# Patient Record
Sex: FEMALE | Race: ASIAN | NOT HISPANIC OR LATINO | Employment: OTHER | URBAN - METROPOLITAN AREA
[De-identification: names, ages, dates, MRNs, and addresses within clinical notes are randomized per-mention and may not be internally consistent; named-entity substitution may affect disease eponyms.]

---

## 2017-01-05 ENCOUNTER — OFFICE VISIT (OUTPATIENT)
Dept: OPHTHALMOLOGY | Facility: CLINIC | Age: 77
End: 2017-01-05
Payer: COMMERCIAL

## 2017-01-05 DIAGNOSIS — H02.834 DERMATOCHALASIS OF BOTH UPPER EYELIDS: ICD-10-CM

## 2017-01-05 DIAGNOSIS — Z96.1 PSEUDOPHAKIA OF BOTH EYES: Primary | ICD-10-CM

## 2017-01-05 DIAGNOSIS — H02.831 DERMATOCHALASIS OF BOTH UPPER EYELIDS: ICD-10-CM

## 2017-01-05 PROCEDURE — 99024 POSTOP FOLLOW-UP VISIT: CPT | Performed by: STUDENT IN AN ORGANIZED HEALTH CARE EDUCATION/TRAINING PROGRAM

## 2017-01-05 ASSESSMENT — TONOMETRY
OS_IOP_MMHG: 18
IOP_METHOD: APPLANATION
OD_IOP_MMHG: 20

## 2017-01-05 ASSESSMENT — VISUAL ACUITY
OD_SC: 20/25
OS_SC: 20/25
METHOD: SNELLEN - LINEAR

## 2017-01-05 ASSESSMENT — EXTERNAL EXAM - LEFT EYE: OS_EXAM: NORMAL

## 2017-01-05 ASSESSMENT — EXTERNAL EXAM - RIGHT EYE: OD_EXAM: NORMAL

## 2017-01-05 ASSESSMENT — CUP TO DISC RATIO
OD_RATIO: 0.1 UD
OS_RATIO: 0.1 UD

## 2017-01-05 NOTE — MR AVS SNAPSHOT
"              After Visit Summary   1/5/2017    Lakhwinder Negron    MRN: [id number]           Patient Information     Date Of Birth          1940        Visit Information        Provider Department      1/5/2017 8:00 AM Julio Doll MD; [employer] [facility]        Today's Diagnoses     Pseudophakia of both eyes    -  1     Dermatochalasis of both upper eyelids           Care Instructions    Continue Ketotifen (Zaditor)  twice a day both eyes.  Use artificial tears up to 4 times per day (Refresh Plus, Systane Balance, or generic artificial tears are ok. Avoid \"get the red out\" drops).   Could use a warm compress as needed     Julio Doll MD  [phone]          Follow-ups after your visit        Follow-up notes from your care team     Return in about 10 months (around 11/5/2017) for Complete Exam.      Your next 10 appointments already scheduled     Jan 12, 2017 10:00 AM   [facility]   [facility]    [address] [facility] [facility] [address] [phone]           Bring a current list of meds and any records pertaining to this visit.  For Physicals, please bring immunization records and any forms needing to be filled out.  Please arrive 10 minutes early to complete paperwork.            Feb 16, 2017 10:30 AM   LAB with BK LAB   [facility]    [address] [facility] [facility] [address] [phone]           Patient must bring picture ID.  Patient should be prepared to give a urine specimen  Please do not eat 10-12 hours before your appointment if you are coming in fasting for labs on lipids, cholesterol, or glucose (sugar).  Pregnant women should follow their Care Team instructions. Water with medications is okay. Do not drink coffee or other fluids.   If you have concerns about taking  your medications, please " ask at office or if scheduling via Accellion, send a message by clicking on Secure Messaging, Message Your Care Team.            Mar 02, 2017  9:00 AM   LAB with BK LAB   [facility]    [address] [facility] [id number] [phone]           Patient must bring picture ID.  Patient should be prepared to give a urine specimen  Please do not eat 10-12 hours before your appointment if you are coming in fasting for labs on lipids, cholesterol, or glucose (sugar).  Pregnant women should follow their Care Team instructions. Water with medications is okay. Do not drink coffee or other fluids.   If you have concerns about taking  your medications, please ask at office or if scheduling via Accellion, send a message by clicking on Secure Messaging, Message Your Care Team.            Mar 09, 2017  9:00 AM   Office Visit with Jonna Cason MD   [facility]    [address] [facility] [id number] [phone]           Bring a current list of meds and any records pertaining to this visit.  For Physicals, please bring immunization records and any forms needing to be filled out.  Please arrive 10 minutes early to complete paperwork.            Aug 16, 2017 10:30 AM   LAB with LAB FIRST FLOOR Counts include 234 beds at the [facility] ([facility])    [address] [place] [id number] [phone]           Patient must bring picture ID.  Patient should be prepared to give a urine specimen  Please do not eat 10-12 hours before your appointment if you are coming in fasting for labs on lipids, cholesterol, or glucose (sugar).  Pregnant women should follow their Care Team instructions. Water with medications is okay. Do not drink coffee or other fluids.   If you have concerns about taking  your medications, please ask at office or if scheduling via Accellion, send a  "message by clicking on Secure Messaging, Message Your Care Team.            Aug 16, 2017 11:00 AM   [facility]    [address] [place] [address] [phone]              Who to contact     If you have questions or need follow up information about today's clinic visit or your schedule please contact [facility] directly at [phone].  Normal or non-critical lab and imaging results will be communicated to you by MyChart, letter or phone within 4 business days after the clinic has received the results. If you do not hear from us within 7 days, please contact the clinic through SIGFOXhart or phone. If you have a critical or abnormal lab result, we will notify you by phone as soon as possible.  Submit refill requests through Spock or call your pharmacy and they will forward the refill request to us. Please allow 3 business days for your refill to be completed.          Additional Information About Your Visit        SIGFOXharEngagementHealth Information     Spock lets you send messages to your doctor, view your test results, renew your prescriptions, schedule appointments and more. To sign up, go to [email or web address] . Click on \"Log in\" on the left side of the screen, which will take you to the Welcome page. Then click on \"Sign up Now\" on the right side of the page.     You will be asked to enter the access code listed below, as well as some personal information. Please follow the directions to create your username and password.     Your access code is: [email or web address]  Expires: 2017  8:48 AM     Your access code will  in 90 days. If you need help or a new code, please call your [facility] or [phone].        Care EveryWhere ID     This is your Care EveryWhere ID. This could be used by other organizations to access your [facility] medical records  [id number]         Blood Pressure from " Last 3 Encounters:   12/09/16 138/68   10/03/16 152/79   09/16/16 146/78    Weight from Last 3 Encounters:   12/09/16 55.339 kg (122 lb)   10/03/16 57.153 kg (126 lb)   09/09/16 57.153 kg (126 lb)              Today, you had the following     No orders found for display         Today's Medication Changes          These changes are accurate as of: 1/5/17  8:53 AM.  If you have any questions, ask your nurse or doctor.               Stop taking these medicines if you haven't already. Please contact your care team if you have questions.     ketotifen 0.025 % Soln ophthalmic solution   Commonly known as:  ZADITOR                    Primary Care Provider Office Phone # Fax #    Jonna Kaleigh Cason [phone]       [facility] [address] [facility] [address]        Thank you!     Thank you for choosing [facility]  for your care. Our goal is always to provide you with excellent care. Hearing back from our patients is one way we can continue to improve our services. Please take a few minutes to complete the written survey that you may receive in the mail after your visit with us. Thank you!             Your Updated Medication List - Protect others around you: Learn how to safely use, store and throw away your medicines at www.disposemymeds.org.          This list is accurate as of: 1/5/17  8:53 AM.  Always use your most recent med list.                   Brand Name Dispense Instructions for use    amLODIPine 10 MG tablet    NORVASC    90 tablet    Take 1 tablet (10 mg) by mouth daily       aspirin 81 MG tablet     100 tablet    Take 1 tablet (81 mg) by mouth daily *       calcium-vitamin D 600-400 MG-UNIT per tablet    CALTRATE    60 tablet    TAKE 1 TABLET BY MOUTH TWICE DAILY FOR BONE HEALTH//IB ZAUG NOJ 1 LUB, IB HNUB NOJ OB ZATIFFANIE PAB LASHA POB TXHA       * CANE, ANY MATERIAL     1 each    1 each.       FLUVIRIN Susp   Generic drug:  Influenza Vac Typ A&B Surf Ant           hydrALAZINE 10 MG tablet    APRESOLINE    1 tablet    Take 1 tablet (10 mg) by mouth daily       imipramine 10 MG tablet    TOFRANIL    180 tablet    TAKE 2 TABLETS BY MOUTH AT BEDTIME // IB HNUB NOJ 2 LUB THAUM MUS PW PAN LASHA NYUAB SIAB       insulin aspart prot & aspart injection    NovoLOG MIX 70/30 FLEXPEN    1 mL    Inject 10 Units Subcutaneous 2 times daily (with meals)       * insulin pen needle 31G X 6 MM    ULTICARE MINI    100 each    Use 2 daily or as directed.       * UNIFINE PENTIPS 31G X 5 MM   Generic drug:  insulin pen needle     100 each    USE AS DIRECTED TWICE DAILY OR AS DIRECTED// SIV RAWS LI QHIA, 1 HNUB 2 ZAUG       olopatadine 0.1 % ophthalmic solution    PATANOL    5 mL    Place 1 drop into both eyes 2 times daily as needed for allergies (itchy eye.)       ONE TOUCH ULTRA test strip   Generic drug:  blood glucose monitoring     200 each    TEST BLOOD SUGAR 2-3 TIMES A DAY//IB HNUB SIV 2-3 ZAUG LI QHIA       ONETOUCH DELICA LANCETS 33G Misc     100 each    USE AS DIRECTED TWO TIMES DAILY // IB HNUB SIV 2 ZAUG LI QHIA       * order for DME     1 each    Equipment being ordered: blood pressure meter with supplies and adult cuff       * order for DME     200 each    Lancets bid for type 2 diabetes on insulin.       * order for DME     1 Device    Equipment being ordered: Shoe lift for leg length difference.       * order for DME     200 each    Test strips for pt's glucometer, brand as covered by insurance Test bid. One touch Delica lancets. Type 2 diabetes on insulin.  Directions: use as directed Quantity: 200       prednisoLONE acetate 1 % ophthalmic susp    PRED FORTE    5 mL    Place 1 drop into both eyes 4 times daily       rosuvastatin 20 MG tablet    CRESTOR    90 tablet    Take 1 tablet (20 mg) by mouth daily       * Notice:  This list has 7 medication(s) that are the same as other medications prescribed for you. Read the directions carefully, and ask your doctor or other care  provider to review them with you.

## 2017-01-05 NOTE — PATIENT INSTRUCTIONS
"Continue Ketotifen (Zaditor)  twice a day both eyes.  Use artificial tears up to 4 times per day (Refresh Plus, Systane Balance, or generic artificial tears are ok. Avoid \"get the red out\" drops).   Could use a warm compress as needed     Julio Doll MD  [phone]    "

## 2017-01-06 ENCOUNTER — TELEPHONE (OUTPATIENT)
Dept: FAMILY MEDICINE | Facility: CLINIC | Age: 77
End: 2017-01-06

## 2017-01-06 NOTE — TELEPHONE ENCOUNTER
Home Care nurse notified.  Patient has hospital follow-up  Schedule next Tuesday.  Patient is currently taking 30 units of Insulin.  Metformin was stopped while in the hospital.   Patient wants to discontinue home care.  In the past, patient has DC home care and has ended up in the hospital.  Home Care nurse was able to convince the patient to see a nurse monthly.  Granddaughter is being trained on how to set-up the patient's medication.    Next 5 appointments (look out 90 days)     Jan 12, 2017 10:00 AM   [facility]    [address] [facility] [address]   [phone]            Mar 09, 2017  9:00 AM   Office Visit with [facility] MD   [facility]    [address] [facility] [address]   [phone]                Gricelda Olguin RN

## 2017-01-06 NOTE — TELEPHONE ENCOUNTER
Blood sugar noted and either in range, high or low, will leave insulin dose the same. Too much variation to be able to see a trend.  Jonna Cason MD

## 2017-01-06 NOTE — TELEPHONE ENCOUNTER
Reason for Call:  Lilliana from [facility]   Detailed comments: Pt released from the hospital 2 weeks ago.  All fasting blood sugar levels 155, 188, 97, 179, 134, 159, 104, 172    Phone Number Patient can be reached at: Lilliana phone#[phone]    Best Time: Anytime  Can we leave a detailed message on this number? YES    Call taken on 1/6/2017 at 2:13 PM by Mikayla Benito

## 2017-01-12 ENCOUNTER — OFFICE VISIT (OUTPATIENT)
Dept: FAMILY MEDICINE | Facility: CLINIC | Age: 77
End: 2017-01-12
Payer: COMMERCIAL

## 2017-01-12 VITALS
BODY MASS INDEX: 27.8 KG/M2 | DIASTOLIC BLOOD PRESSURE: 70 MMHG | SYSTOLIC BLOOD PRESSURE: 132 MMHG | HEIGHT: 56 IN | OXYGEN SATURATION: 97 % | TEMPERATURE: 98.9 F | WEIGHT: 123.6 LBS | HEART RATE: 98 BPM

## 2017-01-12 DIAGNOSIS — E78.5 HYPERLIPIDEMIA LDL GOAL <100: ICD-10-CM

## 2017-01-12 DIAGNOSIS — G81.91 RIGHT HEMIPARESIS (H): ICD-10-CM

## 2017-01-12 DIAGNOSIS — I10 HYPERTENSION, GOAL BELOW 140/90: ICD-10-CM

## 2017-01-12 DIAGNOSIS — I63.549 CEREBROVASCULAR ACCIDENT (CVA) DUE TO OCCLUSION OF CEREBELLAR ARTERY, UNSPECIFIED BLOOD VESSEL LATERALITY (H): ICD-10-CM

## 2017-01-12 DIAGNOSIS — E11.22 TYPE 2 DIABETES MELLITUS WITH STAGE 3 CHRONIC KIDNEY DISEASE, UNSPECIFIED LONG TERM INSULIN USE STATUS: Primary | ICD-10-CM

## 2017-01-12 DIAGNOSIS — N18.30 CKD (CHRONIC KIDNEY DISEASE) STAGE 3, GFR 30-59 ML/MIN (H): ICD-10-CM

## 2017-01-12 DIAGNOSIS — N18.3 TYPE 2 DIABETES MELLITUS WITH STAGE 3 CHRONIC KIDNEY DISEASE, UNSPECIFIED LONG TERM INSULIN USE STATUS: Primary | ICD-10-CM

## 2017-01-12 PROCEDURE — 99214 OFFICE O/P EST MOD 30 MIN: CPT | Performed by: FAMILY MEDICINE

## 2017-01-12 ASSESSMENT — PAIN SCALES - GENERAL: PAINLEVEL: NO PAIN (0)
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
## 2017-01-12 NOTE — NURSING NOTE
"Chief Complaint   Patient presents with     Hospital F/U       Initial /70 mmHg  Pulse 98  Temp(Src) 98.9  F (37.2  C) (Oral)  Ht 4' 8.25\" (1.429 m)  Wt 123 lb 9.6 oz (56.065 kg)  BMI 27.46 kg/m2  SpO2 97%  Breastfeeding? No Estimated body mass index is 27.46 kg/(m^2) as calculated from the following:    Height as of this encounter: 4' 8.25\" (1.429 m).    Weight as of this encounter: 123 lb 9.6 oz (56.065 kg).  BP completed using cuff size: simeon Robles CMA    "

## 2017-01-12 NOTE — MR AVS SNAPSHOT
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   Urgent care M-F 11am-9pm  Sat/Sun 9am-5pm.   Pharmacy   Mon-Th:  8:00am-8pm   Fri:  8:00am-6:00pm  Sat/Sun  8:00am-5:00 pm             Follow-ups after your visit        Follow-up notes from your care team     Return in about 2 months (around 3/12/2017) for Lab Work, medication follow up, Physical Exam, BP Recheck.      Your next 10 appointments already scheduled     Jan 20, 2017 12:30 PM   Return Visit with Sidra Carcamo OD   [facility] ([facility])    [address] [address] [phone]            Feb 16, 2017 10:30 AM   LAB with BK LAB   [facility]    [address] [address] [phone]           Patient must bring picture ID.  Patient should be prepared to give a urine specimen  Please do not eat 10-12 hours before your appointment if you are coming in fasting  for labs on lipids, cholesterol, or glucose (sugar).  Pregnant women should follow their Care Team instructions. Water with medications is okay. Do not drink coffee or other fluids.   If you have concerns about taking  your medications, please ask at office or if scheduling via "[employer] send a message by clicking on Secure Messaging, Message Your Care Team.            Mar 02, 2017  9:00 AM   LAB with BK LAB   [facility]    [address] [facility] [id number] [phone]           Patient must bring picture ID.  Patient should be prepared to give a urine specimen  Please do not eat 10-12 hours before your appointment if you are coming in fasting for labs on lipids, cholesterol, or glucose (sugar).  Pregnant women should follow their Care Team instructions. Water with medications is okay. Do not drink coffee or other fluids.   If you have concerns about taking  your medications, please ask at office or if scheduling via "[employer] send a message by clicking on Secure Messaging, Message Your Care Team.            Mar 09, 2017  9:00 AM   Office Visit with Jonna Cason MD   [facility] ([facility]    [address] [facility] [id number] [phone]           Bring a current list of meds and any records pertaining to this visit.  For Physicals, please bring immunization records and any forms needing to be filled out.  Please arrive 10 minutes early to complete paperwork.            Aug 16, 2017 10:30 AM   LAB with LAB FIRST FLOOR On license of [facility] ([facility])    [address] [place] [id number] [phone]           Patient must bring picture ID.  Patient should be prepared to give a urine specimen  Please do not eat 10-12 hours before your appointment if you are coming in fasting for labs on lipids, cholesterol, or glucose (sugar).   "Pregnant women should follow their Care Team instructions. Water with medications is okay. Do not drink coffee or other fluids.   If you have concerns about taking  your medications, please ask at office or if scheduling via [employer], send a message by clicking on Secure Messaging, Message Your Care Team.            Aug 16, 2017 11:00 AM   Return Visit with Ramon Rodriguez MD   [facility]    [address] [place] [address] [phone]              Who to contact     If you have questions or need follow up information about today's clinic visit or your schedule please contact [facility] directly at [phone].  Normal or non-critical lab and imaging results will be communicated to you by [employer], letter or phone within 4 business days after the clinic has received the results. If you do not hear from us within 7 days, please contact the clinic through [employer] or phone. If you have a critical or abnormal lab result, we will notify you by phone as soon as possible.  Submit refill requests through [employer] or call your pharmacy and they will forward the refill request to us. Please allow 3 business days for your refill to be completed.          Additional Information About Your Visit        [employer] Information     [employer] lets you send messages to your doctor, view your test results, renew your prescriptions, schedule appointments and more. To sign up, go to [email or web address] . Click on \"Log in\" on the left side of the screen, which will take you to the Welcome page. Then click on \"Sign up Now\" on the right side of the page.     You will be asked to enter the access code listed below, as well as some personal information. Please follow the directions to create your username and password.     Your access code is: [email or web address]  Expires: 2017  8:48 AM     Your access code will  in 90 days. If you need help or a new " "code, please call your [facility] or [phone].        Care EveryWhere ID     This is your Care EveryWhere ID. This could be used by other organizations to access your [facility] medical records  [id number]        Your Vitals Were     Pulse Temperature Height BMI (Body Mass Index) Pulse Oximetry Breastfeeding?    98 98.9  F (37.2  C) (Oral) 4' 8.25\" (1.429 m) 27.46 kg/m2 97% No       Blood Pressure from Last 3 Encounters:   01/12/17 132/70   12/09/16 138/68   10/03/16 152/79    Weight from Last 3 Encounters:   01/12/17 123 lb 9.6 oz (56.065 kg)   12/09/16 122 lb (55.339 kg)   10/03/16 126 lb (57.153 kg)              Today, you had the following     No orders found for display       Primary Care Provider Office Phone # Fax #    Jonna Kaleigh Cason [phone]       [facility] [address] [facility] [address]        Thank you!     Thank you for choosing [facility]  for your care. Our goal is always to provide you with excellent care. Hearing back from our patients is one way we can continue to improve our services. Please take a few minutes to complete the written survey that you may receive in the mail after your visit with us. Thank you!             Your Updated Medication List - Protect others around you: Learn how to safely use, store and throw away your medicines at www.disposemymeds.org.          This list is accurate as of: 1/12/17 10:54 AM.  Always use your most recent med list.                   Brand Name Dispense Instructions for use    acetaminophen-codeine 300-30 MG per tablet    TYLENOL #3     as needed       amLODIPine 10 MG tablet    NORVASC    90 tablet    Take 1 tablet (10 mg) by mouth daily       aspirin 81 MG tablet     100 tablet    Take 1 tablet (81 mg) by mouth daily *       calcium-vitamin D 600-400 MG-UNIT per tablet    CALTRATE    60 tablet    TAKE 1 TABLET BY MOUTH TWICE DAILY FOR BONE HEALTH//IB ZAUG NOJ 1 LUB, IB HNUB NOJ " OB ZAUG PAB LASHA POB TXHA       * CANE, ANY MATERIAL     1 each    1 each.       hydrALAZINE 10 MG tablet    APRESOLINE    1 tablet    Take 1 tablet (10 mg) by mouth daily       imipramine 10 MG tablet    TOFRANIL    180 tablet    TAKE 2 TABLETS BY MOUTH AT BEDTIME // IB HNUB NOJ 2 LUB THAUM MUS PW PAN LASHA NYUAB SIAB       insulin aspart prot & aspart injection    NovoLOG MIX 70/30 FLEXPEN    1 mL    Inject 10 Units Subcutaneous 2 times daily (with meals)       * insulin pen needle 31G X 6 MM    ULTICARE MINI    100 each    Use 2 daily or as directed.       * UNIFINE PENTIPS 31G X 5 MM   Generic drug:  insulin pen needle     100 each    USE AS DIRECTED TWICE DAILY OR AS DIRECTED// SIV RAWS CELESTINE RUANO, 1 HNUB 2 ZAUG       olopatadine 0.1 % ophthalmic solution    PATANOL    5 mL    Place 1 drop into both eyes 2 times daily as needed for allergies (itchy eye.)       ONE TOUCH ULTRA test strip   Generic drug:  blood glucose monitoring     200 each    TEST BLOOD SUGAR 2-3 TIMES A DAY//IB HNUB SIV 2-3 ZAUG LI QHIA       ONETOUCH DELICA LANCETS 33G Misc     100 each    USE AS DIRECTED TWO TIMES DAILY // IB HNUB SIV 2 ZAUG LI QHIA       * order for DME     1 each    Equipment being ordered: blood pressure meter with supplies and adult cuff       * order for DME     200 each    Lancets bid for type 2 diabetes on insulin.       * order for DME     1 Device    Equipment being ordered: Shoe lift for leg length difference.       * order for DME     200 each    Test strips for pt's glucometer, brand as covered by insurance Test bid. One touch Delica lancets. Type 2 diabetes on insulin.  Directions: use as directed Quantity: 200       prednisoLONE acetate 1 % ophthalmic susp    PRED FORTE    5 mL    Place 1 drop into both eyes 4 times daily       rosuvastatin 20 MG tablet    CRESTOR    90 tablet    Take 1 tablet (20 mg) by mouth daily       * Notice:  This list has 7 medication(s) that are the same as other medications prescribed for  you. Read the directions carefully, and ask your doctor or other care provider to review them with you.

## 2017-01-12 NOTE — PATIENT INSTRUCTIONS
How to contact your care team: [phone] Pharmacy [phone]   FAIZA GILBERT MD KATYA GEORGIEV, PA-C CHRIS JONES, PA-C NAM HO, MD JONATHAN BATES, MD ARVIN VOCAL, MD    Clinic hours M-Th 7am-7pm Fri 7am-5pm.   Urgent care M-F 11am-9pm  Sat/Sun 9am-5pm.   Pharmacy   Mon-Th:  8:00am-8pm   Fri:  8:00am-6:00pm  Sat/Sun  8:00am-5:00 pm       Diabetes: Understanding Carbohydrates, Fats, and Protein  Food is a source of fuel and nourishment for your body. It s also a source of pleasure. Having diabetes doesn t mean you have to eat special foods or give up desserts. Instead, your dietitian can show you how to plan meals to suit your body. To start, learn how different foods affect blood sugar.    Carbohydrates  Carbohydrates are the main source of fuel for the body. Carbohydrates raise blood sugar. Many people think carbohydrates are only found in pasta or bread. But carbohydrates are actually in many kinds of foods:    Sugars occur naturally in foods such as fruit, milk, honey, and molasses. Sugars can also be added to many foods, from cereals and yogurt to candy and desserts. Sugars raise blood sugar.    Starches are found in bread, cereals, pasta, and dried beans. They re also found in corn, peas, potatoes, yam, acorn squash, and butternut squash. Starches also raise blood sugar.     Fiber is found in foods such as vegetables, fruits, and whole grains. Unlike other carbs, fiber isn t digested or absorbed. So it doesn t raise blood sugar. In fact, fiber can help keep blood sugar from rising too fast. It also helps keep blood cholesterol at a healthy level.     Did You Know?  Even though carbohydrates raise blood sugar, it s best to have some in every meal. They are an important part of a healthy diet.   Fat  Fat is an energy source that can be stored until needed. Fat does not raise blood sugar. However, it can raise blood cholesterol, increasing the risk of heart disease. Fat is also  high in calories, which can cause weight gain. Not all types of fat are the same.  More Healthy:    Monounsaturated fats are mostly found in vegetable oils, such as olive, canola, and peanut oils. They are also found in avocados and some nuts. Monounsaturated fats are healthy for your heart. That s because they lower LDL (unhealthy) cholesterol.    Polyunsaturated fats are mostly found in vegetable oils, such as corn, safflower, and soybean oils. They are also found in some seeds, nuts, and fish. Polyunsaturated fats lower LDL (unhealthy) cholesterol. So, choosing them instead of saturated fats is healthy for your heart. Certain unsaturated fats can help lower triglycerides.   Less Healthy:    Saturated fats are found in animal products, such as meat, poultry, whole milk, lard, and butter. Saturated fats raise LDL cholesterol and are not healthy for your heart.    Hydrogenated oils and trans fats are formed when vegetable oils are processed into solid fats. They are found in many processed foods. Hydrogenated oils and trans fats raise LDL cholesterol and lower HDL (healthy) cholesterol. They are not healthy for your heart.  Protein  Protein helps the body build and repair muscle and other tissue. Protein has little or no effect on blood sugar. However, many foods that contain protein also contain saturated fat. By choosing low-fat protein sources, you can get the benefits of protein without the extra fat:    Plant protein is found in dry beans and peas, nuts, and soy products, such as tofu and soymilk. These sources tend to be cholesterol-free and low in saturated fat.    Animal protein is found in fish, poultry, meat, cheese, milk, and eggs. These contain cholesterol and can be high in saturated fat. Aim for lean, lower-fat choices.    4476-4293 [employer]. [address] [place] [address], [place] [address]. All rights reserved. This information is not intended as a substitute for professional medical care.  Always follow your healthcare professional's instructions.        Symptoms of a Stroke  During a stroke, blood stops flowing to part of the brain. This can damage areas in the brain that control the rest of the body. Get help right away if any of these symptoms come on suddenly, even if the symptoms don t last.  Know the symptoms of a stroke     A sudden feeling of weakness on one side of your body may be a sign that you are having a stroke.     Weakness. You may feel a sudden weakness, tingling, or a loss of feeling on 1 side of your face or body including your arm or leg.     Vision problems. You may have sudden double vision or trouble seeing in 1 or both eyes.    Speech problems. You may have sudden trouble talking, slurred speech, or problems understanding others.    Headache. You may have a sudden, severe headache.    Movement problems. You may have sudden trouble walking, dizziness, a feeling of spinning, a loss of balance, a feeling of falling, or blackouts.    Seizure. You may also have a seizure with a large or hemorrhagic stroke.   Remember: If you have any of these symptoms, call 911 and your doctor as soon as possible.  F.A.S.T. is an easy way to remember the signs of a stroke. When you see these signs, you will know that you need to call 911 fast.   F.A.S.T. stands for:    F is for face drooping - One side of the face is drooping or num. When the person smiles, the smile is uneven.    A is for arm weakness - One arm is weak or numb. When the person lifts both arms at the same time, one arm may drift downward.    S is for speech difficulty - You may notice slurred speech or difficulty speaking. The person can't repeat a simple sentence correctly when asked.    T is for time to dial 911 - If someone shows any of these symptoms, even if they go away, call 911 immediately. Make note of the time the symptoms first appeared.     0720-3049 [employer]. [address] [place] [address], [place] [address].  All rights reserved. This information is not intended as a substitute for professional medical care. Always follow your healthcare professional's instructions.

## 2017-01-20 ENCOUNTER — TELEPHONE (OUTPATIENT)
Dept: FAMILY MEDICINE | Facility: CLINIC | Age: 77
End: 2017-01-20

## 2017-01-20 ENCOUNTER — OFFICE VISIT (OUTPATIENT)
Dept: OPTOMETRY | Facility: CLINIC | Age: 77
End: 2017-01-20
Payer: COMMERCIAL

## 2017-01-20 DIAGNOSIS — G89.4 CHRONIC PAIN SYNDROME: ICD-10-CM

## 2017-01-20 DIAGNOSIS — E11.22 TYPE 2 DIABETES MELLITUS WITH STAGE 3 CHRONIC KIDNEY DISEASE, UNSPECIFIED LONG TERM INSULIN USE STATUS: Primary | ICD-10-CM

## 2017-01-20 DIAGNOSIS — M81.0 SENILE OSTEOPOROSIS: ICD-10-CM

## 2017-01-20 DIAGNOSIS — H10.13 ALLERGIC CONJUNCTIVITIS OF BOTH EYES: Primary | ICD-10-CM

## 2017-01-20 DIAGNOSIS — N18.3 TYPE 2 DIABETES MELLITUS WITH STAGE 3 CHRONIC KIDNEY DISEASE, UNSPECIFIED LONG TERM INSULIN USE STATUS: Primary | ICD-10-CM

## 2017-01-20 PROCEDURE — 99213 OFFICE O/P EST LOW 20 MIN: CPT | Performed by: OPTOMETRIST

## 2017-01-20 RX ORDER — OLOPATADINE HYDROCHLORIDE 1 MG/ML
1 SOLUTION/ DROPS OPHTHALMIC 2 TIMES DAILY PRN
Qty: 5 ML | Refills: 12 | Status: SHIPPED | OUTPATIENT
Start: 2017-01-20 | End: 2020-01-20

## 2017-01-20 ASSESSMENT — VISUAL ACUITY
OD_SC: 20/25
CORRECTION_TYPE: GLASSES
OD_SC+: -1
OS_SC+: -1
OS_SC: 20/25
METHOD: SNELLEN - LINEAR

## 2017-01-20 ASSESSMENT — SLIT LAMP EXAM - LIDS
COMMENTS: NORMAL
COMMENTS: NORMAL

## 2017-01-20 ASSESSMENT — EXTERNAL EXAM - LEFT EYE: OS_EXAM: NORMAL

## 2017-01-20 ASSESSMENT — EXTERNAL EXAM - RIGHT EYE: OD_EXAM: NORMAL

## 2017-01-20 NOTE — PROGRESS NOTES
Chief Complaint   Patient presents with     Eye Problem Both Eyes           HPI    Affected eye(s):  Both   Symptoms:     Tearing   Itching            Comments:  Eyes are really watery.  Not all the time but sometimes she will wipe her eye till red.  Both eyes itch as well - Inner corner of eye.  Noticed a couple of weeks after cataract surgery.  Surgery done in Sept and Oct of 2016.  Pt uses Ketotifen fumerate - 2-3 times a day             Marie Jones  Optometric Tech      See Review Of Systems     HPI and ROS performed by Optometrist  scribed by [unfilled]    Medical, surgical and family histories reviewed and updated 1/20/2017.         OBJECTIVE: See Ophthalmology exam    ASSESSMENT:    ICD-10-CM    1. Allergic conjunctivitis of both eyes H10.13 olopatadine (PATANOL) 0.1 % ophthalmic solution      PLAN:    Patient Instructions   Stop the ketotifen drops. Use preservative free Systane drops 2 to 3 times a day for 2 weeks. If eyes are still itchy after 2 weeks fill the Patanol drop prescription and use one drop in both eyes twice a day.

## 2017-01-20 NOTE — PATIENT INSTRUCTIONS
Stop the ketotifen drops. Use preservative free Systane drops 2 to 3 times a day for 2 weeks. If eyes are still itchy after 2 weeks fill the Patanol drop prescription and use one drop in both eyes twice a day.

## 2017-02-09 DIAGNOSIS — E11.22 TYPE 2 DIABETES MELLITUS WITH DIABETIC CHRONIC KIDNEY DISEASE, UNSPECIFIED CKD STAGE, UNSPECIFIED LONG TERM INSULIN USE STATUS: ICD-10-CM

## 2017-02-09 DIAGNOSIS — I63.39 CEREBROVASCULAR ACCIDENT (CVA) DUE TO THROMBOSIS OF OTHER CEREBRAL ARTERY (H): ICD-10-CM

## 2017-02-09 DIAGNOSIS — I10 ESSENTIAL HYPERTENSION: Primary | ICD-10-CM

## 2017-02-09 NOTE — TELEPHONE ENCOUNTER
PERRYTOUCH DELICA LANCETS 33G MISC      Last Written Prescription Date: 12/09/16  Last Fill Quantity: 100,  # refills: 0   Last Office Visit with [facility], P or  Health prescribing provider: 1/12/17                                           Next 5 appointments (look out 90 days)     Mar 09, 2017  9:00 AM   Office Visit with Jonna Cason MD   [facility] ([facility])    [address] [facility] [address]   [phone]                    aspirin 81 MG tablet      Last Written Prescription Date: 01/28/16  Last Fill Quantity: 100, # refills: 4  Last Office Visit with [facility], [facility] or  Health prescribing provider: 1/12/17     Next 5 appointments (look out 90 days)     Mar 09, 2017  9:00 AM   Office Visit with Jonna Cason MD   [facility] ([facility])    [address] [facility] [address]   [phone]                   BP Readings from Last 3 Encounters:   01/12/17 132/70   12/09/16 138/68   10/03/16 152/79     AST       40   9/22/2014  ALT       37   9/22/2014  CREATININE   Date Value Ref Range Status   12/02/2016 1.66* 0.52 - 1.04 mg/dL Final           Alyx Canales  [facility]

## 2017-02-10 ENCOUNTER — DOCUMENTATION ONLY (OUTPATIENT)
Dept: FAMILY MEDICINE | Facility: CLINIC | Age: 77
End: 2017-02-10

## 2017-02-10 DIAGNOSIS — N18.3 TYPE 2 DIABETES MELLITUS WITH STAGE 3 CHRONIC KIDNEY DISEASE, UNSPECIFIED LONG TERM INSULIN USE STATUS: Primary | ICD-10-CM

## 2017-02-10 DIAGNOSIS — E11.22 TYPE 2 DIABETES MELLITUS WITH STAGE 3 CHRONIC KIDNEY DISEASE, UNSPECIFIED LONG TERM INSULIN USE STATUS: Primary | ICD-10-CM

## 2017-02-10 DIAGNOSIS — N18.30 CKD (CHRONIC KIDNEY DISEASE) STAGE 3, GFR 30-59 ML/MIN (H): ICD-10-CM

## 2017-02-13 RX ORDER — ASPIRIN 81 MG
TABLET, DELAYED RELEASE (ENTERIC COATED) ORAL
Qty: 100 TABLET | Refills: 2 | Status: SHIPPED | OUTPATIENT
Start: 2017-02-13 | End: 2017-07-31

## 2017-02-13 RX ORDER — LANCETS 33 GAUGE
EACH MISCELLANEOUS
Qty: 100 EACH | Refills: 1 | Status: SHIPPED | OUTPATIENT
Start: 2017-02-13 | End: 2017-06-05

## 2017-02-13 NOTE — TELEPHONE ENCOUNTER
Prescription(s) approved per [facility] Refill Protocol. (No labs needed for Aspirin)    Lito Sprague RN

## 2017-02-16 DIAGNOSIS — N18.30 CKD (CHRONIC KIDNEY DISEASE) STAGE 3, GFR 30-59 ML/MIN (H): ICD-10-CM

## 2017-02-16 DIAGNOSIS — E11.22 TYPE 2 DIABETES MELLITUS WITH STAGE 3 CHRONIC KIDNEY DISEASE, UNSPECIFIED LONG TERM INSULIN USE STATUS: ICD-10-CM

## 2017-02-16 DIAGNOSIS — N18.3 TYPE 2 DIABETES MELLITUS WITH STAGE 3 CHRONIC KIDNEY DISEASE, UNSPECIFIED LONG TERM INSULIN USE STATUS: ICD-10-CM

## 2017-02-16 LAB
ALBUMIN SERPL-MCNC: 3.8 G/DL (ref 3.4–5)
ANION GAP SERPL CALCULATED.3IONS-SCNC: 5 MMOL/L (ref 3–14)
BUN SERPL-MCNC: 22 MG/DL (ref 7–30)
CALCIUM SERPL-MCNC: 8.8 MG/DL (ref 8.5–10.1)
CHLORIDE SERPL-SCNC: 108 MMOL/L (ref 94–109)
CO2 SERPL-SCNC: 28 MMOL/L (ref 20–32)
CREAT SERPL-MCNC: 1.66 MG/DL (ref 0.52–1.04)
ERYTHROCYTE [DISTWIDTH] IN BLOOD BY AUTOMATED COUNT: 12.6 % (ref 10–15)
GFR SERPL CREATININE-BSD FRML MDRD: 30 ML/MIN/1.7M2
GLUCOSE SERPL-MCNC: 110 MG/DL (ref 70–99)
HBA1C MFR BLD: 6 % (ref 4.3–6)
HCT VFR BLD AUTO: 38 % (ref 35–47)
HGB BLD-MCNC: 12.2 G/DL (ref 11.7–15.7)
MCH RBC QN AUTO: 28.6 PG (ref 26.5–33)
MCHC RBC AUTO-ENTMCNC: 32.1 G/DL (ref 31.5–36.5)
MCV RBC AUTO: 89 FL (ref 78–100)
PHOSPHATE SERPL-MCNC: 3.7 MG/DL (ref 2.5–4.5)
PLATELET # BLD AUTO: 171 10E9/L (ref 150–450)
POTASSIUM SERPL-SCNC: 4.3 MMOL/L (ref 3.4–5.3)
RBC # BLD AUTO: 4.27 10E12/L (ref 3.8–5.2)
SODIUM SERPL-SCNC: 141 MMOL/L (ref 133–144)
WBC # BLD AUTO: 6.6 10E9/L (ref 4–11)

## 2017-02-16 PROCEDURE — 80069 RENAL FUNCTION PANEL: CPT | Performed by: FAMILY MEDICINE

## 2017-02-16 PROCEDURE — 85027 COMPLETE CBC AUTOMATED: CPT | Performed by: FAMILY MEDICINE

## 2017-02-16 PROCEDURE — 36415 COLL VENOUS BLD VENIPUNCTURE: CPT | Performed by: FAMILY MEDICINE

## 2017-02-16 PROCEDURE — 83036 HEMOGLOBIN GLYCOSYLATED A1C: CPT | Performed by: FAMILY MEDICINE

## 2017-02-16 NOTE — LETTER
[facility]  [address] [facility] [address]  [phone]             February 17, 2017    Lakhwinder Negron  [address] [facility] [address]          Dear Lakhwinder Negron,    Your test results are attached. I am happy to let you know that they are stable and your medications can stay the same.    The diabetes test looks very good. The kidney test is low but has not changed. We can recheck labs in 3 months. Enclosed are the results.  Results for orders placed or performed in visit on 02/16/17   Renal panel   Result Value Ref Range    Sodium 141 133 - 144 mmol/L    Potassium 4.3 3.4 - 5.3 mmol/L    Chloride 108 94 - 109 mmol/L    Carbon Dioxide 28 20 - 32 mmol/L    Anion Gap 5 3 - 14 mmol/L    Glucose 110 (H) 70 - 99 mg/dL    Urea Nitrogen 22 7 - 30 mg/dL    Creatinine 1.66 (H) 0.52 - 1.04 mg/dL    GFR Estimate 30 (L) >60 mL/min/1.7m2    GFR Estimate If Black 36 (L) >60 mL/min/1.7m2    Calcium 8.8 8.5 - 10.1 mg/dL    Phosphorus 3.7 2.5 - 4.5 mg/dL    Albumin 3.8 3.4 - 5.0 g/dL   Hemoglobin A1c   Result Value Ref Range    Hemoglobin A1C 6.0 4.3 - 6.0 %   CBC with platelets   Result Value Ref Range    WBC 6.6 4.0 - 11.0 10e9/L    RBC Count 4.27 3.8 - 5.2 10e12/L    Hemoglobin 12.2 11.7 - 15.7 g/dL    Hematocrit 38.0 35.0 - 47.0 %    MCV 89 78 - 100 fl    MCH 28.6 26.5 - 33.0 pg    MCHC 32.1 31.5 - 36.5 g/dL    RDW 12.6 10.0 - 15.0 %    Platelet Count 171 150 - 450 10e9/L       Please call me if you have any questions about these test results or about your care.    Sincerely,    Jonna Cason MD/ekaterina

## 2017-02-17 ENCOUNTER — DOCUMENTATION ONLY (OUTPATIENT)
Dept: CARE COORDINATION | Facility: CLINIC | Age: 77
End: 2017-02-17

## 2017-02-17 NOTE — PROGRESS NOTES
Dear Lakhwinder Negron,    Your test results are attached. I am happy to let you know that they are stable and your medications can stay the same.    The diabetes test looks very good. The kidney test is low but has not changed. We can recheck labs in 3 months.     Please call me if you have any questions about these test results or about your care.    Sincerely,    Jonna Cason MD

## 2017-02-18 NOTE — PROGRESS NOTES
[facility] now requests orders and shares plan of care/discharge summaries for some patients through [employer].  Please REPLY TO THIS MESSAGE in order to give authorization for orders when needed.  This is considered a verbal order, you will still receive a faxed copy of orders for signature.  Thank you for your assistance in improving collaboration for our patients.    ORDER  1 Month 2, 2 PRN    MD SUMMARY/PLAN OF CARE  SITUATION Patient is a 76 year old female living with family in [facility] level home in [place]. Patient has been receiving home care services for SN every other week for medication managment as well as diabetic education, pain managment, and over all health concerns. Patient over the past 60 days has remained compliant with medication.  Medications have been set up by SN and granddaughter has been instructed and is currently doing.  No changes to plan of care. Due to cognitive level patient needs ongoing eduation. Patient has been compliant with BS monitoring 2 times daily. BS at visit 125, 30 day average 174.  Patient has reported continued compliance with ADA diet and meals are provided by caregivers. Patient ambulates with cane and no falls have been reported. Patient needs assistance and or supervision when ambulating outside of the home. Patient continues to have neuropathic pain to both hands, rating remains at 1 to 5/10. Manages with medication PRN tylenol 3. VS /82, P 67, T 96.8 oral, RR 18, lung CTA, denies SOB. SPO2 98 RA.  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 Day program during the week.   ESTIMATED LENTH OF HOME CARE: ongoing  community wellness nursing anticipated.

## 2017-02-26 ENCOUNTER — DOCUMENTATION ONLY (OUTPATIENT)
Dept: FAMILY MEDICINE | Facility: CLINIC | Age: 77
End: 2017-02-26

## 2017-02-27 NOTE — PROGRESS NOTES
What is a PVL? Patient had labs done on 2-. She does not need any other lab work done.  Jonna Cason MD     Please notify patient.  Jonna Cason MD

## 2017-03-09 ENCOUNTER — OFFICE VISIT (OUTPATIENT)
Dept: FAMILY MEDICINE | Facility: CLINIC | Age: 77
End: 2017-03-09
Payer: COMMERCIAL

## 2017-03-09 VITALS
DIASTOLIC BLOOD PRESSURE: 66 MMHG | OXYGEN SATURATION: 96 % | HEIGHT: 56 IN | TEMPERATURE: 97.8 F | HEART RATE: 84 BPM | BODY MASS INDEX: 28.12 KG/M2 | SYSTOLIC BLOOD PRESSURE: 105 MMHG | WEIGHT: 125 LBS

## 2017-03-09 DIAGNOSIS — G89.4 CHRONIC PAIN SYNDROME: ICD-10-CM

## 2017-03-09 DIAGNOSIS — I10 HYPERTENSION, GOAL BELOW 140/90: ICD-10-CM

## 2017-03-09 DIAGNOSIS — N18.30 TYPE 2 DIABETES MELLITUS WITH STAGE 3 CHRONIC KIDNEY DISEASE, WITHOUT LONG-TERM CURRENT USE OF INSULIN (H): Primary | ICD-10-CM

## 2017-03-09 DIAGNOSIS — E11.22 TYPE 2 DIABETES MELLITUS WITH STAGE 3 CHRONIC KIDNEY DISEASE, WITHOUT LONG-TERM CURRENT USE OF INSULIN (H): Primary | ICD-10-CM

## 2017-03-09 DIAGNOSIS — N18.30 CKD (CHRONIC KIDNEY DISEASE) STAGE 3, GFR 30-59 ML/MIN (H): ICD-10-CM

## 2017-03-09 DIAGNOSIS — G81.91 RIGHT HEMIPARESIS (H): ICD-10-CM

## 2017-03-09 DIAGNOSIS — I63.549 CEREBROVASCULAR ACCIDENT (CVA) DUE TO OCCLUSION OF CEREBELLAR ARTERY, UNSPECIFIED BLOOD VESSEL LATERALITY (H): ICD-10-CM

## 2017-03-09 PROCEDURE — 99214 OFFICE O/P EST MOD 30 MIN: CPT | Performed by: FAMILY MEDICINE
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
## 2017-03-09 NOTE — Clinical Note
Lakhwinder is doing very well. She will return for labs in June. Are there any labs we should add for her chronic kidney disease? Her blood pressure was pretty low today. Jonna Cason MD

## 2017-03-09 NOTE — NURSING NOTE
"Chief Complaint   Patient presents with     Diabetes     Fasting     Chronic Disease Management       Initial /66 (BP Location: Left arm, Patient Position: Chair, Cuff Size: Adult Regular)  Pulse 84  Temp 97.8  F (36.6  C) (Oral)  Ht 4' 8.25\" (1.429 m)  Wt 125 lb (56.7 kg)  SpO2 96%  BMI 27.78 kg/m2 Estimated body mass index is 27.78 kg/(m^2) as calculated from the following:    Height as of this encounter: 4' 8.25\" (1.429 m).    Weight as of this encounter: 125 lb (56.7 kg).  Medication Reconciliation: complete     Ronni Robles CMA    "

## 2017-03-09 NOTE — PATIENT INSTRUCTIONS
How to contact your care team: [phone] Pharmacy [phone]   FAIZA GILBERT MD KATYA GEORGIEV, PA-C CHRIS JONES, PA-C NAM HO, MD JONATHAN BATES, MD ARVIN VOCAL, MD    Clinic hours M-Th 7am-7pm Fri 7am-5pm.   Urgent care M-F 11am-9pm  Sat/Sun 9am-5pm.   Pharmacy   Mon-Th:  8:00am-8pm   Fri:  8:00am-6:00pm  Sat/Sun  8:00am-5:00 pm       Kab Mob Ntshav Qab Zib (Diabetes) Ruddy 2 Yog Dab Tsi ?  Ntshav qab zib ruddy 2 yog ib tug mob ncua ntev (tag sim neej). Yog muaj ntshav qab zib, cov piam thaj hauv koj cov ntshav yuav siab dhau. Cov ntshav qab zib yuav ua viet koj lub cev hloov khoom noj tsis tau mus ua lub zog. Yog li ntawd koj thiaj mloog zoo li nkees thiab qaug zog, tshwj xeeb yog don qab uas noj tag. Lawrence tswj koj cov ntshav qab zib txhais tau hais tias lawrence ua kom muaj qhov hloov uas kalie zaum yuav nyuaj heev thaum xub thawj. Koj cov neeg saib xyuas lawrence noj qab haus huv yuav pab koj.  Tshuaj Xyuas Koj Cov Piam Thaj Hauv Ntshav    Zoo tshaj mas koj yuav tau tshuaj xyuas koj cov piam thaj hauv ntshav txhua txhua hnub. Qhov no yuav qhia viet koj paub hais tias yan koj cov piam thaj hauv ntshav puas tseem nyob viet hauv koj lub ruddy phiaj:    Koj cov neeg saib xyuas lawrence noj qab haus huv yuav qhia koj hais tias koj yuav tau ntsuam xyuas heev npaum licas thiab thaum twg.    Thaum koj cov piam thaj hauv ntshav nyob viet hauv koj lub ruddy phiaj lawm, koj li phiaj xwm khoom noj, phiaj xwm dejnum, thiab lawrence siv tshuaj carlie mob yuav mus tau zoo txhawm kom tswj tau koj tsis muaj mob.    Yog tias koj cov piam thaj hauv ntshav siab dhau lossis qis dhau, koj cov neeg saib xyuas lawrence noj qab haus huv yuav hloov koj kalie phiaj xwm khoom noj, phiaj xwm dejnum, lossis hloov carlie koj qhov lawrence siv tshuaj carlie mob.  Ua Raws Koj Qhov Phiaj Xwm Khoom Noj (Follow Your Meal Plan)  Lawrence ua raws koj qhov phiaj xwm khoom noj yuav pab tswj tau koj cov piam thaj hauv koj cov ntshav. Nws kuj tseem pab koj tswj tau koj qhov  hnyav thiab. Qhov hnyav tshaj qhov tsim nyog yuav ua viet koj lub cev tsis tuaj yeem siv nws cov insulin los mus hloov khoom noj mus ua lub zog tau:    Koj cov neeg saib xyuas lawrence noj qab haus huv yuav pab koj tsim ib lub phiaj xwm khoom noj uas siv tau zoo viet koj.    Koj tsis tas yuav caiv tag nrho cov khoom noj uas koj nyiam. Tab sis kalie zaum koj yuav tau noj tsawg gabriella qub ntawm qee yam khoom noj. Lawrence noj khoom noj haum nrog zaub, txiv hmab txiv ntoo, nqaij ntshiv, thiab cov khoom ua ntsiav tag nrho yuav pab tswj koj cov piam thaj hauv ntshav.    Koj yuav tau noj cov khoom noj kom tab don haum xwb hais txog qhov ntau tsawg. Noj koj cov khoom noj thiab khoom noj ua si nyob viet tib lub sij hawm txhua hnub. Txhob noj mov nrug pluas.  Ua Kom Lub Cev Ua Haujlwm Tas Li (Be Physically Active)  Lawrence ua kom ua haujlwm tas li yuav pab ua kom koj cov piam thaj hauv ntshav nqis. Nws ua li no los ntawm lawrence pab koj lub cev siv cov insulin hloov khoom noj mus ua lub zog. Dejnum los kuj tseem pab koj tswj tuav tau koj qhov hnyav thiab:    Koj cov neeg saib xyuas lawrence noj qab haus huv yuav ua haujlwm nrog koj los mus tsim ib lub phiaj xwm dejnum uas haum viet koj.    Koj lub phiaj xwm dejnum yuav ua raws koj li hnub nyoog, lawrence noj qab haus huv txhua yam, thiab ruddy dejnum uas koj nyiam ua. Yog tias muaj coob leej, lawrence taug lawrence ua si don qab uas noj tag yuav yog ib qho lawrence pib uas zoo heev.  Saib Xyuas Koj Tus Kheej (Take Care of Yourself)  Thaum koj muaj ntshav qab zib, kalie zaum koj yuav muaj lwm yam teeb meem romeo lawrence noj qab haus huv ntau gabriella tuaj. Cov no xam muaj teeb meem ko taw, qhov muag, plawv, thiab raum:    Koj cov neeg saib xyuas lawrence noj qab haus huv yuav qhia viet koj txog lawrence yuav saib xyuas koj tus kheej licas kom pab tiv thaiv tau cov teeb meem no.    Thiab koj tseem yuav tau muaj lawrence tshuaj xyuas tas li, xam muaj lawrence ntsuam xyuas qhov muag thiab ko taw, thiab lawrence ntsuam xyuas ntshav. Yam tsawg 2 zaug nyob viet ib lub  xyoos, hais kom koj tus neeg saib xyuas lawrence noj qab haus huv muab ib qho lawrence ntsuam xyuas A1C (A1C test) viet koj. Qhov lawrence ntsuam xyuas ntshav no yuav pab qhia hais tias koj twb tswj tau koj cov piam thaj hauv ntshav tau zoo npaum licas suav txij 2 mus viet 3 lub hlis dhau los lawm.    Yog tias koj haus luam yeeb, meliton mere tseg! Lawrence haus luam yeeb yuav ua viet koj cov ntshav qab zib thiab cov mob uas koj tuaj yeem muaj tau los ntawm lawrence haus luam yeeb mob loj gabriella tuaj. Hais kom koj tus neeg saib xyuas lawrence noj qab haus huv muab cov hawa lawrence los mus txiav luam yeeb.    8517-0596 The "[employer]. [address] [place] [id number]. All rights reserved. This information is not intended as a substitute for professional medical care. Always follow your healthcare professional's instructions.

## 2017-03-09 NOTE — MR AVS SNAPSHOT
After Visit Summary   3/9/2017    Lakhwinder Negron    MRN: [id number]           Patient Information     Date Of Birth          1940        Visit Information        Provider Department      3/9/2017 8:45 AM Jnona Cason MD; [employer] [facility]        Today's Diagnoses     Type 2 diabetes mellitus with stage 3 chronic kidney disease, without long-term current use of insulin (H)    -  1    CKD (chronic kidney disease) stage 3, GFR 30-59 ml/min        Cerebrovascular accident (CVA) due to occlusion of cerebellar artery, unspecified blood vessel laterality (H)        Right hemiparesis (H)        Hypertension, goal below 140/90        Chronic pain syndrome          Care Instructions    How to contact your care team: [phone] Pharmacy [phone]   FAIZA GILBERT MD KATYA GEORGIEV, PA-C CHRIS JONES, PA-C NAM HO, MD JONATHAN BATES, MD ARVIN VOCAL, MD    Clinic hours M-Th 7am-7pm Fri 7am-5pm.   Urgent care M-F 11am-9pm  Sat/Sun 9am-5pm.   Pharmacy   Mon-Th:  8:00am-8pm   Fri:  8:00am-6:00pm  Sat/Sun  8:00am-5:00 pm       Kab Mob Ntshav Qab Zib (Diabetes) Ruddy 2 Yog Dab Tsi ?  Ntshav qab zib ruddy 2 yog ib tug mob ncua ntev (tag sim neej). Yog muaj ntshav qab zib, cov piam thaj hauv koj cov ntshav yuav siab dhau. Cov ntshav qab zib yuav ua viet koj lub cev hloov khoom noj tsis tau mus ua lub zog. Yog li ntawd koj thiaj mloog zoo li nkees thiab qaug zog, tshwj xeeb yog don qab uas noj tag. Lawrence tswj koj cov ntshav qab zib txhais tau hais tias lawrence ua kom muaj qhov hloov uas kalie zaum yuav nyuaj heev thaum xub thawj. Koj cov neeg saib xyuas lawrence noj qab haus huv yuav pab koj.  Tshuaj Xyuas Koj Cov Piam Thaj Hauv Ntshav    Zoo tshaj mas koj yuav tau tshuaj xyuas koj cov piam thaj hauv ntshav txhua txhua hnub. Qhov no yuav qhia viet koj paub hais tias yan koj cov piam thaj hauv ntshav puas tseem nyob viet hauv koj lub ruddy phiaj:    Koj cov  neeg saib xyuas lawrence noj qab haus huv yuav qhia koj hais tias koj yuav tau ntsuam xyuas heev npaum licas thiab thaum twg.    Thaum koj cov piam thaj hauv ntshav nyob viet hauv koj lub ruddy phiaj lawm, koj li phiaj xwm khoom noj, phiaj xwm dejnum, thiab lawrence siv tshuaj carlie mob yuav mus tau zoo txhawm kom tswj tau koj tsis muaj mob.    Yog tias koj cov piam thaj hauv ntshav siab dhau lossis qis dhau, koj cov neeg saib xyuas lawrence noj qab haus huv yuav hloov koj kalie phiaj xwm khoom noj, phiaj xwm dejnum, lossis hloov carlie koj qhov lawrence siv tshuaj carlie mob.  Ua Raws Koj Qhov Phiaj Xwm Khoom Noj (Follow Your Meal Plan)  Lawrence ua raws koj qhov phiaj xwm khoom noj yuav pab tswj tau koj cov piam thaj hauv koj cov ntshav. Nws kuj tseem pab koj tswj tau koj qhov hnyav thiab. Qhov hnyav tshaj qhov tsim nyog yuav ua viet koj lub cev tsis tuaj yeem siv nws cov insulin los mus hloov khoom noj mus ua lub zog tau:    Koj cov neeg saib xyuas lawrence noj qab haus huv yuav pab koj tsim ib lub phiaj xwm khoom noj uas siv tau zoo viet koj.    Koj tsis tas yuav caiv tag nrho cov khoom noj uas koj nyiam. Tab sis kalie zaum koj yuav tau noj tsawg gabriella qub ntawm qee yam khoom noj. Lawrence noj khoom noj haum nrog zaub, txiv hmab txiv ntoo, nqaij ntshiv, thiab cov khoom ua ntsiav tag nrho yuav pab tswj koj cov piam thaj hauv ntshav.    Koj yuav tau noj cov khoom noj kom tab don haum xwb hais txog qhov ntau tsawg. Noj koj cov khoom noj thiab khoom noj ua si nyob viet tib lub sij hawm txhua hnub. Txhob noj mov nrug pluas.  Ua Kom Lub Cev Ua Haujlwm Tas Li (Be Physically Active)  Lawrence ua kom ua haujlwm tas li yuav pab ua kom koj cov piam thaj hauv ntshav nqis. Nws ua li no los ntawm lawrence pab koj lub cev siv cov insulin hloov khoom noj mus ua lub zog. Dejnum los kuj tseem pab koj tswj tuav tau koj qhov hnyav thiab:    Koj cov neeg saib xyuas lawrence noj qab haus huv yuav ua haujlwm nrog koj los mus tsim ib lub phiaj xwm dejnum uas haum viet koj.    Koj lub phiaj xwm dejnum yuav  ua raws koj li hnub nyoog, lawrence noj qab haus huv txhua yam, thiab ruddy dejnum uas koj nyiam ua. Yog tias muaj coob leej, lawrence taug lawrence ua si don qab uas noj tag yuav yog ib qho lawrence pib uas zoo heev.  Saib Xyuas Koj Tus Kheej (Take Care of Yourself)  Thaum koj muaj ntshav qab zib, kalie zaum koj yuav muaj lwm yam teeb meem romeo lawrence noj qab haus huv ntau gabriella tuaj. Cov no xam muaj teeb meem ko taw, qhov muag, plawv, thiab raum:    Koj cov neeg saib xyuas lawrence noj qab haus huv yuav qhia viet koj txog lawrence yuav saib xyuas koj tus kheej licas kom pab tiv thaiv tau cov teeb meem no.    Thiab koj tseem yuav tau muaj lawrence tshuaj xyuas tas li, xam muaj lawrence ntsuam xyuas qhov muag thiab ko taw, thiab lawrence ntsuam xyuas ntshav. Yam tsawg 2 zaug nyob viet ib lub xyoos, hais kom koj tus neeg saib xyuas lawrence noj qab haus huv muab ib qho lawrence ntsuam xyuas A1C (A1C test) viet koj. Qhov lawrence ntsuam xyuas ntshav no yuav pab qhia hais tias koj twb tswj tau koj cov piam thaj hauv ntshav tau zoo npaum licas suav txij 2 mus viet 3 lub hlis dhau los lawm.    Yog tias koj haus luam yeeb, meliton  tseg! Lawrence haus luam yeeb yuav ua viet koj cov ntshav qab zib thiab cov mob uas koj tuaj yeem muaj tau los ntawm lawrence haus luam yeeb mob loj gabriella tuaj. Hais kom koj tus neeg saib xyuas lawrence noj qab haus huv muab cov hawa lawrence los mus txiav miracle quan.    [id number] [employer]. [address] [place] [address]. All rights reserved. This information is not intended as a substitute for professional medical care. Always follow your healthcare professional's instructions.              Follow-ups after your visit        Follow-up notes from your care team     Return in about 3 months (around 6/9/2017) for Lab Work, Physical Exam, medication follow up, BP Recheck.      Your next 10 appointments already scheduled     Aug 16, 2017 10:30 AM CDT   LAB with LAB FIRST FLOOR [facility] ([facility])    [address] [address] [place] [address]  "[facility] [address]   [phone]           Patient must bring picture ID.  Patient should be prepared to give a urine specimen  Please do not eat 10-12 hours before your appointment if you are coming in fasting for labs on lipids, cholesterol, or glucose (sugar).  Pregnant women should follow their Care Team instructions. Water with medications is okay. Do not drink coffee or other fluids.   If you have concerns about taking  your medications, please ask at office or if scheduling via [employer], send a message by clicking on Secure Messaging, Message Your Care Team.            Aug 16, 2017 11:00 AM CDT   Return Visit with Ramon Rodriguez MD   [facility] ([facility])    [id number] [place] [id number] [phone]              Who to contact     If you have questions or need follow up information about today's clinic visit or your schedule please contact [facility] directly at [phone].  Normal or non-critical lab and imaging results will be communicated to you by [employer], letter or phone within 4 business days after the clinic has received the results. If you do not hear from us within 7 days, please contact the clinic through [employer] or phone. If you have a critical or abnormal lab result, we will notify you by phone as soon as possible.  Submit refill requests through [employer] or call your pharmacy and they will forward the refill request to us. Please allow 3 business days for your refill to be completed.          Additional Information About Your Visit        [employer] Information     [employer] lets you send messages to your doctor, view your test results, renew your prescriptions, schedule appointments and more. To sign up, go to [email or web address] . Click on \"Log in\" on the left side of the screen, which will take you to the Welcome page. Then click on \"Sign up Now\" on the right side of the page.     You will be asked " "to enter the access code listed below, as well as some personal information. Please follow the directions to create your username and password.     Your access code is: [email or web address]  Expires: 2017  9:42 AM     Your access code will  in 90 days. If you need help or a new code, please call your [facility] or [phone].        Care EveryWhere ID     This is your Care EveryWhere ID. This could be used by other organizations to access your [facility] medical records  [id number]        Your Vitals Were     Pulse Temperature Height Pulse Oximetry BMI (Body Mass Index)       84 97.8  F (36.6  C) (Oral) 4' 8.25\" (1.429 m) 96% 27.78 kg/m2        Blood Pressure from Last 3 Encounters:   17 105/66   17 132/70   16 138/68    Weight from Last 3 Encounters:   17 125 lb (56.7 kg)   17 123 lb 9.6 oz (56.1 kg)   16 122 lb (55.3 kg)              Today, you had the following     No orders found for display       Primary Care Provider Office Phone # Fax #    Jonna Kaleigh Cason [phone]       [facility] [address]  [facility] [address]        Thank you!     Thank you for choosing [facility]  for your care. Our goal is always to provide you with excellent care. Hearing back from our patients is one way we can continue to improve our services. Please take a few minutes to complete the written survey that you may receive in the mail after your visit with us. Thank you!             Your Updated Medication List - Protect others around you: Learn how to safely use, store and throw away your medicines at www.disposemymeds.org.          This list is accurate as of: 3/9/17  9:42 AM.  Always use your most recent med list.                   Brand Name Dispense Instructions for use    acetaminophen-codeine 300-30 MG per tablet    TYLENOL #3    60 tablet    TAKE 1-2 TABLETS ONCE DAILY AS NEEDED FOR PAIN// IB HNUB NOJ 1-2 LUB YOG MOB    "    amLODIPine 10 MG tablet    NORVASC    90 tablet    Take 1 tablet (10 mg) by mouth daily       ASPIRIN LOW DOSE 81 MG EC tablet   Generic drug:  aspirin     100 tablet    TAKE 1 TABLET BY MOUTH DAILY TO PREVENT STROKE // IB HNUB NOJ 1 LUB PAB KOM NTSHAV KHIAV ZOO       calcium-vitamin D 600-400 MG-UNIT per tablet    CALTRATE    60 tablet    TAKE 1 TABLET BY MOUTH TWICE DAILY FOR BONE HEALTH // IB ZAUG NOJ 1 LUB, IB HNUB NOJ OB ZAUG PAB LASHA POB TXHA       * CANE, ANY MATERIAL     1 each    1 each.       hydrALAZINE 10 MG tablet    APRESOLINE    1 tablet    Take 1 tablet (10 mg) by mouth daily       imipramine 10 MG tablet    TOFRANIL    180 tablet    TAKE 2 TABLETS BY MOUTH AT BEDTIME // IB HNUB NOJ 2 LUB THAUM MUS PW PAN LASHA NYUAB SIAB       insulin aspart prot & aspart injection    NovoLOG MIX 70/30 FLEXPEN    1 mL    Inject 10 Units Subcutaneous 2 times daily (with meals)       * insulin pen needle 31G X 6 MM    ULTICARE MINI    100 each    Use 2 daily or as directed.       * UNIFINE PENTIPS 31G X 5 MM   Generic drug:  insulin pen needle     100 each    USE AS DIRECTED TWICE DAILY OR AS DIRECTED// SIV DAVONS CELESTINE RUANO, 1 HNUB 2 ZAUG       * olopatadine 0.1 % ophthalmic solution    PATANOL    5 mL    Place 1 drop into both eyes 2 times daily as needed for allergies (itchy eye.)       * olopatadine 0.1 % ophthalmic solution    PATANOL    5 mL    Place 1 drop into both eyes 2 times daily as needed for allergies       ONE TOUCH ULTRA test strip   Generic drug:  blood glucose monitoring     200 each    USE TO TEST BLOOD SUGAR 2-3 TIMES A DAY // IB HNUB SIV 2-3 ZATIFFANIE SPRAGUE QHIA       ONETOUCH DELICA LANCETS 33G Misc     100 each    USE AS DIRECTED TWO TIMES DAILY // IB HNUB SIV 2 ZAUG LI QHIA       * order for DME     1 each    Equipment being ordered: blood pressure meter with supplies and adult cuff       * order for DME     200 each    Lancets bid for type 2 diabetes on insulin.       * order for DME     1 Device     Equipment being ordered: Shoe lift for leg length difference.       * order for DME     200 each    Test strips for pt's glucometer, brand as covered by insurance Test bid. One touch Delica lancets. Type 2 diabetes on insulin.  Directions: use as directed Quantity: 200       prednisoLONE acetate 1 % ophthalmic susp    PRED FORTE    5 mL    Place 1 drop into both eyes 4 times daily       rosuvastatin 20 MG tablet    CRESTOR    90 tablet    Take 1 tablet (20 mg) by mouth daily       * Notice:  This list has 9 medication(s) that are the same as other medications prescribed for you. Read the directions carefully, and ask your doctor or other care provider to review them with you.

## 2017-03-10 ASSESSMENT — PATIENT HEALTH QUESTIONNAIRE - PHQ9: SUM OF ALL RESPONSES TO PHQ QUESTIONS 1-9: 1

## 2017-03-23 DIAGNOSIS — E11.65 HYPERGLYCEMIA DUE TO TYPE 2 DIABETES MELLITUS (H): ICD-10-CM

## 2017-03-23 NOTE — TELEPHONE ENCOUNTER
UNIFINE PENTIPS 31G X 5 MM         Last Written Prescription Date: 3/22/16  Last Fill Quantity: 100, # refills: 5  Last Office Visit with G, [facility] or [facility] prescribing provider:  3/9/17     Next 5 appointments (look out 90 days)     Jun 01, 2017  9:00 AM CDT   [facility]    [address] [place] [id number] [phone]                   BP Readings from Last 3 Encounters:   03/09/17 105/66   01/12/17 132/70   12/09/16 138/68     Lab Results   Component Value Date    MICROL 2810 12/02/2016     No results found for: MICROALBUMIN  Creatinine   Date Value Ref Range Status   02/16/2017 1.66 (H) 0.52 - 1.04 mg/dL Final   ]  GFR Estimate   Date Value Ref Range Status   02/16/2017 30 (L) >60 mL/min/1.7m2 Final     Comment:     Non  GFR Calc   12/02/2016 30 (L) >60 mL/min/1.7m2 Final     Comment:     Non  GFR Calc   08/16/2016 34 (L) >60 mL/min/1.7m2 Final     Comment:     Non  GFR Calc     GFR Estimate If Black   Date Value Ref Range Status   02/16/2017 36 (L) >60 mL/min/1.7m2 Final     Comment:      GFR Calc   12/02/2016 36 (L) >60 mL/min/1.7m2 Final     Comment:      GFR Calc   08/16/2016 41 (L) >60 mL/min/1.7m2 Final     Comment:      GFR Calc     Lab Results   Component Value Date    CHOL 177 12/02/2016     Lab Results   Component Value Date    HDL 40 12/02/2016     Lab Results   Component Value Date    LDL 85 12/02/2016     Lab Results   Component Value Date    TRIG 262 12/02/2016     Lab Results   Component Value Date    CHOLHDLRATIO 5.2 08/06/2015     Lab Results   Component Value Date    AST 40 09/22/2014     Lab Results   Component Value Date    ALT 37 09/22/2014     Lab Results   Component Value Date    A1C 6.0 02/16/2017    A1C 6.1 12/02/2016    A1C 6.1 06/03/2016    A1C 6.3 01/28/2016    A1C 6.3  10/29/2015     Potassium   Date Value Ref Range Status   02/16/2017 4.3 3.4 - 5.3 mmol/L Final

## 2017-03-27 RX ORDER — PEN NEEDLE, DIABETIC 31 GX3/16"
NEEDLE, DISPOSABLE MISCELLANEOUS
Qty: 100 EACH | Refills: 2 | Status: SHIPPED | OUTPATIENT
Start: 2017-03-27 | End: 2019-01-18

## 2017-03-27 NOTE — TELEPHONE ENCOUNTER
Prescription approved per [facility] Refill Protocol.  Gricelda Olguin RN

## 2017-04-21 DIAGNOSIS — G89.4 CHRONIC PAIN SYNDROME: ICD-10-CM

## 2017-04-21 DIAGNOSIS — E11.42 DIABETIC POLYNEUROPATHY ASSOCIATED WITH TYPE 2 DIABETES MELLITUS (H): ICD-10-CM

## 2017-04-21 NOTE — TELEPHONE ENCOUNTER
imipramine (TOFRANIL) 10 MG tablet     Last Written Prescription Date: 11/1/16  Last Fill Quantity: 180, # refills: 1  Last Office Visit with G, P or [facility] prescribing provider: 3/9/17   Next 5 appointments (look out 90 days)     Apr 27, 2017  8:40 AM CDT   [facility]    [address] [facility] [address]   [phone]            Jun 01, 2017  9:00 AM CDT   [facility] Visit [facility]    [address] [facility] [address]   [phone]                   BP Readings from Last 3 Encounters:   03/09/17 105/66   01/12/17 132/70   12/09/16 138/68     Last PHQ-9 score on record=   PHQ-9 SCORE 3/9/2017   Total Score 1

## 2017-04-26 RX ORDER — IMIPRAMINE HYDROCHLORIDE 10 MG/1
TABLET, FILM COATED ORAL
Qty: 180 TABLET | Refills: 1 | Status: SHIPPED | OUTPATIENT
Start: 2017-04-26 | End: 2017-06-07

## 2017-04-26 NOTE — TELEPHONE ENCOUNTER
Routing refill request to provider for review/approval because:  Can not fill for the associated diagnosis.    Mena Franklin RN, [facility]

## 2017-04-27 ENCOUNTER — OFFICE VISIT (OUTPATIENT)
Dept: FAMILY MEDICINE | Facility: CLINIC | Age: 77
End: 2017-04-27
Payer: COMMERCIAL

## 2017-04-27 VITALS
OXYGEN SATURATION: 97 % | BODY MASS INDEX: 28.34 KG/M2 | HEIGHT: 56 IN | SYSTOLIC BLOOD PRESSURE: 158 MMHG | DIASTOLIC BLOOD PRESSURE: 70 MMHG | HEART RATE: 86 BPM | RESPIRATION RATE: 19 BRPM | TEMPERATURE: 97.7 F | WEIGHT: 126 LBS

## 2017-04-27 DIAGNOSIS — G81.91 RIGHT HEMIPARESIS (H): ICD-10-CM

## 2017-04-27 DIAGNOSIS — R60.9 DEPENDENT EDEMA: Primary | ICD-10-CM

## 2017-04-27 DIAGNOSIS — E78.5 HYPERLIPIDEMIA LDL GOAL <100: ICD-10-CM

## 2017-04-27 DIAGNOSIS — E11.22 TYPE 2 DIABETES MELLITUS WITH STAGE 3 CHRONIC KIDNEY DISEASE, WITHOUT LONG-TERM CURRENT USE OF INSULIN (H): ICD-10-CM

## 2017-04-27 DIAGNOSIS — N18.30 TYPE 2 DIABETES MELLITUS WITH STAGE 3 CHRONIC KIDNEY DISEASE, WITHOUT LONG-TERM CURRENT USE OF INSULIN (H): ICD-10-CM

## 2017-04-27 DIAGNOSIS — E66.3 OVERWEIGHT (BMI 25.0-29.9): ICD-10-CM

## 2017-04-27 DIAGNOSIS — I63.549 CEREBROVASCULAR ACCIDENT (CVA) DUE TO OCCLUSION OF CEREBELLAR ARTERY, UNSPECIFIED BLOOD VESSEL LATERALITY (H): ICD-10-CM

## 2017-04-27 DIAGNOSIS — I10 HYPERTENSION, GOAL BELOW 140/90: ICD-10-CM

## 2017-04-27 PROCEDURE — 99214 OFFICE O/P EST MOD 30 MIN: CPT | Performed by: FAMILY MEDICINE

## 2017-04-27 RX ORDER — FUROSEMIDE 20 MG
20 TABLET ORAL DAILY PRN
Qty: 10 TABLET | Refills: 1 | Status: SHIPPED | OUTPATIENT
Start: 2017-04-27 | End: 2017-09-15

## 2017-04-27 ASSESSMENT — PAIN SCALES - GENERAL: PAINLEVEL: NO PAIN (0)

## 2017-04-27 NOTE — MR AVS SNAPSHOT
After Visit Summary   4/27/2017    Lakhwinder Negron    MRN: [id number]           Patient Information     Date Of Birth          1940        Visit Information        Provider Department      4/27/2017 8:30 AM Jonna Cason MD; [employer] [facility]        Today's Diagnoses     Dependent edema    -  1    Type 2 diabetes mellitus with stage 3 chronic kidney disease, without long-term current use of insulin (H)        Hypertension, goal below 140/90        Cerebrovascular accident (CVA) due to occlusion of cerebellar artery, unspecified blood vessel laterality (H)        Right hemiparesis (H)        Overweight (BMI 25.0-29.9)        Hyperlipidemia LDL goal <100          Care Instructions    How to contact your care team: [phone] Pharmacy [phone]     MD ISMAEL ALFARO PA-C CHRIS JONES, PA-C NAM HO, MD JONATHAN BATES, MD ARVIN VOCAL, MD    Clinic hours M-Th 7am-7pm Fri 7am-5pm.   Urgent care M-F 11am-9pm  Sat/Sun 9am-5pm.   Pharmacy   Mon-Th:  8:00am-8pm   Fri:  8:00am-6:00pm  Sat/Sun  8:00am-5:00 pm       Leg Swelling in Both Legs    Swelling of the feet, ankles, and legs is called edema. It is caused by excess fluid that has collected in the tissues. Extra fluid in the body settles in the lowest part because of gravity. This is why the legs and feet are most affected.  Some of the causes for edema include:    Disease of the heart like congestive heart failure    Standing or sitting for long periods of time. Sitting with your legs in the down position may do this.    Infection of the feet or legs    Blood pooling in the veins of your legs (venous insufficiency)    Dilated veins in your lower leg (varicose veins)    Garters or other clothing that is tight on your legs. This will cause blood to pool in your legs because the clothing limits blood flow.    Some medicines. These include hormones like birth control pills. They also  include some blood pressure medicines like calcium channel blockers and steroids. Other medicines include some antidepressants like MAO inhibitors and tricyclics.    Menstrual periods that cause you to retain fluids    Many types of renal disease    Liver failure or cirrhosis    Pregnancy. Some swelling is normal, but a sudden increase in leg swelling or weight gain can be a sign of a dangerous complication of pregnancy..    Poor nutrition  Medical treatment will depend on what is causing the swelling in your legs. Your health care provider may prescribe water pills (diuretics) to get rid of the extra fluid.  Home care  Follow these guidelines when caring for yourself at home:    Don't wear clothing like garters that is tight on your legs.    Keep your legs up while lying or sitting.    If infection, injury, or recent surgery is causing the swelling, stay off your legs as much as possible until symptoms get better.    If your health care provider says that your leg swelling is caused by venous insufficiency or varicose veins, don't sit or  one place for long periods of time. Take breaks and walk about every few hours. Brisk walking is a good exercise. It helps circulate the blood that has collected in your leg. Talk with your provider about using support stockings to stop daytime leg swelling.    If your provider says that heart disease is causing your leg swelling, follow a low-salt diet to stop extra fluid from staying in your body.  Follow-up care  Follow up with your health care provider, or as advised.  When to seek medical advice  Call your health care provider right away if any of these occur:    New shortness of breath or chest pain    Shortness of breath or chest pain that gets worse    Swelling in both legs or ankles that gets worse    Swelling of the abdomen    Redness, warmth, or swelling in one leg    Fever of 100.4 F (38 C) or higher, or as directed by your health care provider    Yellow color  to your skin or eyes    Rapid, unexplained weight gain       4508-4562 [employer]. [address] [place] [address], [place] [address]. All rights reserved. This information is not intended as a substitute for professional medical care. Always follow your healthcare professional's instructions.              Follow-ups after your visit        Your next 10 appointments already scheduled     May 25, 2017  8:45 AM CDT   LAB with B[facility] LAB   [facility]    [address] [facility] [id number] [phone]           Patient must bring picture ID.  Patient should be prepared to give a urine specimen  Please do not eat 10-12 hours before your appointment if you are coming in fasting for labs on lipids, cholesterol, or glucose (sugar).  Pregnant women should follow their Care Team instructions. Water with medications is okay. Do not drink coffee or other fluids.   If you have concerns about taking  your medications, please ask at office or if scheduling via [employer], send a message by clicking on Secure Messaging, Message Your Care Team.            Jun 01, 2017  9:00 AM CDT   Office Visit with Jonna Cason MD   [facility]    [address] [facility] [id number] [phone]           Bring a current list of meds and any records pertaining to this visit.  For Physicals, please bring immunization records and any forms needing to be filled out.  Please arrive 10 minutes early to complete paperwork.            Aug 16, 2017 10:30 AM CDT   LAB with LAB FIRST FLOOR [facility] ([facility])    [address] [place] [id number] [phone]           Patient must bring picture ID.  Patient should be prepared to give a urine specimen  Please do not eat 10-12 hours before your appointment if you are coming in fasting for labs on  "lipids, cholesterol, or glucose (sugar).  Pregnant women should follow their Care Team instructions. Water with medications is okay. Do not drink coffee or other fluids.   If you have concerns about taking  your medications, please ask at office or if scheduling via [employer], send a message by clicking on Secure Messaging, Message Your Care Team.            Aug 16, 2017 11:00 AM CDT   Return Visit with Ramon Rodriguez MD   [facility]    [address] [place] [id number] [phone]              Who to contact     If you have questions or need follow up information about today's clinic visit or your schedule please contact [facility] directly at [phone].  Normal or non-critical lab and imaging results will be communicated to you by [employer], letter or phone within 4 business days after the clinic has received the results. If you do not hear from us within 7 days, please contact the clinic through [email or web address] or phone. If you have a critical or abnormal lab result, we will notify you by phone as soon as possible.  Submit refill requests through [employer] or call your pharmacy and they will forward the refill request to us. Please allow 3 business days for your refill to be completed.          Additional Information About Your Visit        [employer] Information     [employer] lets you send messages to your doctor, view your test results, renew your prescriptions, schedule appointments and more. To sign up, go to [email or web address] . Click on \"Log in\" on the left side of the screen, which will take you to the Welcome page. Then click on \"Sign up Now\" on the right side of the page.     You will be asked to enter the access code listed below, as well as some personal information. Please follow the directions to create your username and password.     Your access code is: [email or web address]  Expires: 6/7/2017 10:42 AM     Your access code will " " in 90 days. If you need help or a new code, please call your [facility] or [phone].        Care EveryWhere ID     This is your Care EveryWhere ID. This could be used by other organizations to access your [facility] medical records  [id number]        Your Vitals Were     Pulse Temperature Respirations Height Pulse Oximetry Breastfeeding?    86 97.7  F (36.5  C) (Oral) 19 4' 8.25\" (1.429 m) 97% No    BMI (Body Mass Index)                   28 kg/m2            Blood Pressure from Last 3 Encounters:   17 158/70   17 105/66   17 132/70    Weight from Last 3 Encounters:   17 126 lb (57.2 kg)   17 125 lb (56.7 kg)   17 123 lb 9.6 oz (56.1 kg)              Today, you had the following     No orders found for display         Today's Medication Changes          These changes are accurate as of: 17  9:16 AM.  If you have any questions, ask your nurse or doctor.               Start taking these medicines.        Dose/Directions    furosemide 20 MG tablet   Commonly known as:  LASIX   Used for:  Dependent edema   Started by:  Jonna Cason MD        Dose:  20 mg   Take 1 tablet (20 mg) by mouth daily as needed For swelling in legs   Quantity:  10 tablet   Refills:  1            Where to get your medicines      These medications were sent to [facility], [place] - [address] [facility]  [address] [place] [address]     Phone:  [phone]     furosemide 20 MG tablet                Primary Care Provider Office Phone # Fax #    Jonna Cason [phone]       [facility] [address] [place]  [facility] [place] [address]        Thank you!     Thank you for choosing [facility]  for your care. Our goal is always to provide you with excellent care. Hearing back from our patients is one way we can continue to improve our services. Please take a few minutes to complete the written survey that you may " receive in the mail after your visit with us. Thank you!             Your Updated Medication List - Protect others around you: Learn how to safely use, store and throw away your medicines at www.disposemymeds.org.          This list is accurate as of: 4/27/17  9:16 AM.  Always use your most recent med list.                   Brand Name Dispense Instructions for use    ACE/ARB NOT PRESCRIBED (INTENTIONAL)      Please choose reason not prescribed, below       acetaminophen-codeine 300-30 MG per tablet    TYLENOL #3    60 tablet    TAKE 1-2 TABLETS ONCE DAILY AS NEEDED FOR PAIN// IB HNUB NOJ 1-2 LUB YOG MOB       amLODIPine 10 MG tablet    NORVASC    90 tablet    Take 1 tablet (10 mg) by mouth daily       ASPIRIN LOW DOSE 81 MG EC tablet   Generic drug:  aspirin     100 tablet    TAKE 1 TABLET BY MOUTH DAILY TO PREVENT STROKE // IB HNUB NOJ 1 LUB PAB KOM NTSHAV KHIAV ZOO       calcium-vitamin D 600-400 MG-UNIT per tablet    CALTRATE    60 tablet    TAKE 1 TABLET BY MOUTH TWICE DAILY FOR BONE HEALTH // IB ZAUG NOJ 1 LUB, IB HNUB NOJ OB ZAUG PAB LASHA POB TXHA       * CANE, ANY MATERIAL     1 each    1 each.       furosemide 20 MG tablet    LASIX    10 tablet    Take 1 tablet (20 mg) by mouth daily as needed For swelling in legs       hydrALAZINE 10 MG tablet    APRESOLINE    1 tablet    Take 1 tablet (10 mg) by mouth daily       imipramine 10 MG tablet    TOFRANIL    180 tablet    TAKE 2 TABLETS BY MOUTH AT BEDTIME // IB HNUB NOJ 2 LUB THAUM MUS PW PAN LASHA NYUAB SIAB       insulin aspart prot & aspart injection    NovoLOG MIX 70/30 FLEXPEN    1 mL    Inject 10 Units Subcutaneous 2 times daily (with meals)       * insulin pen needle 31G X 6 MM    ULTICARE MINI    100 each    Use 2 daily or as directed.       * UNIFINE PENTIPS 31G X 5 MM   Generic drug:  insulin pen needle     100 each    USE AS DIRECTED TWO TIMES DAILY // IB HNUB SIV 2 GERALD JAMESA       * olopatadine 0.1 % ophthalmic solution    PATANOL    5 mL    Place 1  drop into both eyes 2 times daily as needed for allergies (itchy eye.)       * olopatadine 0.1 % ophthalmic solution    PATANOL    5 mL    Place 1 drop into both eyes 2 times daily as needed for allergies       ONE TOUCH ULTRA test strip   Generic drug:  blood glucose monitoring     200 each    USE TO TEST BLOOD SUGAR 2-3 TIMES A DAY // IB HNUB SIV 2-3 ZAUG LI QHIA       ONETOUCH DELICA LANCETS 33G Misc     100 each    USE AS DIRECTED TWO TIMES DAILY // IB HNUB SIV 2 ZAUG LI QHIA       * order for DME     1 each    Equipment being ordered: blood pressure meter with supplies and adult cuff       * order for DME     200 each    Lancets bid for type 2 diabetes on insulin.       * order for DME     1 Device    Equipment being ordered: Shoe lift for leg length difference.       * order for DME     200 each    Test strips for pt's glucometer, brand as covered by insurance Test bid. One touch Delica lancets. Type 2 diabetes on insulin.  Directions: use as directed Quantity: 200       prednisoLONE acetate 1 % ophthalmic susp    PRED FORTE    5 mL    Place 1 drop into both eyes 4 times daily       rosuvastatin 20 MG tablet    CRESTOR    90 tablet    Take 1 tablet (20 mg) by mouth daily       * Notice:  This list has 9 medication(s) that are the same as other medications prescribed for you. Read the directions carefully, and ask your doctor or other care provider to review them with you.
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
## 2017-04-27 NOTE — NURSING NOTE
"Chief Complaint   Patient presents with     Edema       Initial /70  Pulse 86  Temp 97.7  F (36.5  C) (Oral)  Resp 19  Ht 4' 8.25\" (1.429 m)  Wt 126 lb (57.2 kg)  SpO2 97%  Breastfeeding? No  BMI 28 kg/m2 Estimated body mass index is 28 kg/(m^2) as calculated from the following:    Height as of this encounter: 4' 8.25\" (1.429 m).    Weight as of this encounter: 126 lb (57.2 kg).  Medication Reconciliation: complete     Yolande Campos MA       "

## 2017-04-27 NOTE — PATIENT INSTRUCTIONS
How to contact your care team: [phone] Pharmacy [phone]     MD ISMAEL ALFARO PA-C CHRIS JONES, PA-C NAM HO, MD JONATHAN BATES, MD ARVIN VOCAL, MD    Clinic hours M-Th 7am-7pm Fri 7am-5pm.   Urgent care M-F 11am-9pm  Sat/Sun 9am-5pm.   Pharmacy   Mon-Th:  8:00am-8pm   Fri:  8:00am-6:00pm  Sat/Sun  8:00am-5:00 pm       Leg Swelling in Both Legs    Swelling of the feet, ankles, and legs is called edema. It is caused by excess fluid that has collected in the tissues. Extra fluid in the body settles in the lowest part because of gravity. This is why the legs and feet are most affected.  Some of the causes for edema include:    Disease of the heart like congestive heart failure    Standing or sitting for long periods of time. Sitting with your legs in the down position may do this.    Infection of the feet or legs    Blood pooling in the veins of your legs (venous insufficiency)    Dilated veins in your lower leg (varicose veins)    Garters or other clothing that is tight on your legs. This will cause blood to pool in your legs because the clothing limits blood flow.    Some medicines. These include hormones like birth control pills. They also include some blood pressure medicines like calcium channel blockers and steroids. Other medicines include some antidepressants like MAO inhibitors and tricyclics.    Menstrual periods that cause you to retain fluids    Many types of renal disease    Liver failure or cirrhosis    Pregnancy. Some swelling is normal, but a sudden increase in leg swelling or weight gain can be a sign of a dangerous complication of pregnancy..    Poor nutrition  Medical treatment will depend on what is causing the swelling in your legs. Your health care provider may prescribe water pills (diuretics) to get rid of the extra fluid.  Home care  Follow these guidelines when caring for yourself at home:    Don't wear clothing like garters that is tight on your  legs.    Keep your legs up while lying or sitting.    If infection, injury, or recent surgery is causing the swelling, stay off your legs as much as possible until symptoms get better.    If your health care provider says that your leg swelling is caused by venous insufficiency or varicose veins, don't sit or  one place for long periods of time. Take breaks and walk about every few hours. Brisk walking is a good exercise. It helps circulate the blood that has collected in your leg. Talk with your provider about using support stockings to stop daytime leg swelling.    If your provider says that heart disease is causing your leg swelling, follow a low-salt diet to stop extra fluid from staying in your body.  Follow-up care  Follow up with your health care provider, or as advised.  When to seek medical advice  Call your health care provider right away if any of these occur:    New shortness of breath or chest pain    Shortness of breath or chest pain that gets worse    Swelling in both legs or ankles that gets worse    Swelling of the abdomen    Redness, warmth, or swelling in one leg    Fever of 100.4 F (38 C) or higher, or as directed by your health care provider    Yellow color to your skin or eyes    Rapid, unexplained weight gain       [id number] [employer]. [address] [place] [address], [place] [address]. All rights reserved. This information is not intended as a substitute for professional medical care. Always follow your healthcare professional's instructions.

## 2017-05-25 DIAGNOSIS — E11.22 TYPE 2 DIABETES MELLITUS WITH STAGE 3 CHRONIC KIDNEY DISEASE, WITHOUT LONG-TERM CURRENT USE OF INSULIN (H): ICD-10-CM

## 2017-05-25 DIAGNOSIS — N18.30 CKD (CHRONIC KIDNEY DISEASE) STAGE 3, GFR 30-59 ML/MIN (H): ICD-10-CM

## 2017-05-25 DIAGNOSIS — N18.30 TYPE 2 DIABETES MELLITUS WITH STAGE 3 CHRONIC KIDNEY DISEASE, WITHOUT LONG-TERM CURRENT USE OF INSULIN (H): ICD-10-CM

## 2017-05-25 LAB
ALBUMIN SERPL-MCNC: 3.4 G/DL (ref 3.4–5)
ANION GAP SERPL CALCULATED.3IONS-SCNC: 6 MMOL/L (ref 3–14)
BUN SERPL-MCNC: 24 MG/DL (ref 7–30)
CALCIUM SERPL-MCNC: 8.6 MG/DL (ref 8.5–10.1)
CHLORIDE SERPL-SCNC: 108 MMOL/L (ref 94–109)
CO2 SERPL-SCNC: 27 MMOL/L (ref 20–32)
CREAT SERPL-MCNC: 2 MG/DL (ref 0.52–1.04)
ERYTHROCYTE [DISTWIDTH] IN BLOOD BY AUTOMATED COUNT: 12.6 % (ref 10–15)
GFR SERPL CREATININE-BSD FRML MDRD: 24 ML/MIN/1.7M2
GLUCOSE SERPL-MCNC: 79 MG/DL (ref 70–99)
HBA1C MFR BLD: 6.3 % (ref 4.3–6)
HCT VFR BLD AUTO: 40.2 % (ref 35–47)
HGB BLD-MCNC: 13.1 G/DL (ref 11.7–15.7)
MCH RBC QN AUTO: 28.6 PG (ref 26.5–33)
MCHC RBC AUTO-ENTMCNC: 32.6 G/DL (ref 31.5–36.5)
MCV RBC AUTO: 88 FL (ref 78–100)
PHOSPHATE SERPL-MCNC: 3.5 MG/DL (ref 2.5–4.5)
PLATELET # BLD AUTO: 188 10E9/L (ref 150–450)
POTASSIUM SERPL-SCNC: 4 MMOL/L (ref 3.4–5.3)
RBC # BLD AUTO: 4.58 10E12/L (ref 3.8–5.2)
SODIUM SERPL-SCNC: 141 MMOL/L (ref 133–144)
WBC # BLD AUTO: 7.3 10E9/L (ref 4–11)

## 2017-05-25 PROCEDURE — 85027 COMPLETE CBC AUTOMATED: CPT | Performed by: FAMILY MEDICINE

## 2017-05-25 PROCEDURE — 83036 HEMOGLOBIN GLYCOSYLATED A1C: CPT | Performed by: FAMILY MEDICINE

## 2017-05-25 PROCEDURE — 36415 COLL VENOUS BLD VENIPUNCTURE: CPT | Performed by: FAMILY MEDICINE

## 2017-05-25 PROCEDURE — 80069 RENAL FUNCTION PANEL: CPT | Performed by: FAMILY MEDICINE

## 2017-05-26 ENCOUNTER — TELEPHONE (OUTPATIENT)
Dept: NEPHROLOGY | Facility: CLINIC | Age: 77
End: 2017-05-26

## 2017-05-26 ENCOUNTER — TELEPHONE (OUTPATIENT)
Dept: NURSING | Facility: CLINIC | Age: 77
End: 2017-05-26

## 2017-05-26 DIAGNOSIS — N18.30 CKD (CHRONIC KIDNEY DISEASE) STAGE 3, GFR 30-59 ML/MIN (H): Primary | ICD-10-CM

## 2017-05-26 NOTE — TELEPHONE ENCOUNTER
Please call patient with results (If unable to reach patient, this may be sent as a letter instead)   Talk with family member who speaks English:     Dear Christyfrankie Negron,     The kidney test is a little worse, so I will send the results to Dr. Rodriguez to review. You are scheduled to see her in August. I will see if she wants me to make any changes in your medication or do follow up testing before you see her. You are scheduled to see Dr. Rodriguez August 16 to check your kidneys.     The diabetes test looks very good.       Jonna Cason MD              This writer attempted to contact Lakhwinder's son Ramin on 05/26/17    Reason for call results and left message to return call.    When patient calls back, please contact clinic RN team. If no one available, document that pt called and route to care team. routine priority.        Harriett Garcia RN

## 2017-05-26 NOTE — TELEPHONE ENCOUNTER
Left message on patient's son's telephone number to offer sooner appointment due to worsening creatinine according to Dr. Cason recent lab work.     Offered held appointment times on Tuesday, 5/30, 11 AM or 2 PM. Call center- okay to schedule with labs 30 minute prior if patient son returns call.    Dyasi Frias, RN, BSN  Nephrology Care Coordinator  [facility]

## 2017-05-30 NOTE — TELEPHONE ENCOUNTER
This writer attempted to contact Christye on 05/30/17 via     Reason for call Results and left message to return call.    When patient calls back, please contact clinic RN team. If no one available, document that pt called and route to care team. routine priority.        Gricelda Olguin, MARJORIE

## 2017-05-31 ENCOUNTER — CARE COORDINATION (OUTPATIENT)
Dept: GERIATRIC MEDICINE | Facility: CLINIC | Age: 77
End: 2017-05-31

## 2017-05-31 NOTE — PROGRESS NOTES
[facility] Six-Month Telephone Assessment    6 month telephone assessment completed on 5/31/2017.    ER visits: Yes, X1 -  [facility], 12/15/2016 DX: DM II with Hypoglycemia without coma  Hospitalizations: Yes, X1 -  [facility], 12/15/2016 DX: DM II with Hypoglycemia without coma  TCU stays: No  Significant health status changes: None reported.  Falls/Injuries: No  ADL/IADL changes: No  Changes in services: No    Caregiver Assessment follow up:  NA    Goals: See POC in chart for goal progress documentation.      Will see client in 6 months for an annual health risk assessment.   Encouraged client to call CM with any questions or concerns in the meantime.       Jacqui Robles RN, PHN  [facility]   Phone: [phone]  Fax [phone]  [email or web address]

## 2017-06-01 ENCOUNTER — OFFICE VISIT (OUTPATIENT)
Dept: FAMILY MEDICINE | Facility: CLINIC | Age: 77
End: 2017-06-01
Payer: COMMERCIAL

## 2017-06-01 VITALS
HEIGHT: 56 IN | BODY MASS INDEX: 28.34 KG/M2 | OXYGEN SATURATION: 96 % | TEMPERATURE: 98 F | HEART RATE: 93 BPM | DIASTOLIC BLOOD PRESSURE: 71 MMHG | WEIGHT: 126 LBS | SYSTOLIC BLOOD PRESSURE: 151 MMHG

## 2017-06-01 DIAGNOSIS — G81.91 RIGHT HEMIPARESIS (H): ICD-10-CM

## 2017-06-01 DIAGNOSIS — N18.30 CKD (CHRONIC KIDNEY DISEASE) STAGE 3, GFR 30-59 ML/MIN (H): ICD-10-CM

## 2017-06-01 DIAGNOSIS — E78.5 HYPERLIPIDEMIA LDL GOAL <100: ICD-10-CM

## 2017-06-01 DIAGNOSIS — G89.4 CHRONIC PAIN SYNDROME: ICD-10-CM

## 2017-06-01 DIAGNOSIS — I63.549 CEREBROVASCULAR ACCIDENT (CVA) DUE TO OCCLUSION OF CEREBELLAR ARTERY, UNSPECIFIED BLOOD VESSEL LATERALITY (H): ICD-10-CM

## 2017-06-01 DIAGNOSIS — N18.30 TYPE 2 DIABETES MELLITUS WITH STAGE 3 CHRONIC KIDNEY DISEASE, WITHOUT LONG-TERM CURRENT USE OF INSULIN (H): Primary | ICD-10-CM

## 2017-06-01 DIAGNOSIS — I10 HYPERTENSION, GOAL BELOW 140/90: ICD-10-CM

## 2017-06-01 DIAGNOSIS — E11.22 TYPE 2 DIABETES MELLITUS WITH STAGE 3 CHRONIC KIDNEY DISEASE, WITHOUT LONG-TERM CURRENT USE OF INSULIN (H): Primary | ICD-10-CM

## 2017-06-01 LAB
ALBUMIN UR-MCNC: >=300 MG/DL
APPEARANCE UR: CLEAR
BACTERIA #/AREA URNS HPF: ABNORMAL /HPF
BILIRUB UR QL STRIP: NEGATIVE
COLOR UR AUTO: YELLOW
CREAT UR-MCNC: 51 MG/DL
GLUCOSE UR STRIP-MCNC: 100 MG/DL
HGB UR QL STRIP: ABNORMAL
KETONES UR STRIP-MCNC: NEGATIVE MG/DL
LEUKOCYTE ESTERASE UR QL STRIP: NEGATIVE
MUCOUS THREADS #/AREA URNS LPF: PRESENT /LPF
NITRATE UR QL: NEGATIVE
PH UR STRIP: 5.5 PH (ref 5–7)
PROT UR-MCNC: 5.08 G/L
PROT/CREAT 24H UR: 9.9 G/G CR (ref 0–0.2)
RBC #/AREA URNS AUTO: ABNORMAL /HPF (ref 0–2)
SP GR UR STRIP: 1.02 (ref 1–1.03)
URN SPEC COLLECT METH UR: ABNORMAL
UROBILINOGEN UR STRIP-ACNC: 0.2 EU/DL (ref 0.2–1)
WBC #/AREA URNS AUTO: ABNORMAL /HPF (ref 0–2)

## 2017-06-01 PROCEDURE — 84156 ASSAY OF PROTEIN URINE: CPT | Performed by: FAMILY MEDICINE

## 2017-06-01 PROCEDURE — 81001 URINALYSIS AUTO W/SCOPE: CPT | Performed by: FAMILY MEDICINE

## 2017-06-01 PROCEDURE — 99214 OFFICE O/P EST MOD 30 MIN: CPT | Performed by: FAMILY MEDICINE

## 2017-06-01 ASSESSMENT — PAIN SCALES - GENERAL: PAINLEVEL: NO PAIN (0)

## 2017-06-01 NOTE — LETTER
June 2, 2017      Christyfrankie BREEN Jamil  [address] [id number]          Dear Lakhwinder,     Your test results are attached.     The urine shows some blood and protein. I will send these results to Dr. Rodriguez.     Please call me if you have any questions about these test results or about your care.     Sincerely,       Jonna Cason MD/julianna    Results for orders placed or performed in visit on 06/01/17   UA reflex to Microscopic and Culture   Result Value Ref Range    Color Urine Yellow     Appearance Urine Clear     Glucose Urine 100 (A) NEG mg/dL    Bilirubin Urine Negative NEG    Ketones Urine Negative NEG mg/dL    Specific Gravity Urine 1.020 1.003 - 1.035    Blood Urine Moderate (A) NEG    pH Urine 5.5 5.0 - 7.0 pH    Protein Albumin Urine >=300 (A) NEG mg/dL    Urobilinogen Urine 0.2 0.2 - 1.0 EU/dL    Nitrite Urine Negative NEG    Leukocyte Esterase Urine Negative NEG    Source Midstream Urine    Protein  random urine   Result Value Ref Range    Protein Random Urine 5.08 g/L    Protein Total Urine g/gr Creatinine 9.90 (H) 0 - 0.2 g/g Cr   Creatinine urine calculation only   Result Value Ref Range    Creatinine Urine 51 mg/dL   Urine Microscopic   Result Value Ref Range    WBC Urine O - 2 0 - 2 /HPF    RBC Urine 2-5 (A) 0 - 2 /HPF    Bacteria Urine Few (A) NEG /HPF    Mucous Urine Present (A) NEG /LPF

## 2017-06-01 NOTE — NURSING NOTE
"Chief Complaint   Patient presents with     Hypertension     Diabetes       Initial /71 (BP Location: Left arm, Patient Position: Chair, Cuff Size: Adult Regular)  Pulse 93  Temp 98  F (36.7  C) (Oral)  Ht 4' 8.25\" (1.429 m)  Wt 126 lb (57.2 kg)  SpO2 96%  Breastfeeding? No  BMI 28 kg/m2 Estimated body mass index is 28 kg/(m^2) as calculated from the following:    Height as of this encounter: 4' 8.25\" (1.429 m).    Weight as of this encounter: 126 lb (57.2 kg).  Medication Reconciliation: complete     Ronni Robles CMA    "

## 2017-06-01 NOTE — PATIENT INSTRUCTIONS
How to contact your care team: [phone] Pharmacy [phone]   FAIZA GILBERT MD KATYA GEORGIEV, PA-C CHRIS JONES, PA-C NAM HO, MD JONATHAN BATES, MD ARVIN VOCAL, MD    Clinic hours M-Th 7am-7pm Fri 7am-5pm.   Urgent care M-F 11am-9pm  Sat/Sun 9am-5pm.   Pharmacy   Mon-Th:  8:00am-8pm   Fri:  8:00am-6:00pm  Sat/Sun  8:00am-5:00 pm       Kab Mob Ntshav Qab Zib (Diabetes) Ruddy 2 Yog Dab Tsi ?  Ntshav qab zib ruddy 2 yog ib tug mob ncua ntev (tag sim neej). Yog muaj ntshav qab zib, cov piam thaj hauv koj cov ntshav yuav siab dhau. Cov ntshav qab zib yuav ua viet koj lub cev hloov khoom noj tsis tau mus ua lub zog. Yog li ntawd koj thiaj mloog zoo li nkees thiab qaug zog, tshwj xeeb yog don qab uas noj tag. Lawrence tswj koj cov ntshav qab zib txhais tau hais tias lawrence ua kom muaj qhov hloov uas kalie zaum yuav nyuaj heev thaum xub thawj. Koj cov neeg saib xyuas lawrence noj qab haus huv yuav pab koj.  Tshuaj Xyuas Koj Cov Piam Thaj Hauv Ntshav    Zoo tshaj mas koj yuav tau tshuaj xyuas koj cov piam thaj hauv ntshav txhua txhua hnub. Qhov no yuav qhia viet koj paub hais tias yan koj cov piam thaj hauv ntshav puas tseem nyob viet hauv koj lub ruddy phiaj:    Koj cov neeg saib xyuas lawrence noj qab haus huv yuav qhia koj hais tias koj yuav tau ntsuam xyuas heev npaum licas thiab thaum twg.    Thaum koj cov piam thaj hauv ntshav nyob viet hauv koj lub ruddy phiaj lawm, koj li phiaj xwm khoom noj, phiaj xwm dejnum, thiab lawrence siv tshuaj carlie mob yuav mus tau zoo txhawm kom tswj tau koj tsis muaj mob.    Yog tias koj cov piam thaj hauv ntshav siab dhau lossis qis dhau, koj cov neeg saib xyuas lawrence noj qab haus huv yuav hloov koj kalie phiaj xwm khoom noj, phiaj xwm dejnum, lossis hloov carlie koj qhov lawrence siv tshuaj carlie mob.  Ua Raws Koj Qhov Phiaj Xwm Khoom Noj (Follow Your Meal Plan)  Lawrence ua raws koj qhov phiaj xwm khoom noj yuav pab tswj tau koj cov piam thaj hauv koj cov ntshav. Nws kuj tseem pab koj tswj tau koj qhov  hnyav thiab. Qhov hnyav tshaj qhov tsim nyog yuav ua viet koj lub cev tsis tuaj yeem siv nws cov insulin los mus hloov khoom noj mus ua lub zog tau:    Koj cov neeg saib xyuas lawrence noj qab haus huv yuav pab koj tsim ib lub phiaj xwm khoom noj uas siv tau zoo viet koj.    Koj tsis tas yuav caiv tag nrho cov khoom noj uas koj nyiam. Tab sis kalie zaum koj yuav tau noj tsawg gabriella qub ntawm qee yam khoom noj. Lawrence noj khoom noj haum nrog zaub, txiv hmab txiv ntoo, nqaij ntshiv, thiab cov khoom ua ntsiav tag nrho yuav pab tswj koj cov piam thaj hauv ntshav.    Koj yuav tau noj cov khoom noj kom tab don haum xwb hais txog qhov ntau tsawg. Noj koj cov khoom noj thiab khoom noj ua si nyob viet tib lub sij hawm txhua hnub. Txhob noj mov nrug pluas.  Ua Kom Lub Cev Ua Haujlwm Tas Li (Be Physically Active)  Lawrence ua kom ua haujlwm tas li yuav pab ua kom koj cov piam thaj hauv ntshav nqis. Nws ua li no los ntawm lawrence pab koj lub cev siv cov insulin hloov khoom noj mus ua lub zog. Dejnum los kuj tseem pab koj tswj tuav tau koj qhov hnyav thiab:    Koj cov neeg saib xyuas lawrence noj qab haus huv yuav ua haujlwm nrog koj los mus tsim ib lub phiaj xwm dejnum uas haum viet koj.    Koj lub phiaj xwm dejnum yuav ua raws koj li hnub nyoog, lawrence noj qab haus huv txhua yam, thiab ruddy dejnum uas koj nyiam ua. Yog tias muaj coob leej, lawrence taug lawrence ua si don qab uas noj tag yuav yog ib qho lawrence pib uas zoo heev.  Saib Xyuas Koj Tus Kheej (Take Care of Yourself)  Thaum koj muaj ntshav qab zib, kalie zaum koj yuav muaj lwm yam teeb meem romeo lawrence noj qab haus huv ntau gabriella tuaj. Cov no xam muaj teeb meem ko taw, qhov muag, plawv, thiab raum:    Koj cov neeg saib xyuas lawrence noj qab haus huv yuav qhia viet koj txog lawrence yuav saib xyuas koj tus kheej licas kom pab tiv thaiv tau cov teeb meem no.    Thiab koj tseem yuav tau muaj lawrence tshuaj xyuas tas li, xam muaj lawrence ntsuam xyuas qhov muag thiab ko taw, thiab lawrence ntsuam xyuas ntshav. Yam tsawg 2 zaug nyob viet ib lub  xyoos, hais kom koj tus neeg saib xyuas lawrence noj qab haus huv muab ib qho lawrence ntsuam xyuas A1C (A1C test) viet koj. Qhov lawrence ntsuam xyuas ntshav no yuav pab qhia hais tias koj twb tswj tau koj cov piam thaj hauv ntshav tau zoo npaum licas suav txij 2 mus viet 3 lub hlis dhau los lawm.    Yog tias koj haus luam yeeb, meliton mere tseg! Lawrence haus luam yeeb yuav ua ivet koj cov ntshav qab zib thiab cov mob uas koj tuaj yeem muaj tau los ntawm lawrence haus luam yeeb mob loj gabriella tuaj. Hais kom koj tus neeg saib xyuas lawrence noj qab haus huv muab cov hawa lawrence los mus txiav luam yeeb.    [id number] [employer] [address] [place] [address]28. All rights reserved. This information is not intended as a substitute for professional medical care. Always follow your healthcare professional's instructions.

## 2017-06-01 NOTE — MR AVS SNAPSHOT
After Visit Summary   6/1/2017    Lakhwinder Negron    MRN: [id number]           Patient Information     Date Of Birth          1940        Visit Information        Provider Department      6/1/2017 8:45 AM Jonna Cason MD; [employer] [facility]        Today's Diagnoses     Type 2 diabetes mellitus with stage 3 chronic kidney disease, without long-term current use of insulin (H)    -  1    CKD (chronic kidney disease) stage 3, GFR 30-59 ml/min        Hypertension, goal below 140/90        Cerebrovascular accident (CVA) due to occlusion of cerebellar artery, unspecified blood vessel laterality (H)        Hyperlipidemia LDL goal <100        Right hemiparesis (H)        Chronic pain syndrome          Care Instructions    How to contact your care team: [phone] Pharmacy [phone]   FAIZA GILBERT MD KATYA GEORGIEV, PA-C CHRIS JONES, PA-C NAM HO, MD JONATHAN BATES, MD ARVIN VOCAL, MD    Clinic hours M-Th 7am-7pm Fri 7am-5pm.   Urgent care M-F 11am-9pm  Sat/Sun 9am-5pm.   Pharmacy   Mon-Th:  8:00am-8pm   Fri:  8:00am-6:00pm  Sat/Sun  8:00am-5:00 pm       Kab Mob Ntshav Qab Zib (Diabetes) Ruddy 2 Yog Dab Tsi ?  Ntshav qab zib ruddy 2 yog ib tug mob ncua ntev (tag sim neej). Yog muaj ntshav qab zib, cov piam thaj hauv koj cov ntshav yuav siab dhau. Cov ntshav qab zib yuav ua viet koj lub cev hloov khoom noj tsis tau mus ua lub zog. Yog li ntawd koj thiaj mloog zoo li nkees thiab qaug zog, tshwj xeeb yog don qab uas noj tag. Lawrence tswj koj cov ntshav qab zib txhais tau hais tias lawrence ua kom muaj qhov hloov uas kalie zaum yuav nyuaj heev thaum xub thawj. Koj cov neeg saib xyuas lawrence noj qab haus huv yuav pab koj.  Tshuaj Xyuas Koj Cov Piam Thaj Hauv Ntshav    Zoo tshaj mas koj yuav tau tshuaj xyuas koj cov piam thaj hauv ntshav txhua txhua hnub. Qhov no yuav qhia viet koj paub hais tias yan koj cov piam thaj hauv ntshav puas tseem nyob viet  hauv koj lub ruddy phiaj:    Koj cov neeg saib xyuas lawrence noj qab haus huv yuav qhia koj hais tias koj yuav tau ntsuam xyuas heev npaum licas thiab thaum twg.    Thaum koj cov piam thaj hauv ntshav nyob viet hauv koj lub ruddy phiaj lawm, koj li phiaj xwm khoom noj, phiaj xwm dejnum, thiab lawrence siv tshuaj carlie mob yuav mus tau zoo txhawm kom tswj tau koj tsis muaj mob.    Yog tias koj cov piam thaj hauv ntshav siab dhau lossis qis dhau, koj cov neeg saib xyuas lawrence noj qab haus huv yuav hloov koj kalie phiaj xwm khoom noj, phiaj xwm dejnum, lossis hloov carlie koj qhov lawrence siv tshuaj carlie mob.  Ua Raws Koj Qhov Phiaj Xwm Khoom Noj (Follow Your Meal Plan)  Lawrence ua raws koj qhov phiaj xwm khoom noj yuav pab tswj tau koj cov piam thaj hauv koj cov ntshav. Nws kuj tseem pab koj tswj tau koj qhov hnyav thiab. Qhov hnyav tshaj qhov tsim nyog yuav ua viet koj lub cev tsis tuaj yeem siv nws cov insulin los mus hloov khoom noj mus ua lub zog tau:    Koj cov neeg saib xyuas lawrence noj qab haus huv yuav pab koj tsim ib lub phiaj xwm khoom noj uas siv tau zoo viet koj.    Koj tsis tas yuav caiv tag nrho cov khoom noj uas koj nyiam. Tab sis kalie zaum koj yuav tau noj tsawg gabriella qub ntawm qee yam khoom noj. Lawrence noj khoom noj haum nrog zaub, txiv hmab txiv ntoo, nqaij ntshiv, thiab cov khoom ua ntsiav tag nrho yuav pab tswj koj cov piam thaj hauv ntshav.    Koj yuav tau noj cov khoom noj kom tab don haum xwb hais txog qhov ntau tsawg. Noj koj cov khoom noj thiab khoom noj ua si nyob viet tib lub sij hawm txhua hnub. Txhob noj mov nrug pluas.  Ua Kom Lub Cev Ua Haujlwm Tas Li (Be Physically Active)  Lawrence ua kom ua haujlwm tas li yuav pab ua kom koj cov piam thaj hauv ntshav nqis. Nws ua li no los ntawm lawrence pab koj lub cev siv cov insulin hloov khoom noj mus ua lub zog. Dejnum los kuj tseem pab koj tswj tuav tau koj qhov hnyav thiab:    Koj cov neeg saib xyuas lawrence noj qab haus huv yuav ua haujlwm nrog koj los mus tsim ib lub phiaj xwm dejnum uas haum viet  koj.    Koj lub phiaj xwm dejnum yuav ua raws koj li hnub nyoog, lawrence noj qab haus huv txhua yam, thiab ruddy dejnum uas koj nyiam ua. Yog tias muaj coob leej, lawrence taug lawrence ua si don qab uas noj tag yuav yog ib qho lawrence pib uas zoo heev.  Saib Xyuas Koj Tus Kheej (Take Care of Yourself)  Thaum koj muaj ntshav qab zib, kalie zaum koj yuav muaj lwm yam teeb meem romeo lawrence noj qab haus huv ntau gabriella tuaj. Cov no xam muaj teeb meem ko taw, qhov muag, plawv, thiab raum:    Koj cov neeg saib xyuas lawrence noj qab haus huv yuav qhia viet koj txog lawrence yuav saib xyuas koj tus kheej licas kom pab tiv thaiv tau cov teeb meem no.    Thiab koj tseem yuav tau muaj lawrence tshuaj xyuas tas li, xam muaj lawrence ntsuam xyuas qhov muag thiab ko taw, thiab lawrence ntsuam xyuas ntshav. Yam tsawg 2 zaug nyob viet ib lub xyoos, hais kom koj tus neeg saib xyuas lawrence noj qab haus huv muab ib qho lawrence ntsuam xyuas A1C (A1C test) viet koj. Qhov lawrence ntsuam xyuas ntshav no yuav pab qhia hais tias koj twb tswj tau koj cov piam thaj hauv ntshav tau zoo npaum licas suav txij 2 mus viet 3 lub hlis dhau los lawm.    Yog tias koj haus luam yeeb, meliton mere tseg! Lawrence haus luam yeeb yuav ua viet koj cov ntshav qab zib thiab cov mob uas koj tuaj yeem muaj tau los ntawm lawrence haus luam yeeb mob loj gabriella tuaj. Hais kom koj tus neeg saib xyuas lawrence noj qab miguelito huv muab cov hawa lawrence los mus txiav miracle quan.    5028-1229 [employer]. [address] [place] [address] 7[address]76. All rights reserved. This information is not intended as a substitute for professional medical care. Always follow your healthcare professional's instructions.                Follow-ups after your visit        Follow-up notes from your care team     Return in about 3 months (around 9/1/2017) for Lab Work, Physical Exam, medication follow up.      Your next 10 appointments already scheduled     Aug 16, 2017 10:30 AM CDT   LAB with LAB FIRST FLOOR [facility] ([facility]  [place] [facility])    [address] [place] [address] [id number] [phone]           Patient must bring picture ID.  Patient should be prepared to give a urine specimen  Please do not eat 10-12 hours before your appointment if you are coming in fasting for labs on lipids, cholesterol, or glucose (sugar).  Pregnant women should follow their Care Team instructions. Water with medications is okay. Do not drink coffee or other fluids.   If you have concerns about taking  your medications, please ask at office or if scheduling via [facility] ????, send a message by clicking on Secure Messaging, Message Your Care Team.            Aug 16, 2017 11:00 AM CDT   [facility] with Ramon Rodriguez MD   [facility]    [address] [place] [address] [id number] [phone]              Future tests that were ordered for you today     Open Future Orders        Priority Expected Expires Ordered    Hemoglobin A1c Routine 8/16/2017 6/1/2018 6/1/2017            Who to contact     If you have questions or need follow up information about today's clinic visit or your schedule please contact [facility] directly at [phone].  Normal or non-critical lab and imaging results will be communicated to you by MyChart, letter or phone within 4 business days after the clinic has received the results. If you do not hear from us within 7 days, please contact the clinic through [facility] or phone. If you have a critical or abnormal lab result, we will notify you by phone as soon as possible.  Submit refill requests through [facility] ???? or call your pharmacy and they will forward the refill request to us. Please allow 3 business days for your refill to be completed.          Additional Information About Your Visit        W[facility] [facility]AK[facility] ???? Information     [employer] ???? lets you send messages to your doctor, view your test results, renew your prescriptions, schedule appointments and more. To sign up,  "go to [email or web address] . Click on \"Log in\" on the left side of the screen, which will take you to the Welcome page. Then click on \"Sign up Now\" on the right side of the page.     You will be asked to enter the access code listed below, as well as some personal information. Please follow the directions to create your username and password.     Your access code is: [email or web address]  Expires: 2017 10:42 AM     Your access code will  in 90 days. If you need help or a new code, please call your [place] clinic or [phone].        Care EveryWhere ID     This is your [facility] EveryWhere ID. This could be used by other organizations to access your [place] medical records  [id number]        Your Vitals Were     Pulse Temperature Height Pulse Oximetry Breastfeeding? BMI (Body Mass Index)    93 98  F (36.7  C) (Oral) 4' 8.25\" (1.429 m) 96% No 28 kg/m2       Blood Pressure from Last 3 Encounters:   17 151/71   17 158/70   17 105/66    Weight from Last 3 Encounters:   17 126 lb (57.2 kg)   17 126 lb (57.2 kg)   17 125 lb (56.7 kg)              We Performed the Following     Creatinine urine calculation only     Protein  random urine     UA reflex to Microscopic and Culture          Where to get your medicines      Some of these will need a paper prescription and others can be bought over the counter.  Ask your nurse if you have questions.     You don't need a prescription for these medications     ACE/ARB NOT PRESCRIBED (INTENTIONAL)          Primary Care Provider Office Phone # Fax #    Jonna Cason [phone]       [facility] [address]  [facility] [address]        Thank you!     Thank you for choosing [facility]  for your care. Our goal is always to provide you with excellent care. Hearing back from our patients is one way we can continue to improve our services. Please take a few minutes to complete the " written survey that you may receive in the mail after your visit with us. Thank you!             Your Updated Medication List - Protect others around you: Learn how to safely use, store and throw away your medicines at www.disposemymeds.org.          This list is accurate as of: 6/1/17  9:35 AM.  Always use your most recent med list.                   Brand Name Dispense Instructions for use    ACE/ARB NOT PRESCRIBED (INTENTIONAL)      Please choose reason not prescribed, below       acetaminophen-codeine 300-30 MG per tablet    TYLENOL #3    60 tablet    TAKE 1-2 TABLETS ONCE DAILY AS NEEDED FOR PAIN// IB HNUB NOJ 1-2 LUB YOG MOB       amLODIPine 10 MG tablet    NORVASC    90 tablet    Take 1 tablet (10 mg) by mouth daily       ASPIRIN LOW DOSE 81 MG EC tablet   Generic drug:  aspirin     100 tablet    TAKE 1 TABLET BY MOUTH DAILY TO PREVENT STROKE // IB HNUB NOJ 1 LUB PAB KOM NTSHAV KHIAV ZOO       calcium-vitamin D 600-400 MG-UNIT per tablet    CALTRATE    60 tablet    TAKE 1 TABLET BY MOUTH TWICE DAILY FOR BONE HEALTH // IB ZAUG NOJ 1 LUB, IB HNUB NOJ OB ZAUG PAB LASHA POB TXHA       * CANE, ANY MATERIAL     1 each    1 each.       furosemide 20 MG tablet    LASIX    10 tablet    Take 1 tablet (20 mg) by mouth daily as needed For swelling in legs       hydrALAZINE 10 MG tablet    APRESOLINE    90 tablet    Take 1 tablet (10 mg) by mouth 3 times daily       imipramine 10 MG tablet    TOFRANIL    180 tablet    TAKE 2 TABLETS BY MOUTH AT BEDTIME // IB HNUB NOJ 2 LUB THAUM MUS PW PAN LASHA NYUAB SIAB       insulin aspart prot & aspart injection    NovoLOG MIX 70/30 FLEXPEN    1 mL    Inject 10 Units Subcutaneous 2 times daily (with meals)       * insulin pen needle 31G X 6 MM    ULTICARE MINI    100 each    Use 2 daily or as directed.       * UNIFINE PENTIPS 31G X 5 MM   Generic drug:  insulin pen needle     100 each    USE AS DIRECTED TWO TIMES DAILY // IB HNUB SIV 2 ZAUG CELESTINE JAMESA       * olopatadine 0.1 % ophthalmic  solution    PATANOL    5 mL    Place 1 drop into both eyes 2 times daily as needed for allergies (itchy eye.)       * olopatadine 0.1 % ophthalmic solution    PATANOL    5 mL    Place 1 drop into both eyes 2 times daily as needed for allergies       ONE TOUCH ULTRA test strip   Generic drug:  blood glucose monitoring     200 each    USE TO TEST BLOOD SUGAR 2-3 TIMES A DAY // IB HNUB SIV 2-3 ZAUG LI QHIA       ONETOUCH DELICA LANCETS 33G Misc     100 each    USE AS DIRECTED TWO TIMES DAILY // IB HNUB SIV 2 ZAUG LI QHIA       * order for DME     1 each    Equipment being ordered: blood pressure meter with supplies and adult cuff       * order for DME     200 each    Lancets bid for type 2 diabetes on insulin.       * order for DME     1 Device    Equipment being ordered: Shoe lift for leg length difference.       * order for DME     200 each    Test strips for pt's glucometer, brand as covered by insurance Test bid. One touch Delica lancets. Type 2 diabetes on insulin.  Directions: use as directed Quantity: 200       prednisoLONE acetate 1 % ophthalmic susp    PRED FORTE    5 mL    Place 1 drop into both eyes 4 times daily       rosuvastatin 20 MG tablet    CRESTOR    90 tablet    Take 1 tablet (20 mg) by mouth daily       * Notice:  This list has 9 medication(s) that are the same as other medications prescribed for you. Read the directions carefully, and ask your doctor or other care provider to review them with you.
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
## 2017-06-02 NOTE — PROGRESS NOTES
Dear Lakhwinder Negron,    Your test results are attached.     The urine shows some blood and protein. I will send these results to Dr. Rodriguez.     Please call me if you have any questions about these test results or about your care.    Sincerely,    Jonna Cason MD

## 2017-06-05 DIAGNOSIS — I10 ESSENTIAL HYPERTENSION: ICD-10-CM

## 2017-06-05 DIAGNOSIS — E11.22 TYPE 2 DIABETES MELLITUS WITH DIABETIC CHRONIC KIDNEY DISEASE, UNSPECIFIED CKD STAGE, UNSPECIFIED LONG TERM INSULIN USE STATUS: ICD-10-CM

## 2017-06-05 NOTE — TELEPHONE ENCOUNTER
Spoke to Ramin. Corrected him about appointment time. Advised that 10 AM on Wednesday was appointment time. He stated that this will work. Scheduled lab prior along with placed lab orders.    Daysi Frias, RN, BSN  Nephrology Care Coordinator  [facility]

## 2017-06-05 NOTE — TELEPHONE ENCOUNTER
Received voicemail from male on [facility] voicemail stating appt on Wed at 7am will work (?). He would like a call back to confirm [phone].    Peyton LONG RN, BSN  Pulmonary Care Coordinator

## 2017-06-05 NOTE — TELEPHONE ENCOUNTER
ONETOUCH DELICA LANCETS 33G MISC      Last Written Prescription Date: 02/13/17  Last Fill Quantity: 100,  # refills: 1   Last Office Visit with [facility], [facility] or [facility] prescribing provider: 06/01/17                                           Next 5 appointments (look out 90 days)     Aug 16, 2017 11:00 AM CDT   Return Visit with Ramon Rodriguez MD   [facility] ([facility]    [address] [place] [id number]   [phone]                      Alyx Canales  [facility]

## 2017-06-05 NOTE — TELEPHONE ENCOUNTER
Spoke to patient's son, Ramin. He will call back to see if Wednesday 6/8/17 at 10 AM would work out for his family to bring her in to her appointment.    Daysi Frias, RN, BSN  Nephrology Care Coordinator  [facility]

## 2017-06-06 RX ORDER — LANCETS 33 GAUGE
EACH MISCELLANEOUS
Qty: 100 EACH | Refills: 1 | Status: SHIPPED | OUTPATIENT
Start: 2017-06-06 | End: 2017-09-14

## 2017-06-06 NOTE — TELEPHONE ENCOUNTER
Prescription approved per [facility] Refill Protocol.  Gricelda Olguin RN

## 2017-06-07 ENCOUNTER — TELEPHONE (OUTPATIENT)
Dept: FAMILY MEDICINE | Facility: CLINIC | Age: 77
End: 2017-06-07

## 2017-06-07 ENCOUNTER — OFFICE VISIT (OUTPATIENT)
Dept: NEPHROLOGY | Facility: CLINIC | Age: 77
End: 2017-06-07
Payer: COMMERCIAL

## 2017-06-07 VITALS
BODY MASS INDEX: 28.76 KG/M2 | SYSTOLIC BLOOD PRESSURE: 140 MMHG | HEIGHT: 56 IN | DIASTOLIC BLOOD PRESSURE: 65 MMHG | HEART RATE: 83 BPM | WEIGHT: 127.87 LBS

## 2017-06-07 DIAGNOSIS — N18.30 CKD (CHRONIC KIDNEY DISEASE) STAGE 3, GFR 30-59 ML/MIN (H): Primary | ICD-10-CM

## 2017-06-07 DIAGNOSIS — N18.30 CKD (CHRONIC KIDNEY DISEASE) STAGE 3, GFR 30-59 ML/MIN (H): ICD-10-CM

## 2017-06-07 DIAGNOSIS — E11.22 TYPE 2 DIABETES MELLITUS WITH STAGE 3 CHRONIC KIDNEY DISEASE, WITHOUT LONG-TERM CURRENT USE OF INSULIN (H): ICD-10-CM

## 2017-06-07 DIAGNOSIS — N25.81 SECONDARY RENAL HYPERPARATHYROIDISM (H): ICD-10-CM

## 2017-06-07 DIAGNOSIS — N18.30 TYPE 2 DIABETES MELLITUS WITH STAGE 3 CHRONIC KIDNEY DISEASE, WITHOUT LONG-TERM CURRENT USE OF INSULIN (H): ICD-10-CM

## 2017-06-07 DIAGNOSIS — G89.4 CHRONIC PAIN SYNDROME: ICD-10-CM

## 2017-06-07 DIAGNOSIS — R31.29 MICROSCOPIC HEMATURIA: ICD-10-CM

## 2017-06-07 DIAGNOSIS — E11.42 DIABETIC POLYNEUROPATHY ASSOCIATED WITH TYPE 2 DIABETES MELLITUS (H): ICD-10-CM

## 2017-06-07 DIAGNOSIS — I10 HYPERTENSION, GOAL BELOW 140/90: ICD-10-CM

## 2017-06-07 LAB
ALBUMIN SERPL-MCNC: 3.2 G/DL (ref 3.4–5)
ALBUMIN UR-MCNC: >600 MG/DL
ANION GAP SERPL CALCULATED.3IONS-SCNC: 7 MMOL/L (ref 3–14)
APPEARANCE UR: CLEAR
BACTERIA #/AREA URNS HPF: ABNORMAL /HPF
BILIRUB UR QL STRIP: NEGATIVE
BUN SERPL-MCNC: 26 MG/DL (ref 7–30)
C3 SERPL-MCNC: 117 MG/DL (ref 76–169)
C4 SERPL-MCNC: 27 MG/DL (ref 15–50)
CALCIUM SERPL-MCNC: 8.6 MG/DL (ref 8.5–10.1)
CHLORIDE SERPL-SCNC: 112 MMOL/L (ref 94–109)
CO2 SERPL-SCNC: 25 MMOL/L (ref 20–32)
COLOR UR AUTO: YELLOW
CREAT SERPL-MCNC: 2.24 MG/DL (ref 0.52–1.04)
CREAT UR-MCNC: 117 MG/DL
GFR SERPL CREATININE-BSD FRML MDRD: 21 ML/MIN/1.7M2
GLUCOSE SERPL-MCNC: 81 MG/DL (ref 70–99)
GLUCOSE UR STRIP-MCNC: 150 MG/DL
GRAN CASTS #/AREA URNS LPF: ABNORMAL /LPF
HGB UR QL STRIP: ABNORMAL
HYALINE CASTS #/AREA URNS LPF: ABNORMAL /LPF (ref 0–2)
KAPPA LC UR-MCNC: 2.72 MG/DL (ref 0.33–1.94)
KAPPA LC/LAMBDA SER: 0.52 {RATIO} (ref 0.26–1.65)
KETONES UR STRIP-MCNC: NEGATIVE MG/DL
LAMBDA LC SERPL-MCNC: 5.19 MG/DL (ref 0.57–2.63)
LEUKOCYTE ESTERASE UR QL STRIP: NEGATIVE
MICROALBUMIN UR-MCNC: 7350 MG/L
MICROALBUMIN/CREAT UR: 6282.05 MG/G CR (ref 0–25)
NITRATE UR QL: NEGATIVE
NON-SQ EPI CELLS #/AREA URNS LPF: ABNORMAL /LPF
PH UR STRIP: 6 PH (ref 5–7)
PHOSPHATE SERPL-MCNC: 3.1 MG/DL (ref 2.5–4.5)
POTASSIUM SERPL-SCNC: 4.4 MMOL/L (ref 3.4–5.3)
PROT UR-MCNC: 9 G/L
PROT/CREAT 24H UR: 7.44 G/G CR (ref 0–0.2)
RBC #/AREA URNS AUTO: ABNORMAL /HPF (ref 0–2)
SODIUM SERPL-SCNC: 144 MMOL/L (ref 133–144)
SP GR UR STRIP: 1.01 (ref 1–1.03)
URN SPEC COLLECT METH UR: ABNORMAL
UROBILINOGEN UR STRIP-MCNC: NORMAL MG/DL (ref 0–2)
WBC #/AREA URNS AUTO: ABNORMAL /HPF (ref 0–2)

## 2017-06-07 PROCEDURE — 83516 IMMUNOASSAY NONANTIBODY: CPT | Performed by: INTERNAL MEDICINE

## 2017-06-07 PROCEDURE — 99214 OFFICE O/P EST MOD 30 MIN: CPT | Performed by: INTERNAL MEDICINE

## 2017-06-07 PROCEDURE — 82306 VITAMIN D 25 HYDROXY: CPT | Performed by: INTERNAL MEDICINE

## 2017-06-07 PROCEDURE — 83883 ASSAY NEPHELOMETRY NOT SPEC: CPT | Performed by: INTERNAL MEDICINE

## 2017-06-07 PROCEDURE — 82043 UR ALBUMIN QUANTITATIVE: CPT | Performed by: INTERNAL MEDICINE

## 2017-06-07 PROCEDURE — 84165 PROTEIN E-PHORESIS SERUM: CPT | Performed by: INTERNAL MEDICINE

## 2017-06-07 PROCEDURE — 00000402 ZZHCL STATISTIC TOTAL PROTEIN: Performed by: INTERNAL MEDICINE

## 2017-06-07 PROCEDURE — 86160 COMPLEMENT ANTIGEN: CPT | Performed by: INTERNAL MEDICINE

## 2017-06-07 PROCEDURE — 80069 RENAL FUNCTION PANEL: CPT | Performed by: INTERNAL MEDICINE

## 2017-06-07 PROCEDURE — 83876 ASSAY MYELOPEROXIDASE: CPT | Performed by: INTERNAL MEDICINE

## 2017-06-07 PROCEDURE — 84156 ASSAY OF PROTEIN URINE: CPT | Performed by: INTERNAL MEDICINE

## 2017-06-07 PROCEDURE — 36415 COLL VENOUS BLD VENIPUNCTURE: CPT | Performed by: INTERNAL MEDICINE

## 2017-06-07 PROCEDURE — 81001 URINALYSIS AUTO W/SCOPE: CPT | Performed by: INTERNAL MEDICINE

## 2017-06-07 RX ORDER — IMIPRAMINE HYDROCHLORIDE 10 MG/1
10 TABLET, FILM COATED ORAL AT BEDTIME
Qty: 30 TABLET | Refills: 0 | Status: SHIPPED | OUTPATIENT
Start: 2017-06-07 | End: 2017-07-10 | Stop reason: ALTCHOICE

## 2017-06-07 RX ORDER — HYDRALAZINE HYDROCHLORIDE 10 MG/1
20 TABLET, FILM COATED ORAL 2 TIMES DAILY
Qty: 180 TABLET | Refills: 3 | Status: SHIPPED | OUTPATIENT
Start: 2017-06-07 | End: 2017-07-25
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
## 2017-06-07 NOTE — PATIENT INSTRUCTIONS
1. Please have your family member contact our clinic to reconcile your medication list: [phone]  2. INcrease hydralazine to 20 mg twice a day  3. We will be in touch regarding your imipramine medication after I hear back from Dr. Cason  4. We will be in touch with plan going forward when your labs return.

## 2017-06-07 NOTE — MR AVS SNAPSHOT
After Visit Summary   6/7/2017    Lakhwinder Negron    MRN: [id number]           Patient Information     Date Of Birth          1940        Visit Information        Provider Department      6/7/2017 10:00 AM Ramon Rodriguez MD; [employer] [employer] [facility]        Today's Diagnoses     Hypertension, goal below 140/90          Care Instructions    1. Please have your family member contact our clinic to reconcile your medication list: [phone]  2. INcrease hydralazine to 20 mg twice a day  3. We will be in touch regarding your imipramine medication after I hear back from Dr. Cason  4. We will be in touch with plan going forward when your labs return.           Follow-ups after your visit        Your next 10 appointments already scheduled     Jul 25, 2017 11:00 AM CDT   Return Visit with Ramon Rodriguez MD   [facility])    [id number] [place] [id number] [phone]            Aug 16, 2017 10:30 AM CDT   LAB with LAB FIRST FLOOR Formerly named [facility])    [address] [place] [id number] [phone]           Patient must bring picture ID.  Patient should be prepared to give a urine specimen  Please do not eat 10-12 hours before your appointment if you are coming in fasting for labs on lipids, cholesterol, or glucose (sugar).  Pregnant women should follow their Care Team instructions. Water with medications is okay. Do not drink coffee or other fluids.   If you have concerns about taking  your medications, please ask at office or if scheduling via [employer], send a message by clicking on Secure Messaging, Message Your Care Team.            Aug 16, 2017 11:00 AM CDT   Return Visit with Ramon Rodriguez MD   [facility])    [address] [place] [id number] [phone]             Oct 05, 2017  9:00 AM CDT   LAB with BK LAB   [facility]    [address] [facility] [id number] [phone]           Patient must bring picture ID.  Patient should be prepared to give a urine specimen  Please do not eat 10-12 hours before your appointment if you are coming in fasting for labs on lipids, cholesterol, or glucose (sugar).  Pregnant women should follow their Care Team instructions. Water with medications is okay. Do not drink coffee or other fluids.   If you have concerns about taking  your medications, please ask at office or if scheduling via [employer], send a message by clicking on Secure Messaging, Message Your Care Team.            Oct 12, 2017  9:00 AM CDT   Office Visit with Jonna Cason MD   [facility] ([facility])    [address] [facility] [id number] [phone]           Bring a current list of meds and any records pertaining to this visit.  For Physicals, please bring immunization records and any forms needing to be filled out.  Please arrive 10 minutes early to complete paperwork.              Who to contact     If you have questions or need follow up information about today's clinic visit or your schedule please contact [facility] directly at [phone].  Normal or non-critical lab and imaging results will be communicated to you by MyChart, letter or phone within 4 business days after the clinic has received the results. If you do not hear from us within 7 days, please contact the clinic through MyChart or phone. If you have a critical or abnormal lab result, we will notify you by phone as soon as possible.  Submit refill requests through [employer] or call your pharmacy and they will forward the refill request to us. Please allow 3 business days for your refill to be completed.          Additional Information About Your Visit       "  MyChart Information     [employer] is an electronic gateway that provides easy, online access to your medical records. With [employer], you can request a clinic appointment, read your test results, renew a prescription or communicate with your care team.     To sign up for [employer] visit the website at [email or web address]   You will be asked to enter the access code listed below, as well as some personal information. Please follow the directions to create your username and password.     Your access code is: [email or web address]  Expires: 2017 10:52 AM     Your access code will  in 90 days. If you need help or a new code, please contact your [facility] or call [phone] for assistance.        Care EveryWhere ID     This is your Care EveryWhere ID. This could be used by other organizations to access your [place] medical records  [id number]        Your Vitals Were     Pulse Height BMI (Body Mass Index)             83 1.429 m (4' 8.25\") 28.41 kg/m2          Blood Pressure from Last 3 Encounters:   17 140/65   17 151/71   17 158/70    Weight from Last 3 Encounters:   17 58 kg (127 lb 13.9 oz)   17 57.2 kg (126 lb)   17 57.2 kg (126 lb)              Today, you had the following     No orders found for display         Today's Medication Changes          These changes are accurate as of: 17  3:07 PM.  If you have any questions, ask your nurse or doctor.               These medicines have changed or have updated prescriptions.        Dose/Directions    hydrALAZINE 10 MG tablet   Commonly known as:  APRESOLINE   This may have changed:    - how much to take  - when to take this   Used for:  Hypertension, goal below 140/90   Changed by:  Ramon Rodriguez MD        Dose:  20 mg   Take 2 tablets (20 mg) by mouth 2 times daily   Quantity:  180 tablet   Refills:  3            Where to get your medicines      These medications were sent to [facility], [place] - [address]  [address], [facility] [place] [address]     Phone:  [phone]     hydrALAZINE 10 MG tablet                Primary Care Provider Office Phone # Fax #    Jonna Kaleigh Cason [phone]       [facility] [address]  [facility] [address]        Thank you!     Thank you for choosing [facility]  for your care. Our goal is always to provide you with excellent care. Hearing back from our patients is one way we can continue to improve our services. Please take a few minutes to complete the written survey that you may receive in the mail after your visit with us. Thank you!             Your Updated Medication List - Protect others around you: Learn how to safely use, store and throw away your medicines at www.disposemymeds.org.          This list is accurate as of: 6/7/17  3:07 PM.  Always use your most recent med list.                   Brand Name Dispense Instructions for use    ACE/ARB NOT PRESCRIBED (INTENTIONAL)      Please choose reason not prescribed, below       acetaminophen-codeine 300-30 MG per tablet    TYLENOL #3    60 tablet    TAKE 1-2 TABLETS ONCE DAILY AS NEEDED FOR PAIN// IB HNUB NOJ 1-2 LUB YOG MOB       amLODIPine 10 MG tablet    NORVASC    90 tablet    Take 1 tablet (10 mg) by mouth daily       ASPIRIN LOW DOSE 81 MG EC tablet   Generic drug:  aspirin     100 tablet    TAKE 1 TABLET BY MOUTH DAILY TO PREVENT STROKE // IB HNUB NOJ 1 LUB PAB KOM NTSHAV KHIAV ZOO       calcium-vitamin D 600-400 MG-UNIT per tablet    CALTRATE    60 tablet    TAKE 1 TABLET BY MOUTH TWICE DAILY FOR BONE HEALTH // IB ZAUG NOJ 1 LUB, IB HNUB NOJ OB ZAUG PAB LASHA POB TXHA       * CANE, ANY MATERIAL     1 each    1 each.       furosemide 20 MG tablet    LASIX    10 tablet    Take 1 tablet (20 mg) by mouth daily as needed For swelling in legs       hydrALAZINE 10 MG tablet    APRESOLINE    180 tablet    Take 2 tablets  (20 mg) by mouth 2 times daily       imipramine 10 MG tablet    TOFRANIL    180 tablet    TAKE 2 TABLETS BY MOUTH AT BEDTIME // IB HNUB NOJ 2 LUB THAUM MUS PW PAN LASHA NYUAB SIAB       insulin aspart prot & aspart injection    NovoLOG MIX 70/30 FLEXPEN    1 mL    Inject 10 Units Subcutaneous 2 times daily (with meals)       * insulin pen needle 31G X 6 MM    ULTICARE MINI    100 each    Use 2 daily or as directed.       * UNIFINE PENTIPS 31G X 5 MM   Generic drug:  insulin pen needle     100 each    USE AS DIRECTED TWO TIMES DAILY // IB HNUB SIV 2 ZAUG LI QHIA       * olopatadine 0.1 % ophthalmic solution    PATANOL    5 mL    Place 1 drop into both eyes 2 times daily as needed for allergies (itchy eye.)       * olopatadine 0.1 % ophthalmic solution    PATANOL    5 mL    Place 1 drop into both eyes 2 times daily as needed for allergies       ONE TOUCH ULTRA test strip   Generic drug:  blood glucose monitoring     200 each    USE TO TEST BLOOD SUGAR 2-3 TIMES A DAY // IB HNUB SIV 2-3 ZAUG LI QHIA       ONETOUCH DELICA LANCETS 33G Misc     100 each    USE AS DIRECTED TWO TIMES DAILY // IB HNUB SIV 2 ZAUG LI QHIA       * order for DME     1 each    Equipment being ordered: blood pressure meter with supplies and adult cuff       * order for DME     200 each    Lancets bid for type 2 diabetes on insulin.       * order for DME     1 Device    Equipment being ordered: Shoe lift for leg length difference.       * order for DME     200 each    Test strips for pt's glucometer, brand as covered by insurance Test bid. One touch Delica lancets. Type 2 diabetes on insulin.  Directions: use as directed Quantity: 200       prednisoLONE acetate 1 % ophthalmic susp    PRED FORTE    5 mL    Place 1 drop into both eyes 4 times daily       rosuvastatin 20 MG tablet    CRESTOR    90 tablet    Take 1 tablet (20 mg) by mouth daily       * Notice:  This list has 9 medication(s) that are the same as other medications prescribed for you. Read  the directions carefully, and ask your doctor or other care provider to review them with you.

## 2017-06-07 NOTE — NURSING NOTE
"Lakhwinder Negron's goals for this visit include: Follow up CKD  She requests these members of her care team be copied on today's visit information: YES    PCP: Jonna Cason    Referring Provider:  No referring provider defined for this encounter.    Chief Complaint   Patient presents with     Chronic Disease Management       Initial Ht 1.429 m (4' 8.25\")  Wt 58 kg (127 lb 13.9 oz)  BMI 28.41 kg/m2 Estimated body mass index is 28.41 kg/(m^2) as calculated from the following:    Height as of this encounter: 1.429 m (4' 8.25\").    Weight as of this encounter: 58 kg (127 lb 13.9 oz).  Medication Reconciliation: complete    Do you need any medication refills at today's visit? NO    "

## 2017-06-07 NOTE — TELEPHONE ENCOUNTER
----- Message from Ramon Rodriguez MD sent at 6/7/2017 10:39 AM CDT -----  NOting acute rise in  Creatinine on this patient. I may end up doing a kidney biopsy. There are reports that imipramine can cause nephrotoxicity - can we get away from this? Appreciate your help if able.   Ramon

## 2017-06-07 NOTE — TELEPHONE ENCOUNTER
We need to cut back on the imipramine to once at night for 2-3 weeks then stop medication due to kidney problems. Please follow up with me on alternative medication. Monday is a good day when the pharmacist is here for medication review.  Jonna Cason MD

## 2017-06-08 LAB
ALBUMIN SERPL ELPH-MCNC: 3.4 G/DL (ref 3.7–5.1)
ALPHA1 GLOB SERPL ELPH-MCNC: 0.3 G/DL (ref 0.2–0.4)
ALPHA2 GLOB SERPL ELPH-MCNC: 1.1 G/DL (ref 0.5–0.9)
B-GLOBULIN SERPL ELPH-MCNC: 0.8 G/DL (ref 0.6–1)
DEPRECATED CALCIDIOL+CALCIFEROL SERPL-MC: NORMAL UG/L (ref 20–75)
GAMMA GLOB SERPL ELPH-MCNC: 0.7 G/DL (ref 0.7–1.6)
GBM IGG SER IA-ACNC: NORMAL AI (ref 0–0.9)
M PROTEIN SERPL ELPH-MCNC: 0.1 G/DL
MYELOPEROXIDASE AB SER-ACNC: NORMAL AI (ref 0–0.9)
PROT PATTERN SERPL ELPH-IMP: ABNORMAL
PROTEINASE3 IGG SER-ACNC: NORMAL AI (ref 0–0.9)
VITAMIN D2 SERPL-MCNC: <5 UG/L
VITAMIN D3 SERPL-MCNC: 17 UG/L

## 2017-06-08 NOTE — TELEPHONE ENCOUNTER
This writer attempted to contact Lakhwinder on 06/08/17 via [employer]  ID# [id number].      Reason for call relay provider message and left message to return call.      When patient calls back, please contact clinic RN team. If no one available, document that pt called and route to care team. routine priority.          Joelle Hassan RN

## 2017-06-13 ENCOUNTER — TELEPHONE (OUTPATIENT)
Dept: NEPHROLOGY | Facility: CLINIC | Age: 77
End: 2017-06-13

## 2017-06-13 NOTE — TELEPHONE ENCOUNTER
2nd attempt. This writer attempted to contact Xee on 06/13/17      Reason for call relay provider message and left message to return call.      When patient calls back, please contact clinic RN team. If no one available, document that pt called and route to care team. route - high priority.          Joelle Hassan RN

## 2017-06-13 NOTE — TELEPHONE ENCOUNTER
Called and spoke to the patients son and explained that the medication has been changed from hydrALAZINE (APRESOLINE) 10 MG tablet to 2 tablets (20 mg) by mouth 2 times daily.  Son understood and had no other questions.    [facility]

## 2017-06-20 DIAGNOSIS — N18.30 CKD (CHRONIC KIDNEY DISEASE) STAGE 3, GFR 30-59 ML/MIN (H): Primary | ICD-10-CM

## 2017-06-20 DIAGNOSIS — Z53.9 DIAGNOSIS NOT YET DEFINED: Primary | ICD-10-CM

## 2017-06-20 PROCEDURE — 99207 ZZC NO BILLABLE SERVICE THIS VISIT: CPT | Performed by: FAMILY MEDICINE

## 2017-07-02 DIAGNOSIS — N18.30 CKD (CHRONIC KIDNEY DISEASE) STAGE 3, GFR 30-59 ML/MIN (H): Primary | ICD-10-CM
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
## 2017-07-03 ENCOUNTER — TELEPHONE (OUTPATIENT)
Dept: NEPHROLOGY | Facility: CLINIC | Age: 77
End: 2017-07-03

## 2017-07-03 ENCOUNTER — TELEPHONE (OUTPATIENT)
Dept: FAMILY MEDICINE | Facility: CLINIC | Age: 77
End: 2017-07-03

## 2017-07-03 NOTE — LETTER
July 3, 2017      Lakhwinder Negron  [address] [address]              Dear Lakhwinder,      Attached are your labs from your visit last month. You have a lot of protein and glucose present in the urine which is likely all related to diabetes' effect on the kidney. Additional results:   - vitamin D level is just below goal   - NO evidence of vasculitis which is reassuring.   -Complements are normal which is reassuring.   - Unfortunately, your kidney function is again at a worsened level.     At the time of your visit I recommended that you call to do a medication reconciliation following our visit. I don't see that this has occurred. My team will reach out to you to assure we have a good plan going forward. Please call with any questions or concerns.             Sincerely,      Ramon Rodriguez DO    Division of Renal Diseases and Hypertension  [facility]    Component      Latest Ref Rng & Units 6/7/2017   Sodium      133 - 144 mmol/L 144   Potassium      3.4 - 5.3 mmol/L 4.4   Chloride      94 - 109 mmol/L 112 (H)   Carbon Dioxide      20 - 32 mmol/L 25   Anion Gap      3 - 14 mmol/L 7   Glucose      70 - 99 mg/dL 81   Urea Nitrogen      7 - 30 mg/dL 26   Creatinine      0.52 - 1.04 mg/dL 2.24 (H)   GFR Estimate      >60 mL/min/1.7m2 21 (L)   GFR Estimate If Black      >60 mL/min/1.7m2 26 (L)   Calcium      8.5 - 10.1 mg/dL 8.6   Phosphorus      2.5 - 4.5 mg/dL 3.1   Albumin      3.4 - 5.0 g/dL 3.2 (L)   Color Urine       Yellow   Appearance Urine       Clear   Glucose Urine      NEG mg/dL 150 (A)   Bilirubin Urine      NEG Negative   Ketones Urine      NEG mg/dL Negative   Specific Gravity Urine      1.003 - 1.035 1.013   Blood Urine      NEG Small (A)   pH Urine      5.0 - 7.0 pH 6.0   Protein Albumin Urine      NEG mg/dL >600 (A)   Urobilinogen mg/dL      0.0 - 2.0 mg/dL Normal   Nitrite Urine      NEG Negative   Leukocyte Esterase Urine      NEG Negative   Source        Midstream Urine   Albumin Fraction      3.7 - 5.1 g/dL 3.4 (L)   Alpha 1 Fraction      0.2 - 0.4 g/dL 0.3   Alpha 2 Fraction      0.5 - 0.9 g/dL 1.1 (H)   Beta Fraction      0.6 - 1.0 g/dL 0.8   Gamma Fraction      0.7 - 1.6 g/dL 0.7   Monoclonal Peak      0.0 g/dL 0.1 (H)   ELP Interpretation:       Possible small monoclonal protein ( 0.1g/dL) seen in the gamma fraction, not . . .   WBC Urine      0 - 2 /HPF O - 2   RBC Urine      0 - 2 /HPF O - 2   Bacteria Urine      NEG /HPF Few (A)   Squamous Epithelial /LPF Urine      FEW /LPF Few   Hyaline Casts      0 - 2 /LPF O - 2   Granular Casts      NEG /LPF 0-2 (A)   Creatinine Urine      mg/dL 117   Albumin Urine mg/L      mg/L 7350   Albumin Urine mg/g Cr      0 - 25 mg/g Cr 6282.05 (H)   [place] Free Lt Chain      0.33 - 1.94 mg/dL 2.72 (H)   Lambda Free Lt Chain      0.57 - 2.63 mg/dL 5.19 (H)   Kappa Lambda Ratio      0.26 - 1.65 0.52   25 OH Vit D2      ug/L <5   25 OH Vit D3      ug/L 17   25 OH Vit D total      20 - 75 ug/L <22 . . .   Myeloperoxidase Antibody IgG      0.0 - 0.9 AI <0.2 . . .   Proteinase 3 Antibody IgG      0.0 - 0.9 AI <0.2 . . .   Protein Random Urine      g/L 9.00   Protein Total Urine g/gr Creatinine      0 - 0.2 g/g Cr 7.44 (H)   Complement C3      76 - 169 mg/dL 117   Complement C4      15 - 50 mg/dL 27   GBM Antibody IgG      0.0 - 0.9 AI <0.2 . . .

## 2017-07-03 NOTE — TELEPHONE ENCOUNTER
Panel Management Review      BP Readings from Last 1 Encounters:   06/07/17 140/65        Fail List measure: Blood Pressure checlk      Patient is due/failing the following:   BP CHECK    Action needed:   None, seen at [place], by LEANN Rodriguez MD     Type of outreach:    none    Questions for provider review:    None                                                                                                                                    Lise Greenfield MA

## 2017-07-03 NOTE — TELEPHONE ENCOUNTER
Left a message for Ramin to go over Xee's medications and BP checks. Labs are entered and needed this week. Please go over results with this patient letter has been mailed      Attached are your labs from your visit last month. You have a lot of protein and glucose present in the urine which is likely all related to diabetes' effect on the kidney. Additional results:   - vitamin D level is just below goal   - NO evidence of vasculitis which is reassuring.   -Complements are normal which is reassuring.   - Unfortunately, your kidney function is again at a worsened level.     At the time of your visit I recommended that you call to do a medication reconciliation following our visit. I don't see that this has occurred. My team will reach out to you to assure we have a good plan going forward. Please call with any questions or concerns.     Sincerely,     Ramon Rodriguez DO     Team: I appreciate your help with this patient this week if you can. Recommend the following:   - Please call patient/family to reconcile meds to assure our med list is correct.   - Please get an update on BP readings   - Please ask patient to get addt labs done this week. I will place these lab orders now.   - Please move up follow-up with me to next available.    Zach Fried Medical Assistant

## 2017-07-05 ENCOUNTER — TELEPHONE (OUTPATIENT)
Dept: NEPHROLOGY | Facility: CLINIC | Age: 77
End: 2017-07-05

## 2017-07-05 ENCOUNTER — TELEPHONE (OUTPATIENT)
Dept: FAMILY MEDICINE | Facility: CLINIC | Age: 77
End: 2017-07-05

## 2017-07-05 DIAGNOSIS — E11.42 DIABETIC POLYNEUROPATHY ASSOCIATED WITH TYPE 2 DIABETES MELLITUS (H): ICD-10-CM

## 2017-07-05 DIAGNOSIS — E11.22 TYPE 2 DIABETES MELLITUS WITH STAGE 3 CHRONIC KIDNEY DISEASE, UNSPECIFIED LONG TERM INSULIN USE STATUS: ICD-10-CM

## 2017-07-05 DIAGNOSIS — N18.3 TYPE 2 DIABETES MELLITUS WITH STAGE 3 CHRONIC KIDNEY DISEASE, UNSPECIFIED LONG TERM INSULIN USE STATUS: ICD-10-CM

## 2017-07-05 DIAGNOSIS — G89.4 CHRONIC PAIN SYNDROME: ICD-10-CM

## 2017-07-05 NOTE — TELEPHONE ENCOUNTER
Called Ramin to discuss patient ( Xee) results. Second attempt, letter has been mailed to the patient. Need to go over results and schedule a lab appointment.    Zach Meier

## 2017-07-05 NOTE — TELEPHONE ENCOUNTER
imipramine (TOFRANIL) 10 MG tablet     Last Written Prescription Date: 06/07/17  Last Fill Quantity: 30, # refills: 0  Last Office Visit with [facility], [facility] or [facility] prescribing provider: 06/01/17     Next 5 appointments (look out 90 days)     Jul 25, 2017 11:00 AM CDT   Return Visit with Ramon Rodriguez MD   [facility])    [address] [facility] [address] [phone]            Aug 16, 2017 11:00 AM CDT   Return Visit with Ramon Rodriguez MD   [facility])    [address] [facility] [id number] [phone]                   BP Readings from Last 3 Encounters:   06/07/17 140/65   06/01/17 151/71   04/27/17 158/70     Last PHQ-9 score on record=   PHQ-9 SCORE 3/9/2017   Total Score 1       ONE TOUCH ULTRA test strip      Last Written Prescription Date: 01/20/17  Last Fill Quantity: 200,  # refills: 1   Last Office Visit with [facility], [facility] or [facility] prescribing provider: 06/01/17                                           Next 5 appointments (look out 90 days)     Jul 25, 2017 11:00 AM CDT   Return Visit with Ramon Rodriguez MD   [facility])    [address] [facility] [address] [phone]            Aug 16, 2017 11:00 AM CDT   Return Visit with Ramon Rodirguez MD   [facility]    [address] [facility] [address] [phone]                      Alyx Canales  [facility]

## 2017-07-10 RX ORDER — IMIPRAMINE HYDROCHLORIDE 10 MG/1
TABLET, FILM COATED ORAL
Qty: 30 TABLET | Refills: 0 | OUTPATIENT
Start: 2017-07-10

## 2017-07-10 NOTE — TELEPHONE ENCOUNTER
Routing refill request to provider for review/approval because:  Patient needs to be seen because:  Please see telephone encounter 6/7/17.  Future appointment found with PCP: 10/17/17  Routing to provider to review/advise if sooner follow up is required.   Joelle Hassan RN

## 2017-07-10 NOTE — TELEPHONE ENCOUNTER
Dr. Rodriguez would like me to stop the imipramine due to the kidney problems. I will refill the other prescription.   Jonna Cason MD

## 2017-07-25 ENCOUNTER — OFFICE VISIT (OUTPATIENT)
Dept: NEPHROLOGY | Facility: CLINIC | Age: 77
End: 2017-07-25
Payer: COMMERCIAL

## 2017-07-25 VITALS
SYSTOLIC BLOOD PRESSURE: 147 MMHG | BODY MASS INDEX: 26.82 KG/M2 | TEMPERATURE: 97.9 F | DIASTOLIC BLOOD PRESSURE: 73 MMHG | WEIGHT: 120.7 LBS

## 2017-07-25 DIAGNOSIS — E11.22 TYPE 2 DIABETES MELLITUS WITH STAGE 3 CHRONIC KIDNEY DISEASE, WITHOUT LONG-TERM CURRENT USE OF INSULIN (H): Primary | ICD-10-CM

## 2017-07-25 DIAGNOSIS — N18.30 TYPE 2 DIABETES MELLITUS WITH STAGE 3 CHRONIC KIDNEY DISEASE, WITHOUT LONG-TERM CURRENT USE OF INSULIN (H): Primary | ICD-10-CM

## 2017-07-25 DIAGNOSIS — I10 HYPERTENSION, GOAL BELOW 140/90: ICD-10-CM

## 2017-07-25 DIAGNOSIS — N18.30 CKD (CHRONIC KIDNEY DISEASE) STAGE 3, GFR 30-59 ML/MIN (H): ICD-10-CM

## 2017-07-25 LAB
ALBUMIN SERPL-MCNC: 3.2 G/DL (ref 3.4–5)
ANION GAP SERPL CALCULATED.3IONS-SCNC: 5 MMOL/L (ref 3–14)
BUN SERPL-MCNC: 24 MG/DL (ref 7–30)
CALCIUM SERPL-MCNC: 8.8 MG/DL (ref 8.5–10.1)
CHLORIDE SERPL-SCNC: 109 MMOL/L (ref 94–109)
CO2 SERPL-SCNC: 28 MMOL/L (ref 20–32)
CREAT SERPL-MCNC: 2.54 MG/DL (ref 0.52–1.04)
CREAT UR-MCNC: 76 MG/DL
GFR SERPL CREATININE-BSD FRML MDRD: 18 ML/MIN/1.7M2
GLUCOSE SERPL-MCNC: 58 MG/DL (ref 70–99)
HGB BLD-MCNC: 10.9 G/DL (ref 11.7–15.7)
PHOSPHATE SERPL-MCNC: 4.5 MG/DL (ref 2.5–4.5)
POTASSIUM SERPL-SCNC: 3.6 MMOL/L (ref 3.4–5.3)
PROT UR-MCNC: 3.82 G/L
PROT/CREAT 24H UR: 5.05 G/G CR (ref 0–0.2)
SODIUM SERPL-SCNC: 142 MMOL/L (ref 133–144)

## 2017-07-25 PROCEDURE — 82784 ASSAY IGA/IGD/IGG/IGM EACH: CPT | Performed by: INTERNAL MEDICINE

## 2017-07-25 PROCEDURE — 85018 HEMOGLOBIN: CPT | Performed by: INTERNAL MEDICINE

## 2017-07-25 PROCEDURE — 99214 OFFICE O/P EST MOD 30 MIN: CPT | Performed by: INTERNAL MEDICINE

## 2017-07-25 PROCEDURE — 36415 COLL VENOUS BLD VENIPUNCTURE: CPT | Performed by: INTERNAL MEDICINE

## 2017-07-25 PROCEDURE — 80069 RENAL FUNCTION PANEL: CPT | Performed by: INTERNAL MEDICINE

## 2017-07-25 PROCEDURE — 83970 ASSAY OF PARATHORMONE: CPT | Performed by: INTERNAL MEDICINE

## 2017-07-25 PROCEDURE — 84156 ASSAY OF PROTEIN URINE: CPT | Performed by: INTERNAL MEDICINE

## 2017-07-25 PROCEDURE — 86334 IMMUNOFIX E-PHORESIS SERUM: CPT | Performed by: INTERNAL MEDICINE

## 2017-07-25 PROCEDURE — 86335 IMMUNFIX E-PHORSIS/URINE/CSF: CPT | Performed by: INTERNAL MEDICINE

## 2017-07-25 RX ORDER — HYDRALAZINE HYDROCHLORIDE 50 MG/1
50 TABLET, FILM COATED ORAL 2 TIMES DAILY
Qty: 180 TABLET | Refills: 3 | Status: SHIPPED | OUTPATIENT
Start: 2017-07-25 | End: 2017-07-31

## 2017-07-25 NOTE — PATIENT INSTRUCTIONS
1. Please take the med list home and compare to what she is getting at home. If anything is different please call Daysi or Linda at [phone]    2. Specifically, how often has she been using lasix - it is ordered at 20 mg to be used as needed.     3. Lab and urine tests today. If function is worse, we may consider doing a kidney biopsy to better rule out any other cause of worsening kidney function. We will be in touch regarding today's result in the near future.     4. Increase hydralazine to 50 mg twice a day.     5. Recommend nurse visit in 2 weeks to assure blood pressure is better controlled    6. Follow-up with Jennifer in 1 month

## 2017-07-25 NOTE — NURSING NOTE
"Lakhwinder Negron's goals for this visit include: CC  She requests these members of her care team be copied on today's visit information: No    PCP: Jonna Cason    Referring Provider:  No referring provider defined for this encounter.    Chief Complaint   Patient presents with     RECHECK       Initial There were no vitals taken for this visit. Estimated body mass index is 28.41 kg/(m^2) as calculated from the following:    Height as of 6/7/17: 1.429 m (4' 8.25\").    Weight as of 6/7/17: 58 kg (127 lb 13.9 oz).  Medication Reconciliation: complete  "

## 2017-07-25 NOTE — MR AVS SNAPSHOT
After Visit Summary   7/25/2017    Lakhwinder Negron    MRN: [id number]           Patient Information     Date Of Birth          1940        Visit Information        Provider Department      7/25/2017 10:45 AM Ramon Rodriguez MD; [employer] [facility]        Today's Diagnoses     Hypertension, goal below 140/90          Care Instructions    1. Please take the med list home and compare to what she is getting at home. If anything is different please call Daysi or Linda at [phone]    2. Specifically, how often has she been using lasix - it is ordered at 20 mg to be used as needed.     3. Lab and urine tests today. If function is worse, we may consider doing a kidney biopsy to better rule out any other cause of worsening kidney function. We will be in touch regarding today's result in the near future.     4. Increase hydralazine to 50 mg twice a day.     5. Recommend nurse visit in 2 weeks to assure blood pressure is better controlled    6. Follow-up with Jennifer in 1 month          Follow-ups after your visit        Your next 10 appointments already scheduled     Aug 10, 2017  8:00 AM CDT   Nurse Only with Nurse Only Med Spec   [facility]    [address] [place] [id number] [phone]            Aug 16, 2017 10:30 AM CDT   LAB with LAB FIRST FLOOR [facility])    [address] [place] [id number] [phone]           Patient must bring picture ID.  Patient should be prepared to give a urine specimen  Please do not eat 10-12 hours before your appointment if you are coming in fasting for labs on lipids, cholesterol, or glucose (sugar).  Pregnant women should follow their Care Team instructions. Water with medications is okay. Do not drink coffee or other fluids.   If you have concerns about taking  your medications, please ask  at office or if scheduling via [employer], send a message by clicking on Secure Messaging, Message Your Care Team.            Aug 16, 2017 11:00 AM CDT   Return Visit with Ramon Rodriguez MD   [facility] ([facility]    [address] [place] [id number]   [phone]            Oct 05, 2017  9:00 AM CDT   LAB with BK LAB   [facility]    [address] [facility] [id number] [phone]           Patient must bring picture ID.  Patient should be prepared to give a urine specimen  Please do not eat 10-12 hours before your appointment if you are coming in fasting for labs on lipids, cholesterol, or glucose (sugar).  Pregnant women should follow their Care Team instructions. Water with medications is okay. Do not drink coffee or other fluids.   If you have concerns about taking  your medications, please ask at office or if scheduling via [employer], send a message by clicking on Secure Messaging, Message Your Care Team.            Oct 17, 2017 11:00 AM CDT   Office Visit with Jonna Cason MD   [facility]    [address] [facility] [address] [phone]           Bring a current list of meds and any records pertaining to this visit.  For Physicals, please bring immunization records and any forms needing to be filled out.  Please arrive 10 minutes early to complete paperwork.              Who to contact     If you have questions or need follow up information about today's clinic visit or your schedule please contact [facility] directly at [phone].  Normal or non-critical lab and imaging results will be communicated to you by MyChart, letter or phone within 4 business days after the clinic has received the results. If you do not hear from us within 7 days, please contact the clinic through [employer] or  phone. If you have a critical or abnormal lab result, we will notify you by phone as soon as possible.  Submit refill requests through [employer] or call your pharmacy and they will forward the refill request to us. Please allow 3 business days for your refill to be completed.          Additional Information About Your Visit        [employer] Information     [employer] is an electronic gateway that provides easy, online access to your medical records. With [employer], you can request a clinic appointment, read your test results, renew a prescription or communicate with your care team.     To sign up for [employer] visit the website at [email or web address]   You will be asked to enter the access code listed below, as well as some personal information. Please follow the directions to create your username and password.     Your access code is: [email or web address]  Expires: 2017 10:52 AM     Your access code will  in 90 days. If you need help or a new code, please contact your [facility] or call [phone] for assistance.        Care EveryWhere ID     This is your Care EveryWhere ID. This could be used by other organizations to access your [place] medical records  [id number]        Your Vitals Were     Temperature BMI (Body Mass Index)                97.9  F (36.6  C) (Oral) 26.82 kg/m2           Blood Pressure from Last 3 Encounters:   17 147/73   17 140/65   17 151/71    Weight from Last 3 Encounters:   17 54.7 kg (120 lb 11.2 oz)   17 58 kg (127 lb 13.9 oz)   17 57.2 kg (126 lb)              Today, you had the following     No orders found for display         Today's Medication Changes          These changes are accurate as of: 17 11:41 AM.  If you have any questions, ask your nurse or doctor.               These medicines have changed or have updated prescriptions.        Dose/Directions    hydrALAZINE 50 MG tablet   Commonly known as:   APRESOLINE   This may have changed:    - medication strength  - how much to take   Used for:  Hypertension, goal below 140/90   Changed by:  Ramon Rodriguez MD        Dose:  50 mg   Take 1 tablet (50 mg) by mouth 2 times daily   Quantity:  180 tablet   Refills:  3            Where to get your medicines      These medications were sent to [facility] [place] - [address]  [address], [facility] [place] [address]     Phone:  [phone]     hydrALAZINE 50 MG tablet                Primary Care Provider Office Phone # Fax #    Jonna Kaleigh Cason [phone]       [facility] [address]  [facility] [place] [address]        Equal Access to Services     KATIUSKA ORELLANA : Hadii timbo read hadasho Soomaali, waaxda luqadaha, qaybta kaalmada adeegyada, waxay idiin haysanya batista . So [facility] [phone].    ATENCIÓN: Si habla español, tiene a mckeon disposición servicios gratuitos de asistencia lingüística. [facility] [phone].    We comply with applicable federal civil rights laws and [place] laws. We do not discriminate on the basis of race, color, national origin, age, disability sex, sexual orientation or gender identity.            Thank you!     Thank you for choosing [facility]  for your care. Our goal is always to provide you with excellent care. Hearing back from our patients is one way we can continue to improve our services. Please take a few minutes to complete the written survey that you may receive in the mail after your visit with us. Thank you!             Your Updated Medication List - Protect others around you: Learn how to safely use, store and throw away your medicines at www.disposemymeds.org.          This list is accurate as of: 7/25/17 11:41 AM.  Always use your most recent med list.                   Brand Name Dispense Instructions for use Diagnosis    ACE/ARB NOT PRESCRIBED (INTENTIONAL)      Please choose reason  not prescribed, below    Type 2 diabetes mellitus with stage 3 chronic kidney disease, without long-term current use of insulin (H)       acetaminophen-codeine 300-30 MG per tablet    TYLENOL #3    60 tablet    TAKE 1-2 TABLETS ONCE DAILY AS NEEDED FOR PAIN// IB HNUB NOJ 1-2 LUB YOG MOB    Chronic pain syndrome       amLODIPine 10 MG tablet    NORVASC    90 tablet    Take 1 tablet (10 mg) by mouth daily    Type 2 diabetes mellitus with other diabetic kidney complication (H), Proteinuria       ASPIRIN LOW DOSE 81 MG EC tablet   Generic drug:  aspirin     100 tablet    TAKE 1 TABLET BY MOUTH DAILY TO PREVENT STROKE // IB HNUB NOJ 1 LUB PAB KOM NTSV KHIAV ZOO    Type 2 diabetes mellitus with diabetic chronic kidney disease, unspecified CKD stage, unspecified long term insulin use status (H), 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    Right hemiparesis (H)       furosemide 20 MG tablet    LASIX    10 tablet    Take 1 tablet (20 mg) by mouth daily as needed For swelling in legs    Dependent edema       hydrALAZINE 50 MG tablet    APRESOLINE    180 tablet    Take 1 tablet (50 mg) by mouth 2 times daily    Hypertension, goal below 140/90       insulin aspart prot & aspart injection    NovoLOG MIX 70/30 FLEXPEN    1 mL    Inject 10 Units Subcutaneous 2 times daily (with meals)    Type 2 diabetes mellitus without complication, unspecified long term insulin use status (H)       * insulin pen needle 31G X 6 MM    ULTICARE MINI    100 each    Use 2 daily or as directed.    Type 2 diabetes, HbA1C goal < 8% (H)       * UNIFINE PENTIPS 31G X 5 MM   Generic drug:  insulin pen needle     100 each    USE AS DIRECTED TWO TIMES DAILY // IB  HNUB SIV 2 ZAUG LI QHIA    Hyperglycemia due to type 2 diabetes mellitus (H)       * olopatadine 0.1 % ophthalmic solution    PATANOL    5 mL    Place 1 drop into both eyes 2 times daily as needed for allergies (itchy eye.)    Allergic conjunctivitis       * olopatadine 0.1 % ophthalmic solution    PATANOL    5 mL    Place 1 drop into both eyes 2 times daily as needed for allergies    Allergic conjunctivitis of both eyes       ONE TOUCH ULTRA test strip   Generic drug:  blood glucose monitoring     200 strip    USE TO TEST BLOOD SUGAR 2-3 TIMES A DAY // IB HNUB SIV 2-3 ZAUG LI QHIA    Type 2 diabetes mellitus with stage 3 chronic kidney disease, unspecified long term insulin use status (H)       ONETOUCH DELICA LANCETS 33G Misc     100 each    USE AS DIRECTED TWO TIMES DAILY // IB HNUB SIV 2 ZAUG LI QHIA    Essential hypertension, Type 2 diabetes mellitus with diabetic chronic kidney disease, unspecified CKD stage, unspecified long term insulin use status (H)       * order for DME     1 each    Equipment being ordered: blood pressure meter with supplies and adult cuff    Hypertension goal BP (blood pressure) < 140/90       * order for DME     200 each    Lancets bid for type 2 diabetes on insulin.    Type 2 diabetes mellitus with proteinuria or albuminuria       * order for DME     1 Device    Equipment being ordered: Shoe lift for leg length difference.    Leg length difference, acquired, CVA (cerebral vascular accident) (H)       * order for DME     200 each    Test strips for pt's glucometer, brand as covered by insurance Test bid. One touch Delica lancets. Type 2 diabetes on insulin.  Directions: use as directed Quantity: 200    Type 2 diabetes, HbA1C goal < 8% (H)       prednisoLONE acetate 1 % ophthalmic susp    PRED FORTE    5 mL    Place 1 drop into both eyes 4 times daily        rosuvastatin 20 MG tablet    CRESTOR    90 tablet    Take 1 tablet (20 mg) by mouth daily    Hyperlipidemia LDL goal <100       *  Notice:  This list has 9 medication(s) that are the same as other medications prescribed for you. Read the directions carefully, and ask your doctor or other care provider to review them with you.

## 2017-07-26 LAB
IGA SERPL-MCNC: 296 MG/DL (ref 70–380)
IGG SERPL-MCNC: 587 MG/DL (ref 695–1620)
IGM SERPL-MCNC: 30 MG/DL (ref 60–265)
PROT ELPH PNL UR ELPH: NORMAL
PROT PATTERN SERPL IFE-IMP: ABNORMAL
PTH-INTACT SERPL-MCNC: 126 PG/ML (ref 12–72)

## 2017-07-31 ENCOUNTER — OFFICE VISIT (OUTPATIENT)
Dept: FAMILY MEDICINE | Facility: CLINIC | Age: 77
End: 2017-07-31
Payer: COMMERCIAL

## 2017-07-31 VITALS
WEIGHT: 123 LBS | HEART RATE: 93 BPM | TEMPERATURE: 98.3 F | DIASTOLIC BLOOD PRESSURE: 73 MMHG | OXYGEN SATURATION: 96 % | SYSTOLIC BLOOD PRESSURE: 146 MMHG | BODY MASS INDEX: 27.67 KG/M2 | HEIGHT: 56 IN

## 2017-07-31 DIAGNOSIS — I10 HYPERTENSION GOAL BP (BLOOD PRESSURE) < 140/90: ICD-10-CM

## 2017-07-31 DIAGNOSIS — E78.5 HYPERLIPIDEMIA LDL GOAL <100: ICD-10-CM

## 2017-07-31 DIAGNOSIS — I10 HYPERTENSION, GOAL BELOW 140/90: ICD-10-CM

## 2017-07-31 DIAGNOSIS — E11.22 TYPE 2 DIABETES MELLITUS WITH DIABETIC CHRONIC KIDNEY DISEASE, UNSPECIFIED CKD STAGE, UNSPECIFIED LONG TERM INSULIN USE STATUS: ICD-10-CM

## 2017-07-31 DIAGNOSIS — K27.4 GASTROINTESTINAL HEMORRHAGE ASSOCIATED WITH PEPTIC ULCER: Primary | ICD-10-CM

## 2017-07-31 DIAGNOSIS — I63.39 CEREBROVASCULAR ACCIDENT (CVA) DUE TO THROMBOSIS OF OTHER CEREBRAL ARTERY (H): ICD-10-CM

## 2017-07-31 DIAGNOSIS — N18.30 CKD (CHRONIC KIDNEY DISEASE) STAGE 3, GFR 30-59 ML/MIN (H): ICD-10-CM

## 2017-07-31 PROBLEM — D62 ACUTE BLOOD LOSS ANEMIA: Status: ACTIVE | Noted: 2017-07-19

## 2017-07-31 PROBLEM — K92.2 ACUTE UPPER GI BLEED: Status: ACTIVE | Noted: 2017-07-19

## 2017-07-31 PROCEDURE — 99214 OFFICE O/P EST MOD 30 MIN: CPT | Performed by: FAMILY MEDICINE

## 2017-07-31 RX ORDER — HYDRALAZINE HYDROCHLORIDE 25 MG/1
25 TABLET, FILM COATED ORAL 3 TIMES DAILY
Qty: 90 TABLET | Refills: 3 | Status: SHIPPED | OUTPATIENT
Start: 2017-07-31 | End: 2017-09-15

## 2017-07-31 NOTE — MR AVS SNAPSHOT
After Visit Summary   7/31/2017    Lakhwinder Negron    MRN: [id number]           Patient Information     Date Of Birth          1940        Visit Information        Provider Department      7/31/2017 4:15 PM Jonna Cason MD; [employer] [facility]        Today's Diagnoses     Gastrointestinal hemorrhage associated with peptic ulcer    -  1    Type 2 diabetes mellitus with diabetic chronic kidney disease, unspecified CKD stage, unspecified long term insulin use status (H)        Cerebrovascular accident (CVA) due to thrombosis of other cerebral artery (H)        Hypertension goal BP (blood pressure) < 140/90        CKD (chronic kidney disease) stage 3, GFR 30-59 ml/min        Hyperlipidemia LDL goal <100        Hypertension, goal below 140/90          Care Instructions    How to contact your care team: [phone] Pharmacy [phone]   MD ISMAEL ALFARO PA-C CHRIS JONES, PA-C NAM HO, MD JONATHAN BATES, MD ARVIN VOCAL, MD    Clinic hours M-Th 7am-7pm Fri 7am-5pm.   Urgent care M-F 11am-9pm  Sat/Sun 9am-5pm.   Pharmacy   Mon-Th:  8:00am-8pm   Fri:  8:00am-6:00pm  Sat/Sun  8:00am-5:00 pm       Understanding Gastric Ulcers    A gastric ulcer is an open sore in the stomach lining. It is sometimes called a peptic ulcer. This is a more general term for ulcers that may be in the stomach or the upper part of the small intestine. Ulcers can cause pain. But they may also have no symptoms for a long time.  What causes gastric ulcers?  Gastric ulcers have a few common causes. To find the cause of your ulcer, your healthcare provider will give you an exam and take your health history. He or she may also order some tests. The main causes of gastric ulcers include:    Infection with the H. pylori (Helicobacter pylori) bacteria. This damages the stomach lining. Digestive juices can then harm the digestive tract.    Long-term use of some  over-the-counter pain medicines. This reduces the body s ability to protect the stomach from damage.  Other causes of gastric ulcers include:    Heavy alcohol use    Having a family history of ulcers    In rare cases, a tumor in the digestive tract may cause an ulcer  Symptoms of a gastric ulcer  Ulcer symptoms may appear and then go away for a time. Symptoms of a gastric ulcer may include:    Stomach pain, often a dull or burning feeling toward the top of your belly    Feeling full or bloated    Heartburn or acid reflux    Upset stomach (nausea) or vomiting    Vomiting blood    Lack of appetite    Weight loss    Black stool    Red blood in the stool  Treatment for a gastric ulcer  Treatment for gastric ulcers may depend on what is causing them. Treatment may include:    Not using over-the-counter medicines. You will likely need to stop taking these medicines. But in some cases these medicines can t be safely stopped. Check with your healthcare provider to see what is best for you.     Taking medicines to ease symptoms. These medicines may help to reduce the amount of acid your stomach makes. They also may help coat your stomach lining.    Taking antibiotics. If your ulcer was caused by H. pylori, your provider will likely prescribe antibiotics to get rid of the infection.    Having an endoscopy. This is often done to check the stomach and diagnose the ulcer. In some cases it can also treat the ulcer. It involves passing a flexible tube through your mouth into your stomach and small intestine.    Using a tube (catheter) to stop the bleeding. A thin tube is passed into one of your blood vessels. Special tools are used to help stop the bleeding.    Having surgery. You often may need this if the ulcer has caused severe symptoms.  Making some lifestyle changes can reduce ulcer symptoms. It may also prevent more damage to your digestive tract. These changes include:    Not taking over-the-counter pain medicines. Talk  with your provider about using another type of pain reliever.    Not taking aspirin unless your provider has advised it    Limiting the amount of alcohol you drink    Quitting smoking  Possible complications of a gastric ulcer  Gastric ulcers can have serious complications. These can include:    Bleeding into the stomach    A hole (perforation) in the stomach    A blockage that interferes with food moving from your stomach to the small intestine  An ongoing infection with H. pylori may be a risk factor for stomach cancer. This is one reason it is important to get rid of this bacteria.  When to call your healthcare provider  Call your healthcare provider right away if you have any of these:    Vomiting blood, or vomit that looks like coffee grounds    Bloody, black, or tarry-looking stools    Fever of 100.4 F (38 C) or higher, or as directed    Pain that gets worse    Symptoms that don t get better with treatment, or symptoms that get worse    New symptoms   Date Last Reviewed: 5/1/2016 2000-2017 [employer]. [address] [place] [address]. All rights reserved. This information is not intended as a substitute for professional medical care. Always follow your healthcare professional's instructions.                Follow-ups after your visit        Follow-up notes from your care team     Return in about 3 months (around 10/31/2017) for medication follow up, Lab Work.      Your next 10 appointments already scheduled     Aug 10, 2017  8:00 AM CDT   Nurse Only with Nurse Only Med Spec   [facility])    [id number] [place] [address] [id number] [phone]            Aug 16, 2017 10:30 AM CDT   LAB with LAB FIRST FLOOR [facility])    [address] [place] [address] [id number] [phone]           Patient must bring picture ID. Patient should be prepared to give a urine specimen   Please do not eat 10-12 hours before your appointment if you are coming in fasting for labs on lipids, cholesterol, or glucose (sugar). Pregnant women should follow their Care Team instructions. Water with medications is okay. Do not drink coffee or other fluids. If you have concerns about taking  your medications, please ask at office or if scheduling via [employer], send a message by clicking on Secure Messaging, Message Your Care Team.            Aug 16, 2017 11:00 AM CDT   [facility]    [address] [place] [id number]   [phone]            Oct 05, 2017  9:00 AM CDT   LAB with BK LAB   [facility]    [address] [facility] [id number] [phone]           Patient must bring picture ID. Patient should be prepared to give a urine specimen  Please do not eat 10-12 hours before your appointment if you are coming in fasting for labs on lipids, cholesterol, or glucose (sugar). Pregnant women should follow their Care Team instructions. Water with medications is okay. Do not drink coffee or other fluids. If you have concerns about taking  your medications, please ask at office or if scheduling via [employer], send a message by clicking on Secure Messaging, Message Your Care Team.            Oct 17, 2017 11:00 AM CDT   Office Visit with Jonna Cason MD   [facility]    [address] [facility] [phone]           Bring a current list of meds and any records pertaining to this visit. For Physicals, please bring immunization records and any forms needing to be filled out. Please arrive 10 minutes early to complete paperwork.              Who to contact     If you have questions or need follow up information about today's clinic visit or your schedule  "please contact [facility] directly at [phone].  Normal or non-critical lab and imaging results will be communicated to you by MyChart, letter or phone within 4 business days after the clinic has received the results. If you do not hear from us within 7 days, please contact the clinic through MyChart or phone. If you have a critical or abnormal lab result, we will notify you by phone as soon as possible.  Submit refill requests through [employer] or call your pharmacy and they will forward the refill request to us. Please allow 3 business days for your refill to be completed.          Additional Information About Your Visit        [employer] Information     [employer] lets you send messages to your doctor, view your test results, renew your prescriptions, schedule appointments and more. To sign up, go to [email or web address] . Click on \"Log in\" on the left side of the screen, which will take you to the Welcome page. Then click on \"Sign up Now\" on the right side of the page.     You will be asked to enter the access code listed below, as well as some personal information. Please follow the directions to create your username and password.     Your access code is: [email or web address]  Expires: 2017 10:52 AM     Your access code will  in 90 days. If you need help or a new code, please call your [facility] clinic or [phone].        Care EveryWhere ID     This is your Care EveryWhere ID. This could be used by other organizations to access your [facility] medical records  [id number]        Your Vitals Were     Pulse Temperature Height Pulse Oximetry Breastfeeding? BMI (Body Mass Index)    93 98.3  F (36.8  C) (Oral) 4' 8.25\" (1.429 m) 96% No 27.33 kg/m2       Blood Pressure from Last 3 Encounters:   17 146/73   17 147/73   17 140/65    Weight from Last 3 Encounters:   17 123 lb (55.8 kg)   17 120 lb 11.2 oz (54.7 kg)   17 127 lb 13.9 oz (58 kg)              Today, " you had the following     No orders found for display         Today's Medication Changes          These changes are accurate as of: 7/31/17  5:06 PM.  If you have any questions, ask your nurse or doctor.               Start taking these medicines.        Dose/Directions    ASPIRIN NOT PRESCRIBED   Commonly known as:  INTENTIONAL   Used for:  Cerebrovascular accident (CVA) due to thrombosis of other cerebral artery (H)   Started by:  Jonna Cason MD        Please choose reason not prescribed, below   Quantity:  1 each   Refills:  0         These medicines have changed or have updated prescriptions.        Dose/Directions    aspirin 81 MG EC tablet   Commonly known as:  ASPIRIN LOW DOSE   This may have changed:  See the new instructions.   Used for:  Type 2 diabetes mellitus with diabetic chronic kidney disease, unspecified CKD stage, unspecified long term insulin use status (H), Cerebrovascular accident (CVA) due to thrombosis of other cerebral artery (H)   Changed by:  Jonna Cason MD        Dose:  81 mg   Take 1 tablet (81 mg) by mouth once for 1 dose Stop for now due to ulcer in stomach   Quantity:  1 tablet   Refills:  0       hydrALAZINE 25 MG tablet   Commonly known as:  APRESOLINE   This may have changed:    - medication strength  - how much to take  - when to take this   Used for:  Hypertension goal BP (blood pressure) < 140/90   Changed by:  Jonna Cason MD        Dose:  25 mg   Take 1 tablet (25 mg) by mouth 3 times daily   Quantity:  90 tablet   Refills:  3            Where to get your medicines      These medications were sent to [facility], [place] - [address] [facility]  [address], [facility] [place] [address]     Phone:  [phone]     aspirin 81 MG EC tablet    hydrALAZINE 25 MG tablet         Some of these will need a paper prescription and others can be bought over the counter.  Ask your nurse if you have questions.     You don't need a prescription for  these medications     ASPIRIN NOT PRESCRIBED                Primary Care Provider Office Phone # Fax #    Jonna Kaleigh Cason [phone]       [facility] [address]  [facility] [address]        Equal Access to Services     KATIUSKA ORELLANA : Hadii aad ku hadasho Soomaali, waaxda luqadaha, qaybta kaalmada adeegyada, waxay idiin hayaan adeeg kharash lagurdeep ortiz. So [facility] [phone].    ATENCIÓN: Si habla español, tiene a mckeon disposición servicios gratuitos de asistencia lingüística. Llame al [phone].    We comply with applicable federal civil rights laws and [place] laws. We do not discriminate on the basis of race, color, national origin, age, disability sex, sexual orientation or gender identity.            Thank you!     Thank you for choosing [facility]  for your care. Our goal is always to provide you with excellent care. Hearing back from our patients is one way we can continue to improve our services. Please take a few minutes to complete the written survey that you may receive in the mail after your visit with us. Thank you!             Your Updated Medication List - Protect others around you: Learn how to safely use, store and throw away your medicines at www.disposemymeds.org.          This list is accurate as of: 7/31/17  5:06 PM.  Always use your most recent med list.                   Brand Name Dispense Instructions for use Diagnosis    ACE/ARB NOT PRESCRIBED (INTENTIONAL)      Please choose reason not prescribed, below    Type 2 diabetes mellitus with stage 3 chronic kidney disease, without long-term current use of insulin (H)       acetaminophen-codeine 300-30 MG per tablet    TYLENOL #3    60 tablet    TAKE 1-2 TABLETS ONCE DAILY AS NEEDED FOR PAIN// IB HNUB NOJ 1-2 LUB YOG MOB    Chronic pain syndrome       amLODIPine 10 MG tablet    NORVASC    90 tablet    Take 1 tablet (10 mg) by mouth daily    Type 2 diabetes mellitus with other diabetic  kidney complication (H), Proteinuria       aspirin 81 MG EC tablet    ASPIRIN LOW DOSE    1 tablet    Take 1 tablet (81 mg) by mouth once for 1 dose Stop for now due to ulcer in stomach    Type 2 diabetes mellitus with diabetic chronic kidney disease, unspecified CKD stage, unspecified long term insulin use status (H), Cerebrovascular accident (CVA) due to thrombosis of other cerebral artery (H)       ASPIRIN NOT PRESCRIBED    INTENTIONAL    1 each    Please choose reason not prescribed, below    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    Right hemiparesis (H)       furosemide 20 MG tablet    LASIX    10 tablet    Take 1 tablet (20 mg) by mouth daily as needed For swelling in legs    Dependent edema       hydrALAZINE 25 MG tablet    APRESOLINE    90 tablet    Take 1 tablet (25 mg) by mouth 3 times daily    Hypertension goal BP (blood pressure) < 140/90       insulin aspart prot & aspart injection    NovoLOG MIX 70/30 FLEXPEN    1 mL    Inject 10 Units Subcutaneous 2 times daily (with meals)    Type 2 diabetes mellitus without complication, unspecified long term insulin use status (H)       * insulin pen needle 31G X 6 MM    ULTICARE MINI    100 each    Use 2 daily or as directed.    Type 2 diabetes, HbA1C goal < 8% (H)       * UNIFINE PENTIPS 31G X 5 MM   Generic drug:  insulin pen needle     100 each    USE AS DIRECTED TWO TIMES DAILY // IB HNUB SIV 2 ZAUG LI QHIA    Hyperglycemia due to type 2 diabetes mellitus (H)       * olopatadine 0.1 % ophthalmic solution    PATANOL    5 mL    Place 1 drop into both eyes 2 times daily as needed for allergies (itchy eye.)    Allergic conjunctivitis       * olopatadine 0.1 %  ophthalmic solution    PATANOL    5 mL    Place 1 drop into both eyes 2 times daily as needed for allergies    Allergic conjunctivitis of both eyes       omeprazole 20 MG CR capsule    priLOSEC     TAKE 1 CAPSULE BY MOUTH TWICE DAILY FOR STOMACH//IB ZAUG NOJ 1 LUB, IB HNUB NOJ 2 ZAUG PAB LASHA MOB PLAB (NCAUJ PLAB)        ONE TOUCH ULTRA test strip   Generic drug:  blood glucose monitoring     200 strip    USE TO TEST BLOOD SUGAR 2-3 TIMES A DAY // IB HNUB SIV 2-3 ZAUG LI QHIA    Type 2 diabetes mellitus with stage 3 chronic kidney disease, unspecified long term insulin use status (H)       ONETOUCH DELICA LANCETS 33G Misc     100 each    USE AS DIRECTED TWO TIMES DAILY // IB HNUB SIV 2 ZAUG LI QHIA    Essential hypertension, Type 2 diabetes mellitus with diabetic chronic kidney disease, unspecified CKD stage, unspecified long term insulin use status (H)       * order for DME     1 each    Equipment being ordered: blood pressure meter with supplies and adult cuff    Hypertension goal BP (blood pressure) < 140/90       * order for DME     200 each    Lancets bid for type 2 diabetes on insulin.    Type 2 diabetes mellitus with proteinuria or albuminuria       * order for DME     1 Device    Equipment being ordered: Shoe lift for leg length difference.    Leg length difference, acquired, CVA (cerebral vascular accident) (H)       * order for DME     200 each    Test strips for pt's glucometer, brand as covered by insurance Test bid. One touch Delica lancets. Type 2 diabetes on insulin.  Directions: use as directed Quantity: 200    Type 2 diabetes, HbA1C goal < 8% (H)       rosuvastatin 20 MG tablet    CRESTOR    90 tablet    Take 1 tablet (20 mg) by mouth daily    Hyperlipidemia LDL goal <100       * Notice:  This list has 9 medication(s) that are the same as other medications prescribed for you. Read the directions carefully, and ask your doctor or other care provider to review them with you.

## 2017-07-31 NOTE — NURSING NOTE
"Chief Complaint   Patient presents with     Hospital F/U     [facility]     Chronic Disease Management     kidney check       Initial /73 (BP Location: Left arm, Patient Position: Chair, Cuff Size: Adult Regular)  Pulse 93  Temp 98.3  F (36.8  C) (Oral)  Ht 4' 8.25\" (1.429 m)  Wt 123 lb (55.8 kg)  SpO2 96%  Breastfeeding? No  BMI 27.33 kg/m2 Estimated body mass index is 27.33 kg/(m^2) as calculated from the following:    Height as of this encounter: 4' 8.25\" (1.429 m).    Weight as of this encounter: 123 lb (55.8 kg).  Medication Reconciliation: complete     Ronni Robles CMA    "
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
## 2017-07-31 NOTE — PATIENT INSTRUCTIONS
How to contact your care team: [phone] Pharmacy [phone]   MD ISMAEL ALFARO PA-C CHRIS JONES, PA-C NAM HO, MD JONATHAN BATES, MD ARVIN VOCAL, MD    Clinic hours M-Th 7am-7pm Fri 7am-5pm.   Urgent care M-F 11am-9pm  Sat/Sun 9am-5pm.   Pharmacy   Mon-Th:  8:00am-8pm   Fri:  8:00am-6:00pm  Sat/Sun  8:00am-5:00 pm       Understanding Gastric Ulcers    A gastric ulcer is an open sore in the stomach lining. It is sometimes called a peptic ulcer. This is a more general term for ulcers that may be in the stomach or the upper part of the small intestine. Ulcers can cause pain. But they may also have no symptoms for a long time.  What causes gastric ulcers?  Gastric ulcers have a few common causes. To find the cause of your ulcer, your healthcare provider will give you an exam and take your health history. He or she may also order some tests. The main causes of gastric ulcers include:    Infection with the H. pylori (Helicobacter pylori) bacteria. This damages the stomach lining. Digestive juices can then harm the digestive tract.    Long-term use of some over-the-counter pain medicines. This reduces the body s ability to protect the stomach from damage.  Other causes of gastric ulcers include:    Heavy alcohol use    Having a family history of ulcers    In rare cases, a tumor in the digestive tract may cause an ulcer  Symptoms of a gastric ulcer  Ulcer symptoms may appear and then go away for a time. Symptoms of a gastric ulcer may include:    Stomach pain, often a dull or burning feeling toward the top of your belly    Feeling full or bloated    Heartburn or acid reflux    Upset stomach (nausea) or vomiting    Vomiting blood    Lack of appetite    Weight loss    Black stool    Red blood in the stool  Treatment for a gastric ulcer  Treatment for gastric ulcers may depend on what is causing them. Treatment may include:    Not using over-the-counter medicines. You will likely need to stop  taking these medicines. But in some cases these medicines can t be safely stopped. Check with your healthcare provider to see what is best for you.     Taking medicines to ease symptoms. These medicines may help to reduce the amount of acid your stomach makes. They also may help coat your stomach lining.    Taking antibiotics. If your ulcer was caused by H. pylori, your provider will likely prescribe antibiotics to get rid of the infection.    Having an endoscopy. This is often done to check the stomach and diagnose the ulcer. In some cases it can also treat the ulcer. It involves passing a flexible tube through your mouth into your stomach and small intestine.    Using a tube (catheter) to stop the bleeding. A thin tube is passed into one of your blood vessels. Special tools are used to help stop the bleeding.    Having surgery. You often may need this if the ulcer has caused severe symptoms.  Making some lifestyle changes can reduce ulcer symptoms. It may also prevent more damage to your digestive tract. These changes include:    Not taking over-the-counter pain medicines. Talk with your provider about using another type of pain reliever.    Not taking aspirin unless your provider has advised it    Limiting the amount of alcohol you drink    Quitting smoking  Possible complications of a gastric ulcer  Gastric ulcers can have serious complications. These can include:    Bleeding into the stomach    A hole (perforation) in the stomach    A blockage that interferes with food moving from your stomach to the small intestine  An ongoing infection with H. pylori may be a risk factor for stomach cancer. This is one reason it is important to get rid of this bacteria.  When to call your healthcare provider  Call your healthcare provider right away if you have any of these:    Vomiting blood, or vomit that looks like coffee grounds    Bloody, black, or tarry-looking stools    Fever of 100.4 F (38 C) or higher, or as  directed    Pain that gets worse    Symptoms that don t get better with treatment, or symptoms that get worse    New symptoms   Date Last Reviewed: 5/1/2016 2000-2017 [employer]. [address] [place] [address], [place] [address]. All rights reserved. This information is not intended as a substitute for professional medical care. Always follow your healthcare professional's instructions.

## 2017-08-01 ASSESSMENT — PATIENT HEALTH QUESTIONNAIRE - PHQ9: SUM OF ALL RESPONSES TO PHQ QUESTIONS 1-9: 4

## 2017-08-03 DIAGNOSIS — M81.0 SENILE OSTEOPOROSIS: ICD-10-CM

## 2017-08-03 NOTE — TELEPHONE ENCOUNTER
calcium-vitamin D (CALTRATE) 600-400 MG-UNIT per tablet    Last Written Prescription Date: 01/20/17  Last Fill Quantity: 60, # refills: 6  Last Office Visit with [facility], [facility] or [facility] prescribing provider: 7/31/17    Next 5 appointments (look out 90 days)     Aug 10, 2017  8:00 AM CDT   Nurse Only with Nurse Only Med Spec   [facility]    [address] [place] [id number]   [phone]            Aug 16, 2017 11:00 AM CDT   Return Visit with Ramon Rodriguez MD   [facility] ([facility]    [address] [place] [id number]   [phone]            Oct 17, 2017 11:00 AM CDT   Office Visit with Jonna Cason MD   [facility]    [address] [facility] [address]   [phone]                   DEXA Scan:  Last order of DX HIP/PELVIS/SPINE was found on 4/17/2014 from [facility] Appointment on 4/17/2014     No order of DX HIP/PELVIS/SPINE W LAT FRACTION ANALYSIS is found.       Creatinine   Date Value Ref Range Status   07/25/2017 2.54 (H) 0.52 - 1.04 mg/dL Final         Alyx Canales  [facility]

## 2017-08-06 NOTE — TELEPHONE ENCOUNTER
Prescription(s) approved per [facility].    Lito Sprague RN

## 2017-08-08 DIAGNOSIS — R80.9 PROTEINURIA: ICD-10-CM

## 2017-08-08 DIAGNOSIS — N18.30 CKD (CHRONIC KIDNEY DISEASE) STAGE 3, GFR 30-59 ML/MIN (H): Primary | ICD-10-CM

## 2017-08-08 DIAGNOSIS — E11.29 TYPE 2 DIABETES MELLITUS WITH OTHER DIABETIC KIDNEY COMPLICATION (H): ICD-10-CM

## 2017-08-08 RX ORDER — AMLODIPINE BESYLATE 10 MG/1
10 TABLET ORAL DAILY
Qty: 90 TABLET | Refills: 3 | Status: SHIPPED | OUTPATIENT
Start: 2017-08-08 | End: 2018-08-13

## 2017-08-08 NOTE — TELEPHONE ENCOUNTER
Writer received a refill request from: [facility] in [place].     Medication: amLODIPine (NORVASC) 10 MG tablet  Sig: Take 1 tablet (10 mg) by mouth daily  Date last written: 08/16/16  Dispensed amount: 90  Refills: 3  Date last dispensed: 07/04/17      Pt's last office visit: 06/07/17  Next scheduled office visit: 08/16/17

## 2017-08-16 ENCOUNTER — TELEPHONE (OUTPATIENT)
Dept: NEPHROLOGY | Facility: CLINIC | Age: 77
End: 2017-08-16

## 2017-08-16 ENCOUNTER — OFFICE VISIT (OUTPATIENT)
Dept: NEPHROLOGY | Facility: CLINIC | Age: 77
End: 2017-08-16
Payer: COMMERCIAL

## 2017-08-16 VITALS
DIASTOLIC BLOOD PRESSURE: 68 MMHG | TEMPERATURE: 98.7 F | HEART RATE: 80 BPM | SYSTOLIC BLOOD PRESSURE: 147 MMHG | BODY MASS INDEX: 27.82 KG/M2 | WEIGHT: 125.2 LBS

## 2017-08-16 DIAGNOSIS — N18.30 TYPE 2 DIABETES MELLITUS WITH STAGE 3 CHRONIC KIDNEY DISEASE, WITHOUT LONG-TERM CURRENT USE OF INSULIN (H): ICD-10-CM

## 2017-08-16 DIAGNOSIS — E11.22 TYPE 2 DIABETES MELLITUS WITH STAGE 3 CHRONIC KIDNEY DISEASE, WITHOUT LONG-TERM CURRENT USE OF INSULIN (H): ICD-10-CM

## 2017-08-16 DIAGNOSIS — N18.30 CKD (CHRONIC KIDNEY DISEASE) STAGE 3, GFR 30-59 ML/MIN (H): Primary | ICD-10-CM

## 2017-08-16 DIAGNOSIS — N25.81 SECONDARY HYPERPARATHYROIDISM (H): ICD-10-CM

## 2017-08-16 DIAGNOSIS — N18.30 CKD (CHRONIC KIDNEY DISEASE) STAGE 3, GFR 30-59 ML/MIN (H): ICD-10-CM

## 2017-08-16 DIAGNOSIS — I10 HYPERTENSION, GOAL BELOW 140/90: ICD-10-CM

## 2017-08-16 DIAGNOSIS — D64.9 ANEMIA, UNSPECIFIED TYPE: ICD-10-CM

## 2017-08-16 LAB
ALBUMIN SERPL-MCNC: 3.3 G/DL (ref 3.4–5)
ANION GAP SERPL CALCULATED.3IONS-SCNC: 9 MMOL/L (ref 3–14)
BUN SERPL-MCNC: 17 MG/DL (ref 7–30)
CALCIUM SERPL-MCNC: 8.5 MG/DL (ref 8.5–10.1)
CHLORIDE SERPL-SCNC: 111 MMOL/L (ref 94–109)
CO2 SERPL-SCNC: 23 MMOL/L (ref 20–32)
CREAT SERPL-MCNC: 2.22 MG/DL (ref 0.52–1.04)
CREAT UR-MCNC: 150 MG/DL
GFR SERPL CREATININE-BSD FRML MDRD: 21 ML/MIN/1.7M2
GLUCOSE SERPL-MCNC: 104 MG/DL (ref 70–99)
HGB BLD-MCNC: 11.1 G/DL (ref 11.7–15.7)
PHOSPHATE SERPL-MCNC: 3.4 MG/DL (ref 2.5–4.5)
POTASSIUM SERPL-SCNC: 4.1 MMOL/L (ref 3.4–5.3)
PROT UR-MCNC: 9.31 G/L
PROT/CREAT 24H UR: 6.2 G/G CR (ref 0–0.2)
PTH-INTACT SERPL-MCNC: 123 PG/ML (ref 12–72)
SODIUM SERPL-SCNC: 143 MMOL/L (ref 133–144)

## 2017-08-16 PROCEDURE — 83540 ASSAY OF IRON: CPT | Performed by: INTERNAL MEDICINE

## 2017-08-16 PROCEDURE — 83550 IRON BINDING TEST: CPT | Performed by: INTERNAL MEDICINE

## 2017-08-16 PROCEDURE — 80069 RENAL FUNCTION PANEL: CPT | Performed by: INTERNAL MEDICINE

## 2017-08-16 PROCEDURE — 81001 URINALYSIS AUTO W/SCOPE: CPT | Performed by: INTERNAL MEDICINE

## 2017-08-16 PROCEDURE — 85018 HEMOGLOBIN: CPT | Performed by: INTERNAL MEDICINE

## 2017-08-16 PROCEDURE — 84443 ASSAY THYROID STIM HORMONE: CPT | Performed by: INTERNAL MEDICINE

## 2017-08-16 PROCEDURE — 82306 VITAMIN D 25 HYDROXY: CPT | Performed by: INTERNAL MEDICINE

## 2017-08-16 PROCEDURE — 99214 OFFICE O/P EST MOD 30 MIN: CPT | Performed by: INTERNAL MEDICINE

## 2017-08-16 PROCEDURE — 82728 ASSAY OF FERRITIN: CPT | Performed by: INTERNAL MEDICINE

## 2017-08-16 PROCEDURE — 36415 COLL VENOUS BLD VENIPUNCTURE: CPT | Performed by: INTERNAL MEDICINE

## 2017-08-16 PROCEDURE — 84156 ASSAY OF PROTEIN URINE: CPT | Performed by: INTERNAL MEDICINE

## 2017-08-16 PROCEDURE — 83970 ASSAY OF PARATHORMONE: CPT | Performed by: INTERNAL MEDICINE

## 2017-08-16 NOTE — Clinical Note
Hi Team: 2 additional thoughts after my visit with this patient. She has a home health nurse. Can we reach out to her to update her on the advancement of her kidney disease as would like her help in monitoring her BP at home. Would like an idea of what the readings have been like during those visits. Additionally, please clarify that she is NOT on an ACE-I or ARB at this time.  Last - I would like to repeat a renal ultrasound to assure no obstruction given her more acute decline than expected. No absolute urgency but in the somewhat near future.  Ramon Partida

## 2017-08-16 NOTE — LETTER
August 17, 2017       TO: Lakhwinder Negron  [address] [facility] [address]       Dear Ms. Negron,    We are writing to inform you of your test results.    Your most recent lab results are attached.     - Thyroid testing is normal   - there continues to be a large amount of protein present in the urine     We will continue to follow you closely. Please call with any questions or concerns.     Sincerely,     Ramon Rodriguez, DO Ines Daley, CMA                                          Ref Range & Units 1d ago  (8/16/17) 2mo ago  (6/7/17) 2mo ago  (6/7/17)        Color Urine  Yellow Yellow       Appearance Urine  Clear Clear       Glucose Urine NEG^Negative mg/dL 70 (A) 150 (A)R       Bilirubin Urine NEG^Negative Negative NegativeR       Ketones Urine NEG^Negative mg/dL Negative NegativeR       Specific Gravity Urine 1.003 - 1.035 1.016 1.013       Blood Urine NEG^Negative Trace (A) Small (A)R       pH Urine 5.0 - 7.0 pH 6.5 6.0       Protein Albumin Urine NEG^Negative mg/dL >600 (A) >600 (A)R       Urobilinogen mg/dL 0.0 - 2.0 mg/dL Normal Normal       Nitrite Urine NEG^Negative Negative NegativeR       Leukocyte Esterase Urine NEG^Negative Negative NegativeR       Source  Midstream Urine Midstream Urine       WBC Urine OTO2^O - 2 /HPF 2-5 (A)  O - 2R      RBC Urine OTO2^O - 2 /HPF O - 2  O - 2R      Bacteria Urine NEG^Negative /HPF Few (A)  Few (A)R      Squamous Epithelial /LPF Urine FEW^Few /LPF Few  FewR      Hyaline Casts OTO2^O - 2 /LPF O - 2  O - 2R     Resulting Agency  [facility]          Specimen Collected: 08/16/17 10:28 AM Last Resulted: 08/17/17  2:58 PM         Ref Range & Units 1d ago   1yr ago   3yr ago          TSH 0.40 - 4.00 mU/L 2.04 1.44 0.62R     Resulting Agency  [facility]          Specimen Collected: 08/16/17 10:28 AM Last Resulted: 08/17/17 10:16 AM         Ref Range & Units 1d ago        Iron 35 - 180 ug/dL 60     Iron Binding Cap 240 - 430 ug/dL 262     Iron Saturation Index 15  - 46 % 23     Resulting Agency  [facility]          Specimen Collected: 08/16/17 10:28 AM Last Resulted: 08/17/17 10:15 AM            Ref Range & Units 1d ago       Ferritin 8 - 252 ng/mL 59     Resulting Agency  [facility]          Specimen Collected: 08/16/17 10:28 AM Last Resulted: 08/17/17 10:15 AM

## 2017-08-16 NOTE — PATIENT INSTRUCTIONS
1. Via your home health nurse, blood pressure check, lab (renal panel, hgb) in 2 weeks  2. Follow-up in 6-8 weeks.  3. Daysi, [phone]

## 2017-08-16 NOTE — TELEPHONE ENCOUNTER
Left message for patient's home care nurse, Lilliana regarding plan after today's office visit with Dr. Rodriguez.  Orders placed for renal panel, hgb to be drawn in 2 weeks. Dr. Rodriguez would also like a bp check at that time. Asked Lilliana to contact the nephrology clinic with updates after visit in 2 weeks. Provided my direct contact information for telephone call back.    Daysi Frias RN, BSN  Nephrology Care Coordinator  [facility]
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
## 2017-08-16 NOTE — MR AVS SNAPSHOT
After Visit Summary   8/16/2017    Lakhwinder Negron    MRN: [id number]           Patient Information     Date Of Birth          1940        Visit Information        Provider Department      8/16/2017 11:00 AM Ramon Rodriguez MD; [employer] [facility]        Today's Diagnoses     CKD (chronic kidney disease) stage 3, GFR 30-59 ml/min    -  1      Care Instructions    1. Via your home health nurse, blood pressure check, lab (renal panel, hgb) in 2 weeks  2. Follow-up in 6-8 weeks.  3. [facility] [phone]          Follow-ups after your visit        Your next 10 appointments already scheduled     Oct 05, 2017  9:00 AM CDT   LAB with BK LAB   [facility] ([facility])    [address] [facility] [address] [phone]           Patient must bring picture ID. Patient should be prepared to give a urine specimen  Please do not eat 10-12 hours before your appointment if you are coming in fasting for labs on lipids, cholesterol, or glucose (sugar). Pregnant women should follow their Care Team instructions. Water with medications is okay. Do not drink coffee or other fluids. If you have concerns about taking  your medications, please ask at office or if scheduling via [employer], send a message by clicking on Secure Messaging, Message Your Care Team.            Oct 09, 2017  8:30 AM CDT   LAB with LAB FIRST FLOOR [facility] ([facility])    [id number] [place] [address] [phone]           Patient must bring picture ID. Patient should be prepared to give a urine specimen  Please do not eat 10-12 hours before your appointment if you are coming in fasting for labs on lipids, cholesterol, or glucose (sugar). Pregnant women should follow their Care Team instructions. Water with medications is okay. Do not drink coffee or other fluids. If you have concerns about  taking  your medications, please ask at office or if scheduling via [employer], send a message by clicking on Secure Messaging, Message Your Care Team.            Oct 09, 2017  9:00 AM CDT   Return Visit with Ramon Rodriguez MD   [facility] ([facility])    [address] [address] [address]   [phone]            Oct 17, 2017 11:00 AM CDT   Office Visit with Jonna Cason MD   [facility] ([facility])    [address] [facility] [id number] [phone]           Bring a current list of meds and any records pertaining to this visit. For Physicals, please bring immunization records and any forms needing to be filled out. Please arrive 10 minutes early to complete paperwork.              Future tests that were ordered for you today     Open Future Orders        Priority Expected Expires Ordered    Renal panel Routine 8/30/2017 10/31/2017 8/16/2017    Hemoglobin Routine 8/30/2017 10/31/2017 8/16/2017            Who to contact     If you have questions or need follow up information about today's clinic visit or your schedule please contact [facility] directly at [phone].  Normal or non-critical lab and imaging results will be communicated to you by [employer] Â®hart, letter or phone within 4 business days after the clinic has received the results. If you do not hear from us within 7 days, please contact the clinic through [employer] Â®hart or phone. If you have a critical or abnormal lab result, we will notify you by phone as soon as possible.  Submit refill requests through [employer] or call your pharmacy and they will forward the refill request to us. Please allow 3 business days for your refill to be completed.          Additional Information About Your Visit        [employer] Information     [employer] is an electronic gateway that provides easy, online access to your medical records. With [employer], you can  request a clinic appointment, read your test results, renew a prescription or communicate with your care team.     To sign up for [employer] visit the website at [email or web address]   You will be asked to enter the access code listed below, as well as some personal information. Please follow the directions to create your username and password.     Your access code is: [email or web address]  Expires: 2017 10:52 AM     Your access code will  in 90 days. If you need help or a new code, please contact your [facility] or call [phone] for assistance.        Care EveryWhere ID     This is your Care EveryWhere ID. This could be used by other organizations to access your [place] medical records  [id number]        Your Vitals Were     Pulse Temperature BMI (Body Mass Index)             80 98.7  F (37.1  C) (Oral) 27.82 kg/m2          Blood Pressure from Last 3 Encounters:   17 147/68   17 146/73   17 147/73    Weight from Last 3 Encounters:   17 125 lb 3.2 oz (56.8 kg)   17 123 lb (55.8 kg)   17 120 lb 11.2 oz (54.7 kg)               Primary Care Provider Office Phone # Fax #    Jonna Kaleigh Cason [phone]       [address]  [facility] [address]        Equal Access to Services     [facility] AH: Hadii timbo ku hadasho Soomaali, waaxda luqadaha, qaybta kaalmada adeegyada, greta ortiz. So [facility] [phone].    ATENCIÓN: Si habla español, tiene a mckeon disposición servicios gratuitos de asistencia lingüística. Elif al [phone].    We comply with applicable federal civil rights laws and [place] laws. We do not discriminate on the basis of race, color, national origin, age, disability sex, sexual orientation or gender identity.            Thank you!     Thank you for choosing [facility]  for your care. Our goal is always to provide you with excellent care. Hearing back from  our patients is one way we can continue to improve our services. Please take a few minutes to complete the written survey that you may receive in the mail after your visit with us. Thank you!             Your Updated Medication List - Protect others around you: Learn how to safely use, store and throw away your medicines at www.disposemymeds.org.          This list is accurate as of: 8/16/17 11:44 AM.  Always use your most recent med list.                   Brand Name Dispense Instructions for use Diagnosis    ACE/ARB NOT PRESCRIBED (INTENTIONAL)      Please choose reason not prescribed, below    Type 2 diabetes mellitus with stage 3 chronic kidney disease, without long-term current use of insulin (H)       acetaminophen-codeine 300-30 MG per tablet    TYLENOL #3    60 tablet    TAKE 1-2 TABLETS ONCE DAILY AS NEEDED FOR PAIN// IB HNUB NOJ 1-2 LUB YOG MOB    Chronic pain syndrome       amLODIPine 10 MG tablet    NORVASC    90 tablet    Take 1 tablet (10 mg) by mouth daily    Type 2 diabetes mellitus with other diabetic kidney complication (H), Proteinuria       ASPIRIN NOT PRESCRIBED    INTENTIONAL    1 each    Please choose reason not prescribed, below    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    Right hemiparesis (H)       furosemide 20 MG tablet    LASIX    10 tablet    Take 1 tablet (20 mg) by mouth daily as needed For swelling in legs    Dependent edema       hydrALAZINE 25 MG tablet    APRESOLINE    90 tablet    Take 1 tablet (25 mg) by mouth 3 times daily    Hypertension goal BP (blood pressure) < 140/90       insulin aspart prot & aspart injection    NovoLOG MIX 70/30 FLEXPEN    1 mL    Inject 10 Units Subcutaneous  2 times daily (with meals)    Type 2 diabetes mellitus without complication, unspecified long term insulin use status (H)       * insulin pen needle 31G X 6 MM    ULTICARE MINI    100 each    Use 2 daily or as directed.    Type 2 diabetes, HbA1C goal < 8% (H)       * UNIFINE PENTIPS 31G X 5 MM   Generic drug:  insulin pen needle     100 each    USE AS DIRECTED TWO TIMES DAILY // IB HNUB SIV 2 ZAUG LI QHIA    Hyperglycemia due to type 2 diabetes mellitus (H)       * olopatadine 0.1 % ophthalmic solution    PATANOL    5 mL    Place 1 drop into both eyes 2 times daily as needed for allergies (itchy eye.)    Allergic conjunctivitis       * olopatadine 0.1 % ophthalmic solution    PATANOL    5 mL    Place 1 drop into both eyes 2 times daily as needed for allergies    Allergic conjunctivitis of both eyes       omeprazole 20 MG CR capsule    priLOSEC     TAKE 1 CAPSULE BY MOUTH TWICE DAILY FOR STOMACH//IB ZAUG NOJ 1 LUB, IB HNUB NOJ 2 ZAUG PAB LASHA MOB PLAB (NCAUJ PLAB)        ONE TOUCH ULTRA test strip   Generic drug:  blood glucose monitoring     200 strip    USE TO TEST BLOOD SUGAR 2-3 TIMES A DAY // IB HNUB SIV 2-3 ZAUG LI QHIA    Type 2 diabetes mellitus with stage 3 chronic kidney disease, unspecified long term insulin use status (H)       ONETOUCH DELICA LANCETS 33G Misc     100 each    USE AS DIRECTED TWO TIMES DAILY // IB HNUB SIV 2 ZAUG LI QHIA    Essential hypertension, Type 2 diabetes mellitus with diabetic chronic kidney disease, unspecified CKD stage, unspecified long term insulin use status (H)       * order for DME     1 each    Equipment being ordered: blood pressure meter with supplies and adult cuff    Hypertension goal BP (blood pressure) < 140/90       * order for DME     200 each    Lancets bid for type 2 diabetes on insulin.    Type 2 diabetes mellitus with proteinuria or albuminuria       * order for DME     1 Device    Equipment being ordered: Shoe lift for leg length difference.    Leg length  difference, acquired, CVA (cerebral vascular accident) (H)       * order for DME     200 each    Test strips for pt's glucometer, brand as covered by insurance Test bid. One touch Delica lancets. Type 2 diabetes on insulin.  Directions: use as directed Quantity: 200    Type 2 diabetes, HbA1C goal < 8% (H)       rosuvastatin 20 MG tablet    CRESTOR    90 tablet    Take 1 tablet (20 mg) by mouth daily    Hyperlipidemia LDL goal <100       * Notice:  This list has 9 medication(s) that are the same as other medications prescribed for you. Read the directions carefully, and ask your doctor or other care provider to review them with you.

## 2017-08-16 NOTE — NURSING NOTE
"Lakhwinder Negron's goals for this visit include: CC  She requests these members of her care team be copied on today's visit information: No    PCP: Jonna Cason    Referring Provider:  No referring provider defined for this encounter.    Chief Complaint   Patient presents with     RECHECK       Initial Wt 56.8 kg (125 lb 3.2 oz)  BMI 27.82 kg/m2 Estimated body mass index is 27.82 kg/(m^2) as calculated from the following:    Height as of 7/31/17: 1.429 m (4' 8.25\").    Weight as of this encounter: 56.8 kg (125 lb 3.2 oz).  Medication Reconciliation: complete  "
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
## 2017-08-17 ENCOUNTER — CARE COORDINATION (OUTPATIENT)
Dept: GERIATRIC MEDICINE | Facility: CLINIC | Age: 77
End: 2017-08-17

## 2017-08-17 ENCOUNTER — TELEPHONE (OUTPATIENT)
Dept: FAMILY MEDICINE | Facility: CLINIC | Age: 77
End: 2017-08-17

## 2017-08-17 LAB
ALBUMIN UR-MCNC: >600 MG/DL
APPEARANCE UR: CLEAR
BACTERIA #/AREA URNS HPF: ABNORMAL /HPF
BILIRUB UR QL STRIP: NEGATIVE
COLOR UR AUTO: YELLOW
FERRITIN SERPL-MCNC: 59 NG/ML (ref 8–252)
GLUCOSE UR STRIP-MCNC: 70 MG/DL
HGB UR QL STRIP: ABNORMAL
HYALINE CASTS #/AREA URNS LPF: ABNORMAL /LPF
IRON SATN MFR SERPL: 23 % (ref 15–46)
IRON SERPL-MCNC: 60 UG/DL (ref 35–180)
KETONES UR STRIP-MCNC: NEGATIVE MG/DL
LEUKOCYTE ESTERASE UR QL STRIP: NEGATIVE
NITRATE UR QL: NEGATIVE
NON-SQ EPI CELLS #/AREA URNS LPF: ABNORMAL /LPF
PH UR STRIP: 6.5 PH (ref 5–7)
RBC #/AREA URNS AUTO: ABNORMAL /HPF
SOURCE: ABNORMAL
SP GR UR STRIP: 1.02 (ref 1–1.03)
TIBC SERPL-MCNC: 262 UG/DL (ref 240–430)
TSH SERPL DL<=0.005 MIU/L-ACNC: 2.04 MU/L (ref 0.4–4)
UROBILINOGEN UR STRIP-MCNC: NORMAL MG/DL (ref 0–2)
WBC #/AREA URNS AUTO: ABNORMAL /HPF

## 2017-08-17 NOTE — TELEPHONE ENCOUNTER
Reason for Call: Request for an order or referral:    Order or referral being requested: Order    Date needed: as soon as possible    Has the patient been seen by the PCP for this problem? Not Applicable    Additional comments: [facility] needs order for ok to resume homecare for 8/18/17 due to post hospital stay.Nurse would like call back today. Thanks.     Phone number Nurse can be reached at:  [phone]    Best Time:  Anytime     Can we leave a detailed message on this number?  YES    Call taken on 8/17/2017 at 10:45 AM by Rachel Barber

## 2017-08-17 NOTE — TELEPHONE ENCOUNTER
Last OV 7/31/17 Yoav    Called and spoke with MARJORIE Farris.   Verbal orders given to approve the requested services below per the 'Home Care, Assisted Living, or Nursing Home Evaluations and Treatments [facility] Policy'.      Lito Sprague RN

## 2017-08-17 NOTE — PROGRESS NOTES
[facility] Six-Month Telephone Assessment    6 month telephone assessment completed on 8/17/2017.    ER visits: No.  Hospitalizations: Yes -  [facility]    Date of Admission: 7/19/17  Date of Discharge: 7/23/17  Reason(s) for Admission: Bleeding ulcer, vomit with blood   TCU stays: No  Significant health status changes: None reported.   Falls/Injuries: No  ADL/IADL changes: No  Changes in services: No    Caregiver Assessment follow up:  NA    Goals: See POC in chart for goal progress documentation.      Will see client in 6 months for an annual health risk assessment.   Encouraged client to call CM with any questions or concerns in the meantime.     Jacqui Robles RN, PHN  [facility]   Phone: [phone]  Fax [phone]  [email or web address]

## 2017-08-22 LAB
DEPRECATED CALCIDIOL+CALCIFEROL SERPL-MC: <28 UG/L (ref 20–75)
VITAMIN D2 SERPL-MCNC: <5 UG/L
VITAMIN D3 SERPL-MCNC: 23 UG/L

## 2017-08-22 NOTE — TELEPHONE ENCOUNTER
Received message from Dr. Rodriguez.  Hi Team: 2 additional thoughts after my visit with this patient. She has a home health nurse. Can we reach out to her to update her on the advancement of her kidney disease as would like her help in monitoring her BP at home. Would like an idea of what the readings have been like during those visits. Additionally, please clarify that she is NOT on an ACE-I or ARB at this time.   Last - I would like to repeat a renal ultrasound to assure no obstruction given her more acute decline than expected. No absolute urgency but in the somewhat near future.   Ramon Partida       Left message for Lilliana AMADOR at [facility] [phone]. Will await call back.    Daysi Frias, RN, BSN  Nephrology Care Coordinator  [facility]

## 2017-08-23 NOTE — TELEPHONE ENCOUNTER
Spoke to Lilliana. MARJORIE Tijerina has seen this patient once. The visit with Lakhwinder was frustrating for Lilliana because the patient did not have all of her medications at home for Lilliana to review with Lakhwinder. Per Lilliana, there was not a medication box to be found and the medication bottles were reported to be at the other home where Lakhwinder occassionally stays. Lilliana is concerned about medication compliance. Patient's granddaughter is reported to assist with medication set up.    Her last BP was 130/70s.    Lilliana will be visiting the patient again on Friday. Lilliana did confirm that Lakhwinder is not on an ACE/ARB at this time.

## 2017-08-29 NOTE — TELEPHONE ENCOUNTER
Left message with the assistance of  services to call our clinic back to discuss need for repeat ultrasound given recent decline in kidney function.    Also left message for Ramin that ultrasound is needed and to call back to schedule. Order is in patient's chart.    Daysi Frias RN, BSN  Nephrology Care Coordinator  [facility]

## 2017-09-01 ENCOUNTER — TELEPHONE (OUTPATIENT)
Dept: NEPHROLOGY | Facility: CLINIC | Age: 77
End: 2017-09-01

## 2017-09-01 DIAGNOSIS — I10 HYPERTENSION GOAL BP (BLOOD PRESSURE) < 140/90: ICD-10-CM

## 2017-09-01 LAB
ALBUMIN SERPL-MCNC: 2.9 G/DL (ref 3.4–5)
ANION GAP SERPL CALCULATED.3IONS-SCNC: 15 MMOL/L (ref 3–14)
BUN SERPL-MCNC: 21 MG/DL (ref 7–30)
CALCIUM SERPL-MCNC: 8.7 MG/DL (ref 8.5–10.1)
CHLORIDE SERPL-SCNC: 115 MMOL/L (ref 94–109)
CO2 SERPL-SCNC: 14 MMOL/L (ref 20–32)
CREAT SERPL-MCNC: 2.13 MG/DL (ref 0.52–1.04)
GFR SERPL CREATININE-BSD FRML MDRD: 22 ML/MIN/1.7M2
GLUCOSE SERPL-MCNC: 35 MG/DL (ref 70–99)
HGB BLD-MCNC: 11.4 G/DL (ref 11.7–15.7)
PHOSPHATE SERPL-MCNC: 3.1 MG/DL (ref 2.5–4.5)
POTASSIUM SERPL-SCNC: 4.2 MMOL/L (ref 3.4–5.3)
SODIUM SERPL-SCNC: 144 MMOL/L (ref 133–144)

## 2017-09-01 PROCEDURE — 80069 RENAL FUNCTION PANEL: CPT | Performed by: INTERNAL MEDICINE

## 2017-09-01 PROCEDURE — 85018 HEMOGLOBIN: CPT | Performed by: INTERNAL MEDICINE

## 2017-09-06 DIAGNOSIS — E78.5 HYPERLIPIDEMIA LDL GOAL <100: ICD-10-CM

## 2017-09-06 NOTE — TELEPHONE ENCOUNTER
rosuvastatin (CRESTOR) 20 MG tablet     Last Written Prescription Date: 07/28/16  Last Fill Quantity: 90, # refills: 3  Last Office Visit with [facility], [facility] or [facility] prescribing provider: 07/31/17  Next 5 appointments (look out 90 days)     Oct 09, 2017  9:00 AM CDT   Return Visit with Ramon Rodriguez MD   [facility]    [address] [place] [id number]   [phone]            Oct 17, 2017 11:00 AM CDT   Office Visit with Jonna Cason MD   [facility]    [address] [facility] [id number] [phone]                   Lab Results   Component Value Date    CHOL 177 12/02/2016     Lab Results   Component Value Date    HDL 40 12/02/2016     Lab Results   Component Value Date    LDL 85 12/02/2016     Lab Results   Component Value Date    TRIG 262 12/02/2016     Lab Results   Component Value Date    CHOLHDLRATIO 5.2 08/06/2015         Alyx Canales  [facility]

## 2017-09-11 RX ORDER — ROSUVASTATIN CALCIUM 20 MG/1
TABLET, COATED ORAL
Qty: 90 TABLET | Refills: 3 | Status: SHIPPED | OUTPATIENT
Start: 2017-09-11 | End: 2018-05-18

## 2017-09-14 DIAGNOSIS — E11.22 TYPE 2 DIABETES MELLITUS WITH DIABETIC CHRONIC KIDNEY DISEASE, UNSPECIFIED CKD STAGE, UNSPECIFIED LONG TERM INSULIN USE STATUS: ICD-10-CM

## 2017-09-14 DIAGNOSIS — I10 ESSENTIAL HYPERTENSION: ICD-10-CM

## 2017-09-15 RX ORDER — HYDRALAZINE HYDROCHLORIDE 25 MG/1
50 TABLET, FILM COATED ORAL 2 TIMES DAILY
Qty: 180 TABLET | Refills: 3 | Status: SHIPPED | OUTPATIENT
Start: 2017-09-15 | End: 2017-11-13

## 2017-09-15 RX ORDER — SODIUM BICARBONATE 650 MG/1
650 TABLET ORAL 2 TIMES DAILY
Qty: 180 TABLET | Refills: 3 | Status: SHIPPED | OUTPATIENT
Start: 2017-09-15 | End: 2018-01-11

## 2017-09-15 NOTE — TELEPHONE ENCOUNTER
ONETOUCH DELICA LANCETS 33G MISC      Last Written Prescription Date: 6/6/17  Last Fill Quantity: 100,  # refills: 1   Last Office Visit with [facility], [facility] or [facility] prescribing provider: 7/31/17                                         Next 5 appointments (look out 90 days)     Oct 09, 2017  9:00 AM CDT   Return Visit with Ramon Rodriguez MD   [facility]    [address] [place] [id number]   [phone]            Oct 17, 2017 11:00 AM CDT   Office Visit with Jonna Cason MD   [facility]    [address] [facility] [id number] [phone]                    Allie Schneider  [facility]

## 2017-09-19 RX ORDER — LANCETS 33 GAUGE
EACH MISCELLANEOUS
Qty: 100 EACH | Refills: 1 | Status: SHIPPED | OUTPATIENT
Start: 2017-09-19 | End: 2018-04-20

## 2017-09-22 LAB
ALBUMIN SERPL-MCNC: 3.7 G/DL (ref 3.4–5)
ANION GAP SERPL CALCULATED.3IONS-SCNC: 12 MMOL/L (ref 3–14)
BUN SERPL-MCNC: 30 MG/DL (ref 7–30)
CALCIUM SERPL-MCNC: 8.4 MG/DL (ref 8.5–10.1)
CHLORIDE SERPL-SCNC: 109 MMOL/L (ref 94–109)
CO2 SERPL-SCNC: 21 MMOL/L (ref 20–32)
CREAT SERPL-MCNC: 2.3 MG/DL (ref 0.52–1.04)
GFR SERPL CREATININE-BSD FRML MDRD: 20 ML/MIN/1.7M2
GLUCOSE SERPL-MCNC: 44 MG/DL (ref 70–99)
HGB BLD-MCNC: 11.6 G/DL (ref 11.7–15.7)
PHOSPHATE SERPL-MCNC: 3 MG/DL (ref 2.5–4.5)
POTASSIUM SERPL-SCNC: 4.1 MMOL/L (ref 3.4–5.3)
SODIUM SERPL-SCNC: 142 MMOL/L (ref 133–144)

## 2017-09-22 PROCEDURE — 85018 HEMOGLOBIN: CPT | Performed by: INTERNAL MEDICINE

## 2017-09-22 PROCEDURE — 80069 RENAL FUNCTION PANEL: CPT | Performed by: INTERNAL MEDICINE

## 2017-09-25 DIAGNOSIS — N18.30 CKD (CHRONIC KIDNEY DISEASE) STAGE 3, GFR 30-59 ML/MIN (H): Primary | ICD-10-CM

## 2017-09-26 ENCOUNTER — TELEPHONE (OUTPATIENT)
Dept: NEPHROLOGY | Facility: CLINIC | Age: 77
End: 2017-09-26

## 2017-09-26 DIAGNOSIS — N18.30 CKD (CHRONIC KIDNEY DISEASE) STAGE 3, GFR 30-59 ML/MIN (H): Primary | ICD-10-CM

## 2017-09-26 NOTE — TELEPHONE ENCOUNTER
Received telephone call from patient's nurse, Lilliana with updated blood pressure readings.   /68 HR 94         167/75 HR 94          169/68 HR 78    Most recent change was Hydralazine 50 mg BID. Lilliana reports assessing 3+ edema to her LE. Med rec completed. Lasix had been ordered  20 mg prn, though Lilliana does not feel that she has been taking it at all.     Reviewed with Dr. Rodriguez. She would advise starting Lasix 20 mg daily at this time and move follow up forward to next week. Lilliana communicated this to the  and patient. Patient scheduled.    Daysi Frias, RN, BSN  Nephrology Care Coordinator  [facility]

## 2017-09-27 ENCOUNTER — TELEPHONE (OUTPATIENT)
Dept: FAMILY MEDICINE | Facility: CLINIC | Age: 77
End: 2017-09-27

## 2017-09-27 NOTE — TELEPHONE ENCOUNTER
The following contact information found for home care:  MARJORIE Tijerina [facility] care () [phone]    Called and left detailed message for Lilliana advised of provider notes below.  Asked for call back to make sure she got this message and will update patient.    If home care calls back:   Patient contacted by clinic RN team. Please document if home care received the above message. If they have further questions or concerns, please inform that someone from the team will contact them, document that pt called and route to care team.         Lito Sprague RN

## 2017-09-27 NOTE — TELEPHONE ENCOUNTER
Blood sugars are running too low and need to stop insulin for now. Will restart if needed. With kidney failure, she may not need to take shots.   Lab Results   Component Value Date    A1C 6.3 05/25/2017    A1C 6.0 02/16/2017    A1C 6.1 12/02/2016    A1C 6.1 06/03/2016    A1C 6.3 01/28/2016      We should follow up in clinic to discuss safer medication for the diabetes.     Jonna Cason MD

## 2017-09-29 NOTE — TELEPHONE ENCOUNTER
Detailed message left on voice mail per provider documentation. [facility] clinic number provided for any additional questions/concerns.   Chart review shows upcoming appointment on 10/17/17 with PCP.   Joelle Hassan RN

## 2017-10-03 ENCOUNTER — OFFICE VISIT (OUTPATIENT)
Dept: NEPHROLOGY | Facility: CLINIC | Age: 77
End: 2017-10-03
Payer: COMMERCIAL

## 2017-10-03 ENCOUNTER — TELEPHONE (OUTPATIENT)
Dept: NEPHROLOGY | Facility: CLINIC | Age: 77
End: 2017-10-03

## 2017-10-03 VITALS
HEART RATE: 83 BPM | WEIGHT: 124.5 LBS | DIASTOLIC BLOOD PRESSURE: 73 MMHG | SYSTOLIC BLOOD PRESSURE: 154 MMHG | OXYGEN SATURATION: 98 % | BODY MASS INDEX: 27.66 KG/M2

## 2017-10-03 DIAGNOSIS — N25.81 SECONDARY HYPERPARATHYROIDISM (H): ICD-10-CM

## 2017-10-03 DIAGNOSIS — N18.30 TYPE 2 DIABETES MELLITUS WITH STAGE 3 CHRONIC KIDNEY DISEASE, WITHOUT LONG-TERM CURRENT USE OF INSULIN (H): ICD-10-CM

## 2017-10-03 DIAGNOSIS — E11.22 TYPE 2 DIABETES MELLITUS WITH STAGE 3 CHRONIC KIDNEY DISEASE, WITHOUT LONG-TERM CURRENT USE OF INSULIN (H): ICD-10-CM

## 2017-10-03 DIAGNOSIS — N18.30 CKD (CHRONIC KIDNEY DISEASE) STAGE 3, GFR 30-59 ML/MIN (H): ICD-10-CM

## 2017-10-03 DIAGNOSIS — I10 HYPERTENSION, GOAL BELOW 140/90: Primary | ICD-10-CM

## 2017-10-03 DIAGNOSIS — D64.9 ANEMIA, UNSPECIFIED TYPE: ICD-10-CM

## 2017-10-03 DIAGNOSIS — K27.4 GASTROINTESTINAL HEMORRHAGE ASSOCIATED WITH PEPTIC ULCER: ICD-10-CM

## 2017-10-03 DIAGNOSIS — E11.22 TYPE 2 DIABETES MELLITUS WITH DIABETIC CHRONIC KIDNEY DISEASE, UNSPECIFIED CKD STAGE, UNSPECIFIED LONG TERM INSULIN USE STATUS: ICD-10-CM

## 2017-10-03 LAB
ALBUMIN SERPL-MCNC: 3.7 G/DL (ref 3.4–5)
ANION GAP SERPL CALCULATED.3IONS-SCNC: 7 MMOL/L (ref 3–14)
BUN SERPL-MCNC: 25 MG/DL (ref 7–30)
CALCIUM SERPL-MCNC: 8.9 MG/DL (ref 8.5–10.1)
CHLORIDE SERPL-SCNC: 110 MMOL/L (ref 94–109)
CO2 SERPL-SCNC: 27 MMOL/L (ref 20–32)
CREAT SERPL-MCNC: 2.46 MG/DL (ref 0.52–1.04)
CREAT UR-MCNC: 60 MG/DL
ERYTHROCYTE [DISTWIDTH] IN BLOOD BY AUTOMATED COUNT: 12.9 % (ref 10–15)
GFR SERPL CREATININE-BSD FRML MDRD: 19 ML/MIN/1.7M2
GLUCOSE BLDC GLUCOMTR-MCNC: 99 MG/DL (ref 70–99)
GLUCOSE SERPL-MCNC: 39 MG/DL (ref 70–99)
GLUCOSE SERPL-MCNC: 56 MG/DL (ref 70–99)
HBA1C MFR BLD: 5.3 % (ref 4.3–6)
HCT VFR BLD AUTO: 36.3 % (ref 35–47)
HGB BLD-MCNC: 11.9 G/DL (ref 11.7–15.7)
MCH RBC QN AUTO: 28.7 PG (ref 26.5–33)
MCHC RBC AUTO-ENTMCNC: 32.8 G/DL (ref 31.5–36.5)
MCV RBC AUTO: 88 FL (ref 78–100)
PHOSPHATE SERPL-MCNC: 2.7 MG/DL (ref 2.5–4.5)
PLATELET # BLD AUTO: 159 10E9/L (ref 150–450)
POTASSIUM SERPL-SCNC: 3.5 MMOL/L (ref 3.4–5.3)
PROT UR-MCNC: 5.3 G/L
PROT/CREAT 24H UR: 8.8 G/G CR (ref 0–0.2)
PTH-INTACT SERPL-MCNC: 167 PG/ML (ref 12–72)
RBC # BLD AUTO: 4.14 10E12/L (ref 3.8–5.2)
SODIUM SERPL-SCNC: 144 MMOL/L (ref 133–144)
WBC # BLD AUTO: 5.9 10E9/L (ref 4–11)

## 2017-10-03 PROCEDURE — 82962 GLUCOSE BLOOD TEST: CPT | Performed by: INTERNAL MEDICINE

## 2017-10-03 PROCEDURE — 83036 HEMOGLOBIN GLYCOSYLATED A1C: CPT | Performed by: FAMILY MEDICINE

## 2017-10-03 PROCEDURE — 83970 ASSAY OF PARATHORMONE: CPT | Performed by: FAMILY MEDICINE

## 2017-10-03 PROCEDURE — 99215 OFFICE O/P EST HI 40 MIN: CPT | Performed by: INTERNAL MEDICINE

## 2017-10-03 PROCEDURE — 85027 COMPLETE CBC AUTOMATED: CPT | Performed by: FAMILY MEDICINE

## 2017-10-03 PROCEDURE — 80069 RENAL FUNCTION PANEL: CPT | Performed by: FAMILY MEDICINE

## 2017-10-03 PROCEDURE — 82947 ASSAY GLUCOSE BLOOD QUANT: CPT | Performed by: INTERNAL MEDICINE

## 2017-10-03 PROCEDURE — 36415 COLL VENOUS BLD VENIPUNCTURE: CPT | Performed by: FAMILY MEDICINE

## 2017-10-03 PROCEDURE — 84156 ASSAY OF PROTEIN URINE: CPT | Performed by: INTERNAL MEDICINE

## 2017-10-03 RX ORDER — CARVEDILOL 6.25 MG/1
6.25 TABLET ORAL 2 TIMES DAILY WITH MEALS
Qty: 180 TABLET | Refills: 1 | Status: SHIPPED | OUTPATIENT
Start: 2017-10-03 | End: 2018-01-11

## 2017-10-03 NOTE — NURSING NOTE
"Lakhwinder Negron's goals for this visit include: CC  She requests these members of her care team be copied on today's visit information: No    PCP: Jonna Cason    Referring Provider:  No referring provider defined for this encounter.    Chief Complaint   Patient presents with     RECHECK       Initial Wt 56.5 kg (124 lb 8 oz)  BMI 27.66 kg/m2 Estimated body mass index is 27.66 kg/(m^2) as calculated from the following:    Height as of 7/31/17: 1.429 m (4' 8.25\").    Weight as of this encounter: 56.5 kg (124 lb 8 oz).  Medication Reconciliation: complete  "

## 2017-10-03 NOTE — MR AVS SNAPSHOT
After Visit Summary   10/3/2017    Lakhwinder Negron    MRN: [id number]           Patient Information     Date Of Birth          1940        Visit Information        Provider Department      10/3/2017 10:00 AM Ramon Rodriguez MD; LANGUAGE [facility]        Today's Diagnoses     Hypertension, goal below 140/90    -  1      Care Instructions    1. Start carvedilol 6.25 mg twice a day- Blood pressure medication  2. We will touch base with your home health nurse now to discuss this change and also in a week to follow-up on your response.   3. Stop the insulin  4. Hold the sodium bicarbonate  5. Please attempt to change the lasix to 20 mg every other day. -water pill. Let us know if she is having any increase in swelling or shortness of breath [phone]  6. Labs in 2 weeks  7. Follow-up November 20th at 430 PM  8. Follow-up with your appt that is scheduled with Dr. Cason on 10/17 at 11 AM              Follow-ups after your visit        Your next 10 appointments already scheduled     Oct 17, 2017 11:00 AM CDT   Office Visit with Jonna Cason MD   [facility] ([facility])    [address] [address] [phone]           Bring a current list of meds and any records pertaining to this visit. For Physicals, please bring immunization records and any forms needing to be filled out. Please arrive 10 minutes early to complete paperwork.            Nov 20, 2017  4:00 PM CST   LAB with LAB FIRST FLOOR [facility] ([facility])    [id number] [place] [address] [phone]           Patient must bring picture ID. Patient should be prepared to give a urine specimen  Please do not eat 10-12 hours before your appointment if you are coming in fasting for labs on lipids, cholesterol, or glucose (sugar). Pregnant women should follow their Care Team  instructions. Water with medications is okay. Do not drink coffee or other fluids. If you have concerns about taking  your medications, please ask at office or if scheduling via Melon, send a message by clicking on Secure Messaging, Message Your Care Team.            2017  4:30 PM CST   [facility] Visit with Ramon Rodriguez MD   [facility]    [address] [place] [address] [phone]              Who to contact     If you have questions or need follow up information about today's clinic visit or your schedule please contact [facility] directly at [phone].  Normal or non-critical lab and imaging results will be communicated to you by GIVTEDhart, letter or phone within 4 business days after the clinic has received the results. If you do not hear from us within 7 days, please contact the clinic through GIVTEDhart or phone. If you have a critical or abnormal lab result, we will notify you by phone as soon as possible.  Submit refill requests through Melon or call your pharmacy and they will forward the refill request to us. Please allow 3 business days for your refill to be completed.          Additional Information About Your Visit        Melon Information     Melon is an electronic gateway that provides easy, online access to your medical records. With Melon, you can request a clinic appointment, read your test results, renew a prescription or communicate with your care team.     To sign up for Melon visit the website at [email or web address]   You will be asked to enter the access code listed below, as well as some personal information. Please follow the directions to create your username and password.     Your access code is: [email or web address]  Expires: 2018 11:14 AM     Your access code will  in 90 days. If you need help or a new code, please contact your [facility]  or call [phone] for assistance.        Care EveryWhere ID     This is your Care EveryWhere ID. This could be used by other organizations to access your [facility] [place] medical records  [id number]        Your Vitals Were     Pulse Pulse Oximetry BMI (Body Mass Index)             83 98% 27.66 kg/m2          Blood Pressure from Last 3 Encounters:   10/03/17 154/73   08/16/17 147/68   07/31/17 146/73    Weight from Last 3 Encounters:   10/03/17 124 lb 8 oz (56.5 kg)   08/16/17 125 lb 3.2 oz (56.8 kg)   07/31/17 123 lb (55.8 kg)              We Performed the Following     Glucose          Today's Medication Changes          These changes are accurate as of: 10/3/17 11:16 AM.  If you have any questions, ask your nurse or doctor.               Start taking these medicines.        Dose/Directions    carvedilol 6.25 MG tablet   Commonly known as:  COREG   Used for:  Hypertension, goal below 140/90   Started by:  Ramon Rodriguez MD        Dose:  6.25 mg   Take 1 tablet (6.25 mg) by mouth 2 times daily (with meals)   Quantity:  180 tablet   Refills:  1            Where to get your medicines      These medications were sent to [facility], [place] - [address]  [address] [facility] [address]     Phone:  [phone]     carvedilol 6.25 MG tablet                Primary Care Provider Office Phone # Fax #    Jonna Kaleigh Cason [phone]       [address]  [facility] [address]        Equal Access to Services     [facility]: Hadii aad ku hadasho Soomaali, waaxda luqadaha, qaybta kaalmada asa, greta ortiz. So [facility] [phone].    ATENCIÓN: Si habla español, tiene a mckeon disposición servicios gratuitos de asistencia lingüística. Llame al [phone].    We comply with applicable federal civil rights laws and [place] laws. We do not discriminate on the basis of race, color, national origin, age, disability, sex, sexual  orientation, or gender identity.            Thank you!     Thank you for choosing [facility]  for your care. Our goal is always to provide you with excellent care. Hearing back from our patients is one way we can continue to improve our services. Please take a few minutes to complete the written survey that you may receive in the mail after your visit with us. Thank you!             Your Updated Medication List - Protect others around you: Learn how to safely use, store and throw away your medicines at www.disposemymeds.org.          This list is accurate as of: 10/3/17 11:16 AM.  Always use your most recent med list.                   Brand Name Dispense Instructions for use Diagnosis    ACE/ARB NOT PRESCRIBED (INTENTIONAL)      Please choose reason not prescribed, below    Type 2 diabetes mellitus with stage 3 chronic kidney disease, without long-term current use of insulin (H)       acetaminophen-codeine 300-30 MG per tablet    TYLENOL #3    60 tablet    TAKE 1-2 TABLETS ONCE DAILY AS NEEDED FOR PAIN// IB HNUB NOJ 1-2 LUB YOG MOB    Chronic pain syndrome       amLODIPine 10 MG tablet    NORVASC    90 tablet    Take 1 tablet (10 mg) by mouth daily    Type 2 diabetes mellitus with other diabetic kidney complication, Proteinuria       ASPIRIN NOT PRESCRIBED    INTENTIONAL    1 each    Please choose reason not prescribed, below    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    Right hemiparesis (H)       carvedilol 6.25 MG tablet    COREG    180 tablet    Take 1 tablet (6.25 mg) by mouth 2 times daily (with meals)    Hypertension, goal below 140/90       furosemide 20 MG tablet    LASIX    30 tablet    TAKE 1  TABLET BY MOUTH DAILY AS NEEDED FOR SWELLING IN LEGS // IB HNUB NOJ 1 LUB PAB LASHA PHOB VOG    Dependent edema       hydrALAZINE 25 MG tablet    APRESOLINE    180 tablet    Take 2 tablets (50 mg) by mouth 2 times daily    Hypertension goal BP (blood pressure) < 140/90       insulin aspart prot & aspart injection    NovoLOG MIX 70/30 FLEXPEN    1 mL    Inject 10 Units Subcutaneous 2 times daily (with meals)    Type 2 diabetes mellitus without complication, unspecified long term insulin use status (H)       * insulin pen needle 31G X 6 MM    ULTICARE MINI    100 each    Use 2 daily or as directed.    Type 2 diabetes, HbA1C goal < 8% (H)       * UNIFINE PENTIPS 31G X 5 MM   Generic drug:  insulin pen needle     100 each    USE AS DIRECTED TWO TIMES DAILY // IB HNUB SIV 2 ZAUG LI QHIA    Hyperglycemia due to type 2 diabetes mellitus (H)       * olopatadine 0.1 % ophthalmic solution    PATANOL    5 mL    Place 1 drop into both eyes 2 times daily as needed for allergies (itchy eye.)    Allergic conjunctivitis       * olopatadine 0.1 % ophthalmic solution    PATANOL    5 mL    Place 1 drop into both eyes 2 times daily as needed for allergies    Allergic conjunctivitis of both eyes       omeprazole 20 MG CR capsule    priLOSEC     TAKE 1 CAPSULE BY MOUTH TWICE DAILY FOR STOMACH//IB ZAUG NOJ 1 LUB, IB HNUB NOJ 2 ZAUG EKATERINA WRIGHTU MOB PLAB (NCAUJ PLAB)        ONE TOUCH ULTRA test strip   Generic drug:  blood glucose monitoring     200 strip    USE TO TEST BLOOD SUGAR 2-3 TIMES A DAY // IB HNUB SIV 2-3 ZAUG LI QHIA    Type 2 diabetes mellitus with stage 3 chronic kidney disease, unspecified long term insulin use status (H)       ONETOUCH DELICA LANCETS 33G Misc     100 each    USE AS DIRECTED TWO TIMES DAILY // IB HNUB SIV 2 ZAUG LI QHIA    Essential hypertension, Type 2 diabetes mellitus with diabetic chronic kidney disease, unspecified CKD stage, unspecified long term insulin use status (H)       * order for DME     1 each     Equipment being ordered: blood pressure meter with supplies and adult cuff    Hypertension goal BP (blood pressure) < 140/90       * order for DME     200 each    Lancets bid for type 2 diabetes on insulin.    Type 2 diabetes mellitus with proteinuria or albuminuria       * order for DME     1 Device    Equipment being ordered: Shoe lift for leg length difference.    Leg length difference, acquired, CVA (cerebral vascular accident) (H)       * order for DME     200 each    Test strips for pt's glucometer, brand as covered by insurance Test bid. One touch Delica lancets. Type 2 diabetes on insulin.  Directions: use as directed Quantity: 200    Type 2 diabetes, HbA1C goal < 8% (H)       rosuvastatin 20 MG tablet    CRESTOR    90 tablet    TAKE 1 TABLET BY MOUTH DAILY FOR CHOLESTEROL // IB HNUB NOJ 1 LUB LASHA NTSHAV MUAJ ROJ    Hyperlipidemia LDL goal <100       sodium bicarbonate 650 MG tablet     180 tablet    Take 1 tablet (650 mg) by mouth 2 times daily    Hypertension goal BP (blood pressure) < 140/90       * Notice:  This list has 9 medication(s) that are the same as other medications prescribed for you. Read the directions carefully, and ask your doctor or other care provider to review them with you.

## 2017-10-03 NOTE — TELEPHONE ENCOUNTER
Called home care manager, Lilliana to discuss office visit today and medication recommendations from Dr. Rodriguez.     - Stop sodium bicarbonate  - Decrease Lasix to 20 mg every other day  - Start Carvedilol 6.25 mg BID  - Stop insulin    Lilliana stated that she had a prior conversation with both of Lakhwinder's son's regarding stopping the insulin. Patient arrived with low glucose again today and reports that she is still taking insulin. Home care visits are every other week, typically on Fridays to assist with medication set up. Patient attends and adult day program [facility].    Spoke to patient's son, Ramin. Asked him if he had any concerns regarding his mom's rentention and understanding of medical information. From a memory standpoint, he feels that she is the same. He has had to remind her to stop the insulin on a couple of times now. He feels that she has forgotten that. At office visit today, Lakhwinder mentioned not knowing who Dr. Cason was, after informing her that this is the MD that she has seen a number of times, she seemed to continue not to recall. He son feels that she is seeing a lot of different doctors and she does not remember names well.     Expressed concern with Ramin regarding his mother's continued decline in kidney function, medication noncompliance/confusion, lack of family presence at office visits.  Asked if there is a way that family could become more present in her cares. Suggested that someone familiarize themselves with her medications and frequency, someone who can help monitor if she is taking them correctly. He feels that his oldest daughter, Samira will be able to help with this. Suggested more frequent home care visits, to which Ramin stated she has not been open to in the past. He will discuss this with his older brother. Ramin stated that he will attend the next office visit with Lakhwinder that is scheduled for November 20. He will also  the Carvedilol. Advised that Lilliana was going to work with the   to see if she could come earlier than Friday to help with medication set up. He verbalized understanding.    Daysi Frias, RN, BSN  Nephrology Care Coordinator  [facility]

## 2017-10-03 NOTE — LETTER
[facility]  [address] [address]  Phone [phone]                 Christyfrankie Negron  [address] [facility] [address]        October 6, 2017        Dear Lakhwinder,    Your test results are attached. I am happy to let you know that they are stable and your medications can stay the same.    Your diabetes test looks great. The test for anemia is back to normal.       Results for orders placed or performed in visit on 10/03/17   CBC with platelets   Result Value Ref Range    WBC 5.9 4.0 - 11.0 10e9/L    RBC Count 4.14 3.8 - 5.2 10e12/L    Hemoglobin 11.9 11.7 - 15.7 g/dL    Hematocrit 36.3 35.0 - 47.0 %    MCV 88 78 - 100 fl    MCH 28.7 26.5 - 33.0 pg    MCHC 32.8 31.5 - 36.5 g/dL    RDW 12.9 10.0 - 15.0 %    Platelet Count 159 150 - 450 10e9/L   Hemoglobin A1c   Result Value Ref Range    Hemoglobin A1C 5.3 4.3 - 6.0 %   Parathyroid Hormone Intact   Result Value Ref Range    Parathyroid Hormone Intact 167 (H) 12 - 72 pg/mL   Protein  random urine with Creat Ratio   Result Value Ref Range    Protein Random Urine 5.30 g/L    Protein Total Urine g/gr Creatinine 8.80 (H) 0 - 0.2 g/g Cr   Renal panel   Result Value Ref Range    Sodium 144 133 - 144 mmol/L    Potassium 3.5 3.4 - 5.3 mmol/L    Chloride 110 (H) 94 - 109 mmol/L    Carbon Dioxide 27 20 - 32 mmol/L    Anion Gap 7 3 - 14 mmol/L    Glucose 39 (LL) 70 - 99 mg/dL    Urea Nitrogen 25 7 - 30 mg/dL    Creatinine 2.46 (H) 0.52 - 1.04 mg/dL    GFR Estimate 19 (L) >60 mL/min/1.7m2    GFR Estimate If Black 23 (L) >60 mL/min/1.7m2    Calcium 8.9 8.5 - 10.1 mg/dL    Phosphorus 2.7 2.5 - 4.5 mg/dL    Albumin 3.7 3.4 - 5.0 g/dL   Creatinine urine calculation only   Result Value Ref Range    Creatinine Urine 60 mg/dL       Please call me if you have any questions about these test results or about your care.    Sincerely,      Jonna Cason MD/julianna

## 2017-10-03 NOTE — PATIENT INSTRUCTIONS
1. Start carvedilol 6.25 mg twice a day- Blood pressure medication  2. We will touch base with your home health nurse now to discuss this change and also in a week to follow-up on your response.   3. Stop the insulin  4. Hold the sodium bicarbonate  5. Please attempt to change the lasix to 20 mg every other day. -water pill. Let us know if she is having any increase in swelling or shortness of breath [phone]  6. Labs in 2 weeks  7. Follow-up November 20th at 430 PM  8. Follow-up with your appt that is scheduled with Dr. Cason on 10/17 at 11 AM  9. Recommend renal ultrasound
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
## 2017-10-06 DIAGNOSIS — Z53.9 DIAGNOSIS NOT YET DEFINED: Primary | ICD-10-CM

## 2017-10-06 PROCEDURE — 99207 ZZC NO BILLABLE SERVICE THIS VISIT: CPT | Performed by: FAMILY MEDICINE

## 2017-10-06 NOTE — PROGRESS NOTES
Dear Lakhwinder Negron,    Your test results are attached. I am happy to let you know that they are stable and your medications can stay the same.    Your diabetes test looks great. The test for anemia is back to normal.     Please call me if you have any questions about these test results or about your care.    Sincerely,    Jonna Cason MD

## 2017-10-16 ENCOUNTER — TRANSFERRED RECORDS (OUTPATIENT)
Dept: HEALTH INFORMATION MANAGEMENT | Facility: CLINIC | Age: 77
End: 2017-10-16

## 2017-10-16 LAB — EJECTION FRACTION: 63

## 2017-10-17 ENCOUNTER — TELEPHONE (OUTPATIENT)
Dept: FAMILY MEDICINE | Facility: CLINIC | Age: 77
End: 2017-10-17

## 2017-10-17 NOTE — TELEPHONE ENCOUNTER
elvin (HOME CARE) [phone]     Requested nursing home care orders. Pt was discharged from [facility] today. Verbal order given.  Marcia Hogan RN

## 2017-10-20 ENCOUNTER — TELEPHONE (OUTPATIENT)
Dept: NEPHROLOGY | Facility: CLINIC | Age: 77
End: 2017-10-20

## 2017-10-20 ENCOUNTER — HOSPITAL (OUTPATIENT)
Dept: NEPHROLOGY | Facility: CLINIC | Age: 77
End: 2017-10-20

## 2017-10-20 ENCOUNTER — TELEPHONE (OUTPATIENT)
Dept: FAMILY MEDICINE | Facility: CLINIC | Age: 77
End: 2017-10-20

## 2017-10-20 ENCOUNTER — CARE COORDINATION (OUTPATIENT)
Dept: GERIATRIC MEDICINE | Facility: CLINIC | Age: 77
End: 2017-10-20

## 2017-10-20 DIAGNOSIS — N18.30 TYPE 2 DIABETES MELLITUS WITH STAGE 3 CHRONIC KIDNEY DISEASE, WITHOUT LONG-TERM CURRENT USE OF INSULIN (H): Primary | ICD-10-CM

## 2017-10-20 DIAGNOSIS — E11.22 TYPE 2 DIABETES MELLITUS WITH STAGE 3 CHRONIC KIDNEY DISEASE, WITHOUT LONG-TERM CURRENT USE OF INSULIN (H): Primary | ICD-10-CM

## 2017-10-20 RX ORDER — GLIPIZIDE 2.5 MG/1
2.5 TABLET, EXTENDED RELEASE ORAL DAILY
Qty: 30 TABLET | Refills: 3 | Status: SHIPPED | OUTPATIENT
Start: 2017-10-20 | End: 2017-10-27

## 2017-10-20 NOTE — TELEPHONE ENCOUNTER
Please call regarding diabetic management    Ok to leave voicemail    Thank you  ashlyn (HOME CARE) [phone]

## 2017-10-20 NOTE — TELEPHONE ENCOUNTER
It would be better if she could see me Monday or Tuesday and she can take any of my open slots. We can start glipizide 2.5 mg once a day. I sent an prescription for this to the pharmacy.  Jonna Cason MD

## 2017-10-20 NOTE — TELEPHONE ENCOUNTER
This writer attempted to contact Lilliana home care nurse on 10/20/17      Reason for call provider's message and REschedule patient sooner and left detailed message.      If patient calls back:   GERALDO RN- if call center is not able to schedule appointment on Monday or Tuesday.    Joelle Hassan, RN

## 2017-10-20 NOTE — TELEPHONE ENCOUNTER
Upon chart review patient has been cancelling and rescheduling hospital follow up appointments (10/19, 10/20).  She tentatively has an appointment set up for 10/26/17 with Cathleen Adams and another one with Yoav when she is back in office, on 17.     Home care nurse contacted. She reports 3 weeks ago patient saw PCP and was told to stop her insulin. She did not do so and ended up at [facility] under Observation for low blood sugars. They took her insulin away. Her blood sugars have been this mornin. Evening either before or after supper (cannot tell when) blood sugars have been 258, 243, 190, 107, and 371. The 30 day average blood sugar has been 179. Home care wants to know what she should be taking before she is seen in  for hospital follow up.     Routing to provider to review and advise.   Joelle Hassan RN

## 2017-10-23 NOTE — TELEPHONE ENCOUNTER
Hospitalized 10/15-10/17 for Hypoglycemia at [facility]. BP medications did not change. Insulin was discontinued (had been previously advised). She was started on Glipizide 2.5 mg daily.    Per Lilliana, BP 130s/70s. She is scheduled to follow up with Lakhwinder again on Friday. Advised that she should call with any concerns.    Daysi Frias, RN, BSN  Nephrology Care Coordinator  [facility]

## 2017-10-24 NOTE — TELEPHONE ENCOUNTER
This writer attempted to contact Ramin on 10/24/17      Reason for call check with Ramin if pt would like to be seen today with Dr. Cason otherwise ask Ramin to see if pt is planning to see Cathleen Bryan on Thursday 10/26. Pt is also scheduled for an appt with Dr. Cason on 11/2, does pt plan to keep both appts or not and left message to return call.      If patient calls back:   Patient contacted by 1st floor [facility] Team (MA/TC). Inform patient that someone from the team will contact them, document that pt called and route to care team. .        Gamal Negron

## 2017-10-24 NOTE — TELEPHONE ENCOUNTER
Please see telephone encounter dated 10/20/17. Lilliana, home care nurse, did receive the message below. She did inform patient, however patient has not scheduled an earlier appointment with PCP.     Please reach out to Lakhwinder Negron and see if she is willing to come see Dr. Cason today. Otherwise ask her if she is planning on seeing Bryan on Thursday? She has two future appointments scheduled. Lets try to figure out which one she is wanting to keep Bryan vs Yoav.     Thank you,   Joelle Hassan, RN

## 2017-10-25 ENCOUNTER — CARE COORDINATION (OUTPATIENT)
Dept: GERIATRIC MEDICINE | Facility: CLINIC | Age: 77
End: 2017-10-25

## 2017-10-25 NOTE — PROGRESS NOTES
[facility]:  [facility]  Faxed copy of PCA assessment to [facility] and mailed copy to member.  Faxed MD Communication to PCP.     Antonieta Erickson  Case Management Specialist  [facility]   [phone]

## 2017-10-25 NOTE — TELEPHONE ENCOUNTER
Ramin did not call back, the appt for tomorrow is still scheduled, Dr. Cason is not in clinic today, will wait until tomorrow if pt shows up for her appt.  Gamal Negron,  For Teams [facility]

## 2017-10-26 ENCOUNTER — OFFICE VISIT (OUTPATIENT)
Dept: FAMILY MEDICINE | Facility: CLINIC | Age: 77
End: 2017-10-26
Payer: COMMERCIAL

## 2017-10-26 VITALS
BODY MASS INDEX: 26.89 KG/M2 | HEART RATE: 72 BPM | WEIGHT: 121 LBS | TEMPERATURE: 97.2 F | OXYGEN SATURATION: 98 % | SYSTOLIC BLOOD PRESSURE: 158 MMHG | DIASTOLIC BLOOD PRESSURE: 64 MMHG

## 2017-10-26 DIAGNOSIS — I10 HYPERTENSION, GOAL BELOW 140/90: Primary | ICD-10-CM

## 2017-10-26 DIAGNOSIS — N18.30 TYPE 2 DIABETES MELLITUS WITH STAGE 3 CHRONIC KIDNEY DISEASE, WITHOUT LONG-TERM CURRENT USE OF INSULIN (H): ICD-10-CM

## 2017-10-26 DIAGNOSIS — N18.30 CKD (CHRONIC KIDNEY DISEASE) STAGE 3, GFR 30-59 ML/MIN (H): ICD-10-CM

## 2017-10-26 DIAGNOSIS — E11.22 TYPE 2 DIABETES MELLITUS WITH STAGE 3 CHRONIC KIDNEY DISEASE, WITHOUT LONG-TERM CURRENT USE OF INSULIN (H): ICD-10-CM

## 2017-10-26 DIAGNOSIS — E16.2 HYPOGLYCEMIA: ICD-10-CM

## 2017-10-26 LAB
ANION GAP SERPL CALCULATED.3IONS-SCNC: 10 MMOL/L (ref 3–14)
BUN SERPL-MCNC: 33 MG/DL (ref 7–30)
CALCIUM SERPL-MCNC: 9.7 MG/DL (ref 8.5–10.1)
CHLORIDE SERPL-SCNC: 110 MMOL/L (ref 94–109)
CO2 SERPL-SCNC: 21 MMOL/L (ref 20–32)
CREAT SERPL-MCNC: 2.53 MG/DL (ref 0.52–1.04)
GFR SERPL CREATININE-BSD FRML MDRD: 18 ML/MIN/1.7M2
GLUCOSE SERPL-MCNC: 168 MG/DL (ref 70–99)
POTASSIUM SERPL-SCNC: 4.1 MMOL/L (ref 3.4–5.3)
SODIUM SERPL-SCNC: 141 MMOL/L (ref 133–144)

## 2017-10-26 PROCEDURE — 36415 COLL VENOUS BLD VENIPUNCTURE: CPT | Performed by: NURSE PRACTITIONER

## 2017-10-26 PROCEDURE — 80048 BASIC METABOLIC PNL TOTAL CA: CPT | Performed by: NURSE PRACTITIONER

## 2017-10-26 PROCEDURE — 99214 OFFICE O/P EST MOD 30 MIN: CPT | Performed by: NURSE PRACTITIONER

## 2017-10-26 NOTE — PATIENT INSTRUCTIONS
Take Glipizide twice a day, one tablet.  This will help bring your blood sugars down a little bit.  Continue to check your blood sugars twice a day.    For your blood pressure, let's add an additional dose of hydralazine.  So you will take hydralazine three times a day ( morning, noon, and night) at least 4 hours apart.  Follow up with Dr. Cason as planned next week and Dr. Rodriguez on 11/20.      Based on your medical history and these are the current health maintenance or preventive care services that you are due for (some may have been done at this visit)  Health Maintenance Due   Topic Date Due     URINE DRUG SCREEN Q1 YR  05/06/1955     TRICIA QUESTIONNAIRE 1 YEAR  04/28/2017     INFLUENZA VACCINE (SYSTEM ASSIGNED)  09/01/2017     ADVANCE DIRECTIVE PLANNING Q5 YRS  09/25/2017     EYE EXAM Q1 YEAR  11/14/2017         At [facility], we strive to deliver an exceptional experience to you, every time we see you.    If you receive a survey in the mail, please send us back your thoughts. We really do value your feedback.    Your care team's suggested websites for health information:  Www.[employer] : Up to date and easily searchable information on multiple topics.  Www.medlineplus.gov : medication info, interactive tutorials, watch real surgeries online  Www.familydoctor.org : good info from the Academy of Family Physicians  Www.cdc.gov : public health info, travel advisories, epidemics (H1N1)  Www.aap.org : children's health info, normal development, vaccinations  Www.health.[facility].[place].us : [place] dept of health, public health issues in [place], N1N1    How to contact your care team:   Team [facility] [phone]         Pharmacy [phone]    Dr. Alexander, Tiara Hendricks PA-C, Dr. Borja, Radha Gómez APRN CNP, Tiffanie Lino PA-C, Dr. Guzman, and CHANO Garnica CNP    Team RN: [place]      Clinic hours  M-Th 7 am-7 pm   Fri 7 am-5 pm.   Urgent care M-F 11 am-9 pm,   Sat/Sun 9 am-5  pm.  Pharmacy M-Th 8 am-8 pm Fri 8 am-6 pm  Sat/Sun 9 am-5 pm.     All password changes, disabled accounts, or ID changes in [facility]MyHealth will be done by our Access Services Department.    If you need help with your account or password, call: [phone]. Clinic staff no longer has the ability to change passwords.

## 2017-10-26 NOTE — MR AVS SNAPSHOT
After Visit Summary   10/26/2017    Lakhwinder Negron    MRN: [id number]           Patient Information     Date Of Birth          1940        Visit Information        Provider Department      10/26/2017 8:45 AM Cathleen Adams APRN CNP; [employer] [facility] [address]        Today's Diagnoses     CKD (chronic kidney disease) stage 3, GFR 30-59 ml/min    -  1    Hypoglycemia          Care Instructions    Take Glipizide twice a day, one tablet.  This will help bring your blood sugars down a little bit.  Continue to check your blood sugars twice a day.    For your blood pressure, let's add an additional dose of hydralazine.  So you will take hydralazine three times a day ( morning, noon, and night) at least 4 hours apart.  Follow up with Dr. Cason as planned next week and Dr. Rodriguez on 11/20.      Based on your medical history and these are the current health maintenance or preventive care services that you are due for (some may have been done at this visit)  Health Maintenance Due   Topic Date Due     URINE DRUG SCREEN Q1 YR  05/06/1955     TRICIA QUESTIONNAIRE 1 YEAR  04/28/2017     INFLUENZA VACCINE (SYSTEM ASSIGNED)  09/01/2017     ADVANCE DIRECTIVE PLANNING Q5 YRS  09/25/2017     EYE EXAM Q1 YEAR  11/14/2017         At [facility], we strive to deliver an exceptional experience to you, every time we see you.    If you receive a survey in the mail, please send us back your thoughts. We really do value your feedback.    Your care team's suggested websites for health information:  Www.[employer].org : Up to date and easily searchable information on multiple topics.  Www.medlineplus.gov : medication info, interactive tutorials, watch real surgeries online  Www.familydoctor.org : good info from the Academy of Family Physicians  Www.cdc.gov : public health info, travel advisories, epidemics (H1N1)  Www.aap.org : children's health info, normal  development, vaccinations  Www.health.[facility].[place].us : [place] dept of health, public health issues in [place], N1N1    How to contact your care team:   Team Jessica/Peter [phone]         Pharmacy [phone]    Dr. Alexander, Tiara Hendricks PA-C, Dr. Borja, Radha óGmez APRN CNP, Tiffanie Lino PA-C, Dr. Guzman, and CHANO Garnica CNP    Team RN: Mena      Clinic hours  M-Th 7 am-7 pm   Fri 7 am-5 pm.   Urgent care M-F 11 am-9 pm,   Sat/Sun 9 am-5 pm.  Pharmacy M-Th 8 am-8 pm Fri 8 am-6 pm  Sat/Sun 9 am-5 pm.     All password changes, disabled accounts, or ID changes in [employer]MyHealth will be done by our Access Services Department.    If you need help with your account or password, call: [phone]. Clinic staff no longer has the ability to change passwords.             Follow-ups after your visit        Your next 10 appointments already scheduled     Nov 02, 2017  9:20 AM CDT   [facility]    [address] [address] [phone]           Bring a current list of meds and any records pertaining to this visit. For Physicals, please bring immunization records and any forms needing to be filled out. Please arrive 10 minutes early to complete paperwork.            Nov 20, 2017  4:00 PM CST   LAB with LAB FIRST FLOOR Critical access hospital ([facility])    [address] [place] [address] [phone]           Patient must bring picture ID. Patient should be prepared to give a urine specimen  Please do not eat 10-12 hours before your appointment if you are coming in fasting for labs on lipids, cholesterol, or glucose (sugar). Pregnant women should follow their Care Team instructions. Water with medications is okay. Do not drink coffee or other fluids. If you have concerns about taking  your medications, please ask at office or if  "scheduling via scroll kit, send a message by clicking on Secure Messaging, Message Your Care Team.            2017  4:30 PM CST   [facility]    [id number] [place] [address] [phone]              Who to contact     If you have questions or need follow up information about today's clinic visit or your schedule please contact [facility] directly at [phone].  Normal or non-critical lab and imaging results will be communicated to you by [employer], letter or phone within 4 business days after the clinic has received the results. If you do not hear from us within 7 days, please contact the clinic through [employer] or phone. If you have a critical or abnormal lab result, we will notify you by phone as soon as possible.  Submit refill requests through scroll kit or call your pharmacy and they will forward the refill request to us. Please allow 3 business days for your refill to be completed.          Additional Information About Your Visit        scroll kit Information     scroll kit lets you send messages to your doctor, view your test results, renew your prescriptions, schedule appointments and more. To sign up, go to [email or web address] . Click on \"Log in\" on the left side of the screen, which will take you to the Welcome page. Then click on \"Sign up Now\" on the right side of the page.     You will be asked to enter the access code listed below, as well as some personal information. Please follow the directions to create your username and password.     Your access code is: [email or web address]  Expires: 2018 11:14 AM     Your access code will  in 90 days. If you need help or a new code, please call your [facility] or [phone].        Care EveryWhere ID     This is your Care EveryWhere ID. This could be used by other organizations to access your [facility] " records  [id number]        Your Vitals Were     Pulse Temperature Pulse Oximetry Breastfeeding? BMI (Body Mass Index)       72 97.2  F (36.2  C) (Tympanic) 98% No 26.89 kg/m2        Blood Pressure from Last 3 Encounters:   10/26/17 158/64   10/03/17 154/73   08/16/17 147/68    Weight from Last 3 Encounters:   10/26/17 121 lb (54.9 kg)   10/03/17 124 lb 8 oz (56.5 kg)   08/16/17 125 lb 3.2 oz (56.8 kg)              We Performed the Following     Basic metabolic panel  (Ca, Cl, CO2, Creat, Gluc, K, Na, BUN)        Primary Care Provider Office Phone # Fax #    Jonna Kaleigh Cason [phone]       [address]  [facility] [address]        Equal Access to Services     [facility]: Hadii aad ku hadasho Soomaali, waaxda luqadaha, qaybta kaalmada adeegyada, waxay guanakitoin hayaan bing batista . So [facility] [phone].    ATENCIÓN: Si habla español, tiene a mckeon disposición servicios gratuitos de asistencia lingüística. Llame al [phone].    We comply with applicable federal civil rights laws and [place] laws. We do not discriminate on the basis of race, color, national origin, age, disability, sex, sexual orientation, or gender identity.            Thank you!     Thank you for choosing [facility]  for your care. Our goal is always to provide you with excellent care. Hearing back from our patients is one way we can continue to improve our services. Please take a few minutes to complete the written survey that you may receive in the mail after your visit with us. Thank you!             Your Updated Medication List - Protect others around you: Learn how to safely use, store and throw away your medicines at www.disposemymeds.org.          This list is accurate as of: 10/26/17  9:43 AM.  Always use your most recent med list.                   Brand Name Dispense Instructions for use Diagnosis    ACE/ARB/ARNI NOT PRESCRIBED (INTENTIONAL)      Please choose reason not  prescribed, below    Type 2 diabetes mellitus with stage 3 chronic kidney disease, without long-term current use of insulin (H)       acetaminophen-codeine 300-30 MG per tablet    TYLENOL #3    60 tablet    TAKE 1-2 TABLETS ONCE DAILY AS NEEDED FOR PAIN// IB HNUB NOJ 1-2 LUB YOG MOB    Chronic pain syndrome       amLODIPine 10 MG tablet    NORVASC    90 tablet    Take 1 tablet (10 mg) by mouth daily    Type 2 diabetes mellitus with other diabetic kidney complication, Proteinuria       ASPIRIN NOT PRESCRIBED    INTENTIONAL    1 each    Please choose reason not prescribed, below    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    Right hemiparesis (H)       carvedilol 6.25 MG tablet    COREG    180 tablet    Take 1 tablet (6.25 mg) by mouth 2 times daily (with meals)    Hypertension, goal below 140/90       furosemide 20 MG tablet    LASIX    30 tablet    TAKE 1 TABLET BY MOUTH DAILY AS NEEDED FOR SWELLING IN LEGS // IB HNUB NOJ 1 LUB PAB LASHA PHOB VOG    Dependent edema       glipiZIDE 2.5 MG 24 hr tablet    glipiZIDE XL    30 tablet    Take 1 tablet (2.5 mg) by mouth daily    Type 2 diabetes mellitus with stage 3 chronic kidney disease, without long-term current use of insulin (H)       hydrALAZINE 25 MG tablet    APRESOLINE    180 tablet    Take 2 tablets (50 mg) by mouth 2 times daily    Hypertension goal BP (blood pressure) < 140/90       * insulin pen needle 31G X 6 MM    ULTICARE MINI    100 each    Use 2 daily or as directed.    Type 2 diabetes, HbA1C goal < 8% (H)       * UNIFINE PENTIPS 31G X 5 MM   Generic drug:  insulin pen needle     100 each    USE AS DIRECTED TWO TIMES DAILY // IB HNUB SIV 2 NICOLASUG CELESTINE RUANO     Hyperglycemia due to type 2 diabetes mellitus (H)       * olopatadine 0.1 % ophthalmic solution    PATANOL    5 mL    Place 1 drop into both eyes 2 times daily as needed for allergies (itchy eye.)    Allergic conjunctivitis       * olopatadine 0.1 % ophthalmic solution    PATANOL    5 mL    Place 1 drop into both eyes 2 times daily as needed for allergies    Allergic conjunctivitis of both eyes       omeprazole 20 MG CR capsule    priLOSEC     TAKE 1 CAPSULE BY MOUTH TWICE DAILY FOR STOMACH//IB ZAUG NOJ 1 LUB, IB HNUB NOJ 2 ZAUG PAB LASHA MOB PLAB (NCAUJ PLAB)        ONE TOUCH ULTRA test strip   Generic drug:  blood glucose monitoring     200 strip    USE TO TEST BLOOD SUGAR 2-3 TIMES A DAY // IB HNUB SIV 2-3 ZAUG LI QHIA    Type 2 diabetes mellitus with stage 3 chronic kidney disease, unspecified long term insulin use status (H)       ONETOUCH DELICA LANCETS 33G Misc     100 each    USE AS DIRECTED TWO TIMES DAILY // IB HNUB SIV 2 ZAUG LI QHIA    Essential hypertension, Type 2 diabetes mellitus with diabetic chronic kidney disease, unspecified CKD stage, unspecified long term insulin use status (H)       * order for DME     1 each    Equipment being ordered: blood pressure meter with supplies and adult cuff    Hypertension goal BP (blood pressure) < 140/90       * order for DME     200 each    Lancets bid for type 2 diabetes on insulin.    Type 2 diabetes mellitus with proteinuria or albuminuria       * order for DME     1 Device    Equipment being ordered: Shoe lift for leg length difference.    Leg length difference, acquired, CVA (cerebral vascular accident) (H)       * order for DME     200 each    Test strips for pt's glucometer, brand as covered by insurance Test bid. One touch Delica lancets. Type 2 diabetes on insulin.  Directions: use as directed Quantity: 200    Type 2 diabetes, HbA1C goal < 8% (H)       rosuvastatin 20 MG tablet    CRESTOR    90 tablet    TAKE 1 TABLET BY MOUTH DAILY FOR CHOLESTEROL // IB  HNUB NOJ 1 LUB LASHA NTSHAV MUAJ ROJ    Hyperlipidemia LDL goal <100       sodium bicarbonate 650 MG tablet     180 tablet    Take 1 tablet (650 mg) by mouth 2 times daily    Hypertension goal BP (blood pressure) < 140/90       * Notice:  This list has 9 medication(s) that are the same as other medications prescribed for you. Read the directions carefully, and ask your doctor or other care provider to review them with you.

## 2017-10-26 NOTE — NURSING NOTE
"Chief Complaint   Patient presents with     Diabetes       Initial /68 (BP Location: Left arm, Patient Position: Sitting, Cuff Size: Adult Regular)  Pulse 72  Temp 97.2  F (36.2  C) (Tympanic)  Wt 121 lb (54.9 kg)  SpO2 98%  Breastfeeding? No  BMI 26.89 kg/m2 Estimated body mass index is 26.89 kg/(m^2) as calculated from the following:    Height as of 7/31/17: 4' 8.25\" (1.429 m).    Weight as of this encounter: 121 lb (54.9 kg).  Medication Reconciliation: complete   Mine PAYNE      "
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
## 2017-10-27 ENCOUNTER — TELEPHONE (OUTPATIENT)
Dept: FAMILY MEDICINE | Facility: CLINIC | Age: 77
End: 2017-10-27

## 2017-10-27 DIAGNOSIS — N18.30 TYPE 2 DIABETES MELLITUS WITH STAGE 3 CHRONIC KIDNEY DISEASE, WITHOUT LONG-TERM CURRENT USE OF INSULIN (H): ICD-10-CM

## 2017-10-27 DIAGNOSIS — E11.22 TYPE 2 DIABETES MELLITUS WITH STAGE 3 CHRONIC KIDNEY DISEASE, WITHOUT LONG-TERM CURRENT USE OF INSULIN (H): ICD-10-CM

## 2017-10-27 RX ORDER — GLIPIZIDE 2.5 MG/1
2.5 TABLET, EXTENDED RELEASE ORAL 2 TIMES DAILY
Qty: 60 TABLET | Refills: 3 | Status: SHIPPED | OUTPATIENT
Start: 2017-10-27 | End: 2018-02-02

## 2017-10-27 NOTE — TELEPHONE ENCOUNTER
..Reason for Call:  Other     Detailed comments: L[facility]iana called said the patient's script for GLIPIZIDE was switched to 2 times a day. The pharmacy need the new scriot change so the patient can have medication.    Phone Number Patient can be reached at: [phone]    Best Time: anytime    Can we leave a detailed message on this number? YES    Call taken on 10/27/2017 at 11:27 AM by Kian Bartlett

## 2017-10-30 ENCOUNTER — OFFICE VISIT (OUTPATIENT)
Dept: FAMILY MEDICINE | Facility: CLINIC | Age: 77
End: 2017-10-30
Payer: COMMERCIAL

## 2017-10-30 VITALS
WEIGHT: 123 LBS | HEIGHT: 56 IN | TEMPERATURE: 97.1 F | HEART RATE: 67 BPM | BODY MASS INDEX: 27.67 KG/M2 | SYSTOLIC BLOOD PRESSURE: 140 MMHG | DIASTOLIC BLOOD PRESSURE: 57 MMHG | OXYGEN SATURATION: 95 %

## 2017-10-30 DIAGNOSIS — N18.4 CKD (CHRONIC KIDNEY DISEASE) STAGE 4, GFR 15-29 ML/MIN (H): ICD-10-CM

## 2017-10-30 DIAGNOSIS — I10 HYPERTENSION, GOAL BELOW 140/90: ICD-10-CM

## 2017-10-30 DIAGNOSIS — E11.42 TYPE 2 DIABETES MELLITUS WITH DIABETIC POLYNEUROPATHY, UNSPECIFIED LONG TERM INSULIN USE STATUS: ICD-10-CM

## 2017-10-30 DIAGNOSIS — I63.549 CEREBROVASCULAR ACCIDENT (CVA) DUE TO OCCLUSION OF CEREBELLAR ARTERY, UNSPECIFIED BLOOD VESSEL LATERALITY (H): ICD-10-CM

## 2017-10-30 DIAGNOSIS — G81.91 RIGHT HEMIPARESIS (H): ICD-10-CM

## 2017-10-30 DIAGNOSIS — E11.22 TYPE 2 DIABETES MELLITUS WITH STAGE 3 CHRONIC KIDNEY DISEASE, WITHOUT LONG-TERM CURRENT USE OF INSULIN (H): Primary | ICD-10-CM

## 2017-10-30 DIAGNOSIS — N18.30 TYPE 2 DIABETES MELLITUS WITH STAGE 3 CHRONIC KIDNEY DISEASE, WITHOUT LONG-TERM CURRENT USE OF INSULIN (H): Primary | ICD-10-CM

## 2017-10-30 PROCEDURE — 99214 OFFICE O/P EST MOD 30 MIN: CPT | Performed by: FAMILY MEDICINE

## 2017-10-30 ASSESSMENT — PAIN SCALES - GENERAL: PAINLEVEL: MODERATE PAIN (4)

## 2017-10-30 NOTE — TELEPHONE ENCOUNTER
Ronni Robles contacted [facility] on 10/30/17 and left a message. If patient calls back please contact care team.
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
## 2017-10-30 NOTE — PATIENT INSTRUCTIONS
At [facility], we strive to deliver an exceptional experience to you, every time we see you.  If you receive a survey in the mail, please send us back your thoughts. We really do value your feedback.    Based on your medical history, these are the current health maintenance/preventive care services that you are due for (some may have been done at this visit.)  Health Maintenance Due   Topic Date Due     URINE DRUG SCREEN Q1 YR  05/06/1955     TRICIA QUESTIONNAIRE 1 YEAR  04/28/2017     INFLUENZA VACCINE (SYSTEM ASSIGNED)  09/01/2017     ADVANCE DIRECTIVE PLANNING Q5 YRS  09/25/2017     EYE EXAM Q1 YEAR  11/14/2017         Suggested websites for health information:  [email or web address] : Up to date and easily searchable information on multiple topics.  Www.medlineplus.gov : medication info, interactive tutorials, watch real surgeries online  Www.familydoctor.org : good info from the Academy of Family Physicians  Www.cdc.gov : public health info, travel advisories, epidemics (H1N1)  Www.aap.org : children's health info, normal development, vaccinations  Www.health.[facility].[place].us : [place] dept of health, public health issues in [place], N1N1    Your care team:                            Family Medicine Internal Medicine   MD Gera Frost MD Shantel Branch-Fleming, MD Katya Georgiev PA-C Nam Ho, MD Pediatrics   FAIZA Benavides, MD Candida Robles CNP, MD Deborah Mielke, MD Kim Thein, APRN CNP      Clinic hours: Monday - Thursday 7 am-7 pm; Fridays 7 am-5 pm.   Urgent care: Monday - Friday 11 am-9 pm; Saturday and Sunday 9 am-5 pm.  Pharmacy : Monday -Thursday 8 am-8 pm; Friday 8 am-6 pm; Saturday and Sunday 9 am-5 pm.     Clinic: [phone]   Pharmacy: [phone]

## 2017-10-30 NOTE — NURSING NOTE
"Chief Complaint   Patient presents with     Forms      Evalution to report eyesight       Initial /57 (BP Location: Right arm, Patient Position: Chair, Cuff Size: Adult Regular)  Pulse 67  Temp 97.1  F (36.2  C) (Oral)  Ht 4' 8.25\" (1.429 m)  Wt 123 lb (55.8 kg)  SpO2 95%  Breastfeeding? No  BMI 27.33 kg/m2 Estimated body mass index is 27.33 kg/(m^2) as calculated from the following:    Height as of this encounter: 4' 8.25\" (1.429 m).    Weight as of this encounter: 123 lb (55.8 kg).  Medication Reconciliation: complete     Ronni Robles CMA    "

## 2017-10-30 NOTE — MR AVS SNAPSHOT
After Visit Summary   10/30/2017    Lakhwinder Negron    MRN: [id number]           Patient Information     Date Of Birth          1940        Visit Information        Provider Department      10/30/2017 2:45 PM Jonna Cason MD; [employer] 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  If you receive a survey in the mail, please send us back your thoughts. We really do value your feedback.    Based on your medical history, these are the current health maintenance/preventive care services that you are due for (some may have been done at this visit.)  Health Maintenance Due   Topic Date Due     URINE DRUG SCREEN Q1 YR  05/06/1955     TRICIA QUESTIONNAIRE 1 YEAR  04/28/2017     INFLUENZA VACCINE (SYSTEM ASSIGNED)  09/01/2017     ADVANCE DIRECTIVE PLANNING Q5 YRS  09/25/2017     EYE EXAM Q1 YEAR  11/14/2017         Suggested websites for health information:  Www.[employer] : Up to date and easily searchable information on multiple topics.  Www.medlineplus.gov : medication info, interactive tutorials, watch real surgeries online  Www.familydoctor.org : good info from the Academy of Family Physicians  Www.cdc.gov : public health info, travel advisories, epidemics (H1N1)  Www.aap.org : children's health info, normal development, vaccinations  Www.health.state.[place].us : [place]  dept of health, public health issues in [place], N1N1    Your care team:                            Family Medicine Internal Medicine   MD Gera Frost MD Shantel Branch-Fleming, MD Katya Georgiev PA-C Nam Ho, MD Pediatrics   FAIZA Benavides, KD Gómez APRMD Candida Villeda CNP, MD Deborah Mielke, MD Kim Thein, APRMARYLU [facility]      Clinic hours: Monday - Thursday 7 am-7 pm; Fridays 7 am-5 pm.   Urgent care: Monday - Friday 11 am-9 pm; Saturday and Sunday 9 am-5 pm.  Pharmacy : Monday -Thursday 8 am-8 pm; Friday 8 am-6 pm; Saturday and Sunday 9 am-5 pm.     Clinic: [phone]   Pharmacy: [phone]            Follow-ups after your visit        Follow-up notes from your care team     Return in about 3 months (around 1/30/2018) for Physical Exam, Lab Work, medication follow up.      Your next 10 appointments already scheduled     Nov 20, 2017  4:00 PM CST   LAB with LAB FIRST FLOOR [facility] ([facility])    [address] [place] [address] [phone]           Please do not eat 10-12 hours before your appointment if you are coming in fasting for labs on lipids, cholesterol, or glucose (sugar). This does not apply to pregnant women. Water, hot tea and black coffee (with nothing added) are okay. Do not drink other fluids, diet soda or chew gum.            Nov 20, 2017  4:30 PM CST   Return Visit with Ramon Rodriguez MD   [facility] ([facility])    [address] [place] [id number] [phone]            Feb 01, 2018  9:00 AM CST   [facility]    [address] [facility] [address] [phone]           Bring a current list of meds and any records pertaining to this visit. For Physicals, please bring immunization  "records and any forms needing to be filled out. Please arrive 10 minutes early to complete paperwork.              Who to contact     If you have questions or need follow up information about today's clinic visit or your schedule please contact [facility]] [facility] directly at [phone].  Normal or non-critical lab and imaging results will be communicated to you by MyChart, letter or phone within 4 business days after the clinic has received the results. If you do not hear from us within 7 days, please contact the clinic through [employer] or phone. If you have a critical or abnormal lab result, we will notify you by phone as soon as possible.  Submit refill requests through [employer] or call your pharmacy and they will forward the refill request to us. Please allow 3 business days for your refill to be completed.          Additional Information About Your Visit        [facility] Information     [employer] lets you send messages to your doctor, view your test results, renew your prescriptions, schedule appointments and more. To sign up, go to [email or web address] . Click on \"Log in\" on the left side of the screen, which will take you to the Welcome page. Then click on \"Sign up Now\" on the right side of the page.     You will be asked to enter the access code listed below, as well as some personal information. Please follow the directions to create your username and password.     Your access code is: [email or web address]  Expires: 2018 11:14 AM     Your access code will  in 90 days. If you need help or a new code, please call your [facility] or [phone].        Care EveryWhere ID     This is your Care EveryWhere ID. This could be used by other organizations to access your [facility] medical records  [id number]        Your Vitals Were     Pulse Temperature Height Pulse Oximetry Breastfeeding? BMI (Body Mass Index)    67 97.1  F (36.2  C) (Oral) 4' 8.25\" (1.429 m) 95% No 27.33 kg/m2       Blood Pressure " from Last 3 Encounters:   10/30/17 140/57   10/26/17 158/64   10/03/17 154/73    Weight from Last 3 Encounters:   10/30/17 123 lb (55.8 kg)   10/26/17 121 lb (54.9 kg)   10/03/17 124 lb 8 oz (56.5 kg)              Today, you had the following     No orders found for display       Primary Care Provider Office Phone # Fax #    Jonna Kaleigh Cason [phone]       [address]  [facility] [address]        Equal Access to Services     [facility]: Hadii aad ku hadasho Soomaali, waaxda luqadaha, qaybta kaalmada adeegyada, greta khalil haysanya batista . So [facility] [phone].    ATENCIÓN: Si habla español, tiene a mckeon disposición servicios gratuitos de asistencia lingüística. Llame al [phone].    We comply with applicable federal civil rights laws and [place] laws. We do not discriminate on the basis of race, color, national origin, age, disability, sex, sexual orientation, or gender identity.            Thank you!     Thank you for choosing [facility]  for your care. Our goal is always to provide you with excellent care. Hearing back from our patients is one way we can continue to improve our services. Please take a few minutes to complete the written survey that you may receive in the mail after your visit with us. Thank you!             Your Updated Medication List - Protect others around you: Learn how to safely use, store and throw away your medicines at www.disposemymeds.org.          This list is accurate as of: 10/30/17  9:45 PM.  Always use your most recent med list.                   Brand Name Dispense Instructions for use Diagnosis    ACE/ARB/ARNI NOT PRESCRIBED (INTENTIONAL)      Please choose reason not prescribed, below    Type 2 diabetes mellitus with stage 3 chronic kidney disease, without long-term current use of insulin (H)       acetaminophen-codeine 300-30 MG per tablet    TYLENOL #3    60 tablet    TAKE 1-2 TABLETS ONCE DAILY AS NEEDED  FOR PAIN// IB HNUB NOJ 1-2 LUB YOG MOB    Chronic pain syndrome       amLODIPine 10 MG tablet    NORVASC    90 tablet    Take 1 tablet (10 mg) by mouth daily    Type 2 diabetes mellitus with other diabetic kidney complication, Proteinuria       ASPIRIN NOT PRESCRIBED    INTENTIONAL    1 each    Please choose reason not prescribed, below    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    Right hemiparesis (H)       carvedilol 6.25 MG tablet    COREG    180 tablet    Take 1 tablet (6.25 mg) by mouth 2 times daily (with meals)    Hypertension, goal below 140/90       furosemide 20 MG tablet    LASIX    30 tablet    TAKE 1 TABLET BY MOUTH DAILY AS NEEDED FOR SWELLING IN LEGS // IB HNUB NOJ 1 LUB PAB LASHA PHOB VOG    Dependent edema       glipiZIDE 2.5 MG 24 hr tablet    glipiZIDE XL    60 tablet    Take 1 tablet (2.5 mg) by mouth 2 times daily    Type 2 diabetes mellitus with stage 3 chronic kidney disease, without long-term current use of insulin (H)       hydrALAZINE 25 MG tablet    APRESOLINE    180 tablet    Take 2 tablets (50 mg) by mouth 2 times daily    Hypertension goal BP (blood pressure) < 140/90       * insulin pen needle 31G X 6 MM    ULTICARE MINI    100 each    Use 2 daily or as directed.    Type 2 diabetes, HbA1C goal < 8% (H)       * UNIFINE PENTIPS 31G X 5 MM   Generic drug:  insulin pen needle     100 each    USE AS DIRECTED TWO TIMES DAILY // IB HNUB SIV 2 ZAUG LI QHIA    Hyperglycemia due to type 2 diabetes mellitus (H)       * olopatadine 0.1 % ophthalmic solution    PATANOL    5 mL    Place 1 drop into both eyes 2 times daily as needed for allergies (itchy eye.)    Allergic conjunctivitis       * olopatadine  0.1 % ophthalmic solution    PATANOL    5 mL    Place 1 drop into both eyes 2 times daily as needed for allergies    Allergic conjunctivitis of both eyes       omeprazole 20 MG CR capsule    priLOSEC     TAKE 1 CAPSULE BY MOUTH TWICE DAILY FOR STOMACH//IB ZAUG NOJ 1 LUB, IB HNUB NOJ 2 ZAUG PAB LASHA MOB PLAB (NCAUJ PLAB)        ONE TOUCH ULTRA test strip   Generic drug:  blood glucose monitoring     200 strip    USE TO TEST BLOOD SUGAR 2-3 TIMES A DAY // IB HNUB SIV 2-3 ZAUG LI QHIA    Type 2 diabetes mellitus with stage 3 chronic kidney disease, unspecified long term insulin use status (H)       ONETOUCH DELICA LANCETS 33G Misc     100 each    USE AS DIRECTED TWO TIMES DAILY // IB HNUB SIV 2 ZAUG LI QHIA    Essential hypertension, Type 2 diabetes mellitus with diabetic chronic kidney disease, unspecified CKD stage, unspecified long term insulin use status (H)       * order for DME     1 each    Equipment being ordered: blood pressure meter with supplies and adult cuff    Hypertension goal BP (blood pressure) < 140/90       * order for DME     200 each    Lancets bid for type 2 diabetes on insulin.    Type 2 diabetes mellitus with proteinuria or albuminuria       * order for DME     1 Device    Equipment being ordered: Shoe lift for leg length difference.    Leg length difference, acquired, CVA (cerebral vascular accident) (H)       * order for DME     200 each    Test strips for pt's glucometer, brand as covered by insurance Test bid. One touch Delica lancets. Type 2 diabetes on insulin.  Directions: use as directed Quantity: 200    Type 2 diabetes, HbA1C goal < 8% (H)       rosuvastatin 20 MG tablet    CRESTOR    90 tablet    TAKE 1 TABLET BY MOUTH DAILY FOR CHOLESTEROL // IB HNUB NOJ 1 LUB LASHA NTSHAV MUAJ ROJ    Hyperlipidemia LDL goal <100       sodium bicarbonate 650 MG tablet     180 tablet    Take 1 tablet (650 mg) by mouth 2 times daily    Hypertension goal BP (blood pressure) < 140/90       * Notice:  This  list has 9 medication(s) that are the same as other medications prescribed for you. Read the directions carefully, and ask your doctor or other care provider to review them with you.

## 2017-10-31 ENCOUNTER — MEDICAL CORRESPONDENCE (OUTPATIENT)
Dept: HEALTH INFORMATION MANAGEMENT | Facility: CLINIC | Age: 77
End: 2017-10-31

## 2017-11-02 DIAGNOSIS — Z53.9 DIAGNOSIS NOT YET DEFINED: Primary | ICD-10-CM

## 2017-11-10 ENCOUNTER — TELEPHONE (OUTPATIENT)
Dept: FAMILY MEDICINE | Facility: CLINIC | Age: 77
End: 2017-11-10

## 2017-11-10 NOTE — TELEPHONE ENCOUNTER
..Reason for Call:  Other     Detailed comments: Lilliana called from [facility] said she need a new script for HYDRALAZINE sent to [facility] for the patient.    Phone Number Patient can be reached at: [phone]    Best Time: anytime    Can we leave a detailed message on this number? YES    Call taken on 11/10/2017 at 9:23 AM by Kian Bartlett

## 2017-11-10 NOTE — TELEPHONE ENCOUNTER
This writer attempted to contact Lilliana from [facility] on 11/10/17      Reason for call relay information about prescription and left detailed message that patient has refills available at the [facility] pharmacy.      If patient calls back:   Homecare contacted by clinic RN team. Inform patient that someone from the team will contact them, document that pt called and route to care team.      Natasha Lizama RN   [facility] Triage

## 2017-11-13 DIAGNOSIS — I10 HYPERTENSION GOAL BP (BLOOD PRESSURE) < 140/90: ICD-10-CM

## 2017-11-13 NOTE — TELEPHONE ENCOUNTER
Writer received a refill request from: [facility]  in [place].     Medication: hydrALAZINE (APRESOLINE) 25 MG tablet  Sig: Take 2 tablets (50 mg) by mouth 2 times daily  Date last written: 9/15/17  Dispensed amount: 180  Refills: 3  Date last dispensed: 10/13/17      Pt's last office visit:  10/3/17  Next scheduled office visit: 11/20/17     Per Home nurse pt. Supposed to be on 3 tabs BID

## 2017-11-15 RX ORDER — HYDRALAZINE HYDROCHLORIDE 50 MG/1
50 TABLET, FILM COATED ORAL 3 TIMES DAILY
Qty: 270 TABLET | Refills: 1 | Status: SHIPPED | OUTPATIENT
Start: 2017-11-15 | End: 2018-06-15

## 2017-11-15 NOTE — TELEPHONE ENCOUNTER
Reviewed recent chart notes that Hydralazine was increased from 50 BID to 50 TID per PCP team. Refill sent to pharmacy. Patient is scheduled for follow up next week.    Daysi Frias, RN, BSN  Nephrology Care Coordinator  [facility]

## 2017-12-04 ENCOUNTER — CARE COORDINATION (OUTPATIENT)
Dept: GERIATRIC MEDICINE | Facility: CLINIC | Age: 77
End: 2017-12-04

## 2017-12-04 NOTE — PROGRESS NOTES
Received after visit chart from care coordinator.  Completed following tasks: Updated services in access, Submitted referrals/auths for Adult Day Care and Entered MMIS  Chart was returned to CC.     Antonieta Erickson  Case Management Specialist  [facility]   [phone]

## 2017-12-04 NOTE — PROGRESS NOTES
[facility] Home Visit Assessment     Home visit for Health Risk Assessment with Lakhwinder Negron completed on October 20, 2017  Member resides in Private home stairs and lives with son, Ramin, BELEN, and grandchildren.  Present at assessment: member, this care coordinator and adult sonRamin.    Current Care Plan  Member currently receiving the following services: Adult Day Care, EW transportation, PCA, RN, Supplies (incontinence).  Medication Review  Medication reconciliation completed in Epic:Yes  Medication set-up & administration: RN sets up  every two weeks.  Self-administers medications.  Medication understanding/adherence (by member, family or CC): Member has questions about his or her medications:  No     Mental/Behavioral Health   Depression Screening: See PHQ assessment flowsheet.   Mental Health Diagnosis: No   Falls in last 12 months: No If yes, was an injury sustained? No    ADL/IADL Dependencies: Ambulation-cane, Bathing, Dressing, Grooming, Eating, Toileting, Cleaning, Cooking, Laundry, Shopping, Meal prep, Medication Management, Money Management and Transportation     [facility] Plan sponsored benefits: Shared information re: Silver Sneakers/gym memberships, ASA, Calcium +D.    PCA Assessment completed at visit: Yes   If yes, will process assessment and communicate results to member within 10 days.  Elderly Waiver Eligibility: Yes-will continue on EW    Care Plan & Recommendations: Continue current services.     See CC for detailed assessment information.    Follow-Up Plan: Member informed of future contact, plan to f/u with member with a 6 month telephone assessment.  Contact information shared with member and family, encouraged member to call with any questions or concerns at any time.  [place] care continuum providers: Please refer to Health Care Home on the [facility] Problem List to view this patient's [facility] Care Plan Summary.    Jacqui Robles RN, PHN  [facility] Case  Manager  Phone: [phone]  Fax [phone]  [email or web address]

## 2018-01-05 DIAGNOSIS — R60.9 DEPENDENT EDEMA: ICD-10-CM

## 2018-01-05 DIAGNOSIS — N18.3 TYPE 2 DIABETES MELLITUS WITH STAGE 3 CHRONIC KIDNEY DISEASE, UNSPECIFIED LONG TERM INSULIN USE STATUS: ICD-10-CM

## 2018-01-05 DIAGNOSIS — N18.30 CKD (CHRONIC KIDNEY DISEASE) STAGE 3, GFR 30-59 ML/MIN (H): Primary | ICD-10-CM

## 2018-01-05 DIAGNOSIS — M81.0 SENILE OSTEOPOROSIS: ICD-10-CM

## 2018-01-05 DIAGNOSIS — E11.22 TYPE 2 DIABETES MELLITUS WITH STAGE 3 CHRONIC KIDNEY DISEASE, UNSPECIFIED LONG TERM INSULIN USE STATUS: ICD-10-CM

## 2018-01-05 NOTE — TELEPHONE ENCOUNTER
Requested Prescriptions   Pending Prescriptions Disp Refills     furosemide (LASIX) 20 MG tablet [Pharmacy Med Name: FUROSEMIDE 20MG TABLET 20 TAB]  Last Written Prescription Date:  09/19/17  Last Fill Quantity: 30,  # refills: 3   Last Office Visit with [facility], P or [facility] prescribing provider:  10/30/17   Future Office Visit:    Next 5 appointments (look out 90 days)     Jan 09, 2018  4:30 PM CST   Return Visit with Ramon Rodriguez MD, [employer]   [email or web address] ([email or web address]    [address] [place] [address]   [phone]            Feb 01, 2018  8:45 AM CST   Office Visit with Jonna Cason MD, [employer]   [facility] ([facility])    [address] [facility] [address]   [phone]                30 tablet 3     Sig: TAKE 1 TABLET BY MOUTH DAILY AS NEEDED FOR SWELLING IN LEGS // IB HNUB NOJ 1 LUB PAB LASHA PHOB VOG    Diuretics (Including Combos) Protocol Failed    1/5/2018 10:53 AM       Failed - Blood pressure under 140/90    BP Readings from Last 3 Encounters:   10/30/17 140/57   10/26/17 158/64   10/03/17 154/73                Failed - Normal serum creatinine on file in past 12 months    Recent Labs   Lab Test  10/26/17   0947   CR  2.53*             Passed - Recent or future visit with authorizing provider's specialty    Patient had office visit in the last year or has a visit in the next 30 days with authorizing provider.  See chart review.              Passed - Patient is age 18 or older       Passed - No active pregancy on record       Passed - Normal serum potassium on file in past 12 months    Recent Labs   Lab Test  10/26/17   0947   POTASSIUM  4.1                   Passed - Normal serum sodium on file in past 12 months    Recent Labs   Lab Test  10/26/17   0947   NA  141             Passed - No positive pregnancy test in past 12 months         ONETOUCH ULTRA test strip [Pharmacy Med Name: ONE TOUCH ULTRA STRIPS STRP]  Last Written Prescription Date:  07/10/17  Last Fill Quantity: 200,  # refills: 1   Last Office Visit with [facility], [facility] or [facility] prescribing provider:  10/30/17   Future Office Visit:    Next 5 appointments (look out 90 days)     Jan 09, 2018  4:30 PM CST   Return Visit with Ramon Rodriguez MD, [employer]   [facility] ([facility]    [address] [place] [id number]   [phone]            Feb 01, 2018  8:45 AM CST   Office Visit with Jonan Cason MD, [employer]   [facility]    [address] [place] [address]   [phone]                200 strip 1     Sig: USE TO TEST BLOOD SUGAR 2-3 TIMES A DAY // IB HNUB SIV 2-3 ZAUG LI QHIA    Diabetic Supplies Protocol Passed    1/5/2018 10:53 AM       Passed - Patient is 18 years of age or older       Passed - Patient has had appt within past 6 mos    IV to PO - Antibiotics     None                    calcium-vitamin D (CALTRATE) 600-400 MG-UNIT per tablet [Pharmacy Med Name: CALCIUM 600 + VIT D 400 -400 TAB]  Last Written Prescription Date:  08/06/17  Last Fill Quantity: 60,  # refills: 2   Last Office Visit with ANDRIY, YAS or  Health prescribing provider:  10/30/17   Future Office Visit:    Next 5 appointments (look out 90 days)     Jan 09, 2018  4:30 PM CST   Return Visit with Ramon Rodriguez MD, [employer]   [facility] ([facility])    [address] [place] [address]   [phone]            Feb 01, 2018  8:45 AM CST   Office Visit with Jonna Cason MD, [employer]   [facility]    [address] [facility] [address]   [phone]                60  tablet 2     Sig: TAKE 1 TABLET BY MOUTH TWICE DAILY FOR BONE HEALTH // IB GERALD NOSHAUNA 1 LUB, IB HNUB NOJ OB GERALD PAB LASHA POB TXHA    Vitamin Supplements (Adult) Protocol Passed    1/5/2018 10:53 AM       Passed - High dose Vitamin D not ordered       Passed - Recent or future visit with authorizing provider's specialty    Patient had office visit in the last year or has a visit in the next 30 days with authorizing provider.  See chart review.

## 2018-01-09 ENCOUNTER — OFFICE VISIT (OUTPATIENT)
Dept: NEPHROLOGY | Facility: CLINIC | Age: 78
End: 2018-01-09
Payer: COMMERCIAL

## 2018-01-09 VITALS
OXYGEN SATURATION: 98 % | BODY MASS INDEX: 25.89 KG/M2 | DIASTOLIC BLOOD PRESSURE: 62 MMHG | SYSTOLIC BLOOD PRESSURE: 145 MMHG | WEIGHT: 120 LBS | HEART RATE: 65 BPM | HEIGHT: 57 IN

## 2018-01-09 DIAGNOSIS — I10 HYPERTENSION, GOAL BELOW 140/90: ICD-10-CM

## 2018-01-09 DIAGNOSIS — N18.30 CKD (CHRONIC KIDNEY DISEASE) STAGE 3, GFR 30-59 ML/MIN (H): Primary | ICD-10-CM

## 2018-01-09 DIAGNOSIS — D64.9 ANEMIA, UNSPECIFIED TYPE: ICD-10-CM

## 2018-01-09 DIAGNOSIS — N25.81 SECONDARY HYPERPARATHYROIDISM (H): ICD-10-CM

## 2018-01-09 DIAGNOSIS — N18.30 CKD (CHRONIC KIDNEY DISEASE) STAGE 3, GFR 30-59 ML/MIN (H): ICD-10-CM

## 2018-01-09 LAB
ALBUMIN SERPL-MCNC: 3.7 G/DL (ref 3.4–5)
ANION GAP SERPL CALCULATED.3IONS-SCNC: 8 MMOL/L (ref 3–14)
BUN SERPL-MCNC: 33 MG/DL (ref 7–30)
CALCIUM SERPL-MCNC: 8.6 MG/DL (ref 8.5–10.1)
CHLORIDE SERPL-SCNC: 110 MMOL/L (ref 94–109)
CO2 SERPL-SCNC: 23 MMOL/L (ref 20–32)
CREAT SERPL-MCNC: 2.68 MG/DL (ref 0.52–1.04)
GFR SERPL CREATININE-BSD FRML MDRD: 17 ML/MIN/1.7M2
GLUCOSE SERPL-MCNC: 152 MG/DL (ref 70–99)
HGB BLD-MCNC: 10.1 G/DL (ref 11.7–15.7)
PHOSPHATE SERPL-MCNC: 3.5 MG/DL (ref 2.5–4.5)
POTASSIUM SERPL-SCNC: 4.1 MMOL/L (ref 3.4–5.3)
PTH-INTACT SERPL-MCNC: 251 PG/ML (ref 12–72)
SODIUM SERPL-SCNC: 141 MMOL/L (ref 133–144)

## 2018-01-09 PROCEDURE — 36415 COLL VENOUS BLD VENIPUNCTURE: CPT | Performed by: INTERNAL MEDICINE

## 2018-01-09 PROCEDURE — 83970 ASSAY OF PARATHORMONE: CPT | Performed by: INTERNAL MEDICINE

## 2018-01-09 PROCEDURE — 99214 OFFICE O/P EST MOD 30 MIN: CPT | Performed by: INTERNAL MEDICINE

## 2018-01-09 PROCEDURE — 85018 HEMOGLOBIN: CPT | Performed by: INTERNAL MEDICINE

## 2018-01-09 PROCEDURE — 80069 RENAL FUNCTION PANEL: CPT | Performed by: INTERNAL MEDICINE

## 2018-01-09 RX ORDER — FUROSEMIDE 20 MG
TABLET ORAL
Qty: 30 TABLET | Refills: 3 | Status: SHIPPED | OUTPATIENT
Start: 2018-01-09 | End: 2018-03-30

## 2018-01-09 ASSESSMENT — PAIN SCALES - GENERAL: PAINLEVEL: NO PAIN (0)

## 2018-01-09 NOTE — NURSING NOTE
"Lakhwinder Negron's goals for this visit include:   She requests these members of her care team be copied on today's visit information:     PCP: Jonna Cason    Referring Provider:  No referring provider defined for this encounter.    Chief Complaint   Patient presents with     RECHECK     CKD       Initial /62 (BP Location: Left arm, Patient Position: Sitting, Cuff Size: Adult Regular)  Pulse 65  Ht 1.441 m (4' 8.75\")  Wt 54.4 kg (120 lb)  SpO2 98%  BMI 26.2 kg/m2 Estimated body mass index is 26.2 kg/(m^2) as calculated from the following:    Height as of this encounter: 1.441 m (4' 8.75\").    Weight as of this encounter: 54.4 kg (120 lb).  Medication Reconciliation: complete    Do you need any medication refills at today's visit? No    Cora Hayes LPN\    "
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
## 2018-01-09 NOTE — PATIENT INSTRUCTIONS
1. Increase carvedilol to 12.5 mg twice a day  2. Please make sure you are not taking any over the counter pain medications such as aleve, ibuprofen, motrin, napoxen, excedrin. Tylenol is the one that is ok to take.   3. Please assure that you are getting good hydration.   4. Decrease sodium bicarbonate to 650 just once a day  5. Move the norvasc to being taken at night.   6. To the home health nurse: please update us on blood pressure readings in the next 1-2 weeks so we can continue to make adjustments as needed. Try to limit the lasix to no more than once a day at this time as needed.   7. Recommend follow-up in 8 weeks to assure good plan going forward.

## 2018-01-09 NOTE — MR AVS SNAPSHOT
After Visit Summary   1/9/2018    Lakhwinder Negron    MRN: [id number]           Patient Information     Date Of Birth          1940        Visit Information        Provider Department      1/9/2018 4:30 PM Ramon Rodriguez MD; [employer] [facility]        Care Instructions    1. Increase carvedilol to 12.5 mg twice a day  2. Please make sure you are not taking any over the counter pain medications such as aleve, ibuprofen, motrin, napoxen, excedrin. Tylenol is the one that is ok to take.   3. Please assure that you are getting good hydration.   4. Decrease sodium bicarbonate to 650 just once a day  5. Move the norvasc to being taken at night.   6. To the home health nurse: please update us on blood pressure readings in the next 1-2 weeks so we can continue to make adjustments as needed. Try to limit the lasix to no more than once a day at this time as needed.   7. Recommend follow-up in 8 weeks to assure good plan going forward.           Follow-ups after your visit        Your next 10 appointments already scheduled     Feb 01, 2018  8:45 AM CST   [facility] MD   [facility]    [address] [address] [phone]           Bring a current list of meds and any records pertaining to this visit. For Physicals, please bring immunization records and any forms needing to be filled out. Please arrive 10 minutes early to complete paperwork.            Mar 13, 2018  4:00 PM CDT   LAB with LAB FIRST FLOOR [facility] ([facility])    [address] [place] [address] [phone]           Please do not eat 10-12 hours before your appointment if you are coming in fasting for labs on lipids, cholesterol, or glucose (sugar). This does not apply to pregnant women. Water, hot tea and black coffee (with nothing  added) are okay. Do not drink other fluids, diet soda or chew gum.            Mar 13, 2018  4:30 PM CDT   Return Visit with Ramon Rodriguez MD   [facility]    [address] [address] [place] [address] [phone]              Who to contact     If you have questions or need follow up information about today's clinic visit or your schedule please contact [facility] directly at [phone].  Normal or non-critical lab and imaging results will be communicated to you by [employer], letter or phone within 4 business days after the clinic has received the results. If you do not hear from us within 7 days, please contact the clinic through [employer] or phone. If you have a critical or abnormal lab result, we will notify you by phone as soon as possible.  Submit refill requests through [employer] or call your pharmacy and they will forward the refill request to us. Please allow 3 business days for your refill to be completed.          Additional Information About Your Visit        [employer] Information     [employer] is an electronic gateway that provides easy, online access to your medical records. With [employer], you can request a clinic appointment, read your test results, renew a prescription or communicate with your care team.     To sign up for [employer] visit the website at [email or web address]   You will be asked to enter the access code listed below, as well as some personal information. Please follow the directions to create your username and password.     Your access code is: [email or web address]  Expires: 2018  5:27 PM     Your access code will  in 90 days. If you need help or a new code, please contact your [facility] or call [phone] for assistance.        Care EveryWhere ID     This is your Care EveryWhere ID. This could be used by other organizations to access your [facility]  "records  [id number]        Your Vitals Were     Pulse Height Pulse Oximetry BMI (Body Mass Index)          65 1.441 m (4' 8.75\") 98% 26.2 kg/m2         Blood Pressure from Last 3 Encounters:   01/09/18 145/62   10/30/17 140/57   10/26/17 158/64    Weight from Last 3 Encounters:   01/09/18 54.4 kg (120 lb)   10/30/17 55.8 kg (123 lb)   10/26/17 54.9 kg (121 lb)              Today, you had the following     No orders found for display       Primary Care Provider Office Phone # Fax #    Jonna Kaleigh Cason [phone]       [address]  [facility] [address]        Equal Access to Services     KATIUSKA ORELLANA : Hadii timbo read hadasho Soomaali, waaxda luqadaha, qaybta kaalmada adeegyada, greta batista . So [facility] [phone].    ATENCIÓN: Si habla español, tiene a mckeon disposición servicios gratuitos de asistencia lingüística. Llame al [phone].    We comply with applicable federal civil rights laws and [place] laws. We do not discriminate on the basis of race, color, national origin, age, disability, sex, sexual orientation, or gender identity.            Thank you!     Thank you for choosing [facility]  for your care. Our goal is always to provide you with excellent care. Hearing back from our patients is one way we can continue to improve our services. Please take a few minutes to complete the written survey that you may receive in the mail after your visit with us. Thank you!             Your Updated Medication List - Protect others around you: Learn how to safely use, store and throw away your medicines at www.disposemymeds.org.          This list is accurate as of: 1/9/18  5:27 PM.  Always use your most recent med list.                   Brand Name Dispense Instructions for use Diagnosis    ACE/ARB/ARNI NOT PRESCRIBED (INTENTIONAL)      Please choose reason not prescribed, below    Type 2 diabetes mellitus with stage 3 chronic kidney disease, " without long-term current use of insulin (H)       acetaminophen-codeine 300-30 MG per tablet    TYLENOL #3    60 tablet    TAKE 1-2 TABLETS ONCE DAILY AS NEEDED FOR PAIN// IB HNUB NOJ 1-2 LUB YOG MOB    Chronic pain syndrome       amLODIPine 10 MG tablet    NORVASC    90 tablet    Take 1 tablet (10 mg) by mouth daily    Type 2 diabetes mellitus with other diabetic kidney complication, Proteinuria       ASPIRIN NOT PRESCRIBED    INTENTIONAL    1 each    Please choose reason not prescribed, below    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    Right hemiparesis (H)       carvedilol 6.25 MG tablet    COREG    180 tablet    Take 1 tablet (6.25 mg) by mouth 2 times daily (with meals)    Hypertension, goal below 140/90       furosemide 20 MG tablet    LASIX    30 tablet    TAKE 1 TABLET BY MOUTH DAILY AS NEEDED FOR SWELLING IN LEGS // IB HNUB NOJ 1 LUB PAB LASHA PHOB VOG    Dependent edema       glipiZIDE 2.5 MG 24 hr tablet    glipiZIDE XL    60 tablet    Take 1 tablet (2.5 mg) by mouth 2 times daily    Type 2 diabetes mellitus with stage 3 chronic kidney disease, without long-term current use of insulin (H)       hydrALAZINE 50 MG tablet    APRESOLINE    270 tablet    Take 1 tablet (50 mg) by mouth 3 times daily    Hypertension goal BP (blood pressure) < 140/90       * insulin pen needle 31G X 6 MM    ULTICARE MINI    100 each    Use 2 daily or as directed.    Type 2 diabetes, HbA1C goal < 8% (H)       * UNIFINE PENTIPS 31G X 5 MM   Generic drug:  insulin pen needle     100 each    USE AS DIRECTED TWO TIMES DAILY // IB HNUB SIV 2 ZAUG LI QHIA    Hyperglycemia due to type 2 diabetes mellitus (H)       * olopatadine 0.1 %  ophthalmic solution    PATANOL    5 mL    Place 1 drop into both eyes 2 times daily as needed for allergies (itchy eye.)    Allergic conjunctivitis       * olopatadine 0.1 % ophthalmic solution    PATANOL    5 mL    Place 1 drop into both eyes 2 times daily as needed for allergies    Allergic conjunctivitis of both eyes       omeprazole 20 MG CR capsule    priLOSEC     TAKE 1 CAPSULE BY MOUTH TWICE DAILY FOR STOMACH//IB ZAUG NOJ 1 LUB, IB HNUB NOJ 2 ZAUG PAB LASHA MOB PLAB (NCAUJ PLAB)        ONETOUCH DELICA LANCETS 33G Misc     100 each    USE AS DIRECTED TWO TIMES DAILY // IB HNUB SIV 2 ZAUG LI QHIA    Essential hypertension, Type 2 diabetes mellitus with diabetic chronic kidney disease, unspecified CKD stage, unspecified long term insulin use status (H)       ONETOUCH ULTRA test strip   Generic drug:  blood glucose monitoring     200 strip    USE TO TEST BLOOD SUGAR 2-3 TIMES A DAY // IB HNUB SIV 2-3 ZAUG LI QHIA    Type 2 diabetes mellitus with stage 3 chronic kidney disease, unspecified long term insulin use status (H)       * order for DME     1 each    Equipment being ordered: blood pressure meter with supplies and adult cuff    Hypertension goal BP (blood pressure) < 140/90       * order for DME     200 each    Lancets bid for type 2 diabetes on insulin.    Type 2 diabetes mellitus with proteinuria or albuminuria       * order for DME     1 Device    Equipment being ordered: Shoe lift for leg length difference.    Leg length difference, acquired, CVA (cerebral vascular accident) (H)       * order for DME     200 each    Test strips for pt's glucometer, brand as covered by insurance Test bid. One touch Delica lancets. Type 2 diabetes on insulin.  Directions: use as directed Quantity: 200    Type 2 diabetes, HbA1C goal < 8% (H)       rosuvastatin 20 MG tablet    CRESTOR    90 tablet    TAKE 1 TABLET BY MOUTH DAILY FOR CHOLESTEROL // IB HNUB NOJ 1 LUB LASHA NTSHAV MUAJ ROJ    Hyperlipidemia LDL goal <100        sodium bicarbonate 650 MG tablet     180 tablet    Take 1 tablet (650 mg) by mouth 2 times daily    Hypertension goal BP (blood pressure) < 140/90       * Notice:  This list has 9 medication(s) that are the same as other medications prescribed for you. Read the directions carefully, and ask your doctor or other care provider to review them with you.

## 2018-01-09 NOTE — TELEPHONE ENCOUNTER
Lasix  Routing refill request to provider for review/approval because:  Labs out of range:  Creatinine  Blood pressure elevated  BP Readings from Last 3 Encounters:   10/30/17 140/57   10/26/17 158/64   10/03/17 154/73     Test strips  Prescription approved per [facility] Refill Protocol.    Caltrate  Routing refill request to provider for review/approval because:  A break in medication    Hebert Eastman RN, BSN

## 2018-01-11 ENCOUNTER — TELEPHONE (OUTPATIENT)
Dept: NEPHROLOGY | Facility: CLINIC | Age: 78
End: 2018-01-11

## 2018-01-11 DIAGNOSIS — I10 HYPERTENSION, GOAL BELOW 140/90: ICD-10-CM

## 2018-01-11 DIAGNOSIS — R60.9 DEPENDENT EDEMA: ICD-10-CM

## 2018-01-11 RX ORDER — CARVEDILOL 12.5 MG/1
12.5 TABLET ORAL 2 TIMES DAILY WITH MEALS
Qty: 180 TABLET | Refills: 3 | Status: SHIPPED | OUTPATIENT
Start: 2018-01-11 | End: 2018-02-21

## 2018-01-11 NOTE — TELEPHONE ENCOUNTER
Received telephone call from patient's home care nurse, Lilliana for medication reconciliation. She is at the patient's home and setting up her medications.    Current Outpatient Prescriptions   Medication     carvedilol (COREG) 12.5 MG tablet     furosemide (LASIX) 20 MG tablet     ONETOUCH ULTRA test strip     calcium-vitamin D (CALTRATE) 600-400 MG-UNIT per tablet     hydrALAZINE (APRESOLINE) 50 MG tablet     glipiZIDE (GLIPIZIDE XL) 2.5 MG 24 hr tablet     [DISCONTINUED] carvedilol (COREG) 6.25 MG tablet     ONETOUCH DELICA LANCETS 33G MISC     rosuvastatin (CRESTOR) 20 MG tablet     amLODIPine (NORVASC) 10 MG tablet     omeprazole (PRILOSEC) 20 MG CR capsule     ASPIRIN NOT PRESCRIBED (INTENTIONAL)     ACE/ARB NOT PRESCRIBED, INTENTIONAL,     UNIFINE PENTIPS 31G X 5 MM     olopatadine (PATANOL) 0.1 % ophthalmic solution     acetaminophen-codeine (TYLENOL #3) 300-30 MG per tablet     order for DME     order for DME     insulin pen needle (ULTICARE MINI) 31G X 6 MM     ORDER FOR DME     olopatadine (PATANOL) 0.1 % ophthalmic solution     ORDER FOR DME     CANE, ANY MATERIAL     No current facility-administered medications for this visit.      Informed Lilliana of changes advised by Dr. Rodirguez. . Lakhwinder has been taking Lasix 20 mg EOD.Lilliana reported that Lakhwinder has not been on Sodium Bicarbonate Recent C02= 23. Advised that she should continue to hold medication. This writer will review with Dr. Rodriguez and call back with any new recommendations.    Daysi Frias, RN, BSN  Nephrology Care Coordinator  [facility]

## 2018-01-12 NOTE — TELEPHONE ENCOUNTER
Dr. Rodriguez aware. Home care RN did change Carvedilol dosing as advised at visit.   Sent message to neph pool to follow up bp readings in 2 weeks.    Daysi Frias, RN, BSN  Nephrology Care Coordinator  [facility]

## 2018-01-26 ENCOUNTER — TELEPHONE (OUTPATIENT)
Dept: NEPHROLOGY | Facility: CLINIC | Age: 78
End: 2018-01-26

## 2018-01-26 NOTE — TELEPHONE ENCOUNTER
Left message for patient's home care RNLilliana ([phone]) to return call for updated blood pressure and heart rate readings since medication adjustment approximately 2 weeks ago.    Daysi Frias, RN, BSN  Nephrology Care Coordinator  [facility]

## 2018-02-02 DIAGNOSIS — E11.22 TYPE 2 DIABETES MELLITUS WITH STAGE 3 CHRONIC KIDNEY DISEASE, WITHOUT LONG-TERM CURRENT USE OF INSULIN (H): ICD-10-CM

## 2018-02-02 DIAGNOSIS — N18.30 TYPE 2 DIABETES MELLITUS WITH STAGE 3 CHRONIC KIDNEY DISEASE, WITHOUT LONG-TERM CURRENT USE OF INSULIN (H): ICD-10-CM

## 2018-02-03 NOTE — TELEPHONE ENCOUNTER
Requested Prescriptions   Pending Prescriptions Disp Refills     glipiZIDE (GLUCOTROL XL) 2.5 MG 24 hr tablet [Pharmacy Med Name: GLIPIZIDE ER 2.5 MG TABLET 2.5 TAB] 60 tablet 3    Last Written Prescription Date:  10/27/17  Last Fill Quantity: 60,  # refills: 3   Last Office Visit with FMANDRIY, YAS or [facility] prescribing provider:  10/30/2017     Future Office Visit:    Next 5 appointments (look out 90 days)     Mar 13, 2018  4:30 PM CDT   Return Visit with Ramon Rodriguez MD   [facility]    [address] [place] [id number]   [phone]            Mar 15, 2018  9:00 AM CDT   Office Visit with Jonna Cason MD   [facility] ([facility])    [address] [facility] [address]   [phone]                  Sig: TAKE 1 TABLET 2 TIMES DAILY FOR DIABETES.// IB ZATIFFANIE NOJ 1 LUB, IB HNUB NOJ 2 ZAUG PAB LASHA NTSHAV QAB ZIB.    Sulfonylurea Agents Failed    2/2/2018  4:57 PM       Failed - Patient's BP is less than 140/90    BP Readings from Last 3 Encounters:   01/09/18 145/62   10/30/17 140/57   10/26/17 158/64                Failed - Patient has documented LDL within the past 12 mos.    Recent Labs   Lab Test  12/02/16   0849   LDL  85            Failed - Patient has a recent creatinine (normal) within the past 12 mos.    Recent Labs   Lab Test  01/09/18   1556   CR  2.68*            Passed - Patient has had a Microalbumin in the past 12 mos.    Recent Labs   Lab Test  06/07/17   1011   MICROL  7350   UMALCR  6282.05*            Passed - Patient has documented A1c within the specified period of time.    Recent Labs   Lab Test  10/03/17   0954   A1C  5.3            Passed - Patient is age 18 or older       Passed - No active pregnancy on record       Passed - Patient has not had a positive pregnancy test within the past 12 mos.       Passed - Patient has had an appointment with authorizing  "provider within the past 6 mos. or  within next 30 days    Patient had office visit in the last 6 months or has a visit in the next 30 days with authorizing provider.  See \"Patient Info\" tab in inbasket, or \"Choose Columns\" in Meds & Orders section of the refill encounter.              "

## 2018-02-06 RX ORDER — GLIPIZIDE 2.5 MG/1
TABLET, EXTENDED RELEASE ORAL
Qty: 60 TABLET | Refills: 3 | Status: SHIPPED | OUTPATIENT
Start: 2018-02-06 | End: 2018-05-07

## 2018-02-06 NOTE — TELEPHONE ENCOUNTER
"Requested Prescriptions   Pending Prescriptions Disp Refills     glipiZIDE (GLUCOTROL XL) 2.5 MG 24 hr tablet [Pharmacy Med Name: GLIPIZIDE ER 2.5 MG TABLET 2.5 TAB] 60 tablet 3     Sig: TAKE 1 TABLET 2 TIMES DAILY FOR DIABETES.// IB ZAUG NOJ 1 LUB, IB HNUB NOJ 2 ZAUG EKATERINA COLBY NTSHAV QAB ZIB.    Sulfonylurea Agents Failed    2/2/2018  7:58 PM       Failed - Patient's BP is less than 140/90    BP Readings from Last 3 Encounters:   01/09/18 145/62   10/30/17 140/57   10/26/17 158/64                Failed - Patient has documented LDL within the past 12 mos.    Recent Labs   Lab Test  12/02/16   0849   LDL  85            Failed - Patient has a recent creatinine (normal) within the past 12 mos.    Recent Labs   Lab Test  01/09/18   1556   CR  2.68*            Passed - Patient has had a Microalbumin in the past 12 mos.    Recent Labs   Lab Test  06/07/17   1011   MICROL  7350   UMALCR  6282.05*            Passed - Patient has documented A1c within the specified period of time.    Recent Labs   Lab Test  10/03/17   0954   A1C  5.3            Passed - Patient is age 18 or older       Passed - No active pregnancy on record       Passed - Patient has not had a positive pregnancy test within the past 12 mos.       Passed - Patient has had an appointment with authorizing provider within the past 6 mos. or  within next 30 days    Patient had office visit in the last 6 months or has a visit in the next 30 days with authorizing provider.  See \"Patient Info\" tab in inbasket, or \"Choose Columns\" in Meds & Orders section of the refill encounter.            Routing refill request to provider for review/approval because:  Labs out of range:  Creatinine, BP  Labs not current:  LDL    Theresa Gregorio RN, BSN          "

## 2018-02-08 NOTE — TELEPHONE ENCOUNTER
Left another message for Lilliaan for blood pressure readings to be faxed to [phone].    Daysi Frias, RN, BSN  Nephrology Care Coordinator  [facility]

## 2018-02-14 ENCOUNTER — TELEPHONE (OUTPATIENT)
Dept: NEPHROLOGY | Facility: CLINIC | Age: 78
End: 2018-02-14

## 2018-02-14 DIAGNOSIS — I10 HYPERTENSION, GOAL BELOW 140/90: ICD-10-CM

## 2018-02-14 NOTE — TELEPHONE ENCOUNTER
Home Care RN Lilliana returned call with blood pressure readings. Lilliana meets with patient twice monthly. BP are as follows:    Today 148/70 HR 66  2/9/18: 148/70 HR 83  1/26/18:138/64 HR 74  1/11: 140/68     Lilliana plans to see her again,next Friday, 2/23. Reconciled meds with Lilliana. Lakhwinder is taking Lasix EOD and has +1 edema which is reported to be stable for her. Will plan to review with Dr. Rodriguez and contact Lilliana back with recommendations.    Current Outpatient Prescriptions   Medication     glipiZIDE (GLUCOTROL XL) 2.5 MG 24 hr tablet     carvedilol (COREG) 12.5 MG tablet     furosemide (LASIX) 20 MG tablet     ONETOUCH ULTRA test strip     calcium-vitamin D (CALTRATE) 600-400 MG-UNIT per tablet     hydrALAZINE (APRESOLINE) 50 MG tablet     ONETOUCH DELICA LANCETS 33G MISC     rosuvastatin (CRESTOR) 20 MG tablet     amLODIPine (NORVASC) 10 MG tablet     omeprazole (PRILOSEC) 20 MG CR capsule     ASPIRIN NOT PRESCRIBED (INTENTIONAL)     ACE/ARB NOT PRESCRIBED, INTENTIONAL,     UNIFINE PENTIPS 31G X 5 MM     olopatadine (PATANOL) 0.1 % ophthalmic solution     acetaminophen-codeine (TYLENOL #3) 300-30 MG per tablet     order for DME     order for DME     insulin pen needle (ULTICARE MINI) 31G X 6 MM     ORDER FOR DME     olopatadine (PATANOL) 0.1 % ophthalmic solution     ORDER FOR DME     CANE, ANY MATERIAL     No current facility-administered medications for this visit.          Daysi Frias, RN, BSN  Nephrology Care Coordinator  [facility]

## 2018-02-20 DIAGNOSIS — Z53.9 DIAGNOSIS NOT YET DEFINED: Primary | ICD-10-CM

## 2018-02-21 RX ORDER — CARVEDILOL 12.5 MG/1
18.75 TABLET ORAL 2 TIMES DAILY WITH MEALS
Qty: 225 TABLET | Refills: 3 | Status: SHIPPED | OUTPATIENT
Start: 2018-02-21 | End: 2019-04-19

## 2018-02-21 NOTE — TELEPHONE ENCOUNTER
Per Dr. Rodriguez.    It doesn't look like her heart rate goes below 60 - recommend increase coreg to 18.75 mg BID (going up a half pill given her age and want to avoid bradycardia).     Again - have her update in a few weeks including heart rates. Thank you, Ramon     Left detailed message for Lilliana with this information. Script to be sent to patient's pharmacy.    Daysi Frias RN, BSN  Nephrology Care Coordinator  [facility]

## 2018-02-23 DIAGNOSIS — K21.9 GASTROESOPHAGEAL REFLUX DISEASE WITHOUT ESOPHAGITIS: Primary | ICD-10-CM

## 2018-02-23 NOTE — TELEPHONE ENCOUNTER
"Requested Prescriptions   Pending Prescriptions Disp Refills     omeprazole (PRILOSEC) 20 MG CR capsule  Last Written Prescription Date:  na  Last Fill Quantity: na,  # refills: na   Last Office Visit with FMG, P or [facility] prescribing provider:  10/30/17   Future Office Visit:    Next 5 appointments (look out 90 days)     Mar 13, 2018  4:30 PM CDT   Return Visit with Ramon Rodriguez MD   [facility]    [address] [place] [id number] [phone]            Mar 15, 2018  9:00 AM CDT   [facility] Visit with [facility]    [address] [facility] [id number] [phone]                30 capsule 2    PPI Protocol Failed    2/23/2018  2:10 PM       Failed - No diagnosis of osteoporosis on record       Failed - No positive pregnancy test in past 12 months       Passed - Not on Clopidogrel (unless Pantoprazole ordered)       Passed - Recent or future visit with authorizing provider's specialty    Patient had office visit in the last year or has a visit in the next 30 days with authorizing provider.  See \"Patient Info\" tab in inbasket, or \"Choose Columns\" in Meds & Orders section of the refill encounter.            Passed - Patient is age 18 or older       Passed - No active pregnacy on record          "

## 2018-02-27 PROBLEM — K21.9 GASTROESOPHAGEAL REFLUX DISEASE WITHOUT ESOPHAGITIS: Status: ACTIVE | Noted: 2018-02-27

## 2018-02-27 NOTE — TELEPHONE ENCOUNTER
RN unable to fill refill request per protocol.    Mena Franklin RN, [facility]

## 2018-03-08 ENCOUNTER — OFFICE VISIT (OUTPATIENT)
Dept: URGENT CARE | Facility: URGENT CARE | Age: 78
End: 2018-03-08
Payer: COMMERCIAL

## 2018-03-08 ENCOUNTER — RADIANT APPOINTMENT (OUTPATIENT)
Dept: GENERAL RADIOLOGY | Facility: CLINIC | Age: 78
End: 2018-03-08
Attending: PHYSICIAN ASSISTANT
Payer: COMMERCIAL

## 2018-03-08 VITALS
SYSTOLIC BLOOD PRESSURE: 140 MMHG | TEMPERATURE: 97.1 F | HEART RATE: 71 BPM | DIASTOLIC BLOOD PRESSURE: 59 MMHG | BODY MASS INDEX: 25.54 KG/M2 | OXYGEN SATURATION: 94 % | WEIGHT: 117 LBS

## 2018-03-08 DIAGNOSIS — I50.9 ACUTE ON CHRONIC CONGESTIVE HEART FAILURE, UNSPECIFIED CONGESTIVE HEART FAILURE TYPE: Primary | ICD-10-CM

## 2018-03-08 DIAGNOSIS — R06.02 SOB (SHORTNESS OF BREATH): ICD-10-CM

## 2018-03-08 LAB
ALBUMIN SERPL-MCNC: 3.6 G/DL (ref 3.4–5)
ALP SERPL-CCNC: 93 U/L (ref 40–150)
ALT SERPL W P-5'-P-CCNC: 45 U/L (ref 0–50)
ANION GAP SERPL CALCULATED.3IONS-SCNC: 8 MMOL/L (ref 3–14)
AST SERPL W P-5'-P-CCNC: 44 U/L (ref 0–45)
BASOPHILS # BLD AUTO: 0 10E9/L (ref 0–0.2)
BASOPHILS NFR BLD AUTO: 0.5 %
BILIRUB SERPL-MCNC: 0.2 MG/DL (ref 0.2–1.3)
BUN SERPL-MCNC: 38 MG/DL (ref 7–30)
CALCIUM SERPL-MCNC: 8.4 MG/DL (ref 8.5–10.1)
CHLORIDE SERPL-SCNC: 113 MMOL/L (ref 94–109)
CO2 SERPL-SCNC: 22 MMOL/L (ref 20–32)
CREAT SERPL-MCNC: 2.89 MG/DL (ref 0.52–1.04)
DIFFERENTIAL METHOD BLD: ABNORMAL
EOSINOPHIL # BLD AUTO: 0.1 10E9/L (ref 0–0.7)
EOSINOPHIL NFR BLD AUTO: 2.2 %
ERYTHROCYTE [DISTWIDTH] IN BLOOD BY AUTOMATED COUNT: 13.4 % (ref 10–15)
GFR SERPL CREATININE-BSD FRML MDRD: 16 ML/MIN/1.7M2
GLUCOSE SERPL-MCNC: 202 MG/DL (ref 70–99)
HCT VFR BLD AUTO: 30.8 % (ref 35–47)
HGB BLD-MCNC: 9.7 G/DL (ref 11.7–15.7)
LYMPHOCYTES # BLD AUTO: 1.6 10E9/L (ref 0.8–5.3)
LYMPHOCYTES NFR BLD AUTO: 26.1 %
MCH RBC QN AUTO: 28.9 PG (ref 26.5–33)
MCHC RBC AUTO-ENTMCNC: 31.5 G/DL (ref 31.5–36.5)
MCV RBC AUTO: 92 FL (ref 78–100)
MONOCYTES # BLD AUTO: 0.3 10E9/L (ref 0–1.3)
MONOCYTES NFR BLD AUTO: 4 %
NEUTROPHILS # BLD AUTO: 4.2 10E9/L (ref 1.6–8.3)
NEUTROPHILS NFR BLD AUTO: 67.2 %
NT-PROBNP SERPL-MCNC: 4217 PG/ML (ref 0–450)
PLATELET # BLD AUTO: 112 10E9/L (ref 150–450)
POTASSIUM SERPL-SCNC: 4.7 MMOL/L (ref 3.4–5.3)
PROT SERPL-MCNC: 6.5 G/DL (ref 6.8–8.8)
RBC # BLD AUTO: 3.36 10E12/L (ref 3.8–5.2)
SODIUM SERPL-SCNC: 143 MMOL/L (ref 133–144)
WBC # BLD AUTO: 6.3 10E9/L (ref 4–11)

## 2018-03-08 PROCEDURE — 80053 COMPREHEN METABOLIC PANEL: CPT | Performed by: PHYSICIAN ASSISTANT

## 2018-03-08 PROCEDURE — 93000 ELECTROCARDIOGRAM COMPLETE: CPT | Performed by: PHYSICIAN ASSISTANT

## 2018-03-08 PROCEDURE — 83880 ASSAY OF NATRIURETIC PEPTIDE: CPT | Performed by: PHYSICIAN ASSISTANT

## 2018-03-08 PROCEDURE — 71046 X-RAY EXAM CHEST 2 VIEWS: CPT | Mod: FY

## 2018-03-08 PROCEDURE — 99215 OFFICE O/P EST HI 40 MIN: CPT | Performed by: PHYSICIAN ASSISTANT

## 2018-03-08 PROCEDURE — 85025 COMPLETE CBC W/AUTO DIFF WBC: CPT | Performed by: PHYSICIAN ASSISTANT

## 2018-03-08 PROCEDURE — 36415 COLL VENOUS BLD VENIPUNCTURE: CPT | Performed by: PHYSICIAN ASSISTANT

## 2018-03-08 ASSESSMENT — ENCOUNTER SYMPTOMS
CHEST TIGHTNESS: 1
NEUROLOGICAL NEGATIVE: 1
COUGH: 1
SHORTNESS OF BREATH: 1
WHEEZING: 1
MUSCULOSKELETAL NEGATIVE: 1
EYES NEGATIVE: 1
PSYCHIATRIC NEGATIVE: 1
GASTROINTESTINAL NEGATIVE: 1
FEVER: 0
CHILLS: 1
HEMATOLOGIC/LYMPHATIC NEGATIVE: 1

## 2018-03-08 NOTE — MR AVS SNAPSHOT
After Visit Summary   3/8/2018    Lakhwinder Negron    MRN: [id number]           Patient Information     Date Of Birth          1940        Visit Information        Provider Department      3/8/2018 3:55 PM Radha Dowd PA-C [facility]        Today's Diagnoses     Acute on chronic congestive heart failure, unspecified congestive heart failure type (H)    -  1    SOB (shortness of breath)           Follow-ups after your visit        Your next 10 appointments already scheduled     Mar 13, 2018  4:00 PM CDT   LAB with LAB FIRST FLOOR [facility] ([facility])    [address] [place] [phone]   [phone]           Please do not eat 10-12 hours before your appointment if you are coming in fasting for labs on lipids, cholesterol, or glucose (sugar). This does not apply to pregnant women. Water, hot tea and black coffee (with nothing added) are okay. Do not drink other fluids, diet soda or chew gum.            Mar 13, 2018  4:30 PM CDT   [facility] Visit with Ramon Rodriguez MD   [facility]    [address] [place] [id number] [phone]            Mar 15, 2018  9:00 AM CDT   Office Visit with Jonna Cason MD   [facility]    [address] [facility] [id number] [phone]           Bring a current list of meds and any records pertaining to this visit. For Physicals, please bring immunization records and any forms needing to be filled out. Please arrive 10 minutes early to complete paperwork.              Who to contact     If you have questions or need follow up information about today's clinic visit or your schedule please contact [facility] directly at [phone].  Normal or non-critical lab and imaging results will be communicated to you by [facility]  "letter or phone within 4 business days after the clinic has received the results. If you do not hear from us within 7 days, please contact the clinic through Vollee or phone. If you have a critical or abnormal lab result, we will notify you by phone as soon as possible.  Submit refill requests through Vollee or call your pharmacy and they will forward the refill request to us. Please allow 3 business days for your refill to be completed.          Additional Information About Your Visit        Vollee Information     Vollee lets you send messages to your doctor, view your test results, renew your prescriptions, schedule appointments and more. To sign up, go to [email or web address] . Click on \"Log in\" on the left side of the screen, which will take you to the Welcome page. Then click on \"Sign up Now\" on the right side of the page.     You will be asked to enter the access code listed below, as well as some personal information. Please follow the directions to create your username and password.     Your access code is: [email or web address]  Expires: 2018  5:27 PM     Your access code will  in 90 days. If you need help or a new code, please call your [facility] clinic or [phone].        Care EveryWhere ID     This is your Care EveryWhere ID. This could be used by other organizations to access your [facility] medical records  [id number]        Your Vitals Were     Pulse Temperature Pulse Oximetry BMI (Body Mass Index)          71 97.1  F (36.2  C) (Oral) 94% 25.54 kg/m2         Blood Pressure from Last 3 Encounters:   18 140/59   18 145/62   10/30/17 140/57    Weight from Last 3 Encounters:   18 117 lb (53.1 kg)   18 120 lb (54.4 kg)   10/30/17 123 lb (55.8 kg)              We Performed the Following     BNP-N terminal pro     CBC with platelets differential     Comprehensive metabolic panel     EKG 12-lead complete w/read - Clinics     XR Chest 2 Views        Primary Care Provider " Office Phone # Fax #    Jonna Kaleigh Cason [phone] [address]  [facility] [address]        Equal Access to Services     KATIUSKA ORELLANA : Hadii aad ku hadluz elenao Soomaali, waaxda luqadaha, qaybta kaalmada adeegyada, greta rowell laLupissanya ortiz. So [facility] [phone].    ATENCIÓN: Si habla español, tiene a mckeon disposición servicios gratuitos de asistencia lingüística. Llame al [phone].    We comply with applicable federal civil rights laws and [place] laws. We do not discriminate on the basis of race, color, national origin, age, disability, sex, sexual orientation, or gender identity.            Thank you!     Thank you for choosing [facility]  for your care. Our goal is always to provide you with excellent care. Hearing back from our patients is one way we can continue to improve our services. Please take a few minutes to complete the written survey that you may receive in the mail after your visit with us. Thank you!             Your Updated Medication List - Protect others around you: Learn how to safely use, store and throw away your medicines at www.disposemymeds.org.          This list is accurate as of 3/8/18  6:32 PM.  Always use your most recent med list.                   Brand Name Dispense Instructions for use Diagnosis    ACE/ARB/ARNI NOT PRESCRIBED (INTENTIONAL)      Please choose reason not prescribed, below    Type 2 diabetes mellitus with stage 3 chronic kidney disease, without long-term current use of insulin (H)       acetaminophen-codeine 300-30 MG per tablet    TYLENOL #3    60 tablet    TAKE 1-2 TABLETS ONCE DAILY AS NEEDED FOR PAIN// IB HNUB NOJ 1-2 LUB YOG MOB    Chronic pain syndrome       amLODIPine 10 MG tablet    NORVASC    90 tablet    Take 1 tablet (10 mg) by mouth daily    Type 2 diabetes mellitus with other diabetic kidney complication, Proteinuria       ASPIRIN NOT PRESCRIBED    INTENTIONAL    1 each    Please  choose reason not prescribed, below    Cerebrovascular accident (CVA) due to thrombosis of other cerebral artery (H)       calcium-vitamin D 600-400 MG-UNIT per tablet    CALTRATE    60 tablet    TAKE 1 TABLET BY MOUTH TWICE DAILY FOR BONE HEALTH // IB ZAUG NOJ 1 LUB, IB HNUB NOJ OB UG EKATERINA WRIGHTU POB TXHA    Senile osteoporosis       * CANE, ANY MATERIAL     1 each    1 each.    Right hemiparesis (H)       carvedilol 12.5 MG tablet    COREG    225 tablet    Take 1.5 tablets (18.75 mg) by mouth 2 times daily (with meals)    Hypertension, goal below 140/90       furosemide 20 MG tablet    LASIX    30 tablet    TAKE 1 TABLET BY MOUTH DAILY AS NEEDED FOR SWELLING IN LEGS // IB HNUB NOJ 1 LUB PAB LASHA PHOB VOG    Dependent edema       glipiZIDE 2.5 MG 24 hr tablet    GLUCOTROL XL    60 tablet    TAKE 1 TABLET 2 TIMES DAILY FOR DIABETES.// IB ZAUG NOJ 1 LUB, IB HNUB NOJ 2 ZAUG PAB LASHA NTSHAV QAB ZIB.    Type 2 diabetes mellitus with stage 3 chronic kidney disease, without long-term current use of insulin (H)       hydrALAZINE 50 MG tablet    APRESOLINE    270 tablet    Take 1 tablet (50 mg) by mouth 3 times daily    Hypertension goal BP (blood pressure) < 140/90       * insulin pen needle 31G X 6 MM    ULTICARE MINI    100 each    Use 2 daily or as directed.    Type 2 diabetes, HbA1C goal < 8% (H)       * UNIFINE PENTIPS 31G X 5 MM   Generic drug:  insulin pen needle     100 each    USE AS DIRECTED TWO TIMES DAILY // IB HNUB SIV 2 ZAUG LI QHIA    Hyperglycemia due to type 2 diabetes mellitus (H)       * olopatadine 0.1 % ophthalmic solution    PATANOL    5 mL    Place 1 drop into both eyes 2 times daily as needed for allergies (itchy eye.)    Allergic conjunctivitis       * olopatadine 0.1 % ophthalmic solution    PATANOL    5 mL    Place 1 drop into both eyes 2 times daily as needed for allergies    Allergic conjunctivitis of both eyes       omeprazole 20 MG CR capsule    priLOSEC    90 capsule    TAKE 1 CAPSULE BY MOUTH  TWICE DAILY FOR STOMACH//IB ZAUG NOJ 1 LUB, IB HNUB NOJ 2 ZAUG PAB LASHA MOB PLAB (NCAUJ PLAB)    Gastroesophageal reflux disease without esophagitis       ONETOUCH DELICA LANCETS 33G Misc     100 each    USE AS DIRECTED TWO TIMES DAILY // IB HNUB SIV 2 ZAUG LI QHIA    Essential hypertension, Type 2 diabetes mellitus with diabetic chronic kidney disease, unspecified CKD stage, unspecified long term insulin use status (H)       ONETOUCH ULTRA test strip   Generic drug:  blood glucose monitoring     200 strip    USE TO TEST BLOOD SUGAR 2-3 TIMES A DAY // IB HNUB SIV 2-3 ZAUG LI QHIA    Type 2 diabetes mellitus with stage 3 chronic kidney disease, unspecified long term insulin use status (H)       * order for DME     1 each    Equipment being ordered: blood pressure meter with supplies and adult cuff    Hypertension goal BP (blood pressure) < 140/90       * order for DME     200 each    Lancets bid for type 2 diabetes on insulin.    Type 2 diabetes mellitus with proteinuria or albuminuria       * order for DME     1 Device    Equipment being ordered: Shoe lift for leg length difference.    Leg length difference, acquired, CVA (cerebral vascular accident) (H)       * order for DME     200 each    Test strips for pt's glucometer, brand as covered by insurance Test bid. One touch Delica lancets. Type 2 diabetes on insulin.  Directions: use as directed Quantity: 200    Type 2 diabetes, HbA1C goal < 8% (H)       rosuvastatin 20 MG tablet    CRESTOR    90 tablet    TAKE 1 TABLET BY MOUTH DAILY FOR CHOLESTEROL // IB HNUB NOJ 1 LUB LASHA NTSHAV MUAJ ROJ    Hyperlipidemia LDL goal <100       * Notice:  This list has 9 medication(s) that are the same as other medications prescribed for you. Read the directions carefully, and ask your doctor or other care provider to review them with you.

## 2018-03-08 NOTE — PROGRESS NOTES
SUBJECTIVE:   Lakhwinder Negron is a 77 year old female presenting with a chief complaint of   Chief Complaint   Patient presents with     Breathing Problem     Patient complains of SOB  and wheezing x 1 week       She is an established patient of [facility].    This started with tightness in her chest and shortness of breath  The tightness started when she was was moving around  She also has a cough - it is dry  She denies chest pain  She has no history of asthma or COPD  No fever, but chills  No cardiac history  She has leg swelling, which is worse on the left side but found bilaterally - has not improved for a week     Review of Systems   Constitutional: Positive for chills. Negative for fever.   HENT: Negative.    Eyes: Negative.    Respiratory: Positive for cough, chest tightness, shortness of breath and wheezing.    Cardiovascular: Positive for leg swelling.   Gastrointestinal: Negative.    Genitourinary: Negative.    Musculoskeletal: Negative.    Skin: Negative.    Neurological: Negative.    Hematological: Negative.    Psychiatric/Behavioral: Negative.        Past Medical History:   Diagnosis Date     Arthritis      Cataract      CVA (cerebral vascular accident) (H) 2001    Visual changes - RT Leg weakness     DM (diabetes mellitus) (H)     dx around 15 years ago.      Hypertension      neuropathy      Nonproliferative diabetic retinopathy of both eyes (H) 5/12/2011     Family History   Problem Relation Age of Onset     Unknown/Adopted Mother      Unknown/Adopted Father      Blood Disease Son      passed at age 3 - patient reports due to low blood counts     CANCER No family hx of      DIABETES No family hx of      Hypertension No family hx of      CEREBROVASCULAR DISEASE No family hx of      Thyroid Disease No family hx of      Glaucoma No family hx of      Macular Degeneration No family hx of      KIDNEY DISEASE No family hx of      Current Outpatient Prescriptions   Medication Sig Dispense Refill     omeprazole  (PRILOSEC) 20 MG CR capsule TAKE 1 CAPSULE BY MOUTH TWICE DAILY FOR STOMACH//IB ZAUG NOJ 1 LUB, IB HNUB NOJ 2 ZAUG PAB LASHA MOB PLAB (NCAUJ PLAB) 90 capsule 3     carvedilol (COREG) 12.5 MG tablet Take 1.5 tablets (18.75 mg) by mouth 2 times daily (with meals) 225 tablet 3     glipiZIDE (GLUCOTROL XL) 2.5 MG 24 hr tablet TAKE 1 TABLET 2 TIMES DAILY FOR DIABETES.// IB ZAUG NOJ 1 LUB, IB HNUB NOJ 2 ZAUG PAB LASHA NTSHAV QAB ZIB. 60 tablet 3     furosemide (LASIX) 20 MG tablet TAKE 1 TABLET BY MOUTH DAILY AS NEEDED FOR SWELLING IN LEGS // IB HNUB NOJ 1 LUB [facility] LASHA PHOB VOG 30 tablet 3     ONETOUCH ULTRA test strip USE TO TEST BLOOD SUGAR 2-3 TIMES A DAY // IB HNUB SIV 2-3 ZAUG LI QHIA 200 strip 1     calcium-vitamin D (CALTRATE) 600-400 MG-UNIT per tablet TAKE 1 TABLET BY MOUTH TWICE DAILY FOR BONE HEALTH // IB ZAUG NOJ 1 LUB, IB HNUB NOJ OB UG [facility] LASHA POB TXHA 60 tablet 2     hydrALAZINE (APRESOLINE) 50 MG tablet Take 1 tablet (50 mg) by mouth 3 times daily 270 tablet 1     ONETOUCH DELICA LANCETS 33G MISC USE AS DIRECTED TWO TIMES DAILY // IB HNUB SIV 2 ZAUG LI QHIA 100 each 1     rosuvastatin (CRESTOR) 20 MG tablet TAKE 1 TABLET BY MOUTH DAILY FOR CHOLESTEROL // [facility]NUB NOJ [facility] ROJ 90 tablet 3     amLODIPine (NORVASC) 10 MG tablet Take 1 tablet (10 mg) by mouth daily 90 tablet 3     ASPIRIN NOT PRESCRIBED (INTENTIONAL) Please choose reason not prescribed, below 1 each 0     ACE/ARB NOT PRESCRIBED, INTENTIONAL, Please choose reason not prescribed, below       UNIFINE PENTIPS 31G X 5 MM USE AS DIRECTED TWO TIMES DAILY // IB HNUB SIV 2 ZAUG LI QHIA 100 each 2     olopatadine (PATANOL) 0.1 % ophthalmic solution Place 1 drop into both eyes 2 times daily as needed for allergies 5 mL 12     acetaminophen-codeine (TYLENOL #3) 300-30 MG per tablet TAKE 1-2 TABLETS ONCE DAILY AS NEEDED FOR PAIN// IB HNUB NOJ 1-2 LUB YOG MOB 60 tablet 3     order for DME Test strips for pt's glucometer, brand as covered by  insurance Test bid. One touch Delica lancets. Type 2 diabetes on insulin.   Directions: use as directed  Quantity: 200 200 each 0     order for DME Equipment being ordered: Shoe lift for leg length difference. 1 Device 0     insulin pen needle (ULTICARE MINI) 31G X 6 MM Use 2 daily or as directed. 100 each 5     ORDER FOR DME Lancets bid for type 2 diabetes on insulin. 200 each 4     olopatadine (PATANOL) 0.1 % ophthalmic solution Place 1 drop into both eyes 2 times daily as needed for allergies (itchy eye.) 5 mL 12     ORDER FOR DME Equipment being ordered: blood pressure meter with supplies and adult cuff 1 each 0     CANE, ANY MATERIAL 1 each. 1 each 0     Social History   Substance Use Topics     Smoking status: Never Smoker     Smokeless tobacco: Never Used     Alcohol use No       OBJECTIVE  /59 (BP Location: Left arm, Patient Position: Chair, Cuff Size: Adult Regular)  Pulse 71  Temp 97.1  F (36.2  C) (Oral)  Wt 117 lb (53.1 kg)  SpO2 94%  BMI 25.54 kg/m2    Physical Exam   Constitutional: She is oriented to person, place, and time. She appears well-developed.   HENT:   Head: Normocephalic and atraumatic.   Right Ear: Tympanic membrane and ear canal normal.   Left Ear: Tympanic membrane and ear canal normal.   Nose: Nose normal.   Mouth/Throat: Oropharynx is clear and moist.   Eyes: Conjunctivae and EOM are normal.   Cardiovascular: Normal rate, regular rhythm and normal heart sounds.    Pulmonary/Chest: She has decreased breath sounds in the right middle field, the right lower field, the left middle field and the left lower field. She has wheezes. She has no rhonchi. She has no rales.   Neurological: She is alert and oriented to person, place, and time.   Skin: Skin is warm and dry.   Psychiatric: She has a normal mood and affect. Judgment normal.       Labs:  Results for orders placed or performed in visit on 03/08/18 (from the past 24 hour(s))   CBC with platelets differential   Result Value Ref  Range    WBC 6.3 4.0 - 11.0 10e9/L    RBC Count 3.36 (L) 3.8 - 5.2 10e12/L    Hemoglobin 9.7 (L) 11.7 - 15.7 g/dL    Hematocrit 30.8 (L) 35.0 - 47.0 %    MCV 92 78 - 100 fl    MCH 28.9 26.5 - 33.0 pg    MCHC 31.5 31.5 - 36.5 g/dL    RDW 13.4 10.0 - 15.0 %    Platelet Count 112 (L) 150 - 450 10e9/L    Diff Method Automated Method     % Neutrophils 67.2 %    % Lymphocytes 26.1 %    % Monocytes 4.0 %    % Eosinophils 2.2 %    % Basophils 0.5 %    Absolute Neutrophil 4.2 1.6 - 8.3 10e9/L    Absolute Lymphocytes 1.6 0.8 - 5.3 10e9/L    Absolute Monocytes 0.3 0.0 - 1.3 10e9/L    Absolute Eosinophils 0.1 0.0 - 0.7 10e9/L    Absolute Basophils 0.0 0.0 - 0.2 10e9/L   XR Chest 2 Views    Narrative    CHEST TWO VIEWS  3/8/2018 4:41 PM     HISTORY: Shortness of breath.    COMPARISON: None.      Impression    IMPRESSION: Cardiac enlargement. Interstitial prominence in the lungs  bilaterally, predominantly in the perihilar and bibasal regions,  suggests pulmonary edema in the setting of CHF. No pleural effusions  are identified. Aortic calcification.    NEELA VALENTINE MD           ASSESSMENT:      ICD-10-CM    1. Acute on chronic congestive heart failure, unspecified congestive heart failure type (H) I50.9    2. SOB (shortness of breath) R06.02 XR Chest 2 Views     EKG 12-lead complete w/read - Clinics     Comprehensive metabolic panel     CBC with platelets differential     BNP-N terminal pro        Medical Decision Making:    Presentation today is consistent with a new diagnosis of CHF and acute exacerbation  With her history of CKD and previous treatment with Lasix, I do not feel she can be appropriately managed as an outpatient.  She will go to [facility] ED.     PLAN:    Cardiac: transfer to ED    Followup:    Transfer to ED via private car    There are no Patient Instructions on file for this visit.    Radha Dowd PA-C

## 2018-03-08 NOTE — NURSING NOTE
"Chief Complaint   Patient presents with     Breathing Problem     Patient complains of SOB  and wheezing x 1 week       Initial /59 (BP Location: Left arm, Patient Position: Chair, Cuff Size: Adult Regular)  Pulse 71  Temp 97.1  F (36.2  C) (Oral)  Wt 117 lb (53.1 kg)  SpO2 94%  BMI 25.54 kg/m2 Estimated body mass index is 25.54 kg/(m^2) as calculated from the following:    Height as of 1/9/18: 4' 8.75\" (1.441 m).    Weight as of this encounter: 117 lb (53.1 kg).  Medication Reconciliation: complete       Chapis Floyd    "

## 2018-03-09 ENCOUNTER — DOCUMENTATION ONLY (OUTPATIENT)
Dept: LAB | Facility: CLINIC | Age: 78
End: 2018-03-09

## 2018-03-09 DIAGNOSIS — N18.30 CKD (CHRONIC KIDNEY DISEASE) STAGE 3, GFR 30-59 ML/MIN (H): Primary | ICD-10-CM

## 2018-03-09 NOTE — PROGRESS NOTES
Only order in the chart is for a U/A. If blood tests are needed please place orders for 03/13 appointment.    Thank you,  Katt ROLLINS

## 2018-03-12 ENCOUNTER — CARE COORDINATION (OUTPATIENT)
Dept: GERIATRIC MEDICINE | Facility: CLINIC | Age: 78
End: 2018-03-12

## 2018-03-12 ENCOUNTER — TELEPHONE (OUTPATIENT)
Dept: FAMILY MEDICINE | Facility: CLINIC | Age: 78
End: 2018-03-12

## 2018-03-12 NOTE — TELEPHONE ENCOUNTER
Reason for Call:  Other Home Care    Detailed comments: Dot with [facility] calling to notify Dr. Cason that Pt is  requesting a delay in resumption of care for home care services per Pt request until 03/14/18.     Phone Number RN  can be reached at: Other phone number:  [phone]    Best Time: anytime    Can we leave a detailed message on this number? YES    Call taken on 3/12/2018 at 10:04 AM by Regan Ledezma

## 2018-03-13 ENCOUNTER — OFFICE VISIT (OUTPATIENT)
Dept: NEPHROLOGY | Facility: CLINIC | Age: 78
End: 2018-03-13
Payer: COMMERCIAL

## 2018-03-13 VITALS
DIASTOLIC BLOOD PRESSURE: 67 MMHG | HEART RATE: 65 BPM | WEIGHT: 117 LBS | SYSTOLIC BLOOD PRESSURE: 157 MMHG | BODY MASS INDEX: 25.54 KG/M2 | OXYGEN SATURATION: 100 %

## 2018-03-13 DIAGNOSIS — N18.30 CKD (CHRONIC KIDNEY DISEASE) STAGE 3, GFR 30-59 ML/MIN (H): ICD-10-CM

## 2018-03-13 DIAGNOSIS — I10 HYPERTENSION, GOAL BELOW 140/90: Primary | ICD-10-CM

## 2018-03-13 DIAGNOSIS — N18.4 CKD (CHRONIC KIDNEY DISEASE) STAGE 4, GFR 15-29 ML/MIN (H): ICD-10-CM

## 2018-03-13 DIAGNOSIS — E11.42 TYPE 2 DIABETES MELLITUS WITH DIABETIC POLYNEUROPATHY, UNSPECIFIED LONG TERM INSULIN USE STATUS: ICD-10-CM

## 2018-03-13 DIAGNOSIS — N25.81 SECONDARY HYPERPARATHYROIDISM (H): ICD-10-CM

## 2018-03-13 DIAGNOSIS — K21.9 GASTROESOPHAGEAL REFLUX DISEASE WITHOUT ESOPHAGITIS: ICD-10-CM

## 2018-03-13 LAB
ALBUMIN SERPL-MCNC: 3.6 G/DL (ref 3.4–5)
ANION GAP SERPL CALCULATED.3IONS-SCNC: 8 MMOL/L (ref 3–14)
BUN SERPL-MCNC: 31 MG/DL (ref 7–30)
CALCIUM SERPL-MCNC: 8.3 MG/DL (ref 8.5–10.1)
CHLORIDE SERPL-SCNC: 111 MMOL/L (ref 94–109)
CO2 SERPL-SCNC: 23 MMOL/L (ref 20–32)
CREAT SERPL-MCNC: 2.96 MG/DL (ref 0.52–1.04)
CREAT UR-MCNC: 78 MG/DL
GFR SERPL CREATININE-BSD FRML MDRD: 15 ML/MIN/1.7M2
GLUCOSE SERPL-MCNC: 184 MG/DL (ref 70–99)
HGB BLD-MCNC: 10 G/DL (ref 11.7–15.7)
PHOSPHATE SERPL-MCNC: 3.8 MG/DL (ref 2.5–4.5)
POTASSIUM SERPL-SCNC: 4.5 MMOL/L (ref 3.4–5.3)
PROT UR-MCNC: 8.58 G/L
PROT/CREAT 24H UR: 10.97 G/G CR (ref 0–0.2)
PTH-INTACT SERPL-MCNC: 238 PG/ML (ref 18–80)
SODIUM SERPL-SCNC: 142 MMOL/L (ref 133–144)

## 2018-03-13 PROCEDURE — 83970 ASSAY OF PARATHORMONE: CPT | Performed by: INTERNAL MEDICINE

## 2018-03-13 PROCEDURE — 84156 ASSAY OF PROTEIN URINE: CPT | Performed by: INTERNAL MEDICINE

## 2018-03-13 PROCEDURE — 85018 HEMOGLOBIN: CPT | Performed by: INTERNAL MEDICINE

## 2018-03-13 PROCEDURE — 99214 OFFICE O/P EST MOD 30 MIN: CPT | Performed by: INTERNAL MEDICINE

## 2018-03-13 PROCEDURE — 80069 RENAL FUNCTION PANEL: CPT | Performed by: INTERNAL MEDICINE

## 2018-03-13 PROCEDURE — 36415 COLL VENOUS BLD VENIPUNCTURE: CPT | Performed by: INTERNAL MEDICINE

## 2018-03-13 ASSESSMENT — PAIN SCALES - GENERAL: PAINLEVEL: NO PAIN (0)

## 2018-03-13 NOTE — TELEPHONE ENCOUNTER
This writer attempted to contact Dot on 03/13/18      Reason for call informed Dr. Cason message below  and left detailed message.      If patient calls back:   Patient contacted by 1st floor [facility] Team (MA/TC). Inform patient that someone from the team will contact them, document that pt called and route to care team.         Malorie Almaraz MA

## 2018-03-13 NOTE — PATIENT INSTRUCTIONS
Go back to lasix 40 mg twice a day as you were instructed to do at the time of leaving the hospital. Please have the nurse that sets up her medications contact us tomorrow. [phone] ([place])  Nurse visit in 2 weeks  Plan one month follow-up.

## 2018-03-13 NOTE — NURSING NOTE
"Lakhwinder Cansecoua's goals for this visit include:   She requests these members of her care team be copied on today's visit information: no    PCP: Jonna Cason    Referring Provider:  No referring provider defined for this encounter.    Chief Complaint   Patient presents with     RECHECK     CKD       Initial /67 (BP Location: Right arm, Patient Position: Sitting, Cuff Size: Adult Regular)  Pulse 65  Wt 53.1 kg (117 lb)  SpO2 100%  BMI 25.54 kg/m2 Estimated body mass index is 25.54 kg/(m^2) as calculated from the following:    Height as of 1/9/18: 1.441 m (4' 8.75\").    Weight as of this encounter: 53.1 kg (117 lb).  Medication Reconciliation: unable or not appropriate to perform    Do you need any medication refills at today's visit? Unsure    Present with pt today is Interrupter \"Magda\" from [employer] \"[employer]\"    Cora Hayes LPN     "

## 2018-03-14 ENCOUNTER — TELEPHONE (OUTPATIENT)
Dept: NEPHROLOGY | Facility: CLINIC | Age: 78
End: 2018-03-14

## 2018-03-14 NOTE — TELEPHONE ENCOUNTER
Received telephone call from home care nurseLilliana. Current medications as follows.  Carvedilol 18.75 mg BID  Amlodipine 10 mg daily  Hydralazine 50 mg TID  Furosemide 40 mg BID  Calcium Cab with vit D BID  Omeprazole 20 mg BID  Iron 325 mg daily  Glipizide 2.5 mg BID  Crestor 20 mg daily

## 2018-03-15 ENCOUNTER — OFFICE VISIT (OUTPATIENT)
Dept: FAMILY MEDICINE | Facility: CLINIC | Age: 78
End: 2018-03-15
Payer: COMMERCIAL

## 2018-03-15 VITALS
TEMPERATURE: 98.3 F | SYSTOLIC BLOOD PRESSURE: 140 MMHG | HEART RATE: 66 BPM | HEIGHT: 57 IN | WEIGHT: 111.8 LBS | DIASTOLIC BLOOD PRESSURE: 52 MMHG | BODY MASS INDEX: 24.12 KG/M2 | OXYGEN SATURATION: 98 %

## 2018-03-15 DIAGNOSIS — N25.81 SECONDARY HYPERPARATHYROIDISM (H): ICD-10-CM

## 2018-03-15 DIAGNOSIS — N18.4 CKD (CHRONIC KIDNEY DISEASE) STAGE 4, GFR 15-29 ML/MIN (H): ICD-10-CM

## 2018-03-15 DIAGNOSIS — I63.549 CEREBROVASCULAR ACCIDENT (CVA) DUE TO OCCLUSION OF CEREBELLAR ARTERY, UNSPECIFIED BLOOD VESSEL LATERALITY (H): ICD-10-CM

## 2018-03-15 DIAGNOSIS — E78.5 HYPERLIPIDEMIA LDL GOAL <100: ICD-10-CM

## 2018-03-15 DIAGNOSIS — G81.91 RIGHT HEMIPARESIS (H): ICD-10-CM

## 2018-03-15 DIAGNOSIS — N18.30 TYPE 2 DIABETES MELLITUS WITH STAGE 3 CHRONIC KIDNEY DISEASE, WITHOUT LONG-TERM CURRENT USE OF INSULIN (H): Primary | ICD-10-CM

## 2018-03-15 DIAGNOSIS — E11.22 TYPE 2 DIABETES MELLITUS WITH STAGE 3 CHRONIC KIDNEY DISEASE, WITHOUT LONG-TERM CURRENT USE OF INSULIN (H): Primary | ICD-10-CM

## 2018-03-15 LAB
CHOLEST SERPL-MCNC: 138 MG/DL
HBA1C MFR BLD: 6.6 % (ref 4.3–6)
HDLC SERPL-MCNC: 46 MG/DL
LDLC SERPL CALC-MCNC: 34 MG/DL
NONHDLC SERPL-MCNC: 92 MG/DL
TRIGL SERPL-MCNC: 291 MG/DL

## 2018-03-15 PROCEDURE — 99214 OFFICE O/P EST MOD 30 MIN: CPT | Performed by: FAMILY MEDICINE

## 2018-03-15 PROCEDURE — 80061 LIPID PANEL: CPT | Performed by: FAMILY MEDICINE

## 2018-03-15 PROCEDURE — 83036 HEMOGLOBIN GLYCOSYLATED A1C: CPT | Performed by: FAMILY MEDICINE

## 2018-03-15 PROCEDURE — 36415 COLL VENOUS BLD VENIPUNCTURE: CPT | Performed by: FAMILY MEDICINE

## 2018-03-15 RX ORDER — FERROUS SULFATE 325(65) MG
325 TABLET ORAL
COMMUNITY
Start: 2018-03-11 | End: 2018-03-23

## 2018-03-15 ASSESSMENT — PAIN SCALES - GENERAL: PAINLEVEL: NO PAIN (0)

## 2018-03-15 NOTE — LETTER
March 19, 2018      Lakhwinder Negron  [address] [address]        Dear Lakhwinder Negron,    Your test results are attached. I am happy to let you know that they are stable and your medications can stay the same.    Please call me if you have any questions about these test results or about your care.    Sincerely,      Jonna Cason MD/ymv    Resulted Orders   HEMOGLOBIN A1C   Result Value Ref Range    Hemoglobin A1C 6.6 (H) 4.3 - 6.0 %   Lipid panel reflex to direct LDL Non-fasting   Result Value Ref Range    Cholesterol 138 <200 mg/dL    Triglycerides 291 (H) <150 mg/dL      Comment:      Borderline high:  150-199 mg/dl  High:             200-499 mg/dl  Very high:       >499 mg/dl  Fasting specimen      HDL Cholesterol 46 (L) >49 mg/dL    LDL Cholesterol Calculated 34 <100 mg/dL      Comment:      Desirable:       <100 mg/dl    Non HDL Cholesterol 92 <130 mg/dL
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
## 2018-03-15 NOTE — MR AVS SNAPSHOT
After Visit Summary   3/15/2018    Lakhwinder Negron    MRN: [id number]           Patient Information     Date Of Birth          1940        Visit Information        Provider Department      3/15/2018 8:45 AM Jonna Cason MD; [employer] [facility]        Today's Diagnoses     Type 2 diabetes mellitus with stage 3 chronic kidney disease, without long-term current use of insulin (H)    -  1    Screening for diabetic retinopathy        Hyperlipidemia LDL goal <100        Cerebrovascular accident (CVA) due to occlusion of cerebellar artery, unspecified blood vessel laterality (H)        CKD (chronic kidney disease) stage 3, GFR 30-59 ml/min        Right hemiparesis (H)        Secondary hyperparathyroidism (H)           Follow-ups after your visit        Follow-up notes from your care team     Return in about 6 months (around 9/15/2018) for Physical Exam, Lab Work, medication follow up.      Your next 10 appointments already scheduled     Mar 29, 2018 11:00 AM CDT   LAB with LAB FIRST FLOOR [facility])    [address] [place] [id number] [phone]           Please do not eat 10-12 hours before your appointment if you are coming in fasting for labs on lipids, cholesterol, or glucose (sugar). This does not apply to pregnant women. Water, hot tea and black coffee (with nothing added) are okay. Do not drink other fluids, diet soda or chew gum.            Mar 29, 2018 11:15 AM CDT   Nurse Only with Nurse Only Med Spec   [facility])    [address] [place] [id number]   [phone]            Apr 24, 2018  4:00 PM CDT   LAB with LAB FIRST FLOOR [facility])    [address] [place] [id number]   [phone]           Please do not eat 10-12 hours before your  "appointment if you are coming in fasting for labs on lipids, cholesterol, or glucose (sugar). This does not apply to pregnant women. Water, hot tea and black coffee (with nothing added) are okay. Do not drink other fluids, diet soda or chew gum.            2018  4:30 PM CDT   Return Visit with Ramon Rodriguez MD   [facility] ([facility])    [address] [place] [id number] [phone]              Who to contact     If you have questions or need follow up information about today's clinic visit or your schedule please contact [facility] directly at [phone].  Normal or non-critical lab and imaging results will be communicated to you by MyChart, letter or phone within 4 business days after the clinic has received the results. If you do not hear from us within 7 days, please contact the clinic through MyChart or phone. If you have a critical or abnormal lab result, we will notify you by phone as soon as possible.  Submit refill requests through [employer] or call your pharmacy and they will forward the refill request to us. Please allow 3 business days for your refill to be completed.          Additional Information About Your Visit        [employer] Information     [employer] lets you send messages to your doctor, view your test results, renew your prescriptions, schedule appointments and more. To sign up, go to [email or web address] . Click on \"Log in\" on the left side of the screen, which will take you to the Welcome page. Then click on \"Sign up Now\" on the right side of the page.     You will be asked to enter the access code listed below, as well as some personal information. Please follow the directions to create your username and password.     Your access code is: [email or web address]  Expires: 2018  6:27 PM     Your access code will  in 90 days. If you need help or a new code, please call your [facility] or " "[phone].        Care EveryWhere ID     This is your Care EveryWhere ID. This could be used by other organizations to access your [facility] medical records  [id number]        Your Vitals Were     Pulse Temperature Height Pulse Oximetry BMI (Body Mass Index)       66 98.3  F (36.8  C) (Oral) 4' 8.75\" (1.441 m) 98% 24.41 kg/m2        Blood Pressure from Last 3 Encounters:   03/15/18 140/52   03/13/18 157/67   03/08/18 140/59    Weight from Last 3 Encounters:   03/15/18 111 lb 12.8 oz (50.7 kg)   03/13/18 117 lb (53.1 kg)   03/08/18 117 lb (53.1 kg)              We Performed the Following     HEMOGLOBIN A1C     Lipid panel reflex to direct LDL Non-fasting          Today's Medication Changes          These changes are accurate as of 3/15/18  9:40 AM.  If you have any questions, ask your nurse or doctor.               These medicines have changed or have updated prescriptions.        Dose/Directions    furosemide 20 MG tablet   Commonly known as:  LASIX   This may have changed:  See the new instructions.   Used for:  Dependent edema        TAKE 1 TABLET BY MOUTH DAILY AS NEEDED FOR SWELLING IN LEGS // IB HNUB NOJ 1 LUB PAB LASHA PHOB VOG   Quantity:  30 tablet   Refills:  3                Primary Care Provider Office Phone # Fax #    Jonna Kaleigh Cason [phone]       [address] [facility] [address]        Equal Access to Services     KATIUSKA ORELLANA AH: Hadii timbo ku hadasho Soomaali, waaxda luqadaha, qaybta kaalmada adeegyada, greta goyal adesonia ortiz. So [facility] [phone].    ATENCIÓN: Si habla charli, tiene a mckeon disposición servicios gratuitos de asistencia lingüística. Llame al [phone].    We comply with applicable federal civil rights laws and [place] laws. We do not discriminate on the basis of race, color, national origin, age, disability, sex, sexual orientation, or gender identity.            Thank you!     Thank you for choosing [facility]  " for your care. Our goal is always to provide you with excellent care. Hearing back from our patients is one way we can continue to improve our services. Please take a few minutes to complete the written survey that you may receive in the mail after your visit with us. Thank you!             Your Updated Medication List - Protect others around you: Learn how to safely use, store and throw away your medicines at www.disposemymeds.org.          This list is accurate as of 3/15/18  9:40 AM.  Always use your most recent med list.                   Brand Name Dispense Instructions for use Diagnosis    ACE/ARB/ARNI NOT PRESCRIBED (INTENTIONAL)      Please choose reason not prescribed, below    Type 2 diabetes mellitus with stage 3 chronic kidney disease, without long-term current use of insulin (H)       acetaminophen-codeine 300-30 MG per tablet    TYLENOL #3    60 tablet    TAKE 1-2 TABLETS ONCE DAILY AS NEEDED FOR PAIN// IB HNUB NOJ 1-2 LUB YOG MOB    Chronic pain syndrome       amLODIPine 10 MG tablet    NORVASC    90 tablet    Take 1 tablet (10 mg) by mouth daily    Type 2 diabetes mellitus with other diabetic kidney complication, Proteinuria       ASPIRIN NOT PRESCRIBED    INTENTIONAL    1 each    Please choose reason not prescribed, below    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    Right hemiparesis (H)       carvedilol 12.5 MG tablet    COREG    225 tablet    Take 1.5 tablets (18.75 mg) by mouth 2 times daily (with meals)    Hypertension, goal below 140/90       ferrous sulfate 325 (65 FE) MG tablet    IRON     Take 325 mg by mouth        furosemide 20 MG tablet    LASIX    30 tablet    TAKE 1 TABLET BY MOUTH DAILY AS  NEEDED FOR SWELLING IN LEGS // IB HNUB NOJ 1 LUB EKATERINA LASHA PHOB VOG    Dependent edema       glipiZIDE 2.5 MG 24 hr tablet    GLUCOTROL XL    60 tablet    TAKE 1 TABLET 2 TIMES DAILY FOR DIABETES.// IB ZAUG NOJ 1 LUB, IB HNUB NOJ 2 GERALD TOBRA RAU NTSHAV QAB ZIB.    Type 2 diabetes mellitus with stage 3 chronic kidney disease, without long-term current use of insulin (H)       hydrALAZINE 50 MG tablet    APRESOLINE    270 tablet    Take 1 tablet (50 mg) by mouth 3 times daily    Hypertension goal BP (blood pressure) < 140/90       * insulin pen needle 31G X 6 MM    ULTICARE MINI    100 each    Use 2 daily or as directed.    Type 2 diabetes, HbA1C goal < 8% (H)       * UNIFINE PENTIPS 31G X 5 MM   Generic drug:  insulin pen needle     100 each    USE AS DIRECTED TWO TIMES DAILY // IB HNUB SIV 2 ZAUG LI QHIA    Hyperglycemia due to type 2 diabetes mellitus (H)       * olopatadine 0.1 % ophthalmic solution    PATANOL    5 mL    Place 1 drop into both eyes 2 times daily as needed for allergies (itchy eye.)    Allergic conjunctivitis       * olopatadine 0.1 % ophthalmic solution    PATANOL    5 mL    Place 1 drop into both eyes 2 times daily as needed for allergies    Allergic conjunctivitis of both eyes       omeprazole 20 MG CR capsule    priLOSEC    90 capsule    TAKE 1 CAPSULE BY MOUTH TWICE DAILY FOR STOMACH//IB ZAUG NOJ 1 LUB, IB HNUB NOJ 2 [facility] EKATERINA WRIGHTU MOB PLAB (NCAUJ PLAB)    Gastroesophageal reflux disease without esophagitis       ONETOUCH DELICA LANCETS 33G Misc     100 each    USE AS DIRECTED TWO TIMES DAILY // IB HNUB SIV 2 ZAUG LI QHIA    Essential hypertension, Type 2 diabetes mellitus with diabetic chronic kidney disease, unspecified CKD stage, unspecified long term insulin use status (H)       ONETOUCH ULTRA test strip   Generic drug:  blood glucose monitoring     200 strip    USE TO TEST BLOOD SUGAR 2-3 TIMES A DAY // IB HNUB SIV 2-3 ZAUG LI QHIA    Type 2 diabetes mellitus with stage 3 chronic kidney  disease, unspecified long term insulin use status (H)       * order for DME     1 each    Equipment being ordered: blood pressure meter with supplies and adult cuff    Hypertension goal BP (blood pressure) < 140/90       * order for DME     200 each    Lancets bid for type 2 diabetes on insulin.    Type 2 diabetes mellitus with proteinuria or albuminuria       * order for DME     1 Device    Equipment being ordered: Shoe lift for leg length difference.    Leg length difference, acquired, CVA (cerebral vascular accident) (H)       * order for DME     200 each    Test strips for pt's glucometer, brand as covered by insurance Test bid. One touch Delica lancets. Type 2 diabetes on insulin.  Directions: use as directed Quantity: 200    Type 2 diabetes, HbA1C goal < 8% (H)       rosuvastatin 20 MG tablet    CRESTOR    90 tablet    TAKE 1 TABLET BY MOUTH DAILY FOR CHOLESTEROL // IB HNUB NOJ 1 LUB LASHA NTSHAV MUAJ ROJ    Hyperlipidemia LDL goal <100       * Notice:  This list has 9 medication(s) that are the same as other medications prescribed for you. Read the directions carefully, and ask your doctor or other care provider to review them with you.

## 2018-03-15 NOTE — PATIENT INSTRUCTIONS
Understanding Primary Hyperparathyroidism    The parathyroid glands are 4 tiny glands in the neck. They make parathyroid hormone (PTH). PTH controls the amount of calcium and phosphorus in your blood. Primary hyperparathyroidism is when there is too much PTH in your blood. It occurs when one or more of the glands are too active.  The job of PTH is to tell the body how to control calcium. Too much PTH means the body increases the amount of calcium in the blood. This leads to a problem called hypercalcemia. This is when the amount of calcium in the blood is too high. Hypercalcemia can cause serious health problems.  Causes  Hyperparathyroidism can occur when a parathyroid gland becomes enlarged. It can also occur as a complication of another health conditions, such as kidney failure or rickets. In these conditions, calcium is usually not high. This is called secondary hyperparathyroidism.  Who s at risk  The risk factors for this condition include:    Being a woman (it s less common in men)    Being older (it s more likely to occur with age)    Having parents or siblings with the condition or other endocrine tumors    Having certain kidney problems    Taking certain medicines    Having had radiation treatment in the head or neck  Symptoms  Symptoms of the condition can include:    Muscle weakness    Depression    Tiredness    Confusion and memory loss    Poor memory    Nausea and vomiting    Pain in the stomach area (abdomen)    Hard stools (constipation)    Stomach ulcers    Need to urinate often    Kidney stones    Joint or bone pain    Bone disease (osteopenia or osteoporosis), an increase in bone fractures    High blood pressure    Increased thirst  Treatment  If primary hyperparathyroidism is not treated, it can get worse over time. Treatments include:    Surgery. This may be done to remove any enlarged parathyroid glands. This lets the amount of calcium in the blood go back to normal. You may need to take  vitamin D and calcium supplements before the surgery. This will reduce the risk of low calcium after the surgery.     Medicine. This lowers the amount of parathyroid hormone made by the overactive glands.   You and your healthcare provider can discuss your treatment options. Make sure to ask any questions you have.  Date Last Reviewed: 11/1/2016 2000-2017 [employer]. [address] [place] [address], [place] [address]. All rights reserved. This information is not intended as a substitute for professional medical care. Always follow your healthcare professional's instructions.        Chronic Kidney Disease (CKD)     The role of the kidneys is to remove waste products and extra water from the blood.  When the kidneys do not work as they should, waste products begin to build up in the blood. This is called chronic kidney disease (CKD). CKD means that you have kidney damage or a decrease in kidney function lasting at least 3 months. CKD allows extra water, waste, and toxins to build up in the body. This can eventually become life-threatening. You might need dialysis or a kidney transplant to stay alive. This most severe form is called end stage renal disease.  Diabetes is the leading causes of chronic renal failure. Other causes include high blood pressure, hardening of the arteries (atherosclerosis), lupus, inflammation of the blood vessels (vasculitis), and past viral or bacterial infections. Certain over-the-counter pain medicines can cause renal failure when taken often over a long period of time. These include aspirin, ibuprofen, and related anti-inflammatory medicines called NSAIDs (nonsteroidal anti-inflammatory drugs).  Home care  The following guidelines will help you care for yourself at home:    If you have diabetes, talk with your healthcare provider about keeping your blood sugar under control. Ask if you need to make and changes to your diet, lifestyle, or medicines.    If you have high blood  pressure:    Take prescribed medicine to lower your blood pressure to the recommended goal of less than 130/80.    Start a regular exercise program that you enjoy. Check with your healthcare provider to be sure your planned exercise program is right for you.    Eat less salt (sodium). Your healthcare provider can tell you how much salt per day is safe for you.    If you are overweight, talk with your healthcare provider about a weight loss plan.    If you smoke, you must quit. Smoking makes kidney disease worse. Talk with your healthcare provider about ways to help you quit.  For more information, visit the following links:    www.smokefree.gov/sites/default/files/pdf/clearing-the-air-accessible.pdf    www.smokefree.gov    www.cancer.org/healthy/stayawayfromtobacco/guidetoquittingsmoking/    Most people with CKD need to follow a special diet.  Be sure you understand yours. In general, you will need to limit protein, salt, potassium, and phosphorus. You also need to limit how much fluid you drink.     CKD is a risk factor for heart disease. Talk with your healthcare provider about any other risk factors you might have and what you can do to lessen them.    Talk with your healthcare provider about any medicines you are taking to find out if they need to be reduced or stopped.    Don't use the following over-the-counter medicines, or consult your healthcare provider before using:    Aspirin and NSAIDs such as ibuprofen or naproxen. Using acetaminophen for fever or pain is OK.    Laxatives and antacids containing magnesium or aluminum    Fleet or phospho soda enemas containing phosphorus    Certain stomach acid-blocking medicine such as cimetidine or ranitidine     Decongestants containing pseudoephedrine     Herbal supplements  Follow-up care  Follow up with your healthcare provider, or as advised. Contact one of the following for more information:    American Association of Kidney Patients 549-031-4015  www.aakp.org    National Kidney Foundation 025-230-9937 www.kidney.org    American Kidney Fund 162-662-2291 www.kidneyfund.org    National Kidney Disease Education Program 866-4KIDNEY www.nkdep.nih.gov  If an X-ray, ECG (cardiogram), or other diagnostic test was taken, you will be told of any new findings that may affect your care.  Call 911  Call 911 if you have any of the following:    Severe weakness, dizziness, fainting, drowsiness, or confusion    Chest pain or shortness of breath    Heart beating fast, slow, or irregularly  When to seek medical advice  Call your healthcare provider right away if any of these occur:    Nausea or vomiting    Fever of 100.4 F (38 C) or higher, or as directed by your healthcare provider    Unexpected weight gain or swelling in the legs, ankles, or around the eyes    Decrease or absent urine output  Date Last Reviewed: 9/1/2016 2000-2017 [employer]. [address] [place] [address], [place] [address]. All rights reserved. This information is not intended as a substitute for professional medical care. Always follow your healthcare professional's instructions.

## 2018-03-16 ASSESSMENT — PATIENT HEALTH QUESTIONNAIRE - PHQ9: SUM OF ALL RESPONSES TO PHQ QUESTIONS 1-9: 4

## 2018-03-18 NOTE — PROGRESS NOTES
Dear Lakhwinder Negron,    Your test results are attached. I am happy to let you know that they are stable and your medications can stay the same.    Please call me if you have any questions about these test results or about your care.    Sincerely,    Jonna Cason MD

## 2018-03-23 DIAGNOSIS — D50.8 IRON DEFICIENCY ANEMIA SECONDARY TO INADEQUATE DIETARY IRON INTAKE: Primary | ICD-10-CM

## 2018-03-23 DIAGNOSIS — M81.0 SENILE OSTEOPOROSIS: ICD-10-CM

## 2018-03-23 RX ORDER — FERROUS SULFATE 325(65) MG
325 TABLET ORAL EVERY OTHER DAY
Qty: 100 TABLET | Refills: 1 | Status: SHIPPED | OUTPATIENT
Start: 2018-03-23 | End: 2018-04-30

## 2018-03-23 NOTE — TELEPHONE ENCOUNTER
"Requested Prescriptions   Pending Prescriptions Disp Refills     calcium-vitamin D (CALTRATE) 600-400 MG-UNIT per tablet [Pharmacy Med Name: CALCIUM 600 + VIT D 400 -400 TAB] 60 tablet 2     Sig: TAKE 1 TABLET BY MOUTH TWICE DAILY FOR BONE HEALTH // IB ZAUG NOJ 1 LUB, IB HNUB NOJ OB NICOLASUG PAB LASHA POB TXHA    Vitamin Supplements (Adult) Protocol Passed    3/23/2018 10:53 AM       Passed - High dose Vitamin D not ordered       Passed - Recent (12 mo) or future (30 days) visit within the authorizing provider's specialty    Patient had office visit in the last 12 months or has a visit in the next 30 days with authorizing provider or within the authorizing provider's specialty.  See \"Patient Info\" tab in inbasket, or \"Choose Columns\" in Meds & Orders section of the refill encounter.            Prescription approved per [facility] [place].    Theresa Gregorio RN, BSN    "

## 2018-03-23 NOTE — TELEPHONE ENCOUNTER
..Reason for Call:  Medication or medication refill:    Do you use a [facility]?  Name of the pharmacy and phone number for the current request:  [facility], [place] - [address] [Patient Preferred]  [phone]    Name of the medication requested: ferrous sulfate (IRON) 325 (65 FE) MG tablet    Other request: Lilliana from [facility] called and wondering if Dr. Cason can prescribe the patient Ferrous-sulfate 325 mg daily since the hospital forgot to prescribe it to the patient. Please call her if any questions.      Can we leave a detailed message on this number? YES    Phone number patient can be reached at: Other phone number:  [phone]    Best Time: any    Call taken on 3/23/2018 at 11:45 AM by Swetha Johnson

## 2018-03-23 NOTE — TELEPHONE ENCOUNTER
Requested Prescriptions   Pending Prescriptions Disp Refills     ferrous sulfate (IRON) 325 (65 FE) MG tablet 100 tablet      Sig: Take 1 tablet (325 mg) by mouth    There is no refill protocol information for this order        Routing refill request to provider for review/approval because:  Drug not on the [facility] refill protocol   Medication is reported/historical    Theresa Gregorio RN, BSN

## 2018-03-27 DIAGNOSIS — G89.4 CHRONIC PAIN SYNDROME: ICD-10-CM

## 2018-03-27 NOTE — TELEPHONE ENCOUNTER
Requested Prescriptions   Pending Prescriptions Disp Refills     acetaminophen-codeine (TYLENOL #3) 300-30 MG per tablet [Pharmacy Med Name: ACETAMINOPHEN-COD #3 TABLET 300-30 TAB]  Last Written Prescription Date:  01/20/17  Last Fill Quantity: 60,  # refills: 3   Last Office Visit with FMANDRIY, YAS or [facility] prescribing provider:  03/15/18   Future Office Visit:    Next 5 appointments (look out 90 days)     Mar 29, 2018 11:00 AM CDT   Nurse Only with Nurse Only Med Spec, [employer]   [facility])    [address] [place] [id number]   [phone]            Apr 24, 2018  4:30 PM CDT   Return Visit with Ramon Rodriguez MD   [facility])    [address] [place] [id number]   [phone]                60 tablet 3     Sig: TAKE 1-2 TABLETS ONCE DAILY AS NEEDED// IB HNUB NOJ 1-2 LUB YOG XAV NOJPAB LASHA MOB IB CE    There is no refill protocol information for this order

## 2018-03-27 NOTE — TELEPHONE ENCOUNTER
Requested Prescriptions   Pending Prescriptions Disp Refills     acetaminophen-codeine (TYLENOL #3) 300-30 MG per tablet [Pharmacy Med Name: ACETAMINOPHEN-COD #3 TABLET 300-30 TAB] 60 tablet 3     Sig: TAKE 1-2 TABLETS ONCE DAILY AS NEEDED// IB HNUB NOJ 1-2 LUB YOG XAV NOJPAB LASHA MOB IB CE    There is no refill protocol information for this order        Routing refill request to provider for review/approval because:  Drug not on the [facility] [place]     Theresa Gregorio RN, BSN
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
## 2018-03-29 NOTE — TELEPHONE ENCOUNTER
Written rx faxed to the pharmacy, Pharmacy will contact pt when medication is ready for pickup.  Gamal Negorn,  [employer] [facility]

## 2018-03-30 DIAGNOSIS — R60.9 DEPENDENT EDEMA: ICD-10-CM

## 2018-03-30 RX ORDER — FUROSEMIDE 20 MG
40 TABLET ORAL DAILY
Qty: 180 TABLET | Refills: 1 | Status: SHIPPED | OUTPATIENT
Start: 2018-03-30 | End: 2018-04-09

## 2018-03-30 NOTE — TELEPHONE ENCOUNTER
Received refill for Lasix 20 mg tablet- take 40 mg daily.    Refilled medication.    Daysi Frias RN

## 2018-04-03 ENCOUNTER — PATIENT OUTREACH (OUTPATIENT)
Dept: GERIATRIC MEDICINE | Facility: CLINIC | Age: 78
End: 2018-04-03

## 2018-04-04 NOTE — PROGRESS NOTES
[facility] Care Coordination Contact    [facility] Six-Month Telephone Assessment    6 month telephone assessment completed on 4/3/2018.    ER visits: No  Hospitalizations: Yes.  Admitted on 3/8 to [facility] due to shortness of breath  TCU stays: No  Significant health status changes: None reported.  Falls/Injuries: No  ADL/IADL changes: No  Changes in services: No    Caregiver Assessment follow up:  N/A    Goals: See POC in chart for goal progress documentation.      Will see member in 6 months for an annual health risk assessment.   Encouraged member to call CC with any questions or concerns in the meantime.     Jacqui Robles RN, PHN  [facility]   Phone: [phone]  Fax [phone]  [email or web address]

## 2018-04-06 ENCOUNTER — TELEPHONE (OUTPATIENT)
Dept: NEPHROLOGY | Facility: CLINIC | Age: 78
End: 2018-04-06

## 2018-04-06 DIAGNOSIS — R60.9 DEPENDENT EDEMA: ICD-10-CM

## 2018-04-06 NOTE — TELEPHONE ENCOUNTER
4.6.18  Lilliana,nurse, states that patient should be taking Lasix b.i.d.  Rx only states daily. Please call with clarification  [phone].    Lilliana is with patient now and needs to make the change while she is there. Please call.

## 2018-04-09 RX ORDER — FUROSEMIDE 40 MG
40 TABLET ORAL 2 TIMES DAILY
Qty: 180 TABLET | Refills: 3 | Status: SHIPPED | OUTPATIENT
Start: 2018-04-09 | End: 2018-04-24

## 2018-04-09 NOTE — TELEPHONE ENCOUNTER
Lasix 40 mg BID is correct. Discussed this with Lilliana. Sending in new prescription today for this.    Daysi Frias, RN, BSN  Nephrology Care Coordinator  [facility]

## 2018-04-13 NOTE — TELEPHONE ENCOUNTER
Home care nurse called with the following information for Daysi:    B/P-130/68  P-65  Edema-2+ stable-no change  No SOB        Shirley Nolan LPN

## 2018-04-20 ENCOUNTER — DOCUMENTATION ONLY (OUTPATIENT)
Dept: LAB | Facility: CLINIC | Age: 78
End: 2018-04-20

## 2018-04-20 DIAGNOSIS — N18.4 CKD (CHRONIC KIDNEY DISEASE) STAGE 4, GFR 15-29 ML/MIN (H): Primary | ICD-10-CM

## 2018-04-20 DIAGNOSIS — N18.3 TYPE 2 DIABETES MELLITUS WITH STAGE 3 CHRONIC KIDNEY DISEASE, UNSPECIFIED LONG TERM INSULIN USE STATUS: ICD-10-CM

## 2018-04-20 DIAGNOSIS — E11.22 TYPE 2 DIABETES MELLITUS WITH DIABETIC CHRONIC KIDNEY DISEASE, UNSPECIFIED CKD STAGE, UNSPECIFIED LONG TERM INSULIN USE STATUS: ICD-10-CM

## 2018-04-20 DIAGNOSIS — I10 ESSENTIAL HYPERTENSION: ICD-10-CM

## 2018-04-20 DIAGNOSIS — E11.22 TYPE 2 DIABETES MELLITUS WITH STAGE 3 CHRONIC KIDNEY DISEASE, UNSPECIFIED LONG TERM INSULIN USE STATUS: ICD-10-CM

## 2018-04-20 RX ORDER — LANCETS 33 GAUGE
EACH MISCELLANEOUS
Qty: 100 EACH | Refills: 1 | Status: SHIPPED | OUTPATIENT
Start: 2018-04-20 | End: 2018-08-10

## 2018-04-20 NOTE — TELEPHONE ENCOUNTER
Prescription [place] per [facility] [place].    Theresa Gregorio RN, BSN

## 2018-04-20 NOTE — TELEPHONE ENCOUNTER
"Requested Prescriptions   Pending Prescriptions Disp Refills     ONETOUCH DELICA LANCETS 33G MISC [Pharmacy Med Name: ONETOUCH DELICA 33G LANCETS MISC] 100 each 1     Sig: USE AS DIRECTED TWO TIMES DAILY // IB HNUB SIV 2 GERALD SPRAGUE QHIA    Diabetic Supplies Protocol Passed    4/20/2018  1:51 PM       Passed - Patient is 18 years of age or older       Passed - Recent (6 mo) or future (30 days) visit within the authorizing provider's specialty    Patient had office visit in the last 6 months or has a visit in the next 30 days with authorizing provider.  See \"Patient Info\" tab in inbasket, or \"Choose Columns\" in Meds & Orders section of the refill encounter.            Prescription approved per [facility] [place] Refill Protocol.    Theresa Gregorio RN, BSN    "

## 2018-04-20 NOTE — TELEPHONE ENCOUNTER
"Requested Prescriptions   Pending Prescriptions Disp Refills     ONETOUCH ULTRA test strip [Pharmacy Med Name: ONE TOUCH ULTRA STRIPS STRP]  Last Written Prescription Date:  01/09/18  Last Fill Quantity: 200,  # refills: 1   Last office visit: 3/15/2018 with prescribing provider:  Yoav   Future Office Visit:   Next 5 appointments (look out 90 days)     Apr 24, 2018  4:30 PM CDT   Return Visit with Ramon Rodriguez MD, [employer]   [facility]    [address] [place] [id number]   [phone]                  200 strip 1     Sig: USE TO TEST BLOOD SUGAR 2-3 TIMES A DAY // IB HNUB SIV 2-3 GERALD SPRAGUE QHIA    Diabetic Supplies Protocol Passed    4/20/2018 10:10 AM       Passed - Patient is 18 years of age or older       Passed - Recent (6 mo) or future (30 days) visit within the authorizing provider's specialty    Patient had office visit in the last 6 months or has a visit in the next 30 days with authorizing provider.  See \"Patient Info\" tab in inbasket, or \"Choose Columns\" in Meds & Orders section of the refill encounter.              "

## 2018-04-20 NOTE — PROGRESS NOTES
Please order labs if needed in addition to urine sample for 4/24 appointment.    Thank you,  Katt ROLLINS

## 2018-04-23 PROBLEM — N39.46 MIXED STRESS AND URGE URINARY INCONTINENCE: Status: ACTIVE | Noted: 2018-04-23

## 2018-04-24 ENCOUNTER — OFFICE VISIT (OUTPATIENT)
Dept: NEPHROLOGY | Facility: CLINIC | Age: 78
End: 2018-04-24
Payer: COMMERCIAL

## 2018-04-24 VITALS
DIASTOLIC BLOOD PRESSURE: 63 MMHG | SYSTOLIC BLOOD PRESSURE: 157 MMHG | WEIGHT: 114 LBS | BODY MASS INDEX: 24.89 KG/M2 | HEART RATE: 66 BPM | OXYGEN SATURATION: 97 %

## 2018-04-24 DIAGNOSIS — N25.81 SECONDARY HYPERPARATHYROIDISM (H): ICD-10-CM

## 2018-04-24 DIAGNOSIS — N18.4 CKD (CHRONIC KIDNEY DISEASE) STAGE 4, GFR 15-29 ML/MIN (H): ICD-10-CM

## 2018-04-24 DIAGNOSIS — Z53.9 DIAGNOSIS NOT YET DEFINED: Primary | ICD-10-CM

## 2018-04-24 DIAGNOSIS — I10 HYPERTENSION, GOAL BELOW 140/90: ICD-10-CM

## 2018-04-24 DIAGNOSIS — R60.9 DEPENDENT EDEMA: ICD-10-CM

## 2018-04-24 DIAGNOSIS — N18.5 CKD (CHRONIC KIDNEY DISEASE) STAGE 5, GFR LESS THAN 15 ML/MIN (H): ICD-10-CM

## 2018-04-24 DIAGNOSIS — D64.9 ANEMIA, UNSPECIFIED TYPE: ICD-10-CM

## 2018-04-24 DIAGNOSIS — Z87.19 HISTORY OF GI BLEED: Primary | ICD-10-CM

## 2018-04-24 LAB
ALBUMIN SERPL-MCNC: 3.7 G/DL (ref 3.4–5)
ALBUMIN UR-MCNC: 100 MG/DL
ANION GAP SERPL CALCULATED.3IONS-SCNC: 9 MMOL/L (ref 3–14)
APPEARANCE UR: CLEAR
BACTERIA #/AREA URNS HPF: ABNORMAL /HPF
BILIRUB UR QL STRIP: NEGATIVE
BUN SERPL-MCNC: 49 MG/DL (ref 7–30)
CALCIUM SERPL-MCNC: 8.8 MG/DL (ref 8.5–10.1)
CHLORIDE SERPL-SCNC: 102 MMOL/L (ref 94–109)
CO2 SERPL-SCNC: 25 MMOL/L (ref 20–32)
COLOR UR AUTO: ABNORMAL
CREAT SERPL-MCNC: 3.53 MG/DL (ref 0.52–1.04)
CREAT UR-MCNC: 23 MG/DL
FERRITIN SERPL-MCNC: 68 NG/ML (ref 8–252)
GFR SERPL CREATININE-BSD FRML MDRD: 12 ML/MIN/1.7M2
GLUCOSE SERPL-MCNC: 275 MG/DL (ref 70–99)
GLUCOSE UR STRIP-MCNC: 70 MG/DL
HGB BLD-MCNC: 10.1 G/DL (ref 11.7–15.7)
HGB UR QL STRIP: NEGATIVE
IRON SATN MFR SERPL: 15 % (ref 15–46)
IRON SERPL-MCNC: 39 UG/DL (ref 35–180)
KETONES UR STRIP-MCNC: NEGATIVE MG/DL
LEUKOCYTE ESTERASE UR QL STRIP: NEGATIVE
NITRATE UR QL: NEGATIVE
NON-SQ EPI CELLS #/AREA URNS LPF: ABNORMAL /LPF
PH UR STRIP: 6.5 PH (ref 5–7)
PHOSPHATE SERPL-MCNC: 4.8 MG/DL (ref 2.5–4.5)
POTASSIUM SERPL-SCNC: 4.4 MMOL/L (ref 3.4–5.3)
PROT UR-MCNC: 1.57 G/L
PROT/CREAT 24H UR: 6.7 G/G CR (ref 0–0.2)
PTH-INTACT SERPL-MCNC: 339 PG/ML (ref 18–80)
RBC #/AREA URNS AUTO: ABNORMAL /HPF
SODIUM SERPL-SCNC: 136 MMOL/L (ref 133–144)
SOURCE: ABNORMAL
SP GR UR STRIP: 1.01 (ref 1–1.03)
TIBC SERPL-MCNC: 253 UG/DL (ref 240–430)
TRANS CELLS #/AREA URNS HPF: ABNORMAL /HPF
UROBILINOGEN UR STRIP-MCNC: NORMAL MG/DL (ref 0–2)
WBC #/AREA URNS AUTO: ABNORMAL /HPF

## 2018-04-24 PROCEDURE — 81001 URINALYSIS AUTO W/SCOPE: CPT | Performed by: INTERNAL MEDICINE

## 2018-04-24 PROCEDURE — 86335 IMMUNFIX E-PHORSIS/URINE/CSF: CPT | Performed by: INTERNAL MEDICINE

## 2018-04-24 PROCEDURE — 82306 VITAMIN D 25 HYDROXY: CPT | Performed by: INTERNAL MEDICINE

## 2018-04-24 PROCEDURE — 80069 RENAL FUNCTION PANEL: CPT | Performed by: INTERNAL MEDICINE

## 2018-04-24 PROCEDURE — 82728 ASSAY OF FERRITIN: CPT | Performed by: INTERNAL MEDICINE

## 2018-04-24 PROCEDURE — 85018 HEMOGLOBIN: CPT | Performed by: INTERNAL MEDICINE

## 2018-04-24 PROCEDURE — 83970 ASSAY OF PARATHORMONE: CPT | Performed by: INTERNAL MEDICINE

## 2018-04-24 PROCEDURE — 99214 OFFICE O/P EST MOD 30 MIN: CPT | Performed by: INTERNAL MEDICINE

## 2018-04-24 PROCEDURE — 83550 IRON BINDING TEST: CPT | Performed by: INTERNAL MEDICINE

## 2018-04-24 PROCEDURE — 84156 ASSAY OF PROTEIN URINE: CPT | Performed by: INTERNAL MEDICINE

## 2018-04-24 PROCEDURE — 36415 COLL VENOUS BLD VENIPUNCTURE: CPT | Performed by: INTERNAL MEDICINE

## 2018-04-24 PROCEDURE — 83540 ASSAY OF IRON: CPT | Performed by: INTERNAL MEDICINE

## 2018-04-24 RX ORDER — FUROSEMIDE 40 MG
TABLET ORAL
Qty: 180 TABLET | Refills: 3 | COMMUNITY
Start: 2018-04-24 | End: 2018-05-18

## 2018-04-24 NOTE — MR AVS SNAPSHOT
After Visit Summary   4/24/2018    Lakhwinder Negron    MRN: [id number]           Patient Information     Date Of Birth          1940        Visit Information        Provider Department      4/24/2018 4:30 PM Ramon Rodriguez MD; [employer] [facility]        Today's Diagnoses     History of GI bleed    -  1    Dependent edema        Anemia, unspecified type        Secondary hyperparathyroidism (H)          Care Instructions    1. Hold the sodium bicarbonate for the time being.   2. Stop the prilosec  3. Start ranitidine 150 mg twice a day  4. Lower lasix to 40 mg AM and 20 mg PM (I believe you have been taking lasix 40 mg twice a day currently - if this is not accurate, please let me know).   5. Follow-up in one month.   6. If considering dialysis, I would recommend vascular referral for access placement as well as additional education at this time. If not considering dialysis, would recommend palliative care consult to assure a good plan in place going forward toward a comfort care approach of care.     Team: orthostatic vital Signs prior to leaving. Thank you, KW          Follow-ups after your visit        Your next 10 appointments already scheduled     Jun 12, 2018  3:30 PM CDT   LAB with LAB FIRST FLOOR [facility] ([facility])    [id number] [place] [id number] [phone]           Please do not eat 10-12 hours before your appointment if you are coming in fasting for labs on lipids, cholesterol, or glucose (sugar). This does not apply to pregnant women. Water, hot tea and black coffee (with nothing added) are okay. Do not drink other fluids, diet soda or chew gum.            Jun 12, 2018  4:00 PM CDT   Return Visit with Ramon Rodriguez MD   [facility] ([facility]    [address] [place] [address]   [phone]            Sep 13, 2018  8:40  AM CDT   Office Visit with Jonna Cason MD   [facility] ([facility]    [address] [facility] [id number] [phone]           Bring a current list of meds and any records pertaining to this visit. For Physicals, please bring immunization records and any forms needing to be filled out. Please arrive 10 minutes early to complete paperwork.              Future tests that were ordered for you today     Open Future Orders        Priority Expected Expires Ordered    Protein electrophoresis Routine 4/24/2018 4/24/2019 4/24/2018    Poinciana and lambda light chain Routine 4/24/2018 4/24/2019 4/24/2018            Who to contact     If you have questions or need follow up information about today's clinic visit or your schedule please contact [facility] directly at [phone].  Normal or non-critical lab and imaging results will be communicated to you by MyChart, letter or phone within 4 business days after the clinic has received the results. If you do not hear from us within 7 days, please contact the clinic through [employer] or phone. If you have a critical or abnormal lab result, we will notify you by phone as soon as possible.  Submit refill requests through [employer] or call your pharmacy and they will forward the refill request to us. Please allow 3 business days for your refill to be completed.          Additional Information About Your Visit        RES[employer]hart Information     [employer]TV is an electronic gateway that provides easy, online access to your medical records. With SnappyTV, you can request a clinic appointment, read your test results, renew a prescription or communicate with your care team.     To sign up for [employer] visit the website at [email or web address]   You will be asked to enter the access code listed below, as well as some personal information. Please follow the directions to create your username and password.      Your access code is: [email or web address]  Expires: 2018  5:21 PM     Your access code will  in 90 days. If you need help or a new code, please contact your [facility] or call [phone] for assistance.        Care EveryWhere ID     This is your Care EveryWhere ID. This could be used by other organizations to access your [facility] medical records  [id number]        Your Vitals Were     Pulse Pulse Oximetry BMI (Body Mass Index)             68 97% 24.89 kg/m2          Blood Pressure from Last 3 Encounters:   18 154/56   03/15/18 140/52   18 157/67    Weight from Last 3 Encounters:   18 51.7 kg (114 lb)   03/15/18 50.7 kg (111 lb 12.8 oz)   18 53.1 kg (117 lb)              We Performed the Following     25 Hydroxyvitamin D2 and D3     Ferritin     Iron and iron binding capacity     Protein immunofixation urine     UA with Microscopic reflex to Culture          Today's Medication Changes          These changes are accurate as of 18  5:21 PM.  If you have any questions, ask your nurse or doctor.               Start taking these medicines.        Dose/Directions    ranitidine 150 MG tablet   Commonly known as:  ZANTAC   Used for:  History of GI bleed   Started by:  Ramon Rodriguez MD        Dose:  150 mg   Take 1 tablet (150 mg) by mouth 2 times daily   Quantity:  60 tablet   Refills:  3         These medicines have changed or have updated prescriptions.        Dose/Directions    furosemide 40 MG tablet   Commonly known as:  LASIX   This may have changed:    - how much to take  - how to take this  - when to take this  - additional instructions   Used for:  Dependent edema   Changed by:  Ramon Rodriguez MD        40 mg AM and 20 mg PM   Quantity:  180 tablet   Refills:  3         Stop taking these medicines if you haven't already. Please contact your care team if you have questions.     omeprazole 20 MG CR capsule   Commonly known as:   priLOSEC   Stopped by:  Ramon Rodriguez MD                Where to get your medicines      These medications were sent to [facility], [place] - [address] [facility]  [address] [facility], [facility] [place] [address]     Phone:  [phone]     ranitidine 150 MG tablet                Primary Care Provider Office Phone # Fax #    Jonna Kaleigh Cason [phone]       [address]  [facility] [address]        Equal Access to Services     [facility]: Hadii aad ku hadasho Soomaali, waaxda luqadaha, qaybta kaalmada adeegyada, waxay idiin hayaan adeeg kharash lagurdeep . So [facility] [phone].    ATENCIÓN: Si habla español, tiene a mckeon disposición servicios gratuitos de asistencia lingüística. [facility] [phone].    We comply with applicable federal civil rights laws and [place] laws. We do not discriminate on the basis of race, color, national origin, age, disability, sex, sexual orientation, or gender identity.            Thank you!     Thank you for choosing [facility]  for your care. Our goal is always to provide you with excellent care. Hearing back from our patients is one way we can continue to improve our services. Please take a few minutes to complete the written survey that you may receive in the mail after your visit with us. Thank you!             Your Updated Medication List - Protect others around you: Learn how to safely use, store and throw away your medicines at www.disposemymeds.org.          This list is accurate as of 4/24/18  5:21 PM.  Always use your most recent med list.                   Brand Name Dispense Instructions for use Diagnosis    ACE/ARB/ARNI NOT PRESCRIBED (INTENTIONAL)      Please choose reason not prescribed, below    Type 2 diabetes mellitus with stage 3 chronic kidney disease, without long-term current use of insulin (H)       acetaminophen-codeine 300-30 MG per tablet    TYLENOL #3    60 tablet    TAKE 1-2 TABLETS ONCE  DAILY AS NEEDED// IB HNUB NOJ 1-2 LUB YOG XAV NOJPAB LASHA MOB IB CE    Chronic pain syndrome       amLODIPine 10 MG tablet    NORVASC    90 tablet    Take 1 tablet (10 mg) by mouth daily    Type 2 diabetes mellitus with other diabetic kidney complication, Proteinuria       ASPIRIN NOT PRESCRIBED    INTENTIONAL    1 each    Please choose reason not prescribed, below    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    Right hemiparesis (H)       carvedilol 12.5 MG tablet    COREG    225 tablet    Take 1.5 tablets (18.75 mg) by mouth 2 times daily (with meals)    Hypertension, goal below 140/90       ferrous sulfate 325 (65 Fe) MG tablet    IRON    100 tablet    Take 1 tablet (325 mg) by mouth every other day    Iron deficiency anemia secondary to inadequate dietary iron intake       furosemide 40 MG tablet    LASIX    180 tablet    40 mg AM and 20 mg PM    Dependent edema       glipiZIDE 2.5 MG 24 hr tablet    GLUCOTROL XL    60 tablet    TAKE 1 TABLET 2 TIMES DAILY FOR DIABETES.// IB ZAUG NOJ 1 LUB, IB HNUB NOJ 2 [facility] PAB LASHA NTSHAV QAB ZIB.    Type 2 diabetes mellitus with stage 3 chronic kidney disease, without long-term current use of insulin (H)       hydrALAZINE 50 MG tablet    APRESOLINE    270 tablet    Take 1 tablet (50 mg) by mouth 3 times daily    Hypertension goal BP (blood pressure) < 140/90       * insulin pen needle 31G X 6 MM    ULTICARE MINI    100 each    Use 2 daily or as directed.    Type 2 diabetes, HbA1C goal < 8% (H)       * UNIFINE PENTIPS 31G X 5 MM   Generic drug:  insulin pen needle     100 each    USE AS DIRECTED TWO TIMES DAILY // IB HNUB SIV 2 ZAUG CELESTINE QHIA    Hyperglycemia due to type 2 diabetes  mellitus (H)       * olopatadine 0.1 % ophthalmic solution    PATANOL    5 mL    Place 1 drop into both eyes 2 times daily as needed for allergies (itchy eye.)    Allergic conjunctivitis       * olopatadine 0.1 % ophthalmic solution    PATANOL    5 mL    Place 1 drop into both eyes 2 times daily as needed for allergies    Allergic conjunctivitis of both eyes       ONETOUCH DELICA LANCETS 33G Misc     100 each    USE AS DIRECTED TWO TIMES DAILY // IB HNUB SIV 2 ZAUG LI QHIA    Essential hypertension, Type 2 diabetes mellitus with diabetic chronic kidney disease, unspecified CKD stage, unspecified long term insulin use status (H)       ONETOUCH ULTRA test strip   Generic drug:  blood glucose monitoring     200 strip    USE TO TEST BLOOD SUGAR 2-3 TIMES A DAY // IB HNUB SIV 2-3 ZAUG LI QHIA    Type 2 diabetes mellitus with stage 3 chronic kidney disease, unspecified long term insulin use status (H)       * order for DME     1 each    Equipment being ordered: blood pressure meter with supplies and adult cuff    Hypertension goal BP (blood pressure) < 140/90       order for DME     200 each    Lancets bid for type 2 diabetes on insulin.    Type 2 diabetes mellitus with proteinuria or albuminuria       order for DME     1 Device    Equipment being ordered: Shoe lift for leg length difference.    Leg length difference, acquired, CVA (cerebral vascular accident) (H)       order for DME     200 each    Test strips for pt's glucometer, brand as covered by insurance Test bid. One touch Delica lancets. Type 2 diabetes on insulin.  Directions: use as directed Quantity: 200    Type 2 diabetes, HbA1C goal < 8% (H)       ranitidine 150 MG tablet    ZANTAC    60 tablet    Take 1 tablet (150 mg) by mouth 2 times daily    History of GI bleed       rosuvastatin 20 MG tablet    CRESTOR    90 tablet    TAKE 1 TABLET BY MOUTH DAILY FOR CHOLESTEROL // IB HNUB NOJ 1 LUB LASHA NTSHAV MUAJ ROJ    Hyperlipidemia LDL goal <100       * Notice:   This list has 6 medication(s) that are the same as other medications prescribed for you. Read the directions carefully, and ask your doctor or other care provider to review them with you.

## 2018-04-24 NOTE — PATIENT INSTRUCTIONS
1. Hold the sodium bicarbonate for the time being.   2. Stop the prilosec  3. Start ranitidine 150 mg twice a day  4. Lower lasix to 40 mg AM and 20 mg PM (I believe you have been taking lasix 40 mg twice a day currently - if this is not accurate, please let me know).   5. Follow-up in one month.   6. If considering dialysis, I would recommend vascular referral for access placement as well as additional education at this time. If not considering dialysis, would recommend palliative care consult to assure a good plan in place going forward toward a comfort care approach of care.     Team: orthostatic vital Signs prior to leaving. Thank you, KW

## 2018-04-24 NOTE — NURSING NOTE
Orthostatics  Blood pressure and Pulse:   Supine  174/68   65  Sitting  168/67   64  Standing   157/63   66      one minute wait between readings.    Cora Hayes LPN

## 2018-04-24 NOTE — NURSING NOTE
"Lakhwinder Negron's goals for this visit include: recheck  She requests these members of her care team be copied on today's visit information:     PCP: Jonna Cason    Referring Provider:  No referring provider defined for this encounter.    Chief Complaint   Patient presents with     RECHECK       Initial /56  Pulse 68  Wt 51.7 kg (114 lb)  SpO2 97%  BMI 24.89 kg/m2 Estimated body mass index is 24.89 kg/(m^2) as calculated from the following:    Height as of 3/15/18: 1.441 m (4' 8.75\").    Weight as of this encounter: 51.7 kg (114 lb).  Medication Reconciliation: complete    "

## 2018-04-25 LAB — PROT ELPH PNL UR ELPH: NORMAL

## 2018-04-27 LAB
DEPRECATED CALCIDIOL+CALCIFEROL SERPL-MC: <31 UG/L (ref 20–75)
VITAMIN D2 SERPL-MCNC: <5 UG/L
VITAMIN D3 SERPL-MCNC: 26 UG/L

## 2018-04-30 ENCOUNTER — TELEPHONE (OUTPATIENT)
Dept: NEPHROLOGY | Facility: CLINIC | Age: 78
End: 2018-04-30

## 2018-04-30 DIAGNOSIS — D50.8 IRON DEFICIENCY ANEMIA SECONDARY TO INADEQUATE DIETARY IRON INTAKE: ICD-10-CM

## 2018-04-30 RX ORDER — FERROUS SULFATE 325(65) MG
325 TABLET ORAL DAILY
Qty: 100 TABLET | Refills: 1 | Status: SHIPPED | OUTPATIENT
Start: 2018-04-30 | End: 2018-10-26

## 2018-04-30 NOTE — LETTER
April 30, 2018      Christyfrankie BREEN Jamil  [address] [facility] [address]              Dear Lakhwinder,    The results of your recent Lab tests is listed below.    Vitamin d level is at goal.   No blood in the urine.  Iron level is below goal. I recommend increasing iron dosing to every day to see if we can help increase this iron saturation.    Please call if you have any questions or concerns.  [phone]      Sincerely,      Ramon Rodriguez, DO  KW/gw    Results for orders placed or performed in visit on 04/24/18   Protein immunofixation urine   Result Value Ref Range    Immunofix ELP Urine       No monoclonal protein seen on immunofixation.  Pathological significance requires clinical   correlation.     UA with Microscopic reflex to Culture   Result Value Ref Range    Color Urine Straw     Appearance Urine Clear     Glucose Urine 70 (A) NEG^Negative mg/dL    Bilirubin Urine Negative NEG^Negative    Ketones Urine Negative NEG^Negative mg/dL    Specific Gravity Urine 1.006 1.003 - 1.035    Blood Urine Negative NEG^Negative    pH Urine 6.5 5.0 - 7.0 pH    Protein Albumin Urine 100 (A) NEG^Negative mg/dL    Urobilinogen mg/dL Normal 0.0 - 2.0 mg/dL    Nitrite Urine Negative NEG^Negative    Leukocyte Esterase Urine Negative NEG^Negative    Source Midstream Urine     WBC Urine 0 - 5 OTO5^0 - 5 /HPF    RBC Urine O - 2 OTO2^O - 2 /HPF    Bacteria Urine Few (A) NEG^Negative /HPF    Squamous Epithelial /LPF Urine Few FEW^Few /LPF    Transitional Epi Few FEW^Few /HPF   25 Hydroxyvitamin D2 and D3   Result Value Ref Range    25 OH Vit D2 <5 ug/L    25 OH Vit D3 26 ug/L    25 OH Vit D total <31 20 - 75 ug/L   Ferritin   Result Value Ref Range    Ferritin 68 8 - 252 ng/mL   Iron and iron binding capacity   Result Value Ref Range    Iron 39 35 - 180 ug/dL    Iron Binding Cap 253 240 - 430 ug/dL    Iron Saturation Index 15 15 - 46 %

## 2018-04-30 NOTE — TELEPHONE ENCOUNTER
"Attempted to contact pt via phone using "[employer] Services [phone].  No answer. Message left to call [facility] at [phone].    Attempted to contact pt's Son \"Ramin Negron\" (Auth to Discuss on file).  No answer.  Message left to call Dr. Rodriguez's office at [facility] at [phone].  Regarding his Mom, need to give lab results and recommendations per Dr. Rodriguez.    Letter mailed to pt as well.    Notes Recorded by Ramon Rodriguez MD on 4/27/2018 at 2:30 PM  Vitamin d level is at goal.  ------    Notes Recorded by Ramon Rodriguez MD on 4/27/2018 at 7:37 AM  No blood in the urine   Iron level is below goal. I recommend increasing iron dosing to every day to see if we can help increase this iron saturation.      Cora Hayes LPN    "

## 2018-05-02 ENCOUNTER — PATIENT OUTREACH (OUTPATIENT)
Dept: GERIATRIC MEDICINE | Facility: CLINIC | Age: 78
End: 2018-05-02

## 2018-05-02 NOTE — PROGRESS NOTES
Per [employer], Incontinence Pads & Pull Ups delivered on 04/25/18. Order placed 04/13/18.    Lilliana Greer  CMS Supervisor  [facility]  [phone]

## 2018-05-07 DIAGNOSIS — N18.30 TYPE 2 DIABETES MELLITUS WITH STAGE 3 CHRONIC KIDNEY DISEASE, WITHOUT LONG-TERM CURRENT USE OF INSULIN (H): ICD-10-CM

## 2018-05-07 DIAGNOSIS — E11.22 TYPE 2 DIABETES MELLITUS WITH STAGE 3 CHRONIC KIDNEY DISEASE, WITHOUT LONG-TERM CURRENT USE OF INSULIN (H): ICD-10-CM

## 2018-05-07 NOTE — TELEPHONE ENCOUNTER
Requested Prescriptions   Pending Prescriptions Disp Refills     glipiZIDE (GLUCOTROL XL) 2.5 MG 24 hr tablet [Pharmacy Med Name: GLIPIZIDE ER 2.5 MG TAB 2.5 TAB]  Last Written Prescription Date:  02/06/18  Last Fill Quantity: 60,  # refills: 3   Last Office Visit with FMANDRIY, YAS or [facility] prescribing provider:  03/15/18   Future Office Visit:    Next 5 appointments (look out 90 days)     Jun 12, 2018  4:00 PM CDT   Return Visit with Ramon Rodriguez MD   [facility]    [address] [address] [place] [id number] [phone]                60 tablet 3     Sig: TAKE 1 TABLET BY MOUTH TWO TIMES DAILY FOR DIABETES // IB ZAUG NOJ 1 LUB, IB HNUB NOJ 2 ZAUG PAB LASHA NTSHAV QAB ZIB    Sulfonylurea Agents Failed    5/7/2018 12:22 PM       Failed - Blood pressure less than 140/90 in past 6 months    BP Readings from Last 3 Encounters:   04/24/18 157/63   03/15/18 140/52   03/13/18 157/67                Failed - Patient has a recent creatinine (normal) within the past 12 mos.    Recent Labs   Lab Test  04/24/18   1603   CR  3.53*            Passed - Patient has documented LDL within the past 12 mos.    Recent Labs   Lab Test  03/15/18   0859   LDL  34            Passed - Patient has had a Microalbumin in the past 12 mos.    Recent Labs   Lab Test  06/07/17   1011   MICROL  7350   UMALCR  6282.05*            Passed - Patient has documented A1c within the specified period of time.    Recent Labs   Lab Test  03/15/18   0859   A1C  6.6*            Passed - Patient is age 18 or older       Passed - No active pregnancy on record       Passed - Patient has not had a positive pregnancy test within the past 12 mos.       Passed - Recent (6 mo) or future (30 days) visit within the authorizing provider's specialty    Patient had office visit in the last 6 months or has a visit in the next 30 days with authorizing provider or within the authorizing provider's specialty.  See  "\"Patient Info\" tab in inbasket, or \"Choose Columns\" in Meds & Orders section of the refill encounter.              "

## 2018-05-10 ENCOUNTER — PATIENT OUTREACH (OUTPATIENT)
Dept: GERIATRIC MEDICINE | Facility: CLINIC | Age: 78
End: 2018-05-10

## 2018-05-10 ENCOUNTER — TRANSFERRED RECORDS (OUTPATIENT)
Dept: HEALTH INFORMATION MANAGEMENT | Facility: CLINIC | Age: 78
End: 2018-05-10

## 2018-05-10 ENCOUNTER — TELEPHONE (OUTPATIENT)
Dept: FAMILY MEDICINE | Facility: CLINIC | Age: 78
End: 2018-05-10

## 2018-05-10 LAB — EJECTION FRACTION: 63

## 2018-05-10 RX ORDER — GLIPIZIDE 2.5 MG/1
TABLET, EXTENDED RELEASE ORAL
Qty: 60 TABLET | Refills: 3 | Status: SHIPPED | OUTPATIENT
Start: 2018-05-10 | End: 2018-11-19

## 2018-05-10 NOTE — TELEPHONE ENCOUNTER
Reason for Call:  Form, our goal is to have forms completed with 72 hours, however, some forms may require a visit or additional information.    Type of letter, form or note:  FMLA    Who is the form from?: Patient / son    Where did the form come from: Patient or family brought in       What clinic location was the form placed at?: [place]    Where the form was placed: 's Box p    What number is listed as a contact on the form?: [phone]       Additional comments: Son Ramin will  forms when ready.    Call taken on 5/10/2018 at 4:46 PM by Ronnie Robles

## 2018-05-10 NOTE — PROGRESS NOTES
[facility] Care Coordination Contact    Kellie Cason,    I am the [facility] care coordinator for Lakhwinder Negron.  I am writing to inform you that client was admitted to [facility] on 5/8/2018 for CHF.     All of my documentation can be found in EPIC. You do not have to respond to this message. However, if you have any questions or concerns, please feel free to contact me.    Jacqui Robles RN, PHN  [facility]   Phone: [phone]  Fax [phone]  [email or web address]

## 2018-05-10 NOTE — TELEPHONE ENCOUNTER
Routing refill request to provider for review/approval because:  Labs out of range:  BP and Creatinine    Colleen Dixon RN

## 2018-05-10 NOTE — TELEPHONE ENCOUNTER
Son Ramin Negron returned call to clinic. Informed him of Dr. Rodriguez's recommendations and lab result comment. He confirmed and expressed understanding. He also asked which doctor he should give his FMLA paperwork to. Informed him to give this paperwork to the pt's PCP. He states he needs this because the pt is not doing well and was recently in the ED due to SOB. He had no questions or concerns at this time.  Ines Daley, CMA

## 2018-05-11 NOTE — TELEPHONE ENCOUNTER
Form is received from the  and forward to Dr. Cason to address.  Gamal Negron,  [employer]

## 2018-05-14 ENCOUNTER — PATIENT OUTREACH (OUTPATIENT)
Dept: GERIATRIC MEDICINE | Facility: CLINIC | Age: 78
End: 2018-05-14

## 2018-05-14 NOTE — TELEPHONE ENCOUNTER
Patient's son Ramin called regarding FMLA.  He is requesting Intermittent as needed for an indefinite amount of time.     Please call Ramin at [phone] with any questions.    Thank you,    Latoya Klein

## 2018-05-14 NOTE — TELEPHONE ENCOUNTER
Noted Bee info below, forwarding the form and this message to Dr. Cason to address.  Gamal Negron,  For [employer] [facility] [employer]

## 2018-05-14 NOTE — TELEPHONE ENCOUNTER
This writer attempted to contact Bee on 05/14/18      Reason for call Dr. Cason needs more information on how much time off Bee needs from work and left detailed message.      If patient calls back:   Relay message from Dr. Cason below, (read verbatim) and route this message back to the care team.  Gamal Negron,  For [facility] [employer] [facility]        Gamal Negron

## 2018-05-14 NOTE — PROGRESS NOTES
[facility] Care Coordination Contact    Kellie Cason,    I am the [facility] care coordinator for Lakhwinder Negron.  I am writing to inform you that client was  was discharged from [facility] to home on 5/11/2018 with resumption of home care services. She has a post hospital f/u with you on 5/18/2018.      All of my documentation can be found in EPIC. You do not have to respond to this message. However, if you have any questions or concerns, please feel free to contact me.    Jacqui Robles RN, PHN  [facility]   Phone: [phone]  Fax [phone]  [email or web address]

## 2018-05-14 NOTE — TELEPHONE ENCOUNTER
Can you get more information about how much time off patient's son needs or wants to take from work? This can be in days per month or week, if he is not certain. I can also OK a block of time if needed.  Jonna Cason MD

## 2018-05-14 NOTE — TELEPHONE ENCOUNTER
Reason for Call:  Other call back and returning call    Detailed comments: Ramin would like 2 days a week to be with his mom.     Phone Number Patient can be reached at: [phone] (I)    Best Time: Any    Can we leave a detailed message on this number? YES    Call taken on 5/14/2018 at 1:29 PM by Familia Maldonado

## 2018-05-15 PROBLEM — N18.5 CKD (CHRONIC KIDNEY DISEASE) STAGE 5, GFR LESS THAN 15 ML/MIN (H): Status: ACTIVE | Noted: 2018-03-15

## 2018-05-15 NOTE — TELEPHONE ENCOUNTER
This writer attempted to contact Ramin on 05/15/18      Reason for call FMLA and left detailed message.      If patient calls back:   Relay message FMLA form ready for  at the , bring photo ID, (read verbatim), document that pt called and close encounter        Ronni Robles

## 2018-05-15 NOTE — TELEPHONE ENCOUNTER
form will be deliver to the  this afternoon for pickup after 1pm.  Gamal Negron,  [employer]    Please call Ramin to let them know the above info.  And remind the person who is picking up to bring their photo ID.  Gamal Negron,  For [employer]     NOTE: If patient/gaurdian calls the clinic before the care teams gets a chance to call them, please let pt know the above information regarding pickup times, remind them to bring a photo ID and close the encounter.  Gamal Negron,  For [employer] [facility]    FYI:  Anything completed after 2:00p will not be delivered until the next business day after 11a.   Gamal Negron,  for Team's Comfort and Heart.
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
## 2018-05-18 ENCOUNTER — OFFICE VISIT (OUTPATIENT)
Dept: FAMILY MEDICINE | Facility: CLINIC | Age: 78
End: 2018-05-18
Payer: COMMERCIAL

## 2018-05-18 VITALS
TEMPERATURE: 98.1 F | BODY MASS INDEX: 23.3 KG/M2 | HEIGHT: 57 IN | DIASTOLIC BLOOD PRESSURE: 46 MMHG | SYSTOLIC BLOOD PRESSURE: 141 MMHG | OXYGEN SATURATION: 97 % | HEART RATE: 68 BPM | WEIGHT: 108 LBS | RESPIRATION RATE: 16 BRPM

## 2018-05-18 DIAGNOSIS — E78.5 HYPERLIPIDEMIA LDL GOAL <100: ICD-10-CM

## 2018-05-18 DIAGNOSIS — I10 HYPERTENSION, GOAL BELOW 140/90: ICD-10-CM

## 2018-05-18 DIAGNOSIS — I63.549 CEREBROVASCULAR ACCIDENT (CVA) DUE TO OCCLUSION OF CEREBELLAR ARTERY, UNSPECIFIED BLOOD VESSEL LATERALITY (H): ICD-10-CM

## 2018-05-18 DIAGNOSIS — N25.81 SECONDARY HYPERPARATHYROIDISM (H): ICD-10-CM

## 2018-05-18 DIAGNOSIS — N18.5 CKD (CHRONIC KIDNEY DISEASE) STAGE 5, GFR LESS THAN 15 ML/MIN (H): ICD-10-CM

## 2018-05-18 DIAGNOSIS — Z13.5 SCREENING FOR DIABETIC RETINOPATHY: ICD-10-CM

## 2018-05-18 DIAGNOSIS — E11.42 TYPE 2 DIABETES MELLITUS WITH DIABETIC POLYNEUROPATHY, UNSPECIFIED LONG TERM INSULIN USE STATUS: Primary | ICD-10-CM

## 2018-05-18 PROCEDURE — 99214 OFFICE O/P EST MOD 30 MIN: CPT | Performed by: FAMILY MEDICINE

## 2018-05-18 RX ORDER — FUROSEMIDE 20 MG
60 TABLET ORAL
COMMUNITY
Start: 2018-05-11 | End: 2018-05-25

## 2018-05-18 RX ORDER — ROSUVASTATIN CALCIUM 20 MG/1
10 TABLET, COATED ORAL
COMMUNITY
Start: 2018-05-11 | End: 2018-06-14

## 2018-05-18 ASSESSMENT — PAIN SCALES - GENERAL: PAINLEVEL: NO PAIN (0)
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
## 2018-05-18 NOTE — MR AVS SNAPSHOT
After Visit Summary   5/18/2018    Lakhwinder Negron    MRN: [id number]           Patient Information     Date Of Birth          1940        Visit Information        Provider Department      5/18/2018 11:25 AM Jonna Cason MD; [employer] [facility]        Today's Diagnoses     Screening for diabetic retinopathy    -  1    Type 2 diabetes mellitus with diabetic polyneuropathy, unspecified long term insulin use status (H)        CKD (chronic kidney disease) stage 5, GFR less than 15 ml/min (H)        Secondary hyperparathyroidism (H)        Hyperlipidemia LDL goal <100        Cerebrovascular accident (CVA) due to occlusion of cerebellar artery, unspecified blood vessel laterality (H)        Hypertension, goal below 140/90          Care Instructions    At [facility], we strive to deliver an exceptional experience to you, every time we see you.  If you receive a survey in the mail, please send us back your thoughts. We really do value your feedback.    Based on your medical history, these are the current health maintenance/preventive care services that you are due for (some may have been done at this visit.)  Health Maintenance Due   Topic Date Due     HF ACTION PLAN Q3 YR  1940     URINE DRUG SCREEN Q1 YR  05/06/1953     TRICIA QUESTIONNAIRE 1 YEAR  04/28/2017     ADVANCE DIRECTIVE PLANNING Q5 YRS  09/25/2017     EYE EXAM Q1 YEAR  11/14/2017     MICROALBUMIN Q1 YEAR  06/07/2018         Suggested websites for health information:  Www.Avuxi.org : Up to date and easily searchable information on multiple topics.  Www.medlineplus.gov : medication info, interactive tutorials, watch real surgeries online  Www.familydoctor.org : good info from the Academy of Family Physicians  Www.cdc.gov : public health info, travel advisories, epidemics (H1N1)  Www.aap.org : children's health info, normal development, vaccinations  Www.health.state.[place].us :  [place] dept of health, public health issues in [place], N1N1    Your care team:                            Family Medicine Internal Medicine   MD Gera Frost MD Shantel Branch-Fleming, MD Katya Georgiev PA-C Nam Ho, MD Pediatrics   FAIZA Benavides, KD Gómez APRN CNP   MD Candida Gonzalez MD Deborah Mielke, MD Kim Thein, APRN [facility]      Clinic hours: Monday - Thursday 7 am-7 pm; Fridays 7 am-5 pm.   Urgent care: Monday - Friday 11 am-9 pm; Saturday and Sunday 9 am-5 pm.  Pharmacy : Monday -Thursday 8 am-8 pm; Friday 8 am-6 pm; Saturday and Sunday 9 am-5 pm.     Clinic: [phone]   Pharmacy: [phone]    Hemodialysis    The job of the kidneys is to remove waste and extra water from the body. The kidneys also balance levels of minerals in the body, called electrolytes. Kidneys are essential for the health of the body. People with severe kidney disease are not able to perform these functions. This may be due to a temporary or a permanent kidney condition. Hemodialysis is a treatment that takes over the essential functions of the kidney until you recover from kidney disease, or get a kidney transplant. If you have end stage renal disease and are not eligible for a transplant, you will need to have hemodialysis for the rest of your life. Peritoneal dialysis is another type of dialysis but is not covered in this article.  Hemodialysis requires access to a strong blood flow. There are 3 ways to do this.     Dialysis catheter. This is a plastic tube put into a large blood vessel, usually for temporary access. If there is no other option, it may be used permanently.    AV fistula. Through a minor surgical procedure, an artery in your arm is joined directly to a vein. This creates an arteriovenous fistula or AV fistula. The pressure of the arterial blood flow slowly expands the size of the attached vein. It may take up to 4 to 6 weeks for the fistula to  enlarge enough to be ready for dialysis. The AV fistula is the access of choice. This is because there are typically fewer problems and side effects. It also lasts longer than the other options.    AV graft. This is an artificial implant that joins an artery and vein in people with small veins. It can also be used when an AV fistula did not work. It can be used for dialysis a few weeks after the surgery.  A connecting tube carries blood from the access point in the body to the dialysis machine where special filters called dialyzers filter and process the blood. The blood is then returned to the body through a separate tube and needle. This takes 3 to 4 hours and is usually done 3 times a week. Home dialysis is an option for some patients.  Hemodialysis is lifesaving for people with kidney failure, but there are possible side effects. These include infection, low blood pressure, bleeding, electrolyte imbalance, anemia, and heart disease.  Home care  The following guidelines will help you care for yourself at home:    Take any medicines as directed.    Follow the special diet for kidney failure that your healthcare provider gave you.    Don t wear any clothing or jewelry that could put pressure on the access site.    Don t lie on the access site while sleeping.    If the access is in your arm, have blood pressure readings and blood samples taken from the other arm.    Check the access site after dialysis for swelling, bleeding, or signs of infection.    If you have an AV fistula or graft, check the site regularly to be sure you can always feel the vibration (called the  thrill ) of blood flowing from artery to vein. Don t put lotions or other products on the access site.    If you have an external dialysis catheter, avoid physical activities that could pull on the catheter.  Follow-up care  Follow up with your healthcare provider, or as advised.  Call 911  Call 911 if any of these occur:    External catheter starts  bleeding or opens to air    Severe weakness, dizziness, fainting, drowsiness, or confusion    Chest pain or shortness of breath  When to seek medical advice  Call your healthcare provider right away if any of these occur:    Color of the blood in the external tubing changes from bright red to dark red    You don t feel the  thrill  (vibration) in an AV fistula or graft    Nausea or vomiting    Unexpected weight gain or swelling in the legs, ankles, or around the eyes    Decreased or absent urine output if you previously made urine    Fever of 100.4 F (38 C) or higher, or as directed by your healthcare provider  Date Last Reviewed: 10/1/2016    4316-9631 [employer]. [address] [place] [address], [place] [id number]. All rights reserved. This information is not intended as a substitute for professional medical care. Always follow your healthcare professional's instructions.        Central Line (Central Venous Access Device)     The catheter may have more than 1 channel. This means that different fluids or medicines can be given at the same time.   You need a central line as part of your treatment. It s also called a central venous access device (CVAD) or central venous catheter (CVC). A small, soft tube called a catheter is put in a vein that leads to your heart. When you no longer need the central line, it will be taken out. Your skin will then heal. This sheet describes types of central lines. It also explains how the central line is placed in your body.  What a central line does  A central line is often used instead of a standard IV (intravenous) line when you need treatment for longer than a week or so. The line can deliver medicine or nutrition right into your bloodstream. It can also be used to measure blood flow (hemodynamic monitoring), to draw blood, or for other reasons. Ask your healthcare provider why you need the central line and which type you ll get.  Types of central lines  The central line will  be placed into 1 of the veins as described below. Which vein is used depends on your needs and overall health. The catheter is threaded through the vein until the tip sits in the large vein near the heart (vena cava). Types of central lines include:    Peripherally inserted central catheter (PICC). This line is placed in a large vein in the upper arm, or near the bend of the elbow.    Subclavian line. This line is placed into the vein that runs behind the collarbone.    Internal jugular line. This line is placed into a large vein in the neck.    Femoral line. This line is placed in a large vein in the groin.  Placing the central line  The central line is placed in your body during a brief procedure. This may be done in your hospital room or an operating room. Your healthcare team can tell you what to expect. During central line placement:    You re fully covered with a large sterile sheet. Only the spot where the line will be placed is exposed. The skin is cleaned with antiseptic solution. These steps lower the risk for infection.    Medicine (local anesthetic) is injected near the vein. This numbs the skin so you don t feel pain during the procedure.    After the pain medicine takes effect, the catheter is gently passed into the vein. It s moved forward until the tip of the catheter is in the vena cava, close to the heart.    The other end of the catheter extends a few inches out from your skin. It may be loosely attached to the skin with stitches to hold it in place.    The healthcare provider flushes the catheter with saline solution to clear it. The solution may include heparin, which prevents blood clots.    An X-ray or other imaging test is done. This allows the provider to confirm the catheter s position and check for problems.  Risks and complications  As with any procedure, having a central line placed has certain risks. These include:    Infection    Bleeding problems    An irregular heartbeat    Injury to  the vein or to lymph ducts near the vein    Inflammation of the vein (phlebitis)    Air bubble in the blood (air embolism). An air embolism can travel through the blood vessels and block the flow of blood to the heart, lungs, brain, or other organs.    Blood clot (thrombus) that can block the flow of blood. A blood clot can also travel through the blood vessels and block the flow of blood to the heart, lungs (pulmonary embolism), brain, or other organs.    Collapsed lung (pneumothorax) or buildup of blood between the lungs and the chest wall (hemothorax)    Nerve injury    Accidental insertion into an artery instead of a vein    Catheter not positioned correctly  If you have any problems with your central line, talk to your healthcare provider.  Date Last Reviewed: 7/1/2016 2000-2017 [employer]. [address] [place] [address]. All rights reserved. This information is not intended as a substitute for professional medical care. Always follow your healthcare professional's instructions.                Follow-ups after your visit        Your next 10 appointments already scheduled     Jun 12, 2018  3:30 PM CDT   LAB with LAB FIRST FLOOR [facility] ([facility])    [id number] [place] [id number] [phone]           Please do not eat 10-12 hours before your appointment if you are coming in fasting for labs on lipids, cholesterol, or glucose (sugar). This does not apply to pregnant women. Water, hot tea and black coffee (with nothing added) are okay. Do not drink other fluids, diet soda or chew gum.            Jun 12, 2018  4:00 PM CDT   Return Visit with Ramon Rodriguez MD   [facility]    [id number] [place] [id number] [phone]            Sep 13, 2018  8:40 AM CDT   Office Visit with Jonna Cason MD   [facility]  "[facility]    [address] [facility] [id number] [phone]           Bring a current list of meds and any records pertaining to this visit. For Physicals, please bring immunization records and any forms needing to be filled out. Please arrive 10 minutes early to complete paperwork.              Who to contact     If you have questions or need follow up information about today's clinic visit or your schedule please contact [facility] directly at [phone].  Normal or non-critical lab and imaging results will be communicated to you by [employer] Experthart, letter or phone within 4 business days after the clinic has received the results. If you do not hear from us within 7 days, please contact the clinic through AirPRt or phone. If you have a critical or abnormal lab result, we will notify you by phone as soon as possible.  Submit refill requests through mphoria or call your pharmacy and they will forward the refill request to us. Please allow 3 business days for your refill to be completed.          Additional Information About Your Visit        D[facility] Expert[facility] [email or web address] Information     mphoria lets you send messages to your doctor, view your test results, renew your prescriptions, schedule appointments and more. To sign up, go to [email or web address] . Click on \"Log in\" on the left side of the screen, which will take you to the Welcome page. Then click on \"Sign up Now\" on the right side of the page.     You will be asked to enter the access code listed below, as well as some personal information. Please follow the directions to create your username and password.     Your access code is: [email or web address]  Expires: 2018  5:21 PM     Your access code will  in 90 days. If you need help or a new code, please call your [facility] or [phone].        Care EveryWhere ID     This is your Care EveryWhere ID. This could be used by other organizations to access your [place] medical " "records  [id number]        Your Vitals Were     Pulse Temperature Respirations Height Pulse Oximetry BMI (Body Mass Index)    68 98.1  F (36.7  C) (Oral) 16 4' 8.75\" (1.441 m) 97% 23.58 kg/m2       Blood Pressure from Last 3 Encounters:   05/18/18 141/46   04/24/18 157/63   03/15/18 140/52    Weight from Last 3 Encounters:   05/18/18 108 lb (49 kg)   04/24/18 114 lb (51.7 kg)   03/15/18 111 lb 12.8 oz (50.7 kg)              Today, you had the following     No orders found for display       Primary Care Provider Office Phone # Fax #    Jonna Kaleigh Cason [phone]       [address]  [facility] [address]        Equal Access to Services     [facility]: Hadii aad ku hadasho Soomaali, waaxda luqadaha, qaybta kaalmada adeegyada, waxay idiin hayaan bing kharaila batista . So [facility] [phone].    ATENCIÓN: Si habla español, tiene a mckeon disposición servicios gratuitos de asistencia lingüística. Llame al [phone].    We comply with applicable federal civil rights laws and [place] laws. We do not discriminate on the basis of race, color, national origin, age, disability, sex, sexual orientation, or gender identity.            Thank you!     Thank you for choosing [facility]  for your care. Our goal is always to provide you with excellent care. Hearing back from our patients is one way we can continue to improve our services. Please take a few minutes to complete the written survey that you may receive in the mail after your visit with us. Thank you!             Your Updated Medication List - Protect others around you: Learn how to safely use, store and throw away your medicines at www.disposemymeds.org.          This list is accurate as of 5/18/18 12:09 PM.  Always use your most recent med list.                   Brand Name Dispense Instructions for use Diagnosis    ACE/ARB/ARNI NOT PRESCRIBED (INTENTIONAL)      Please choose reason not prescribed, below    Type 2 " diabetes mellitus with stage 3 chronic kidney disease, without long-term current use of insulin (H)       acetaminophen-codeine 300-30 MG per tablet    TYLENOL #3    60 tablet    TAKE 1-2 TABLETS ONCE DAILY AS NEEDED// IB HNUB NOJ 1-2 LUB YOG XAV NOJPAB LASHA MOB IB CE    Chronic pain syndrome       amLODIPine 10 MG tablet    NORVASC    90 tablet    Take 1 tablet (10 mg) by mouth daily    Type 2 diabetes mellitus with other diabetic kidney complication, Proteinuria       ASPIRIN NOT PRESCRIBED    INTENTIONAL    1 each    Please choose reason not prescribed, below    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    Right hemiparesis (H)       carvedilol 12.5 MG tablet    COREG    225 tablet    Take 1.5 tablets (18.75 mg) by mouth 2 times daily (with meals)    Hypertension, goal below 140/90       ferrous sulfate 325 (65 Fe) MG tablet    IRON    100 tablet    Take 1 tablet (325 mg) by mouth daily    Iron deficiency anemia secondary to inadequate dietary iron intake       furosemide 20 MG tablet    LASIX     Take 60 mg by mouth        glipiZIDE 2.5 MG 24 hr tablet    GLUCOTROL XL    60 tablet    TAKE 1 TABLET BY MOUTH TWO TIMES DAILY FOR DIABETES // IB ZAUG NOJ 1 LUB, IB HNUB NOJ 2 ZAUG PAB LASHA NTSHAV QAB ZIB    Type 2 diabetes mellitus with stage 3 chronic kidney disease, without long-term current use of insulin (H)       hydrALAZINE 50 MG tablet    APRESOLINE    270 tablet    Take 1 tablet (50 mg) by mouth 3 times daily    Hypertension goal BP (blood pressure) < 140/90       * insulin pen needle 31G X 6 MM    ULTICARE MINI    100 each    Use 2 daily or as directed.    Type 2 diabetes, HbA1C goal < 8% (H)       * UNIFINE PENTIPS 31G  X 5 MM   Generic drug:  insulin pen needle     100 each    USE AS DIRECTED TWO TIMES DAILY // IB HNUB SIV 2 ZAUG LI QHIA    Hyperglycemia due to type 2 diabetes mellitus (H)       * olopatadine 0.1 % ophthalmic solution    PATANOL    5 mL    Place 1 drop into both eyes 2 times daily as needed for allergies (itchy eye.)    Allergic conjunctivitis       * olopatadine 0.1 % ophthalmic solution    PATANOL    5 mL    Place 1 drop into both eyes 2 times daily as needed for allergies    Allergic conjunctivitis of both eyes       ONETOUCH DELICA LANCETS 33G Misc     100 each    USE AS DIRECTED TWO TIMES DAILY // IB HNUB SIV 2 ZAUG LI QHIA    Essential hypertension, Type 2 diabetes mellitus with diabetic chronic kidney disease, unspecified CKD stage, unspecified long term insulin use status (H)       ONETOUCH ULTRA test strip   Generic drug:  blood glucose monitoring     200 strip    USE TO TEST BLOOD SUGAR 2-3 TIMES A DAY // IB HNUB SIV 2-3 ZAUG LI QHIA    Type 2 diabetes mellitus with stage 3 chronic kidney disease, unspecified long term insulin use status (H)       * order for DME     1 each    Equipment being ordered: blood pressure meter with supplies and adult cuff    Hypertension goal BP (blood pressure) < 140/90       order for DME     200 each    Lancets bid for type 2 diabetes on insulin.    Type 2 diabetes mellitus with proteinuria or albuminuria       order for DME     1 Device    Equipment being ordered: Shoe lift for leg length difference.    Leg length difference, acquired, CVA (cerebral vascular accident) (H)       order for DME     200 each    Test strips for pt's glucometer, brand as covered by insurance Test bid. One touch Delica lancets. Type 2 diabetes on insulin.  Directions: use as directed Quantity: 200    Type 2 diabetes, HbA1C goal < 8% (H)       ranitidine 150 MG tablet    ZANTAC    60 tablet    Take 1 tablet (150 mg) by mouth 2 times daily    History of GI bleed       rosuvastatin 20 MG tablet     CRESTOR     Take 10 mg by mouth        * Notice:  This list has 6 medication(s) that are the same as other medications prescribed for you. Read the directions carefully, and ask your doctor or other care provider to review them with you.

## 2018-05-18 NOTE — PATIENT INSTRUCTIONS
At [facility], we strive to deliver an exceptional experience to you, every time we see you.  If you receive a survey in the mail, please send us back your thoughts. We really do value your feedback.    Based on your medical history, these are the current health maintenance/preventive care services that you are due for (some may have been done at this visit.)  Health Maintenance Due   Topic Date Due     HF ACTION PLAN Q3 YR  1940     URINE DRUG SCREEN Q1 YR  05/06/1955     TRICIA QUESTIONNAIRE 1 YEAR  04/28/2017     ADVANCE DIRECTIVE PLANNING Q5 YRS  09/25/2017     EYE EXAM Q1 YEAR  11/14/2017     MICROALBUMIN Q1 YEAR  06/07/2018         Suggested websites for health information:  Www.[employer].org : Up to date and easily searchable information on multiple topics.  Www.medlineplus.gov : medication info, interactive tutorials, watch real surgeries online  Www.familydoctor.org : good info from the Academy of Family Physicians  Www.cdc.gov : public health info, travel advisories, epidemics (H1N1)  Www.aap.org : children's health info, normal development, vaccinations  Www.health.[facility].[place].us : [place] dept of health, public health issues in [place], N1N1    Your care team:                            Family Medicine Internal Medicine   MD Gera Frost MD Shantel Branch-Fleming, MD Katya Georgiev PA-C Nam Ho, MD Pediatrics   FAIZA Benavides, MD Candida Robles CNP, MD Deborah Mielke, MD Kim Thein, APRN CNP      Clinic hours: Monday - Thursday 7 am-7 pm; Fridays 7 am-5 pm.   Urgent care: Monday - Friday 11 am-9 pm; Saturday and Sunday 9 am-5 pm.  Pharmacy : Monday -Thursday 8 am-8 pm; Friday 8 am-6 pm; Saturday and Sunday 9 am-5 pm.     Clinic: [phone]   Pharmacy: [phone]    Hemodialysis    The job of the kidneys is to remove waste and extra water from the body. The kidneys also balance levels of minerals  in the body, called electrolytes. Kidneys are essential for the health of the body. People with severe kidney disease are not able to perform these functions. This may be due to a temporary or a permanent kidney condition. Hemodialysis is a treatment that takes over the essential functions of the kidney until you recover from kidney disease, or get a kidney transplant. If you have end stage renal disease and are not eligible for a transplant, you will need to have hemodialysis for the rest of your life. Peritoneal dialysis is another type of dialysis but is not covered in this article.  Hemodialysis requires access to a strong blood flow. There are 3 ways to do this.     Dialysis catheter. This is a plastic tube put into a large blood vessel, usually for temporary access. If there is no other option, it may be used permanently.    AV fistula. Through a minor surgical procedure, an artery in your arm is joined directly to a vein. This creates an arteriovenous fistula or AV fistula. The pressure of the arterial blood flow slowly expands the size of the attached vein. It may take up to 4 to 6 weeks for the fistula to enlarge enough to be ready for dialysis. The AV fistula is the access of choice. This is because there are typically fewer problems and side effects. It also lasts longer than the other options.    AV graft. This is an artificial implant that joins an artery and vein in people with small veins. It can also be used when an AV fistula did not work. It can be used for dialysis a few weeks after the surgery.  A connecting tube carries blood from the access point in the body to the dialysis machine where special filters called dialyzers filter and process the blood. The blood is then returned to the body through a separate tube and needle. This takes 3 to 4 hours and is usually done 3 times a week. Home dialysis is an option for some patients.  Hemodialysis is lifesaving for people with kidney failure, but  there are possible side effects. These include infection, low blood pressure, bleeding, electrolyte imbalance, anemia, and heart disease.  Home care  The following guidelines will help you care for yourself at home:    Take any medicines as directed.    Follow the special diet for kidney failure that your healthcare provider gave you.    Don t wear any clothing or jewelry that could put pressure on the access site.    Don t lie on the access site while sleeping.    If the access is in your arm, have blood pressure readings and blood samples taken from the other arm.    Check the access site after dialysis for swelling, bleeding, or signs of infection.    If you have an AV fistula or graft, check the site regularly to be sure you can always feel the vibration (called the  thrill ) of blood flowing from artery to vein. Don t put lotions or other products on the access site.    If you have an external dialysis catheter, avoid physical activities that could pull on the catheter.  Follow-up care  Follow up with your healthcare provider, or as advised.  Call 911  Call 911 if any of these occur:    External catheter starts bleeding or opens to air    Severe weakness, dizziness, fainting, drowsiness, or confusion    Chest pain or shortness of breath  When to seek medical advice  Call your healthcare provider right away if any of these occur:    Color of the blood in the external tubing changes from bright red to dark red    You don t feel the  thrill  (vibration) in an AV fistula or graft    Nausea or vomiting    Unexpected weight gain or swelling in the legs, ankles, or around the eyes    Decreased or absent urine output if you previously made urine    Fever of 100.4 F (38 C) or higher, or as directed by your healthcare provider  Date Last Reviewed: 10/1/2016    5369-3727 [employer]. [address] [place] [address], [place] [address]. All rights reserved. This information is not intended as a substitute for  professional medical care. Always follow your healthcare professional's instructions.        Central Line (Central Venous Access Device)     The catheter may have more than 1 channel. This means that different fluids or medicines can be given at the same time.   You need a central line as part of your treatment. It s also called a central venous access device (CVAD) or central venous catheter (CVC). A small, soft tube called a catheter is put in a vein that leads to your heart. When you no longer need the central line, it will be taken out. Your skin will then heal. This sheet describes types of central lines. It also explains how the central line is placed in your body.  What a central line does  A central line is often used instead of a standard IV (intravenous) line when you need treatment for longer than a week or so. The line can deliver medicine or nutrition right into your bloodstream. It can also be used to measure blood flow (hemodynamic monitoring), to draw blood, or for other reasons. Ask your healthcare provider why you need the central line and which type you ll get.  Types of central lines  The central line will be placed into 1 of the veins as described below. Which vein is used depends on your needs and overall health. The catheter is threaded through the vein until the tip sits in the large vein near the heart (vena cava). Types of central lines include:    Peripherally inserted central catheter (PICC). This line is placed in a large vein in the upper arm, or near the bend of the elbow.    Subclavian line. This line is placed into the vein that runs behind the collarbone.    Internal jugular line. This line is placed into a large vein in the neck.    Femoral line. This line is placed in a large vein in the groin.  Placing the central line  The central line is placed in your body during a brief procedure. This may be done in your hospital room or an operating room. Your healthcare team can tell you  what to expect. During central line placement:    You re fully covered with a large sterile sheet. Only the spot where the line will be placed is exposed. The skin is cleaned with antiseptic solution. These steps lower the risk for infection.    Medicine (local anesthetic) is injected near the vein. This numbs the skin so you don t feel pain during the procedure.    After the pain medicine takes effect, the catheter is gently passed into the vein. It s moved forward until the tip of the catheter is in the vena cava, close to the heart.    The other end of the catheter extends a few inches out from your skin. It may be loosely attached to the skin with stitches to hold it in place.    The healthcare provider flushes the catheter with saline solution to clear it. The solution may include heparin, which prevents blood clots.    An X-ray or other imaging test is done. This allows the provider to confirm the catheter s position and check for problems.  Risks and complications  As with any procedure, having a central line placed has certain risks. These include:    Infection    Bleeding problems    An irregular heartbeat    Injury to the vein or to lymph ducts near the vein    Inflammation of the vein (phlebitis)    Air bubble in the blood (air embolism). An air embolism can travel through the blood vessels and block the flow of blood to the heart, lungs, brain, or other organs.    Blood clot (thrombus) that can block the flow of blood. A blood clot can also travel through the blood vessels and block the flow of blood to the heart, lungs (pulmonary embolism), brain, or other organs.    Collapsed lung (pneumothorax) or buildup of blood between the lungs and the chest wall (hemothorax)    Nerve injury    Accidental insertion into an artery instead of a vein    Catheter not positioned correctly  If you have any problems with your central line, talk to your healthcare provider.  Date Last Reviewed: 7/1/2016 2000-2017 [employer]. [address] [place] [address], [place] [address]. All rights reserved. This information is not intended as a substitute for professional medical care. Always follow your healthcare professional's instructions.

## 2018-05-25 DIAGNOSIS — I50.33 ACUTE ON CHRONIC DIASTOLIC CONGESTIVE HEART FAILURE (H): Primary | ICD-10-CM

## 2018-05-25 RX ORDER — FUROSEMIDE 20 MG
60 TABLET ORAL DAILY
Qty: 90 TABLET | Refills: 1 | Status: SHIPPED | OUTPATIENT
Start: 2018-05-25 | End: 2018-06-14 | Stop reason: DRUGHIGH

## 2018-05-25 NOTE — TELEPHONE ENCOUNTER
"Requested Prescriptions   Pending Prescriptions Disp Refills     furosemide (LASIX) 20 MG tablet 30 tablet     Last Written Prescription Date:  5/11/18  Last Fill Quantity: 30,  # refills: 0   Last Office Visit with FMANDRIY, VIJAYA or [facility] prescribing provider:  5/18/2018     Future Office Visit:    Next 5 appointments (look out 90 days)     Jun 12, 2018  4:00 PM CDT   Return Visit with Ramon Rodriguez MD   [facility]    [address] [place] [id number] [phone]                  Sig: Take 3 tablets (60 mg) by mouth    Diuretics (Including Combos) Protocol Failed    5/25/2018  1:44 PM       Failed - Blood pressure under 140/90 in past 12 months    BP Readings from Last 3 Encounters:   05/18/18 141/46   04/24/18 157/63   03/15/18 140/52                Failed - Normal serum creatinine on file in past 12 months    Recent Labs   Lab Test  04/24/18   1603   CR  3.53*             Passed - Recent (12 mo) or future (30 days) visit within the authorizing provider's specialty    Patient had office visit in the last 12 months or has a visit in the next 30 days with authorizing provider or within the authorizing provider's specialty.  See \"Patient Info\" tab in inbasket, or \"Choose Columns\" in Meds & Orders section of the refill encounter.           Passed - Patient is age 18 or older       Passed - No active pregancy on record       Passed - Normal serum potassium on file in past 12 months    Recent Labs   Lab Test  04/24/18   1603   POTASSIUM  4.4                   Passed - Normal serum sodium on file in past 12 months    Recent Labs   Lab Test  04/24/18   1603   NA  136             Passed - No positive pregnancy test in past 12 months          "

## 2018-05-25 NOTE — TELEPHONE ENCOUNTER
Routing refill request to provider for review/approval because:  Labs out of range:  Creatinine  BP is out of range.  Medication is reported as historical    Theresa Gregorio RN, BSN

## 2018-05-31 ENCOUNTER — TELEPHONE (OUTPATIENT)
Dept: FAMILY MEDICINE | Facility: CLINIC | Age: 78
End: 2018-05-31

## 2018-06-07 NOTE — TELEPHONE ENCOUNTER
Latasha nurse from [facility] contacted and informed of provider response  Ronni Robles CMA

## 2018-06-11 DIAGNOSIS — N18.5 CKD (CHRONIC KIDNEY DISEASE) STAGE 5, GFR LESS THAN 15 ML/MIN (H): Primary | ICD-10-CM

## 2018-06-12 ENCOUNTER — OFFICE VISIT (OUTPATIENT)
Dept: NEPHROLOGY | Facility: CLINIC | Age: 78
End: 2018-06-12
Payer: COMMERCIAL

## 2018-06-12 VITALS
BODY MASS INDEX: 24.45 KG/M2 | SYSTOLIC BLOOD PRESSURE: 142 MMHG | DIASTOLIC BLOOD PRESSURE: 60 MMHG | WEIGHT: 112 LBS | HEART RATE: 64 BPM | OXYGEN SATURATION: 98 %

## 2018-06-12 DIAGNOSIS — N18.5 CKD (CHRONIC KIDNEY DISEASE) STAGE 5, GFR LESS THAN 15 ML/MIN (H): ICD-10-CM

## 2018-06-12 DIAGNOSIS — I10 HYPERTENSION, GOAL BELOW 140/90: ICD-10-CM

## 2018-06-12 DIAGNOSIS — N25.81 SECONDARY HYPERPARATHYROIDISM (H): ICD-10-CM

## 2018-06-12 DIAGNOSIS — Z87.19 HISTORY OF GI BLEED: ICD-10-CM

## 2018-06-12 DIAGNOSIS — E11.22 TYPE 2 DIABETES MELLITUS WITH STAGE 3 CHRONIC KIDNEY DISEASE, WITHOUT LONG-TERM CURRENT USE OF INSULIN (H): Primary | ICD-10-CM

## 2018-06-12 DIAGNOSIS — N18.30 TYPE 2 DIABETES MELLITUS WITH STAGE 3 CHRONIC KIDNEY DISEASE, WITHOUT LONG-TERM CURRENT USE OF INSULIN (H): Primary | ICD-10-CM

## 2018-06-12 DIAGNOSIS — D64.9 ANEMIA, UNSPECIFIED TYPE: ICD-10-CM

## 2018-06-12 LAB
ALBUMIN SERPL-MCNC: 3.8 G/DL (ref 3.4–5)
ANION GAP SERPL CALCULATED.3IONS-SCNC: 7 MMOL/L (ref 3–14)
BUN SERPL-MCNC: 45 MG/DL (ref 7–30)
CALCIUM SERPL-MCNC: 8.3 MG/DL (ref 8.5–10.1)
CHLORIDE SERPL-SCNC: 107 MMOL/L (ref 94–109)
CO2 SERPL-SCNC: 27 MMOL/L (ref 20–32)
CREAT SERPL-MCNC: 3.1 MG/DL (ref 0.52–1.04)
CREAT UR-MCNC: 43 MG/DL
GFR SERPL CREATININE-BSD FRML MDRD: 14 ML/MIN/1.7M2
GLUCOSE SERPL-MCNC: 212 MG/DL (ref 70–99)
HGB BLD-MCNC: 10.8 G/DL (ref 11.7–15.7)
PHOSPHATE SERPL-MCNC: 4.1 MG/DL (ref 2.5–4.5)
POTASSIUM SERPL-SCNC: 4.1 MMOL/L (ref 3.4–5.3)
PROT UR-MCNC: 2.49 G/L
PROT/CREAT 24H UR: 5.82 G/G CR (ref 0–0.2)
PTH-INTACT SERPL-MCNC: 259 PG/ML (ref 18–80)
SODIUM SERPL-SCNC: 141 MMOL/L (ref 133–144)

## 2018-06-12 PROCEDURE — 84165 PROTEIN E-PHORESIS SERUM: CPT | Performed by: INTERNAL MEDICINE

## 2018-06-12 PROCEDURE — 99214 OFFICE O/P EST MOD 30 MIN: CPT | Performed by: INTERNAL MEDICINE

## 2018-06-12 PROCEDURE — 84156 ASSAY OF PROTEIN URINE: CPT | Performed by: INTERNAL MEDICINE

## 2018-06-12 PROCEDURE — 36415 COLL VENOUS BLD VENIPUNCTURE: CPT | Performed by: INTERNAL MEDICINE

## 2018-06-12 PROCEDURE — 00000402 ZZHCL STATISTIC TOTAL PROTEIN: Performed by: INTERNAL MEDICINE

## 2018-06-12 PROCEDURE — 83970 ASSAY OF PARATHORMONE: CPT | Performed by: INTERNAL MEDICINE

## 2018-06-12 PROCEDURE — 80069 RENAL FUNCTION PANEL: CPT | Performed by: INTERNAL MEDICINE

## 2018-06-12 PROCEDURE — 85018 HEMOGLOBIN: CPT | Performed by: INTERNAL MEDICINE

## 2018-06-12 PROCEDURE — 83883 ASSAY NEPHELOMETRY NOT SPEC: CPT | Performed by: INTERNAL MEDICINE

## 2018-06-12 ASSESSMENT — PAIN SCALES - GENERAL: PAINLEVEL: NO PAIN (0)
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
## 2018-06-12 NOTE — MR AVS SNAPSHOT
After Visit Summary   6/12/2018    Lakhwinder Negron    MRN: [id number]           Patient Information     Date Of Birth          1940        Visit Information        Provider Department      6/12/2018 3:45 PM Ramon Rodriguez MD; [employer] [facility]        Care Instructions    1. Follow-up in 6 weeks but call sooner if any concerns or questions.  2. If considering dialysis, I would recommend vascular referral for access placement as well as additional education at this time. If not considering dialysis, would recommend palliative care consult to assure a good plan in place going forward toward a comfort care approach of care.  3. We will be in touch with your nurse to assure we are aware of your current medication regimen and make adjustments as needed.       Preventive Care:    Diabetic Eye Exam Screening: During our visit today, we discussed that it is recommended you receive diabetic eye exam screening. Please call or make an appointment with your primary care provider to discuss this with them. You may also call the [facility] scheduling line ([phone]) to set up an eye exam at one of the [facility] Eye [facility].            Follow-ups after your visit        Your next 10 appointments already scheduled     Talat 15, 2018  4:30 PM CDT   US RENAL COMPLETE with MGUS1, MG US TECH   [facility] ([facility])    [address] [place] [id number] [phone]           Please bring a list of your medicines (including vitamins, minerals and over-the-counter drugs). Also, tell your doctor about any allergies you may have. Wear comfortable clothes and leave your valuables at home.  You do not need to do anything special to prepare for your exam.  Please call the Imaging Department at your exam site with any questions.            Aug 07, 2018  4:00 PM CDT   LAB with LAB FIRST FLOOR [facility] (  [facility])    [address] [address] [place] [id number] [phone]           Please do not eat 10-12 hours before your appointment if you are coming in fasting for labs on lipids, cholesterol, or glucose (sugar). This does not apply to pregnant women. Water, hot tea and black coffee (with nothing added) are okay. Do not drink other fluids, diet soda or chew gum.            Aug 07, 2018  4:30 PM CDT   Return Visit with Ramon Rodriguez MD   [facility] ([facility])    [id number] [place] [id number] [phone]            Sep 13, 2018  8:25 AM CDT   Office Visit with Jonna Cason MD   [facility] ([facility])    [address] [facility] [id number] [phone]           Bring a current list of meds and any records pertaining to this visit. For Physicals, please bring immunization records and any forms needing to be filled out. Please arrive 10 minutes early to complete paperwork.              Who to contact     If you have questions or need follow up information about today's clinic visit or your schedule please contact [facility] directly at [phone].  Normal or non-critical lab and imaging results will be communicated to you by MyChart, letter or phone within 4 business days after the clinic has received the results. If you do not hear from us within 7 days, please contact the clinic through MyChart or phone. If you have a critical or abnormal lab result, we will notify you by phone as soon as possible.  Submit refill requests through [employer] or call your pharmacy and they will forward the refill request to us. Please allow 3 business days for your refill to be completed.          Additional Information About Your Visit        Care EveryWhere ID     This is your Care EveryWhere ID. This could be used by other organizations to access your [place]  medical records  [id number]        Your Vitals Were     Pulse Pulse Oximetry BMI (Body Mass Index)             64 98% 24.45 kg/m2          Blood Pressure from Last 3 Encounters:   06/12/18 142/60   05/18/18 141/46   04/24/18 157/63    Weight from Last 3 Encounters:   06/12/18 50.8 kg (112 lb)   05/18/18 49 kg (108 lb)   04/24/18 51.7 kg (114 lb)              Today, you had the following     No orders found for display       Primary Care Provider Office Phone # Fax #    Jonna Kaleigh Cason [phone]       [address] [place]  [facility] [address]        Equal Access to Services     KATIUSKA ORELLANA : Hadii timbo thompsono Sojulissa, waaxda luqadaha, qaybta kaalmada adeegyada, greta batista . So [facility] [phone].    ATENCIÓN: Si habla español, tiene a mckeon disposición servicios gratuitos de asistencia lingüística. Llame al [phone].    We comply with applicable federal civil rights laws and [place] laws. We do not discriminate on the basis of race, color, national origin, age, disability, sex, sexual orientation, or gender identity.            Thank you!     Thank you for choosing [facility]  for your care. Our goal is always to provide you with excellent care. Hearing back from our patients is one way we can continue to improve our services. Please take a few minutes to complete the written survey that you may receive in the mail after your visit with us. Thank you!             Your Updated Medication List - Protect others around you: Learn how to safely use, store and throw away your medicines at www.disposemymeds.org.          This list is accurate as of 6/12/18  4:56 PM.  Always use your most recent med list.                   Brand Name Dispense Instructions for use Diagnosis    ACE/ARB/ARNI NOT PRESCRIBED (INTENTIONAL)      Please choose reason not prescribed, below    Type 2 diabetes mellitus with stage 3 chronic kidney disease, without long-term  current use of insulin (H)       acetaminophen-codeine 300-30 MG per tablet    TYLENOL #3    60 tablet    TAKE 1-2 TABLETS ONCE DAILY AS NEEDED// IB HNUB NOJ 1-2 LUB YOG XAV NOJPAB LASHA MOB IB CE    Chronic pain syndrome       amLODIPine 10 MG tablet    NORVASC    90 tablet    Take 1 tablet (10 mg) by mouth daily    Type 2 diabetes mellitus with other diabetic kidney complication, Proteinuria       ASPIRIN NOT PRESCRIBED    INTENTIONAL    1 each    Please choose reason not prescribed, below    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    Right hemiparesis (H)       carvedilol 12.5 MG tablet    COREG    225 tablet    Take 1.5 tablets (18.75 mg) by mouth 2 times daily (with meals)    Hypertension, goal below 140/90       ferrous sulfate 325 (65 Fe) MG tablet    IRON    100 tablet    Take 1 tablet (325 mg) by mouth daily    Iron deficiency anemia secondary to inadequate dietary iron intake       furosemide 20 MG tablet    LASIX    90 tablet    Take 3 tablets (60 mg) by mouth daily    Acute on chronic diastolic congestive heart failure (H)       glipiZIDE 2.5 MG 24 hr tablet    GLUCOTROL XL    60 tablet    TAKE 1 TABLET BY MOUTH TWO TIMES DAILY FOR DIABETES // IB ZAUG NOJ 1 LUB, IB HNUB NOJ 2 ZAUG PAB LASHA NTSHAV QAB ZIB    Type 2 diabetes mellitus with stage 3 chronic kidney disease, without long-term current use of insulin (H)       hydrALAZINE 50 MG tablet    APRESOLINE    270 tablet    Take 1 tablet (50 mg) by mouth 3 times daily    Hypertension goal BP (blood pressure) < 140/90       * insulin pen needle 31G X 6 MM    ULTICARE MINI    100 each    Use 2 daily or as directed.    Type 2 diabetes, HbA1C goal < 8% (H)       *  UNIFINE PENTIPS 31G X 5 MM   Generic drug:  insulin pen needle     100 each    USE AS DIRECTED TWO TIMES DAILY // IB HNUB SIV 2 ZAUG LI QHIA    Hyperglycemia due to type 2 diabetes mellitus (H)       * olopatadine 0.1 % ophthalmic solution    PATANOL    5 mL    Place 1 drop into both eyes 2 times daily as needed for allergies (itchy eye.)    Allergic conjunctivitis       * olopatadine 0.1 % ophthalmic solution    PATANOL    5 mL    Place 1 drop into both eyes 2 times daily as needed for allergies    Allergic conjunctivitis of both eyes       ONETOUCH DELICA LANCETS 33G Misc     100 each    USE AS DIRECTED TWO TIMES DAILY // IB HNUB SIV 2 ZAUG LI QHIA    Essential hypertension, Type 2 diabetes mellitus with diabetic chronic kidney disease, unspecified CKD stage, unspecified long term insulin use status (H)       ONETOUCH ULTRA test strip   Generic drug:  blood glucose monitoring     200 strip    USE TO TEST BLOOD SUGAR 2-3 TIMES A DAY // IB HNUB SIV 2-3 ZAUG LI QHIA    Type 2 diabetes mellitus with stage 3 chronic kidney disease, unspecified long term insulin use status (H)       * order for DME     1 each    Equipment being ordered: blood pressure meter with supplies and adult cuff    Hypertension goal BP (blood pressure) < 140/90       order for DME     200 each    Lancets bid for type 2 diabetes on insulin.    Type 2 diabetes mellitus with proteinuria or albuminuria       order for DME     1 Device    Equipment being ordered: Shoe lift for leg length difference.    Leg length difference, acquired, CVA (cerebral vascular accident) (H)       order for DME     200 each    Test strips for pt's glucometer, brand as covered by insurance Test bid. One touch Delica lancets. Type 2 diabetes on insulin.  Directions: use as directed Quantity: 200    Type 2 diabetes, HbA1C goal < 8% (H)       ranitidine 150 MG tablet    ZANTAC    60 tablet    Take 1 tablet (150 mg) by mouth 2 times daily    History of GI bleed        rosuvastatin 20 MG tablet    CRESTOR     Take 10 mg by mouth        * Notice:  This list has 6 medication(s) that are the same as other medications prescribed for you. Read the directions carefully, and ask your doctor or other care provider to review them with you.

## 2018-06-12 NOTE — PATIENT INSTRUCTIONS
1. Follow-up in 6 weeks but call sooner if any concerns or questions.  2. If considering dialysis, I would recommend vascular referral for access placement as well as additional education at this time. If not considering dialysis, would recommend palliative care consult to assure a good plan in place going forward toward a comfort care approach of care.  3. We will be in touch with your nurse to assure we are aware of your current medication regimen and make adjustments as needed.       Preventive Care:    Diabetic Eye Exam Screening: During our visit today, we discussed that it is recommended you receive diabetic eye exam screening. Please call or make an appointment with your primary care provider to discuss this with them. You may also call the [facility] scheduling line ([phone]) to set up an eye exam at one of the [facility] Eye Clinics.

## 2018-06-12 NOTE — NURSING NOTE
"Lakhwinder Negron's goals for this visit include:   Chief Complaint   Patient presents with     RECHECK     CKD       She requests these members of her care team be copied on today's visit information: no    PCP: Jonna Cason    Referring Provider:  No referring provider defined for this encounter.    /60 (BP Location: Right arm, Patient Position: Sitting, Cuff Size: Adult Regular)  Pulse 64  Wt 50.8 kg (112 lb)  SpO2 98%  BMI 24.45 kg/m2    Do you need any medication refills at today's visit? Unsure    Present with pt is Interrupter \"Magda\" Kaleigh Hayes LPN     "

## 2018-06-13 LAB
ALBUMIN SERPL ELPH-MCNC: 4 G/DL (ref 3.7–5.1)
ALPHA1 GLOB SERPL ELPH-MCNC: 0.3 G/DL (ref 0.2–0.4)
ALPHA2 GLOB SERPL ELPH-MCNC: 0.9 G/DL (ref 0.5–0.9)
B-GLOBULIN SERPL ELPH-MCNC: 0.7 G/DL (ref 0.6–1)
GAMMA GLOB SERPL ELPH-MCNC: 0.8 G/DL (ref 0.7–1.6)
KAPPA LC UR-MCNC: 3.51 MG/DL (ref 0.33–1.94)
KAPPA LC/LAMBDA SER: 0.7 {RATIO} (ref 0.26–1.65)
LAMBDA LC SERPL-MCNC: 5.02 MG/DL (ref 0.57–2.63)
M PROTEIN SERPL ELPH-MCNC: 0 G/DL
PROT PATTERN SERPL ELPH-IMP: NORMAL

## 2018-06-14 ENCOUNTER — DOCUMENTATION ONLY (OUTPATIENT)
Dept: CARE COORDINATION | Facility: CLINIC | Age: 78
End: 2018-06-14

## 2018-06-14 ENCOUNTER — TELEPHONE (OUTPATIENT)
Dept: NEPHROLOGY | Facility: CLINIC | Age: 78
End: 2018-06-14

## 2018-06-14 DIAGNOSIS — I10 HYPERTENSION GOAL BP (BLOOD PRESSURE) < 140/90: ICD-10-CM

## 2018-06-14 DIAGNOSIS — E78.5 HYPERLIPIDEMIA LDL GOAL <100: Primary | ICD-10-CM

## 2018-06-14 NOTE — PROGRESS NOTES
[facility] now requests orders and shares plan of care/discharge summaries for some patients through [employer].  Please REPLY TO THIS MESSAGE OR ROUTE BACK TO THE AUTHOR in order to give authorization for orders when needed.  This is considered a verbal order, you will still receive a faxed copy of orders for signature.  Thank you for your assistance in improving collaboration for our patients.    ORDER 1 SN visit Every other week starting week of 6/17/18, and 3 PRN        78 year old female being recertified for every other week Skill nursing services through [facility]. In past 60 days Pt did have one hospitalization d/t CHF exacerbation and pneumonia.  Post d/c from hospital pt was seen weekly x4 wks. Pt started to weight self daily and shows nurse weights at each visit. Education has been provided if increase wt, SOB or edema to call nurse and have PRN visit for assessment. Pt reports understanding. D/t respiratory and CHF being stable will return to every other week visits. At time of visit VSS, LS CTA. Wt has been 109 or 110 over the past 2 wks. Pt has chronic pain in bilat hand, when nurse asked if MD should be called for possible medicate top. Medication to assist with pain management, Pt state her pain was tolerable and didnt feel it was necessary for MD to be called. Will continue to assess pain and update PRN.  SOB with moderate activity, this is per Pts baseline.  Skin assessment completed, CDI no areas of break down. Noted to have trace edema to BLE, but Pt has been up ambulating a good portion of the day. Mansoor shows no risk for break down at this time. Pt is incontinent of urine at times s/t stress and urgency incontinence. Continent of bowels, and denies any issues r/t GI. Reports appetite to be fair, eating 3 smaller meals per day, tends to eat a lot of vegetables, with at least 2 services of protein per day.  Family assist with ADL/IADLs, Pt ambulates with cane for long distances, no  falls in the past 60 days, but remains at risk.  Medication reconciliation completed.  Did call [facility] nurse with Dr. Adams regarding Lasix, per phone conversation with [facility] will fill prescription of 60 mg BID, but if MD wants a change will she call back. All other meds in home match Clinic paper work.   ANALYSIS...Pt is at risk for rehospitalization due to CHF, DM, hx of respiratory infections, and poor understanding of medications.  RECOMMENDATION Patient will continue to benefit from home care services for [facility] for education on chronic disease management/ medication management/ and coordination of cares prn.    Thank you!  Rosa Valladares RN  [email or web address]  [phone]

## 2018-06-14 NOTE — TELEPHONE ENCOUNTER
Med Rec done with MARJORIE Snyder over the telephone.   The only change is that she is taking Lasix for 60 mg twice daily versus once daily that is currently on MAR. She has been on this dosage since her hospital d/c. Lakhwinder does weigh herself daily and records these. Home weights from 2 weeks ago ranged from 109-110 lbs.    Will forward to Dr. Rodriguez. If no changes are advised, she will need a new refill sent to pharmacy.    Daysi Frias RN, BSN  Nephrology Care Coordinator  [facility]

## 2018-06-15 ENCOUNTER — RADIANT APPOINTMENT (OUTPATIENT)
Dept: ULTRASOUND IMAGING | Facility: CLINIC | Age: 78
End: 2018-06-15
Attending: INTERNAL MEDICINE
Payer: COMMERCIAL

## 2018-06-15 DIAGNOSIS — N18.30 CKD (CHRONIC KIDNEY DISEASE) STAGE 3, GFR 30-59 ML/MIN (H): ICD-10-CM

## 2018-06-15 PROCEDURE — 76770 US EXAM ABDO BACK WALL COMP: CPT | Performed by: RADIOLOGY

## 2018-06-15 RX ORDER — HYDRALAZINE HYDROCHLORIDE 50 MG/1
50 TABLET, FILM COATED ORAL 3 TIMES DAILY
Qty: 270 TABLET | Refills: 1 | Status: SHIPPED | OUTPATIENT
Start: 2018-06-15 | End: 2019-03-26

## 2018-06-15 RX ORDER — FUROSEMIDE 40 MG
60 TABLET ORAL 2 TIMES DAILY
Qty: 270 TABLET | Refills: 1 | Status: SHIPPED | OUTPATIENT
Start: 2018-06-15 | End: 2018-10-26

## 2018-06-15 RX ORDER — ROSUVASTATIN CALCIUM 20 MG/1
10 TABLET, COATED ORAL DAILY
Qty: 45 TABLET | Refills: 3 | Status: SHIPPED | OUTPATIENT
Start: 2018-06-15 | End: 2019-07-02

## 2018-06-15 NOTE — TELEPHONE ENCOUNTER
"Routing refill request to provider for review/approval because:  Medication is reported/historical on current med list. Believe dose was reduced by hospitalist when last in [facility]. Apparently now taking 20mg, 1/2 tab daily. Need PCP to write script and approve since \"reported\" medication, please.    Nancy Owusu RN  [facility] Triage          "

## 2018-06-15 NOTE — TELEPHONE ENCOUNTER
Per Dr. Rodriguez,  Did she give you any BP updates from home? No other changes needed for the time being.     Left message for Latasha at [phone] that no further recommendations at this time, though if she could send updated blood pressure readings, in the upcoming weeks, that would be helpful.    Provided fax and telephone number to reach back to.    Daysi Frias RN, BSN  Nephrology Care Coordinator  [facility]

## 2018-06-15 NOTE — TELEPHONE ENCOUNTER
"Requested Prescriptions   Pending Prescriptions Disp Refills     rosuvastatin (CRESTOR) 20 MG tablet    Last Written Prescription Date:  9/11/17  Last Fill Quantity: 90,  # refills: 3   Last Office Visit with MIGUEL, VIJAYA or [facility] prescribing provider:  5/18/18   Future Office Visit:    Next 5 appointments (look out 90 days)     Aug 07, 2018  4:30 PM CDT   Return Visit with Ramon Rodriguez MD   [facility]    [address] [place] [id number] [phone]                  30 tablet      Sig: Take 0.5 tablets (10 mg) by mouth    Statins Protocol Passed    6/14/2018  3:31 PM       Passed - LDL on file in past 12 months    Recent Labs   Lab Test  03/15/18   0859   LDL  34            Passed - No abnormal creatine kinase in past 12 months    Recent Labs   Lab Test  08/16/16   1612   CKT  200               Passed - Recent (12 mo) or future (30 days) visit within the authorizing provider's specialty    Patient had office visit in the last 12 months or has a visit in the next 30 days with authorizing provider or within the authorizing provider's specialty.  See \"Patient Info\" tab in inbasket, or \"Choose Columns\" in Meds & Orders section of the refill encounter.           Passed - Patient is age 18 or older       Passed - No active pregnancy on record       Passed - No positive pregnancy test in past 12 months              Demarco Faarax  [facility]  "

## 2018-06-18 DIAGNOSIS — I10 HYPERTENSION GOAL BP (BLOOD PRESSURE) < 140/90: ICD-10-CM

## 2018-06-18 RX ORDER — HYDRALAZINE HYDROCHLORIDE 50 MG/1
50 TABLET, FILM COATED ORAL 3 TIMES DAILY
Qty: 270 TABLET | Refills: 1 | Status: CANCELLED | OUTPATIENT
Start: 2018-06-18

## 2018-06-21 DIAGNOSIS — Z53.9 DIAGNOSIS NOT YET DEFINED: Primary | ICD-10-CM

## 2018-07-18 ENCOUNTER — TELEPHONE (OUTPATIENT)
Dept: FAMILY MEDICINE | Facility: CLINIC | Age: 78
End: 2018-07-18

## 2018-07-18 DIAGNOSIS — G89.4 CHRONIC PAIN SYNDROME: ICD-10-CM

## 2018-07-18 NOTE — TELEPHONE ENCOUNTER
Routing refill request to provider for review/approval because:  Drug not on the [facility] refill protocol     Theresa Gregoiro RN, BSN

## 2018-07-18 NOTE — TELEPHONE ENCOUNTER
Requested Prescriptions   Pending Prescriptions Disp Refills     acetaminophen-codeine (TYLENOL #3) 300-30 MG per tablet [Pharmacy Med Name: ACETAMINOPHEN-COD #3 TABLET 300-30 TAB]    Last Written Prescription Date:  3/27/18  Last Fill Quantity: 60,  # refills: 3   Last Office Visit with FMANDRIY, YAS or [facility] prescribing provider:  5/18/18   Future Office Visit:    Next 5 appointments (look out 90 days)     Aug 07, 2018  4:30 PM CDT   Return Visit with Ramon Rodriguez MD   [facility] ([facility])    [address] [place] [phone]   [phone]            Sep 13, 2018  8:25 AM CDT   Office Visit with Jonna Cason MD, [employer]   [facility] ([facility])    [address] [facility] [address]   [phone]                  60 tablet 3     Sig: TAKE 1-2 TABLETS BY MOUTH ONCE DAILY AS NEEDED // IB HNUB NOJ 1-2 LUB YOG MOB    There is no refill protocol information for this order            Demarco Faarax  [facility]

## 2018-07-19 NOTE — TELEPHONE ENCOUNTER
Written rx faxed to the pharmacy, Pharmacy will contact pt when medication is ready for pickup also to let patient know she needs to schedule an appointment for patient's next refill.  Gamal Negron,  [facility]

## 2018-07-19 NOTE — TELEPHONE ENCOUNTER
Prescription signed and put in box/printer. [facility] web site accessed and verified 4 refills in past 4 months with no other medications under patient's name. Will need office visit for next refill. Dr.Deborah Cason

## 2018-08-03 DIAGNOSIS — N18.5 CKD (CHRONIC KIDNEY DISEASE) STAGE 5, GFR LESS THAN 15 ML/MIN (H): Primary | ICD-10-CM

## 2018-08-07 ENCOUNTER — OFFICE VISIT (OUTPATIENT)
Dept: NEPHROLOGY | Facility: CLINIC | Age: 78
End: 2018-08-07
Payer: COMMERCIAL

## 2018-08-07 VITALS
SYSTOLIC BLOOD PRESSURE: 150 MMHG | DIASTOLIC BLOOD PRESSURE: 62 MMHG | HEART RATE: 60 BPM | WEIGHT: 113.8 LBS | OXYGEN SATURATION: 97 % | BODY MASS INDEX: 24.84 KG/M2

## 2018-08-07 DIAGNOSIS — N18.30 TYPE 2 DIABETES MELLITUS WITH STAGE 3 CHRONIC KIDNEY DISEASE, WITHOUT LONG-TERM CURRENT USE OF INSULIN (H): ICD-10-CM

## 2018-08-07 DIAGNOSIS — D64.9 ANEMIA, UNSPECIFIED TYPE: Primary | ICD-10-CM

## 2018-08-07 DIAGNOSIS — E11.22 TYPE 2 DIABETES MELLITUS WITH STAGE 3 CHRONIC KIDNEY DISEASE, WITHOUT LONG-TERM CURRENT USE OF INSULIN (H): ICD-10-CM

## 2018-08-07 DIAGNOSIS — N18.5 CKD (CHRONIC KIDNEY DISEASE) STAGE 5, GFR LESS THAN 15 ML/MIN (H): ICD-10-CM

## 2018-08-07 DIAGNOSIS — N25.81 SECONDARY HYPERPARATHYROIDISM (H): ICD-10-CM

## 2018-08-07 DIAGNOSIS — I10 HYPERTENSION, GOAL BELOW 140/90: ICD-10-CM

## 2018-08-07 LAB
ALBUMIN SERPL-MCNC: 3.9 G/DL (ref 3.4–5)
ANION GAP SERPL CALCULATED.3IONS-SCNC: 9 MMOL/L (ref 3–14)
BUN SERPL-MCNC: 45 MG/DL (ref 7–30)
CALCIUM SERPL-MCNC: 8.5 MG/DL (ref 8.5–10.1)
CHLORIDE SERPL-SCNC: 109 MMOL/L (ref 94–109)
CO2 SERPL-SCNC: 25 MMOL/L (ref 20–32)
CREAT SERPL-MCNC: 3.73 MG/DL (ref 0.52–1.04)
CREAT UR-MCNC: 28 MG/DL
FERRITIN SERPL-MCNC: 193 NG/ML (ref 8–252)
GFR SERPL CREATININE-BSD FRML MDRD: 12 ML/MIN/1.7M2
GLUCOSE SERPL-MCNC: 185 MG/DL (ref 70–99)
HGB BLD-MCNC: 10.7 G/DL (ref 11.7–15.7)
IRON SATN MFR SERPL: 27 % (ref 15–46)
IRON SERPL-MCNC: 66 UG/DL (ref 35–180)
PHOSPHATE SERPL-MCNC: 4.6 MG/DL (ref 2.5–4.5)
POTASSIUM SERPL-SCNC: 4.5 MMOL/L (ref 3.4–5.3)
PROT UR-MCNC: 1.6 G/L
PROT/CREAT 24H UR: 5.64 G/G CR (ref 0–0.2)
PTH-INTACT SERPL-MCNC: 335 PG/ML (ref 18–80)
SODIUM SERPL-SCNC: 143 MMOL/L (ref 133–144)
TIBC SERPL-MCNC: 243 UG/DL (ref 240–430)

## 2018-08-07 PROCEDURE — 83540 ASSAY OF IRON: CPT | Performed by: INTERNAL MEDICINE

## 2018-08-07 PROCEDURE — 36415 COLL VENOUS BLD VENIPUNCTURE: CPT | Performed by: INTERNAL MEDICINE

## 2018-08-07 PROCEDURE — 82728 ASSAY OF FERRITIN: CPT | Performed by: INTERNAL MEDICINE

## 2018-08-07 PROCEDURE — 84156 ASSAY OF PROTEIN URINE: CPT | Performed by: INTERNAL MEDICINE

## 2018-08-07 PROCEDURE — 83970 ASSAY OF PARATHORMONE: CPT | Performed by: INTERNAL MEDICINE

## 2018-08-07 PROCEDURE — 99214 OFFICE O/P EST MOD 30 MIN: CPT | Performed by: INTERNAL MEDICINE

## 2018-08-07 PROCEDURE — 85018 HEMOGLOBIN: CPT | Performed by: INTERNAL MEDICINE

## 2018-08-07 PROCEDURE — 83550 IRON BINDING TEST: CPT | Performed by: INTERNAL MEDICINE

## 2018-08-07 PROCEDURE — 80069 RENAL FUNCTION PANEL: CPT | Performed by: INTERNAL MEDICINE

## 2018-08-07 PROCEDURE — 82306 VITAMIN D 25 HYDROXY: CPT | Performed by: INTERNAL MEDICINE

## 2018-08-07 ASSESSMENT — PAIN SCALES - GENERAL: PAINLEVEL: NO PAIN (0)

## 2018-08-07 NOTE — NURSING NOTE
Lakhwinder Negron's goals for this visit include: return  She requests these members of her care team be copied on today's visit information:     PCP: Jonna Cason    Referring Provider:  No referring provider defined for this encounter.    /62  Pulse 60  Wt 51.6 kg (113 lb 12.8 oz)  SpO2 97%  BMI 24.84 kg/m2    Do you need any medication refills at today's visit? n

## 2018-08-07 NOTE — LETTER
August 13, 2018      Lakhwinder Negron  [address] [facility] [address]              Dear Lakhwinder,      - Your vitamin D level is just below goal. I recommend taking a vitamin D supplement: cholecalciferol 1000 units daily.   - Iron level is also just below goal. It is reasonable to continue your iron as you have been taking.     Attached are your labs as we discussed at your visit. GFR is currently 12.     Please call with any questions or concerns.         Sincerely,      Ramon Rodriguez DO    Division of Renal Diseases and Hypertension  [facility]  KW/gw                    Component      Latest Ref Rng & Units 8/7/2018   Sodium      133 - 144 mmol/L 143   Potassium      3.4 - 5.3 mmol/L 4.5   Chloride      94 - 109 mmol/L 109   Carbon Dioxide      20 - 32 mmol/L 25   Anion Gap      3 - 14 mmol/L 9   Glucose      70 - 99 mg/dL 185 (H)   Urea Nitrogen      7 - 30 mg/dL 45 (H)   Creatinine      0.52 - 1.04 mg/dL 3.73 (H)   GFR Estimate      >60 mL/min/1.7m2 12 (L)   GFR Estimate If Black      >60 mL/min/1.7m2 14 (L)   Calcium      8.5 - 10.1 mg/dL 8.5   Phosphorus      2.5 - 4.5 mg/dL 4.6 (H)   Albumin      3.4 - 5.0 g/dL 3.9   25 OH Vit D2      ug/L <5   25 OH Vit D3      ug/L 22   25 OH Vit D total      20 - 75 ug/L <27   Iron      35 - 180 ug/dL 66   Iron Binding Cap      240 - 430 ug/dL 243   Iron Saturation Index      15 - 46 % 27   Protein Random Urine      g/L 1.60   Protein Total Urine g/gr Creatinine      0 - 0.2 g/g Cr 5.64 (H)   Hemoglobin      11.7 - 15.7 g/dL 10.7 (L)   Parathyroid Hormone Intact      18 - 80 pg/mL 335 (H)   Creatinine Urine      mg/dL 28   Ferritin      8 - 252 ng/mL 193

## 2018-08-09 ENCOUNTER — DOCUMENTATION ONLY (OUTPATIENT)
Dept: CARE COORDINATION | Facility: CLINIC | Age: 78
End: 2018-08-09
Payer: COMMERCIAL

## 2018-08-09 LAB
DEPRECATED CALCIDIOL+CALCIFEROL SERPL-MC: <27 UG/L (ref 20–75)
VITAMIN D2 SERPL-MCNC: <5 UG/L
VITAMIN D3 SERPL-MCNC: 22 UG/L

## 2018-08-09 NOTE — PROGRESS NOTES
[facility] utilizes an encounter to take the place of a direct phone call to your office. Please take a moment to review the below request. Please reply or route message to author of this encounter.  Message will act as a verbal OK of orders requested below. Thank you.    ORDER  1 visit every other week x9 weeks, 3 prns   MD SUMMARY/PLAN OF CARE  SN to perform assessment with focus on edema, weight, dyspnea levels, cardiac assessment, oxygen safety and management, medication management and reconciliation, pain management, mental health status and need for referral or change in treatment plan, skin integrity, falls risk, and to notify physician concerns related to mental or physical health. Instruct on disease process, signs/symptoms of complications to report to agency/physician/911, emergency/safety and falls prevention plan, medication effects/SE, new/high risk/changed medications, diet, and activity. Implement interventions to monitor and mitigate pain, prevent pressure ulcers and confirm proper foot care.     3 prn visits for falls, medication changes/issues, change in mental or physical health, supervisory visits per agency protocol, recertification assessments.  Visits may be in person or via video conference based upon patient needs.  Visits to begin week of 8/14/18  DISCHARGE SUMMARY

## 2018-08-10 ENCOUNTER — TELEPHONE (OUTPATIENT)
Dept: NEPHROLOGY | Facility: CLINIC | Age: 78
End: 2018-08-10

## 2018-08-10 DIAGNOSIS — I10 ESSENTIAL HYPERTENSION: ICD-10-CM

## 2018-08-10 DIAGNOSIS — N25.81 SECONDARY HYPERPARATHYROIDISM (H): Primary | ICD-10-CM

## 2018-08-10 DIAGNOSIS — E11.22 TYPE 2 DIABETES MELLITUS WITH DIABETIC CHRONIC KIDNEY DISEASE, UNSPECIFIED CKD STAGE, UNSPECIFIED LONG TERM INSULIN USE STATUS: ICD-10-CM

## 2018-08-10 RX ORDER — LANCETS 33 GAUGE
EACH MISCELLANEOUS
Qty: 100 EACH | Refills: 1 | Status: SHIPPED | OUTPATIENT
Start: 2018-08-10 | End: 2019-06-17

## 2018-08-10 NOTE — TELEPHONE ENCOUNTER
"Requested Prescriptions   Pending Prescriptions Disp Refills     ONETOUCH DELICA LANCETS 33G MISC [Pharmacy Med Name: ONETOUCH DELICA 33G LANCETS MISC]  Last Written Prescription Date:  04/20/18  Last Fill Quantity: 100,  # refills: 1   Last Office Visit with FMANDRIY, YAS or [facility] prescribing provider:  05/18/18   Future Office Visit:    Next 5 appointments (look out 90 days)     Sep 13, 2018  8:25 AM CDT   Office Visit with Jonna Cason MD, [employer]   [facility]    [address] [facility] [address]   [phone]            Oct 09, 2018  4:30 PM CDT   Return Visit with Ramon Rodriguez MD   [facility]    [address] [place] [id number]   [phone]                100 each 1     Sig: USE AS DIRECTED TWO TIMES DAILY // IB HNUB SIV 2 GERALD SPRAGUE QHIA    Diabetic Supplies Protocol Passed    8/10/2018 11:02 AM       Passed - Patient is 18 years of age or older       Passed - Recent (6 mo) or future (30 days) visit within the authorizing provider's specialty    Patient had office visit in the last 6 months or has a visit in the next 30 days with authorizing provider.  See \"Patient Info\" tab in inbasket, or \"Choose Columns\" in Meds & Orders section of the refill encounter.              "

## 2018-08-10 NOTE — TELEPHONE ENCOUNTER
Received telephone call from Lilliana, home care RN for Lakhwinder Negron. She was touching base about medications. Reviewed last patient instructions from office visit 8/7 as well as result note from Dr. Rodriguez. Lilliana feels that edema is under good control right now.     - Your vitamin D level is just below goal. I recommend taking a vitamin D supplement: cholecalciferol 1000 units daily.   - Iron level is also just below goal. It is reasonable to continue your iron as you have been taking.     Please call with any questions or concerns.     Sincerely,     DO Daysi Hobson: as a FYI, she uses [facility] as her : [phone] - not urgent to get her on the vitamin D.     Patient does take PO iron daily. Will send prescription in to patient's pharmacy. Updated Magda via VM of new prescription.    Daysi Frias, RN, BSN  Nephrology Care Coordinator  [facility]

## 2018-08-10 NOTE — TELEPHONE ENCOUNTER
Prescription approved per [facility] Refill Protocol.  Mena Franklin RN, [facility]

## 2018-08-13 DIAGNOSIS — I10 HTN (HYPERTENSION): Primary | ICD-10-CM

## 2018-08-13 DIAGNOSIS — Z87.19 HISTORY OF GI BLEED: ICD-10-CM

## 2018-08-13 RX ORDER — AMLODIPINE BESYLATE 10 MG/1
10 TABLET ORAL DAILY
Qty: 90 TABLET | Refills: 3 | Status: SHIPPED | OUTPATIENT
Start: 2018-08-13 | End: 2019-09-17

## 2018-08-13 NOTE — TELEPHONE ENCOUNTER
Patient is followed closely in neph clinic and home care nursing team. Refilling Norvasc at this time.    Daysi Frias RN, BSN  Nephrology Care Coordinator  [facility]

## 2018-08-13 NOTE — TELEPHONE ENCOUNTER
Writer received a refill request from: [facility] in [place].  [phone]    Medication: amLODIPine (NORVASC) 10 MG tablet   Sig: Take one tablet by mouth daily (for high blood pressure)    Date last dispensed: 7/12/18    Pt's last office visit: 8/17/18  Next scheduled office visit: 10/09/18        Medication: ranitidine (ZANTAC) 150 MG tablet    Sig: Take one tablet by mouth twice daily (for stomach)    Date last dispensed: 7/12/18    Pt's last office visit: 8/07/18  Next scheduled office visit: 10/09/18      Per the RN/LPN medication refill protocol, writer is to refill this request. If able to refill, please call the pt to inform them the RX was sent to the pharmacy. If unable to refill, route this encounter to the prescribing physician for authorization or further instructions.    Cora Hayes LPN

## 2018-08-14 DIAGNOSIS — Z53.9 DIAGNOSIS NOT YET DEFINED: Primary | ICD-10-CM
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
## 2018-09-18 ENCOUNTER — PATIENT OUTREACH (OUTPATIENT)
Dept: GERIATRIC MEDICINE | Facility: CLINIC | Age: 78
End: 2018-09-18

## 2018-09-18 ASSESSMENT — ACTIVITIES OF DAILY LIVING (ADL): DEPENDENT_IADLS:: CLEANING;COOKING;LAUNDRY;SHOPPING;MEAL PREPARATION;MEDICATION MANAGEMENT;TRANSPORTATION

## 2018-09-19 ENCOUNTER — PATIENT OUTREACH (OUTPATIENT)
Dept: GERIATRIC MEDICINE | Facility: CLINIC | Age: 78
End: 2018-09-19

## 2018-09-19 NOTE — PROGRESS NOTES
[facility] Care Coordination Contact    [facility] Home Visit Assessment     Home visit for Health Risk Assessment with Lakhwinder Negron completed on September 18, 2018    Type of residence:: Private home - stairs  Current living arrangement:: I live in a private home with family     Assessment completed with:: Patient    Current Care Plan  Member currently receiving the following home care services: Skilled Nursing   Member currently receiving the following community resources: PCA, Day Care, Home Care    Medication Review  Medication reconciliation completed in Epic: Yes  Medication set-up & administration: RN sets up  every two weeks.  Self-administers medications.  Medication understanding concerns (by member, family or CC): No  Medication adherence concerns (by member, family or CC): No    Mental/Behavioral Health   Depression Screening: See PHQ assessment flowsheet.   Mental health DX:: No      No current MH services-will place referral for : N/A    Falls Assessment:   Fallen 2 or more times in the past year?: No   Any fall with injury in the past year?: No    ADL/IADL Dependencies:   Dependent ADLs:: Ambulation-cane, Bathing, Dressing, Grooming  Dependent IADLs:: Cleaning, Cooking, Laundry, Shopping, Meal Preparation, Medication Management, Transportation    [facility] Health Plan sponsored benefits: Shared information re: Silver Sneakers/gym memberships, ASA, Calcium +D.    PCA Assessment completed at visit: Yes     Elderly Waiver Eligibility: Yes-will continue on EW    Care Plan & Recommendations: Continue current services.    See CC for detailed assessment information.    Follow-Up Plan: Member informed of future contact, plan to f/u with member with a 6 month telephone assessment.  Contact information shared with member and family, encouraged member to call with any questions or concerns at any time.    Blessing care continuum providers: Please refer to Health Care Home on the [facility] Problem List to  view this patient's [facility] Care Plan Summary.    Jacqui Robles RN, PHN  [facility] Care Coordinator  Phone: [phone]  Fax [phone]   [email or web address]

## 2018-09-19 NOTE — PROGRESS NOTES
[facility] Care Coordination Contact    Dr. Cason,     I am the [facility] care coordinator for Lakhwinder Negron, and I am writing to notify you of a change in services. I completed PCA assessment for client on 9/18/2018, and client has a reduction in PCA hours to 3 hours per day from 3.5 hours per day.      This change has occurred because last year client had 5 ADL dependencies. This year client has 4 ADL dependencies.  Last year client was dependent with dressing, grooming, bathing, mobility,and toileting. This year client is assessed as being independent with eating, transfer, positioning, and mobility but still dependent with dressing, grooming, bathing, and toileting.  Client s needs can be met in the community with assistance of PCA and family.     I am required by the health plan to notify you of this change. No action is required on your part. Please do not hesitate to contact me with any questions or concerns. Thank you.     Jacqui Robles RN, PHN  [facility] Care Coordinator  Phone: [phone]  Fax [phone]

## 2018-09-20 ENCOUNTER — PATIENT OUTREACH (OUTPATIENT)
Dept: GERIATRIC MEDICINE | Facility: CLINIC | Age: 78
End: 2018-09-20

## 2018-09-20 ENCOUNTER — OFFICE VISIT (OUTPATIENT)
Dept: FAMILY MEDICINE | Facility: CLINIC | Age: 78
End: 2018-09-20
Payer: COMMERCIAL

## 2018-09-20 VITALS
HEART RATE: 56 BPM | TEMPERATURE: 98 F | BODY MASS INDEX: 24.98 KG/M2 | OXYGEN SATURATION: 97 % | DIASTOLIC BLOOD PRESSURE: 65 MMHG | RESPIRATION RATE: 16 BRPM | SYSTOLIC BLOOD PRESSURE: 128 MMHG | WEIGHT: 115.8 LBS | HEIGHT: 57 IN

## 2018-09-20 DIAGNOSIS — E11.22 TYPE 2 DIABETES MELLITUS WITH STAGE 4 CHRONIC KIDNEY DISEASE, WITHOUT LONG-TERM CURRENT USE OF INSULIN (H): Primary | ICD-10-CM

## 2018-09-20 DIAGNOSIS — N18.5 CKD (CHRONIC KIDNEY DISEASE) STAGE 5, GFR LESS THAN 15 ML/MIN (H): ICD-10-CM

## 2018-09-20 DIAGNOSIS — H90.6 MIXED CONDUCTIVE AND SENSORINEURAL HEARING LOSS OF BOTH EARS: ICD-10-CM

## 2018-09-20 DIAGNOSIS — I63.549 CEREBROVASCULAR ACCIDENT (CVA) DUE TO OCCLUSION OF CEREBELLAR ARTERY, UNSPECIFIED BLOOD VESSEL LATERALITY (H): ICD-10-CM

## 2018-09-20 DIAGNOSIS — E78.5 HYPERLIPIDEMIA LDL GOAL <100: ICD-10-CM

## 2018-09-20 DIAGNOSIS — N18.4 TYPE 2 DIABETES MELLITUS WITH STAGE 4 CHRONIC KIDNEY DISEASE, WITHOUT LONG-TERM CURRENT USE OF INSULIN (H): Primary | ICD-10-CM

## 2018-09-20 DIAGNOSIS — G81.91 RIGHT HEMIPARESIS (H): ICD-10-CM

## 2018-09-20 PROCEDURE — 99214 OFFICE O/P EST MOD 30 MIN: CPT | Performed by: FAMILY MEDICINE

## 2018-09-20 ASSESSMENT — ANXIETY QUESTIONNAIRES
3. WORRYING TOO MUCH ABOUT DIFFERENT THINGS: NOT AT ALL
6. BECOMING EASILY ANNOYED OR IRRITABLE: NOT AT ALL
1. FEELING NERVOUS, ANXIOUS, OR ON EDGE: NOT AT ALL
7. FEELING AFRAID AS IF SOMETHING AWFUL MIGHT HAPPEN: NOT AT ALL
2. NOT BEING ABLE TO STOP OR CONTROL WORRYING: NOT AT ALL
GAD7 TOTAL SCORE: 0
5. BEING SO RESTLESS THAT IT IS HARD TO SIT STILL: NOT AT ALL
IF YOU CHECKED OFF ANY PROBLEMS ON THIS QUESTIONNAIRE, HOW DIFFICULT HAVE THESE PROBLEMS MADE IT FOR YOU TO DO YOUR WORK, TAKE CARE OF THINGS AT HOME, OR GET ALONG WITH OTHER PEOPLE: NOT DIFFICULT AT ALL

## 2018-09-20 ASSESSMENT — PATIENT HEALTH QUESTIONNAIRE - PHQ9: 5. POOR APPETITE OR OVEREATING: NOT AT ALL

## 2018-09-20 ASSESSMENT — PAIN SCALES - GENERAL: PAINLEVEL: NO PAIN (0)

## 2018-09-20 NOTE — PROGRESS NOTES
[facility] Care Coordination Contact  PCA DTR per CC.  Faxed completed PCA Assessment to PCA Agency and mailed copy to member.  Faxed MD Communication to PCP.    Lilliana Greer  CMS Supervisor  [facility]  [phone]

## 2018-09-20 NOTE — PATIENT INSTRUCTIONS
At [facility], we strive to deliver an exceptional experience to you, every time we see you.  If you receive a survey in the mail, please send us back your thoughts. We really do value your feedback.    Based on your medical history, these are the current health maintenance/preventive care services that you are due for (some may have been done at this visit.)  Health Maintenance Due   Topic Date Due     HF ACTION PLAN Q3 YR  1940     URINE DRUG SCREEN Q1 YR  05/06/1955     TRICIA QUESTIONNAIRE 1 YEAR  04/28/2017     ADVANCE DIRECTIVE PLANNING Q5 YRS  09/25/2017     EYE EXAM Q1 YEAR  11/14/2017     MICROALBUMIN Q1 YEAR  06/07/2018     INFLUENZA VACCINE (1) 09/01/2018     A1C Q6 MO  09/15/2018       Suggested websites for health information:  Www.OpenHatch.Appsco : Up to date and easily searchable information on multiple topics.  Www.[employer]orific.gov : medication info, interactive tutorials, watch real surgeries online  Www.familydoctor.org : good info from the Academy of Family Physicians  Www.cdc.gov : public health info, travel advisories, epidemics (H1N1)  Www.aap.org : children's health info, normal development, vaccinations  Www.health.[facility].[place].us : [place] dept of health, public health issues in [place], N1N1    Your care team:                            Family Medicine Internal Medicine   MD Gera Frost MD Shantel Branch-Fleming, MD Katya Georgiev PA-C Megan Hill, CHANO Causey MD Pediatrics   FAIZA Benavides, MD Radha Hernandez APRN CNP   MD Candida Gonzalez MD Deborah Mielke, MD Kim Thein, APRN [facility]      Clinic hours: Monday - Thursday 7 am-7 pm; Fridays 7 am-5 pm.   Urgent care: Monday - Friday 11 am-9 pm; Saturday and Sunday 9 am-5 pm.  Pharmacy : Monday -Thursday 8 am-8 pm; Friday 8 am-6 pm; Saturday and Sunday 9 am-5 pm.     Clinic: [phone]   Pharmacy: [phone]

## 2018-09-20 NOTE — MR AVS SNAPSHOT
After Visit Summary   9/20/2018    Lakhwinder Negron    MRN: [id number]           Patient Information     Date Of Birth          1940        Visit Information        Provider Department      9/20/2018 10:30 AM Jonna Cason MD; [employer] [facility]        Today's Diagnoses     Type 2 diabetes mellitus with stage 4 chronic kidney disease, without long-term current use of insulin (H)    -  1    Cerebrovascular accident (CVA) due to occlusion of cerebellar artery, unspecified blood vessel laterality (H)        CKD (chronic kidney disease) stage 5, GFR less than 15 ml/min (H)        Hyperlipidemia LDL goal <100        Mixed conductive and sensorineural hearing loss of both ears        Right hemiparesis (H)          Care Instructions    At [facility], we strive to deliver an exceptional experience to you, every time we see you.  If you receive a survey in the mail, please send us back your thoughts. We really do value your feedback.    Based on your medical history, these are the current health maintenance/preventive care services that you are due for (some may have been done at this visit.)  Health Maintenance Due   Topic Date Due     HF ACTION PLAN Q3 YR  1940     URINE DRUG SCREEN Q1 YR  [birth year]/06/1955     TRICIA QUESTIONNAIRE 1 YEAR  04/28/2017     ADVANCE DIRECTIVE PLANNING Q5 YRS  09/25/2017     EYE EXAM Q1 YEAR  11/14/2017     MICROALBUMIN Q1 YEAR  06/07/2018     INFLUENZA VACCINE (1) 09/01/2018     A1C Q6 MO  09/15/2018       Suggested websites for health information:  Www.Ne[employer] : Up to date and easily searchable information on multiple topics.  Www.medlineplus.gov : medication info, interactive tutorials, watch real surgeries online  Www.familydoctor.org : good info from the Academy of Family Physicians  Www.cdc.gov : public health info, travel advisories, epidemics (H1N1)  Www.aap.org : children's health info, normal  development, vaccinations  Www.health.[facility].[place].us : [place] dept of health, public health issues in [place], N1N1    Your care team:                            Family Medicine Internal Medicine   MD Gera Frost MD Shantel Branch-Fleming, MD Katya Georgiev PA-C Megan Hill, APRN [facility]    Fernando Causey MD Pediatrics   Reagan Parham, FAIZA Adams, CNP MD Radha Holman APRN CNP   MD Candida Gonzalez MD Deborah Mielke, MD Kim Thein, APRN [facility]      Clinic hours: Monday - Thursday 7 am-7 pm; Fridays 7 am-5 pm.   Urgent care: Monday - Friday 11 am-9 pm; Saturday and Sunday 9 am-5 pm.  Pharmacy : Monday -Thursday 8 am-8 pm; Friday 8 am-6 pm; Saturday and Sunday 9 am-5 pm.     Clinic: [phone]   Pharmacy: [phone]              Follow-ups after your visit        Follow-up notes from your care team     Return in about 6 months (around 3/20/2019) for Lab Work, medication follow up.      Your next 10 appointments already scheduled     Sep 24, 2018  1:30 PM CDT   Office Visit with Lyla Moya [facility] ([facility])    [address] [facility] [address] [phone]           Bring a current list of meds and any records pertaining to this visit. For Physicals, please bring immunization records and any forms needing to be filled out. Please arrive 10 minutes early to complete paperwork.            Oct 09, 2018  4:00 PM CDT   LAB with LAB FIRST FLOOR [facility] ([facility])    [address] [place] [address] [phone]           Please do not eat 10-12 hours before your appointment if you are coming in fasting for labs on lipids, cholesterol, or glucose (sugar). This does not apply to pregnant women. Water, hot tea and black coffee (with nothing added) are okay. Do not drink other fluids, diet soda or chew gum.            Oct 09, 2018   4:30 PM CDT   [facility] Ramon [facility]   [facility]    [address] [place] [address]   [phone]            Oct 12, 2018  2:00 PM CDT   New Visit with Sidra Carcamo OD   [facility]    [address] [address] [id number] [phone]            Dec 20, 2018 10:00 AM CST   Office Visit with Jonna Cason MD   [facility]    [address] [id number] [phone]           Bring a current list of meds and any records pertaining to this visit. For Physicals, please bring immunization records and any forms needing to be filled out. Please arrive 10 minutes early to complete paperwork.              Future tests that were ordered for you today     Open Future Orders        Priority Expected Expires Ordered    HEMOGLOBIN A1C Routine 10/9/2018 9/20/2019 9/20/2018            Who to contact     If you have questions or need follow up information about today's clinic visit or your schedule please contact [facility] directly at [phone].  Normal or non-critical lab and imaging results will be communicated to you by MyChart, letter or phone within 4 business days after the clinic has received the results. If you do not hear from us within 7 days, please contact the clinic through MyChart or phone. If you have a critical or abnormal lab result, we will notify you by phone as soon as possible.  Submit refill requests through [employer] or call your pharmacy and they will forward the refill request to us. Please allow 3 business days for your refill to be completed.          Additional Information About Your Visit        [employer] Information     [employer] lets you send messages to your doctor, view your test results, renew your prescriptions, schedule appointments and  "more. To sign up, go to [email or web address] . Click on \"Log in\" on the left side of the screen, which will take you to the Welcome page. Then click on \"Sign up Now\" on the right side of the page.     You will be asked to enter the access code listed below, as well as some personal information. Please follow the directions to create your username and password.     Your access code is: [email or web address]  Expires: 2018  9:32 AM     Your access code will  in 90 days. If you need help or a new code, please call your [place] clinic or [phone].        Care EveryWhere ID     This is your Care EveryWhere ID. This could be used by other organizations to access your [place] medical records  [id number]        Your Vitals Were     Pulse Temperature Respirations Height Pulse Oximetry Breastfeeding?    56 98  F (36.7  C) (Oral) 16 4' 8.75\" (1.441 m) 97% No    BMI (Body Mass Index)                   25.28 kg/m2            Blood Pressure from Last 3 Encounters:   18 128/65   18 150/62   18 142/60    Weight from Last 3 Encounters:   18 115 lb 12.8 oz (52.5 kg)   18 113 lb 12.8 oz (51.6 kg)   18 112 lb (50.8 kg)               Primary Care Provider Office Phone # Fax #    Jonna Kaleigh Cason [phone]       [address]  [facility] [address]        Equal Access to Services     [facility]: Hadii timbo read hadasho Soomaali, waaxda luqadaha, qaybta kaalmada asa, greta ortiz. So [facility] [phone].    ATENCIÓN: Si habla español, tiene a mckeon disposición servicios gratuitos de asistencia lingüística. Llame al [phone].    We comply with applicable federal civil rights laws and [place] laws. We do not discriminate on the basis of race, color, national origin, age, disability, sex, sexual orientation, or gender identity.            Thank you!     Thank you for choosing [facility]  for your care. Our goal " is always to provide you with excellent care. Hearing back from our patients is one way we can continue to improve our services. Please take a few minutes to complete the written survey that you may receive in the mail after your visit with us. Thank you!             Your Updated Medication List - Protect others around you: Learn how to safely use, store and throw away your medicines at www.disposemymeds.org.          This list is accurate as of 9/20/18  8:07 PM.  Always use your most recent med list.                   Brand Name Dispense Instructions for use Diagnosis    ACE/ARB/ARNI NOT PRESCRIBED (INTENTIONAL)      Please choose reason not prescribed, below    Type 2 diabetes mellitus with stage 3 chronic kidney disease, without long-term current use of insulin (H)       acetaminophen-codeine 300-30 MG per tablet    TYLENOL #3    60 tablet    TAKE 1-2 TABLETS BY MOUTH ONCE DAILY AS NEEDED // IB HNUB NOJ 1-2 LUB YOG MOB    Chronic pain syndrome       amLODIPine 10 MG tablet    NORVASC    90 tablet    Take 1 tablet (10 mg) by mouth daily    HTN (hypertension)       ASPIRIN NOT PRESCRIBED    INTENTIONAL    1 each    Please choose reason not prescribed, below    Cerebrovascular accident (CVA) due to thrombosis of other cerebral artery (H)       calcium carbonate 600 mg-vitamin D 400 units 600-400 MG-UNIT per tablet    CALTRATE    60 tablet    TAKE 1 TABLET BY MOUTH TWICE DAILY FOR BONE HEALTH // IB ZAUG NOJ 1 LUB, IB HNUB NOJ OB GERALD PAB LASHA POB TXHA    Senile osteoporosis       carvedilol 12.5 MG tablet    COREG    225 tablet    Take 1.5 tablets (18.75 mg) by mouth 2 times daily (with meals)    Hypertension, goal below 140/90       cholecalciferol 1000 UNIT tablet    vitamin D3    100 tablet    Take 1 tablet (1,000 Units) by mouth daily    Secondary hyperparathyroidism (H)       ferrous sulfate 325 (65 Fe) MG tablet    IRON    100 tablet    Take 1 tablet (325 mg) by mouth daily    Iron deficiency anemia secondary to  inadequate dietary iron intake       furosemide 40 MG tablet    LASIX    270 tablet    Take 1.5 tablets (60 mg) by mouth 2 times daily    Hypertension goal BP (blood pressure) < 140/90       glipiZIDE 2.5 MG 24 hr tablet    GLUCOTROL XL    60 tablet    TAKE 1 TABLET BY MOUTH TWO TIMES DAILY FOR DIABETES // IB ZAUG NOJ 1 LUB, IB HNUB NOJ 2 NICOLASUG PAB LASHA NTSHAV QAB ZIB    Type 2 diabetes mellitus with stage 3 chronic kidney disease, without long-term current use of insulin (H)       hydrALAZINE 50 MG tablet    APRESOLINE    270 tablet    Take 1 tablet (50 mg) by mouth 3 times daily    Hypertension goal BP (blood pressure) < 140/90       * insulin pen needle 31G X 6 MM    ULTICARE MINI    100 each    Use 2 daily or as directed.    Type 2 diabetes, HbA1C goal < 8% (H)       * UNIFINE PENTIPS 31G X 5 MM   Generic drug:  insulin pen needle     100 each    USE AS DIRECTED TWO TIMES DAILY // IB HNUB SIV 2 ZAUG LI QHIA    Hyperglycemia due to type 2 diabetes mellitus (H)       olopatadine 0.1 % ophthalmic solution    PATANOL    5 mL    Place 1 drop into both eyes 2 times daily as needed for allergies    Allergic conjunctivitis of both eyes       omeprazole 20 MG CR capsule    priLOSEC     TAKE 1 CAPSULE BY MOUTH TWICE DAILY FOR STOMACH    IB ZAUG NOJ 1 LUB, IB HNUB NOJ 2 GERALD TOBAR RAU MOB PLAB  NCAUJ PLAB        ONETOUCH DELICA LANCETS 33G Misc     100 each    USE AS DIRECTED TWO TIMES DAILY // IB HNUB SIV 2 ZAUG LI QHIA    Essential hypertension, Type 2 diabetes mellitus with diabetic chronic kidney disease, unspecified CKD stage, unspecified long term insulin use status (H)       ONETOUCH ULTRA test strip   Generic drug:  blood glucose monitoring     200 strip    USE TO TEST BLOOD SUGAR 2-3 TIMES A DAY // IB HNUB SIV 2-3 ZAUG LI QHIA    Type 2 diabetes mellitus with stage 3 chronic kidney disease, unspecified long term insulin use status (H)       ranitidine 150 MG tablet    ZANTAC    180 tablet    Take 1 tablet (150 mg) by  mouth 2 times daily    History of GI bleed       rosuvastatin 20 MG tablet    CRESTOR    45 tablet    Take 0.5 tablets (10 mg) by mouth daily    Hyperlipidemia LDL goal <100       * Notice:  This list has 2 medication(s) that are the same as other medications prescribed for you. Read the directions carefully, and ask your doctor or other care provider to review them with you.

## 2018-09-20 NOTE — LETTER
[address], [address]  [place] [address]  Phone:  [phone]  Fax:  [phone]        September 20, 2018    LAKHWINDER LAZARO  [address] [facility] [address]    Dear Lakhwinder,    Faviola is a copy of your completed PCA Assessment and Service Plan.  This is for your records and no action is required by you.  If you have additional questions regarding your assessment please contact me at [phone]. If you feel that your needs are not being met, please contact the Clinical Supervisor at [phone].    Sincerely,      Jacqui Robles RN  Care Coordinator  [facility]  [phone]      Enclosure:  Completed PCA assessment
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
## 2018-09-21 ASSESSMENT — ANXIETY QUESTIONNAIRES: GAD7 TOTAL SCORE: 0

## 2018-09-24 ENCOUNTER — OFFICE VISIT (OUTPATIENT)
Dept: PHARMACY | Facility: CLINIC | Age: 78
End: 2018-09-24
Payer: COMMERCIAL

## 2018-09-24 VITALS — DIASTOLIC BLOOD PRESSURE: 56 MMHG | SYSTOLIC BLOOD PRESSURE: 144 MMHG | HEART RATE: 59 BPM

## 2018-09-24 DIAGNOSIS — E11.22 TYPE 2 DIABETES MELLITUS WITH STAGE 4 CHRONIC KIDNEY DISEASE, WITHOUT LONG-TERM CURRENT USE OF INSULIN (H): ICD-10-CM

## 2018-09-24 DIAGNOSIS — N18.4 TYPE 2 DIABETES MELLITUS WITH STAGE 4 CHRONIC KIDNEY DISEASE, WITHOUT LONG-TERM CURRENT USE OF INSULIN (H): ICD-10-CM

## 2018-09-24 DIAGNOSIS — D64.9 ANEMIA, UNSPECIFIED TYPE: ICD-10-CM

## 2018-09-24 DIAGNOSIS — E78.5 HYPERLIPIDEMIA LDL GOAL <100: ICD-10-CM

## 2018-09-24 DIAGNOSIS — K21.9 GASTROESOPHAGEAL REFLUX DISEASE WITHOUT ESOPHAGITIS: ICD-10-CM

## 2018-09-24 DIAGNOSIS — Z87.19 HISTORY OF GI BLEED: ICD-10-CM

## 2018-09-24 DIAGNOSIS — I10 HYPERTENSION, GOAL BELOW 140/90: Primary | ICD-10-CM

## 2018-09-24 DIAGNOSIS — G89.4 CHRONIC PAIN SYNDROME: ICD-10-CM

## 2018-09-24 DIAGNOSIS — E56.9 VITAMIN DEFICIENCY: ICD-10-CM

## 2018-09-24 LAB — HBA1C MFR BLD: 6.8 % (ref 0–5.6)

## 2018-09-24 PROCEDURE — 99607 MTMS BY PHARM ADDL 15 MIN: CPT | Performed by: PHARMACIST

## 2018-09-24 PROCEDURE — 99605 MTMS BY PHARM NP 15 MIN: CPT | Performed by: PHARMACIST

## 2018-09-24 PROCEDURE — 36415 COLL VENOUS BLD VENIPUNCTURE: CPT | Performed by: FAMILY MEDICINE

## 2018-09-24 PROCEDURE — 83036 HEMOGLOBIN GLYCOSYLATED A1C: CPT | Performed by: FAMILY MEDICINE

## 2018-09-24 NOTE — PROGRESS NOTES
Dear Lakhwinder Negron,    Your test results are attached. I am happy to let you know that they are stable and your medications can stay the same.    The diabetes test is good. We can recheck labs in 6 months.     Please call me if you have any questions about these test results or about your care.    Sincerely,    Jonna Cason MD

## 2018-09-24 NOTE — PROGRESS NOTES
SUBJECTIVE/OBJECTIVE:                           Lakhwinder Negron is a 78 year old female coming in for an initial visit for Medication Therapy Management.  She was referred to me from  [facility] . Seen with [facility] .    Chief Complaint: would like to stop taking so many pills.    Allergies/ADRs: Reviewed in Epic  Tobacco: No tobacco use  Alcohol: not currently using  Caffeine: 1 cups/day of coffee  Activity: stretching exercises  PMH: Reviewed in Epic    Medication Adherence/Access:  Patient uses pill box(es). Set up by HCRN  Patient takes medications 3 time(s) per day.   Per patient, misses medication 0 times per week.     Pain: takes apap otc for mild pain and T#3 for moderate pain, 1-2 times weekly on avg. No side effects noted.     Hypertension: Current medications include amlodipine 10mg daily, carvedilol 18.75 BID, hydralazine 50mg TID, furosemide 60mg AM and noon.  Patient has BP monitored by HCR[facility]. Home BP monitoring in range of 140's systolic over 90's diastolic - she thinks but not sure.  Patient reports no current medication side effects.    Diabetes:  Pt currently taking glipizide XL 2.5 BID. Pt is not experiencing side effects.  SMBG: one time daily.   Ranges (patient reported): 110-130  Patient is not experiencing hypoglycemia  Recent symptoms of high blood sugar? none  Eye exam: due  Foot exam: up to date  ACEi/ARB: No, on hold CESAR   Urine Albumin:   Lab Results   Component Value Date    UMALCR 6282.05 (H) 06/07/2017      Aspirin: Not taking due to hx GI bleed  Diet/Exercise: did not review today.     Hyperlipidemia: Current therapy includes rosuvastatin 10mg once daily.  Pt reports no significant myalgias or other side effects. Pt has hx CVA.     vitamin: currently taking calcium w/D BID and vit D 1000u daily. She does not ingest any dairy sources of calcium.     GERD: Current medications include: Zantac (ranitidine) 150mg BID. The patient does have a history of GI bleed. Patient feels  "that current regimen is effective.     anemia: currently taking ferrous sulfate 325mg daily without side effects. Takes in the morning with breakfast and calcium supplement.   Hemoglobin   Date Value Ref Range Status   08/07/2018 10.7 (L) 11.7 - 15.7 g/dL Final   ]  Ferritin   Date Value Ref Range Status   08/07/2018 193 8 - 252 ng/mL Final     Iron   Date Value Ref Range Status   08/07/2018 66 35 - 180 ug/dL Final     Iron Binding Cap   Date Value Ref Range Status   08/07/2018 243 240 - 430 ug/dL Final     Today's Vitals: /56  Pulse 59    Est CrCl ([place]-G[place], ABW 52.5kg) ~ 10 ml/min (may be overestimation, unable to use accurate body weight for calculating ideal body weight due to height. Additionally SCr has not been stable)  Wt Readings from Last 1 Encounters:   09/20/18 115 lb 12.8 oz (52.5 kg)     Ht Readings from Last 1 Encounters:   09/20/18 4' 8.75\" (1.441 m)     Creatinine   Date Value Ref Range Status   08/07/2018 3.73 (H) 0.52 - 1.04 mg/dL Final   ]    ASSESSMENT:                             Current medications were reviewed today.     Medication Adherence: excellent, no issues identified    Pain: Stable. Reviewed dosing of apap w/codeine Per [employer]: GFR 10 to 50 mL/min: Reduce to 75% of the usual dose.    For safety, consider decreasing apap w/codeine dose to 30-15mg (Order T#2 instead of T#3)     Hypertension: Improved. Patient is not meeting BP goal of < 140/90mmHg. Will forward chart to nephrology to update BP reading and inform of ongoing edema.    Diabetes: Stable. Due for A1c recheck.    Hyperlipidemia: Stable.     vitamin: stable    GERD: stable without PPI, however dosing recommendations for ranitidine for CKD CrCl <50 mL/minute: Oral: 150 mg every 24 hours; adjust dose cautiously   Since symptoms are well controlled, will back off on this dose     anemia: needs improvement, due to drug interaction with calcium and ranitidine, will move iron to noon dosing to improve absorption. "     PLAN:                            1. Decrease ranitidine to 150mg once a day  2. Change ferrous sulfate to taking at noon  3. Consider changing Rx for T#3 to T#2 PRN - sent to PCP for consideration  4. Sent Epic message to nephrology regarding med rec and ongoing edema.  5. A1c today    I spent 45 minutes with this patient today. All changes were made via collaborative practice agreement with Jonna Cason. A copy of the visit note was provided to the patient's primary care provider.    Will follow up as needed.    The patient was given a summary of these recommendations as an after visit summary.     Lyla Moya, PharmD, BCACP

## 2018-09-24 NOTE — PATIENT INSTRUCTIONS
Recommendations from today's MTM visit:                                                    MTM (medication therapy management) is a service provided by a clinical pharmacist designed to help you get the most of out of your medicines.   Today we reviewed what your medicines are for, how to know if they are working, that your medicines are safe and how to make your medicine regimen as easy as possible.     1. Decrease ranitidine to 150mg once a day in the morning    2. Move ferrous sulfate to taking it in the afternoon    3. Check your A1c today    4. I will let the kidney doctor know about your swelling      Next MTM visit: as needed    To schedule another MTM appointment, please call the clinic directly or you may call the MTM scheduling line at [phone] or toll-free at [phone].     My Clinical Pharmacist's contact information:                                                      It was a pleasure seeing you today!  Please feel free to contact me with any questions or concerns you have.      Lyla Moya, [place], [facility]   [phone]    You may receive a survey about the MTM services you received.  I would appreciate your feedback to help me serve you better in the future. Please fill it out and return it when you can. Your comments will be anonymous.

## 2018-09-24 NOTE — MR AVS SNAPSHOT
After Visit Summary   9/24/2018    Lakhwinder Negron    MRN: [id number]           Patient Information     Date Of Birth          1940        Visit Information        Provider Department      9/24/2018 1:30 PM Lyla Moya [facility]; [employer] [facility] MTM        Today's Diagnoses     History of GI bleed          Care Instructions    Recommendations from today's MTM visit:                                                    MTM (medication therapy management) is a service provided by a clinical pharmacist designed to help you get the most of out of your medicines.   Today we reviewed what your medicines are for, how to know if they are working, that your medicines are safe and how to make your medicine regimen as easy as possible.     1. Decrease ranitidine to 150mg once a day in the morning    2. Move ferrous sulfate to taking it in the afternoon    3. Check your A1c today    4. I will let the kidney doctor know about your swelling      Next MTM visit: as needed    To schedule another MTM appointment, please call the clinic directly or you may call the MTM scheduling line at [phone] or toll-free at [phone].     My Clinical Pharmacist's contact information:                                                      It was a pleasure seeing you today!  Please feel free to contact me with any questions or concerns you have.      Lyla Moya, PharmD, [facility]   [phone]    You may receive a survey about the MTM services you received.  I would appreciate your feedback to help me serve you better in the future. Please fill it out and return it when you can. Your comments will be anonymous.                  Follow-ups after your visit        Your next 10 appointments already scheduled     Oct 09, 2018  4:00 PM CDT   LAB with LAB FIRST FLOOR Replaced by [facility]    [address] [place] [id number]    [phone]           Please do not eat 10-12 hours before your appointment if you are coming in fasting for labs on lipids, cholesterol, or glucose (sugar). This does not apply to pregnant women. Water, hot tea and black coffee (with nothing added) are okay. Do not drink other fluids, diet soda or chew gum.            Oct 09, 2018  4:30 PM CDT   Return Visit with Ramon Rodriguez MD   [facility] ([facility])    [address] [place] [id number]   [phone]            Oct 12, 2018  2:00 PM CDT   [facility]    [address] [facility] [address]   [phone]            Dec 20, 2018 10:00 AM CST   Office Visit with Jonna Cason MD   [facility]    [address] [facility] [address]   [phone]           Bring a current list of meds and any records pertaining to this visit. For Physicals, please bring immunization records and any forms needing to be filled out. Please arrive 10 minutes early to complete paperwork.              Who to contact     If you have questions or need follow up information about today's clinic visit or your schedule please contact [facility] MTM directly at [phone].  Normal or non-critical lab and imaging results will be communicated to you by MyChart, letter or phone within 4 business days after the clinic has received the results. If you do not hear from us within 7 days, please contact the clinic through MyChart or phone. If you have a critical or abnormal lab result, we will notify you by phone as soon as possible.  Submit refill requests through [employer] or call your pharmacy and they will forward the refill request to us. Please allow 3 business days for your refill to be completed.          Additional Information About  "Your Visit        MediCardhart Information     frintit lets you send messages to your doctor, view your test results, renew your prescriptions, schedule appointments and more. To sign up, go to [email or web address] . Click on \"Log in\" on the left side of the screen, which will take you to the Welcome page. Then click on \"Sign up Now\" on the right side of the page.     You will be asked to enter the access code listed below, as well as some personal information. Please follow the directions to create your username and password.     Your access code is: [email or web address]  Expires: 2018  9:32 AM     Your access code will  in 90 days. If you need help or a new code, please call your [facility] clinic or [phone].        Care EveryWhere ID     This is your Care EveryWhere ID. This could be used by other organizations to access your [facility] medical records  [id number]        Your Vitals Were     Pulse                   59            Blood Pressure from Last 3 Encounters:   18 144/56   18 128/65   18 150/62    Weight from Last 3 Encounters:   18 115 lb 12.8 oz (52.5 kg)   18 113 lb 12.8 oz (51.6 kg)   18 112 lb (50.8 kg)              Today, you had the following     No orders found for display         Today's Medication Changes          These changes are accurate as of 18  2:20 PM.  If you have any questions, ask your nurse or doctor.               These medicines have changed or have updated prescriptions.        Dose/Directions    ranitidine 150 MG tablet   Commonly known as:  ZANTAC   This may have changed:  when to take this   Used for:  History of GI bleed        Dose:  150 mg   Take 1 tablet (150 mg) by mouth daily   Quantity:  90 tablet   Refills:  1            Where to get your medicines      These medications were sent to [facility], [place] - [address]  [address] [place] [address]     Phone:  [phone]     ranitidine " 150 MG tablet                Primary Care Provider Office Phone # Fax #    Jonna Kaleigh Cason [phone] [address]  [facility] [address]        Equal Access to Services     KATIUSKA ORELLANA : Hadii timbo ku jayo Soomaali, waaxda luqadaha, qaybta kaalmada adeegyada, waxnadine idiin haylorenn adesonia rowell lagurdeep ortiz. So [facility] [phone].    ATENCIÓN: Si habla español, tiene a mckeon disposición servicios gratuitos de asistencia lingüística. Llame al [phone].    We comply with applicable federal civil rights laws and [place] laws. We do not discriminate on the basis of race, color, national origin, age, disability, sex, sexual orientation, or gender identity.            Thank you!     Thank you for choosing [facility]  for your care. Our goal is always to provide you with excellent care. Hearing back from our patients is one way we can continue to improve our services. Please take a few minutes to complete the written survey that you may receive in the mail after your visit with us. Thank you!             Your Updated Medication List - Protect others around you: Learn how to safely use, store and throw away your medicines at www.disposemymeds.org.          This list is accurate as of 9/24/18  2:20 PM.  Always use your most recent med list.                   Brand Name Dispense Instructions for use Diagnosis    ACE/ARB/ARNI NOT PRESCRIBED (INTENTIONAL)      Please choose reason not prescribed, below    Type 2 diabetes mellitus with stage 3 chronic kidney disease, without long-term current use of insulin (H)       acetaminophen-codeine 300-30 MG per tablet    TYLENOL #3    60 tablet    TAKE 1-2 TABLETS BY MOUTH ONCE DAILY AS NEEDED // IB HNUB NOJ 1-2 LUB YOG MOB    Chronic pain syndrome       amLODIPine 10 MG tablet    NORVASC    90 tablet    Take 1 tablet (10 mg) by mouth daily    HTN (hypertension)       ASPIRIN NOT PRESCRIBED    INTENTIONAL    1 each    Please choose  reason not prescribed, below    Cerebrovascular accident (CVA) due to thrombosis of other cerebral artery (H)       calcium carbonate 600 mg-vitamin D 400 units 600-400 MG-UNIT per tablet    CALTRATE    60 tablet    TAKE 1 TABLET BY MOUTH TWICE DAILY FOR BONE HEALTH // IB ZAUG NOJ 1 LUB, IB HNUB NOJ OB [facility] POB TXHA    Senile osteoporosis       carvedilol 12.5 MG tablet    COREG    225 tablet    Take 1.5 tablets (18.75 mg) by mouth 2 times daily (with meals)    Hypertension, goal below 140/90       cholecalciferol 1000 UNIT tablet    vitamin D3    100 tablet    Take 1 tablet (1,000 Units) by mouth daily    Secondary hyperparathyroidism (H)       ferrous sulfate 325 (65 Fe) MG tablet    IRON    100 tablet    Take 1 tablet (325 mg) by mouth daily    Iron deficiency anemia secondary to inadequate dietary iron intake       furosemide 40 MG tablet    LASIX    270 tablet    Take 1.5 tablets (60 mg) by mouth 2 times daily    Hypertension goal BP (blood pressure) < 140/90       glipiZIDE 2.5 MG 24 hr tablet    GLUCOTROL XL    60 tablet    TAKE 1 TABLET BY MOUTH TWO TIMES DAILY FOR DIABETES // IB ZAUG NOJ 1 LUB, IB HNUB NOJ 2 [facility] NTSHAV QAB ZIB    Type 2 diabetes mellitus with stage 3 chronic kidney disease, without long-term current use of insulin (H)       hydrALAZINE 50 MG tablet    APRESOLINE    270 tablet    Take 1 tablet (50 mg) by mouth 3 times daily    Hypertension goal BP (blood pressure) < 140/90       * insulin pen needle 31G X 6 MM    ULTICARE MINI    100 each    Use 2 daily or as directed.    Type 2 diabetes, HbA1C goal < 8% (H)       * UNIFINE PENTIPS 31G X 5 MM   Generic drug:  insulin pen needle     100 each    USE AS DIRECTED TWO TIMES DAILY // IB HNUB SIV 2 ZAUG LI QHIA    Hyperglycemia due to type 2 diabetes mellitus (H)       olopatadine 0.1 % ophthalmic solution    PATANOL    5 mL    Place 1 drop into both eyes 2 times daily as needed for allergies    Allergic conjunctivitis of both eyes        ONETOUCH DELICA LANCETS 33G Misc     100 each    USE AS DIRECTED TWO TIMES DAILY // IB HNUB SIV 2 ZAUG LI QHIA    Essential hypertension, Type 2 diabetes mellitus with diabetic chronic kidney disease, unspecified CKD stage, unspecified long term insulin use status (H)       ONETOUCH ULTRA test strip   Generic drug:  blood glucose monitoring     200 strip    USE TO TEST BLOOD SUGAR 2-3 TIMES A DAY // IB HNUB SIV 2-3 ZAUG LI QHIA    Type 2 diabetes mellitus with stage 3 chronic kidney disease, unspecified long term insulin use status (H)       ranitidine 150 MG tablet    ZANTAC    90 tablet    Take 1 tablet (150 mg) by mouth daily    History of GI bleed       rosuvastatin 20 MG tablet    CRESTOR    45 tablet    Take 0.5 tablets (10 mg) by mouth daily    Hyperlipidemia LDL goal <100       * Notice:  This list has 2 medication(s) that are the same as other medications prescribed for you. Read the directions carefully, and ask your doctor or other care provider to review them with you.

## 2018-09-24 NOTE — LETTER
September 24, 2018      Lakhwinder Negron  [address] [address]      Dear Lakhwinder Negron,    Your test results are attached. I am happy to let you know that they are stable and your medications can stay the same.    The diabetes test is good. We can recheck labs in 6 months.     Please call me if you have any questions about these test results or about your care.    Sincerely,    Jonna Cason MD/SYLVESTER      Resulted Orders   HEMOGLOBIN A1C   Result Value Ref Range    Hemoglobin A1C 6.8 (H) 0 - 5.6 %      Comment:      Normal <5.7% Prediabetes 5.7-6.4%  Diabetes 6.5% or higher - adopted from ADA   consensus guidelines.

## 2018-09-24 NOTE — Clinical Note
NELIDA from [facility] visit - Dr Cason, next time you give Rx for apap w/codeine, would consider the lower dose - primarily renal elimination.

## 2018-09-25 ENCOUNTER — PATIENT OUTREACH (OUTPATIENT)
Dept: GERIATRIC MEDICINE | Facility: CLINIC | Age: 78
End: 2018-09-25

## 2018-09-25 NOTE — LETTER
September 25, 2018    LAKHWINDER LAZARO  [address] [address]    Dear Lakhwinder,     At [facility], we re dedicated to improving the health and well-being of our members.  Enclosed you will find the Comprehensive Care Plan that was developed with you on 09/18/2018. Please review the Care Plan carefully.    As a reminder, some of the things we discussed at your visit include:    Ways to improve or maintain your physical health such as walking 20 minutes a day.     Ways to reduce the risk of falls.     Health care needs you may have.     Don t forget to contact your care coordinator if you:    Have been hospitalized or plan to be hospitalized.     Have experienced a fall.      Have experienced a change in physical health, which may include bladder control and pain issues.      Are experiencing emotional problems.     If you do not agree with your Care Plan, have questions about it, or have experienced a change in your needs, please contact me, your care coordinator, at [phone]. If you are hearing impaired, please call the [place] Relay at 791 or [phone] (cvegwx-ca-kbrhap relay service).    Sincerely,      Jacqui Robles RN  [employer]     [facility] is a health plan that contracts with both Medicare and the [place] Medical Assistance (Medicaid) program to provide benefits of both programs to enrollees. Enrollment in [facility] depends on contract renewal.    MSC+ [id number] IA ([id number])                                                  (12/16)

## 2018-10-05 DIAGNOSIS — N18.5 CKD (CHRONIC KIDNEY DISEASE) STAGE 5, GFR LESS THAN 15 ML/MIN (H): Primary | ICD-10-CM

## 2018-10-08 ENCOUNTER — DOCUMENTATION ONLY (OUTPATIENT)
Dept: LAB | Facility: CLINIC | Age: 78
End: 2018-10-08

## 2018-10-08 NOTE — PROGRESS NOTES
Pt is requesting an A1C to be drawn with other labs. Please order lab if needed for 10/9 appointment.    Thank you,  Katt ROLLINS

## 2018-10-08 NOTE — PROGRESS NOTES
This writer attempted to contact Lakhwinder Negron on 10/08/18      Reason for call A1C and left message.      If patient calls back:   Patient contacted by 1st floor [place] Care Team (MA/TC). Inform patient that someone from the team will contact them, document that pt called and route to care team.         Urban Robles MA

## 2018-10-09 ENCOUNTER — TELEPHONE (OUTPATIENT)
Dept: NEPHROLOGY | Facility: CLINIC | Age: 78
End: 2018-10-09

## 2018-10-09 NOTE — TELEPHONE ENCOUNTER
LM for Ramin to return call to discuss need to cancel Dr. Thomas office visit today.    LM for Magda, patient's  to discuss need to cancel Dr. Rodriguez office visit today.    Contacted patient with  service assistance to review need to cancel appointment for today. Offered Sat 10/13 8:00 lab 8:30 with Dr. Rodriguez at [facility]- though patient is concerned that Son may not be able to drive her to this appointment. Patient prefers appointments at 4:30 as her son does not get home from work until 2:30. Advised that patient have her son call later today to reschedule appointment which may work for their schedule. This writer is concerned as there is not an appointment available within a reasonable time frame given patient has CKD stage 5. She cannot push this appointment into December.  - Sat 10/13 8 lab 8:30  - Monday 10/15 @ 12 labs at 11:30  - Wednesday 10/17 labs at 2:00 with office visit at 2:30      Daysi Frias, RN, BSN  Nephrology Care Coordinator  [facility]     Patient is out if medication.

## 2018-10-09 NOTE — TELEPHONE ENCOUNTER
Contacted Ramin to discuss scheduling. 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 Same with a 12:00 weekday spot given his work hours. Offered Wednesday 10/17 @ 2:30 (labs at 2). He was agreeable to this.    Daysi Frias, RN, BSN  Nephrology Care Coordinator  [facility]

## 2018-10-12 ENCOUNTER — DOCUMENTATION ONLY (OUTPATIENT)
Dept: CARE COORDINATION | Facility: CLINIC | Age: 78
End: 2018-10-12
Payer: COMMERCIAL

## 2018-10-12 NOTE — PROGRESS NOTES
[facility] utilizes an encounter to take the place of a direct phone call to your office. Please take a moment to review the below request. Please reply or route message to author of this encounter.  Message will act as a verbal OK of orders requested below. Thank you.    ORDER  Skilled nurse visits every other week x9 weeks 3prns   MD SUMMARY/PLAN OF CARE  SN to perform assessment with focus on edema, weight, dyspnea levels, cardiac assessment, oxygen safety and management, medication management and reconciliation, pain management, mental health status and need for referral or change in treatment plan, skin integrity, falls risk, and to notify physician concerns related to mental or physical health. Instruct on disease process, signs/symptoms of complications to report to agency/physician/911, emergency/safety and falls prevention plan, medication effects/SE, new/high risk/changed medications, diet, and activity. Implement interventions to monitor and mitigate pain, prevent pressure ulcers and confirm proper foot care.     3 prn visits for falls, medication changes/issues, change in mental or physical health, supervisory visits per agency protocol, recertification assessments.  Visits may be in person or via video conference based upon patient needs.

## 2018-10-16 DIAGNOSIS — Z53.9 DIAGNOSIS NOT YET DEFINED: Primary | ICD-10-CM

## 2018-10-17 ENCOUNTER — OFFICE VISIT (OUTPATIENT)
Dept: NEPHROLOGY | Facility: CLINIC | Age: 78
End: 2018-10-17
Payer: COMMERCIAL

## 2018-10-17 VITALS
OXYGEN SATURATION: 98 % | HEIGHT: 56 IN | SYSTOLIC BLOOD PRESSURE: 150 MMHG | HEART RATE: 55 BPM | RESPIRATION RATE: 18 BRPM | TEMPERATURE: 97.8 F | DIASTOLIC BLOOD PRESSURE: 62 MMHG | BODY MASS INDEX: 26.66 KG/M2 | WEIGHT: 118.5 LBS

## 2018-10-17 DIAGNOSIS — N25.81 SECONDARY HYPERPARATHYROIDISM (H): ICD-10-CM

## 2018-10-17 DIAGNOSIS — N18.5 CKD (CHRONIC KIDNEY DISEASE) STAGE 5, GFR LESS THAN 15 ML/MIN (H): ICD-10-CM

## 2018-10-17 DIAGNOSIS — I10 HYPERTENSION, GOAL BELOW 140/90: ICD-10-CM

## 2018-10-17 DIAGNOSIS — E11.22 TYPE 2 DIABETES MELLITUS WITH STAGE 4 CHRONIC KIDNEY DISEASE, WITHOUT LONG-TERM CURRENT USE OF INSULIN (H): ICD-10-CM

## 2018-10-17 DIAGNOSIS — D50.8 IRON DEFICIENCY ANEMIA SECONDARY TO INADEQUATE DIETARY IRON INTAKE: ICD-10-CM

## 2018-10-17 DIAGNOSIS — I10 HYPERTENSION GOAL BP (BLOOD PRESSURE) < 140/90: ICD-10-CM

## 2018-10-17 DIAGNOSIS — N18.4 TYPE 2 DIABETES MELLITUS WITH STAGE 4 CHRONIC KIDNEY DISEASE, WITHOUT LONG-TERM CURRENT USE OF INSULIN (H): ICD-10-CM

## 2018-10-17 DIAGNOSIS — Z23 NEED FOR PROPHYLACTIC VACCINATION AND INOCULATION AGAINST INFLUENZA: ICD-10-CM

## 2018-10-17 DIAGNOSIS — D64.9 ANEMIA, UNSPECIFIED TYPE: Primary | ICD-10-CM

## 2018-10-17 LAB
ALBUMIN SERPL-MCNC: 3.9 G/DL (ref 3.4–5)
ANION GAP SERPL CALCULATED.3IONS-SCNC: 6 MMOL/L (ref 3–14)
BUN SERPL-MCNC: 47 MG/DL (ref 7–30)
CALCIUM SERPL-MCNC: 8.4 MG/DL (ref 8.5–10.1)
CHLORIDE SERPL-SCNC: 111 MMOL/L (ref 94–109)
CO2 SERPL-SCNC: 23 MMOL/L (ref 20–32)
CREAT SERPL-MCNC: 3.7 MG/DL (ref 0.52–1.04)
CREAT UR-MCNC: 35 MG/DL
FERRITIN SERPL-MCNC: 203 NG/ML (ref 8–252)
GFR SERPL CREATININE-BSD FRML MDRD: 12 ML/MIN/1.7M2
GLUCOSE SERPL-MCNC: 171 MG/DL (ref 70–99)
HGB BLD-MCNC: 10.1 G/DL (ref 11.7–15.7)
IRON SATN MFR SERPL: 17 % (ref 15–46)
IRON SERPL-MCNC: 38 UG/DL (ref 35–180)
PHOSPHATE SERPL-MCNC: 4.8 MG/DL (ref 2.5–4.5)
POTASSIUM SERPL-SCNC: 5.2 MMOL/L (ref 3.4–5.3)
PROT UR-MCNC: 1.9 G/L
PROT/CREAT 24H UR: 5.48 G/G CR (ref 0–0.2)
PTH-INTACT SERPL-MCNC: 366 PG/ML (ref 18–80)
SODIUM SERPL-SCNC: 140 MMOL/L (ref 133–144)
TIBC SERPL-MCNC: 229 UG/DL (ref 240–430)

## 2018-10-17 PROCEDURE — 36415 COLL VENOUS BLD VENIPUNCTURE: CPT | Performed by: INTERNAL MEDICINE

## 2018-10-17 PROCEDURE — 90471 IMMUNIZATION ADMIN: CPT | Performed by: INTERNAL MEDICINE

## 2018-10-17 PROCEDURE — 84156 ASSAY OF PROTEIN URINE: CPT | Performed by: INTERNAL MEDICINE

## 2018-10-17 PROCEDURE — 83550 IRON BINDING TEST: CPT | Performed by: INTERNAL MEDICINE

## 2018-10-17 PROCEDURE — 99214 OFFICE O/P EST MOD 30 MIN: CPT | Mod: 25 | Performed by: INTERNAL MEDICINE

## 2018-10-17 PROCEDURE — 80069 RENAL FUNCTION PANEL: CPT | Performed by: INTERNAL MEDICINE

## 2018-10-17 PROCEDURE — 90662 IIV NO PRSV INCREASED AG IM: CPT | Performed by: INTERNAL MEDICINE

## 2018-10-17 PROCEDURE — 82728 ASSAY OF FERRITIN: CPT | Performed by: INTERNAL MEDICINE

## 2018-10-17 PROCEDURE — 85018 HEMOGLOBIN: CPT | Performed by: INTERNAL MEDICINE

## 2018-10-17 PROCEDURE — 83540 ASSAY OF IRON: CPT | Performed by: INTERNAL MEDICINE

## 2018-10-17 PROCEDURE — 83970 ASSAY OF PARATHORMONE: CPT | Performed by: INTERNAL MEDICINE

## 2018-10-17 ASSESSMENT — PAIN SCALES - GENERAL: PAINLEVEL: NO PAIN (0)

## 2018-10-17 NOTE — PATIENT INSTRUCTIONS
1. We will be in touch with your home health nurse regarding making potential adjustments to the following:  - increasing the lasix from 60 mg twice a day to 80 mg in the AM and 60 mg in the early afternoon.  - potential change to the iron (iron studies pending and we will update whether to change)    2. We will get recent blood pressure readings from your home health nurse as well.     3. Follow-up in 8 weeks or sooner as needed.

## 2018-10-17 NOTE — PROGRESS NOTES

## 2018-10-17 NOTE — MR AVS SNAPSHOT
After Visit Summary   10/17/2018    Lakhwinder Negron    MRN: [id number]           Patient Information     Date Of Birth          1940        Visit Information        Provider Department      10/17/2018 2:30 PM Ramon Rodriguez MD; [employer] [facility]        Today's Diagnoses     Anemia, unspecified type    -  1      Care Instructions    1. We will be in touch with your home health nurse regarding making potential adjustments to the following:  - increasing the lasix from 60 mg twice a day to 80 mg in the AM and 60 mg in the early afternoon.  - potential change to the iron (iron studies pending and we will update whether to change)    2. We will get recent blood pressure readings from your home health nurse as well.     3. Follow-up in 8 weeks or sooner as needed.           Follow-ups after your visit        Your next 10 appointments already scheduled     Nov 23, 2018  4:00 PM CST   New Visit with Sidra Carcamo OD   [facility] ([facility]    [address] [place] [id number]   [phone]            Dec 11, 2018  4:00 PM CST   Return Visit with Ramon Rodriguez MD   [facility] ([facility]    [address] [place] [id number]   [phone]            Dec 20, 2018 10:00 AM CST   Office Visit with Jonna Cason MD   [facility] ([facility]    [address] [place] [id number]   [phone]           Bring a current list of meds and any records pertaining to this visit. For Physicals, please bring immunization records and any forms needing to be filled out. Please arrive 10 minutes early to complete paperwork.              Who to contact     If you have questions or need follow up information about today's clinic visit or your schedule please contact [facility]  "directly at [phone].  Normal or non-critical lab and imaging results will be communicated to you by MyChart, letter or phone within 4 business days after the clinic has received the results. If you do not hear from us within 7 days, please contact the clinic through MyChart or phone. If you have a critical or abnormal lab result, we will notify you by phone as soon as possible.  Submit refill requests through [employer] or call your pharmacy and they will forward the refill request to us. Please allow 3 business days for your refill to be completed.          Additional Information About Your Visit        Care EveryWhere ID     This is your Care EveryWhere ID. This could be used by other organizations to access your [facility] medical records  [id number]        Your Vitals Were     Pulse Temperature Respirations Height Pulse Oximetry BMI (Body Mass Index)    55 97.8  F (36.6  C) 18 1.422 m (4' 8\") 98% 26.57 kg/m2       Blood Pressure from Last 3 Encounters:   10/17/18 150/62   09/24/18 144/56   09/20/18 128/65    Weight from Last 3 Encounters:   10/17/18 53.8 kg (118 lb 8 oz)   09/20/18 52.5 kg (115 lb 12.8 oz)   08/07/18 51.6 kg (113 lb 12.8 oz)              We Performed the Following     Ferritin     Iron and iron binding capacity        Primary Care Provider Office Phone # Fax #    Jonna Kaleigh Cason [phone]       [id number]  [facility] [address]        Equal Access to Services     [facility]: Hadii aad ku hadasho Soomaali, waaxda luqadaha, qaybta kaalmada adeegyada, waxay remi batista . So [facility] [phone].    ATENCIÓN: Si habla español, tiene a mckeon disposición servicios gratuitos de asistencia lingüística. Llame al [phone].    We comply with applicable federal civil rights laws and [place] laws. We do not discriminate on the basis of race, color, national origin, age, disability, sex, sexual orientation, or gender identity.            Thank you!     " Thank you for choosing [facility]  for your care. Our goal is always to provide you with excellent care. Hearing back from our patients is one way we can continue to improve our services. Please take a few minutes to complete the written survey that you may receive in the mail after your visit with us. Thank you!             Your Updated Medication List - Protect others around you: Learn how to safely use, store and throw away your medicines at www.disposemymeds.org.          This list is accurate as of 10/17/18  3:28 PM.  Always use your most recent med list.                   Brand Name Dispense Instructions for use Diagnosis    ACE/ARB/ARNI NOT PRESCRIBED (INTENTIONAL)      Please choose reason not prescribed, below    Type 2 diabetes mellitus with stage 3 chronic kidney disease, without long-term current use of insulin (H)       acetaminophen-codeine 300-30 MG per tablet    TYLENOL #3    60 tablet    TAKE 1-2 TABLETS BY MOUTH ONCE DAILY AS NEEDED // IB HNUB NOJ 1-2 LUB YOG MOB    Chronic pain syndrome       amLODIPine 10 MG tablet    NORVASC    90 tablet    Take 1 tablet (10 mg) by mouth daily    HTN (hypertension)       ASPIRIN NOT PRESCRIBED    INTENTIONAL    1 each    Please choose reason not prescribed, below    Cerebrovascular accident (CVA) due to thrombosis of other cerebral artery (H)       calcium carbonate 600 mg-vitamin D 400 units 600-400 MG-UNIT per tablet    CALTRATE    60 tablet    TAKE 1 TABLET BY MOUTH TWICE DAILY FOR BONE HEALTH // IB ZAUG NOJ 1 LUB, IB HNUB NOJ OB ZAUG PAB LASHA POB TXHA    Senile osteoporosis       carvedilol 12.5 MG tablet    COREG    225 tablet    Take 1.5 tablets (18.75 mg) by mouth 2 times daily (with meals)    Hypertension, goal below 140/90       cholecalciferol 1000 UNIT tablet    vitamin D3    100 tablet    Take 1 tablet (1,000 Units) by mouth daily    Secondary hyperparathyroidism (H)       ferrous sulfate 325 (65 Fe) MG tablet    IRON    100 tablet     Take 1 tablet (325 mg) by mouth daily    Iron deficiency anemia secondary to inadequate dietary iron intake       furosemide 40 MG tablet    LASIX    270 tablet    Take 1.5 tablets (60 mg) by mouth 2 times daily    Hypertension goal BP (blood pressure) < 140/90       glipiZIDE 2.5 MG 24 hr tablet    GLUCOTROL XL    60 tablet    TAKE 1 TABLET BY MOUTH TWO TIMES DAILY FOR DIABETES // IB ZAUG NOJ 1 LUB, IB HNUB NOJ 2 ZAUG PAB LASHA NTSHAV QAB ZIB    Type 2 diabetes mellitus with stage 3 chronic kidney disease, without long-term current use of insulin (H)       hydrALAZINE 50 MG tablet    APRESOLINE    270 tablet    Take 1 tablet (50 mg) by mouth 3 times daily    Hypertension goal BP (blood pressure) < 140/90       * insulin pen needle 31G X 6 MM    ULTICARE MINI    100 each    Use 2 daily or as directed.    Type 2 diabetes, HbA1C goal < 8% (H)       * UNIFINE PENTIPS 31G X 5 MM   Generic drug:  insulin pen needle     100 each    USE AS DIRECTED TWO TIMES DAILY // IB HNUB SIV 2 ZAUG LI QHIA    Hyperglycemia due to type 2 diabetes mellitus (H)       olopatadine 0.1 % ophthalmic solution    PATANOL    5 mL    Place 1 drop into both eyes 2 times daily as needed for allergies    Allergic conjunctivitis of both eyes       ONETOUCH DELICA LANCETS 33G Misc     100 each    USE AS DIRECTED TWO TIMES DAILY // IB HNUB SIV 2 ZAUG LI QHIA    Essential hypertension, Type 2 diabetes mellitus with diabetic chronic kidney disease, unspecified CKD stage, unspecified long term insulin use status       ONETOUCH ULTRA test strip   Generic drug:  blood glucose monitoring     200 strip    USE TO TEST BLOOD SUGAR 2-3 TIMES A DAY // IB HNUB SIV 2-3 ZAUG LI QHIA    Type 2 diabetes mellitus with stage 3 chronic kidney disease, unspecified long term insulin use status       ranitidine 150 MG tablet    ZANTAC    90 tablet    Take 1 tablet (150 mg) by mouth daily    History of GI bleed       rosuvastatin 20 MG tablet    CRESTOR    45 tablet    Take  0.5 tablets (10 mg) by mouth daily    Hyperlipidemia LDL goal <100       * Notice:  This list has 2 medication(s) that are the same as other medications prescribed for you. Read the directions carefully, and ask your doctor or other care provider to review them with you.

## 2018-10-17 NOTE — NURSING NOTE
"Lakhwinder Negron's goals for this visit include: Return  She requests these members of her care team be copied on today's visit information: PCP    PCP: Jonna Cason    Referring Provider:  No referring provider defined for this encounter.    /62  Pulse 55  Temp 97.8  F (36.6  C)  Resp 18  Ht 1.422 m (4' 8\")  Wt 53.8 kg (118 lb 8 oz)  SpO2 98%  BMI 26.57 kg/m2    Do you need any medication refills at today's visit? N    "

## 2018-10-19 ENCOUNTER — TELEPHONE (OUTPATIENT)
Dept: NEPHROLOGY | Facility: CLINIC | Age: 78
End: 2018-10-19

## 2018-10-19 NOTE — TELEPHONE ENCOUNTER
Dr. Rodriguez recommends increasing iron to twice daily. Will contact patient to discuss.     Daysi Frias RN, BSN  Nephrology Care Coordinator  [facility]

## 2018-10-23 NOTE — TELEPHONE ENCOUNTER
Please document what dose of Iron pt should be taking twice daily.     She is currently on ferrous sulfate (IRON) Take 1 tablet (325 mg) by mouth daily     Cora Hayes LPN

## 2018-10-24 NOTE — TELEPHONE ENCOUNTER
Left message for MARJORIE Tijerina Care Coordinator to discuss medication changes.     Dr. Rodriguez would like to increase iron to 325 mg twice daily. In addition to this change, Dr. Rodriguez recommends - increasing the lasix from 60 mg twice a day to 80 mg in the AM and 60 mg in the early afternoon (per office visit patient instructions).    Daysi Frias RN, BSN  Nephrology Care Coordinator  [facility]

## 2018-10-24 NOTE — TELEPHONE ENCOUNTER
Received Voicemail from MARJORIE Tijerina [facility] regarding patients medications changes. She will be seeing the patient on Friday 10/26/18, and will make the medication changes per Dr. Rodriguez's recommendations 80 mg in the AM and 60 mg in the early afternoon, and increase iron to 325mg twice daily.  If there are any questions or concerns please call her back at [phone].     Kaley Esteban CMA (AAMA) on 10/24/2018 at 3:57 PM

## 2018-10-26 RX ORDER — FERROUS SULFATE 325(65) MG
325 TABLET ORAL 2 TIMES DAILY
Qty: 200 TABLET | Refills: 1 | Status: SHIPPED | OUTPATIENT
Start: 2018-10-26 | End: 2019-03-18

## 2018-10-26 RX ORDER — FUROSEMIDE 20 MG
TABLET ORAL
Qty: 630 TABLET | Refills: 1 | Status: SHIPPED | OUTPATIENT
Start: 2018-10-26 | End: 2019-06-25

## 2018-11-05 ENCOUNTER — PATIENT OUTREACH (OUTPATIENT)
Dept: GERIATRIC MEDICINE | Facility: CLINIC | Age: 78
End: 2018-11-05

## 2018-11-06 NOTE — PROGRESS NOTES
[facility] Care Coordination Contact    CC received notification of Hospital admission.  Hospital admission occurred on 11/4/2018 at [facility] with Dx of CHF.  CC contacted Hospital /discharge planner (SHEBA Lai: [phone]) and reviewed community POC. CC requested to be notified of concerns, care conference dates and discharge planning.   CC reached out to karla Negron regarding transition and offered support as needed.  Reviewed and update care plan as needed.  Notified community service providers and placed services Adult [facility] on hold as needed.  Transition log initiated.   PCP notified of hospitalization via EMR.    Jacqui Robles RN, PHN  [facility] Care Coordinator  Phone: [phone]  Fax [phone]   [email or web address]
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
## 2018-11-08 ENCOUNTER — RADIANT APPOINTMENT (OUTPATIENT)
Dept: GENERAL RADIOLOGY | Facility: CLINIC | Age: 78
End: 2018-11-08
Attending: FAMILY MEDICINE
Payer: COMMERCIAL

## 2018-11-08 ENCOUNTER — OFFICE VISIT (OUTPATIENT)
Dept: FAMILY MEDICINE | Facility: CLINIC | Age: 78
End: 2018-11-08
Payer: COMMERCIAL

## 2018-11-08 VITALS
OXYGEN SATURATION: 98 % | TEMPERATURE: 97.8 F | RESPIRATION RATE: 14 BRPM | HEART RATE: 62 BPM | HEIGHT: 56 IN | BODY MASS INDEX: 24.75 KG/M2 | DIASTOLIC BLOOD PRESSURE: 55 MMHG | SYSTOLIC BLOOD PRESSURE: 134 MMHG | WEIGHT: 110 LBS

## 2018-11-08 DIAGNOSIS — R04.2 HEMOPTYSIS: Primary | ICD-10-CM

## 2018-11-08 DIAGNOSIS — E78.5 HYPERLIPIDEMIA LDL GOAL <100: ICD-10-CM

## 2018-11-08 DIAGNOSIS — R04.2 HEMOPTYSIS: ICD-10-CM

## 2018-11-08 DIAGNOSIS — I10 HYPERTENSION, GOAL BELOW 140/90: ICD-10-CM

## 2018-11-08 DIAGNOSIS — N18.5 CKD (CHRONIC KIDNEY DISEASE) STAGE 5, GFR LESS THAN 15 ML/MIN (H): ICD-10-CM

## 2018-11-08 DIAGNOSIS — I63.549 CEREBROVASCULAR ACCIDENT (CVA) DUE TO OCCLUSION OF CEREBELLAR ARTERY, UNSPECIFIED BLOOD VESSEL LATERALITY (H): ICD-10-CM

## 2018-11-08 DIAGNOSIS — N18.4 TYPE 2 DIABETES MELLITUS WITH STAGE 4 CHRONIC KIDNEY DISEASE, WITHOUT LONG-TERM CURRENT USE OF INSULIN (H): ICD-10-CM

## 2018-11-08 DIAGNOSIS — G81.91 RIGHT HEMIPARESIS (H): ICD-10-CM

## 2018-11-08 DIAGNOSIS — E11.22 TYPE 2 DIABETES MELLITUS WITH STAGE 4 CHRONIC KIDNEY DISEASE, WITHOUT LONG-TERM CURRENT USE OF INSULIN (H): ICD-10-CM

## 2018-11-08 DIAGNOSIS — D62 ACUTE BLOOD LOSS ANEMIA: ICD-10-CM

## 2018-11-08 PROCEDURE — 71046 X-RAY EXAM CHEST 2 VIEWS: CPT | Mod: FY

## 2018-11-08 PROCEDURE — 99214 OFFICE O/P EST MOD 30 MIN: CPT | Performed by: FAMILY MEDICINE

## 2018-11-08 ASSESSMENT — PAIN SCALES - GENERAL: PAINLEVEL: NO PAIN (0)
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
## 2018-11-08 NOTE — LETTER
2018      Lakhwinder Negron  [address] [facility] [address]              Dear Lakhwinder Negron      Your test results are attached. I am happy to let you know that they are stable and your medications can stay the same.    There is no sign of pneumonia. There is some scar tissue at the bottom of the lungs. None of this shows any cause for blood when coughing. Please follow up with ENT to see if they see any cause for the bleeding.     Enclosed is a copy of the results.  It was a pleasure to see you at your last appointment.    If you have any questions or concerns, please call myself or my nurse at [phone].      Sincerely,      Jonna Cason MD, MPH /D. LALITA Almaraz  [employer]      Results for orders placed or performed in visit on 18   XR Chest 2 Views    Narrative    XR CHEST 2 VW 2018 1:51 PM    HISTORY: Hemoptysis.    COMPARISON: 3/8/2018    FINDINGS: Chronic mild basilar fibrosis. No new airspace  consolidation, pleural effusion or pneumothorax. Stable heart size.      Impression    IMPRESSION: No acute cardiopulmonary abnormality.    MARIN LOPEZ MD                 RE: Lakhwinder Negron   MRN: [id number]  : 1940  ENC DATE: 2018

## 2018-11-08 NOTE — PATIENT INSTRUCTIONS
At [facility], we strive to deliver an exceptional experience to you, every time we see you.  If you receive a survey in the mail, please send us back your thoughts. We really do value your feedback.    Based on your medical history, these are the current health maintenance/preventive care services that you are due for (some may have been done at this visit.)  Health Maintenance Due   Topic Date Due     HF ACTION PLAN Q3 YR  1940     URINE DRUG SCREEN Q1 YR  05/06/1955     ADVANCE DIRECTIVE PLANNING Q5 YRS  09/25/2017     EYE EXAM Q1 YEAR  11/14/2017     MICROALBUMIN Q1 YEAR  06/07/2018         Suggested websites for health information:  Www.etrigg.org : Up to date and easily searchable information on multiple topics.  Www.ClearFit.gov : medication info, interactive tutorials, watch real surgeries online  Www.familydoctor.org : good info from the Academy of Family Physicians  Www.cdc.gov : public health info, travel advisories, epidemics (H1N1)  Www.aap.org : children's health info, normal development, vaccinations  Www.health.[facility].[place].us : [place] dept of health, public health issues in [place], N1N1    Your care team:                            Family Medicine Internal Medicine   MD Gera Frost MD Shantel Branch-Fleming, MD Katya Georgiev PA-C Nam Ho, MD Pediatrics   FAIZA Benavides, KD Gómez APRN MD Candida Bland MD Deborah Mielke, MD Kim Thein, APRN [facility]      Clinic hours: Monday - Thursday 7 am-7 pm; Fridays 7 am-5 pm.   Urgent care: Monday - Friday 11 am-9 pm; Saturday and Sunday 9 am-5 pm.  Pharmacy : Monday -Thursday 8 am-8 pm; Friday 8 am-6 pm; Saturday and Sunday 9 am-5 pm.     Clinic: [phone]   Pharmacy: [phone]    Hemoptysis    Hemoptysis is the medical term for coughing up blood. There are many causes for this, including minor illnesses like bronchitis. Hemoptysis can also be an early  sign of a more serious illness, like a blood clot in the lung (pulmonary embolism), cancer, tuberculosis, or pneumonia.  Less common causes of hemoptysis can be hard to diagnose in an emergency department or a clinic. More testing will be needed if the symptoms continue.  Home care    Stay away from cigarette smoke. Smoke irritates the bronchial passages.    Unless you are taking daily aspirin to prevent stroke or heart attack, don't take aspirin or products that contain aspirin. Check with your healthcare provider to see if you should continue the aspirin or when you should restart it if you develop hemoptysis. Aspirin affects how readily the blood clots. Medicines that prevent clotting may make hemoptysis worse.    If you have a lung infection, drinking extra fluid will help loosen secretions in the lungs.    Over-the-counter cough medicines that contain dextromethorphan may help reduce coughing. Check with your healthcare provider before taking dextromethorphan if you have a chronic illness, are pregnant, or take daily medicines.    If you were prescribed an antibiotic, take it until it is all gone. Take it even if you are feeling better after only a few days.  Follow-up care  Follow up with your healthcare provider, or as advised.  When to seek medical advice  Call your healthcare provider right away if any of these occur:    Fever of 100.4 F (38 C) or higher, or as directed by your healthcare provider  Call 911  Call 911 if any of these occur:    Coughing up an increased amount of blood    Trouble breathing, wheezing, or pain with breathing    Chest pain or chest pressure    Fainting or losing consciousness    Rapid heartbeat    Weakness or dizziness   Date Last Reviewed: 6/1/2018 2000-2018 [employer]. [address] [place] [address], [place] [address]. All rights reserved. This information is not intended as a substitute for professional medical care. Always follow your healthcare professional's  instructions.

## 2018-11-08 NOTE — MR AVS SNAPSHOT
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  If you receive a survey in the mail, please send us back your thoughts. We really do value your feedback.    Based on your medical history, these are the current health maintenance/preventive care services that you are due for (some may have been done at this visit.)  Health Maintenance Due   Topic Date Due     HF ACTION PLAN Q3 YR  1940     URINE DRUG SCREEN Q1 YR  05/06/1955     ADVANCE DIRECTIVE PLANNING Q5 YRS  09/25/2017     EYE EXAM Q1 YEAR  11/14/2017     MICROALBUMIN Q1 YEAR  06/07/2018         Suggested websites for health information:  Www.[employer].org : Up to date and easily searchable information on multiple topics.  Www.medlineplus.gov : medication info, interactive tutorials, watch real surgeries online  Www.familydoctor.org : good info from the Academy of Family Physicians  Www.cdc.gov : public health info, travel advisories, epidemics (H1N1)  Www.aap.org : children's health info, normal development, vaccinations  Www.health.state.[place].us : [place] dept of health, public health issues  in [place], N1N1    Your care team:                            Family Medicine Internal Medicine   MD Gera Frost MD Shantel Branch-Fleming, MD Katya Georgiev PA-C Nam Ho, MD Pediatrics   FAIZA Benavides, KD Gómez APRN CNP   MD Candida Gonzalez MD Deborah Mielke, MD Kim Thein, APRN [facility]      Clinic hours: Monday - Thursday 7 am-7 pm; Fridays 7 am-5 pm.   Urgent care: Monday - Friday 11 am-9 pm; Saturday and Sunday 9 am-5 pm.  Pharmacy : Monday -Thursday 8 am-8 pm; Friday 8 am-6 pm; Saturday and Sunday 9 am-5 pm.     Clinic: [phone]   Pharmacy: [phone]    Hemoptysis    Hemoptysis is the medical term for coughing up blood. There are many causes for this, including minor illnesses like bronchitis. Hemoptysis can also be an early sign of a more serious illness, like a blood clot in the lung (pulmonary embolism), cancer, tuberculosis, or pneumonia.  Less common causes of hemoptysis can be hard to diagnose in an emergency department or a clinic. More testing will be needed if the symptoms continue.  Home care    Stay away from cigarette smoke. Smoke irritates the bronchial passages.    Unless you are taking daily aspirin to prevent stroke or heart attack, don't take aspirin or products that contain aspirin. Check with your healthcare provider to see if you should continue the aspirin or when you should restart it if you develop hemoptysis. Aspirin affects how readily the blood clots. Medicines that prevent clotting may make hemoptysis worse.    If you have a lung infection, drinking extra fluid will help loosen secretions in the lungs.    Over-the-counter cough medicines that contain dextromethorphan may help reduce coughing. Check with your healthcare provider before taking dextromethorphan if you have a chronic illness, are pregnant, or take daily medicines.    If you were prescribed an antibiotic, take it until it is all gone. Take it  even if you are feeling better after only a few days.  Follow-up care  Follow up with your healthcare provider, or as advised.  When to seek medical advice  Call your healthcare provider right away if any of these occur:    Fever of 100.4 F (38 C) or higher, or as directed by your healthcare provider  Call 911  Call 911 if any of these occur:    Coughing up an increased amount of blood    Trouble breathing, wheezing, or pain with breathing    Chest pain or chest pressure    Fainting or losing consciousness    Rapid heartbeat    Weakness or dizziness   Date Last Reviewed: 6/1/2018 2000-2018 [employer]. [address] [place] [address]. All rights reserved. This information is not intended as a substitute for professional medical care. Always follow your healthcare professional's instructions.                Follow-ups after your visit        Additional Services     OTOLARYNGOLOGY REFERRAL       Your provider has referred you to: [facility]: [facility] [phone]   [email or web address]    Please be aware that coverage of these services is subject to the terms and limitations of your health insurance plan.  Call member services at your health plan with any benefit or coverage questions.      Please bring the following with you to your appointment:    (1) Any X-Rays, CTs or MRIs which have been performed.  Contact the facility where they were done to arrange for  prior to your scheduled appointment.   (2) List of current medications  (3) This referral request   (4) Any documents/labs given to you for this referral                  Follow-up notes from your care team     Return in about 3 months (around 2/8/2019) for medication follow up, Weight check, Physical Exam.      Your next 10 appointments already scheduled     Nov 13, 2018  5:20 PM CST   Office Visit with Jonna Cason MD   [facility]  [facility])    [address] [facility] [address]   [phone]           Bring a current list of meds and any records pertaining to this visit. For Physicals, please bring immunization records and any forms needing to be filled out. Please arrive 10 minutes early to complete paperwork.            Nov 23, 2018  4:00 PM CST   [facility]    [address] [address]   [phone]            Dec 11, 2018  4:00 PM CST   [facility] Visit with Ramon Rodriguez MD   [facility]    [address] [place] [address]   [phone]            Dec 20, 2018 10:00 AM CST   Office Visit with [facility] Cason MD   [facility]    [address] [address]   [phone]           Bring a current list of meds and any records pertaining to this visit. For Physicals, please bring immunization records and any forms needing to be filled out. Please arrive 10 minutes early to complete paperwork.              Future tests that were ordered for you today     Open Future Orders        Priority Expected Expires Ordered    XR Chest 2 Views Routine 11/8/2018 11/8/2019 11/8/2018            Who to contact     If you have questions or need follow up information about today's clinic visit or your schedule please contact [facility] directly at [phone].  Normal or non-critical lab and imaging results will be communicated to you by MyChart, letter or phone within 4 business days after the clinic has received the results. If you do not hear from us within 7 days, please contact the clinic through MyChart or phone. If you have a critical or abnormal lab result, we will notify you by phone as soon as possible.  Submit refill requests  "through [employer] or call your pharmacy and they will forward the refill request to us. Please allow 3 business days for your refill to be completed.          Additional Information About Your Visit        Care EveryWhere ID     This is your Care EveryWhere ID. This could be used by other organizations to access your [place] medical records  [id number]        Your Vitals Were     Pulse Temperature Respirations Height Last Period Pulse Oximetry    62 97.8  F (36.6  C) (Oral) 14 4' 8\" (1.422 m) (LMP Unknown) 98%    Breastfeeding? BMI (Body Mass Index)                No 24.66 kg/m2           Blood Pressure from Last 3 Encounters:   11/08/18 134/55   10/17/18 150/62   09/24/18 144/56    Weight from Last 3 Encounters:   11/08/18 110 lb (49.9 kg)   10/17/18 118 lb 8 oz (53.8 kg)   09/20/18 115 lb 12.8 oz (52.5 kg)              We Performed the Following     OTOLARYNGOLOGY REFERRAL        Primary Care Provider Office Phone # Fax #    Jonna Kaleigh Cason [phone]       [address]  [facility] [address]        Equal Access to Services     [facility]: Hadii aad ku hadasho Soomaali, waaxda luqadaha, qaybta kaalmada adeegyada, waxay idiin haylorenn bing rowell la'sanya . So [facility] [phone].    ATENCIÓN: Si habla español, tiene a mckeon disposición servicios gratuitos de asistencia lingüística. [facility] [phone].    We comply with applicable federal civil rights laws and [place] laws. We do not discriminate on the basis of race, color, national origin, age, disability, sex, sexual orientation, or gender identity.            Thank you!     Thank you for choosing [facility]  for your care. Our goal is always to provide you with excellent care. Hearing back from our patients is one way we can continue to improve our services. Please take a few minutes to complete the written survey that you may receive in the mail after your visit with us. Thank you!             Your Updated " Medication List - Protect others around you: Learn how to safely use, store and throw away your medicines at www.disposemymeds.org.          This list is accurate as of 11/8/18  1:42 PM.  Always use your most recent med list.                   Brand Name Dispense Instructions for use Diagnosis    ACE/ARB/ARNI NOT PRESCRIBED (INTENTIONAL)      Please choose reason not prescribed, below    Type 2 diabetes mellitus with stage 3 chronic kidney disease, without long-term current use of insulin (H)       acetaminophen-codeine 300-30 MG per tablet    TYLENOL #3    60 tablet    TAKE 1-2 TABLETS BY MOUTH ONCE DAILY AS NEEDED // IB HNUB NOJ 1-2 LUB YOG MOB    Chronic pain syndrome       amLODIPine 10 MG tablet    NORVASC    90 tablet    Take 1 tablet (10 mg) by mouth daily    HTN (hypertension)       ASPIRIN NOT PRESCRIBED    INTENTIONAL    1 each    Please choose reason not prescribed, below    Cerebrovascular accident (CVA) due to thrombosis of other cerebral artery (H)       calcium carbonate 600 mg-vitamin D 400 units 600-400 MG-UNIT per tablet    CALTRATE    60 tablet    TAKE 1 TABLET BY MOUTH TWICE DAILY FOR BONE HEALTH // IB ZAUG NOJ 1 LUB, IB HNUB NOJ OB ZAUG PAB LSAHA POB TXHA    Senile osteoporosis       carvedilol 12.5 MG tablet    COREG    225 tablet    Take 1.5 tablets (18.75 mg) by mouth 2 times daily (with meals)    Hypertension, goal below 140/90       cholecalciferol 1000 UNIT tablet    vitamin D3    100 tablet    Take 1 tablet (1,000 Units) by mouth daily    Secondary hyperparathyroidism (H)       ferrous sulfate 325 (65 Fe) MG tablet    IRON    200 tablet    Take 1 tablet (325 mg) by mouth 2 times daily    Iron deficiency anemia secondary to inadequate dietary iron intake, Anemia, unspecified type       furosemide 20 MG tablet    LASIX    630 tablet    Take 80 mg in the AM (4 tablets) and 60 mg (3 tablets) in the PM.    Hypertension goal BP (blood pressure) < 140/90       glipiZIDE 2.5 MG 24 hr tablet     GLUCOTROL XL    60 tablet    TAKE 1 TABLET BY MOUTH TWO TIMES DAILY FOR DIABETES // IB ZAUG NOJ 1 LUB, IB HNUB NOJ 2 ZAUG PAB LASHA NTSHAV QAB ZIB    Type 2 diabetes mellitus with stage 3 chronic kidney disease, without long-term current use of insulin (H)       hydrALAZINE 50 MG tablet    APRESOLINE    270 tablet    Take 1 tablet (50 mg) by mouth 3 times daily    Hypertension goal BP (blood pressure) < 140/90       * insulin pen needle 31G X 6 MM    ULTICARE MINI    100 each    Use 2 daily or as directed.    Type 2 diabetes, HbA1C goal < 8% (H)       * UNIFINE PENTIPS 31G X 5 MM   Generic drug:  insulin pen needle     100 each    USE AS DIRECTED TWO TIMES DAILY // IB HNUB SIV 2 ZAUG LI QHIA    Hyperglycemia due to type 2 diabetes mellitus (H)       olopatadine 0.1 % ophthalmic solution    PATANOL    5 mL    Place 1 drop into both eyes 2 times daily as needed for allergies    Allergic conjunctivitis of both eyes       ONETOUCH DELICA LANCETS 33G Misc     100 each    USE AS DIRECTED TWO TIMES DAILY // IB HNUB SIV 2 ZAUG LI QHIA    Essential hypertension, Type 2 diabetes mellitus with diabetic chronic kidney disease, unspecified CKD stage, unspecified long term insulin use status       ONETOUCH ULTRA test strip   Generic drug:  blood glucose monitoring     200 strip    USE TO TEST BLOOD SUGAR 2-3 TIMES A DAY // IB HNUB SIV 2-3 ZAUG LI QHIA    Type 2 diabetes mellitus with stage 3 chronic kidney disease, unspecified long term insulin use status       ranitidine 150 MG tablet    ZANTAC    90 tablet    Take 1 tablet (150 mg) by mouth daily    History of GI bleed       rosuvastatin 20 MG tablet    CRESTOR    45 tablet    Take 0.5 tablets (10 mg) by mouth daily    Hyperlipidemia LDL goal <100       * Notice:  This list has 2 medication(s) that are the same as other medications prescribed for you. Read the directions carefully, and ask your doctor or other care provider to review them with you.

## 2018-11-11 NOTE — PROGRESS NOTES
Dear Lakhwinder Negron,    Your test results are attached. I am happy to let you know that they are stable and your medications can stay the same.    There is no sign of pneumonia. There is some scar tissue at the bottom of the lungs. None of this shows any cause for blood when coughing. Please follow up with ENT to see if they see any cause for the bleeding.    Please call me if you have any questions about these test results or about your care.    Sincerely,    Jonna Cason MD

## 2018-11-13 ENCOUNTER — TELEPHONE (OUTPATIENT)
Dept: FAMILY MEDICINE | Facility: CLINIC | Age: 78
End: 2018-11-13

## 2018-11-13 DIAGNOSIS — Z87.19 HISTORY OF GI BLEED: ICD-10-CM

## 2018-11-13 NOTE — TELEPHONE ENCOUNTER
Should have refill?  But with different directions at the old [facility].  May need to transfer

## 2018-11-13 NOTE — TELEPHONE ENCOUNTER
Writer received a refill request from: [facility] in [place], [place]. [phone]    Medication: ranitidine (ZANTAC) 150 MG tablet     Sig: Take 1 tablet by mouth twice daily for stomach    Date last dispensed: 9/24/18   Dr. Cason last filled this medication      Routed this message to Dr. Cason office for review.    Cora Hayes LPN

## 2018-11-16 NOTE — TELEPHONE ENCOUNTER
Called [facility], pharmacist states that if prescription was sent over to [facility] after its been closed, sometimes some script to go thru to [facility]. Verbal given over the phone to pharmacist for Zantac.  Kimmy Hamlin MA

## 2018-11-16 NOTE — TELEPHONE ENCOUNTER
Medication Detail      Disp Refills Start End JULIA   ranitidine (ZANTAC) 150 MG tablet 90 tablet 1 9/24/2018  No   Sig - Route: Take 1 tablet (150 mg) by mouth daily - Oral   Class: E-Prescribe   Notes to Pharmacy: Updated instructions, hold for refill   Order: [id number]   E-Prescribing Status: Sent to pharmacy (9/24/2018  2:15 PM CDT)   Printout Tracking   External Result Report   Pharmacy   [facility], [place] - [address]     Team, please advise patient to request a transfer of Rx to preferred pharmacy.     When [facility] phone number is called, it goes to [facility]? Has patient heard that [facility] has closed?     Colleen Dixon RN

## 2018-11-19 DIAGNOSIS — E11.22 TYPE 2 DIABETES MELLITUS WITH STAGE 3 CHRONIC KIDNEY DISEASE, WITHOUT LONG-TERM CURRENT USE OF INSULIN (H): ICD-10-CM

## 2018-11-19 DIAGNOSIS — N18.30 TYPE 2 DIABETES MELLITUS WITH STAGE 3 CHRONIC KIDNEY DISEASE, WITHOUT LONG-TERM CURRENT USE OF INSULIN (H): ICD-10-CM

## 2018-11-19 NOTE — TELEPHONE ENCOUNTER
Requested Prescriptions   Pending Prescriptions Disp Refills     glipiZIDE (GLUCOTROL XL) 2.5 MG 24 hr tablet  Last Written Prescription Date:  05/10/18  Last Fill Quantity: 60,  # refills: 3   Last Office Visit with MIGUEL, YAS or [facility] prescribing provider:  11/08/18Laurie   Future Office Visit:    Next 5 appointments (look out 90 days)     Dec 11, 2018  4:00 PM CST   [facility] Ramon Rodriguez MD   [facility]    [address] [address] [place] [address]   [phone]            Dec 20, 2018 10:00 AM CST   [facility]    [address] [facility] [address]   [phone]                60 tablet 3    Sulfonylurea Agents Failed    11/19/2018  2:43 PM       Failed - Patient has had a Microalbumin in the past 12 mos.    Recent Labs   Lab Test  06/07/17   1011   MICROL  7350   UMALCR  6282.05*            Failed - Patient has a recent creatinine (normal) within the past 12 mos.    Recent Labs   Lab Test  10/17/18   1400   CR  3.70*            Passed - Blood pressure less than 140/90 in past 6 months    BP Readings from Last 3 Encounters:   11/08/18 134/55   10/17/18 150/62   09/24/18 144/56                Passed - Patient has documented LDL within the past 12 mos.    Recent Labs   Lab Test  03/15/18   0859   LDL  34            Passed - Patient has documented A1c within the specified period of time.    If HgbA1C is 8 or greater, it needs to be on file within the past 3 months.  If less than 8, must be on file within the past 6 months.     Recent Labs   Lab Test  09/24/18   1424   A1C  6.8*            Passed - Patient is age 18 or older       Passed - No active pregnancy on record       Passed - Patient has not had a positive pregnancy test within the past 12 mos.       Passed - Recent (6 mo) or future (30 days) visit within the authorizing  "provider's specialty    Patient had office visit in the last 6 months or has a visit in the next 30 days with authorizing provider or within the authorizing provider's specialty.  See \"Patient Info\" tab in inbasket, or \"Choose Columns\" in Meds & Orders section of the refill encounter.              "

## 2018-11-23 RX ORDER — GLIPIZIDE 2.5 MG/1
2.5 TABLET, EXTENDED RELEASE ORAL 2 TIMES DAILY
Qty: 60 TABLET | Refills: 3 | Status: SHIPPED | OUTPATIENT
Start: 2018-11-23 | End: 2019-02-15

## 2018-11-23 NOTE — TELEPHONE ENCOUNTER
Routing refill request to provider for review/approval because:  Labs out of range:  Creatinine and microalbumin    Mena Franklin RN, [facility]

## 2018-11-27 DIAGNOSIS — E11.22 TYPE 2 DIABETES MELLITUS WITH STAGE 4 CHRONIC KIDNEY DISEASE, WITHOUT LONG-TERM CURRENT USE OF INSULIN (H): Primary | ICD-10-CM

## 2018-11-27 DIAGNOSIS — N18.4 TYPE 2 DIABETES MELLITUS WITH STAGE 4 CHRONIC KIDNEY DISEASE, WITHOUT LONG-TERM CURRENT USE OF INSULIN (H): Primary | ICD-10-CM

## 2018-11-27 NOTE — TELEPHONE ENCOUNTER
Prescription approved per [facility] Refill Protocol.      Theresa Gregorio RN, BSN

## 2018-12-07 ENCOUNTER — DOCUMENTATION ONLY (OUTPATIENT)
Dept: CARE COORDINATION | Facility: CLINIC | Age: 78
End: 2018-12-07
Payer: COMMERCIAL

## 2018-12-07 NOTE — PROGRESS NOTES
[facility] utilizes an encounter to take the place of a direct phone call to your office. Please take a moment to review the below request. Please reply or route message to author of this encounter.  Message will act as a verbal OK of orders requested below. Thank you.    ORDER  Skilled nurse visits every other week x8 weeks, 3prns   MD SUMMARY/PLAN OF CARE    SN to perform assessment with focus on edema, weight, dyspnea levels, cardiac assessment, oxygen safety and management, medication management and reconciliation, pain management, mental health status and need for referral or change in treatment plan, skin integrity, falls risk, and to notify physician concerns related to mental or physical health. Instruct on disease process, signs/symptoms of complications to report to agency/physician/911, emergency/safety and falls prevention plan, medication effects/SE, new/high risk/changed medications, diet, and activity. Implement interventions to monitor and mitigate pain, prevent pressure ulcers and confirm proper foot care. 3 prn visits for falls, medication changes/issues, change in mental or physical health, supervisory visits per agency protocol, recertification assessments.Visits may be in person or via video conference based upon patient needs.  Visits to begin week of 12/17/18.

## 2018-12-10 DIAGNOSIS — M81.0 SENILE OSTEOPOROSIS: ICD-10-CM

## 2018-12-10 NOTE — TELEPHONE ENCOUNTER
"Requested Prescriptions   Pending Prescriptions Disp Refills     calcium carbonate 600 mg-vitamin D 400 units (CALTRATE) 600-400 MG-UNIT per tablet  Last Written Prescription Date:  03/23/18  Last Fill Quantity: 60,  # refills: 5   Last Office Visit with FMANDRIY, YAS or [facility] prescribing provider:  11/08/18-Yoav   Future Office Visit:    Next 5 appointments (look out 90 days)    Dec 11, 2018  3:45 PM CST  Return Visit with Ramon Rodriguez MD, [facility] [facility]  [facility] [address] [place] [address]  [phone]   Dec 20, 2018 10:00 AM CST  Office Visit with Jonna Cason MD  [facility] [address] [facility] [address]  [phone]        60 tablet 5    Vitamin Supplements (Adult) Protocol Passed - 12/10/2018  2:44 PM       Passed - High dose Vitamin D not ordered       Passed - Recent (12 mo) or future (30 days) visit within the authorizing provider's specialty    Patient had office visit in the last 12 months or has a visit in the next 30 days with authorizing provider or within the authorizing provider's specialty.  See \"Patient Info\" tab in inbasket, or \"Choose Columns\" in Meds & Orders section of the refill encounter.                "

## 2018-12-11 ENCOUNTER — OFFICE VISIT (OUTPATIENT)
Dept: NEPHROLOGY | Facility: CLINIC | Age: 78
End: 2018-12-11
Payer: COMMERCIAL

## 2018-12-11 VITALS
OXYGEN SATURATION: 99 % | BODY MASS INDEX: 25.11 KG/M2 | WEIGHT: 112 LBS | SYSTOLIC BLOOD PRESSURE: 144 MMHG | HEART RATE: 68 BPM | DIASTOLIC BLOOD PRESSURE: 64 MMHG

## 2018-12-11 DIAGNOSIS — N25.81 SECONDARY HYPERPARATHYROIDISM (H): ICD-10-CM

## 2018-12-11 DIAGNOSIS — I10 HYPERTENSION GOAL BP (BLOOD PRESSURE) < 140/90: ICD-10-CM

## 2018-12-11 DIAGNOSIS — D64.9 ANEMIA, UNSPECIFIED TYPE: ICD-10-CM

## 2018-12-11 DIAGNOSIS — E11.22 TYPE 2 DIABETES MELLITUS WITH STAGE 4 CHRONIC KIDNEY DISEASE, WITHOUT LONG-TERM CURRENT USE OF INSULIN (H): ICD-10-CM

## 2018-12-11 DIAGNOSIS — N18.4 TYPE 2 DIABETES MELLITUS WITH STAGE 4 CHRONIC KIDNEY DISEASE, WITHOUT LONG-TERM CURRENT USE OF INSULIN (H): ICD-10-CM

## 2018-12-11 DIAGNOSIS — N18.5 CKD (CHRONIC KIDNEY DISEASE) STAGE 5, GFR LESS THAN 15 ML/MIN (H): Primary | ICD-10-CM

## 2018-12-11 LAB
ALBUMIN SERPL-MCNC: 4.2 G/DL (ref 3.4–5)
ANION GAP SERPL CALCULATED.3IONS-SCNC: 7 MMOL/L (ref 3–14)
BUN SERPL-MCNC: 54 MG/DL (ref 7–30)
CALCIUM SERPL-MCNC: 8.5 MG/DL (ref 8.5–10.1)
CHLORIDE SERPL-SCNC: 107 MMOL/L (ref 94–109)
CO2 SERPL-SCNC: 26 MMOL/L (ref 20–32)
CREAT SERPL-MCNC: 3.48 MG/DL (ref 0.52–1.04)
FERRITIN SERPL-MCNC: 180 NG/ML (ref 8–252)
GFR SERPL CREATININE-BSD FRML MDRD: 13 ML/MIN/1.7M2
GLUCOSE SERPL-MCNC: 205 MG/DL (ref 70–99)
HGB BLD-MCNC: 10.6 G/DL (ref 11.7–15.7)
IRON SATN MFR SERPL: 20 % (ref 15–46)
IRON SERPL-MCNC: 49 UG/DL (ref 35–180)
PHOSPHATE SERPL-MCNC: 3.7 MG/DL (ref 2.5–4.5)
POTASSIUM SERPL-SCNC: 4.9 MMOL/L (ref 3.4–5.3)
PTH-INTACT SERPL-MCNC: 226 PG/ML (ref 18–80)
SODIUM SERPL-SCNC: 140 MMOL/L (ref 133–144)
TIBC SERPL-MCNC: 250 UG/DL (ref 240–430)

## 2018-12-11 PROCEDURE — 83540 ASSAY OF IRON: CPT | Performed by: INTERNAL MEDICINE

## 2018-12-11 PROCEDURE — 36415 COLL VENOUS BLD VENIPUNCTURE: CPT | Performed by: INTERNAL MEDICINE

## 2018-12-11 PROCEDURE — 83970 ASSAY OF PARATHORMONE: CPT | Performed by: INTERNAL MEDICINE

## 2018-12-11 PROCEDURE — 85018 HEMOGLOBIN: CPT | Performed by: INTERNAL MEDICINE

## 2018-12-11 PROCEDURE — 82306 VITAMIN D 25 HYDROXY: CPT | Performed by: INTERNAL MEDICINE

## 2018-12-11 PROCEDURE — 80069 RENAL FUNCTION PANEL: CPT | Performed by: INTERNAL MEDICINE

## 2018-12-11 PROCEDURE — 82728 ASSAY OF FERRITIN: CPT | Performed by: INTERNAL MEDICINE

## 2018-12-11 PROCEDURE — 83550 IRON BINDING TEST: CPT | Performed by: INTERNAL MEDICINE

## 2018-12-11 PROCEDURE — 99214 OFFICE O/P EST MOD 30 MIN: CPT | Performed by: INTERNAL MEDICINE

## 2018-12-11 ASSESSMENT — PAIN SCALES - GENERAL: PAINLEVEL: NO PAIN (0)

## 2018-12-11 NOTE — LETTER
December 14, 2018      Lakhwinder Negron  [address] [facility] [address] [facility] [address]              Dear Lakhwinder,    Your vitamin D level is at goal. Your kidney function is stable but not normal as you are aware.   Your iron level is just below goal so would continue to take iron as you have been. Please call or MyChart with questions or concerns.         Sincerely,      Ramon Rodriguez DO    Division of Renal Diseases and Hypertension  [facility]  KW/gw          Component      Latest Ref Rng & Units 12/11/2018   Sodium      133 - 144 mmol/L 140   Potassium      3.4 - 5.3 mmol/L 4.9   Chloride      94 - 109 mmol/L 107   Carbon Dioxide      20 - 32 mmol/L 26   Anion Gap      3 - 14 mmol/L 7   Glucose      70 - 99 mg/dL 205 (H)   Urea Nitrogen      7 - 30 mg/dL 54 (H)   Creatinine      0.52 - 1.04 mg/dL 3.48 (H)   GFR Estimate      >60 mL/min/1.7m2 13 (L)   GFR Estimate If Black      >60 mL/min/1.7m2 15 (L)   Calcium      8.5 - 10.1 mg/dL 8.5   Phosphorus      2.5 - 4.5 mg/dL 3.7   Albumin      3.4 - 5.0 g/dL 4.2   Iron      35 - 180 ug/dL 49   Iron Binding Cap      240 - 430 ug/dL 250   Iron Saturation Index      15 - 46 % 20   25 OH Vit D2      ug/L <5   25 OH Vit D3      ug/L 30   25 OH Vit D total      20 - 75 ug/L <35   Hemoglobin      11.7 - 15.7 g/dL 10.6 (L)   Ferritin      8 - 252 ng/mL 180   Parathyroid Hormone Intact      18 - 80 pg/mL 226 (H)

## 2018-12-11 NOTE — PATIENT INSTRUCTIONS
1. We will be in touch with your home health nurse regarding:  - medication reconciliation  - blood pressure readings at home    2. Follow-up in 10 weeks

## 2018-12-11 NOTE — NURSING NOTE
Lakhwinder Negron's goals for this visit include:   Chief Complaint   Patient presents with     RECHECK     2mo recheck CKD       She requests these members of her care team be copied on today's visit information: no    PCP: Jonna Cason    Referring Provider:  Ramon Rodriguez MD  [address] [place] [address]    /64 (BP Location: Right arm, Patient Position: Sitting, Cuff Size: Adult Regular)   Pulse 68   Wt 50.8 kg (112 lb)   LMP  (LMP Unknown)   SpO2 99%   BMI 25.11 kg/m      Do you need any medication refills at today's visit? No    Cora Hayes LPN

## 2018-12-13 DIAGNOSIS — Z53.9 DIAGNOSIS NOT YET DEFINED: Primary | ICD-10-CM

## 2018-12-13 LAB
DEPRECATED CALCIDIOL+CALCIFEROL SERPL-MC: <35 UG/L (ref 20–75)
VITAMIN D2 SERPL-MCNC: <5 UG/L
VITAMIN D3 SERPL-MCNC: 30 UG/L

## 2018-12-13 NOTE — TELEPHONE ENCOUNTER
Prescription approved per [facility] Refill Protocol.    Colleen Dixon RN

## 2018-12-21 ENCOUNTER — PATIENT OUTREACH (OUTPATIENT)
Dept: GERIATRIC MEDICINE | Facility: CLINIC | Age: 78
End: 2018-12-21

## 2018-12-21 NOTE — LETTER
December 21, 2018    MYCHALENRIQUE BREEN IVETH  [address] [facility] [address]    Dear Lakhwinder,     As a member of [facility] ([facility]) ([facility]), you are assigned a case coordinator.  I will be your new case coordinator as of 1/1/19 because:    [] Your address has changed.  [] You have selected a new primary care clinic/physician.  [] Your previous case coordinator has left the agency.   [x] You have been reassigned.    If you have any questions, please feel free to call me at  [phone]. If you reach my voice mail, please leave a message and your phone number. If you are hearing impaired, please call the [place] Relay at 461 or [phone] ([email or web address] relay service).     I look forward to speaking with you soon.    Sincerely,        Shruthi Martins RN, PHN    [facility] is a health plan that contracts with both Medicare and the [place] Medical Assistance (Medicaid) program to provide benefits of both programs to enrollees. Enrollment in [facility] depends on contract renewal.    [facility]  [id number] DHS Approved ([id number])          (01/17)

## 2018-12-21 NOTE — PROGRESS NOTES
Internal Care Coordination Transfer Eff 1/1/19.  Mailed Letter.    Mikayla Robles  Care Management Specialist  [facility]  [phone]
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
## 2019-01-07 ENCOUNTER — PATIENT OUTREACH (OUTPATIENT)
Dept: GERIATRIC MEDICINE | Facility: CLINIC | Age: 79
End: 2019-01-07

## 2019-01-08 NOTE — PROGRESS NOTES
[facility] Care Coordination Contact     Client is transferred to [facility] Care Coordinator Shruthi Martins effective 1/1/2019. CC called Shruthi and gave verbal report.  Chart reviewed and this CM's encounter closed.     Jacqui Robles RN, PHN  [facility] Care Coordinator  Phone: [phone]  Fax [phone]   [email or web address]

## 2019-01-18 ENCOUNTER — OFFICE VISIT (OUTPATIENT)
Dept: FAMILY MEDICINE | Facility: CLINIC | Age: 79
End: 2019-01-18
Payer: COMMERCIAL

## 2019-01-18 VITALS
SYSTOLIC BLOOD PRESSURE: 134 MMHG | DIASTOLIC BLOOD PRESSURE: 58 MMHG | OXYGEN SATURATION: 95 % | WEIGHT: 109 LBS | HEIGHT: 56 IN | TEMPERATURE: 98 F | RESPIRATION RATE: 20 BRPM | HEART RATE: 65 BPM | BODY MASS INDEX: 24.52 KG/M2

## 2019-01-18 DIAGNOSIS — E11.22 TYPE 2 DIABETES MELLITUS WITH STAGE 5 CHRONIC KIDNEY DISEASE NOT ON CHRONIC DIALYSIS, WITHOUT LONG-TERM CURRENT USE OF INSULIN (H): Primary | ICD-10-CM

## 2019-01-18 DIAGNOSIS — N18.5 CKD (CHRONIC KIDNEY DISEASE) STAGE 5, GFR LESS THAN 15 ML/MIN (H): ICD-10-CM

## 2019-01-18 DIAGNOSIS — I50.22 CHRONIC SYSTOLIC CONGESTIVE HEART FAILURE (H): ICD-10-CM

## 2019-01-18 DIAGNOSIS — Z13.5 SCREENING FOR DIABETIC RETINOPATHY: ICD-10-CM

## 2019-01-18 DIAGNOSIS — E78.5 HYPERLIPIDEMIA LDL GOAL <100: ICD-10-CM

## 2019-01-18 DIAGNOSIS — N25.81 SECONDARY HYPERPARATHYROIDISM (H): ICD-10-CM

## 2019-01-18 DIAGNOSIS — G81.91 RIGHT HEMIPARESIS (H): ICD-10-CM

## 2019-01-18 DIAGNOSIS — I10 HYPERTENSION, GOAL BELOW 140/90: ICD-10-CM

## 2019-01-18 DIAGNOSIS — I63.549 CEREBROVASCULAR ACCIDENT (CVA) DUE TO OCCLUSION OF CEREBELLAR ARTERY, UNSPECIFIED BLOOD VESSEL LATERALITY (H): ICD-10-CM

## 2019-01-18 DIAGNOSIS — N18.5 TYPE 2 DIABETES MELLITUS WITH STAGE 5 CHRONIC KIDNEY DISEASE NOT ON CHRONIC DIALYSIS, WITHOUT LONG-TERM CURRENT USE OF INSULIN (H): Primary | ICD-10-CM

## 2019-01-18 PROCEDURE — 99214 OFFICE O/P EST MOD 30 MIN: CPT | Performed by: FAMILY MEDICINE

## 2019-01-18 ASSESSMENT — MIFFLIN-ST. JEOR: SCORE: 822.42

## 2019-01-18 ASSESSMENT — PAIN SCALES - GENERAL: PAINLEVEL: NO PAIN (0)

## 2019-01-18 ASSESSMENT — PATIENT HEALTH QUESTIONNAIRE - PHQ9: SUM OF ALL RESPONSES TO PHQ QUESTIONS 1-9: 2

## 2019-01-18 NOTE — PATIENT INSTRUCTIONS
At [facility], we strive to deliver an exceptional experience to you, every time we see you.  If you receive a survey in the mail, please send us back your thoughts. We really do value your feedback.    Based on your medical history, these are the current health maintenance/preventive care services that you are due for (some may have been done at this visit.)  Health Maintenance Due   Topic Date Due     HF ACTION PLAN Q3 YR  1940     URINE DRUG SCREEN Q1 YR  05/06/1955     ZOSTER IMMUNIZATION (2 of 3) 05/19/2011     ADVANCE DIRECTIVE PLANNING Q5 YRS  09/25/2017     EYE EXAM Q1 YEAR  11/14/2017     MICROALBUMIN Q1 YEAR  06/07/2018         Suggested websites for health information:  Www.[facility].org : Up to date and easily searchable information on multiple topics.  Www.medlineplus.gov : medication info, interactive tutorials, watch real surgeries online  Www.familydoctor.org : good info from the Academy of Family Physicians  Www.cdc.gov : public health info, travel advisories, epidemics (H1N1)  Www.aap.org : children's health info, normal development, vaccinations  Www.health.state.[place].us : [place] dept of health, public health issues in [place], N1N1    Your care team:                            Family Medicine Internal Medicine   MD Gera Frost MD Shantel Branch-Fleming, MD Katya Georgiev PA-C Nam Ho, MD Pediatrics   FAIZA Benavides, KD Gómez APRN MD Candida Bland MD Deborah Mielke, MD Kim Thein, APRN [facility]      Clinic hours: Monday - Thursday 7 am-7 pm; Fridays 7 am-5 pm.   Urgent care: Monday - Friday 11 am-9 pm; Saturday and Sunday 9 am-5 pm.  Pharmacy : Monday -Thursday 8 am-8 pm; Friday 8 am-6 pm; Saturday and Sunday 9 am-5 pm.     Clinic: [phone]   Pharmacy: [phone]    Patient Education     Heart Failure: Warning Signs of a Flare-Up    You have a condition called heart failure. Once you have heart  failure, flare-ups can happen. Below are signs that can mean your heart failure is getting worse. If you notice any of these warning signs, call your healthcare provider.  Swelling    Your feet, ankles, or lower legs get puffier.    You notice skin changes on your lower legs.    Your shoes feel too tight.    Your clothes are tighter in the waist.    You have trouble getting rings on or off your fingers.  Shortness of breath    You have to breathe harder even when you re doing your normal activities or when you re resting.    You are short of breath walking up stairs or even short distances.    You wake up at night short of breath or coughing.    You need to use more pillows or sit up to sleep.    You wake up tired or restless.  Other warning signs    You feel weaker, dizzy, or more tired.    You have chest pain or changes in your heartbeat.    You have a cough that won t go away.    You can t remember things or don t feel like eating.  Tracking your weight  Gaining weight is often the first warning sign that heart failure is getting worse. Gaining even a few pounds can be a sign that your body is retaining excess water and salt. Weighing yourself each day in the morning after you urinate and before you eat, is the best way to know if you're retaining water. Get a scale that is easy to read and make sure you wear the same clothes and use the same scale every time you weigh. Your healthcare provider will show you how to track your weight. Call your doctor if you gain more than 2 pounds in 1 day, 5 pounds in 1 week, or whatever weight gain you were told to report by your doctor. This is often a sign of worsening heart failure and needs to be evaluated and treated before it compromises your breathing. Your doctor will tell you what to do next.    Date Last Reviewed: 3/15/2016    5672-0559 [employer]. [address], [place] [address]. All rights reserved. This information is not intended as a  substitute for professional medical care. Always follow your healthcare professional's instructions.

## 2019-01-18 NOTE — LETTER
"My Heart Failure Action Plan   Name: Lakhwinder Negron    YOB: 1940   Date: 1/18/2019    My doctor: Jonna Cason     [address] [address] [phone]  My Diagnosis: {HF TYPE:396490::\"Systolic Heart Failure\"}   My Ejection Fraction: {EF%:565281}    My Exercise Goal: 30 minutes daily  .     My Weight Goal: ***  Wt Readings from Last 2 Encounters:   12/11/18 50.8 kg (112 lb)   11/08/18 49.9 kg (110 lb)     Weigh yourself daily using the same scale. If you gain more than 2 pounds in 24 hours or 5 pounds in a week {HF WEIGHT GOAL:214478}    My Diet Goal: {HF DIET GOAL:007806::\"No added salt\"}    Emergency Room Visits:    Our goal is to improve your quality of life and help you avoid a visit to the emergency room or hospital.  If we work together, we can achieve this goal. But, if you feel you need to call 911 or go to the emergency room, please do so.  If you go to the emergency room, please bring your list of medicines and your daily weight chart with you.       GREEN ZONE     Doing well today    Weight gained is no more than 2 pounds a day or 5 pounds a week.    No swelling in feet, ankles, legs or stomach.    No more swelling than usual.    No more trouble breathing than usual.    No change in my sleep.    No other problems. Actions:    I am doing fine.  I will take my medicine, follow my diet, see my doctor, exercise, and watch for symptoms.           YELLOW ZONE         Having a bad day or flare up    Weight gain of more than 2 pounds in one day or 5 pounds in one week.    New swelling in ankle, leg, knee or thigh.    Bloating in belly, pants feel tighter.    Swelling in hands or face.    Coughing or trouble breathing while walking or talking.    Harder to breathe last night.    Have trouble sleeping, wake up short of breath.    Much more tired than usual.    Not eating.    Pain in my chest or bad leg cramps.    Feel weak or dizzy. " Actions:    I need to take action and call my doctor or nurse today.                 RED ZONE         Need medical care now    Weight gain of 5 pounds overnight.    Chest pain or pressure that does not go away.    Feel less alert.    Wheezing or have trouble breathing when at rest.    Cannot sleep lying down.    Cannot take my water pill.    Pass out or faint. Actions:    I need to call my doctor or nurse now!    Call 911 if I have chest pain or cannot breathe.
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
## 2019-01-19 PROBLEM — I50.23 ACUTE ON CHRONIC SYSTOLIC CONGESTIVE HEART FAILURE (H): Status: ACTIVE | Noted: 2019-01-19

## 2019-02-12 DIAGNOSIS — I10 HYPERTENSION, GOAL BELOW 140/90: ICD-10-CM

## 2019-02-12 NOTE — TELEPHONE ENCOUNTER
"Requested Prescriptions   Pending Prescriptions Disp Refills     carvedilol (COREG) 6.25 MG tablet 180 tablet 1          Last Written Prescription Date:  10/3/17  Last Fill Quantity: 180,  # refills: 1   Last Office Visit with FMANDRIY, YAS or [facility] prescribing provider:  1/18/19   Future Office Visit:    Next 5 appointments (look out 90 days)    Feb 19, 2019  4:00 PM CST  Return Visit with Ramon Rodriguez MD  [facility] ([facility]) [address] [place] [address] [phone]          Sig: Take 1 tablet (6.25 mg) by mouth 2 times daily (with meals)    Beta-Blockers Protocol Passed - 2/12/2019 11:59 AM       Passed - Blood pressure under 140/90 in past 12 months    BP Readings from Last 3 Encounters:   01/18/19 134/58   12/11/18 144/64   11/08/18 134/55                Passed - Patient is age 6 or older       Passed - Recent (12 mo) or future (30 days) visit within the authorizing provider's specialty    Patient had office visit in the last 12 months or has a visit in the next 30 days with authorizing provider or within the authorizing provider's specialty.  See \"Patient Info\" tab in inbasket, or \"Choose Columns\" in Meds & Orders section of the refill encounter.             Passed - Medication is active on med list              Demarco Faarax  [facility]  "

## 2019-02-14 RX ORDER — CARVEDILOL 6.25 MG/1
6.25 TABLET ORAL 2 TIMES DAILY WITH MEALS
Qty: 180 TABLET | Refills: 1 | Status: SHIPPED | OUTPATIENT
Start: 2019-02-14 | End: 2019-03-15

## 2019-02-14 NOTE — TELEPHONE ENCOUNTER
Prescription approved per [facility] Refill Protocol.      Theresa Gregorio RN, BSN

## 2019-02-15 ENCOUNTER — DOCUMENTATION ONLY (OUTPATIENT)
Dept: LAB | Facility: CLINIC | Age: 79
End: 2019-02-15

## 2019-02-15 DIAGNOSIS — N18.30 TYPE 2 DIABETES MELLITUS WITH STAGE 3 CHRONIC KIDNEY DISEASE, WITHOUT LONG-TERM CURRENT USE OF INSULIN (H): ICD-10-CM

## 2019-02-15 DIAGNOSIS — N18.5 CKD (CHRONIC KIDNEY DISEASE) STAGE 5, GFR LESS THAN 15 ML/MIN (H): Primary | ICD-10-CM

## 2019-02-15 DIAGNOSIS — E11.22 TYPE 2 DIABETES MELLITUS WITH STAGE 3 CHRONIC KIDNEY DISEASE, WITHOUT LONG-TERM CURRENT USE OF INSULIN (H): ICD-10-CM

## 2019-02-15 NOTE — TELEPHONE ENCOUNTER
Requested Prescriptions   Pending Prescriptions Disp Refills     glipiZIDE (GLUCOTROL XL) 2.5 MG 24 hr tablet [Pharmacy Med Name: GLIPIZIDE ER 2.5MG TAB] 60 tablet 3      Last Written Prescription Date:  11/23/18  Last Fill Quantity: 60,  # refills: 3   Last Office Visit with MIGUEL, YAS or [facility] prescribing provider:  01/18/19-Yoav   Future Office Visit:    Next 5 appointments (look out 90 days)    Feb 19, 2019  4:00 PM CST  Return Visit with Ramon Rodriguez MD  [facility] [id number] [place] [address] [phone]        Sig: TAKE ONE TABLET BY MOUTH TWICE A DAY // IB HNUB NOJ 1 LUB, IB HNUB NOJ 2 ZAUG PAB LASHA NTSHAV QAB ZIB    Sulfonylurea Agents Failed - 2/15/2019  9:56 AM       Failed - Patient has had a Microalbumin in the past 12 mos.    Recent Labs   Lab Test 06/07/17  1011   MICROL 7,350   UMALCR 6,282.05*            Failed - Patient has a recent creatinine (normal) within the past 12 mos.    Recent Labs   Lab Test 12/11/18  1622   CR 3.48*            Passed - Blood pressure less than 140/90 in past 6 months    BP Readings from Last 3 Encounters:   01/18/19 134/58   12/11/18 144/64   11/08/18 134/55                Passed - Patient has documented LDL within the past 12 mos.    Recent Labs   Lab Test 03/15/18  0859   LDL 34            Passed - Patient has documented A1c within the specified period of time.    If HgbA1C is 8 or greater, it needs to be on file within the past 3 months.  If less than 8, must be on file within the past 6 months.     Recent Labs   Lab Test 09/24/18  1424   A1C 6.8*            Passed - Medication is active on med list       Passed - Patient is age 18 or older       Passed - No active pregnancy on record       Passed - Patient has not had a positive pregnancy test within the past 12 mos.       Passed - Recent (6 mo) or future (30 days) visit within the authorizing provider's specialty    Patient had office  "visit in the last 6 months or has a visit in the next 30 days with authorizing provider or within the authorizing provider's specialty.  See \"Patient Info\" tab in inbasket, or \"Choose Columns\" in Meds & Orders section of the refill encounter.              "

## 2019-02-19 ENCOUNTER — TELEPHONE (OUTPATIENT)
Dept: NEPHROLOGY | Facility: CLINIC | Age: 79
End: 2019-02-19

## 2019-02-19 ENCOUNTER — OFFICE VISIT (OUTPATIENT)
Dept: NEPHROLOGY | Facility: CLINIC | Age: 79
End: 2019-02-19
Payer: COMMERCIAL

## 2019-02-19 VITALS
WEIGHT: 112.8 LBS | DIASTOLIC BLOOD PRESSURE: 57 MMHG | HEART RATE: 62 BPM | TEMPERATURE: 97.7 F | BODY MASS INDEX: 25.38 KG/M2 | OXYGEN SATURATION: 98 % | HEIGHT: 56 IN | SYSTOLIC BLOOD PRESSURE: 149 MMHG | RESPIRATION RATE: 14 BRPM

## 2019-02-19 DIAGNOSIS — N25.81 SECONDARY HYPERPARATHYROIDISM (H): ICD-10-CM

## 2019-02-19 DIAGNOSIS — E11.22 TYPE 2 DIABETES MELLITUS WITH STAGE 4 CHRONIC KIDNEY DISEASE, WITHOUT LONG-TERM CURRENT USE OF INSULIN (H): ICD-10-CM

## 2019-02-19 DIAGNOSIS — N18.5 TYPE 2 DIABETES MELLITUS WITH STAGE 5 CHRONIC KIDNEY DISEASE NOT ON CHRONIC DIALYSIS, WITHOUT LONG-TERM CURRENT USE OF INSULIN (H): ICD-10-CM

## 2019-02-19 DIAGNOSIS — D64.9 ANEMIA, UNSPECIFIED TYPE: Primary | ICD-10-CM

## 2019-02-19 DIAGNOSIS — N18.4 TYPE 2 DIABETES MELLITUS WITH STAGE 4 CHRONIC KIDNEY DISEASE, WITHOUT LONG-TERM CURRENT USE OF INSULIN (H): ICD-10-CM

## 2019-02-19 DIAGNOSIS — N18.5 CKD (CHRONIC KIDNEY DISEASE) STAGE 5, GFR LESS THAN 15 ML/MIN (H): ICD-10-CM

## 2019-02-19 DIAGNOSIS — I10 HYPERTENSION GOAL BP (BLOOD PRESSURE) < 140/90: ICD-10-CM

## 2019-02-19 DIAGNOSIS — E11.22 TYPE 2 DIABETES MELLITUS WITH STAGE 5 CHRONIC KIDNEY DISEASE NOT ON CHRONIC DIALYSIS, WITHOUT LONG-TERM CURRENT USE OF INSULIN (H): ICD-10-CM

## 2019-02-19 DIAGNOSIS — D64.9 ANEMIA, UNSPECIFIED TYPE: ICD-10-CM

## 2019-02-19 DIAGNOSIS — D50.8 IRON DEFICIENCY ANEMIA SECONDARY TO INADEQUATE DIETARY IRON INTAKE: ICD-10-CM

## 2019-02-19 DIAGNOSIS — Z53.9 DIAGNOSIS NOT YET DEFINED: Primary | ICD-10-CM

## 2019-02-19 LAB
ALBUMIN SERPL-MCNC: 4.1 G/DL (ref 3.4–5)
ANION GAP SERPL CALCULATED.3IONS-SCNC: 9 MMOL/L (ref 3–14)
BUN SERPL-MCNC: 58 MG/DL (ref 7–30)
CALCIUM SERPL-MCNC: 8.5 MG/DL (ref 8.5–10.1)
CHLORIDE SERPL-SCNC: 107 MMOL/L (ref 94–109)
CO2 SERPL-SCNC: 24 MMOL/L (ref 20–32)
CREAT SERPL-MCNC: 3.87 MG/DL (ref 0.52–1.04)
CREAT UR-MCNC: 42 MG/DL
FERRITIN SERPL-MCNC: 180 NG/ML (ref 8–252)
GFR SERPL CREATININE-BSD FRML MDRD: 10 ML/MIN/{1.73_M2}
GLUCOSE SERPL-MCNC: 233 MG/DL (ref 70–99)
HBA1C MFR BLD: 6.4 % (ref 0–5.6)
HGB BLD-MCNC: 10.1 G/DL (ref 11.7–15.7)
IRON SATN MFR SERPL: 25 % (ref 15–46)
IRON SERPL-MCNC: 61 UG/DL (ref 35–180)
PHOSPHATE SERPL-MCNC: 3.8 MG/DL (ref 2.5–4.5)
POTASSIUM SERPL-SCNC: 4.1 MMOL/L (ref 3.4–5.3)
PROT UR-MCNC: 1.47 G/L
PROT/CREAT 24H UR: 3.5 G/G CR (ref 0–0.2)
PTH-INTACT SERPL-MCNC: 215 PG/ML (ref 18–80)
SODIUM SERPL-SCNC: 140 MMOL/L (ref 133–144)
TIBC SERPL-MCNC: 244 UG/DL (ref 240–430)

## 2019-02-19 PROCEDURE — 85018 HEMOGLOBIN: CPT | Performed by: FAMILY MEDICINE

## 2019-02-19 PROCEDURE — 83540 ASSAY OF IRON: CPT | Performed by: INTERNAL MEDICINE

## 2019-02-19 PROCEDURE — 80069 RENAL FUNCTION PANEL: CPT | Performed by: FAMILY MEDICINE

## 2019-02-19 PROCEDURE — 83550 IRON BINDING TEST: CPT | Performed by: INTERNAL MEDICINE

## 2019-02-19 PROCEDURE — 84156 ASSAY OF PROTEIN URINE: CPT | Performed by: INTERNAL MEDICINE

## 2019-02-19 PROCEDURE — 82728 ASSAY OF FERRITIN: CPT | Performed by: INTERNAL MEDICINE

## 2019-02-19 PROCEDURE — 83970 ASSAY OF PARATHORMONE: CPT | Performed by: FAMILY MEDICINE

## 2019-02-19 PROCEDURE — 99214 OFFICE O/P EST MOD 30 MIN: CPT | Performed by: INTERNAL MEDICINE

## 2019-02-19 PROCEDURE — 36415 COLL VENOUS BLD VENIPUNCTURE: CPT | Performed by: FAMILY MEDICINE

## 2019-02-19 PROCEDURE — 83036 HEMOGLOBIN GLYCOSYLATED A1C: CPT | Performed by: FAMILY MEDICINE

## 2019-02-19 RX ORDER — GLIPIZIDE 2.5 MG/1
TABLET, EXTENDED RELEASE ORAL
Qty: 60 TABLET | Refills: 1 | Status: SHIPPED | OUTPATIENT
Start: 2019-02-19 | End: 2019-05-01

## 2019-02-19 ASSESSMENT — PAIN SCALES - GENERAL: PAINLEVEL: NO PAIN (0)

## 2019-02-19 ASSESSMENT — MIFFLIN-ST. JEOR: SCORE: 839.66

## 2019-02-19 NOTE — TELEPHONE ENCOUNTER
Reaching out to patient's home health nurse to complete medication reconciliation as well as get updated blood pressure readings.     Message was sent to home health nurse Lilliana. Asked that she call back or fax updated reports to nephrology clinic.    Daysi Frias RN, BSN  Nephrology Care Coordinator  [facility]

## 2019-02-19 NOTE — TELEPHONE ENCOUNTER
Prescription approved per [facility] Refill Protocol.  Valerie Da Silva RN

## 2019-02-19 NOTE — NURSING NOTE
"Lakhwinder Negron's goals for this visit include: Return  She requests these members of her care team be copied on today's visit information: PCP    PCP: Jonna Cason    Referring Provider:  Ramon Rodriguez MD  [address] [facility] [address]  [place] [address]    /57   Pulse 62   Temp 97.7  F (36.5  C)   Resp 14   Ht 1.422 m (4' 8\")   Wt 51.2 kg (112 lb 12.8 oz)   LMP  (LMP Unknown)   SpO2 98%   BMI 25.29 kg/m      Do you need any medication refills at today's visit? N    "

## 2019-02-19 NOTE — PATIENT INSTRUCTIONS
Information we will obtain from your home health nurse:  - blood pressure readings at home  - what iron dose you are taking  - understand diuretic dose  - any weight trends    Follow-up in 8 weeks. Please call if questions or concerns in the meantime.

## 2019-02-22 ENCOUNTER — PATIENT OUTREACH (OUTPATIENT)
Dept: NEPHROLOGY | Facility: CLINIC | Age: 79
End: 2019-02-22

## 2019-02-22 NOTE — PROGRESS NOTES
Nephrology Note: Nursing Outreach Encounter    REASON FOR CALL:                                                      REASON FOR CALL: Care Coordination, Lab Result Notice  Dr. Rodriguez commented on results:    Your iron level is just below goal but improved from where it was previously. We have left a message with your home health nurse and will make adjustments when we hear back.     Sincerely,   DO Daysi Hobson: if she is taking iron BID, I think that is ok to keep it the same. If she is taking iron just once a day, I would try to increase it to BID if she can tolerate from GI perspective .Thanks, KW                                  SITUATION/BACKROUND:                                                    Patient is being treated for CKD Stage 5, Anemia.      ASSESSMENT:                                                          PLAN:                                                      Follow Up:   Results Notification: Results dated (2/19) discussed wtih patient over telephone       Daysi Frias RN

## 2019-02-26 NOTE — LETTER
[facility]    03/09/17    Patient: Lakhwinder Negron  YOB: 1940  Medical Record Number: [id number]                                                                  Controlled Substance Agreement  I understand that my care provider has prescribed controlled substances (narcotics, tranquilizers, and/or stimulants) to help manage my condition(s).  I am taking this medicine to help me function or work.  I know that this is strong medicine.  It could have serious side effects and even cause a dependency on the drug.  If I stop these medicines suddenly, I could have severe withdrawal symptoms.    The risks, benefits, and side effects of these medication(s) were explained to me.  I agree that:  1. I will take part in other treatments as advised by my provider.  This may be psychiatry or counseling, physical therapy, behavioral therapy, group treatment, or a referral to a pain clinic.  I will reduce or stop my medicine when my provider tells me to do so.   2. I will take my medicines as prescribed.  I will not change the dose or schedule unless my provider tells me to.  There will be no refills if I  run out early.   I may be contacted at any time without warning and asked to complete a drug test or pill count.   3. I will keep all my appointments at the clinic.  If I miss appointments or fail to follow instructions, my provider may stop my medicine.  4. I will not ask other providers to prescribe controlled substances. And I will not accept controlled substances from other people. If I need another prescribed controlled substance for a new reason, I will notify my provider within one business day.  5. If I enroll in the [place] Medical Marijuana program, I will tell my provider.  I will also sign an agreement to share my medical records with my provider.  6. I will use one pharmacy to fill all of my controlled substance prescriptions.  If my prescription is mailed to my pharmacy, it may take 5  to 7 days for my medicine to be ready.  7. I understand that my provider, clinic care team, and pharmacy can track controlled substance prescriptions from other providers through a central database (prescription monitoring program).  8. I will bring in my list of medications (or my medicine bottles) each time I come to the clinic.  REV- 04/2016                                                                                                                                            Page 1 of 2      [facility]    03/09/17    Patient: Lakhwinder Negron  YOB: 1940  Medical Record Number: [id number]    9. Refills of controlled substances will be made only during office hours.  It is up to me to make sure that I do not run out of my medicines on weekends or holidays.    10. I am responsible for my prescriptions.  If the medicine is lost or stolen, it will not be replaced.   I also agree not to share these medicines with anyone.  11. I agree to not use ANY illegal or recreational drugs.  This includes marijuana, cocaine, bath salts or other drugs.  I agree not to use alcohol unless my provider says I may.  I agree to give urine samples whenever asked.  If I fail to give a urine sample, the provider may stop my medicine.     12. I will tell my nurse or provider right away if I become pregnant or have a new medical problem treated outside of [facility].  13. I understand that this medicine can affect my thinking and judgment.  It may be unsafe for me to drive, use machinery and do dangerous tasks.  I will not do any of these things until I know how the medicine affects me.  If my dose changes, I will wait to see how it affects me.  I will contact my provider if I have concerns about medicine side effects.  I understand that if I do not follow any of the conditions above, my prescriptions or treatment may be stopped.    I agree that my provider, clinic care team, and pharmacy may work with any  city, state or federal law enforcement agency that investigates the misuse, sale, or other diversion of my controlled medicine. I will allow my provider to discuss my care with or share a copy of this agreement with any other treating provider, pharmacy or emergency room where I receive care.  I agree to give up (waive) any right of privacy or confidentiality with respect to these authorizations.   I have read this agreement and have asked questions about anything I did not understand.   ___________________________________    ___________________________  Patient Signature                                                           Date and Time  ___________________________________     ____________________________  Witness                                                                            Date and Time  ___________________________________  Jonna Cason MD  REV-  04/2016                                                                                                                                                                 Page 2 of 2     afib w RVR

## 2019-03-01 NOTE — TELEPHONE ENCOUNTER
Lilliana returned call, though left message for this writer to return call.       Attempted to reach her back. Left detailed message.    Daysi Frias, RN, BSN  Nephrology Care Coordinator  [facility]
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
## 2019-03-12 ENCOUNTER — TELEPHONE (OUTPATIENT)
Dept: FAMILY MEDICINE | Facility: CLINIC | Age: 79
End: 2019-03-12

## 2019-03-12 NOTE — TELEPHONE ENCOUNTER
Form is received from the  and forward to Dr. Cason to address for patient.  Gamal Negron,  [facility]

## 2019-03-12 NOTE — TELEPHONE ENCOUNTER
What type of form? FMLA  What day did you drop off your forms? 03/12/2019  Is there a due date? 03/13/2019 (7-10 business day to compete forms)   How would you like to receive these forms? Patient will  at the clinic when completed    What is the best number to contact you? Home [phone] (DESHAUN IVETH will  form  What time works best to contact you with in 4 hrs? yes  Is it okay to leave a message? Yes yes    Jonna SCHULZ

## 2019-03-14 NOTE — TELEPHONE ENCOUNTER
Form is completed will be deliver to the  this afternoon for pickup after 3p.  Gamal Negron,  [facility]    Please call Ramin to let them know the above info.  And remind the person who is picking up to bring their photo ID.  Gamal Negron,  [facility]     NOTE: If patient/gaurdian calls the clinic before the care teams gets a chance to call them, please let pt know the above information regarding pickup times, remind them to bring a photo ID and close the encounter.  Gamal Negron,  [facility]    FYI:  Anything completed after 2:00p will not be delivered until the next business day after 11a.   Gamal Negron,  for [facility].

## 2019-03-14 NOTE — TELEPHONE ENCOUNTER
Called and left third message for Lilliana, home health nurse for patient. Asked her to call back with updated blood pressure readings and current medication list. Asked her to call back to [phone] and ask for nephrology clinic.   MARJORIE Altamirano

## 2019-03-14 NOTE — TELEPHONE ENCOUNTER
Called, Ramin is notified, he stated his son Caro Negron will pickup for him. Caro's name is added to the envelope.  Gamal Negron,  [facility]

## 2019-03-15 NOTE — TELEPHONE ENCOUNTER
Lilliana, home health nurse returned called. She said that patient's weight is very stable, running around 109#. Lilliana sets up medications, and said that she takes her medications consistently. Reviewed medication list and confirmed all are current. Lilliana said that her edema is much better. BP's below:    2/15- 146/64  Pulse 65  3/1-158/64  Pulse 62  3//64  Pulse 54    Reviewed next appointment with Dr Rodriguez with Lilliana. Will route to Dr Rodriguez for review and call Lilliana with any changes.     DBakatrina, RN

## 2019-03-18 RX ORDER — FERROUS SULFATE 325(65) MG
325 TABLET ORAL
Qty: 200 TABLET | Refills: 1 | COMMUNITY
Start: 2019-03-18 | End: 2019-05-10

## 2019-03-18 NOTE — TELEPHONE ENCOUNTER
Ramon Rodriguez MD Balcome, Debbie A RN   Caller: Unspecified (3 weeks ago)             BP is always high in clinic - looks like we have one reading of 110 but the others are above goal.     Recommendations:     1. Take iron three times daily (if constipation with this dose then she should go back to twice a day dosing)   2. Keep following BP and update us if consistently greater than 130.      Called and spoke to home health nurse Lilliana and gave her Dr Rodriguez's above recommendations. She had no questions.   DBakatrina, RN

## 2019-03-26 ENCOUNTER — OFFICE VISIT (OUTPATIENT)
Dept: NEPHROLOGY | Facility: CLINIC | Age: 79
End: 2019-03-26
Payer: COMMERCIAL

## 2019-03-26 VITALS
OXYGEN SATURATION: 97 % | WEIGHT: 111 LBS | DIASTOLIC BLOOD PRESSURE: 59 MMHG | BODY MASS INDEX: 24.89 KG/M2 | SYSTOLIC BLOOD PRESSURE: 147 MMHG | HEART RATE: 64 BPM

## 2019-03-26 DIAGNOSIS — N18.30 CKD (CHRONIC KIDNEY DISEASE) STAGE 3, GFR 30-59 ML/MIN (H): ICD-10-CM

## 2019-03-26 DIAGNOSIS — N18.5 CKD (CHRONIC KIDNEY DISEASE) STAGE 5, GFR LESS THAN 15 ML/MIN (H): ICD-10-CM

## 2019-03-26 DIAGNOSIS — I10 HYPERTENSION GOAL BP (BLOOD PRESSURE) < 140/90: ICD-10-CM

## 2019-03-26 DIAGNOSIS — N18.4 TYPE 2 DIABETES MELLITUS WITH STAGE 4 CHRONIC KIDNEY DISEASE, WITHOUT LONG-TERM CURRENT USE OF INSULIN (H): Primary | ICD-10-CM

## 2019-03-26 DIAGNOSIS — N18.5 CKD (CHRONIC KIDNEY DISEASE) STAGE 5, GFR LESS THAN 15 ML/MIN (H): Primary | ICD-10-CM

## 2019-03-26 DIAGNOSIS — E11.22 TYPE 2 DIABETES MELLITUS WITH STAGE 4 CHRONIC KIDNEY DISEASE, WITHOUT LONG-TERM CURRENT USE OF INSULIN (H): Primary | ICD-10-CM

## 2019-03-26 DIAGNOSIS — N25.81 SECONDARY HYPERPARATHYROIDISM (H): ICD-10-CM

## 2019-03-26 DIAGNOSIS — D64.9 ANEMIA, UNSPECIFIED TYPE: ICD-10-CM

## 2019-03-26 LAB
ALBUMIN SERPL-MCNC: 4 G/DL (ref 3.4–5)
ANION GAP SERPL CALCULATED.3IONS-SCNC: 8 MMOL/L (ref 3–14)
BUN SERPL-MCNC: 55 MG/DL (ref 7–30)
CALCIUM SERPL-MCNC: 8.5 MG/DL (ref 8.5–10.1)
CHLORIDE SERPL-SCNC: 108 MMOL/L (ref 94–109)
CO2 SERPL-SCNC: 25 MMOL/L (ref 20–32)
CREAT SERPL-MCNC: 4 MG/DL (ref 0.52–1.04)
CREAT UR-MCNC: 70 MG/DL
GFR SERPL CREATININE-BSD FRML MDRD: 10 ML/MIN/{1.73_M2}
GLUCOSE SERPL-MCNC: 162 MG/DL (ref 70–99)
HGB BLD-MCNC: 9.8 G/DL (ref 11.7–15.7)
PHOSPHATE SERPL-MCNC: 4.7 MG/DL (ref 2.5–4.5)
POTASSIUM SERPL-SCNC: 4.5 MMOL/L (ref 3.4–5.3)
PROT UR-MCNC: 4.64 G/L
PROT/CREAT 24H UR: 6.61 G/G CR (ref 0–0.2)
PTH-INTACT SERPL-MCNC: 411 PG/ML (ref 18–80)
SODIUM SERPL-SCNC: 141 MMOL/L (ref 133–144)

## 2019-03-26 PROCEDURE — 82306 VITAMIN D 25 HYDROXY: CPT | Performed by: INTERNAL MEDICINE

## 2019-03-26 PROCEDURE — 36415 COLL VENOUS BLD VENIPUNCTURE: CPT | Performed by: INTERNAL MEDICINE

## 2019-03-26 PROCEDURE — 99214 OFFICE O/P EST MOD 30 MIN: CPT | Performed by: INTERNAL MEDICINE

## 2019-03-26 PROCEDURE — 84156 ASSAY OF PROTEIN URINE: CPT | Performed by: INTERNAL MEDICINE

## 2019-03-26 PROCEDURE — 83970 ASSAY OF PARATHORMONE: CPT | Performed by: INTERNAL MEDICINE

## 2019-03-26 PROCEDURE — 80069 RENAL FUNCTION PANEL: CPT | Performed by: INTERNAL MEDICINE

## 2019-03-26 PROCEDURE — 85018 HEMOGLOBIN: CPT | Performed by: INTERNAL MEDICINE

## 2019-03-26 RX ORDER — HYDRALAZINE HYDROCHLORIDE 50 MG/1
75 TABLET, FILM COATED ORAL 3 TIMES DAILY
Qty: 405 TABLET | Refills: 3 | Status: SHIPPED | OUTPATIENT
Start: 2019-03-26 | End: 2019-10-25

## 2019-03-26 NOTE — PATIENT INSTRUCTIONS
1. Hydralazine should be increased to 75 mg three times a day (instead of the 50 mg three times a day you have been taking)  2. Please take after visit summary to home health nurse to compare the med list to what she is actually taking. If there are any discrepancies, she should contact Cora or Daysi at [phone] and update.  3. Follow-up in 8 weeks

## 2019-03-26 NOTE — NURSING NOTE
Lakhwinder Negron's goals for this visit include:   Chief Complaint   Patient presents with     RECHECK     follow up      She requests these members of her care team be copied on today's visit information: pcp    PCP: Jonna Cason    Referring Provider:  Ramon Rodriguez MD  [address] [place] [id number]    /59 (BP Location: Left arm, Patient Position: Sitting, Cuff Size: Adult Regular)   Pulse 64   Wt 50.3 kg (111 lb)   LMP  (LMP Unknown)   SpO2 98%   BMI 24.89 kg/m      Do you need any medication refills at today's visit?  Maybe

## 2019-04-08 ENCOUNTER — PATIENT OUTREACH (OUTPATIENT)
Dept: GERIATRIC MEDICINE | Facility: CLINIC | Age: 79
End: 2019-04-08

## 2019-04-08 NOTE — PROGRESS NOTES
[facility] Care Coordination Contact    Called member to complete six month assessment and left a message requesting a return call. Unable to reach member's son, Ramin. CC verified with [facility] CC has the correct number on file for member and son. CC will try again.     Shruthi Martins RN, PHN  [facility]  [phone]      [facility] Care Coordination Contact      [facility] Six-Month Telephone Assessment    6 month telephone assessment completed on 4/9/19 with member's son, Ramin Negron.    ER visits: No  Hospitalizations: No  TCU stays: No  Significant health status changes: Health remains unchanged from annual visit. Ramin mentions that member's kidney functions declining and dialysis was recommended. Member has decided not to move forward with dialysis and decision was shared with provider at a previous appointment.   Falls/Injuries: No  ADL/IADL changes: No  Changes in services: No    Caregiver Assessment follow up:  NA    Goals: See POC in chart for goal progress documentation.     Will see member in 6 months for an annual health risk assessment.   Encouraged member to call CC with any questions or concerns in the meantime.     Shruthi Martins RN, PHN  [facility]  [phone]

## 2019-04-12 ENCOUNTER — DOCUMENTATION ONLY (OUTPATIENT)
Dept: CARE COORDINATION | Facility: CLINIC | Age: 79
End: 2019-04-12

## 2019-04-12 NOTE — PROGRESS NOTES
[facility] Care utilizes an encounter to take the place of a direct phone call to your office. Please take a moment to review the below request. Please reply or route message to author of this encounter.  Message will act as a verbal OK of orders requested below. Thank you.    ORDER  SNV every other week x9 weeks and 3prns   MD SUMMARY/PLAN OF CARE    SN to perform assessment with focus on medication management and reconciliation, pain management, mental health status and need for referral or change in treatment plan, skin integrity, and falls risk. Instruct on disease process, signs/symptoms of complications to report to agency/physician/911, emergency/safety and falls prevention plan, medication effects/SE, new/high risk/changed medications, diet, and activity. Implement interventions to monitor and mitigate pain, prevent pressure ulcers and confirm proper foot care.   3prn visits for medical symptoms that would necessitate an unplanned visit, supervisory visits per agency protocol, recertification assessments.  Visits may be in person or via video conference based upon patient needs.

## 2019-04-15 DIAGNOSIS — I10 HYPERTENSION, GOAL BELOW 140/90: ICD-10-CM

## 2019-04-15 NOTE — TELEPHONE ENCOUNTER
Writer received a refill request from: [facility] in [place], [place]. [phone]    Medication: carvedilol (COREG) 12.5 MG tablet     Sig: Take 1 and 1/2 tablets by mouth 2 times daily with meals    Date last dispensed: 11/04/18      Pt's last office visit: 3/26/19  Next scheduled office visit: 5/21/19      Chart reviewed.  Refill Encounter dated 2/12/19 Dr. Cason  for carvedilol (COREG) 6.25 MG tablet   Sig: Take 1 tablet (6.25 mg) by mouth 2 times daily (with meals)    LOV with Dr. Rodriguez 5/21/19  In Pt instructions per Dr. Rodriguez: Please take after visit summary to home health nurse to compare the med list to what she is actually taking. If there are any discrepancies, she should contact Cora or Daysi at [phone] and update.     Per the RN/LPN medication refill protocol, writer is  to refill this request. If able to refill, please call the pt to inform them the RX was sent to the pharmacy. If unable to refill, route this encounter to the prescribing physician for authorization or further instructions.

## 2019-04-16 DIAGNOSIS — Z53.9 DIAGNOSIS NOT YET DEFINED: Primary | ICD-10-CM
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
## 2019-04-17 NOTE — TELEPHONE ENCOUNTER
Left message for Home Care RN Lilliana to return call to discuss current medications for BP. Will await return call.    Daysi Frias RN, BSN  Nephrology Care Coordinator  [facility]

## 2019-04-19 RX ORDER — CARVEDILOL 12.5 MG/1
18.75 TABLET ORAL 2 TIMES DAILY WITH MEALS
Qty: 270 TABLET | Refills: 3 | Status: SHIPPED | OUTPATIENT
Start: 2019-04-19 | End: 2019-10-08

## 2019-04-19 NOTE — TELEPHONE ENCOUNTER
Spoke to Lilliana. Medication reconciliation for BP meds completed over telephone. Refilled Carvedilol 18.75 mg BID.    Daysi Frias RN, BSN  Nephrology Care Coordinator  [facility]

## 2019-04-19 NOTE — TELEPHONE ENCOUNTER
Left another message for Lilliana to return call to discuss Lakhwinder Negron.     Daysi Frias, RN, BSN  Nephrology Care Coordinator  [facility]

## 2019-05-01 DIAGNOSIS — E11.22 TYPE 2 DIABETES MELLITUS WITH STAGE 3 CHRONIC KIDNEY DISEASE, WITHOUT LONG-TERM CURRENT USE OF INSULIN (H): ICD-10-CM

## 2019-05-01 DIAGNOSIS — N18.30 TYPE 2 DIABETES MELLITUS WITH STAGE 3 CHRONIC KIDNEY DISEASE, WITHOUT LONG-TERM CURRENT USE OF INSULIN (H): ICD-10-CM

## 2019-05-01 NOTE — TELEPHONE ENCOUNTER
Requested Prescriptions   Pending Prescriptions Disp Refills     glipiZIDE (GLUCOTROL XL) 2.5 MG 24 hr tablet [Pharmacy Med Name: GLIPIZIDE ER 2.5MG TAB]  Last Written Prescription Date:  02/19/19  Last Fill Quantity: 60,  # refills: 1   Last Office Visit with MIGUEL, YAS or [facility] prescribing provider:  01/18/19-Yoav   Future Office Visit:    Next 5 appointments (look out 90 days)    May 21, 2019  3:30 PM CDT  [facility] Visit with Ramon [facility] MD  [facility] [address] [address] [place] [id number]  [phone]   May 24, 2019  3:00 PM CDT  Office Visit with Jonna Cason MD  [facility] ([facility]) [address] [facility] [address]  [phone]        60 tablet 1     Sig: TAKE 1 TABLET BY MOUHT TWO TIMES A DAY FOR DIABETES.       Sulfonylurea Agents Failed - 5/1/2019  4:47 AM        Failed - Blood pressure less than 140/90 in past 6 months     BP Readings from Last 3 Encounters:   03/26/19 147/59   02/19/19 149/57   01/18/19 134/58                 Failed - Patient has documented LDL within the past 12 mos.     Recent Labs   Lab Test 03/15/18  0859   LDL 34             Failed - Patient has had a Microalbumin in the past 12 mos.     Recent Labs   Lab Test 06/07/17  1011   MICROL 7,350   UMALCR 6,282.05*             Failed - Patient has a recent creatinine (normal) within the past 12 mos.     Recent Labs   Lab Test 03/26/19  1305   CR 4.00*             Passed - Patient has documented A1c within the specified period of time.     If HgbA1C is 8 or greater, it needs to be on file within the past 3 months.  If less than 8, must be on file within the past 6 months.     Recent Labs   Lab Test 02/19/19  1604   A1C 6.4*             Passed - Medication is active on med list        Passed - Patient is age 18 or older        Passed - No active pregnancy on record        Passed - Patient has not had a  "positive pregnancy test within the past 12 mos.        Passed - Recent (6 mo) or future (30 days) visit within the authorizing provider's specialty     Patient had office visit in the last 6 months or has a visit in the next 30 days with authorizing provider or within the authorizing provider's specialty.  See \"Patient Info\" tab in inbasket, or \"Choose Columns\" in Meds & Orders section of the refill encounter.              "

## 2019-05-02 RX ORDER — GLIPIZIDE 2.5 MG/1
TABLET, EXTENDED RELEASE ORAL
Qty: 60 TABLET | Refills: 1 | Status: SHIPPED | OUTPATIENT
Start: 2019-05-02 | End: 2019-07-01

## 2019-05-02 NOTE — TELEPHONE ENCOUNTER
Routing refill request to provider for review/approval because:    Due to not having the following;   Patient has documented LDL within the past 12 mos.    Patient has had a Microalbumin in the past 12 mos.    Patient has a recent creatinine (normal) within the past 12 mos.     Lisa Polk RN

## 2019-05-09 ENCOUNTER — DOCUMENTATION ONLY (OUTPATIENT)
Dept: OPHTHALMOLOGY | Facility: CLINIC | Age: 79
End: 2019-05-09

## 2019-05-10 DIAGNOSIS — D50.8 IRON DEFICIENCY ANEMIA SECONDARY TO INADEQUATE DIETARY IRON INTAKE: ICD-10-CM

## 2019-05-10 DIAGNOSIS — D64.9 ANEMIA, UNSPECIFIED TYPE: ICD-10-CM

## 2019-05-10 RX ORDER — FERROUS SULFATE 325(65) MG
325 TABLET ORAL
Qty: 270 TABLET | Refills: 3 | Status: SHIPPED | OUTPATIENT
Start: 2019-05-10 | End: 2020-12-09

## 2019-05-16 DIAGNOSIS — N18.5 CKD (CHRONIC KIDNEY DISEASE) STAGE 5, GFR LESS THAN 15 ML/MIN (H): Primary | ICD-10-CM

## 2019-05-24 ENCOUNTER — TELEPHONE (OUTPATIENT)
Dept: NEPHROLOGY | Facility: CLINIC | Age: 79
End: 2019-05-24

## 2019-05-24 NOTE — TELEPHONE ENCOUNTER
Called Lilliana back and recommended she call the on-call nephrologist as Dr. Rodriguez is not in clinic today and is not on-call. I let her know we would communicate if Dr. Rodriguez did respond to this message. Ruby Echeverria RN

## 2019-05-24 NOTE — TELEPHONE ENCOUNTER
Received VM from Lilliana from  home care who reports pt's BP today was 158/62 and she is having 4+ pitting edema in both lower extremeties. She is wanting to know if Xee's Lasix should be adjusted for the next few days? Lilliana can be reached at [phone]. Will update team and provider. Ruby Echeverria, RN

## 2019-05-28 DIAGNOSIS — M81.0 SENILE OSTEOPOROSIS: ICD-10-CM

## 2019-05-28 NOTE — TELEPHONE ENCOUNTER
"Requested Prescriptions   Pending Prescriptions Disp Refills     calcium carbonate 600 mg-vitamin D 400 units (CALTRATE) 600-400 MG-UNIT per tablet [Pharmacy Med Name: CALCIUM CARB-PALMIRA 600-400 TABS]  Last Written Prescription Date:  12/13/18  Last Fill Quantity: 60,  # refills: 5   Last Office Visit with FMG, P or [facility] prescribing provider:  01/18/19Laurie   Future Office Visit:    Next 5 appointments (look out 90 days)    Jun 14, 2019  9:00 AM CDT  Office Visit with Jonna Cason MD  [facility] ([facility]) [address] [place] [id number] [phone]        60 tablet 5     Sig: TAKE ONE TABLET BY MOUTH TWICE A DAY FOR BONE HEALTH // IB ZATIFFANIE NOJ 1 LUB, IB HNUB NOJ 2 GERALD COLBY POB TXHA       Vitamin Supplements (Adult) Protocol Passed - 5/28/2019  5:04 AM        Passed - High dose Vitamin D not ordered        Passed - Recent (12 mo) or future (30 days) visit within the authorizing provider's specialty     Patient had office visit in the last 12 months or has a visit in the next 30 days with authorizing provider or within the authorizing provider's specialty.  See \"Patient Info\" tab in inbasket, or \"Choose Columns\" in Meds & Orders section of the refill encounter.              Passed - Medication is active on med list          "

## 2019-05-29 NOTE — TELEPHONE ENCOUNTER
Prescription approved per [facility] Refill Protocol.    Mena Franklin RN, [facility]

## 2019-05-30 NOTE — TELEPHONE ENCOUNTER
Contacted Lilliana. She had not heard back from the neph on call last Friday.     Will Contact patient to see how she is doing in regards to edema.

## 2019-06-12 NOTE — TELEPHONE ENCOUNTER
Spoke to patient with the assistance of  services. She was less concerned about edema and more concerned about toe pain. Advised that she should address this with Dr. Cason this week.     Assisted scheduling patient for July 9 with Dr. Rodriguez (offered sooner though patient declined). Patient did not have any further concerns at this time.    Daysi Frias RN, BSN  Nephrology Care Coordinator  [facility]

## 2019-06-14 ENCOUNTER — OFFICE VISIT (OUTPATIENT)
Dept: FAMILY MEDICINE | Facility: CLINIC | Age: 79
End: 2019-06-14
Payer: COMMERCIAL

## 2019-06-14 VITALS
DIASTOLIC BLOOD PRESSURE: 58 MMHG | BODY MASS INDEX: 24.52 KG/M2 | SYSTOLIC BLOOD PRESSURE: 162 MMHG | HEIGHT: 56 IN | HEART RATE: 62 BPM | RESPIRATION RATE: 16 BRPM | WEIGHT: 109 LBS | TEMPERATURE: 98.1 F | OXYGEN SATURATION: 98 %

## 2019-06-14 DIAGNOSIS — I10 HYPERTENSION, GOAL BELOW 140/90: ICD-10-CM

## 2019-06-14 DIAGNOSIS — E78.5 HYPERLIPIDEMIA LDL GOAL <100: ICD-10-CM

## 2019-06-14 DIAGNOSIS — M21.70 LEG LENGTH DIFFERENCE, ACQUIRED: ICD-10-CM

## 2019-06-14 DIAGNOSIS — I63.549 CEREBROVASCULAR ACCIDENT (CVA) DUE TO OCCLUSION OF CEREBELLAR ARTERY, UNSPECIFIED BLOOD VESSEL LATERALITY (H): ICD-10-CM

## 2019-06-14 DIAGNOSIS — N18.5 CKD (CHRONIC KIDNEY DISEASE) STAGE 5, GFR LESS THAN 15 ML/MIN (H): ICD-10-CM

## 2019-06-14 DIAGNOSIS — E11.22 TYPE 2 DIABETES MELLITUS WITH STAGE 5 CHRONIC KIDNEY DISEASE NOT ON CHRONIC DIALYSIS, WITHOUT LONG-TERM CURRENT USE OF INSULIN (H): Primary | ICD-10-CM

## 2019-06-14 DIAGNOSIS — N25.81 SECONDARY HYPERPARATHYROIDISM (H): ICD-10-CM

## 2019-06-14 DIAGNOSIS — N18.5 TYPE 2 DIABETES MELLITUS WITH STAGE 5 CHRONIC KIDNEY DISEASE NOT ON CHRONIC DIALYSIS, WITHOUT LONG-TERM CURRENT USE OF INSULIN (H): Primary | ICD-10-CM

## 2019-06-14 PROCEDURE — 99214 OFFICE O/P EST MOD 30 MIN: CPT | Performed by: FAMILY MEDICINE

## 2019-06-14 ASSESSMENT — MIFFLIN-ST. JEOR: SCORE: 817.42

## 2019-06-14 ASSESSMENT — PAIN SCALES - GENERAL: PAINLEVEL: NO PAIN (0)

## 2019-06-14 NOTE — PATIENT INSTRUCTIONS
At [facility], we strive to deliver an exceptional experience to you, every time we see you.  If you receive a survey in the mail, please send us back your thoughts. We really do value your feedback.    Based on your medical history, these are the current health maintenance/preventive care services that you are due for (some may have been done at this visit.)  Health Maintenance Due   Topic Date Due     HF ACTION PLAN  1940     URINE DRUG SCREEN  1940     MEDICARE ANNUAL WELLNESS VISIT  05/06/2003     ZOSTER IMMUNIZATION (2 of 3) 05/19/2011     ADVANCED DIRECTIVE PLANNING  09/25/2017     EYE EXAM  11/14/2017     MICROALBUMIN  06/07/2018     ALT  03/08/2019     CBC  03/08/2019     LIPID  03/15/2019     DIABETIC FOOT EXAM  03/15/2019     DTAP/TDAP/TD IMMUNIZATION (3 - Td) 04/30/2019         Suggested websites for health information:  Www.[facility]WellRight.[employer] : Up to date and easily searchable information on multiple topics.  Www.medlineplus.gov : medication info, interactive tutorials, watch real surgeries online  Www.familydoctor.org : good info from the Academy of Family Physicians  Www.cdc.gov : public health info, travel advisories, epidemics (H1N1)  Www.aap.org : children's health info, normal development, vaccinations  Www.health.state.[place].us : [place] dept of health, public health issues in [place], N1N1    Your care team:                            Family Medicine Internal Medicine   MD Gera Frost MD Shantel Branch-Fleming, MD Katya Georgiev PA-C Nam Ho, MD Pediatrics   FAIZA Benavides CNP Amelia Massimini APRN MD Candida Bland MD Deborah Mielke, MD Kim Thein, APRN CNP      Clinic hours: Monday - Thursday 7 am-7 pm; Fridays 7 am-5 pm.   Urgent care: Monday - Friday 11 am-9 pm; Saturday and Sunday 9 am-5 pm.  Pharmacy : Monday -Thursday 8 am-8 pm; Friday 8 am-6 pm; Saturday and Sunday 9 am-5 pm.     Clinic: [phone]    Pharmacy: [phone]    Patient Education     Lawrence Tswj Ntshav Siab  Ntshav siab (ntshav nan siab) feem ntau raug hu ua tus kab mob tua neeg ntsiag to. Hu li no vim tias muaj ntau tus neeg uas muaj tus mob no yeej tsis paub txog nws. Ntshav siab yog muaj txog 140/90 mm Hg lossis siab gabriella. Yuav paub tias koj muaj ntshav siab yuav tsum nquag kuaj xyuas. Lawrence pab txo thiaj tuaj yeem pab tau koj txoj hever. Nod yog qee yam uas tuaj yeem pab koj tswj tau koj li ntshav siab.    Xaiv cov khoom noj zoo viet lub plawv    Xaiv cov khoom noj tsuag, muaj roj tsawg. Koj cov khoom noj muaj sodium li 2,400 mg gary hnub xwb lossis tsuas noj raws li koj tus kws kuaj mob tau hais qhia xwb.    Txo tsis txhob noj cov khoom noj ntim hauv kaus poom, khoo noj qhuav, oo noj tsaus ntev, khoo noj ntim, thiab khoo noj ua ceev. Cov khoom noj no muaj ntsev ntau.    Noj txiv hmab txiv ntoo thiab zaub 8 txog 10 zaus hauv txhua hnub.    Xaiv noj nqaij ntshiv, nqaij ntses, lossis nqaij qaib.    Noj cov tseem noob pasta, mov daj lis, thiab taum.    Haus cov mis uas muaj roj tsawg lossis tsis muaj roj 2 txog 3 zaug.    Nug koj tus kws khomob txog qhov lawrence npaj noj raws li qhov lawrence qhia ntawm DASH. Qhov lawrence npaj no yuav pab txo tau ntshav siab.    Thaum koj mus don ib lub tsev noj mov, hais kom lawv txhob viet ntsev viet koj cov zaub mov noj.  Tswj xyuas qhov hnyav ntawm lub cev kom nyob viet kis zoo    Nug koj tus kws kuaj mob yan ib hnub twg yuav noj cov khoom noj muaj calories ntau npaum li flores. Willy yuav tsum noj raws li qhia ntawd.    Nug koj tus kws kuaj mob yan qhov hnyav ntawm lub cev uas zoo tshaj plaws viet koj yog li flores. Yog koj lub cev hnyav heev, lawrence txo qhov hnyav ntawm koj lub cev kom poob qis li 3% txog 5% yeej pab txo tau cov ntshav siab. Lub ruddy phiaj txo qhov hnyav kom poob qis txog 10% viet hauv ib lub xyoos yog ib lub ruddy phiaj zoo.    Txo tsis txhob noj cov khoom noj txom ncauj thiab cov khoom noj qab zib.    Nquag ua ev xaws  xais.  Sawv mus los thiab nquag ua haujlwm    Xaiv ua cov haujlwm koj nyiam. Nrhiav kalie yam uas koj tuaj yeem ua nrog cov phooj ywg lossis tsev neeg. Kalei no muaj xws li caij tsheb kauj vab, ligia sheyla taw, taug lawrence, thiab khiav conrad william.    Nres tsheb kom phyllis me ntsis ntawm lub qhov rooj nkag mus viet hauv lub tsev.    Tsis txhob siv ntaiv hluav taws xob siv ntaiv nce.    Thaum koj tuaj yeem ua tau meliton taug lawrence lossis caij tsheb kauj vab hloov viet qhov caij tsheb.    Txhob haus dej cawv, ua leon zaub, lossis carlie leon janelle ub no.    Ib asthiv twg yuav tsum tau ua haujlwm sib mus txog haujlwm siv zog tsawg kawg yog 40 feeb hauv 3 ntawm 4 hnub.   Tswj xyuas kom txhob ntxhov siab    Nrhiav sijhawm so thiab ua lawrence lom zem. Nrhiav sijhawm luag.    Qhia yam koj txhawj xeeb viet koj tus hlub thiab koj tus kws kuaj mob.    Mus ntsib tsev neeg thiab cov phooj ywg thiab kav tsij ua cov haujlwm uas koj nquag ua tas li ntawd.  Txo txhob haus cawv ntau thiab txiav luam yeeb    Cov txiv neej yuav tsum tsis haus cawv tshaj 2 khob hauv ib hnub.    Cov poj niam yuav tsum tsis haus cawv tshaj 1 khob hauv ib hnub.    Priscila nrog koj tus kws kuaj mob txog lawrence txiav luam yeeb. Lawrence haus luam yeeb yuav muaj feem ua viet koj muaj kab mob plawv thiab mob hlab ntshav tawg. Nug koj tus kws kuaj mob txog cov lawrence pab txiav luam yeeb nyob hauv lub zos thiab lwm yam lawrence xaiv.  Cov Tshuaj Khomob  Yog lawrence pauv hloov hauv yus lub neej tsis zoo txaus, kalie zaum koj tus kws kuaj mob yuav david ntawv yuav tshuaj zoo ntshav siab viet koj. Noj txhua yam tshuaj raws li hais. Yog koj muaj aishwarya nug txog koj cov tshuaj, nug koj tus kws kuaj mob ua ntej mere tseg tsis noj lossis ua ntej yuav hloov.   Date Last Reviewed: 4/27/2016 2000-2018 [employer]. [address] [place] [address]. Txwv tsis pub yuam tas nrho cov keara kav. Cov ntsiab aishwarya ntamw no tsis yog muab coj los ua ib yam hloov chaw viet txoj lawrence carlie mob nkeeg. Yuav tsum ua raws li koj tug kws  carlie ernst qhia tas mus li.
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
## 2019-06-17 DIAGNOSIS — E11.22 TYPE 2 DIABETES MELLITUS WITH DIABETIC CHRONIC KIDNEY DISEASE (H): ICD-10-CM

## 2019-06-17 DIAGNOSIS — I10 ESSENTIAL HYPERTENSION: ICD-10-CM

## 2019-06-17 NOTE — TELEPHONE ENCOUNTER
"Requested Prescriptions   Pending Prescriptions Disp Refills     ONETOUCH DELICA LANCETS 33G MISC  Last Written Prescription Date:  08/10/18  Last Fill Quantity: 100,  # refills: 1   Last Office Visit with MIGUEL, YAS or [facility] prescribing provider:  06/14/19-Yoav   Future Office Visit:    Next 5 appointments (look out 90 days)    Jul 09, 2019  1:30 PM CDT  Return Visit with Ramon Rodriguez MD  [facility] ([facility]) [address] [place] [id number]  [phone]        100 each 1       Diabetic Supplies Protocol Passed - 6/17/2019  8:00 AM        Passed - Medication is active on med list        Passed - Patient is 18 years of age or older        Passed - Recent (6 mo) or future (30 days) visit within the authorizing provider's specialty     Patient had office visit in the last 6 months or has a visit in the next 30 days with authorizing provider.  See \"Patient Info\" tab in inbasket, or \"Choose Columns\" in Meds & Orders section of the refill encounter.              "

## 2019-06-18 RX ORDER — LANCETS 33 GAUGE
1 EACH MISCELLANEOUS
Qty: 200 EACH | Refills: 1 | Status: SHIPPED | OUTPATIENT
Start: 2019-06-18 | End: 2019-12-20

## 2019-06-18 NOTE — TELEPHONE ENCOUNTER
Prescription approved per [facility] Refill Protocol.  Joelle Hassan RN

## 2019-06-20 DIAGNOSIS — Z53.9 DIAGNOSIS NOT YET DEFINED: Primary | ICD-10-CM

## 2019-06-20 PROCEDURE — G0179 MD RECERTIFICATION HHA PT: HCPCS | Performed by: FAMILY MEDICINE

## 2019-06-21 DIAGNOSIS — I10 HYPERTENSION GOAL BP (BLOOD PRESSURE) < 140/90: ICD-10-CM

## 2019-06-21 NOTE — TELEPHONE ENCOUNTER
"Requested Prescriptions   Pending Prescriptions Disp Refills     furosemide (LASIX) 20 MG tablet  Last Written Prescription Date:  10/26/18  Last Fill Quantity: 630,  # refills: 1   Last Office Visit with MIGUEL, YAS or [facility] prescribing provider:  06/14/19-Yoav   Future Office Visit:    Next 5 appointments (look out 90 days)    Jul 09, 2019  1:30 PM CDT  Return Visit with Ramon Rodriguez MD  [facility] ([facility]) [address] [place] [id number] [phone]        630 tablet 1     Sig: Take 80 mg in the AM (4 tablets) and 60 mg (3 tablets) in the PM.       Diuretics (Including Combos) Protocol Failed - 6/21/2019  7:42 AM        Failed - Blood pressure under 140/90 in past 12 months     BP Readings from Last 3 Encounters:   06/14/19 162/58   03/26/19 147/59   02/19/19 149/57                 Failed - Normal serum creatinine on file in past 12 months     Recent Labs   Lab Test 03/26/19  1305   CR 4.00*              Passed - Recent (12 mo) or future (30 days) visit within the authorizing provider's specialty     Patient had office visit in the last 12 months or has a visit in the next 30 days with authorizing provider or within the authorizing provider's specialty.  See \"Patient Info\" tab in inbasket, or \"Choose Columns\" in Meds & Orders section of the refill encounter.              Passed - Medication is active on med list        Passed - Patient is age 18 or older        Passed - No active pregancy on record        Passed - Normal serum potassium on file in past 12 months     Recent Labs   Lab Test 03/26/19  1305   POTASSIUM 4.5                    Passed - Normal serum sodium on file in past 12 months     Recent Labs   Lab Test 03/26/19  1305                 Passed - No positive pregnancy test in past 12 months          "

## 2019-06-23 DIAGNOSIS — Z87.19 HISTORY OF GI BLEED: ICD-10-CM

## 2019-06-23 NOTE — TELEPHONE ENCOUNTER
"Requested Prescriptions   Pending Prescriptions Disp Refills     ranitidine (ZANTAC) 150 MG tablet [Pharmacy Med Name: RANITIDINE HCL 150MG TABS] 90 tablet 1     Sig: TAKE ONE TABLET BY MOUTH EVERY DAY       H2 Blockers Protocol Passed - 6/23/2019  4:56 AM        Passed - Patient is age 12 or older        Passed - Recent (12 mo) or future (30 days) visit within the authorizing provider's specialty     Patient had office visit in the last 12 months or has a visit in the next 30 days with authorizing provider or within the authorizing provider's specialty.  See \"Patient Info\" tab in inbasket, or \"Choose Columns\" in Meds & Orders section of the refill encounter.              Passed - Medication is active on med list        Last Written Prescription Date:  09/24/2018  Last Fill Quantity: 90,  # refills: 1   Last office visit: 6/14/2019 with prescribing provider:     Future Office Visit:   Next 5 appointments (look out 90 days)    Jul 09, 2019  1:30 PM CDT  Return Visit with Ramon Rodriguez MD  [facility] [id number] [place] [id number] [phone]           "

## 2019-06-25 DIAGNOSIS — I10 HYPERTENSION GOAL BP (BLOOD PRESSURE) < 140/90: ICD-10-CM

## 2019-06-25 DIAGNOSIS — N25.81 SECONDARY HYPERPARATHYROIDISM (H): ICD-10-CM

## 2019-06-25 RX ORDER — FUROSEMIDE 20 MG
TABLET ORAL
Qty: 630 TABLET | Refills: 1 | Status: SHIPPED | OUTPATIENT
Start: 2019-06-25 | End: 2019-09-20

## 2019-06-25 RX ORDER — FUROSEMIDE 20 MG
TABLET ORAL
Qty: 630 TABLET | Refills: 1 | Status: SHIPPED | OUTPATIENT
Start: 2019-06-25 | End: 2019-10-25

## 2019-06-25 NOTE — TELEPHONE ENCOUNTER
VITAMIN D3 1000UNIT TABS 1000  Last Written Prescription Date:  08/10/2018  Last Fill Quantity: 100 TABLETS,  # refills: 3   Last office visit: 3/26/2019 with prescribing provider:    Last refill per Pharmacy   Future Office Visit:   Next 5 appointments (look out 90 days)    Jul 09, 2019  1:30 PM CDT  Return Visit with Ramon Rodriguez MD  [facility] ([facility]) [address] [place] [id number]  [phone]         David Ferreira CMA (AAMA)

## 2019-06-25 NOTE — TELEPHONE ENCOUNTER
Routing refill request to provider for review/approval because:  Out of range:  Blood pressure and Creat   Joelle Hassan RN

## 2019-06-25 NOTE — TELEPHONE ENCOUNTER
FUROSEMIDE 20MG TABS 20  Last Written Prescription Date:  10/26/2018  Last Fill Quantity: 630 TABLETS,  # refills: 1   Last office visit: 3/26/2019 with prescribing provider:    Last refill per Pharmacy   Future Office Visit:   Next 5 appointments (look out 90 days)    Jul 09, 2019  1:30 PM CDT  Return Visit with Ramon Rodriguez MD  [facility] ([facility]) [address] [place] [phone]  [phone]         David Ferreira CMA (AAMA)

## 2019-06-26 NOTE — TELEPHONE ENCOUNTER
Prescription approved per [facility] Refill Protocol.  Joelle Hassan RN

## 2019-07-01 DIAGNOSIS — N18.30 TYPE 2 DIABETES MELLITUS WITH STAGE 3 CHRONIC KIDNEY DISEASE, WITHOUT LONG-TERM CURRENT USE OF INSULIN (H): ICD-10-CM

## 2019-07-01 DIAGNOSIS — E11.22 TYPE 2 DIABETES MELLITUS WITH STAGE 3 CHRONIC KIDNEY DISEASE, WITHOUT LONG-TERM CURRENT USE OF INSULIN (H): ICD-10-CM

## 2019-07-01 RX ORDER — GLIPIZIDE 2.5 MG/1
TABLET, EXTENDED RELEASE ORAL
Qty: 180 TABLET | Refills: 1 | Status: SHIPPED | OUTPATIENT
Start: 2019-07-01 | End: 2019-11-06 | Stop reason: ALTCHOICE

## 2019-07-01 NOTE — TELEPHONE ENCOUNTER
"Requested Prescriptions   Pending Prescriptions Disp Refills     glipiZIDE (GLUCOTROL XL) 2.5 MG 24 hr tablet [Pharmacy Med Name: GLIPIZIDE ER 2.5MG TAB] 60 tablet 1     Sig: TAKE ONE TABLET BY MOUTH TWICE A DAY FOR DIABETES       Sulfonylurea Agents Failed - 7/1/2019  5:02 AM        Failed - Blood pressure less than 140/90 in past 6 months     BP Readings from Last 3 Encounters:   06/14/19 162/58   03/26/19 147/59   02/19/19 149/57                 Failed - Patient has documented LDL within the past 12 mos.     Recent Labs   Lab Test 03/15/18  0859   LDL 34             Failed - Patient has had a Microalbumin in the past 15 mos.     Recent Labs   Lab Test 06/07/17  1011   MICROL 7,350   UMALCR 6,282.05*             Failed - Patient has a recent creatinine (normal) within the past 12 mos.     Recent Labs   Lab Test 03/26/19  1305   CR 4.00*             Passed - Patient has documented A1c within the specified period of time.     If HgbA1C is 8 or greater, it needs to be on file within the past 3 months.  If less than 8, must be on file within the past 6 months.     Recent Labs   Lab Test 02/19/19  1604   A1C 6.4*             Passed - Medication is active on med list        Passed - Patient is age 18 or older        Passed - No active pregnancy on record        Passed - Patient has not had a positive pregnancy test within the past 12 mos.        Passed - Recent (6 mo) or future (30 days) visit within the authorizing provider's specialty     Patient had office visit in the last 6 months or has a visit in the next 30 days with authorizing provider or within the authorizing provider's specialty.  See \"Patient Info\" tab in inbasket, or \"Choose Columns\" in Meds & Orders section of the refill encounter.            Last Written Prescription Date:  5/2/19  Last Fill Quantity: 60,  # refills: 1   Last office visit: 6/14/2019 with prescribing provider:  Jonna Cason     Future Office Visit:   Next 5 appointments (look out " 90 days)    Jul 09, 2019  1:30 PM CDT  Return Visit with Ramon Rodriguez MD  [facility] ([facility]) [address] [address] [place] [phone]  [phone]

## 2019-07-01 NOTE — TELEPHONE ENCOUNTER
Routing refill request to provider for review/approval because:    Due to patient not having the following;   Patient has documented LDL within the past 12 mos.    Patient has had a Microalbumin in the past 15 mos.    Patient has a recent creatinine (normal) within the past 12 mos.     Lisa Polk RN

## 2019-07-02 DIAGNOSIS — E78.5 HYPERLIPIDEMIA LDL GOAL <100: ICD-10-CM

## 2019-07-02 RX ORDER — ROSUVASTATIN CALCIUM 20 MG/1
10 TABLET, COATED ORAL DAILY
Qty: 45 TABLET | Refills: 3 | Status: SHIPPED | OUTPATIENT
Start: 2019-07-02 | End: 2020-06-17

## 2019-07-02 NOTE — TELEPHONE ENCOUNTER
"Requested Prescriptions   Pending Prescriptions Disp Refills     rosuvastatin (CRESTOR) 20 MG tablet 45 tablet 3     Sig: Take 0.5 tablets (10 mg) by mouth daily   Last Written Prescription Date:  6-15-18  Last Fill Quantity: 45,  # refills: 3   Last office visit: 6/14/2019 with prescribing provider:  6-14-19   Future Office Visit:   Next 5 appointments (look out 90 days)    Jul 09, 2019  1:30 PM CDT  Return Visit with Ramon Rodriguez MD  [facility] [address] [place] [address]  [phone]             Statins Protocol Failed - 7/2/2019 10:49 AM        Failed - LDL on file in past 12 months     Recent Labs   Lab Test 03/15/18  0859   LDL 34             Passed - No abnormal creatine kinase in past 12 months     Recent Labs   Lab Test 08/16/16  1612                   Passed - Recent (12 mo) or future (30 days) visit within the authorizing provider's specialty     Patient had office visit in the last 12 months or has a visit in the next 30 days with authorizing provider or within the authorizing provider's specialty.  See \"Patient Info\" tab in inbasket, or \"Choose Columns\" in Meds & Orders section of the refill encounter.              Passed - Medication is active on med list        Passed - Patient is age 18 or older        Passed - No active pregnancy on record        Passed - No positive pregnancy test in past 12 months          "

## 2019-07-02 NOTE — TELEPHONE ENCOUNTER
Routing refill request to provider for review/approval because:  A break in medication  Lisa Polk RN

## 2019-07-15 DIAGNOSIS — I10 HYPERTENSION, GOAL BELOW 140/90: ICD-10-CM

## 2019-07-16 RX ORDER — CARVEDILOL 6.25 MG/1
TABLET ORAL
Qty: 180 TABLET | Refills: 1 | OUTPATIENT
Start: 2019-07-16

## 2019-08-02 DIAGNOSIS — I10 HYPERTENSION, GOAL BELOW 140/90: ICD-10-CM

## 2019-08-02 NOTE — TELEPHONE ENCOUNTER
"Requested Prescriptions   Pending Prescriptions Disp Refills     carvedilol (COREG) 6.25 MG tablet [Pharmacy Med Name: CARVEDILOL 6.25MG TABS] 180 tablet 1     Sig: TAKE ONE TABLET BY MOUTH TWICE A DAY WITH MEALS       Beta-Blockers Protocol Failed - 8/2/2019  9:44 AM        Failed - Blood pressure under 140/90 in past 12 months     BP Readings from Last 3 Encounters:   06/14/19 162/58   03/26/19 147/59   02/19/19 149/57                 Passed - Patient is age 6 or older        Passed - Recent (12 mo) or future (30 days) visit within the authorizing provider's specialty     Patient had office visit in the last 12 months or has a visit in the next 30 days with authorizing provider or within the authorizing provider's specialty.  See \"Patient Info\" tab in inbasket, or \"Choose Columns\" in Meds & Orders section of the refill encounter.              Passed - Medication is active on med list        Last Written Prescription Date:  4/19/19  Last Fill Quantity: 270,  # refills: 3   Last office visit: 6/14/2019 with prescribing provider:  Jonna Cason     Future Office Visit:   Next 5 appointments (look out 90 days)    Aug 20, 2019  3:00 PM CDT  Return Visit with Ramon Rodriguez MD  [facility] ([facility]) [address] [place] [phone] [phone]           "

## 2019-08-06 ENCOUNTER — PATIENT OUTREACH (OUTPATIENT)
Dept: GERIATRIC MEDICINE | Facility: CLINIC | Age: 79
End: 2019-08-06

## 2019-08-06 RX ORDER — CARVEDILOL 6.25 MG/1
TABLET ORAL
Qty: 180 TABLET | Refills: 1 | OUTPATIENT
Start: 2019-08-06

## 2019-08-08 ENCOUNTER — PATIENT OUTREACH (OUTPATIENT)
Dept: GERIATRIC MEDICINE | Facility: CLINIC | Age: 79
End: 2019-08-08

## 2019-08-08 ASSESSMENT — PATIENT HEALTH QUESTIONNAIRE - PHQ9: SUM OF ALL RESPONSES TO PHQ QUESTIONS 1-9: 3

## 2019-08-08 ASSESSMENT — ACTIVITIES OF DAILY LIVING (ADL)
DEPENDENT_IADLS:: CLEANING;LAUNDRY;COOKING;SHOPPING;MEAL PREPARATION;MEDICATION MANAGEMENT;MONEY MANAGEMENT;TRANSPORTATION;INCONTINENCE

## 2019-08-08 NOTE — PROGRESS NOTES
[facility] Care Coordination Contact    [facility] Home Visit Assessment     Home visit for Health Risk Assessment with Lakhwinder Negron completed on August 8, 2019    Type of residence:: Private home - stairs  Current living arrangement:: I live in a private home with family     Assessment completed with:: Patient, Family, Care Team Member    Current Care Plan  Member currently receiving the following home care services: Skilled Nursing   Member currently receiving the following community resources: Day Care, PCA, DME, SNV       Medication Review  Medication reconciliation completed in Epic: Yes  Medication set-up & administration: RN set up every two weeks.  Self-administers medications.  Medication Risk Assessment Medication (1 or more, place referral to MTM): N/A: No risk factors identified  MTM Referral Placed: No: No risk factors idenified    Mental/Behavioral Health   Depression Screening: See PHQ assessment flowsheet.   Mental health DX:: No      Mental Health Diagnosis: No  Mental Health Services: None: No further intervention needed at this time.    Falls Assessment:   Fallen 2 or more times in the past year?: No   Any fall with injury in the past year?: No    ADL/IADL Dependencies:   Dependent ADLs:: Ambulation-cane, Bathing, Dressing, Grooming, Incontinence, Toileting  Dependent IADLs:: Cleaning, Laundry, Cooking, Shopping, Meal Preparation, Medication Management, Money Management, Transportation, Incontinence    [facility] sponsored benefits: Shared information re: Silver Sneakers/gym memberships, ASA, Calcium +D.    PCA Assessment completed at visit: Yes     Elderly Waiver Eligibility: Yes-will continue on EW    Care Plan & Recommendations: CC met with Lakhwinder and her son, Ramin at today's annual home visit. Lakhwinder is feeling sad today as her  just passed away in July 2019. Lakhwinder has been receiving support from family and [facility] staff/friends to cope with loss. Lakhwinder is not interested in a  behavorial health referral at this time. Lakhwinder reports feeling weaker this year. Xee's son reports Xee has ESRD and last he was told member's kidney function is at 8%. Ramin and Xee indicates dialysis was recommended, however, Xee declined. Lakhwinder reports chronic tingling and numbing sensation in GEORGE UE and continued rt sided weakness from hx of CVA and Rt knee cap replacement. Lakhwinder is satisfied with PCA, SNV and ADC service. Lakhwinder reports no falls within the past year.     See [facility] for detailed assessment information.    Follow-Up Plan: Member informed of future contact, plan to f/u with member with a 6 month telephone assessment.  Contact information shared with member and family, encouraged member to call with any questions or concerns at any time.    [place] care continuum providers: Please refer to Health Care Home on the [facility] Problem List to view this patient's [facility] Care Plan Summary.    Shruthi Martins RN, PHN  [facility]  [phone]

## 2019-08-16 ENCOUNTER — PATIENT OUTREACH (OUTPATIENT)
Dept: GERIATRIC MEDICINE | Facility: CLINIC | Age: 79
End: 2019-08-16

## 2019-08-16 NOTE — LETTER
September 4, 2019      LAKHWINDER LAZARO  [address] [address]      Dear Lakhwinder:    At [facility], we are dedicated to improving your health and well-being. Enclosed is the Comprehensive Care Plan that we developed with you on 08/08/2019. Please review the Care Plan carefully.    As a reminder, some of the things we discussed at your visit include:    Your physical and mental health    Ways to reduce falls    Health care needs you may have    Don t forget to contact your care coordinator if you:    Have been hospitalized or plan to be hospitalized     Have had a fall     Have experienced a change in physical health    Are experiencing emotional problems     If you do not agree with your Care Plan, have questions about it, or have experienced a change in your needs, please call me at  . If you are hearing impaired, please call the [place] Relay at 741 or [phone] (nyhgad-xg-bcdwql relay service).    Sincerely,    Shruthi Martins RN, PHN    E-mail:[email or web address]  Phone: [phone]    Care Manager  [facility] [place] [facility]\"") is a health plan that contracts with both Medicare and the [place] Medical Assistance (Medicaid) program to provide benefits of both programs to enrollees. Enrollment in [facility] depends on contract renewal.    [id number])     [id number] (11/18)

## 2019-08-16 NOTE — LETTER
[address], [address]  [place] [address]  Phone:  [phone]  Fax:  [phone]        August 16, 2019    MAURO LAZARO  [address] [address]    Dear Faviola Keane is a copy of your completed PCA Assessment and Service Plan.  This is for your records and no action is required by you.  If you have additional questions regarding your assessment please contact me at  . If you feel that your needs are not being met, please contact the Clinical Supervisor at [phone].    Sincerely,    Shruthi Martins RN, PHN    E-mail:[email or web address]  Phone: [phone]    Care Manager  [facility]            Enclosure:  Completed PCA assessment

## 2019-08-16 NOTE — PROGRESS NOTES
[facility] Care Coordination Contact    [facility]:  Emailed completed PCA assessment to [facility].  Faxed copy of PCA assessment to PCA Agency and mailed copy to member.  Faxed MD Communication to PCP.     Mikayla Robles  Care Management Specialist  [facility]  [phone]

## 2019-08-18 DIAGNOSIS — Z53.9 DIAGNOSIS NOT YET DEFINED: Primary | ICD-10-CM

## 2019-08-18 PROCEDURE — 99207 C MD RECERTIFICATION HHA PT: CPT | Performed by: FAMILY MEDICINE

## 2019-09-04 NOTE — PROGRESS NOTES
[facility] Care Coordination Contact    Received after visit chart from care coordinator.  Completed following tasks: Mailed copy of care plan to client.    Updated services in access and Submitted referrals/auths for ADC with  ADC (4 days/wk plus negotiated transportation 6 trips/wk).  Faxed notification of increase to  ADC.    Provider Signature - No POC Shared:  Member indicates that they do not want their POC shared with any EW providers.    Mikayla Robles  Care Management Specialist  [facility]  [phone]

## 2019-09-08 DIAGNOSIS — G89.4 CHRONIC PAIN SYNDROME: ICD-10-CM

## 2019-09-08 NOTE — TELEPHONE ENCOUNTER
acetaminophen-codeine (TYLENOL #3) 300-30 MG per tablet      Last Written Prescription Date:  7/19/18  Last Fill Quantity: 60,   # refills: 3  Last Office Visit: 6/14/19  Future Office visit:    Next 5 appointments (look out 90 days)    Sep 11, 2019 11:40 AM CDT  [facility] [address] [facility] [phone]   Oct 07, 2019  4:30 PM CDT  [facility] Visit with Ramon Rodriguez MD  [facility] [address] [place] [address] [phone] [phone]           Routing refill request to provider for review/approval because:  Drug not on the [facility], [facility] or [facility] refill protocol or controlled substance        Last [facility] website verification:  done on 7-

## 2019-09-10 NOTE — TELEPHONE ENCOUNTER
Rx has refills at [facility] however, I believe that pharmacy is now closed. Looks like Cyndi John is requesting now.     Routing refill request to provider for review/approval because:  Drug not on the [facility] refill protocol   Joelle Hassan RN

## 2019-09-10 NOTE — TELEPHONE ENCOUNTER
Written rx faxed to the pharmacy today, Pharmacy will contact pt when medication is ready for pickup.  Gamal Negron,  For Teams Comfort and Heart

## 2019-09-12 ENCOUNTER — OFFICE VISIT (OUTPATIENT)
Dept: FAMILY MEDICINE | Facility: CLINIC | Age: 79
End: 2019-09-12
Payer: COMMERCIAL

## 2019-09-12 VITALS
HEIGHT: 56 IN | TEMPERATURE: 98.4 F | SYSTOLIC BLOOD PRESSURE: 122 MMHG | RESPIRATION RATE: 16 BRPM | WEIGHT: 112 LBS | OXYGEN SATURATION: 94 % | HEART RATE: 65 BPM | DIASTOLIC BLOOD PRESSURE: 50 MMHG | BODY MASS INDEX: 25.19 KG/M2

## 2019-09-12 DIAGNOSIS — Z23 ENCOUNTER FOR IMMUNIZATION: ICD-10-CM

## 2019-09-12 DIAGNOSIS — J20.9 ACUTE BRONCHITIS WITH SYMPTOMS > 10 DAYS: Primary | ICD-10-CM

## 2019-09-12 PROCEDURE — 90662 IIV NO PRSV INCREASED AG IM: CPT | Performed by: FAMILY MEDICINE

## 2019-09-12 PROCEDURE — G0008 ADMIN INFLUENZA VIRUS VAC: HCPCS | Mod: 59 | Performed by: FAMILY MEDICINE

## 2019-09-12 PROCEDURE — 90471 IMMUNIZATION ADMIN: CPT | Performed by: FAMILY MEDICINE

## 2019-09-12 PROCEDURE — 99213 OFFICE O/P EST LOW 20 MIN: CPT | Mod: 25 | Performed by: FAMILY MEDICINE

## 2019-09-12 PROCEDURE — 90715 TDAP VACCINE 7 YRS/> IM: CPT | Performed by: FAMILY MEDICINE

## 2019-09-12 RX ORDER — DOXYCYCLINE 100 MG/1
100 CAPSULE ORAL 2 TIMES DAILY
Qty: 20 CAPSULE | Refills: 0 | Status: SHIPPED | OUTPATIENT
Start: 2019-09-12 | End: 2019-10-22

## 2019-09-12 ASSESSMENT — ANXIETY QUESTIONNAIRES
2. NOT BEING ABLE TO STOP OR CONTROL WORRYING: NOT AT ALL
7. FEELING AFRAID AS IF SOMETHING AWFUL MIGHT HAPPEN: NOT AT ALL
6. BECOMING EASILY ANNOYED OR IRRITABLE: NOT AT ALL
GAD7 TOTAL SCORE: 0
5. BEING SO RESTLESS THAT IT IS HARD TO SIT STILL: NOT AT ALL
1. FEELING NERVOUS, ANXIOUS, OR ON EDGE: NOT AT ALL
3. WORRYING TOO MUCH ABOUT DIFFERENT THINGS: NOT AT ALL
IF YOU CHECKED OFF ANY PROBLEMS ON THIS QUESTIONNAIRE, HOW DIFFICULT HAVE THESE PROBLEMS MADE IT FOR YOU TO DO YOUR WORK, TAKE CARE OF THINGS AT HOME, OR GET ALONG WITH OTHER PEOPLE: NOT DIFFICULT AT ALL

## 2019-09-12 ASSESSMENT — PATIENT HEALTH QUESTIONNAIRE - PHQ9: 5. POOR APPETITE OR OVEREATING: NOT AT ALL

## 2019-09-12 ASSESSMENT — MIFFLIN-ST. JEOR: SCORE: 831.03

## 2019-09-12 ASSESSMENT — PAIN SCALES - GENERAL: PAINLEVEL: NO PAIN (0)
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
## 2019-09-12 NOTE — PATIENT INSTRUCTIONS
At [facility], we strive to deliver an exceptional experience to you, every time we see you.  If you receive a survey in the mail, please send us back your thoughts. We really do value your feedback.    Based on your medical history, these are the current health maintenance/preventive care services that you are due for (some may have been done at this visit.)  Health Maintenance Due   Topic Date Due     HF ACTION PLAN  1940     URINE DRUG SCREEN  1940     MEDICARE ANNUAL WELLNESS VISIT  05/06/2003     ZOSTER IMMUNIZATION (2 of 3) 05/19/2011     ADVANCE CARE PLANNING  09/25/2017     EYE EXAM  11/14/2017     MICROALBUMIN  06/07/2018     ALT  03/08/2019     CBC  03/08/2019     LIPID  03/15/2019     DIABETIC FOOT EXAM  03/15/2019     DTAP/TDAP/TD IMMUNIZATION (3 - Td) 04/30/2019     A1C  08/19/2019     INFLUENZA VACCINE (1) 09/01/2019     TSH W/FREE T4 REFLEX  08/16/2019     TRICIA ASSESSMENT  09/20/2019     BMP  09/26/2019         Suggested websites for health information:  Www.[facility].org : Up to date and easily searchable information on multiple topics.  Www.medlineplus.gov : medication info, interactive tutorials, watch real surgeries online  Www.familydoctor.org : good info from the Academy of Family Physicians  Www.cdc.gov : public health info, travel advisories, epidemics (H1N1)  Www.aap.org : children's health info, normal development, vaccinations  Www.health.[facility].[place].us : [place] dept of health, public health issues in [place], N1N1    Your care team:                            Family Medicine Internal Medicine   MD Gera Frost MD Shantel Branch-Fleming, MD Katya Georgiev PA-C Nam Ho, MD Pediatrics   FAIZA Benavides, MD Candida Robles CNP, MD Deborah Mielke, MD Kim Thein, APRN CNP      Clinic hours: Monday - Thursday 7 am-7 pm; Fridays 7 am-5 pm.   Urgent care: Monday - Friday 11 am-9 pm; Saturday  and Sunday 9 am-5 pm.  Pharmacy : Monday -Thursday 8 am-8 pm; Friday 8 am-6 pm; Saturday and Sunday 9 am-5 pm.     Clinic: [phone]   Pharmacy: [phone]

## 2019-09-13 ASSESSMENT — ANXIETY QUESTIONNAIRES: GAD7 TOTAL SCORE: 0

## 2019-09-17 DIAGNOSIS — I15.1 HYPERTENSION SECONDARY TO OTHER RENAL DISORDERS: ICD-10-CM

## 2019-09-17 NOTE — TELEPHONE ENCOUNTER
"Requested Prescriptions   Pending Prescriptions Disp Refills     amLODIPine (NORVASC) 10 MG tablet [Pharmacy Med Name: AMLODIPINE BESYLATE 10MG TAB] 90 tablet 1     Sig: TAKE ONE TABLET BY MOUTH EVERY DAY         Last Written Prescription Date:  8/13/18  Last Fill Quantity: 90,  # refills: 3   Last Office Visit with [facility], [facility] or [facility] prescribing provider:  9/12/19   Future Office Visit:    Next 5 appointments (look out 90 days)    Oct 07, 2019  4:30 PM CDT  Return Visit with Ramon Rodriguez MD  [facility] [id number] [place] [phone]  [phone]             Calcium Channel Blockers Protocol  Failed - 9/17/2019  3:43 PM        Failed - Normal serum creatinine on file in past 12 months     Recent Labs   Lab Test 03/26/19  1305   CR 4.00*             Passed - Blood pressure under 140/90 in past 12 months     BP Readings from Last 3 Encounters:   09/12/19 122/50   06/14/19 162/58   03/26/19 147/59                 Passed - Recent (12 mo) or future (30 days) visit within the authorizing provider's specialty     Patient had office visit in the last 12 months or has a visit in the next 30 days with authorizing provider or within the authorizing provider's specialty.  See \"Patient Info\" tab in inbasket, or \"Choose Columns\" in Meds & Orders section of the refill encounter.              Passed - Medication is active on med list        Passed - Patient is age 18 or older        Passed - No active pregnancy on record        Passed - No positive pregnancy test in past 12 months              Demarco Faarax  [facility]  "

## 2019-09-19 RX ORDER — AMLODIPINE BESYLATE 10 MG/1
TABLET ORAL
Qty: 90 TABLET | Refills: 3 | Status: SHIPPED | OUTPATIENT
Start: 2019-09-19 | End: 2019-10-25

## 2019-09-19 NOTE — TELEPHONE ENCOUNTER
Routing refill request to provider for review/approval because:  Labs out of range:  Elevated creatinine  Known CKD, but lab done by Nephrology and not PCP. Would like PCP review.    Nancy Owusu RN

## 2019-10-01 ENCOUNTER — TELEPHONE (OUTPATIENT)
Dept: FAMILY MEDICINE | Facility: CLINIC | Age: 79
End: 2019-10-01

## 2019-10-01 NOTE — TELEPHONE ENCOUNTER
.Reason for Call:  Form, our goal is to have forms completed with 72 hours, however, some forms may require a visit or additional information.    Type of letter, form or note:  FMLA    Who is the form for ?:  Pt's Son's employer     Where did the form come from: Patient or family brought in       What clinic location was the form placed at?: [place]    Where the form was placed: TEAM HEART Box/Folder    What number is listed as a contact on the form?: [phone]       Additional comments: Pt.'s son will  completed  form      Form received on 10/1/2019 at 2:50 PM by Rosette Disla

## 2019-10-01 NOTE — TELEPHONE ENCOUNTER
Form is received from the  and forward to Dr. Csaon to address.  Gamal Negron,  For Teams Comfort and Heart

## 2019-10-02 NOTE — TELEPHONE ENCOUNTER
I tried to find the previous FMLA form from March 2019 and can't find it in the records. Will do a new one with current data. Form completed. Jonna Cason MD

## 2019-10-03 NOTE — TELEPHONE ENCOUNTER
Ramin Negron's FMLA form will be deliver to the  this afternoon for pickup after 4p.  Gamal Negron,  For Teams Comfort and Heart    Please notify Ramin to let them know the above info.  And remind the person who is picking up to bring their photo ID.  Gamal Negron,  For Teams Comfort and Heart     NOTE: If patient and or guardians calls the clinic before the care teams gets a chance to call them, please notify the caller the above information regarding pickup times, remind them to bring a photo ID, document and close the encounter.  Gamal Negron,  For Teams Comfort and Heart    FYI:  Anything completed after 2:00p will not be delivered until the next business day after 3p.   Gamal Negron,  for Team's Comfort and Heart.

## 2019-10-03 NOTE — TELEPHONE ENCOUNTER
Called, Ramin is notified of this writer's note below. Also wanted to let Ramin know his FMLA form will not be scanned in patient's chart, Ramin is responsible to not lose the form as there will be no replacement or copy.  Gamal Negron,  For Teams Comfort and Heart

## 2019-10-04 ENCOUNTER — DOCUMENTATION ONLY (OUTPATIENT)
Dept: CARE COORDINATION | Facility: CLINIC | Age: 79
End: 2019-10-04
Payer: COMMERCIAL

## 2019-10-04 NOTE — PROGRESS NOTES
[facility] utilizes an encounter to take the place of a direct phone call to your office. Please take a moment to review the below request. Please reply or route message to author of this encounter.  Message will act as a verbal OK of orders requested below. Thank you.    ORDER  SNV every other week x8 weeks, 3prns   MD SUMMARY/PLAN OF CARE  SN to perform assessment with focus on medication management and reconciliation, pain management, mental health status and need for referral or change in treatment plan, skin integrity, and falls risk. Instruct on disease process, signs/symptoms of complications to report to agency/physician/911, emergency/safety and falls prevention plan, medication effects/SE, new/high risk/changed medications, diet, and activity. Implement interventions to monitor and mitigate pain, prevent pressure ulcers and confirm proper foot care.   0prn visits for medical symptoms that would necessitate an unplanned visit, supervisory visits per agency protocol, recertification assessments.  Visits may be in person or via video conference based upon patient needs.

## 2019-10-07 ENCOUNTER — OFFICE VISIT (OUTPATIENT)
Dept: NEPHROLOGY | Facility: CLINIC | Age: 79
End: 2019-10-07
Payer: COMMERCIAL

## 2019-10-07 VITALS
WEIGHT: 113.5 LBS | SYSTOLIC BLOOD PRESSURE: 168 MMHG | DIASTOLIC BLOOD PRESSURE: 60 MMHG | BODY MASS INDEX: 25.53 KG/M2 | HEIGHT: 56 IN | HEART RATE: 66 BPM | OXYGEN SATURATION: 98 % | RESPIRATION RATE: 12 BRPM

## 2019-10-07 DIAGNOSIS — N18.4 TYPE 2 DIABETES MELLITUS WITH STAGE 4 CHRONIC KIDNEY DISEASE, WITHOUT LONG-TERM CURRENT USE OF INSULIN (H): ICD-10-CM

## 2019-10-07 DIAGNOSIS — N18.5 CKD (CHRONIC KIDNEY DISEASE) STAGE 5, GFR LESS THAN 15 ML/MIN (H): ICD-10-CM

## 2019-10-07 DIAGNOSIS — D63.1 ANEMIA IN STAGE 5 CHRONIC KIDNEY DISEASE (H): ICD-10-CM

## 2019-10-07 DIAGNOSIS — N25.81 SECONDARY HYPERPARATHYROIDISM (H): ICD-10-CM

## 2019-10-07 DIAGNOSIS — E11.22 TYPE 2 DIABETES MELLITUS WITH STAGE 4 CHRONIC KIDNEY DISEASE, WITHOUT LONG-TERM CURRENT USE OF INSULIN (H): ICD-10-CM

## 2019-10-07 DIAGNOSIS — I10 HYPERTENSION GOAL BP (BLOOD PRESSURE) < 140/90: ICD-10-CM

## 2019-10-07 DIAGNOSIS — N18.5 ANEMIA IN STAGE 5 CHRONIC KIDNEY DISEASE (H): ICD-10-CM

## 2019-10-07 DIAGNOSIS — D64.9 ANEMIA, UNSPECIFIED TYPE: Primary | ICD-10-CM

## 2019-10-07 LAB
ALBUMIN SERPL-MCNC: 3.5 G/DL (ref 3.4–5)
ANION GAP SERPL CALCULATED.3IONS-SCNC: 7 MMOL/L (ref 3–14)
BUN SERPL-MCNC: 61 MG/DL (ref 7–30)
CALCIUM SERPL-MCNC: 7.8 MG/DL (ref 8.5–10.1)
CHLORIDE SERPL-SCNC: 113 MMOL/L (ref 94–109)
CO2 SERPL-SCNC: 21 MMOL/L (ref 20–32)
CREAT SERPL-MCNC: 6.29 MG/DL (ref 0.52–1.04)
CREAT UR-MCNC: 59 MG/DL
FERRITIN SERPL-MCNC: 230 NG/ML (ref 8–252)
GFR SERPL CREATININE-BSD FRML MDRD: 6 ML/MIN/{1.73_M2}
GLUCOSE SERPL-MCNC: 210 MG/DL (ref 70–99)
HGB BLD-MCNC: 8.2 G/DL (ref 11.7–15.7)
IRON SATN MFR SERPL: 21 % (ref 15–46)
IRON SERPL-MCNC: 39 UG/DL (ref 35–180)
PHOSPHATE SERPL-MCNC: 4.5 MG/DL (ref 2.5–4.5)
POTASSIUM SERPL-SCNC: 4.9 MMOL/L (ref 3.4–5.3)
PROT UR-MCNC: 5.22 G/L
PROT/CREAT 24H UR: 8.92 G/G CR (ref 0–0.2)
PTH-INTACT SERPL-MCNC: 707 PG/ML (ref 18–80)
SODIUM SERPL-SCNC: 141 MMOL/L (ref 133–144)
TIBC SERPL-MCNC: 189 UG/DL (ref 240–430)

## 2019-10-07 PROCEDURE — 83550 IRON BINDING TEST: CPT | Performed by: INTERNAL MEDICINE

## 2019-10-07 PROCEDURE — 82728 ASSAY OF FERRITIN: CPT | Performed by: INTERNAL MEDICINE

## 2019-10-07 PROCEDURE — 84156 ASSAY OF PROTEIN URINE: CPT | Performed by: INTERNAL MEDICINE

## 2019-10-07 PROCEDURE — 83540 ASSAY OF IRON: CPT | Performed by: INTERNAL MEDICINE

## 2019-10-07 PROCEDURE — 83970 ASSAY OF PARATHORMONE: CPT | Performed by: INTERNAL MEDICINE

## 2019-10-07 PROCEDURE — 36415 COLL VENOUS BLD VENIPUNCTURE: CPT | Performed by: INTERNAL MEDICINE

## 2019-10-07 PROCEDURE — 99215 OFFICE O/P EST HI 40 MIN: CPT | Performed by: INTERNAL MEDICINE

## 2019-10-07 PROCEDURE — 85018 HEMOGLOBIN: CPT | Performed by: INTERNAL MEDICINE

## 2019-10-07 PROCEDURE — 82306 VITAMIN D 25 HYDROXY: CPT | Performed by: INTERNAL MEDICINE

## 2019-10-07 PROCEDURE — 80069 RENAL FUNCTION PANEL: CPT | Performed by: INTERNAL MEDICINE

## 2019-10-07 ASSESSMENT — MIFFLIN-ST. JEOR: SCORE: 837.83

## 2019-10-07 ASSESSMENT — PAIN SCALES - GENERAL: PAINLEVEL: MILD PAIN (3)

## 2019-10-07 NOTE — LETTER
10/11/2019       Lakhwinder Negron   [address] [address]            Dear Lakhwinder,    Attached are your labs as we discussed at your visit. No additional recommendations at this time. Please call or MyChart with questions or concerns.  [phone].      Sincerely,      Ramon Rodriguez DO    Division of Renal Diseases and Hypertension  [facility]   KW/gw    Resulted Orders   Protein  random urine with Creat Ratio   Result Value Ref Range    Protein Random Urine 5.22 g/L    Protein Total Urine g/gr Creatinine 8.92 (H) 0 - 0.2 g/g Cr   Renal panel   Result Value Ref Range    Sodium 141 133 - 144 mmol/L    Potassium 4.9 3.4 - 5.3 mmol/L    Chloride 113 (H) 94 - 109 mmol/L    Carbon Dioxide 21 20 - 32 mmol/L    Anion Gap 7 3 - 14 mmol/L    Glucose 210 (H) 70 - 99 mg/dL      Comment:      Non Fasting    Urea Nitrogen 61 (H) 7 - 30 mg/dL    Creatinine 6.29 (H) 0.52 - 1.04 mg/dL    GFR Estimate 6 (L) >60 mL/min/[1.73_m2]      Comment:      Non  GFR Calc  Starting 12/18/2018, serum creatinine based estimated GFR (eGFR) will be   calculated using the Chronic Kidney Disease Epidemiology Collaboration   (CKD-EPI) equation.      GFR Estimate If Black 7 (L) >60 mL/min/[1.73_m2]      Comment:       GFR Calc  Starting 12/18/2018, serum creatinine based estimated GFR (eGFR) will be   calculated using the Chronic Kidney Disease Epidemiology Collaboration   (CKD-EPI) equation.      Calcium 7.8 (L) 8.5 - 10.1 mg/dL    Phosphorus 4.5 2.5 - 4.5 mg/dL    Albumin 3.5 3.4 - 5.0 g/dL   Parathyroid Hormone Intact   Result Value Ref Range    Parathyroid Hormone Intact 707 (H) 18 - 80 pg/mL   Hemoglobin   Result Value Ref Range    Hemoglobin 8.2 (L) 11.7 - 15.7 g/dL   Creatinine urine calculation only   Result Value Ref Range    Creatinine Urine 59 mg/dL
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
## 2019-10-07 NOTE — PATIENT INSTRUCTIONS
1. I encourage you to discuss dialysis and whether this is something you want to start to help improve your symptoms of decreased appetite, nausea, vomiting.     2. We have a message out to your home health nurse to make adjustments to your medications.     3. Plan to have you back to see me     4. Hold furosemide (lasix) dose tonight. We will then adjust the furosemide (lasix) depending on what she has been getting.
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
## 2019-10-07 NOTE — NURSING NOTE
"Lakhwinder Negron's goals for this visit include: Return  She requests these members of her care team be copied on today's visit information: PCP    PCP: Jonna Cason    Referring Provider:  Jonna Cason MD  [address] [place] [address]    BP (!) 168/60 (BP Location: Right arm, Patient Position: Sitting, Cuff Size: Adult Regular)   Pulse 66   Resp 12   Ht 1.422 m (4' 8\")   Wt 51.5 kg (113 lb 8 oz)   LMP  (LMP Unknown)   SpO2 98%   BMI 25.45 kg/m      Do you need any medication refills at today's visit? N    "

## 2019-10-08 ENCOUNTER — TELEPHONE (OUTPATIENT)
Dept: NEPHROLOGY | Facility: CLINIC | Age: 79
End: 2019-10-08

## 2019-10-08 NOTE — TELEPHONE ENCOUNTER
Spoke to patient's homecare RN, Lilliana,    Patient is currently taking   Amlodipine 10 mg daily.   Hydralazine 75 mg TID  Lasix 80 mg in the AM and 60 mg in the PM    Reviewed with Dr. Rodriguez. Changing the following medications based off of Dr. Rodriguez's assessment in clinic yesterday. Home readings as follows:  9/6: 136/68  9/20: 142/70  10/4: 148/76    Decrease Lasix to 60 mg in the AM and 40 mg in the PM. Increase Coreg to 25 mg BID.     Sent these prescriptions in to patient's pharmacy. Updated Lilliana of Dr. Rodriguez's recommendations. She will plan to update patient as well.    Daysi Frias, RN, BSN  Nephrology Care Coordinator  [facility]

## 2019-10-11 LAB
DEPRECATED CALCIDIOL+CALCIFEROL SERPL-MC: <30 UG/L (ref 20–75)
VITAMIN D2 SERPL-MCNC: <5 UG/L
VITAMIN D3 SERPL-MCNC: 25 UG/L

## 2019-10-14 ENCOUNTER — PATIENT OUTREACH (OUTPATIENT)
Dept: GERIATRIC MEDICINE | Facility: CLINIC | Age: 79
End: 2019-10-14

## 2019-10-14 NOTE — PROGRESS NOTES
TRANSITIONS OF CARE (FREDDY) LOG   FREDDY tasks should be completed by the CC within one (1) business day of notification of each transition. Follow up contact with member is required after return to their usual care setting.  Note:  If CC finds out about the transitions fifteen (15) days or more after the member has returned to their usual care setting, no FREDDY log is needed. However, the CC should check in with the member to discuss the transition process, any changes needed to the care plan and document it in a case note.    Member Name:  Lakhwinder Negron O Name:  [facility]O/Health Plan Member ID#: [id number]   [place] [facility] [place] Coord[place] Contact:  Shruthi Martins RN, PHN Agency/County/Care System: [facility]   Transition Communication Actions from Care Management Contact   Transition #1   Notification Date: 10/14/2019 Transition Date:   10/12/2019 Transition From: Home     Is this the member s usual care setting?               yes Transition To: Hospital, [facility]   Transition Type:  Unplanned  Reason for Admission/Comments:  SOB, Acute Decompensated Heart     CC received notification of Hospital admission.  Hospital admission occurred on 10/12/19 at [facility] with Dx of SOB, Acute Decompensated Heart. CC contacted Hospital /discharge planner ([place] [address] Care Coordination team - [phone]) and left a message with this CC contact information, reviewed community POC as well requested to be notified of concerns, care conferences and discharge planning.  CC reached out to adult son Ramin Negron regarding transition and offered support as needed.  Reviewed and update care plan as needed.  Notified community service providers and placed services Adult Day Care PCA on hold as needed.  Transition log initiated.   PCP notified of hospitalization via EMR.    Care Coordinator spoke to member's son, Ramin and he reports member is in fair condition. Member has pulmonary edema  and nephrology will meet with member/family to discuss dialysis as renal function is poor. Member has declined dialysis in the past, however, family and member will have another meeting to discuss options. Ramin is not aware of a discharge date at this time and will update CC as he becomes aware.     Shruthi Martins, RN, PHN  [facility]  [phone]       Shared CC contact info, care plan/services with receiving setting--Date completed: 10/14/2019   Notified PCP of transition--Date completed:  10/12/2019     via  EMR   Transition #2   Transition #3  (if applicable)   Notification Date: 10/15/19         Transition To:  Home  Transition Date: 10/14/19     Transition Type:    Planned  Notified PCP -- Date completed: 10/15/19              Shared CC contact info, care plan/services with receiving setting or, if applicable, home care agency--Date completed:  10/15/19  *Complete additional tasks below, if this transition is a return to usual care setting.      Comments:  [facility] Care Coordination Contact    CC received notification of discharge to home. Discharge occurred on (Date 10/14/19) from [facility].   CC contacted member and family Daughter in Keyanna ferraro and reviewed discharge summary.  Member has a follow-up appointment with PCP in 7 days: No: Offered Assistance with setting up a follow up appointment   Member has had a change in condition: No  Home visit needed: No  Care plan reviewed and updated.  The following home based services Adult Day Care PCA RN were resumed.  New referrals placed: No  Transition log completed.   PCP notified of transition back to home via EMR.    10/15/19 - CC spoke to Joelle  at [facility] and she confirmed member discharge home on 10/14/19. Joelle will send orders to [facility] to continue nurse visits and possible therapy service. Joelle states dialysis treatment and/or Hospice care were discussed, however, member is not ready to make a decision  yet.     CC spoke to member and reviewed hospitalization and current status. Member states she is more SOB the past couple weeks. Member says she would like to resume PCA, ADC and Nurse visits. Member doesn't have a follow-up appointment with PCP but states a nephrology appointment is scheduled for 10/22/19. Member says she doesn't know if she wants dialysis and understands about hospice option. Xefrankie wants to discuss her decision further with family and will let PCP and/or nephrologist know when she meets with them.     CC spoke to member's daughter in law, Keyanna and she and family will assist in scheduling a follow-up appointment with PCP. Keyanna confirmed Lakhwinder has not made a decision on what she wants to do next with health treatment. Keyanna, family and member will have discussion and will update CC as member makes her decision. Encourage family to contact CC with any questions or concerns.     Shruthi Martins RN, PHN  [facility]  [phone]        Notification Date:  NA        Transition To:  NA  Transition Date:   NA           Transition Type:    Planned  Notified PCP--Date completed: NA         Shared CC contact info, care plan/services with receiving setting or, if applicable, home care agency--Date completed: NA      *Complete additional tasks below, if this transition is a return to usual care setting.      Comments:  NA     *Complete tasks below when the member is discharging TO their usual care setting within one (1) business day of notification.  For situations where the Care Coordinator is notified of the discharge prior to the date of discharge, the Care Coordinator must follow up with the member or designated representative to confirm that discharge actually occurred and discuss required FREDDY tasks as outlined in the FREDDY Instructions.  (This includes situations where it may be a  new  usual care setting for the member. (i.e., a community member who decides upon permanent nursing home  placement following hospitalization and rehab).    Date completed: 10/15/19  Communicated with member or their designated representative about the following:  care transition process; about changes to the member s health status; plan of care updates; education about transitions and how to prevent unplanned transitions/readmissions  Four Pillars for Optimal Transition:    Check  Yes  - if the member, family member and/or SNF/facility staff manages the following:    If  No  provide explanation in the comments section.          []  Yes     [x]  No     Does the member have a follow-up appointment scheduled with primary care or specialist? (Mental health hospitalizations--the appt. should be w/in 7 days)   [x]  Yes     []  No     Can the member manage their medications or is there a system in place to manage medications (e.g. home care set-up)?         [x]  Yes     []  No     Can the member verbalize warning signs and symptoms to watch for and how to respond?         [x]  Yes     []  No     Does the member use a Personal Health Care Record?  Check  Yes  if visit summary, discharge summary, and/or healthcare summary are being used as a PHR.                                                                                                                                                                                    [x] Yes      [] No      Have you updated the member s care plan?  If  No  provide explanation in comments.   Comments:  Family will assist Xee is scheduling a follow-up appointment with PCP.

## 2019-10-15 ENCOUNTER — TELEPHONE (OUTPATIENT)
Dept: PHARMACY | Facility: CLINIC | Age: 79
End: 2019-10-15

## 2019-10-15 DIAGNOSIS — D63.1 ANEMIA OF CHRONIC RENAL FAILURE, STAGE 5 (H): Primary | ICD-10-CM

## 2019-10-15 DIAGNOSIS — N18.5 ANEMIA OF CHRONIC RENAL FAILURE, STAGE 5 (H): Primary | ICD-10-CM

## 2019-10-15 DIAGNOSIS — N18.5 CKD (CHRONIC KIDNEY DISEASE) STAGE 5, GFR LESS THAN 15 ML/MIN (H): ICD-10-CM

## 2019-10-15 RX ORDER — HEPARIN SODIUM,PORCINE 10 UNIT/ML
5 VIAL (ML) INTRAVENOUS
Status: CANCELLED | OUTPATIENT
Start: 2020-10-15

## 2019-10-15 RX ORDER — HEPARIN SODIUM (PORCINE) LOCK FLUSH IV SOLN 100 UNIT/ML 100 UNIT/ML
5 SOLUTION INTRAVENOUS
Status: CANCELLED | OUTPATIENT
Start: 2020-10-15

## 2019-10-15 NOTE — TELEPHONE ENCOUNTER
Anemia Management Note - Enrollment  SUBJECTIVE/OBJECTIVE:    Referred by Dr. Ramon Rodriguez on 10/11/2019  Primary Diagnosis: Anemia in Chronic Kidney Disease (N18.5, D63.1)     Secondary Diagnosis:  Chronic Kidney Disease, Stage 5 (N18.5)  Hgb goal range:  9-10  Epo/Darbo: Aranesp  25 mcg  every two weeks  In clinic  Iron regimen:  Ferrous Sulfate  3 times daily  Labs : 10/14/2020  Recent BELA use, transfusion, IV iron: none  RX/TX plans : 10/14/2020  History of stroke ()  Contact:  Ok to leave message regarding scheduling, medical, and billing per consent to communicate dated 17    OK to speak with Ramin Negron (son) and Samira Negron (grand daughter) regarding scheduling, medical, and billing per consent to communicate dated 17    Homecare MARJORIE Tijerina: phone [phone].    Per Lilliana RN; Homecare sees Xee every other week.  She is NOT homebound and is able to drive herself.  She does go to a Day Program Mon-Thursday. She will need to go to clinic for labs and Aranesp.    Per Lilliana, best contact is Ramin (son)    Anemia Latest Ref Rng & Units 2018 2018 10/17/2018 2018 2019 3/26/2019 10/7/2019   Hemoglobin 11.7 - 15.7 g/dL 10.8(L) 10.7(L) 10.1(L) 10.6(L) 10.1(L) 9.8(L) 8.2(L)   TSAT 15 - 46 % - 27 17 20 25 - 21   Ferritin 8 - 252 ng/mL - 193 203 180 180 - 230       BP Readings from Last 3 Encounters:   10/07/19 (!) 168/60   19 122/50   19 162/58     Wt Readings from Last 2 Encounters:   10/07/19 113 lb 8 oz (51.5 kg)   19 112 lb (50.8 kg)     Current Outpatient Medications   Medication Sig Dispense Refill     ACE/ARB NOT PRESCRIBED, INTENTIONAL, Please choose reason not prescribed, below       acetaminophen-codeine (TYLENOL #3) 300-30 MG tablet TAKE 1-2 TABLETS BY MOUTH ONCE DAILY AS NEEDED // IB HNUB NOJ 1-2 LUB YOG MOB 60 tablet 0     amLODIPine (NORVASC) 10 MG tablet TAKE ONE TABLET BY MOUTH EVERY DAY 90 tablet 3     ASPIRIN NOT PRESCRIBED (INTENTIONAL) Please  choose reason not prescribed, below 1 each 0     blood glucose monitoring (ONETOUCH ULTRA) test strip USE TO TEST BLOOD SUGAR 2-3 TIMES A DAY // IB HNUB SIV 2-3 ZAUG LI QHIA 200 strip 11     calcium carbonate 600 mg-vitamin D 400 units (CALTRATE) 600-400 MG-UNIT per tablet TAKE ONE TABLET BY MOUTH TWICE A DAY FOR BONE HEALTH // IB ZAUG NOJ 1 LUB, IB HNUB NOJ 2 ZAUG PAB LASHA POB TXHA 60 tablet 5     carvedilol (COREG) 25 MG tablet Take 1 tablet (25 mg) by mouth 2 times daily (with meals) 180 tablet 3     ferrous sulfate (FEROSUL) 325 (65 Fe) MG tablet Take 1 tablet (325 mg) by mouth 3 times daily (with meals) 270 tablet 3     furosemide (LASIX) 20 MG tablet TAKE 3 TABLETS (60 mg) BY MOUTH EVERY MORNING AND TAKE 2 TABLETS (40mg) BY MOUTH EVERY EVENING 450 tablet 3     furosemide (LASIX) 20 MG tablet Take 80 mg in the AM (4 tablets) and 60 mg (3 tablets) in the PM. 630 tablet 1     glipiZIDE (GLUCOTROL XL) 2.5 MG 24 hr tablet TAKE ONE TABLET BY MOUTH TWICE A DAY FOR DIABETES 180 tablet 1     hydrALAZINE (APRESOLINE) 50 MG tablet Take 1.5 tablets (75 mg) by mouth 3 times daily 405 tablet 3     olopatadine (PATANOL) 0.1 % ophthalmic solution Place 1 drop into both eyes 2 times daily as needed for allergies 5 mL 12     ONETOUCH DELICA LANCETS 33G MISC 1 applicator by In Vitro route 2 times daily 200 each 1     ranitidine (ZANTAC) 150 MG tablet TAKE ONE TABLET BY MOUTH EVERY DAY 90 tablet 3     rosuvastatin (CRESTOR) 20 MG tablet Take 0.5 tablets (10 mg) by mouth daily 45 tablet 3     vitamin D3 (CHOLECALCIFEROL) 1000 units (25 mcg) tablet Take 1 tablet (1,000 Units) by mouth daily 100 tablet 3     ASSESSMENT:  Hgb Not at goal/Initiation of therapy   Ferritin: At goal (>100ng/mL)  TSat: not at goal of >30%  Iron regimen recommended: iron infusions  Recommended BELA regimen: Aranesp   Blood Pressure: history of HTN, monitor BP closely    PLAN:  1.LM with Lilliana [facility] RN today for enrollment in Anemia Management  Service.  2. Need to discuss:  anemia overview, monitoring service and goal hemoglobin range and rationale and risks of BELA blood clots, stroke and increase in blood pressure  3. Dose location: in clinic at [facility]  4. Labs: [facility]  5. Pharmacy: n/a  6. Will Send new patient letter with medication guide to patient once reached     LM with Lilliana to clarify what services Homecare offers for patient (blood draws?), is pt home bound (would need procrit) or can they get to clinic for aranesp injections?  Is there a best person to contact for patient?  Adjust therapy plan once questions answered (currently in for aranesp) and fax orders to homecare    Next call date:  10/17/2019    10/17/19; LVM for [facility] re Anemia Services.    Hugo Khan PharmD, CSP  Anemia Services  [facility]  [address] [place] [id number]   Office : [phone]  Fax: [phone]

## 2019-10-16 ENCOUNTER — TELEPHONE (OUTPATIENT)
Dept: FAMILY MEDICINE | Facility: CLINIC | Age: 79
End: 2019-10-16

## 2019-10-16 DIAGNOSIS — N18.5 CKD (CHRONIC KIDNEY DISEASE) STAGE 5, GFR LESS THAN 15 ML/MIN (H): Primary | ICD-10-CM

## 2019-10-16 NOTE — TELEPHONE ENCOUNTER
What type of form? FMLA  What day did you drop off your forms? 10/16/2019  Is there a due date? ASAP     How would you like to receive these forms? Patient will  at the clinic when completed  Which clinic [facility] the [facility]    What is the best number to contact you? Home [phone]  What time works best to contact you with in 4 hrs? After 2:00PM  Is it okay to leave a message? Yes   NOTES: Page 3 Question 5 was missed after it has been filled out it needs to Initialed and dated.    Amy Rivera

## 2019-10-17 ENCOUNTER — DOCUMENTATION ONLY (OUTPATIENT)
Dept: CARE COORDINATION | Facility: CLINIC | Age: 79
End: 2019-10-17

## 2019-10-17 ENCOUNTER — TELEPHONE (OUTPATIENT)
Dept: FAMILY MEDICINE | Facility: CLINIC | Age: 79
End: 2019-10-17

## 2019-10-17 NOTE — TELEPHONE ENCOUNTER
Form is received from the  and forward to Dr. Cason to address page 3 question 5, and make sure Dr. Cason initial and date after completion.  Gamal Negron,  [facility]

## 2019-10-17 NOTE — TELEPHONE ENCOUNTER
Dr. Causey completed form for patient and this form and a copy of patient's med list has been faxed back to fax # [phone].  Gamal Negron,  [facility]

## 2019-10-17 NOTE — PROGRESS NOTES
Dear Dr. Cason  Medicare Home Health regulations requires [facility] to provide an initial assessment visit either within 48 hours of the patient's return home, or on the physician ordered Start of Care date.    There will be a delay in the Resumption Assessment for Lakhwinder Negron; MRN [id number]  We anticipate the Start of care will be 10/18/19 date.      Sincerely [facility]  Prachi Cobb RN  [phone]

## 2019-10-18 ENCOUNTER — DOCUMENTATION ONLY (OUTPATIENT)
Dept: CARE COORDINATION | Facility: CLINIC | Age: 79
End: 2019-10-18
Payer: COMMERCIAL

## 2019-10-18 ENCOUNTER — TELEPHONE (OUTPATIENT)
Dept: CARDIOLOGY | Facility: CLINIC | Age: 79
End: 2019-10-18

## 2019-10-18 DIAGNOSIS — I10 HYPERTENSION GOAL BP (BLOOD PRESSURE) < 140/90: ICD-10-CM

## 2019-10-18 RX ORDER — HYDRALAZINE HYDROCHLORIDE 50 MG/1
75 TABLET, FILM COATED ORAL
COMMUNITY
End: 2019-10-25

## 2019-10-18 RX ORDER — FUROSEMIDE 20 MG
TABLET ORAL
COMMUNITY
Start: 2019-10-14 | End: 2019-10-25

## 2019-10-18 NOTE — TELEPHONE ENCOUNTER
NOE Tijerina calling regarding question about patient's Hydralazine.   Patient was just discharged from [facility] (10/12-10/14) for CHF exacerbation. Her paperwork says to continue her Hydralazine at 75 mg twice a day, but before hospitalization she was taking 75 mg three times per day. Lasix has been increased to 120 mg in am, and 80 mg in pm.   Lilliana would like clarification . Has not been able to check blood pressure yet.   Patient has lab and appt with Dr Rodriguez next Tuesday, Oct 22.   Advised that a message would be sent to covering physician for Dr Rodriguez and will call Lilliana back with clarification.   MARJORIE Altamirano    Date: 10/18/2019    Time of Call: 10:42 AM     Diagnosis:  CKD, heart failure     [ TORB ] Ordering provider: Dr Ramon Rodriguez    Order: Continue Hydralazine at twice a day, will reassess at appointment on 10/22     Order received by: MARJORIE Altamirano      Follow-up/additional notes: Left message for Lilliana with above. Asked her to have patient bring in BP readings between now and then to appt.

## 2019-10-18 NOTE — PROGRESS NOTES
[facility] utilizes an encounter to take the place of a direct phone call to your office. Please take a moment to review the below request. Please reply or route message to author of this encounter.  Message will act as a verbal OK of orders requested below. Thank you.    ORDER  Skilled Nurse Visits EOW x7 weeks, 3prns.   MD SUMMARY/PLAN OF CARE    SN to perform assessment with focus on medication management and reconciliation, pain management, mental health status and need for referral or change in treatment plan, skin integrity, and falls risk. Instruct on disease process, signs/symptoms of complications to report to agency/physician/911, emergency/safety and falls prevention plan, medication effects/SE, new/high risk/changed medications, diet, and activity. Implement interventions to monitor and mitigate pain, prevent pressure ulcers and confirm proper foot care.   0prn visits for medical symptoms that would necessitate an unplanned visit, supervisory visits per agency protocol, recertification assessments.  Visits may be in person or via video conference based upon patient needs

## 2019-10-18 NOTE — PROGRESS NOTES
[facility] utilizes an encounter to take the place of a direct phone call to your office. Please take a moment to review the below request. Please reply or route message to author of this encounter.  Message will act as a verbal OK of orders requested below. Thank you.    ORDER    MD SUMMARY/PLAN OF CARE

## 2019-10-22 ENCOUNTER — OFFICE VISIT (OUTPATIENT)
Dept: NEPHROLOGY | Facility: CLINIC | Age: 79
End: 2019-10-22
Payer: COMMERCIAL

## 2019-10-22 VITALS
HEART RATE: 63 BPM | SYSTOLIC BLOOD PRESSURE: 181 MMHG | OXYGEN SATURATION: 98 % | BODY MASS INDEX: 25.35 KG/M2 | RESPIRATION RATE: 16 BRPM | WEIGHT: 112.7 LBS | DIASTOLIC BLOOD PRESSURE: 53 MMHG | HEIGHT: 56 IN

## 2019-10-22 DIAGNOSIS — N18.5 ANEMIA IN STAGE 5 CHRONIC KIDNEY DISEASE (H): ICD-10-CM

## 2019-10-22 DIAGNOSIS — I10 HYPERTENSION, GOAL BELOW 140/90: ICD-10-CM

## 2019-10-22 DIAGNOSIS — N18.5 CKD (CHRONIC KIDNEY DISEASE) STAGE 5, GFR LESS THAN 15 ML/MIN (H): Primary | ICD-10-CM

## 2019-10-22 DIAGNOSIS — D63.1 ANEMIA OF CHRONIC RENAL FAILURE, STAGE 5 (H): ICD-10-CM

## 2019-10-22 DIAGNOSIS — N18.5 ANEMIA OF CHRONIC RENAL FAILURE, STAGE 5 (H): ICD-10-CM

## 2019-10-22 DIAGNOSIS — N18.5 CKD (CHRONIC KIDNEY DISEASE) STAGE 5, GFR LESS THAN 15 ML/MIN (H): ICD-10-CM

## 2019-10-22 DIAGNOSIS — D63.1 ANEMIA IN STAGE 5 CHRONIC KIDNEY DISEASE (H): ICD-10-CM

## 2019-10-22 DIAGNOSIS — N25.81 SECONDARY HYPERPARATHYROIDISM (H): ICD-10-CM

## 2019-10-22 LAB
ALBUMIN SERPL-MCNC: 3.8 G/DL (ref 3.4–5)
ANION GAP SERPL CALCULATED.3IONS-SCNC: 9 MMOL/L (ref 3–14)
BUN SERPL-MCNC: 82 MG/DL (ref 7–30)
CALCIUM SERPL-MCNC: 8.2 MG/DL (ref 8.5–10.1)
CHLORIDE SERPL-SCNC: 112 MMOL/L (ref 94–109)
CO2 SERPL-SCNC: 19 MMOL/L (ref 20–32)
CREAT SERPL-MCNC: 6.42 MG/DL (ref 0.52–1.04)
FERRITIN SERPL-MCNC: 437 NG/ML (ref 8–252)
GFR SERPL CREATININE-BSD FRML MDRD: 6 ML/MIN/{1.73_M2}
GLUCOSE SERPL-MCNC: 223 MG/DL (ref 70–99)
HCT VFR BLD AUTO: 30.5 % (ref 35–47)
HGB BLD-MCNC: 9.4 G/DL (ref 11.7–15.7)
IRON SATN MFR SERPL: 32 % (ref 15–46)
IRON SERPL-MCNC: 67 UG/DL (ref 35–180)
PHOSPHATE SERPL-MCNC: 5.5 MG/DL (ref 2.5–4.5)
POTASSIUM SERPL-SCNC: 4.7 MMOL/L (ref 3.4–5.3)
SODIUM SERPL-SCNC: 140 MMOL/L (ref 133–144)
TIBC SERPL-MCNC: 212 UG/DL (ref 240–430)

## 2019-10-22 PROCEDURE — 85018 HEMOGLOBIN: CPT | Performed by: INTERNAL MEDICINE

## 2019-10-22 PROCEDURE — 99215 OFFICE O/P EST HI 40 MIN: CPT | Performed by: INTERNAL MEDICINE

## 2019-10-22 PROCEDURE — 82728 ASSAY OF FERRITIN: CPT | Performed by: INTERNAL MEDICINE

## 2019-10-22 PROCEDURE — 83550 IRON BINDING TEST: CPT | Performed by: INTERNAL MEDICINE

## 2019-10-22 PROCEDURE — 80069 RENAL FUNCTION PANEL: CPT | Performed by: INTERNAL MEDICINE

## 2019-10-22 PROCEDURE — 83540 ASSAY OF IRON: CPT | Performed by: INTERNAL MEDICINE

## 2019-10-22 PROCEDURE — 85014 HEMATOCRIT: CPT | Performed by: INTERNAL MEDICINE

## 2019-10-22 PROCEDURE — 36415 COLL VENOUS BLD VENIPUNCTURE: CPT | Performed by: INTERNAL MEDICINE

## 2019-10-22 ASSESSMENT — PAIN SCALES - GENERAL: PAINLEVEL: NO PAIN (0)

## 2019-10-22 ASSESSMENT — MIFFLIN-ST. JEOR: SCORE: 834.2
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
## 2019-10-22 NOTE — PATIENT INSTRUCTIONS
To be discussed with home health nurse:  - Increase sodium bicarbonate (one additional tab from what she is getting)  - Want to adjust BP meds again (will assure up to date med list and adjust from there)  - Start calcitriol 0.25 mcg taken daily.   - Encourage discussions as family to determine whether dialysis should be started vs hospice referral (we have been having these discussions at visit)  - Please confirm that ranitidine was received from pharmacy and not bought over the counter (want to assure not a recalled version of ranitidine she is taking).     Recommendations today:  1. Discussion as family regarding goals of care  2. If dialysis is decided:  - tour of dialysis facility  - planning of access placement  - determining timing of dialysis initiation  - social work help to get transportation arranged  3. If no decision made in 2 weeks, plan follow-up with Jennifer on November 4th or 5th to continue these conversations.

## 2019-10-23 ENCOUNTER — TELEPHONE (OUTPATIENT)
Dept: PHARMACY | Facility: CLINIC | Age: 79
End: 2019-10-23

## 2019-10-23 NOTE — TELEPHONE ENCOUNTER
Anemia Management Note  SUBJECTIVE/OBJECTIVE:  Referred by Dr. Ramon Rodriguez on 10/11/2019  Primary Diagnosis: Anemia in Chronic Kidney Disease (N18.5, D63.1)     Secondary Diagnosis:  Chronic Kidney Disease, Stage 5 (N18.5)  Hgb goal range:  9-10  Epo/Darbo: Aranesp  25 mcg  every two weeks  In clinic  Iron regimen:  Ferrous Sulfate  3 times daily  Labs : 10/14/2020  Recent BELA use, transfusion, IV iron: none  RX/TX plans : 10/14/2020  History of stroke ()  Contact:            Ok to leave message regarding scheduling, medical, and billing per consent to communicate dated 17                              OK to speak with Ramin Negron (son) and Samira Negron (grand daughter) regarding scheduling, medical, and billing per consent to communicate dated 17     Homecare [facility]: phone [phone].     Per Lilliana AMADOR; Homecare sees Lakhwinder every other week.  She is NOT homebound and is able to drive herself.  She does go to a Day Program Mon-Thursday. She will need to go to clinic for labs and Aranesp.     Per Lilliana, best contact is Ramin (son)    Anemia Latest Ref Rng & Units 2018 10/17/2018 2018 2019 3/26/2019 10/7/2019 10/22/2019   Hemoglobin 11.7 - 15.7 g/dL 10.7(L) 10.1(L) 10.6(L) 10.1(L) 9.8(L) 8.2(L) 9.4(L)   TSAT 15 - 46 % 27 17 20 25 - 21 32   Ferritin 8 - 252 ng/mL 193 203 180 180 - 230 437(H)     BP Readings from Last 3 Encounters:   10/22/19 (!) 181/53   10/07/19 (!) 168/60   19 122/50     Wt Readings from Last 2 Encounters:   10/22/19 51.1 kg (112 lb 11.2 oz)   10/07/19 51.5 kg (113 lb 8 oz)       Ramin never called back.  Lakhwinder was seen at clinic yesterday. Her labs are within range. Will follow up again in 2 weeks.     ASSESSMENT:  Hgb:at goal - continue to monitor  TSat: at goal >30% Ferritin: At goal (>100ng/mL)    PLAN:  RTC for Hgb in 2 week(s)    Orders needed to be renewed (for next follow-up date) in EPIC: None    Iron labs due:  4 weeks    Plan discussed with:  LVM with  Ramin  Plan provided by:  richa    NEXT FOLLOW-UP DATE:  11/05/2019    Anemia Management Service  Keyanna Pedro RN  Phone: [phone]  Fax: [phone]

## 2019-10-23 NOTE — TELEPHONE ENCOUNTER
Patient's son is calling to check on FMLA forms, states he has not heard anything?      Please call and advise [phone]    Thanks,    Cora

## 2019-10-24 DIAGNOSIS — Z53.9 DIAGNOSIS NOT YET DEFINED: Primary | ICD-10-CM

## 2019-10-24 PROCEDURE — 99207 C MD RECERTIFICATION HHA PT: CPT | Performed by: FAMILY MEDICINE

## 2019-10-24 NOTE — TELEPHONE ENCOUNTER
Called, Ramin is notified of this writer's note below.  Gamal Negron,  [facility]

## 2019-10-24 NOTE — TELEPHONE ENCOUNTER
Ramin's form will be deliver to the  this afternoon for pickup after 4p.  Gamal Negron,  [facility]    Please notify Ramin to let them know the above info.  And remind the person who is picking up to bring their photo ID.  Gamal Negron,  [facility]     NOTE: If patient and or guardians calls the clinic before the care teams gets a chance to call them, please notify the caller the above information regarding pickup times, remind them to bring a photo ID, document and close the encounter.  Gamal Negron,  [facility]    FYI:  Anything completed after 2:00p will not be delivered until the next business day after 3p.   Gamal Negron,  for [facility].

## 2019-10-25 ENCOUNTER — OFFICE VISIT (OUTPATIENT)
Dept: FAMILY MEDICINE | Facility: CLINIC | Age: 79
End: 2019-10-25
Payer: COMMERCIAL

## 2019-10-25 VITALS
HEART RATE: 67 BPM | SYSTOLIC BLOOD PRESSURE: 152 MMHG | WEIGHT: 114 LBS | BODY MASS INDEX: 25.64 KG/M2 | RESPIRATION RATE: 12 BRPM | HEIGHT: 56 IN | DIASTOLIC BLOOD PRESSURE: 58 MMHG | OXYGEN SATURATION: 98 % | TEMPERATURE: 97.9 F

## 2019-10-25 DIAGNOSIS — D63.1 ANEMIA OF CHRONIC RENAL FAILURE, STAGE 5 (H): ICD-10-CM

## 2019-10-25 DIAGNOSIS — E11.22 TYPE 2 DIABETES MELLITUS WITH STAGE 5 CHRONIC KIDNEY DISEASE NOT ON CHRONIC DIALYSIS, WITHOUT LONG-TERM CURRENT USE OF INSULIN (H): ICD-10-CM

## 2019-10-25 DIAGNOSIS — N18.5 CKD (CHRONIC KIDNEY DISEASE) STAGE 5, GFR LESS THAN 15 ML/MIN (H): Primary | ICD-10-CM

## 2019-10-25 DIAGNOSIS — I10 HYPERTENSION, GOAL BELOW 140/90: ICD-10-CM

## 2019-10-25 DIAGNOSIS — I50.22 CHRONIC SYSTOLIC CONGESTIVE HEART FAILURE (H): ICD-10-CM

## 2019-10-25 DIAGNOSIS — N18.5 TYPE 2 DIABETES MELLITUS WITH STAGE 5 CHRONIC KIDNEY DISEASE NOT ON CHRONIC DIALYSIS, WITHOUT LONG-TERM CURRENT USE OF INSULIN (H): ICD-10-CM

## 2019-10-25 DIAGNOSIS — N18.5 ANEMIA OF CHRONIC RENAL FAILURE, STAGE 5 (H): ICD-10-CM

## 2019-10-25 DIAGNOSIS — I63.549 CEREBROVASCULAR ACCIDENT (CVA) DUE TO OCCLUSION OF CEREBELLAR ARTERY, UNSPECIFIED BLOOD VESSEL LATERALITY (H): ICD-10-CM

## 2019-10-25 PROBLEM — D64.9 ANEMIA: Status: RESOLVED | Noted: 2017-08-16 | Resolved: 2019-10-25

## 2019-10-25 PROCEDURE — 99214 OFFICE O/P EST MOD 30 MIN: CPT | Performed by: FAMILY MEDICINE

## 2019-10-25 ASSESSMENT — MIFFLIN-ST. JEOR: SCORE: 840.1

## 2019-10-25 ASSESSMENT — PAIN SCALES - GENERAL: PAINLEVEL: NO PAIN (0)

## 2019-10-25 NOTE — PATIENT INSTRUCTIONS
Patient Education     Coping with Kidney Failure     Talking to someone you feel close to may help when you're feeling down.     Having kidney failure means many changes in your health and life. It may feel like too much to cope with at times, but you can learn how to deal with these emotions and feel better about your treatment and yourself. Learning as much as you can about kidney failure is a good place to start. Kidney failure is also called chronic kidney disease. It has 5 stages, from mild to severe, based on whether your kidneys are leaking protein and how well they are filtering your blood.  Understanding your emotions  Living with a medical condition like kidney failure can be very stressful. It is common at times to feel:    Angry and frustrated over having to depend on others.    Confused about all the instructions you've been given.    Worried about things going wrong with your treatment.    Upset with side effects of kidney failure or the treatment for it.     Hopeless and depressed about your future.    Unhappy with your body. Don't keep these feelings to yourself. Talk with your healthcare team and your loved ones. They may know ways to help.  Accepting your body's changes  Kidney failure and its treatment cause changes in your body. These changes can affect the way you feel about your sexuality. Your desire for and feelings about sex may change. Be open with your partner about your feelings and talk with your healthcare providers. They can help you understand your body's changes.  Finding support  People sometimes find it hard to ask others for help. But it's also hard to face a chronic illness alone. When you need some help or just want to talk, turn to friends, family, and members of your healthcare team. Also consider joining a support group. In a support group, people meet to talk about common problems.  Ask your healthcare provider if there is a kidney failure support group  nearby.  Resources  These organizations can give you more information on kidney failure. They may also guide you to local resources, such as support groups.    American Association of Kidney Patients  957.714.7230  www.aakp.org    American Kid[employer] Fund  890.335.2512  www.akfinc.org    National Kidney Foundation  588.362.2051  www.kidney.org    National Kidney Disease Education Program [phone] www.nkdep.nih.gov       Date Last Reviewed: 1/1/2017 2000-2018 [employer]. [address] [place] [address]. All rights reserved. This information is not intended as a substitute for professional medical care. Always follow your healthcare professional's instructions.
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
## 2019-10-31 ENCOUNTER — TELEPHONE (OUTPATIENT)
Dept: NEPHROLOGY | Facility: CLINIC | Age: 79
End: 2019-10-31

## 2019-10-31 NOTE — TELEPHONE ENCOUNTER
Patient son called. Ramin and his brother have been speaking to their mother about dialysis and dialysis planning. He states that his mother has decided that she would like to pursue dialysis at [facility]. He would like to move referrals forward for vascular access placement as well as a tour of the dialysis unit. Advised Ramin that the clinic will help to facilitate these referrals.    Message sent to Josep Gamal for dialysis access consult.  Patient would like to tour [facility] Dialysis unit- this writer to arrange.    Patient's son is wondering whether they need office visit on November 4. Will route to Dr. Thomas to determine this.     Daysi Frias, RN, BSN  Nephrology Care Coordinator  [facility]

## 2019-11-01 DIAGNOSIS — Z45.2 ENCOUNTER FOR ADJUSTMENT AND MANAGEMENT OF VASCULAR ACCESS DEVICE: ICD-10-CM

## 2019-11-01 DIAGNOSIS — Z01.818 ENCOUNTER FOR OTHER PREPROCEDURAL EXAMINATION: ICD-10-CM

## 2019-11-01 DIAGNOSIS — N18.5 CKD (CHRONIC KIDNEY DISEASE) STAGE 5, GFR LESS THAN 15 ML/MIN (H): Primary | ICD-10-CM

## 2019-11-01 DIAGNOSIS — N18.5 CHRONIC KIDNEY DISEASE, STAGE 5, KIDNEY FAILURE (H): Primary | ICD-10-CM

## 2019-11-01 RX ORDER — SODIUM BICARBONATE 650 MG/1
650 TABLET ORAL DAILY
Qty: 90 TABLET | Refills: 1 | Status: SHIPPED | OUTPATIENT
Start: 2019-11-01 | End: 2020-12-09

## 2019-11-01 RX ORDER — CALCITRIOL 0.25 UG/1
0.25 CAPSULE, LIQUID FILLED ORAL DAILY
Qty: 90 CAPSULE | Refills: 1 | Status: SHIPPED | OUTPATIENT
Start: 2019-11-01 | End: 2020-12-09

## 2019-11-01 RX ORDER — METOLAZONE 2.5 MG/1
2.5 TABLET ORAL DAILY
Qty: 60 TABLET | Refills: 1 | Status: SHIPPED | OUTPATIENT
Start: 2019-11-01 | End: 2020-12-09

## 2019-11-01 NOTE — PROGRESS NOTES
Spoke to patient's home care RN, Lilliana. Patient is up 7 lbs since last week. She has 4+ pitting edema  (from 2+ last week).     Patient had not been taking sodium bicarbonate prior last visit with Dr. Rodriguez. Confirmed that she is on Lasix 120 mg AM and 80 mg in the afternoon.    Contacted Dr. Rodriguez. Orders received:  - Start Sodium Bicarbonate 650 mg daily  - Start Metolazone 2.5 mg daily 30 minutes prior to AM Lasix dose.     Will also send Calcitriol 0.25 mcg daily to patient's pharmacy as discussed at office visit.    Advised to Lilliana that if patient runs into any acute issues over the weekend that she should seek urgent/emergent care given advanced renal disease. Patient should keep follow up  at is scheduled for Monday 11/4/19.    Left a message for Ramin with this information as well.    Daysi Frias, RN, BSN  Nephrology Care Coordinator  [facility]

## 2019-11-04 ENCOUNTER — OFFICE VISIT (OUTPATIENT)
Dept: NEPHROLOGY | Facility: CLINIC | Age: 79
End: 2019-11-04
Payer: COMMERCIAL

## 2019-11-04 VITALS
HEART RATE: 62 BPM | DIASTOLIC BLOOD PRESSURE: 69 MMHG | BODY MASS INDEX: 26.23 KG/M2 | WEIGHT: 117 LBS | SYSTOLIC BLOOD PRESSURE: 184 MMHG | OXYGEN SATURATION: 99 %

## 2019-11-04 DIAGNOSIS — N18.5 CKD (CHRONIC KIDNEY DISEASE) STAGE 5, GFR LESS THAN 15 ML/MIN (H): Primary | ICD-10-CM

## 2019-11-04 DIAGNOSIS — D63.1 ANEMIA OF CHRONIC RENAL FAILURE, STAGE 5 (H): ICD-10-CM

## 2019-11-04 DIAGNOSIS — I10 HYPERTENSION, GOAL BELOW 140/90: ICD-10-CM

## 2019-11-04 DIAGNOSIS — N18.5 CKD (CHRONIC KIDNEY DISEASE) STAGE 5, GFR LESS THAN 15 ML/MIN (H): ICD-10-CM

## 2019-11-04 DIAGNOSIS — N18.4 TYPE 2 DIABETES MELLITUS WITH STAGE 4 CHRONIC KIDNEY DISEASE, WITHOUT LONG-TERM CURRENT USE OF INSULIN (H): ICD-10-CM

## 2019-11-04 DIAGNOSIS — Z11.59 ENCOUNTER FOR SCREENING FOR OTHER VIRAL DISEASES: ICD-10-CM

## 2019-11-04 DIAGNOSIS — N18.6 END STAGE RENAL DISEASE (H): ICD-10-CM

## 2019-11-04 DIAGNOSIS — D64.9 ANEMIA, UNSPECIFIED TYPE: ICD-10-CM

## 2019-11-04 DIAGNOSIS — N25.81 SECONDARY HYPERPARATHYROIDISM (H): ICD-10-CM

## 2019-11-04 DIAGNOSIS — N18.5 TYPE 2 DIABETES MELLITUS WITH STAGE 5 CHRONIC KIDNEY DISEASE NOT ON CHRONIC DIALYSIS, WITHOUT LONG-TERM CURRENT USE OF INSULIN (H): ICD-10-CM

## 2019-11-04 DIAGNOSIS — E11.22 TYPE 2 DIABETES MELLITUS WITH STAGE 5 CHRONIC KIDNEY DISEASE NOT ON CHRONIC DIALYSIS, WITHOUT LONG-TERM CURRENT USE OF INSULIN (H): ICD-10-CM

## 2019-11-04 DIAGNOSIS — N18.5 ANEMIA OF CHRONIC RENAL FAILURE, STAGE 5 (H): ICD-10-CM

## 2019-11-04 DIAGNOSIS — E11.22 TYPE 2 DIABETES MELLITUS WITH STAGE 4 CHRONIC KIDNEY DISEASE, WITHOUT LONG-TERM CURRENT USE OF INSULIN (H): ICD-10-CM

## 2019-11-04 LAB
ALBUMIN SERPL-MCNC: 4 G/DL (ref 3.4–5)
ANION GAP SERPL CALCULATED.3IONS-SCNC: 12 MMOL/L (ref 3–14)
BUN SERPL-MCNC: 93 MG/DL (ref 7–30)
CALCIUM SERPL-MCNC: 8 MG/DL (ref 8.5–10.1)
CHLORIDE SERPL-SCNC: 109 MMOL/L (ref 94–109)
CO2 SERPL-SCNC: 21 MMOL/L (ref 20–32)
CREAT SERPL-MCNC: 6.44 MG/DL (ref 0.52–1.04)
FERRITIN SERPL-MCNC: 356 NG/ML (ref 8–252)
GFR SERPL CREATININE-BSD FRML MDRD: 6 ML/MIN/{1.73_M2}
GLUCOSE SERPL-MCNC: 155 MG/DL (ref 70–99)
HBA1C MFR BLD: 5.4 % (ref 0–5.6)
HCT VFR BLD AUTO: 26.8 % (ref 35–47)
HGB BLD-MCNC: 8.7 G/DL (ref 11.7–15.7)
IRON SATN MFR SERPL: 38 % (ref 15–46)
IRON SERPL-MCNC: 83 UG/DL (ref 35–180)
PHOSPHATE SERPL-MCNC: 4.7 MG/DL (ref 2.5–4.5)
POTASSIUM SERPL-SCNC: 5 MMOL/L (ref 3.4–5.3)
SODIUM SERPL-SCNC: 142 MMOL/L (ref 133–144)
TIBC SERPL-MCNC: 221 UG/DL (ref 240–430)

## 2019-11-04 PROCEDURE — 86706 HEP B SURFACE ANTIBODY: CPT | Performed by: INTERNAL MEDICINE

## 2019-11-04 PROCEDURE — 36415 COLL VENOUS BLD VENIPUNCTURE: CPT | Performed by: FAMILY MEDICINE

## 2019-11-04 PROCEDURE — 85018 HEMOGLOBIN: CPT | Performed by: FAMILY MEDICINE

## 2019-11-04 PROCEDURE — 80069 RENAL FUNCTION PANEL: CPT | Performed by: INTERNAL MEDICINE

## 2019-11-04 PROCEDURE — 82728 ASSAY OF FERRITIN: CPT | Performed by: FAMILY MEDICINE

## 2019-11-04 PROCEDURE — 83550 IRON BINDING TEST: CPT | Performed by: FAMILY MEDICINE

## 2019-11-04 PROCEDURE — 83540 ASSAY OF IRON: CPT | Performed by: FAMILY MEDICINE

## 2019-11-04 PROCEDURE — 99215 OFFICE O/P EST HI 40 MIN: CPT | Performed by: INTERNAL MEDICINE

## 2019-11-04 PROCEDURE — 83036 HEMOGLOBIN GLYCOSYLATED A1C: CPT | Performed by: FAMILY MEDICINE

## 2019-11-04 PROCEDURE — 85014 HEMATOCRIT: CPT | Performed by: FAMILY MEDICINE

## 2019-11-04 ASSESSMENT — PAIN SCALES - GENERAL: PAINLEVEL: NO PAIN (0)
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
## 2019-11-04 NOTE — PATIENT INSTRUCTIONS
1. We will look into starting dialysis on a Tuesday, Thursday, Saturday scheduled 2nd shift at [facility]     2. Decrease metolazone now to just Monday, Wednesday, Friday. Once we start dialysis , we will discontinue this medication.    3. Keep vascular access appt for 11/26/19 and we will see if we can move it up.     4. If any immediate concerns prior to starting dialysis, please be seen in ED    5. Daysi will call Ramin with availability for temporary dialysis catheter placement at the [facility]. We will also schedule lab work to be done this same day.     6. Daysi will discuss blood pressure with home health nurse and call with any new changes.

## 2019-11-04 NOTE — NURSING NOTE
"Lakhwinder Negron's goals for this visit include:   Chief Complaint   Patient presents with     RECHECK     2week recheck CKD       She requests these members of her care team be copied on today's visit information: no    PCP: Jonna Cason    Referring Provider:  No referring provider defined for this encounter.    BP (!) 184/69 (BP Location: Left arm, Patient Position: Sitting, Cuff Size: Adult Regular)   Pulse 62   Wt 53.1 kg (117 lb)   LMP  (LMP Unknown)   SpO2 99%   BMI 26.23 kg/m      Do you need any medication refills at today's visit? No    Present with pt is Son \" Ramin\" and  \"Long\" with [facility].    Cora Hayes LPN    "

## 2019-11-05 ENCOUNTER — TELEPHONE (OUTPATIENT)
Dept: PHARMACY | Facility: CLINIC | Age: 79
End: 2019-11-05

## 2019-11-05 LAB — HBV SURFACE AB SERPL IA-ACNC: 167.48 M[IU]/ML

## 2019-11-05 NOTE — TELEPHONE ENCOUNTER
Anemia Management Note  SUBJECTIVE/OBJECTIVE:  Referred by Dr. Ramon Rodriguez on 10/11/2019  Primary Diagnosis: Anemia in Chronic Kidney Disease (N18.5, D63.1)     Secondary Diagnosis:  Chronic Kidney Disease, Stage 5 (N18.5)  Hgb goal range:  9-10  Epo/Darbo: Aranesp  25 mcg  every two weeks  In clinic  Iron regimen:  Ferrous Sulfate  3 times daily  Labs : 10/14/2020  Recent BELA use, transfusion, IV iron: none  RX/TX plans : 10/14/2020  History of stroke ()  Contact:            Ok to leave message regarding scheduling, medical, and billing per consent to communicate dated 17                              OK to speak with Ramin Negron (son) and Samira Negron (grand daughter) regarding scheduling, medical, and billing per consent to communicate dated 17     Homecare [facility]: phone [phone].     Per Lilliana AMADOR; Homecare sees Lakhwinder every other week.  She is NOT homebound and is able to drive herself.  She does go to a Day Program Mon-Thursday. She will need to go to clinic for labs and Aranesp.     Per Lilliana, best contact is Ramin (son)    Anemia Latest Ref Rng & Units 10/17/2018 2018 2019 3/26/2019 10/7/2019 10/22/2019 2019   Hemoglobin 11.7 - 15.7 g/dL 10.1(L) 10.6(L) 10.1(L) 9.8(L) 8.2(L) 9.4(L) 8.7(L)   TSAT 15 - 46 % 17 20 25 - 21 32 38   Ferritin 8 - 252 ng/mL 203 180 180 - 230 437(H) 356(H)     BP Readings from Last 3 Encounters:   19 (!) 184/69   10/25/19 (!) 152/58   10/22/19 (!) 181/53     Wt Readings from Last 2 Encounters:   19 53.1 kg (117 lb)   10/25/19 51.7 kg (114 lb)       Ramin has not returned Anemia Services calls. Will continue to monitor labs at this time. Lakhwinder was seen by Dr. Rodriguez yesterday. If Hgb drops again, will attempt to reach Ramin again for Xee to start Aranesp injections.     ASSESSMENT:  Hgb:Not at goal/Known  TSat: at goal >30% Ferritin: At goal (>100ng/mL)    PLAN:  RTC for Hgb in 2 week(s) and Check iron studies in 4 week(s)    Orders needed  to be renewed (for next follow-up date) in EPIC: None    Iron labs due:  12/2/2019    Plan discussed with:  No call made, was seen in clinic yestersday  Plan provided by:  richa    NEXT FOLLOW-UP DATE:  11/19/2019    Anemia Management Service  Keyanna Pedro RN  Phone: [phone]  Fax: [phone]

## 2019-11-06 DIAGNOSIS — Z11.59 ENCOUNTER FOR SCREENING FOR OTHER VIRAL DISEASES: ICD-10-CM

## 2019-11-06 DIAGNOSIS — E11.22 TYPE 2 DIABETES MELLITUS WITH STAGE 5 CHRONIC KIDNEY DISEASE NOT ON CHRONIC DIALYSIS, WITHOUT LONG-TERM CURRENT USE OF INSULIN (H): ICD-10-CM

## 2019-11-06 DIAGNOSIS — N18.6 END STAGE RENAL DISEASE (H): ICD-10-CM

## 2019-11-06 DIAGNOSIS — N18.5 CKD (CHRONIC KIDNEY DISEASE) STAGE 5, GFR LESS THAN 15 ML/MIN (H): ICD-10-CM

## 2019-11-06 DIAGNOSIS — M81.0 SENILE OSTEOPOROSIS: ICD-10-CM

## 2019-11-06 DIAGNOSIS — N18.5 TYPE 2 DIABETES MELLITUS WITH STAGE 5 CHRONIC KIDNEY DISEASE NOT ON CHRONIC DIALYSIS, WITHOUT LONG-TERM CURRENT USE OF INSULIN (H): ICD-10-CM

## 2019-11-06 DIAGNOSIS — D63.1 ANEMIA OF CHRONIC RENAL FAILURE, STAGE 5 (H): ICD-10-CM

## 2019-11-06 DIAGNOSIS — N18.5 ANEMIA OF CHRONIC RENAL FAILURE, STAGE 5 (H): ICD-10-CM

## 2019-11-06 LAB — HBA1C MFR BLD: 5.4 % (ref 0–5.6)

## 2019-11-06 PROCEDURE — 86481 TB AG RESPONSE T-CELL SUSP: CPT | Performed by: INTERNAL MEDICINE

## 2019-11-06 PROCEDURE — 86704 HEP B CORE ANTIBODY TOTAL: CPT | Performed by: INTERNAL MEDICINE

## 2019-11-06 PROCEDURE — 87340 HEPATITIS B SURFACE AG IA: CPT | Performed by: INTERNAL MEDICINE

## 2019-11-06 PROCEDURE — 86803 HEPATITIS C AB TEST: CPT | Performed by: INTERNAL MEDICINE

## 2019-11-06 PROCEDURE — 36415 COLL VENOUS BLD VENIPUNCTURE: CPT | Performed by: INTERNAL MEDICINE

## 2019-11-06 PROCEDURE — 83036 HEMOGLOBIN GLYCOSYLATED A1C: CPT | Performed by: INTERNAL MEDICINE

## 2019-11-06 PROCEDURE — 86706 HEP B SURFACE ANTIBODY: CPT | Performed by: INTERNAL MEDICINE

## 2019-11-07 LAB
HBV CORE AB SERPL QL IA: REACTIVE
HBV SURFACE AB SERPL IA-ACNC: 159.85 M[IU]/ML
HBV SURFACE AG SERPL QL IA: NONREACTIVE
HCV AB SERPL QL IA: NONREACTIVE

## 2019-11-07 RX ORDER — HYDRALAZINE HYDROCHLORIDE 50 MG/1
75 TABLET, FILM COATED ORAL 2 TIMES DAILY
COMMUNITY
End: 2020-03-17

## 2019-11-07 RX ORDER — AMLODIPINE BESYLATE 10 MG/1
10 TABLET ORAL DAILY
COMMUNITY
End: 2020-10-22

## 2019-11-07 RX ORDER — FUROSEMIDE 40 MG
TABLET ORAL DAILY
COMMUNITY
End: 2020-01-20

## 2019-11-08 LAB
GAMMA INTERFERON BACKGROUND BLD IA-ACNC: 0.06 IU/ML
M TB IFN-G BLD-IMP: NEGATIVE
M TB IFN-G CD4+ BCKGRND COR BLD-ACNC: >10 IU/ML
MITOGEN IGNF BCKGRD COR BLD-ACNC: 0 IU/ML
MITOGEN IGNF BCKGRD COR BLD-ACNC: 0 IU/ML

## 2019-11-08 NOTE — TELEPHONE ENCOUNTER
Left message for Lilliana to return call regarding patient BP readings and medication list.   Would also like to update Lilliana on patient plan next week to start dialysis.    This RN is working on dialysis admission paperwork to initiate 11/14/19.    Daysi Frias RN, BSN  Nephrology Care Coordinator  [facility]

## 2019-11-08 NOTE — PROGRESS NOTES
Dialysis Orders    1st dialysis Run:  This will be her first dialysis run (has not received inpatient dialysis).   - Access CVC  - K3, Ca 2.5 bath   - Time 2 hours, 200 BFR  -    - No fluid removal  - No heparin, use saline flushes to keep from clotting.     2nd dialysis run:  - K3 Ca 2.5 bath  - Time 2.5 hours,   -   - No fluid removal  - no heparin unless issues with first run - if issues, please contact Jennifer or Deshawn as may add very low dose heparin    3rd dialysis run:  - K3 Ca 2.5 bath  - Time 3 hours with BFR of 450  -   - We will need to establish a dry weight but I do not expect her to need much fluid removal - consider her EDW at this time to be 53 kg      Additional Orders  - SHAPE protocol  - Iron Protocol  - Hectoral per protocol      Ramon Rodriguez, DO

## 2019-11-12 ENCOUNTER — ANESTHESIA EVENT (OUTPATIENT)
Dept: SURGERY | Facility: AMBULATORY SURGERY CENTER | Age: 79
End: 2019-11-12

## 2019-11-13 ENCOUNTER — ANESTHESIA (OUTPATIENT)
Dept: SURGERY | Facility: AMBULATORY SURGERY CENTER | Age: 79
End: 2019-11-13

## 2019-11-13 ENCOUNTER — HOSPITAL ENCOUNTER (OUTPATIENT)
Facility: AMBULATORY SURGERY CENTER | Age: 79
End: 2019-11-13
Attending: PHYSICIAN ASSISTANT
Payer: COMMERCIAL

## 2019-11-13 ENCOUNTER — ANCILLARY PROCEDURE (OUTPATIENT)
Dept: RADIOLOGY | Facility: AMBULATORY SURGERY CENTER | Age: 79
End: 2019-11-13
Attending: INTERNAL MEDICINE
Payer: COMMERCIAL

## 2019-11-13 VITALS
SYSTOLIC BLOOD PRESSURE: 183 MMHG | RESPIRATION RATE: 16 BRPM | TEMPERATURE: 98 F | DIASTOLIC BLOOD PRESSURE: 75 MMHG | OXYGEN SATURATION: 100 % | HEART RATE: 62 BPM

## 2019-11-13 DIAGNOSIS — N18.6 END STAGE RENAL DISEASE (H): ICD-10-CM

## 2019-11-13 LAB
ERYTHROCYTE [DISTWIDTH] IN BLOOD BY AUTOMATED COUNT: 12.7 % (ref 10–15)
GLUCOSE BLDC GLUCOMTR-MCNC: 62 MG/DL (ref 70–99)
GLUCOSE BLDC GLUCOMTR-MCNC: 88 MG/DL (ref 70–99)
GLUCOSE BLDC GLUCOMTR-MCNC: 95 MG/DL (ref 70–99)
HCT VFR BLD AUTO: 25.6 % (ref 35–47)
HGB BLD-MCNC: 8.5 G/DL (ref 11.7–15.7)
INR PPP: 1.02 (ref 0.86–1.14)
MCH RBC QN AUTO: 30.7 PG (ref 26.5–33)
MCHC RBC AUTO-ENTMCNC: 33.2 G/DL (ref 31.5–36.5)
MCV RBC AUTO: 92 FL (ref 78–100)
PLATELET # BLD AUTO: 128 10E9/L (ref 150–450)
RBC # BLD AUTO: 2.77 10E12/L (ref 3.8–5.2)
WBC # BLD AUTO: 5.8 10E9/L (ref 4–11)

## 2019-11-13 DEVICE — CATH VA PRECISION CHRONIC PALINDROME 14.5FRX19CM 8888119360P: Type: IMPLANTABLE DEVICE | Site: CHEST | Status: FUNCTIONAL

## 2019-11-13 RX ORDER — LABETALOL 20 MG/4 ML (5 MG/ML) INTRAVENOUS SYRINGE
10 ONCE
Status: COMPLETED | OUTPATIENT
Start: 2019-11-13 | End: 2019-11-13

## 2019-11-13 RX ORDER — FENTANYL CITRATE 50 UG/ML
25-50 INJECTION, SOLUTION INTRAMUSCULAR; INTRAVENOUS
Status: DISCONTINUED | OUTPATIENT
Start: 2019-11-13 | End: 2019-11-14 | Stop reason: HOSPADM

## 2019-11-13 RX ORDER — SODIUM CHLORIDE, SODIUM LACTATE, POTASSIUM CHLORIDE, CALCIUM CHLORIDE 600; 310; 30; 20 MG/100ML; MG/100ML; MG/100ML; MG/100ML
INJECTION, SOLUTION INTRAVENOUS CONTINUOUS
Status: DISCONTINUED | OUTPATIENT
Start: 2019-11-13 | End: 2019-11-14 | Stop reason: HOSPADM

## 2019-11-13 RX ORDER — ONDANSETRON 4 MG/1
4 TABLET, ORALLY DISINTEGRATING ORAL EVERY 30 MIN PRN
Status: DISCONTINUED | OUTPATIENT
Start: 2019-11-13 | End: 2019-11-14 | Stop reason: HOSPADM

## 2019-11-13 RX ORDER — HEPARIN SODIUM 1000 [USP'U]/ML
INJECTION, SOLUTION INTRAVENOUS; SUBCUTANEOUS CONTINUOUS PRN
Status: COMPLETED | OUTPATIENT
Start: 2019-11-13 | End: 2019-11-13

## 2019-11-13 RX ORDER — LIDOCAINE 40 MG/G
CREAM TOPICAL
Status: DISCONTINUED | OUTPATIENT
Start: 2019-11-13 | End: 2019-11-14 | Stop reason: HOSPADM

## 2019-11-13 RX ORDER — SODIUM CHLORIDE 9 MG/ML
INJECTION, SOLUTION INTRAVENOUS CONTINUOUS
Status: DISCONTINUED | OUTPATIENT
Start: 2019-11-13 | End: 2019-11-14 | Stop reason: HOSPADM

## 2019-11-13 RX ORDER — HEPARIN SODIUM 1000 [USP'U]/ML
3 INJECTION, SOLUTION INTRAVENOUS; SUBCUTANEOUS ONCE
Status: DISCONTINUED | OUTPATIENT
Start: 2019-11-13 | End: 2019-11-14 | Stop reason: HOSPADM

## 2019-11-13 RX ORDER — SODIUM CHLORIDE, SODIUM LACTATE, POTASSIUM CHLORIDE, CALCIUM CHLORIDE 600; 310; 30; 20 MG/100ML; MG/100ML; MG/100ML; MG/100ML
INJECTION, SOLUTION INTRAVENOUS CONTINUOUS PRN
Status: DISCONTINUED | OUTPATIENT
Start: 2019-11-13 | End: 2019-11-13

## 2019-11-13 RX ORDER — OXYCODONE HYDROCHLORIDE 5 MG/1
5 TABLET ORAL EVERY 4 HOURS PRN
Status: DISCONTINUED | OUTPATIENT
Start: 2019-11-13 | End: 2019-11-14 | Stop reason: HOSPADM

## 2019-11-13 RX ORDER — MEPERIDINE HYDROCHLORIDE 25 MG/ML
12.5 INJECTION INTRAMUSCULAR; INTRAVENOUS; SUBCUTANEOUS
Status: DISCONTINUED | OUTPATIENT
Start: 2019-11-13 | End: 2019-11-14 | Stop reason: HOSPADM

## 2019-11-13 RX ORDER — PROPOFOL 10 MG/ML
INJECTION, EMULSION INTRAVENOUS CONTINUOUS PRN
Status: DISCONTINUED | OUTPATIENT
Start: 2019-11-13 | End: 2019-11-13

## 2019-11-13 RX ORDER — CEFAZOLIN SODIUM 2 G/50ML
2 SOLUTION INTRAVENOUS
Status: DISCONTINUED | OUTPATIENT
Start: 2019-11-13 | End: 2019-11-14 | Stop reason: HOSPADM

## 2019-11-13 RX ORDER — NALOXONE HYDROCHLORIDE 0.4 MG/ML
.1-.4 INJECTION, SOLUTION INTRAMUSCULAR; INTRAVENOUS; SUBCUTANEOUS
Status: DISCONTINUED | OUTPATIENT
Start: 2019-11-13 | End: 2019-11-14 | Stop reason: HOSPADM

## 2019-11-13 RX ORDER — ONDANSETRON 2 MG/ML
INJECTION INTRAMUSCULAR; INTRAVENOUS PRN
Status: DISCONTINUED | OUTPATIENT
Start: 2019-11-13 | End: 2019-11-13

## 2019-11-13 RX ORDER — ACETAMINOPHEN 325 MG/1
650 TABLET ORAL ONCE
Status: COMPLETED | OUTPATIENT
Start: 2019-11-13 | End: 2019-11-13

## 2019-11-13 RX ORDER — LIDOCAINE HYDROCHLORIDE 20 MG/ML
INJECTION, SOLUTION INFILTRATION; PERINEURAL PRN
Status: DISCONTINUED | OUTPATIENT
Start: 2019-11-13 | End: 2019-11-13

## 2019-11-13 RX ORDER — ONDANSETRON 2 MG/ML
4 INJECTION INTRAMUSCULAR; INTRAVENOUS EVERY 30 MIN PRN
Status: DISCONTINUED | OUTPATIENT
Start: 2019-11-13 | End: 2019-11-14 | Stop reason: HOSPADM

## 2019-11-13 RX ORDER — SODIUM CHLORIDE 9 MG/ML
500 INJECTION, SOLUTION INTRAVENOUS CONTINUOUS
Status: DISCONTINUED | OUTPATIENT
Start: 2019-11-13 | End: 2019-11-14 | Stop reason: HOSPADM

## 2019-11-13 RX ORDER — SODIUM CHLORIDE 9 MG/ML
INJECTION, SOLUTION INTRAVENOUS CONTINUOUS PRN
Status: DISCONTINUED | OUTPATIENT
Start: 2019-11-13 | End: 2019-11-13

## 2019-11-13 RX ADMIN — ONDANSETRON 4 MG: 2 INJECTION INTRAMUSCULAR; INTRAVENOUS at 10:24

## 2019-11-13 RX ADMIN — SODIUM CHLORIDE: 9 INJECTION, SOLUTION INTRAVENOUS at 10:17

## 2019-11-13 RX ADMIN — SODIUM CHLORIDE 500 ML: 9 INJECTION, SOLUTION INTRAVENOUS at 09:42

## 2019-11-13 RX ADMIN — ACETAMINOPHEN 650 MG: 325 TABLET ORAL at 09:38

## 2019-11-13 RX ADMIN — PROPOFOL 100 MCG/KG/MIN: 10 INJECTION, EMULSION INTRAVENOUS at 10:23

## 2019-11-13 RX ADMIN — LIDOCAINE HYDROCHLORIDE 40 MG: 20 INJECTION, SOLUTION INFILTRATION; PERINEURAL at 10:24

## 2019-11-13 RX ADMIN — LABETALOL 20 MG/4 ML (5 MG/ML) INTRAVENOUS SYRINGE 10 MG: at 09:54

## 2019-11-13 NOTE — ANESTHESIA POSTPROCEDURE EVALUATION
Anesthesia POST Procedure Evaluation    Patient: Lakhwinder Negron   MRN:     [id number] Gender:   female   Age:    79 year old :      1940        Preoperative Diagnosis: End stage renal disease (H) [N18.6]   Procedure(s):  Central Venous Catheter Tunnel Placement   Postop Comments: No value filed.       Anesthesia Type:  Not documented  No value filed.    Reportable Event: NO     PAIN: Uncomplicated   Sign Out status: Comfortable, Well controlled pain     PONV: No PONV   Sign Out status:  No Nausea or Vomiting     Neuro/Psych: Uneventful perioperative course   Sign Out Status: Preoperative baseline; Age appropriate mentation     Airway/Resp.: Uneventful perioperative course   Sign Out Status: Non labored breathing, age appropriate RR; Resp. Status within EXPECTED Parameters     CV: Uneventful perioperative course   Sign Out status: Appropriate BP and perfusion indices; Appropriate HR/Rhythm     Disposition:   Sign Out in:  Phase II  Disposition:  Home  Recovery Course: Uneventful  Follow-Up: Not required           Last Anesthesia Record Vitals:  CRNA VITALS  2019 1026 - 2019 1126      2019             Pulse:  59    SpO2:  100 %    Resp Rate (set):  10    EKG:  Sinus rhythm          Last PACU Vitals:  Vitals Value Taken Time   /62 2019 10:59 AM   Temp 36.7  C (98  F) 2019 10:59 AM   Pulse 60 2019 10:59 AM   Resp 16 2019 10:59 AM   SpO2 99 % 2019 10:59 AM   Temp src Available 2019 10:56 AM   NIBP 173/62 2019 10:52 AM   Pulse 59 2019 10:56 AM   SpO2 100 % 2019 10:56 AM   Resp     Temp     Ht Rate 69 2019 10:55 AM   Temp 2           Electronically Signed By: Danyel Pete MD, 2019, 12:57 PM

## 2019-11-13 NOTE — PROGRESS NOTES
Pt BG 62 when came into phase 2, gave apple juice and crackers. Re-check after 15 min BG increased to 88.    Pt /75, notified MD. MD stated that was ok and instruct to go home and take her BP meds.

## 2019-11-13 NOTE — PROCEDURES
Interventional Radiology Brief Post Procedure Note    Procedure: IR TCVC Placement    Proceduralist: Marc Moreland PA-C    Assistant: None    Time Out: Prior to the start of the procedure and with procedural staff participation, I verbally confirmed the patient s identity using two indicators, relevant allergies, that the procedure was appropriate and matched the consent or emergent situation, and that the correct equipment/implants were available. Immediately prior to starting the procedure I conducted the Time Out with the procedural staff and re-confirmed the patient s name, procedure, and site/side. (The Joint Commission universal protocol was followed.)  Yes        Sedation: Monitored Anesthesia Care (MAC) administered and documented by Anesthesia Care Provider    Findings: Completed image-guided placement of 14.5 Lao, 19 cm double lumen tunneled central venous catheter via right IJ. Aspirates and flushes freely, heparin locked and ready for immediate use. Tip in high right atrium.    Estimated Blood Loss: Minimal    Fluoroscopy Time:  less than 2 minute(s)    SPECIMENS: None    Complications: 1. None     Condition: Stable    Plan: Follow-up per primary team.     Comments: See dictated procedure note for full details.    Marc Moreland PA-C

## 2019-11-13 NOTE — ANESTHESIA POSTPROCEDURE EVALUATION
Anesthesia POST Procedure Evaluation    Patient: Lakhwinder Negron   MRN:     [id number] Gender:   female   Age:    79 year old :      1940        Preoperative Diagnosis: End stage renal disease (H) [N18.6]   Procedure(s):  Central Venous Catheter Tunnel Placement   Postop Comments: No value filed.       Anesthesia Type:  Not documented  No value filed.    Reportable Event: NO     PAIN: Uncomplicated   Sign Out status: Comfortable, Well controlled pain     PONV: No PONV   Sign Out status:  No Nausea or Vomiting     Neuro/Psych: Uneventful perioperative course   Sign Out Status: Preoperative baseline; Age appropriate mentation     Airway/Resp.: Uneventful perioperative course   Sign Out Status: Non labored breathing, age appropriate RR; Resp. Status within EXPECTED Parameters     CV: Uneventful perioperative course   Sign Out status: Appropriate BP and perfusion indices; Appropriate HR/Rhythm     Disposition:   Sign Out in:  Phase II  Disposition:  Home  Recovery Course: Uneventful  Follow-Up: Not required           Last Anesthesia Record Vitals:  CRNA VITALS  2019 1026 - 2019 1126      2019             Pulse:  59    SpO2:  100 %    Resp Rate (set):  10    EKG:  Sinus rhythm          Last PACU Vitals:  Vitals Value Taken Time   /62 2019 10:59 AM   Temp 36.7  C (98  F) 2019 10:59 AM   Pulse 60 2019 10:59 AM   Resp 16 2019 10:59 AM   SpO2 99 % 2019 10:59 AM   Temp src Available 2019 10:56 AM   NIBP 173/62 2019 10:52 AM   Pulse 59 2019 10:56 AM   SpO2 100 % 2019 10:56 AM   Resp     Temp     Ht Rate 69 2019 10:55 AM   Temp 2           Electronically Signed By: Danyel Pete MD, 2019, 3:15 PM

## 2019-11-13 NOTE — DISCHARGE INSTRUCTIONS
A collaboration between [facility] and [facility]  Experts in minimally invasive, targeted treatments performed using imaging guidance    Venous Access Device,  Port Catheter or Tunneled or Non-Tunneled Central Line Placement    Today you had a procedure today to install a venous access device; either a tunneled central vein catheter or a subcutaneous port catheter.    One of our Radiology PAs performed this procedure for you today:  ? Juan Carlos Beltran PA-C  ? PAT Bell PA-C  ? Stepan Archuleta PA-C  ? Ines Wells PA-C   ? Marc Moreland PA-C    After you go home:  - Drink plenty of fluids.  Generally 6-8 (8 ounce) glasses a day is recommended.  - Resume your regular diet unless otherwise ordered by a medical provider.  - Keep any applied tape/gauze dressings clean and dry.  Change tape/gauze dressings if they get wet or soiled.  - You may shower the following day after procedure, however cover and protect from moisture any tape/gauze dressings.  You may let water hit and run over dried skin glue, but do not scrub.  Pat the area dry after showering.  - Port placement incisions are closed with absorbable suture, meaning they do not need to be removed at a later date, and a topical skin adhesive (skin glue).  This glue will wear off in 7-14 days.  Do not remove before this time.  If 14 days have passed and residual glue is present, you may gently remove it.  - Do not apply gels, lotions, or ointments to the glue site for the first 10 days as this may cause the glue to prematurely soften and fail.  - Do not perform strenuous activities or lift greater than 10 pounds for the next three days.  - If there is bleeding or oozing from the procedure site, apply firm pressure to the area for 5-10 minutes.  If the bleeding continues seek medical advice at the numbers below.  - Mild procedure site discomfort can be treated with an ice pack and over-the-counter pain  relievers.        For 24 hours after any sedation used:  - Relax and take it easy.  No strenuous activities.  - Do not drive or operate machines at home or at work.  - No alcohol consumption.  - Do not make any important or legal decisions.    Call our Interventional Radiology (IR) service if:  - If you start bleeding from the procedure site.  If you do start to bleed from the site, lie down and hold some pressure on the site.  Our radiology provider can help you decide if you need to return to the hospital.  - If you have new or worsening pain related to the procedure.  - If you have concerning swelling at the procedure site.  - If you develop persistent nausea or vomiting.  - If you develop hives or a rash or any unexplained itching.  - If you have a fever of greater than 100.5  F and chills in the first 5 days after procedure.  - Any other concerns related to your procedure.      [facility]  Interventional Radiology (IR)  [address] [place] [address]    Contact Number:  [phone]  (IR control desk)  - Monday - Friday 8:00 am - 4:30 pm    After hours for urgent concerns:  [phone]  - After 4:30 pm Monday - Friday, Weekends and Holidays.   - Ask for Interventional Radiology on-call.  Someone is available 24 hours a day.  - [facility] toll free number:  [phone]

## 2019-11-13 NOTE — ANESTHESIA CARE TRANSFER NOTE
Patient: Lakhwinder Negron    Procedure(s):  Central Venous Catheter Tunnel Placement    Diagnosis: End stage renal disease (H) [N18.6]  Diagnosis Additional Information: No value filed.    Anesthesia Type:   No value filed.     Note:  Airway :Room Air  Patient transferred to:Phase II  Handoff Report: Identifed the Patient, Identified the Reponsible Provider, Reviewed the pertinent medical history, Discussed the surgical course, Reviewed Intra-OP anesthesia mangement and issues during anesthesia, Set expectations for post-procedure period and Allowed opportunity for questions and acknowledgement of understanding      Vitals: (Last set prior to Anesthesia Care Transfer)    CRNA VITALS  11/13/2019 1026 - 11/13/2019 1104      11/13/2019             Pulse:  59    SpO2:  100 %    Resp Rate (set):  10                Electronically Signed By: CHANO Paulson CRNA  November 13, 2019  11:04 AM

## 2019-11-13 NOTE — TELEPHONE ENCOUNTER
Contacted patient's son as patient is scheduled for IR tunneled line today. Patient would like to defer dialysis start to Tuesday November 19.     Spoke to [facility] Dialysis access specialist whom will confirm with dialysis unit and will plan to return call.     Daysi Frias, RN, BSN  Nephrology Care Coordinator  [facility]
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
## 2019-11-14 NOTE — TELEPHONE ENCOUNTER
Patient is scheduled for hemodialysis T, Th, Sat. Arrival time of 11:15 and start time of 11:30. Spoke to Ramin today and he stated that patient had started today. Thus far, she is doing well.     Advised that further neph follow up will be done via dialysis unit.     Daysi Frias RN, BSN  Nephrology Care Coordinator  [facility]     PRINCIPAL DISCHARGE DIAGNOSIS  Diagnosis: Intestinal infection due to enteropathogenic E. coli  Assessment and Plan of Treatment: You were admitted with complaints of abdominal pain and diarrhea mixed with blood. You had a ct scan of abdomen which showed inflammation of your left side of colon. Your stool PCR grew enteropathogenic E.coli. You were treated with intravenous antibiotics. Your symptoms slowly improved. Your diet was slowly advanced to regular. You were evaluated by gastroenterologist who recommended to continue with ciprofloxacin 500mg twice a day for 8 more days to complete a course of 10 days of antibiotics.

## 2019-11-19 ENCOUNTER — TELEPHONE (OUTPATIENT)
Dept: PHARMACY | Facility: CLINIC | Age: 79
End: 2019-11-19

## 2019-11-19 ENCOUNTER — PATIENT OUTREACH (OUTPATIENT)
Dept: GERIATRIC MEDICINE | Facility: CLINIC | Age: 79
End: 2019-11-19

## 2019-11-19 NOTE — PROGRESS NOTES
[facility] Care Coordination Contact    CC spoke to member's son, Ramin and he reports member started dialysis treatment about a week ago. Member has dialysis 3 days a week. Ramin would like CC to come out to complete an early PCA reassessment with member's change in condition. CC scheduled annual reassessment for Monday, November 25th at 11:30am.       Shruthi Martins RN, PHN  [facility]  [phone]

## 2019-11-19 NOTE — TELEPHONE ENCOUNTER
Anemia Management Note  SUBJECTIVE/OBJECTIVE:  Referred by Dr. Ramon Rodriguez on 10/11/2019  Primary Diagnosis: Anemia in Chronic Kidney Disease (N18.5, D63.1)     Secondary Diagnosis:  Chronic Kidney Disease, Stage 5 (N18.5)  Hgb goal range:  9-10  Epo/Darbo: Aranesp  25 mcg  every two weeks  In clinic  Iron regimen:  Ferrous Sulfate  3 times daily  Labs : 10/14/2020  Recent BELA use, transfusion, IV iron: none  RX/TX plans : 10/14/2020  History of stroke ()  Contact:            Ok to leave message regarding scheduling, medical, and billing per consent to communicate dated 17                              OK to speak with Ramin Negron (son) and Samira Negron (grand daughter) regarding scheduling, medical, and billing per consent to communicate dated 17     Homecare MARJORIE Tijerina: phone [phone].     Per Lilliana AMADOR; Homecare sees Xee every other week.  She is NOT homebound and is able to drive herself.  She does go to a Day Program Mon-Thursday. She will need to go to clinic for labs and Aranesp.     Per Lilliana, best contact is Ramin (son)    Anemia Latest Ref Rng & Units 2018 2019 3/26/2019 10/7/2019 10/22/2019 2019 2019   Hemoglobin 11.7 - 15.7 g/dL 10.6(L) 10.1(L) 9.8(L) 8.2(L) 9.4(L) 8.7(L) 8.5(L)   TSAT 15 - 46 % 20 25 - 21 32 38 -   Ferritin 8 - 252 ng/mL 180 180 - 230 437(H) 356(H) -     BP Readings from Last 3 Encounters:   19 (!) 183/75   19 (!) 184/69   10/25/19 (!) 152/58     Wt Readings from Last 2 Encounters:   19 53.1 kg (117 lb)   10/25/19 51.7 kg (114 lb)       Hgb has dropped, Xee should start BELA therapy. Left another message for Ramin.     ASSESSMENT:  Hgb:Not at goal/Known  TSat: at goal >30% Ferritin: At goal (>100ng/mL)    PLAN:  Left another message for Ramin to call Anemia Services. Xee should start BELA therapy.     Orders needed to be renewed (for next follow-up date) in EPIC: None    Iron labs due:  2019    Plan discussed with:  DEONNA for  Ramin  Plan provided by:  richa    NEXT FOLLOW-UP DATE:  12/03/2019    Keyanna Pedro RN   Anemia Services  [address] [address]   [email or web address]   Office : [phone]  Fax: [phone]

## 2019-11-22 ENCOUNTER — DOCUMENTATION ONLY (OUTPATIENT)
Dept: TRANSPLANT | Facility: CLINIC | Age: 79
End: 2019-11-22

## 2019-11-22 NOTE — PROGRESS NOTES
Desi Yeung APRN CNS [place], Michelle L Michelle,   Patient started dialysis on 11/14 with dialysis schedule on Tuesday, Thursday and Saturday.   I spoke with her dialysis center and they would like to switch vascular surgeon on non-dialysis day.   Please cancel vein mapping and Dr. Muñoz appts on 11/26/2019, reschedule Vascular access consultation with Dr. Lomas:   1). Vein mapping - 12/16/2019 Monday - to be scheduled   2). Consult with surgeon -  12/16/2019 Monday at 3:30 pm - to be scheduled   Please contact patient's son DESHAUN for new appts.   Fax appts to [facility].   Thanks   Sum    Previous Messages      ----- Message -----   From: Annie Meng   Sent: 11/6/2019  12:08 PM CST   To: CHANO Lizama     Is scheduled for 11.26.19 9:00 vain mapping and 11:00 Dr Atkinson I called the son with an  and left a message   ----- Message -----   From: Desi Yeung APRN CNS   Sent: 11/1/2019   9:50 AM CST   To: Tonja Alanis,   Please schedule pt for the following appointments:   Vascular access consultation:     A. Consultation schedule:   1). Vein mapping - 11/26/2019 Tuesday - to be scheduled   2). Consult with surgeon - 11/26/2019 Tuesday at 11 am - to be scheduled   3). Assigned surgeon - Dr. Muñoz     B.  Referral information:   1). Date of Referral - 10/31/2019   2). Referring Nephrologist - Dr. Rodriguez   3). Dialysis schedule - none   4). Dialysis unit/phone number - N/A     C. Please call patient's Son (DESHAUN) with information     * This is a 79 years old non-English speaking woman. It is best to contact her son DESHAUN to discuss appointments per Dr. Rodriguez*     Thank you,   Sum

## 2019-11-25 ENCOUNTER — PATIENT OUTREACH (OUTPATIENT)
Dept: GERIATRIC MEDICINE | Facility: CLINIC | Age: 79
End: 2019-11-25

## 2019-11-25 ASSESSMENT — ACTIVITIES OF DAILY LIVING (ADL)
DEPENDENT_IADLS:: CLEANING;COOKING;LAUNDRY;SHOPPING;MEAL PREPARATION;MEDICATION MANAGEMENT;MONEY MANAGEMENT;TRANSPORTATION;INCONTINENCE

## 2019-11-25 NOTE — PROGRESS NOTES
[facility] Care Coordination Contact    [facility] Home Visit Assessment     Home visit for Health Risk Assessment with Lakhwinder Negron completed on November 25, 2019    Type of residence:: Private home - stairs  Current living arrangement:: I live in a private home with family     Assessment completed with:: Patient, Care Team Member    Current Care Plan  Member currently receiving the following home care services: Skilled Nursing   Member currently receiving the following community resources: Home Care, Dialysis Services, DME, PCA, Day Care      Medication Review  Medication reconciliation completed in Epic: Yes  Medication set-up & administration: RN set up every two weeks.  Self-administers medications.  Medication Risk Assessment Medication (1 or more, place referral to MTM): N/A: No risk factors identified  MTM Referral Placed: No: No risk factors idenified    Mental/Behavioral Health   Depression Screening: See PHQ assessment flowsheet.   Mental health DX:: No      Mental Health Diagnosis: No  Mental Health Services: None: No further intervention needed at this time.    Falls Assessment:   Fallen 2 or more times in the past year?: No   Any fall with injury in the past year?: No    ADL/IADL Dependencies:   Dependent ADLs:: Ambulation-cane  Dependent IADLs:: Cleaning, Cooking, Laundry, Shopping, Meal Preparation, Medication Management, Money Management, Transportation, Incontinence    [facility] sponsored benefits: Shared information re: Silver Sneakers/gym memberships, ASA, Calcium +D.    PCA Assessment completed at visit: Yes     Elderly Waiver Eligibility: Yes-will continue on EW    Care Plan & Recommendations: Met with Lakhwinder Negron and Son, Ramin Negron for an early reassessment due to a change in condition. Lakhwinder started Dialysis recently and family requested an early PCA reassessment. Lakhwinder is going to [facility] 3x a weekly on Tuesday, Thursday and Saturday. Lakhwinder reports feeling fair since Dialysis  started. Lakhwinder feels optimistic towards Dialysis service and is looking forward to feeling better. Lakhwinder reports taking medications daily, eating a renal diet and reports no falls since her last hospitalization on 10/12/19. Lakhwinder reports going to [facility] continues to help with her emotional health. Lakhwinder would like to resume ADC, SNV and PCA service.     See [facility] for detailed assessment information.    Follow-Up Plan: Member informed of future contact, plan to f/u with member with a 6 month telephone assessment.  Contact information shared with member and family, encouraged member to call with any questions or concerns at any time.    [place] care continuum providers: Please refer to Health Care Home on the Epic Problem List to view this patient's [facility] Care Plan Summary.    Shruthi Martins RN, PHN  [facility]  [phone]

## 2019-12-02 DIAGNOSIS — I10 HYPERTENSION, GOAL BELOW 140/90: Primary | ICD-10-CM

## 2019-12-02 NOTE — TELEPHONE ENCOUNTER
"Requested Prescriptions   Pending Prescriptions Disp Refills     furosemide (LASIX) 20 MG tablet [Pharmacy Med Name: FUROSEMIDE 20MG TABS]  Last Written Prescription Date:  10/14/19  Last Fill Quantity: na,  # refills: na   Last Office Visit with G, P or [facility] prescribing provider:  10/25/19   Future Office Visit:      300 tablet 0     Sig: TAKE 6 TABLETS BY MOUTH EVERY MORNING, AND TAKE 4 TABLETS BY MOUTH EVERY EVENING       Diuretics (Including Combos) Protocol Failed - 12/2/2019  3:54 PM        Failed - Blood pressure under 140/90 in past 12 months     BP Readings from Last 3 Encounters:   11/13/19 (!) 183/75   11/04/19 (!) 184/69   10/25/19 (!) 152/58                 Failed - Normal serum creatinine on file in past 12 months     Recent Labs   Lab Test 11/04/19  1404   CR 6.44*              Passed - Recent (12 mo) or future (30 days) visit within the authorizing provider's specialty     Patient has had an office visit with the authorizing provider or a provider within the authorizing providers department within the previous 12 mos or has a future within next 30 days. See \"Patient Info\" tab in inbasket, or \"Choose Columns\" in Meds & Orders section of the refill encounter.              Passed - Medication is active on med list        Passed - Patient is age 18 or older        Passed - No active pregancy on record        Passed - Normal serum potassium on file in past 12 months     Recent Labs   Lab Test 11/04/19  1404   POTASSIUM 5.0                    Passed - Normal serum sodium on file in past 12 months     Recent Labs   Lab Test 11/04/19  1404                 Passed - No positive pregnancy test in past 12 months          "

## 2019-12-03 ENCOUNTER — TELEPHONE (OUTPATIENT)
Dept: PHARMACY | Facility: CLINIC | Age: 79
End: 2019-12-03

## 2019-12-03 NOTE — TELEPHONE ENCOUNTER
Follow-up with anemia management service:    Spoke with Ramin.  He was not able to talk.  He requested a call back after 2pm.     Xee needs labs drawn and to start on BELA therapy.     Anemia Latest Ref Rng & Units 12/11/2018 2/19/2019 3/26/2019 10/7/2019 10/22/2019 11/4/2019 11/13/2019   Hemoglobin 11.7 - 15.7 g/dL 10.6(L) 10.1(L) 9.8(L) 8.2(L) 9.4(L) 8.7(L) 8.5(L)   TSAT 15 - 46 % 20 25 - 21 32 38 -   Ferritin 8 - 252 ng/mL 180 180 - 230 437(H) 356(H) -         Follow-up call date: 12/03/2019  Call after 2pm.     Keyanna Pedro RN   Anemia Services  [facility] Services  [address] [place] [address] [place] [address]   [email or web address]   Office : [phone]  Fax: [phone]

## 2019-12-04 ENCOUNTER — PATIENT OUTREACH (OUTPATIENT)
Dept: GERIATRIC MEDICINE | Facility: CLINIC | Age: 79
End: 2019-12-04

## 2019-12-04 RX ORDER — FUROSEMIDE 20 MG
TABLET ORAL
Qty: 300 TABLET | Refills: 0 | Status: SHIPPED | OUTPATIENT
Start: 2019-12-04 | End: 2020-01-02

## 2019-12-04 NOTE — PROGRESS NOTES
[facility] Care Coordination Contact    [facility]:  Emailed completed PCA assessment to [facility].  Faxed copy of PCA assessment to PCA Agency and mailed copy to member.  Faxed MD Communication to PCP.     Mikayla Robles  Care Management Specialist  [facility]  [phone]

## 2019-12-04 NOTE — LETTER
[address], [address]  [place] [address]  Phone:  [phone]  Fax:  [phone]        December 4, 2019    MAURO LAZARO  [address] [facility] [address]    Dear Faviola Keane is a copy of your completed PCA Assessment and Service Plan.  This is for your records and no action is required by you.  If you have additional questions regarding your assessment please contact me at [phone]. If you feel that your needs are not being met, please contact the Clinical Supervisor at [phone].    Sincerely,    Shruthi Martins RN, PHN    E-mail:[email or web address]  Phone: [phone]    Care Manager  [facility]            Enclosure:  Completed PCA assessment

## 2019-12-04 NOTE — LETTER
December 18, 2019      LAKHWINDER LAZARO  [address] [facility] [address]      Dear Lakhwinder:    At [facility], we are dedicated to improving your health and well-being. Enclosed is the Comprehensive Care Plan that we developed with you on 11/25/19. Please review the Care Plan carefully.    As a reminder, some of the things we discussed at your visit include:    Your physical and mental health    Ways to reduce falls    Health care needs you may have    Don t forget to contact your care coordinator if you:    Have been hospitalized or plan to be hospitalized     Have had a fall     Have experienced a change in physical health    Are experiencing emotional problems     If you do not agree with your Care Plan, have questions about it, or have experienced a change in your needs, please call me at [phone]. If you are hearing impaired, please call the [place] [employer] at 121 or [phone] (dbdiuy-hx-hgyizy relay service).    Sincerely,    Shruthi Martins RN, PHN    E-mail:[email or web address]  Phone: [phone]    Care Manager  [facility] is a health plan that contracts with both Medicare and the [place] Medical Assistance (Medicaid) program to provide benefits of both programs to enrollees. Enrollment in [facility] depends on contract renewal.    [id number])     [id number] (11/18)

## 2019-12-04 NOTE — TELEPHONE ENCOUNTER
Routing refill request to provider for review/approval because:  Labs out of range:  Creatinine at 6.44    BP reading have been elevated 10/25/2019- 152/58, 11/04/2019-184/69, 11/13/2019-183/75    Samantha Ann RN

## 2019-12-05 ENCOUNTER — PATIENT OUTREACH (OUTPATIENT)
Dept: GERIATRIC MEDICINE | Facility: CLINIC | Age: 79
End: 2019-12-05

## 2019-12-05 NOTE — PROGRESS NOTES
[facility] Care Coordination Contact    Received a request to submit a DTR for the reduced of Adult Day Care. Documentation completed and faxed to the health plan. Care Coordinator aware.    Chapis Huerta RN  Utilization   [facility]  [phone]

## 2019-12-06 ENCOUNTER — DOCUMENTATION ONLY (OUTPATIENT)
Dept: CARE COORDINATION | Facility: CLINIC | Age: 79
End: 2019-12-06
Payer: COMMERCIAL

## 2019-12-06 NOTE — PROGRESS NOTES
[facility] utilizes an encounter to take the place of a direct phone call to your office. Please take a moment to review the below request. Please reply or route message to author of this encounter.  Message will act as a verbal OK of orders requested below. Thank you.    ORDER  Skilled nurse visits every other week x9 weeks, 3prns   MD SUMMARY/PLAN OF CARE  SN to perform assessment with focus on medication management and reconciliation, pain management, mental health status and need for referral or change in treatment plan, skin integrity, and falls risk. Instruct on disease process, signs/symptoms of complications to report to agency/physician/911, emergency/safety and falls prevention plan, medication effects/SE, new/high risk/changed medications, diet, and activity. Implement interventions to monitor and mitigate pain, prevent pressure ulcers and confirm proper foot care.   0prn visits for medical symptoms that would necessitate an unplanned visit, supervisory visits per agency protocol, recertification assessments.  Visits may be in person or via video conference based upon patient needs

## 2019-12-10 ENCOUNTER — TELEPHONE (OUTPATIENT)
Dept: PHARMACY | Facility: CLINIC | Age: 79
End: 2019-12-10

## 2019-12-10 NOTE — TELEPHONE ENCOUNTER
Follow-up with anemia management service:    LVM for Ramin to remind him that his mom is due for labs.     Anemia Latest Ref Rng & Units 12/11/2018 2/19/2019 3/26/2019 10/7/2019 10/22/2019 11/4/2019 11/13/2019   Hemoglobin 11.7 - 15.7 g/dL 10.6(L) 10.1(L) 9.8(L) 8.2(L) 9.4(L) 8.7(L) 8.5(L)   TSAT 15 - 46 % 20 25 - 21 32 38 -   Ferritin 8 - 252 ng/mL 180 180 - 230 437(H) 356(H) -           Follow-up call date: 12/17/2019    Keyanna Pedro RN   Anemia Services  [address] [place] [address]   [email or web address]   Office : [phone]  Fax: [phone]

## 2019-12-13 DIAGNOSIS — G89.4 CHRONIC PAIN SYNDROME: ICD-10-CM

## 2019-12-13 NOTE — TELEPHONE ENCOUNTER
Requested Prescriptions   Pending Prescriptions Disp Refills     acetaminophen-codeine (TYLENOL #3) 300-30 MG tablet 60 tablet 0     Sig: TAKE 1-2 TABLETS BY MOUTH ONCE DAILY AS NEEDED // IB HNUB NOJ 1-2 LUB YOG MOB       There is no refill protocol information for this order          Last Written Prescription Date:  9/10/19  Last Fill Quantity: 60,  # refills: 0   Last office visit: 10/25/2019 with prescribing provider:  Jonna Cason     Future Office Visit:

## 2019-12-13 NOTE — TELEPHONE ENCOUNTER
Routing refill request to provider for review/approval because:  Drug not on the [facility] refill protocol   Lisa Polk RN

## 2019-12-16 ENCOUNTER — OFFICE VISIT (OUTPATIENT)
Dept: TRANSPLANT | Facility: CLINIC | Age: 79
End: 2019-12-16
Attending: SURGERY
Payer: COMMERCIAL

## 2019-12-16 ENCOUNTER — ANCILLARY PROCEDURE (OUTPATIENT)
Dept: ULTRASOUND IMAGING | Facility: CLINIC | Age: 79
End: 2019-12-16
Attending: CLINICAL NURSE SPECIALIST
Payer: COMMERCIAL

## 2019-12-16 VITALS
OXYGEN SATURATION: 97 % | BODY MASS INDEX: 24.79 KG/M2 | HEIGHT: 56 IN | WEIGHT: 110.2 LBS | DIASTOLIC BLOOD PRESSURE: 71 MMHG | HEART RATE: 67 BPM | SYSTOLIC BLOOD PRESSURE: 161 MMHG

## 2019-12-16 DIAGNOSIS — Z01.818 ENCOUNTER FOR OTHER PREPROCEDURAL EXAMINATION: ICD-10-CM

## 2019-12-16 DIAGNOSIS — N18.6 ENCOUNTER REGARDING VASCULAR ACCESS FOR DIALYSIS FOR END-STAGE RENAL DISEASE (H): Primary | ICD-10-CM

## 2019-12-16 DIAGNOSIS — N18.5 CHRONIC KIDNEY DISEASE, STAGE 5, KIDNEY FAILURE (H): ICD-10-CM

## 2019-12-16 DIAGNOSIS — Z99.2 ENCOUNTER REGARDING VASCULAR ACCESS FOR DIALYSIS FOR END-STAGE RENAL DISEASE (H): Primary | ICD-10-CM

## 2019-12-16 DIAGNOSIS — Z45.2 ENCOUNTER FOR ADJUSTMENT AND MANAGEMENT OF VASCULAR ACCESS DEVICE: ICD-10-CM

## 2019-12-16 PROCEDURE — G0463 HOSPITAL OUTPT CLINIC VISIT: HCPCS | Mod: ZF

## 2019-12-16 PROCEDURE — T1013 SIGN LANG/ORAL INTERPRETER: HCPCS | Mod: U3,ZF

## 2019-12-16 ASSESSMENT — MIFFLIN-ST. JEOR: SCORE: 822.86

## 2019-12-16 ASSESSMENT — PAIN SCALES - GENERAL: PAINLEVEL: NO PAIN (0)
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
## 2019-12-16 NOTE — NURSING NOTE
"Chief Complaint   Patient presents with     Consult     Fistula      Blood pressure (!) 161/71, pulse 67, height 1.422 m (4' 8\"), weight 50 kg (110 lb 3.2 oz), SpO2 97 %, not currently breastfeeding.    Mouna Rodas, [facility]    "

## 2019-12-16 NOTE — PATIENT INSTRUCTIONS
You will be scheduled right upper arm AV graft surgery:  1. No blood draw and blood pressure from right arm  2. You will be scheduled an appointment with Pre op anesthesiology clinic (PAC) within 30 days prior to surgery  3. You need a ride after surgery and someone stay with you overnight on the surgery day  4. You will receive a call from a  for your PAC and surgery appointments   5. Wash your entire right/left arm with antibiotics soap the night before and morning of surgery    If you have questions and concerns, please contact dialysis access program Desi LOZANO (Sum) at [phone].  Thank you for choosing [facility] for your care.    Desi Yeung (Sum) MS, APRN, CNS  Dialysis access program  Phone # [phone]
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
## 2019-12-17 DIAGNOSIS — N18.6 ESRD ON DIALYSIS (H): Primary | ICD-10-CM

## 2019-12-17 DIAGNOSIS — Z01.818 PRE-OP EVALUATION: ICD-10-CM

## 2019-12-17 DIAGNOSIS — Z99.2 ESRD ON DIALYSIS (H): Primary | ICD-10-CM

## 2019-12-18 NOTE — PROGRESS NOTES
[facility] Care Coordination Contact    Received after visit chart from care coordinator.  Completed following tasks: Mailed copy of care plan to client.    Provider Signature - No POC Shared:  Member indicates that they do not want their POC shared with any [facility] providers.    Mikayla Roblse  Care Management Specialist  [facility]  [phone]

## 2019-12-23 NOTE — TELEPHONE ENCOUNTER
FUTURE VISIT INFORMATION      SURGERY INFORMATION:    MANE abreu upper arm AV graft construction, jan 24th tentatively, Dr. Lomas, at [facility], sched per mark transplant coordinator    RECORDS REQUESTED FROM:       Primary Care Provider: Jonna Mattson    Pertinent Medical History: hypertension, chronic systolic congestive heart failure    Most recent EKG+ Tracing: 10/12/19- [facility]- requested tracing    Most recent ECHO: 10/13/19-  [facility]

## 2019-12-27 ENCOUNTER — TELEPHONE (OUTPATIENT)
Dept: FAMILY MEDICINE | Facility: CLINIC | Age: 79
End: 2019-12-27

## 2019-12-27 DIAGNOSIS — I10 HYPERTENSION, GOAL BELOW 140/90: ICD-10-CM

## 2019-12-27 NOTE — TELEPHONE ENCOUNTER
"Requested Prescriptions   Pending Prescriptions Disp Refills     furosemide (LASIX) 20 MG tablet [Pharmacy Med Name: FUROSEMIDE 20MG TABS] 300 tablet 0     Sig: TAKE 6 TABLETS BY MOUTH EVERY MORNING, AND TAKE 4 TABLETS BY MOUTH EVERY EVENING       Diuretics (Including Combos) Protocol Failed - 12/27/2019  5:27 AM        Failed - Blood pressure under 140/90 in past 12 months     BP Readings from Last 3 Encounters:   12/16/19 (!) 161/71   11/13/19 (!) 183/75   11/04/19 (!) 184/69                 Failed - Normal serum creatinine on file in past 12 months     Recent Labs   Lab Test 11/04/19  1404   CR 6.44*              Passed - Recent (12 mo) or future (30 days) visit within the authorizing provider's specialty     Patient has had an office visit with the authorizing provider or a provider within the authorizing providers department within the previous 12 mos or has a future within next 30 days. See \"Patient Info\" tab in inbasket, or \"Choose Columns\" in Meds & Orders section of the refill encounter.              Passed - Medication is active on med list        Passed - Patient is age 18 or older        Passed - No active pregancy on record        Passed - Normal serum potassium on file in past 12 months     Recent Labs   Lab Test 11/04/19  1404   POTASSIUM 5.0                    Passed - Normal serum sodium on file in past 12 months     Recent Labs   Lab Test 11/04/19  1404                 Passed - No positive pregnancy test in past 12 months        Last Written Prescription Date:  12/4/19  Last Fill Quantity: 300,  # refills: 0   Last office visit: 10/25/2019 with prescribing provider:  Jonna Cason     Future Office Visit:      "

## 2020-01-02 RX ORDER — FUROSEMIDE 20 MG
TABLET ORAL
Qty: 300 TABLET | Refills: 3 | Status: SHIPPED | OUTPATIENT
Start: 2020-01-02 | End: 2020-01-06

## 2020-01-02 NOTE — TELEPHONE ENCOUNTER
Routing refill request to provider for review/approval because:  Labs out of range:  Creatinine        Theresa Gregorio RN, BSN, PHN

## 2020-01-02 NOTE — TELEPHONE ENCOUNTER
Please have patient confirm with her kidney specialist that she still is taking this much lasix.  Jonna Cason MD

## 2020-01-06 RX ORDER — FUROSEMIDE 20 MG
TABLET ORAL
Qty: 300 TABLET | Refills: 3 | Status: SHIPPED | OUTPATIENT
Start: 2020-01-06 | End: 2020-08-04

## 2020-01-06 NOTE — TELEPHONE ENCOUNTER
Called and spoke with patient, she states her kidney specialist comes to see her at dialysis some times, and she will ask. Patient states he hasn't been taking medication as prescribed, she takes 4 tablets in the morning and takes 2 in the evening   (sometimes she don't even take the evening dose)    Route to provider NELIDA Hamlin MA

## 2020-01-08 ENCOUNTER — TELEPHONE (OUTPATIENT)
Dept: PHARMACY | Facility: CLINIC | Age: 80
End: 2020-01-08

## 2020-01-08 NOTE — TELEPHONE ENCOUNTER
Referred by Dr. Ramon Rodriguez on 10/11/2019    dialyzing at [facility]. Anemia will be monitored and treated at dialysis clinic.     Anemia Services closed.   Clinic is aware.     Anemia Latest Ref Rng & Units 12/11/2018 2/19/2019 3/26/2019 10/7/2019 10/22/2019 11/4/2019 11/13/2019   Hemoglobin 11.7 - 15.7 g/dL 10.6(L) 10.1(L) 9.8(L) 8.2(L) 9.4(L) 8.7(L) 8.5(L)   TSAT 15 - 46 % 20 25 - 21 32 38 -   Ferritin 8 - 252 ng/mL 180 180 - 230 437(H) 356(H) -       Anemia labs discontinued, therapy plans cancelled, removed from active patient list, and referring provider notified.    Keyanna Pedro RN   Anemia Services  [facility]  [address] [place] [id number]   [email or web address]   Office : [phone]  Fax: [phone]

## 2020-01-10 ENCOUNTER — PREP FOR PROCEDURE (OUTPATIENT)
Dept: TRANSPLANT | Facility: CLINIC | Age: 80
End: 2020-01-10

## 2020-01-10 DIAGNOSIS — N18.6 ESRD ON DIALYSIS (H): Primary | ICD-10-CM

## 2020-01-10 DIAGNOSIS — Z99.2 ENCOUNTER REGARDING VASCULAR ACCESS FOR DIALYSIS FOR ESRD (H): ICD-10-CM

## 2020-01-10 DIAGNOSIS — N18.6 ENCOUNTER REGARDING VASCULAR ACCESS FOR DIALYSIS FOR ESRD (H): ICD-10-CM

## 2020-01-10 DIAGNOSIS — Z99.2 ESRD ON DIALYSIS (H): Primary | ICD-10-CM
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
## 2020-01-16 DIAGNOSIS — N18.6 ESRD ON DIALYSIS (H): Primary | ICD-10-CM

## 2020-01-16 DIAGNOSIS — Z45.2 ENCOUNTER FOR ADJUSTMENT AND MANAGEMENT OF VASCULAR ACCESS DEVICE: ICD-10-CM

## 2020-01-16 DIAGNOSIS — Z53.9 DIAGNOSIS NOT YET DEFINED: Primary | ICD-10-CM

## 2020-01-16 DIAGNOSIS — Z99.2 ESRD ON DIALYSIS (H): Primary | ICD-10-CM

## 2020-01-16 PROBLEM — J15.69 GRAM-NEGATIVE PNEUMONIA (H): Status: ACTIVE | Noted: 2018-05-08

## 2020-01-16 PROBLEM — I51.89 DIASTOLIC DYSFUNCTION: Status: ACTIVE | Noted: 2017-10-17

## 2020-01-16 PROBLEM — I50.9 ACUTE DECOMPENSATED HEART FAILURE (H): Status: ACTIVE | Noted: 2018-05-08

## 2020-01-16 PROBLEM — E16.2 HYPOGLYCEMIA: Status: ACTIVE | Noted: 2017-10-17

## 2020-01-16 PROBLEM — I15.0 RENOVASCULAR HYPERTENSION: Status: ACTIVE | Noted: 2020-01-16

## 2020-01-16 PROBLEM — D50.9 IRON DEFICIENCY ANEMIA: Status: ACTIVE | Noted: 2018-03-09

## 2020-01-16 PROBLEM — I50.33 ACUTE ON CHRONIC DIASTOLIC CHF (CONGESTIVE HEART FAILURE) (H): Status: ACTIVE | Noted: 2018-03-09

## 2020-01-16 PROCEDURE — 99207 C MD RECERTIFICATION HHA PT: CPT | Performed by: FAMILY MEDICINE

## 2020-01-20 ENCOUNTER — OFFICE VISIT (OUTPATIENT)
Dept: SURGERY | Facility: CLINIC | Age: 80
End: 2020-01-20
Attending: CLINICAL NURSE SPECIALIST
Payer: COMMERCIAL

## 2020-01-20 ENCOUNTER — PRE VISIT (OUTPATIENT)
Dept: SURGERY | Facility: CLINIC | Age: 80
End: 2020-01-20

## 2020-01-20 ENCOUNTER — ANESTHESIA EVENT (OUTPATIENT)
Dept: SURGERY | Facility: CLINIC | Age: 80
End: 2020-01-20
Payer: COMMERCIAL

## 2020-01-20 VITALS
TEMPERATURE: 98.2 F | WEIGHT: 111 LBS | HEART RATE: 77 BPM | OXYGEN SATURATION: 97 % | SYSTOLIC BLOOD PRESSURE: 164 MMHG | DIASTOLIC BLOOD PRESSURE: 64 MMHG | BODY MASS INDEX: 23.95 KG/M2 | RESPIRATION RATE: 15 BRPM | HEIGHT: 57 IN

## 2020-01-20 DIAGNOSIS — Z99.2 ESRD (END STAGE RENAL DISEASE) ON DIALYSIS (H): ICD-10-CM

## 2020-01-20 DIAGNOSIS — N18.6 ESRD (END STAGE RENAL DISEASE) ON DIALYSIS (H): ICD-10-CM

## 2020-01-20 DIAGNOSIS — E78.5 HYPERLIPIDEMIA LDL GOAL <100: ICD-10-CM

## 2020-01-20 DIAGNOSIS — Z01.818 PRE-OP EXAMINATION: Primary | ICD-10-CM

## 2020-01-20 LAB — LDLC SERPL DIRECT ASSAY-MCNC: 106 MG/DL

## 2020-01-20 ASSESSMENT — MIFFLIN-ST. JEOR: SCORE: 842.37

## 2020-01-20 ASSESSMENT — PAIN SCALES - GENERAL: PAINLEVEL: NO PAIN (0)

## 2020-01-20 ASSESSMENT — LIFESTYLE VARIABLES: TOBACCO_USE: 0

## 2020-01-20 NOTE — PATIENT INSTRUCTIONS
Preparing for Your Surgery      Name:  Lakhwinder Negron   MRN:  [id number]   :  1940   Today's Date:  2020     Arriving for surgery:  Surgery date:  2020  Arrival time:  5:30AM  Please come to:       [facility] [address]  [address] [place]  [address]    - ? parking is available in front of the hospital      -    Please proceed to [address] on the 3rd floor. [phone]?     - ?If you are in need of directions, wheelchair or escort please stop at the Information Desk in the lobby.  Inform the information person that you are here for surgery; a wheelchair and escort to [address] will be provided.?     What can I eat or drink?  -  You may have solid food or milk products until 8 hours prior to your surgery. 2020, 11:30PM  -  You may have water, apple juice or 7up/Sprite until 2 hours prior to your surgery. 2020, 5:30AM    Which medicines can I take?  Stop Aspirin, vitamins and supplements one week prior to surgery.  Hold Ibuprofen for 24 hours and/or Naproxen for 48 hours prior to surgery.   -  Do NOT take these medications in the morning, the day of surgery:    Calcitriol    Calcium    Ferrous Sulfate   Furosemide(Lasix)   Metolazone(Zaroxolyn)   Sodium Bicarb    Vitamin D3         -  Please take these medications the day of surgery:    Amlodipine(Norvasc)   Carvedilol(Coreg)     Hydralazine(Apresoline)  Ranitidine(Zantac)    Rosuvastatin(Crestor)        How do I prepare myself?  -  Take two showers: one the night before surgery; and one the morning of surgery.         Use Scrubcare or Hibiclens to wash from neck down, leave soap on your skin for up to one minute.  Do not get soap in your eyes or ears.  You may use your own shampoo and conditioner; no other hair products.   -  Do NOT use lotion, powder, deodorant, or antiperspirant the day of your surgery.  -  Do NOT wear any makeup, fingernail polish or jewelry.  - Do not bring your own medications  to the hospital, except for inhalers and eye   drops.  -  Bring your ID and insurance card.    -If you are scheduled to go home the Same Day as surgery you must have a responsible adult as a  and to stay with you overnight the first 24 hours after surgery.     Questions or Concerns:  -If you are scheduled on the [facility] or [facility] campus and have questions or concerns regarding the day of surgery, please call Preadmission Nursing at [phone].     -If you have health changes between today and your surgery please call your surgeon. For questions after surgery please call your surgeons office.
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
## 2020-01-22 NOTE — RESULT ENCOUNTER NOTE
Dear Lakhwinder Negron,    Your test results are attached. I am happy to let you know that they are stable and your medications can stay the same.    The cholesterol is stable.     Please call me if you have any questions about these test results or about your care.    Sincerely,    Jonna Cason MD

## 2020-01-24 ENCOUNTER — ANESTHESIA (OUTPATIENT)
Dept: SURGERY | Facility: CLINIC | Age: 80
End: 2020-01-24
Payer: COMMERCIAL

## 2020-01-24 ENCOUNTER — ANCILLARY PROCEDURE (OUTPATIENT)
Dept: ULTRASOUND IMAGING | Facility: CLINIC | Age: 80
End: 2020-01-24
Payer: COMMERCIAL

## 2020-01-24 ENCOUNTER — HOSPITAL ENCOUNTER (OUTPATIENT)
Facility: CLINIC | Age: 80
Discharge: HOME | End: 2020-01-24
Attending: SURGERY | Admitting: SURGERY
Payer: COMMERCIAL

## 2020-01-24 VITALS
WEIGHT: 106.7 LBS | TEMPERATURE: 97.9 F | OXYGEN SATURATION: 97 % | DIASTOLIC BLOOD PRESSURE: 67 MMHG | SYSTOLIC BLOOD PRESSURE: 157 MMHG | HEART RATE: 68 BPM | BODY MASS INDEX: 23.02 KG/M2 | RESPIRATION RATE: 16 BRPM | HEIGHT: 57 IN

## 2020-01-24 DIAGNOSIS — Z99.2 ENCOUNTER REGARDING VASCULAR ACCESS FOR DIALYSIS FOR ESRD (H): ICD-10-CM

## 2020-01-24 DIAGNOSIS — N18.6 ENCOUNTER REGARDING VASCULAR ACCESS FOR DIALYSIS FOR ESRD (H): ICD-10-CM

## 2020-01-24 DIAGNOSIS — N18.6 ESRD ON DIALYSIS (H): ICD-10-CM

## 2020-01-24 DIAGNOSIS — Z99.2 ESRD ON DIALYSIS (H): ICD-10-CM

## 2020-01-24 DIAGNOSIS — Z45.2 ENCOUNTER FOR ADJUSTMENT AND MANAGEMENT OF VASCULAR ACCESS DEVICE: ICD-10-CM

## 2020-01-24 LAB
ABO + RH BLD: NORMAL
ABO + RH BLD: NORMAL
APTT PPP: 40 SEC (ref 22–37)
BASOPHILS # BLD AUTO: 0.1 10E9/L (ref 0–0.2)
BASOPHILS NFR BLD AUTO: 0.6 %
BLD GP AB SCN SERPL QL: NORMAL
BLOOD BANK CMNT PATIENT-IMP: NORMAL
BLOOD BANK CMNT PATIENT-IMP: NORMAL
CREAT SERPL-MCNC: 3.96 MG/DL (ref 0.52–1.04)
DIFFERENTIAL METHOD BLD: ABNORMAL
EOSINOPHIL # BLD AUTO: 0.1 10E9/L (ref 0–0.7)
EOSINOPHIL NFR BLD AUTO: 1.1 %
ERYTHROCYTE [DISTWIDTH] IN BLOOD BY AUTOMATED COUNT: 12.6 % (ref 10–15)
GFR SERPL CREATININE-BSD FRML MDRD: 10 ML/MIN/{1.73_M2}
GLUCOSE BLDC GLUCOMTR-MCNC: 108 MG/DL (ref 70–99)
GLUCOSE BLDC GLUCOMTR-MCNC: 123 MG/DL (ref 70–99)
GLUCOSE SERPL-MCNC: 121 MG/DL (ref 70–99)
HCT VFR BLD AUTO: 35.2 % (ref 35–47)
HGB BLD-MCNC: 11.2 G/DL (ref 11.7–15.7)
IMM GRANULOCYTES # BLD: 0 10E9/L (ref 0–0.4)
IMM GRANULOCYTES NFR BLD: 0.4 %
INR PPP: 1.06 (ref 0.86–1.14)
LYMPHOCYTES # BLD AUTO: 1.4 10E9/L (ref 0.8–5.3)
LYMPHOCYTES NFR BLD AUTO: 12 %
MCH RBC QN AUTO: 29.1 PG (ref 26.5–33)
MCHC RBC AUTO-ENTMCNC: 31.8 G/DL (ref 31.5–36.5)
MCV RBC AUTO: 91 FL (ref 78–100)
MONOCYTES # BLD AUTO: 0.5 10E9/L (ref 0–1.3)
MONOCYTES NFR BLD AUTO: 4.7 %
NEUTROPHILS # BLD AUTO: 9.2 10E9/L (ref 1.6–8.3)
NEUTROPHILS NFR BLD AUTO: 81.2 %
NRBC # BLD AUTO: 0 10*3/UL
NRBC BLD AUTO-RTO: 0 /100
PLATELET # BLD AUTO: 245 10E9/L (ref 150–450)
POTASSIUM SERPL-SCNC: 4 MMOL/L (ref 3.4–5.3)
RBC # BLD AUTO: 3.85 10E12/L (ref 3.8–5.2)
SPECIMEN EXP DATE BLD: NORMAL
WBC # BLD AUTO: 11.4 10E9/L (ref 4–11)

## 2020-01-24 PROCEDURE — 37000008 ZZH ANESTHESIA TECHNICAL FEE, 1ST 30 MIN: Performed by: SURGERY

## 2020-01-24 PROCEDURE — 36000057 ZZH SURGERY LEVEL 3 1ST 30 MIN - UMMC: Performed by: SURGERY

## 2020-01-24 PROCEDURE — 25000566 ZZH SEVOFLURANE, EA 15 MIN: Performed by: SURGERY

## 2020-01-24 PROCEDURE — 27210794 ZZH OR GENERAL SUPPLY STERILE: Performed by: SURGERY

## 2020-01-24 PROCEDURE — 76998 US GUIDE INTRAOP: CPT

## 2020-01-24 PROCEDURE — 25000128 H RX IP 250 OP 636: Performed by: NURSE ANESTHETIST, CERTIFIED REGISTERED

## 2020-01-24 PROCEDURE — 71000014 ZZH RECOVERY PHASE 1 LEVEL 2 FIRST HR: Performed by: SURGERY

## 2020-01-24 PROCEDURE — 25800030 ZZH RX IP 258 OP 636: Performed by: NURSE ANESTHETIST, CERTIFIED REGISTERED

## 2020-01-24 PROCEDURE — 84132 ASSAY OF SERUM POTASSIUM: CPT | Performed by: CLINICAL NURSE SPECIALIST

## 2020-01-24 PROCEDURE — 25000125 ZZHC RX 250: Performed by: NURSE ANESTHETIST, CERTIFIED REGISTERED

## 2020-01-24 PROCEDURE — 76499 UNLISTED DX RADIOGRAPHIC PX: CPT

## 2020-01-24 PROCEDURE — 85025 COMPLETE CBC W/AUTO DIFF WBC: CPT | Performed by: CLINICAL NURSE SPECIALIST

## 2020-01-24 PROCEDURE — 82947 ASSAY GLUCOSE BLOOD QUANT: CPT | Mod: 91 | Performed by: CLINICAL NURSE SPECIALIST

## 2020-01-24 PROCEDURE — 36000059 ZZH SURGERY LEVEL 3 EA 15 ADDTL MIN UMMC: Performed by: SURGERY

## 2020-01-24 PROCEDURE — 25000125 ZZHC RX 250: Performed by: STUDENT IN AN ORGANIZED HEALTH CARE EDUCATION/TRAINING PROGRAM

## 2020-01-24 PROCEDURE — 82565 ASSAY OF CREATININE: CPT | Performed by: CLINICAL NURSE SPECIALIST

## 2020-01-24 PROCEDURE — 37000009 ZZH ANESTHESIA TECHNICAL FEE, EACH ADDTL 15 MIN: Performed by: SURGERY

## 2020-01-24 PROCEDURE — 25000132 ZZH RX MED GY IP 250 OP 250 PS 637: Performed by: SURGERY

## 2020-01-24 PROCEDURE — 82962 GLUCOSE BLOOD TEST: CPT | Mod: 91

## 2020-01-24 PROCEDURE — 25000128 H RX IP 250 OP 636: Performed by: CLINICAL NURSE SPECIALIST

## 2020-01-24 PROCEDURE — 25000128 H RX IP 250 OP 636: Performed by: STUDENT IN AN ORGANIZED HEALTH CARE EDUCATION/TRAINING PROGRAM

## 2020-01-24 PROCEDURE — 85730 THROMBOPLASTIN TIME PARTIAL: CPT | Performed by: CLINICAL NURSE SPECIALIST

## 2020-01-24 PROCEDURE — 85610 PROTHROMBIN TIME: CPT | Performed by: CLINICAL NURSE SPECIALIST

## 2020-01-24 PROCEDURE — 40000170 ZZH STATISTIC PRE-PROCEDURE ASSESSMENT II: Performed by: SURGERY

## 2020-01-24 PROCEDURE — 25800030 ZZH RX IP 258 OP 636: Performed by: CLINICAL NURSE SPECIALIST

## 2020-01-24 PROCEDURE — 71000027 ZZH RECOVERY PHASE 2 EACH 15 MINS: Performed by: SURGERY

## 2020-01-24 PROCEDURE — 36415 COLL VENOUS BLD VENIPUNCTURE: CPT | Performed by: CLINICAL NURSE SPECIALIST

## 2020-01-24 RX ORDER — OXYCODONE HYDROCHLORIDE 5 MG/1
5 TABLET ORAL
Status: COMPLETED | OUTPATIENT
Start: 2020-01-24 | End: 2020-01-24

## 2020-01-24 RX ORDER — AMOXICILLIN 250 MG
1-2 CAPSULE ORAL 2 TIMES DAILY
Qty: 30 TABLET | Refills: 0 | Status: SHIPPED | OUTPATIENT
Start: 2020-01-24 | End: 2023-01-01

## 2020-01-24 RX ORDER — ONDANSETRON 2 MG/ML
INJECTION INTRAMUSCULAR; INTRAVENOUS PRN
Status: DISCONTINUED | OUTPATIENT
Start: 2020-01-24 | End: 2020-01-24

## 2020-01-24 RX ORDER — NALOXONE HYDROCHLORIDE 0.4 MG/ML
.1-.4 INJECTION, SOLUTION INTRAMUSCULAR; INTRAVENOUS; SUBCUTANEOUS
Status: DISCONTINUED | OUTPATIENT
Start: 2020-01-24 | End: 2020-01-24 | Stop reason: HOSPADM

## 2020-01-24 RX ORDER — SODIUM CHLORIDE, SODIUM LACTATE, POTASSIUM CHLORIDE, CALCIUM CHLORIDE 600; 310; 30; 20 MG/100ML; MG/100ML; MG/100ML; MG/100ML
INJECTION, SOLUTION INTRAVENOUS CONTINUOUS
Status: DISCONTINUED | OUTPATIENT
Start: 2020-01-24 | End: 2020-01-24 | Stop reason: HOSPADM

## 2020-01-24 RX ORDER — FLUMAZENIL 0.1 MG/ML
0.2 INJECTION, SOLUTION INTRAVENOUS
Status: DISCONTINUED | OUTPATIENT
Start: 2020-01-24 | End: 2020-01-24 | Stop reason: HOSPADM

## 2020-01-24 RX ORDER — SODIUM CHLORIDE 9 MG/ML
INJECTION, SOLUTION INTRAVENOUS CONTINUOUS PRN
Status: DISCONTINUED | OUTPATIENT
Start: 2020-01-24 | End: 2020-01-24

## 2020-01-24 RX ORDER — OXYCODONE HCL 5 MG/5 ML
5 SOLUTION, ORAL ORAL EVERY 4 HOURS PRN
Status: DISCONTINUED | OUTPATIENT
Start: 2020-01-24 | End: 2020-01-24 | Stop reason: HOSPADM

## 2020-01-24 RX ORDER — FENTANYL CITRATE 50 UG/ML
INJECTION, SOLUTION INTRAMUSCULAR; INTRAVENOUS PRN
Status: DISCONTINUED | OUTPATIENT
Start: 2020-01-24 | End: 2020-01-24

## 2020-01-24 RX ORDER — FENTANYL CITRATE 50 UG/ML
25-50 INJECTION, SOLUTION INTRAMUSCULAR; INTRAVENOUS
Status: DISCONTINUED | OUTPATIENT
Start: 2020-01-24 | End: 2020-01-24 | Stop reason: HOSPADM

## 2020-01-24 RX ORDER — PROPOFOL 10 MG/ML
INJECTION, EMULSION INTRAVENOUS PRN
Status: DISCONTINUED | OUTPATIENT
Start: 2020-01-24 | End: 2020-01-24

## 2020-01-24 RX ORDER — ONDANSETRON 4 MG/1
4 TABLET, ORALLY DISINTEGRATING ORAL EVERY 30 MIN PRN
Status: DISCONTINUED | OUTPATIENT
Start: 2020-01-24 | End: 2020-01-24 | Stop reason: HOSPADM

## 2020-01-24 RX ORDER — BUPIVACAINE HYDROCHLORIDE AND EPINEPHRINE 5; 5 MG/ML; UG/ML
INJECTION, SOLUTION PERINEURAL PRN
Status: DISCONTINUED | OUTPATIENT
Start: 2020-01-24 | End: 2020-01-24

## 2020-01-24 RX ORDER — MEPERIDINE HYDROCHLORIDE 25 MG/ML
12.5 INJECTION INTRAMUSCULAR; INTRAVENOUS; SUBCUTANEOUS
Status: DISCONTINUED | OUTPATIENT
Start: 2020-01-24 | End: 2020-01-24 | Stop reason: HOSPADM

## 2020-01-24 RX ORDER — LIDOCAINE HYDROCHLORIDE 20 MG/ML
INJECTION, SOLUTION INFILTRATION; PERINEURAL PRN
Status: DISCONTINUED | OUTPATIENT
Start: 2020-01-24 | End: 2020-01-24

## 2020-01-24 RX ORDER — LABETALOL 20 MG/4 ML (5 MG/ML) INTRAVENOUS SYRINGE
10
Status: DISCONTINUED | OUTPATIENT
Start: 2020-01-24 | End: 2020-01-24 | Stop reason: HOSPADM

## 2020-01-24 RX ORDER — HYDRALAZINE HYDROCHLORIDE 20 MG/ML
2.5-5 INJECTION INTRAMUSCULAR; INTRAVENOUS EVERY 10 MIN PRN
Status: DISCONTINUED | OUTPATIENT
Start: 2020-01-24 | End: 2020-01-24 | Stop reason: HOSPADM

## 2020-01-24 RX ORDER — ACETAMINOPHEN 325 MG/1
650 TABLET ORAL
Status: DISCONTINUED | OUTPATIENT
Start: 2020-01-24 | End: 2020-01-24 | Stop reason: HOSPADM

## 2020-01-24 RX ORDER — ONDANSETRON 2 MG/ML
4 INJECTION INTRAMUSCULAR; INTRAVENOUS EVERY 30 MIN PRN
Status: DISCONTINUED | OUTPATIENT
Start: 2020-01-24 | End: 2020-01-24 | Stop reason: HOSPADM

## 2020-01-24 RX ORDER — OXYCODONE HYDROCHLORIDE 5 MG/1
5 TABLET ORAL EVERY 4 HOURS PRN
Qty: 10 TABLET | Refills: 0 | Status: SHIPPED | OUTPATIENT
Start: 2020-01-24 | End: 2020-03-13

## 2020-01-24 RX ORDER — EPHEDRINE SULFATE 50 MG/ML
INJECTION, SOLUTION INTRAMUSCULAR; INTRAVENOUS; SUBCUTANEOUS PRN
Status: DISCONTINUED | OUTPATIENT
Start: 2020-01-24 | End: 2020-01-24

## 2020-01-24 RX ADMIN — PROPOFOL 80 MG: 10 INJECTION, EMULSION INTRAVENOUS at 08:07

## 2020-01-24 RX ADMIN — BUPIVACAINE HYDROCHLORIDE AND EPINEPHRINE BITARTRATE 25 ML: 5; .005 INJECTION, SOLUTION EPIDURAL; INTRACAUDAL; PERINEURAL at 07:50

## 2020-01-24 RX ADMIN — Medication 10 MG: at 08:14

## 2020-01-24 RX ADMIN — GENTAMICIN SULFATE 50 MG: 40 INJECTION, SOLUTION INTRAMUSCULAR; INTRAVENOUS at 08:11

## 2020-01-24 RX ADMIN — ROCURONIUM BROMIDE 50 MG: 10 INJECTION INTRAVENOUS at 08:07

## 2020-01-24 RX ADMIN — FENTANYL CITRATE 100 MCG: 50 INJECTION, SOLUTION INTRAMUSCULAR; INTRAVENOUS at 08:05

## 2020-01-24 RX ADMIN — ONDANSETRON 4 MG: 2 INJECTION INTRAMUSCULAR; INTRAVENOUS at 10:40

## 2020-01-24 RX ADMIN — VANCOMYCIN HYDROCHLORIDE 750 MG: 1 INJECTION, POWDER, LYOPHILIZED, FOR SOLUTION INTRAVENOUS at 08:13

## 2020-01-24 RX ADMIN — LIDOCAINE HYDROCHLORIDE 100 MG: 20 INJECTION, SOLUTION INFILTRATION; PERINEURAL at 08:05

## 2020-01-24 RX ADMIN — SODIUM CHLORIDE: 9 INJECTION, SOLUTION INTRAVENOUS at 07:51

## 2020-01-24 RX ADMIN — Medication 5 MG: at 10:28

## 2020-01-24 RX ADMIN — FENTANYL CITRATE 50 MCG: 50 INJECTION, SOLUTION INTRAMUSCULAR; INTRAVENOUS at 07:43

## 2020-01-24 RX ADMIN — SUGAMMADEX 200 MG: 100 INJECTION, SOLUTION INTRAVENOUS at 10:40

## 2020-01-24 RX ADMIN — OXYCODONE HYDROCHLORIDE 5 MG: 5 TABLET ORAL at 12:21

## 2020-01-24 ASSESSMENT — MIFFLIN-ST. JEOR: SCORE: 823

## 2020-01-24 NOTE — OR NURSING
1203 patient arrived to phase 2 via cart with NST patient settled into chair VSS , sling applied to right arm hand warm to touch , cap refill WNL  Patient has some movement in finger but decreased sensation , will continue to monitor. MF

## 2020-01-24 NOTE — OR NURSING
1235 discharge instructions given  To son and patient verbal understanding . MF  1250 patient left for discharge via wheelchair with NST MF

## 2020-01-24 NOTE — OR NURSING
Pt has home care nurse set up her medications, pt does not know what medications she took and her son doesn't know either. Dr Haskins aware.

## 2020-01-24 NOTE — BRIEF OP NOTE
[facility], [place]    Brief Operative Note    Pre-operative diagnosis: ESRD on dialysis (H) [N18.6, Z99.2]  Encounter regarding vascular access for dialysis for ESRD (H) [N18.6, Z99.2]  Post-operative diagnosis Same as pre-operative diagnosis    Procedure: Procedure(s):  Attempted Right upper arm Arteriovenous graft (Bovine) construction with intraoperative ultrasound  Surgeon: Surgeon(s) and Role:     * Lincoln Lomas MD - Primary     * Du Kay MD - Resident - Assisting     * Lily Castro MD - Fellow - Assisting  Anesthesia: Combined General with Block   Estimated blood loss: 5  Drains: None  Specimens: * No specimens in log *  Findings:   no venous landing zone appreciated for AV graft placement.  Complications: None.  Implants: * No implants in log *

## 2020-01-24 NOTE — DISCHARGE INSTRUCTIONS
[facility]  Same-Day Surgery   Adult Discharge Orders & Instructions     For 24 hours after surgery    1. Get plenty of rest.  A responsible adult must stay with you for at least 24 hours after you leave the hospital.   2. Do not drive or use heavy equipment.  If you have weakness or tingling, don't drive or use heavy equipment until this feeling goes away.  3. Do not drink alcohol.  4. Avoid strenuous or risky activities.  Ask for help when climbing stairs.   5. You may feel lightheaded.  IF so, sit for a few minutes before standing.  Have someone help you get up.   6. If you have nausea (feel sick to your stomach): Drink only clear liquids such as apple juice, ginger ale, broth or 7-Up.  Rest may also help.  Be sure to drink enough fluids.  Move to a regular diet as you feel able.  7. You may have a slight fever. Call the doctor if your fever is over 100 F (37.7 C) (taken under the tongue) or lasts longer than 24 hours.  8. You may have a dry mouth, a sore throat, muscle aches or trouble sleeping.  These should go away after 24 hours.  9. Do not make important or legal decisions.   Call your doctor for any of the followin.  Signs of infection (fever, growing tenderness at the surgery site, a large amount of drainage or bleeding, severe pain, foul-smelling drainage, redness, swelling).    2. It has been over 8 to 10 hours since surgery and you are still not able to urinate (pass water).    3.  Headache for over 24 hours.    To contact a doctor, call Dr Lomas's office at [phone]   or:        [phone] and ask for the resident on call for   Kidney Transplant Surgery  (answered 24 hours a day)      Emergency Department:    [facility]: [phone]       (TTY for hearing impaired: [phone])      Bupivacaine injection  What is bupivacaine injection?  BUPIVACAINE (Marcaine , Sensorcaine ) is a drug that is injected before and during various surgical or  dental procedures. Bupivacaine causes loss of feeling in the skin and surrounding tissues.   How should I use this medicine?  Bupivacaine is injected into a specific area to make it numb before a surgery or other procedure. Depending on the type of procedure it may be given into the area around your spine, into your gums, or into other areas so you will not feel pain during the procedure. Only a specially trained health-care professional will give bupivacaine in a hospital or clinic.  What drug(s) may interact with bupivacaine?    blood thinners such as warfarin    certain drugs to treat depression called monoamine oxidase inhibitors (MAOIs) such as isocarboxacid and phenelzine    guanadrel    guanethidine    medicines for high blood pressure    medicines that improve muscle strength or tone, for conditions like myasthenia gravis    mecamylamine  Tell your prescriber or health care professional about all other medicines that you are taking, including non-prescription medicines, nutritional supplements, or herbal products. Also tell your prescriber or health care professional if you are a frequent user of drinks with caffeine or alcohol, if you smoke, or if you use illegal drugs. These may affect the way your medicine works. Check with your health care professional before stopping or starting any of your medicines.  What do I need to watch for after I receive bupivacaine?  Let your prescriber or health care professional know if the feeling of numbness that bupivacaine causes does not wear off within a few hours, or if you find it hard to open your mouth.  After an injection of bupivacaine the area will be numb for some time and you will not be aware of pain. Try to avoid injury to the area. If the injection was given in your mouth, do not chew gum or food until the numbness wears off. You could bite your tongue or the inside of your cheeks.  What side effects may I notice from receiving bupivacaine?  Side effects  that you should report to your prescriber or health care professional as soon as possible:    anxiety, restlessness    blurred vision    difficulty breathing    dizziness, drowsiness    irregular heartbeat (palpitations)    nausea, vomiting    seizures (convulsions)    skin rash, itching (hives)    swelling of the face or mouth    tremors  If they are going to occur, these side effects may become apparent before you leave the hospital, clinic or dental office. Call your health care provider as soon as you can if you get any of the above reactions later.  Side effects that usually do not require medical attention (report to your prescriber or health care professional if they continue or are bothersome):    numbness or tingling of the face or mouth    pain at the injection site

## 2020-01-24 NOTE — ANESTHESIA CARE TRANSFER NOTE
Patient: Lakhwinder Negron    Procedure(s):  Attempted Right upper arm Arteriovenous graft (Bovine) construction with intraoperative ultrasound    Diagnosis: ESRD on dialysis (H) [N18.6, Z99.2]  Encounter regarding vascular access for dialysis for ESRD (H) [N18.6, Z99.2]  Diagnosis Additional Information: No value filed.    Anesthesia Type:   General, Peripheral Nerve Block     Note:  Airway :Face Mask  Patient transferred to:PACU  Handoff Report: Identifed the Patient, Identified the Reponsible Provider, Reviewed the pertinent medical history, Discussed the surgical course, Reviewed Intra-OP anesthesia mangement and issues during anesthesia, Set expectations for post-procedure period and Allowed opportunity for questions and acknowledgement of understanding      Vitals: (Last set prior to Anesthesia Care Transfer)    CRNA VITALS  1/24/2020 1016 - 1/24/2020 1051      1/24/2020             Ht Rate:  73    SpO2:  100 %    Resp Rate (observed):  9                Electronically Signed By: Charles Patrick Schlatter, APRN CRNA  January 24, 2020  10:51 AM

## 2020-01-24 NOTE — ANESTHESIA POSTPROCEDURE EVALUATION
Anesthesia POST Procedure Evaluation    Patient: Lakhwinder Negron   MRN:     [id number] Gender:   female   Age:    79 year old :      1940        Preoperative Diagnosis: ESRD on dialysis (H) [N18.6, Z99.2]  Encounter regarding vascular access for dialysis for ESRD (H) [N18.6, Z99.2]   Procedure(s):  Attempted Right upper arm Arteriovenous graft (Bovine) construction with intraoperative ultrasound   Postop Comments: No value filed.       Anesthesia Type:  Not documented  General, Peripheral Nerve Block    Reportable Event: NO     PAIN: Uncomplicated   Sign Out status: Comfortable, Well controlled pain     PONV: No PONV   Sign Out status:  No Nausea or Vomiting     Neuro/Psych: Uneventful perioperative course   Sign Out Status: Preoperative baseline; Age appropriate mentation     Airway/Resp.: Uneventful perioperative course   Sign Out Status: Non labored breathing, age appropriate RR; Resp. Status within EXPECTED Parameters     CV: Uneventful perioperative course   Sign Out status: Appropriate BP and perfusion indices; Appropriate HR/Rhythm     Disposition:   Sign Out in:  PACU  Disposition:  Floor  Recovery Course: Uneventful  Follow-Up: Not required     Comments/Narrative:  Via            Last Anesthesia Record Vitals:  CRNA VITALS  2020 1016 - 2020 1116      2020             Ht Rate:  73    SpO2:  100 %    Resp Rate (observed):  9          Last PACU Vitals:  Vitals Value Taken Time   /71 2020 11:30 AM   Temp 36.7  C (98  F) 2020 11:15 AM   Pulse 76 2020 11:30 AM   Resp 16 2020 11:30 AM   SpO2 99 % 2020 11:33 AM   Temp src     NIBP     Pulse     SpO2     Resp     Temp     Ht Rate     Temp 2     Vitals shown include unvalidated device data.      Electronically Signed By: Wicho Haskins MD, 2020, 11:35 AM

## 2020-01-24 NOTE — ANESTHESIA PROCEDURE NOTES
Peripheral Nerve Block Procedure Note    Staff:     Anesthesiologist:  Krishna Camacho MD    Resident/CRNA:  Yoselin Lopez MD    Block performed by resident/CRNA in the presence of a teaching physician    Location: Pre-op  Procedure Start/Stop TImes:      1/24/2020 7:42 AM     1/24/2020 7:51 AM    patient identified, IV checked, site marked, risks and benefits discussed, informed consent, monitors and equipment checked, pre-op evaluation, at physician/surgeon's request and post-op pain management      Correct Patient: Yes      Correct Position: Yes      Correct Site: Yes      Correct Procedure: Yes      Correct Laterality:  Yes    Site Marked:  Yes  Procedure details:     Procedure:  Supraclavicular    Laterality:  Right    Position:  Sitting    Sterile Prep: chloraprep, mask and sterile gloves      Local skin infiltration:  None    Needle:  Short bevel    Needle gauge:  21    Needle length (inches):  4    Ultrasound: Yes      Ultrasound used to identify targeted nerve, plexus, or vascular structure and placed a needle adjacent to it      Permanent Image entered into patiient's record      Abnormal pain on injection: No      Blood Aspirated: No      Paresthesias:  No    Bleeding at site: No      Bolus via:  Needle    Infusion Method:  Single Shot    Complications:  None

## 2020-01-27 ENCOUNTER — PATIENT OUTREACH (OUTPATIENT)
Dept: GERIATRIC MEDICINE | Facility: CLINIC | Age: 80
End: 2020-01-27

## 2020-01-27 NOTE — PROGRESS NOTES
TRANSITIONS OF CARE (FREDDY) LOG   FREDDY tasks should be completed by the CC within one (1) business day of notification of each transition. Follow up contact with member is required after return to their usual care setting.  Note:  If CC finds out about the transitions fifteen (15) days or more after the member has returned to their usual care setting, no FREDDY log is needed. However, the CC should check in with the member to discuss the transition process, any changes needed to the care plan and document it in a case note.    Member Name:  Lakhwinder Negron MCO Name:  [employer] Member ID#: [id number]   [place] [facility] Contact:  Shruthi Martins RN, PHN Agency/County/Care System: [facility]   Transition Communication Actions from Care Management Contact   Transition #1   Notification Date: 1/27/20 Transition Date:   1/27/20 Transition From: Home     Is this the member s usual care setting?               yes Transition To: Hospital, [facility]   Transition Type:  Planned  Reason for Admission/Comments:  vascular access for dialysis for ESRD     [facility] Care Coordination Contact    CC received notification of Hospital admission.  Hospital admission occurred on 1/24/20 at [facility] with Dx of vascular access for dialysis for ESRD.  CC contacted Hospital /discharge planner (NA - same day procedure. Patient care plan on file in Epic.)   CC reached out to family Grandson, Ran regarding transition and offered support as needed.  Reviewed and update care plan as needed.  Notified community service providers and placed services Adult Day Care [facility] on hold as needed.  Transition log initiated.   PCP notified of hospitalization via EMR.    Shruthi Martins RN, PHN  [facility]  [phone]         Shared CC contact info, care plan/services with receiving setting--Date completed: 1/24/20 - EMR    Notified PCP of transition--Date completed:   1/24/20     via  EMR   Transition #2   Transition #3  (if applicable)   Notification Date: 1/27/20         Transition To:  Home  Transition Date: 1/24/20     Transition Type:    Planned  Notified PCP -- Date completed: 1/24/20 - EMR              Shared CC contact info, care plan/services with receiving setting or, if applicable, home care agency--Date completed:  1/27/20  *Complete additional tasks below, if this transition is a return to usual care setting.      Comments:       [facility] Care Coordination Contact    CC received notification of discharge to home. Discharge occurred on (Date 1/24/20) from [facility].   CC contacted family Ran Woods and reviewed discharge summary.  Member has a follow-up appointment with PCP in 7 days: Yes  Member has had a change in condition: No  Home visit needed: No  Care plan reviewed and updated.  The following home based services Adult Day Care [facility] were resumed.  New referrals placed: No  Transition log completed.   PCP notified of transition back to home via EMR.    Shruthi Martins RN, PHN  [facility]  [phone]       Notification Date:  NA        Transition To:  NA  Transition Date:   NA           Transition Type:    Planned  Notified PCP--Date completed: NA         Shared CC contact info, care plan/services with receiving setting or, if applicable, home care agency--Date completed: NA      *Complete additional tasks below, if this transition is a return to usual care setting.      Comments:  NA     *Complete tasks below when the member is discharging TO their usual care setting within one (1) business day of notification.  For situations where the Care Coordinator is notified of the discharge prior to the date of discharge, the Care Coordinator must follow up with the member or designated representative to confirm that discharge actually occurred and discuss required FREDDY tasks as outlined in the FREDDY Instructions.  (This includes situations where  it may be a  new  usual care setting for the member. (i.e., a community member who decides upon permanent nursing home placement following hospitalization and rehab).    Date completed: 1/27/20  Communicated with member or their designated representative about the following:  care transition process; about changes to the member s health status; plan of care updates; education about transitions and how to prevent unplanned transitions/readmissions  Four Pillars for Optimal Transition:    Check  Yes  - if the member, family member and/or SNF/facility staff manages the following:    If  No  provide explanation in the comments section.          [x]  Yes     []  No     Does the member have a follow-up appointment scheduled with primary care or specialist? (Mental health hospitalizations--the appt. should be w/in 7 days)   [x]  Yes     []  No     Can the member manage their medications or is there a system in place to manage medications (e.g. home care set-up)?         [x]  Yes     []  No     Can the member verbalize warning signs and symptoms to watch for and how to respond?         [x]  Yes     []  No     Does the member use a Personal Health Care Record?  Check  Yes  if visit summary, discharge summary, and/or healthcare summary are being used as a PHR.                                                                                                                                                                                    [x] Yes      [] No      Have you updated the member s care plan?  If  No  provide explanation in comments.   Comments:  NA

## 2020-01-31 ENCOUNTER — DOCUMENTATION ONLY (OUTPATIENT)
Dept: CARE COORDINATION | Facility: CLINIC | Age: 80
End: 2020-01-31
Payer: COMMERCIAL

## 2020-01-31 NOTE — PROGRESS NOTES
[facility] utilizes an encounter to take the place of a direct phone call to your office. Please take a moment to review the below request. Please reply or route message to author of this encounter.  Message will act as a verbal OK of orders requested below. Thank you.    ORDER  SNV eow x9 weeks, 3prns   MD SUMMARY/PLAN OF CARE    SN to perform assessment with focus on edema, weight, dyspnea levels, cardiac assessment, oxygen safety and management, medication management and reconciliation, pain management, mental health status and need for referral or change in treatment plan, skin integrity, falls risk, and to notify physician concerns related to mental or physical health. Instruct on disease process, signs/symptoms of complications to report to agency/physician/911, emergency/safety and falls prevention plan, medication effects/SE, new/high risk/changed medications, diet, and activity. Implement interventions to monitor and mitigate pain, prevent pressure ulcers and confirm proper foot care.     3 prn visits for falls, medication changes/issues, change in mental or physical health, supervisory visits per agency protocol, recertification assessments.  Visits may be in person or via video conference based upon patient needs.

## 2020-02-03 NOTE — OP NOTE
Transplant Surgery  Operative Note    Date:  1/24/20  Preop Dx:  ESRD on dialysis   Postop Dx: ESRD on dialysis   Procedure: Attempted Right upper arm Arteriovenous graft (Bovine) construction with intraoperative ultrasound  Surgeon: Lincoln Lomas M.D.  ASSISTANT:  FATEMEH Castro MD fellow. TOÑITO Kay resident.   Anesthesia: General  EBL: 5 ml  Drains: no drain  Specimen: none.  Complications: none  Findings:  no venous landing zone appreciated for AV graft placement.  Indication: The patient has ESRD in need of permanent HD access.  After discussing the risks and benefits of surgery and potential complications, the patient provided informed consent.     DETAILS OF PROCEDURE:  The patient was brought to the operating room, placed in a supine position.  Perioperative prophylactic IV antibiotics were given.   The procedure was performed under right axillary block/general anesthesia. The patient was positioned supine with the right arm outstretched to near 90 . The right upper extremity was prepped and draped in the usual sterile fashion. Time-out was performed to verify correct patient, procedure, and site. A transverse skin incision was performed in the upper arm over the previously mapped brachial artery in the antecubital fossa. The brachial artery was palpated and the soft tissue overlying it was incised. The brachial artery was exposed and encircled with a vessel loop. A 2-cm segment of the brachial artery was then circumferentially dissected.    Attention was then turned to the axillary vein. Intraoperative ultrasound was performed and vein was thought to be identified. Incision was made and dissection carried down through the subcutaneous tissue, an appropriately sized vein was not found despite careful inspection. With no  venous landing zone appreciated for AV graft placement, the procedure was aborted.    The wounds were irrigated and then closed with 3-0 Vicryl for the subcutaneous tissue. The skin was  closed with 4-0 Monocryl subcuticular sutures and further secured with skin glue. The patient tolerated the procedure well and was transferred to the postanesthesia care unit in stable condition.      I was present during the key portions of the procedure, and I was immediately available for the entire procedure.

## 2020-02-07 ENCOUNTER — OFFICE VISIT (OUTPATIENT)
Dept: TRANSPLANT | Facility: CLINIC | Age: 80
End: 2020-02-07
Attending: CLINICAL NURSE SPECIALIST
Payer: COMMERCIAL

## 2020-02-07 VITALS
OXYGEN SATURATION: 97 % | DIASTOLIC BLOOD PRESSURE: 77 MMHG | WEIGHT: 108.3 LBS | BODY MASS INDEX: 23.43 KG/M2 | HEART RATE: 73 BPM | SYSTOLIC BLOOD PRESSURE: 181 MMHG

## 2020-02-07 DIAGNOSIS — T82.898A OTHER SPECIFIED COMPLICATION OF VASCULAR PROSTHETIC DEVICES, IMPLANTS AND GRAFTS, INITIAL ENCOUNTER (H): ICD-10-CM

## 2020-02-07 DIAGNOSIS — T82.9XXA COMPLICATION OF VASCULAR ACCESS FOR DIALYSIS, INITIAL ENCOUNTER: ICD-10-CM

## 2020-02-07 DIAGNOSIS — N18.6 ESRD (END STAGE RENAL DISEASE) ON DIALYSIS (H): Primary | ICD-10-CM

## 2020-02-07 DIAGNOSIS — Z48.89 ENCOUNTER FOR POST SURGICAL WOUND CHECK: ICD-10-CM

## 2020-02-07 DIAGNOSIS — N18.6 ESRD ON DIALYSIS (H): Primary | ICD-10-CM

## 2020-02-07 DIAGNOSIS — Z99.2 ESRD ON DIALYSIS (H): Primary | ICD-10-CM

## 2020-02-07 DIAGNOSIS — Z99.2 ESRD (END STAGE RENAL DISEASE) ON DIALYSIS (H): Primary | ICD-10-CM

## 2020-02-07 DIAGNOSIS — Z09 FOLLOW-UP EXAMINATION AFTER VASCULAR SURGERY: ICD-10-CM

## 2020-02-07 NOTE — PROGRESS NOTES
Dialysis Access Service   Progress Note    S:  Ms. Negron is being seen today for surgical followup of her dialysis access.  She reports no issues with the wound, and  a little steal syndrome of the distal extremity. C/O pain and swelling in entire right arm after surgery. Pain relieved with pain meds but not much Tylenol. Mild numbness in right forearm and 4th and 5th fingers, difficult to make  in right hand. Denies pain at incisions sites, a change of color/temperature in right hand and fingers. S/P Attempted Right upper arm Arteriovenous graft (Bovine) construction, the procedure was aborted on 1/24/2020.    O:  Pulse:  [73] 73  BP: (181)/(77) 181/77  SpO2:  [97 %] 97 %  GENERAL: alert, cooperative  Circulation:   Radial pulse 3+  Ulnar pulse  3+   Capillary refill:  capillary refill < 2 sec    Sensory exam:   arm: Normal   []           Abnormal   [x]          Comment: Mild numbness in right forearm   hand: Normal   []           Abnormal   [x]          Comment: numbness in 4th and 5th  Motor exam:   arm: Normal   [x]           Abnormal   []          Comment:    hand: Normal   []           Abnormal   [x]          Comment:difficult to make  in right hand    Access: R upper extremity wound(s) healed, tender to touch. Mild venous hypertension and a small fluid collection area at axillary and AC fossa incisions. Mild to moderate edema in right arm, without apparent infection. Mild bruises in right interior forearm.    Assessment & Plan: Ms. Negron's right upper arm incisions healed well at this time point.   1. Elevate right arm with support as tolerated  2. May exercise right hand with a squeeze ball as tolerated  3. Maintain normal ROM but avoid heavy lifting  4. Follow up with Dr. Lomas in 2 weeks  5. May take Tylenol for pain as needed  6. May schedule RUE arterial/venous duplex if swelling in right arm persists    We would like to see the patient back in the clinic in 2 weeks time to assess progress.  The patient was counselled to contact our nurse coordinator, CHANO Walter CNS (Sum) at [phone] with any questions or concerns.  Thank you for the opportunity to participate in Ms. Negron's care.    TT: 15 min  CT: 15 min    MARTA Montaño (Sum)  Dialysis Vascular Access/SOT Clinical Nurse Specialist    Solid Organ Transplant Service - [facility]   Phone # [phone]  Pager # [phone]

## 2020-02-07 NOTE — LETTER
2/7/2020       RE: Lakhwinder Negron  [address]  [place] [address]     Dear Colleague,    Thank you for referring your patient, Lakhwinder Negron, to the [facility] SOLID ORGAN TRANSPLANT at [facility]. Please see a copy of my visit note below.    Dialysis Access Service   Progress Note    S:  Ms. Negron is being seen today for surgical followup of her dialysis access.  She reports no issues with the wound, and  a little steal syndrome of the distal extremity. C/O pain and swelling in entire right arm after surgery. Pain relieved with pain meds but not much Tylenol. Mild numbness in right forearm and 4th and 5th fingers, difficult to make  in right hand. Denies pain at incisions sites, a change of color/temperature in right hand and fingers. S/P Attempted Right upper arm Arteriovenous graft (Bovine) construction, the procedure was aborted on 1/24/2020.    O:  Pulse:  [73] 73  BP: (181)/(77) 181/77  SpO2:  [97 %] 97 %  GENERAL: alert, cooperative  Circulation:   Radial pulse 3+  Ulnar pulse  3+   Capillary refill:  capillary refill < 2 sec    Sensory exam:   arm: Normal   []           Abnormal   [x]          Comment: Mild numbness in right forearm   hand: Normal   []           Abnormal   [x]          Comment: numbness in 4th and 5th  Motor exam:   arm: Normal   [x]           Abnormal   []          Comment:    hand: Normal   []           Abnormal   [x]          Comment:difficult to make  in right hand    Access: R upper extremity wound(s) healed, tender to touch. Mild venous hypertension and a small fluid collection area at axillary and AC fossa incisions. Mild to moderate edema in right arm, without apparent infection. Mild bruises in right interior forearm.    Assessment & Plan: Ms. Negron's right upper arm incisions healed well at this time point.   1. Elevate right arm with support as tolerated  2. May exercise right hand with a squeeze ball as tolerated  3. Maintain normal  ROM but avoid heavy lifting  4. Follow up with Dr. Lomas in 2 weeks  5. May take Tylenol for pain as needed  6. May schedule RUE arterial/venous duplex if swelling in right arm persists    We would like to see the patient back in the clinic in 2 weeks time to assess progress. The patient was counselled to contact our nurse coordinator, CHANO Walter CNS (Sum) at [phone] with any questions or concerns.  Thank you for the opportunity to participate in Ms. Negron's care.    TT: 15 min  CT: 15 min    MARTA Montaño (Sum)  Dialysis Vascular Access/SOT Clinical Nurse Specialist    Solid Organ Transplant Service - [facility]   Phone # [phone]  Pager # [phone]    Again, thank you for allowing me to participate in the care of your patient.      Sincerely,    CHANO Whiting

## 2020-02-12 ENCOUNTER — OFFICE VISIT (OUTPATIENT)
Dept: FAMILY MEDICINE | Facility: CLINIC | Age: 80
End: 2020-02-12
Payer: COMMERCIAL

## 2020-02-12 VITALS
RESPIRATION RATE: 16 BRPM | OXYGEN SATURATION: 97 % | BODY MASS INDEX: 23.3 KG/M2 | DIASTOLIC BLOOD PRESSURE: 68 MMHG | WEIGHT: 108 LBS | HEIGHT: 57 IN | SYSTOLIC BLOOD PRESSURE: 140 MMHG | TEMPERATURE: 98.3 F | HEART RATE: 82 BPM

## 2020-02-12 DIAGNOSIS — R20.0 NUMBNESS AND TINGLING OF RIGHT ARM: ICD-10-CM

## 2020-02-12 DIAGNOSIS — N18.5 CKD (CHRONIC KIDNEY DISEASE) STAGE 5, GFR LESS THAN 15 ML/MIN (H): ICD-10-CM

## 2020-02-12 DIAGNOSIS — M79.621 PAIN OF RIGHT UPPER ARM: Primary | ICD-10-CM

## 2020-02-12 DIAGNOSIS — R20.2 NUMBNESS AND TINGLING OF RIGHT ARM: ICD-10-CM

## 2020-02-12 PROCEDURE — 99213 OFFICE O/P EST LOW 20 MIN: CPT | Performed by: NURSE PRACTITIONER

## 2020-02-12 RX ORDER — ACETAMINOPHEN 325 MG/1
325-650 TABLET ORAL EVERY 6 HOURS PRN
Qty: 50 TABLET | Refills: 1 | Status: SHIPPED | OUTPATIENT
Start: 2020-02-12 | End: 2021-11-29

## 2020-02-12 ASSESSMENT — MIFFLIN-ST. JEOR: SCORE: 828.76

## 2020-02-12 NOTE — Clinical Note
NELIDA only. Son was told she shouldn't take tylenol with codeine by nephrology? Just wanted to loop you in. I sent in regular tylenol as recommended by specialty clinic but it looks like tylenol with codeine is for chronic pain.

## 2020-02-12 NOTE — PATIENT INSTRUCTIONS
Schedule imaging for her arm (ordered on 2/7)  Tylenol sent to pharmacy  Follow up with the surgeon
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
## 2020-02-13 ENCOUNTER — TRANSFERRED RECORDS (OUTPATIENT)
Dept: HEALTH INFORMATION MANAGEMENT | Facility: CLINIC | Age: 80
End: 2020-02-13

## 2020-02-13 DIAGNOSIS — Z53.9 DIAGNOSIS NOT YET DEFINED: Primary | ICD-10-CM

## 2020-02-13 PROCEDURE — 99207 C MD RECERTIFICATION HHA PT: CPT | Performed by: FAMILY MEDICINE

## 2020-02-14 ENCOUNTER — ANCILLARY PROCEDURE (OUTPATIENT)
Dept: ULTRASOUND IMAGING | Facility: CLINIC | Age: 80
End: 2020-02-14
Attending: CLINICAL NURSE SPECIALIST
Payer: COMMERCIAL

## 2020-02-14 DIAGNOSIS — Z99.2 ESRD (END STAGE RENAL DISEASE) ON DIALYSIS (H): ICD-10-CM

## 2020-02-14 DIAGNOSIS — T82.9XXA COMPLICATION OF VASCULAR ACCESS FOR DIALYSIS, INITIAL ENCOUNTER: ICD-10-CM

## 2020-02-14 DIAGNOSIS — T82.898A OTHER SPECIFIED COMPLICATION OF VASCULAR PROSTHETIC DEVICES, IMPLANTS AND GRAFTS, INITIAL ENCOUNTER (H): ICD-10-CM

## 2020-02-14 DIAGNOSIS — N18.6 ESRD (END STAGE RENAL DISEASE) ON DIALYSIS (H): ICD-10-CM

## 2020-02-20 DIAGNOSIS — G89.4 CHRONIC PAIN SYNDROME: ICD-10-CM

## 2020-02-20 NOTE — TELEPHONE ENCOUNTER
Requested Prescriptions   Pending Prescriptions Disp Refills     acetaminophen-codeine 300-30 MG PO tablet [Pharmacy Med Name: ACETAMINOPHEN-CODEINE # 300-30 TABS] 60 tablet 0     Sig: TAKE 1 TO 2 TABLETS BY MOUTH ONCE DAILY AS NEEDED // IB HNUB NOJ 1-2 LUB YOG MOB       There is no refill protocol information for this order        Last Written Prescription Date:  12/13/19  Last Fill Quantity: 60,  # refills: 0   Last office visit: 2/12/2020 with prescribing provider:  Jonna Cason     Future Office Visit:

## 2020-02-24 ENCOUNTER — OFFICE VISIT (OUTPATIENT)
Dept: TRANSPLANT | Facility: CLINIC | Age: 80
End: 2020-02-24
Attending: SURGERY
Payer: COMMERCIAL

## 2020-02-24 VITALS
SYSTOLIC BLOOD PRESSURE: 144 MMHG | WEIGHT: 109.5 LBS | BODY MASS INDEX: 23.7 KG/M2 | OXYGEN SATURATION: 94 % | HEART RATE: 60 BPM | DIASTOLIC BLOOD PRESSURE: 64 MMHG

## 2020-02-24 DIAGNOSIS — Z99.2 ENCOUNTER REGARDING VASCULAR ACCESS FOR DIALYSIS FOR END-STAGE RENAL DISEASE (H): Primary | ICD-10-CM

## 2020-02-24 DIAGNOSIS — N18.6 ENCOUNTER REGARDING VASCULAR ACCESS FOR DIALYSIS FOR END-STAGE RENAL DISEASE (H): Primary | ICD-10-CM

## 2020-02-24 PROCEDURE — G0463 HOSPITAL OUTPT CLINIC VISIT: HCPCS | Mod: ZF

## 2020-02-24 ASSESSMENT — PAIN SCALES - GENERAL: PAINLEVEL: SEVERE PAIN (6)
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
## 2020-03-05 DIAGNOSIS — E11.22 TYPE 2 DIABETES MELLITUS WITH STAGE 3 CHRONIC KIDNEY DISEASE, WITHOUT LONG-TERM CURRENT USE OF INSULIN (H): ICD-10-CM

## 2020-03-05 DIAGNOSIS — N18.30 TYPE 2 DIABETES MELLITUS WITH STAGE 3 CHRONIC KIDNEY DISEASE, WITHOUT LONG-TERM CURRENT USE OF INSULIN (H): ICD-10-CM

## 2020-03-05 NOTE — TELEPHONE ENCOUNTER
Requested Prescriptions   Pending Prescriptions Disp Refills     glipiZIDE (GLUCOTROL XL) 2.5 MG 24 hr tablet [Pharmacy Med Name: GLIPIZIDE ER 2.5MG TAB]  Last Written Prescription Date:  10/27/17  Last Fill Quantity: 60,  # refills: 3   Last Office Visit with G, P or [facility] prescribing provider:  [facility]   Future Office Visit:    Next 5 appointments (look out 90 days)    Mar 13, 2020  4:20 PM CDT  Office Visit with Jonna Cason MD  [facility] ([facility]) [address] [place] [id number]  [phone]        180 tablet 1     Sig: TAKE ONE TABLET BY MOUTH TWICE A DAY FOR DIABETES (EKATERINA COLBY NTSHAV QAB ZIB)       Sulfonylurea Agents Failed - 3/5/2020  8:19 AM        Failed - Blood pressure less than 140/90 in past 6 months     BP Readings from Last 3 Encounters:   02/24/20 (!) 144/64   02/12/20 (!) 140/68   02/07/20 (!) 181/77                 Failed - Patient has had a Microalbumin in the past 15 mos.     Recent Labs   Lab Test 06/07/17  1011   MICROL 7,350   UMALCR 6,282.05*             Failed - Medication is active on med list        Failed - Patient has a recent creatinine (normal) within the past 12 mos.     Recent Labs   Lab Test 01/24/20  0647   CR 3.96*             Passed - Patient has documented LDL within the past 12 mos.     Recent Labs   Lab Test 01/20/20  1129   *             Passed - Patient has documented A1c within the specified period of time.     If HgbA1C is 8 or greater, it needs to be on file within the past 3 months.  If less than 8, must be on file within the past 6 months.     Recent Labs   Lab Test 11/06/19  1544   A1C 5.4             Passed - Patient is age 18 or older        Passed - No active pregnancy on record        Passed - Patient has not had a positive pregnancy test within the past 12 mos.        Passed - Recent (6 mo) or future (30 days) visit within the authorizing provider's specialty     Patient  "had office visit in the last 6 months or has a visit in the next 30 days with authorizing provider or within the authorizing provider's specialty.  See \"Patient Info\" tab in inbasket, or \"Choose Columns\" in Meds & Orders section of the refill encounter.              "

## 2020-03-06 RX ORDER — GLIPIZIDE 2.5 MG/1
TABLET, EXTENDED RELEASE ORAL
Qty: 180 TABLET | Refills: 1 | Status: SHIPPED | OUTPATIENT
Start: 2020-03-06 | End: 2020-08-25

## 2020-03-06 NOTE — TELEPHONE ENCOUNTER
Routing refill request to provider for review/approval because:  Drug not active on patient's medication list  Lisa Polk RN

## 2020-03-13 ENCOUNTER — OFFICE VISIT (OUTPATIENT)
Dept: FAMILY MEDICINE | Facility: CLINIC | Age: 80
End: 2020-03-13
Payer: COMMERCIAL

## 2020-03-13 VITALS
HEIGHT: 57 IN | WEIGHT: 105 LBS | DIASTOLIC BLOOD PRESSURE: 60 MMHG | OXYGEN SATURATION: 98 % | TEMPERATURE: 98.2 F | RESPIRATION RATE: 17 BRPM | SYSTOLIC BLOOD PRESSURE: 166 MMHG | BODY MASS INDEX: 22.65 KG/M2 | HEART RATE: 82 BPM

## 2020-03-13 DIAGNOSIS — I10 HYPERTENSION, GOAL BELOW 140/90: ICD-10-CM

## 2020-03-13 DIAGNOSIS — N18.6 TYPE 2 DIABETES MELLITUS WITH CHRONIC KIDNEY DISEASE ON CHRONIC DIALYSIS, WITHOUT LONG-TERM CURRENT USE OF INSULIN (H): ICD-10-CM

## 2020-03-13 DIAGNOSIS — Z99.2 ESRD ON DIALYSIS (H): Primary | ICD-10-CM

## 2020-03-13 DIAGNOSIS — Z99.2 TYPE 2 DIABETES MELLITUS WITH CHRONIC KIDNEY DISEASE ON CHRONIC DIALYSIS, WITHOUT LONG-TERM CURRENT USE OF INSULIN (H): ICD-10-CM

## 2020-03-13 DIAGNOSIS — I63.549 CEREBROVASCULAR ACCIDENT (CVA) DUE TO OCCLUSION OF CEREBELLAR ARTERY, UNSPECIFIED BLOOD VESSEL LATERALITY (H): ICD-10-CM

## 2020-03-13 DIAGNOSIS — N18.6 ESRD ON DIALYSIS (H): Primary | ICD-10-CM

## 2020-03-13 DIAGNOSIS — E11.22 TYPE 2 DIABETES MELLITUS WITH CHRONIC KIDNEY DISEASE ON CHRONIC DIALYSIS, WITHOUT LONG-TERM CURRENT USE OF INSULIN (H): ICD-10-CM

## 2020-03-13 DIAGNOSIS — M79.631 PAIN OF RIGHT FOREARM: ICD-10-CM

## 2020-03-13 DIAGNOSIS — I50.22 CHRONIC SYSTOLIC CONGESTIVE HEART FAILURE (H): ICD-10-CM

## 2020-03-13 DIAGNOSIS — G81.91 RIGHT HEMIPARESIS (H): ICD-10-CM

## 2020-03-13 DIAGNOSIS — N25.81 SECONDARY HYPERPARATHYROIDISM (H): ICD-10-CM

## 2020-03-13 DIAGNOSIS — Z78.9 CENTRAL VENOUS CATHETER IN PLACE: ICD-10-CM

## 2020-03-13 DIAGNOSIS — G89.4 CHRONIC PAIN SYNDROME: ICD-10-CM

## 2020-03-13 DIAGNOSIS — E44.0 MODERATE MALNUTRITION (H): ICD-10-CM

## 2020-03-13 PROBLEM — N18.5 CKD (CHRONIC KIDNEY DISEASE) STAGE 5, GFR LESS THAN 15 ML/MIN (H): Status: RESOLVED | Noted: 2018-03-15 | Resolved: 2020-03-13

## 2020-03-13 PROCEDURE — 99214 OFFICE O/P EST MOD 30 MIN: CPT | Performed by: FAMILY MEDICINE

## 2020-03-13 RX ORDER — LIDOCAINE 40 MG/G
CREAM TOPICAL 2 TIMES DAILY PRN
Qty: 45 G | Refills: 3 | Status: SHIPPED | OUTPATIENT
Start: 2020-03-13 | End: 2023-01-01

## 2020-03-13 RX ORDER — LACTOSE-REDUCED FOOD
1 LIQUID (ML) ORAL 2 TIMES DAILY PRN
Qty: 60 EACH | Refills: 11 | Status: SHIPPED | OUTPATIENT
Start: 2020-03-13 | End: 2023-01-01

## 2020-03-13 ASSESSMENT — PAIN SCALES - GENERAL: PAINLEVEL: SEVERE PAIN (7)

## 2020-03-13 ASSESSMENT — MIFFLIN-ST. JEOR: SCORE: 815.16

## 2020-03-13 NOTE — PATIENT INSTRUCTIONS
At [facility], we strive to deliver an exceptional experience to you, every time we see you.  If you receive a survey in the mail, please send us back your thoughts. We really do value your feedback.    Based on your medical history, these are the current health maintenance/preventive care services that you are due for (some may have been done at this visit.)  Health Maintenance Due   Topic Date Due     HF ACTION PLAN  1940     URINE DRUG SCREEN  1940     MEDICARE ANNUAL WELLNESS VISIT  05/06/2003     ZOSTER IMMUNIZATION (2 of 3) 05/19/2011     ADVANCE CARE PLANNING  09/25/2017     EYE EXAM  11/14/2017     ALT  03/08/2019     LIPID  03/15/2019     DIABETIC FOOT EXAM  03/15/2019     PHQ-2  01/01/2020         Suggested websites for health information:  Www.[employer].org : Up to date and easily searchable information on multiple topics.  Www.[employer].gov : medication info, interactive tutorials, watch real surgeries online  Www.familydoctor.org : good info from the Academy of Family Physicians  Www.cdc.gov : public health info, travel advisories, epidemics (H1N1)  Www.aap.org : children's health info, normal development, vaccinations  Www.health.[facility].[place].us : [place] dept of health, public health issues in [place], N1N1    Your care team:                            Family Medicine Internal Medicine   MD Gera Frost MD Shantel Branch-Fleming, MD Katya Georgiev PA-C Nam Ho, MD Pediatrics   FAIZA Benavides, MD Candida Robles CNP, MD Deborah Mielke, MD Kim Thein, CHANO [facility]      Clinic hours: Monday - Thursday 7 am-7 pm; Fridays 7 am-5 pm.   Urgent care: Monday - Friday 11 am-9 pm; Saturday and Sunday 9 am-5 pm.  Pharmacy : Monday -Thursday 8 am-8 pm; Friday 8 am-6 pm; Saturday and Sunday 9 am-5 pm.     Clinic: [phone]   Pharmacy: [phone]      Patient Education     Understanding the Pain  Response  Your pain is important. It can slow healing and keep you from being active. You may have acute or chronic pain. Both types of pain respond to treatment. Work with your healthcare professional. Together you can find relief.  Types of pain  Acute pain is caused by a health problem or injury. The pain usually goes away when its cause is treated. You may have pain:    From an illness or injury that needs emergency care    After an operation, such as heart surgery    During and after the birth of your baby  Chronic pain lasts 3 to 6 months or more. It can be caused by a health problem or injury, like arthritis or a shoulder strain. Chronic pain can also exist without a clear cause.  Your perception of pain  Pain is a complex phenomenon that involves many of the chemicals found naturally in the spinal cord and brain. All pain signals travel to the brain. The brain sends back signals to protect the body. The brain also makes its own painkillers (endorphins). These can help reduce the pain.     1. Pain starts in 1 or more parts of the body. In some cases, the site of the pain is far from its source.  2. Pain signals move through nerves and up the spinal cord.  3. The brain reads the signals as pain. Natural painkillers are released.  4. The feeling of pain can be reduced in this way.  Date Last Reviewed: 5/1/2017 2000-2019 [employer]. [address] [place] [address], [place] [address]. All rights reserved. This information is not intended as a substitute for professional medical care. Always follow your healthcare professional's instructions.
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
## 2020-03-15 PROBLEM — Z78.9 CENTRAL VENOUS CATHETER IN PLACE: Status: ACTIVE | Noted: 2020-03-15

## 2020-03-16 ENCOUNTER — TELEPHONE (OUTPATIENT)
Dept: FAMILY MEDICINE | Facility: CLINIC | Age: 80
End: 2020-03-16

## 2020-03-16 NOTE — TELEPHONE ENCOUNTER
Plan does not cover lidocaine (LMX4) 4 % external cream.  Please call [email or web address] to initiate Prior Auth or change med.    Insurance type and ID number: ID# [id number]      Additional Information:     Alyx Canales

## 2020-03-16 NOTE — TELEPHONE ENCOUNTER
Please find out if any alternatives are covered. Can't take NSAID's due to renal failure.  Jonna Cason MD

## 2020-03-17 DIAGNOSIS — I10 HYPERTENSION, GOAL BELOW 140/90: Primary | ICD-10-CM

## 2020-03-17 RX ORDER — HYDRALAZINE HYDROCHLORIDE 50 MG/1
75 TABLET, FILM COATED ORAL 2 TIMES DAILY
Qty: 180 TABLET | Refills: 3 | Status: SHIPPED | OUTPATIENT
Start: 2020-03-17 | End: 2020-12-09

## 2020-03-17 NOTE — TELEPHONE ENCOUNTER
Writer received a refill request from: [facility]hya in [facility]. [phone]    Medication: hydrALAZINE (APRESOLINE) 50 MG tablet     Sig: Take 1 and 1/2 tablets by mouth three times a day    * Chart states: Take 75 mg by mouth 2 times daily Take 1.5 tabs BID    Date last dispensed: 2/01/2020      [facility] pt      If unable to refill, route this encounter to the prescribing physician for authorization or further instructions.

## 2020-03-19 NOTE — TELEPHONE ENCOUNTER
Writer called patient and son Ramin picked up the phone. Ramin informed of medication  information. He states yesterday pharmacy already told them not covered, too expensive for self pay, mother declined to pay.    Ramin told can feel free to check with pharmacist at the pharmacy of OTC creams that would be cheaper but to make sure if he does ask to tell pharmacist mother cannot take NSAIDS due to renal failure. Son states verbal understanding.    Ronni Robles CMA

## 2020-04-03 ENCOUNTER — TELEPHONE (OUTPATIENT)
Dept: FAMILY MEDICINE | Facility: CLINIC | Age: 80
End: 2020-04-03

## 2020-04-03 NOTE — TELEPHONE ENCOUNTER
[facility] utilizes an encounter to take the place of a direct phone call to your office. Please take a moment to review the below request. Please reply or route message to author of this encounter.  Message will act as a verbal OK of orders requested below. Thank you.    ORDER  Skilled nurse visits every other week x9 weeks, 3prns.     MD SUMMARY/PLAN OF CARE  SN to perform assessment with focus on edema, weight, dyspnea levels, cardiac assessment, oxygen safety and management, medication management and reconciliation, pain management, mental health status and need for referral or change in treatment plan, skin integrity, falls risk, and to notify physician concerns related to mental or physical health. Instruct on disease process, signs/symptoms of complications to report to agency/physician/911, emergency/safety and falls prevention plan, medication effects/SE, new/high risk/changed medications, diet, and activity. Implement interventions to monitor and mitigate pain, prevent pressure ulcers and confirm proper foot care.     3 prn visits for falls, medication changes/issues, change in mental or physical health, supervisory visits per agency protocol, recertification assessments.  Visits may be in person or via video conference based upon patient needs.

## 2020-04-03 NOTE — TELEPHONE ENCOUNTER
Agree with continued home care. As per note, this can be done in person or via phone call depending on patient's condition. Orders approved.   Jonna Cason MD

## 2020-04-03 NOTE — TELEPHONE ENCOUNTER
Routing to provider to review as writer unsure if home care orders appropriate at this time due to COVID-19 outbreak.      Provider please review and advise.      Lisa Polk RN

## 2020-04-19 DIAGNOSIS — M81.0 SENILE OSTEOPOROSIS: ICD-10-CM

## 2020-04-19 NOTE — TELEPHONE ENCOUNTER
"Requested Prescriptions   Pending Prescriptions Disp Refills     calcium carbonate 600 mg-vitamin D 400 units (CALTRATE) 600-400 MG-UNIT per tablet [Pharmacy Med Name: CALCIUM CARB-PALMIRA 600-400 TABS] 60 tablet 5     Sig: TAKE ONE TABLET BY MOUTH TWICE A DAY // IB ZAUG NOJ IB LUB, IB HNUB NOJ OB ZAUG PAB LASHA POB TXHA       Vitamin Supplements (Adult) Protocol Failed - 4/19/2020  4:55 AM        Failed - Medication is active on med list        Passed - High dose Vitamin D not ordered        Passed - Recent (12 mo) or future (30 days) visit within the authorizing provider's specialty     Patient has had an office visit with the authorizing provider or a provider within the authorizing providers department within the previous 12 mos or has a future within next 30 days. See \"Patient Info\" tab in inbasket, or \"Choose Columns\" in Meds & Orders section of the refill encounter.                 Last Written Prescription Date:  11/7/19  Last Fill Quantity: 60,  # refills: 5   Last office visit: 3/13/2020 with prescribing provider:  Jonna Cason     Future Office Visit:      "

## 2020-04-21 DIAGNOSIS — Z53.9 DIAGNOSIS NOT YET DEFINED: Primary | ICD-10-CM

## 2020-04-21 PROCEDURE — 99207 C MD RECERTIFICATION HHA PT: CPT | Performed by: FAMILY MEDICINE

## 2020-04-22 DIAGNOSIS — Z53.9 DIAGNOSIS NOT YET DEFINED: Primary | ICD-10-CM

## 2020-04-22 PROCEDURE — 99207 C MD RECERTIFICATION HHA PT: CPT | Performed by: FAMILY MEDICINE

## 2020-04-22 NOTE — TELEPHONE ENCOUNTER
Routing refill request to provider for review/approval because:  Drug not active on patient's medication list.    Mena Franklin RN, [facility] Triage

## 2020-05-14 DIAGNOSIS — G89.4 CHRONIC PAIN SYNDROME: ICD-10-CM

## 2020-05-15 NOTE — TELEPHONE ENCOUNTER
Requested Prescriptions   Pending Prescriptions Disp Refills     acetaminophen-codeine (TYLENOL #3) 300-30 MG tablet [Pharmacy Med Name: ACETAMINOPHEN-CODEINE # 300-30 TABS] 60 tablet 0     Sig: TAKE ONE TO TWO TABLETS BY MOUTH EVERY DAY AS NEEDED // IB HNUB NOJ 1-2 LUB YOG MOB       There is no refill protocol information for this order        Routing refill request to provider for review/approval because:  Drug not on the [facility] refill protocol     Theresa Gregorio RN, BSN, PHN

## 2020-05-18 ENCOUNTER — OFFICE VISIT (OUTPATIENT)
Dept: TRANSPLANT | Facility: CLINIC | Age: 80
End: 2020-05-18
Attending: SURGERY
Payer: COMMERCIAL

## 2020-05-18 VITALS
WEIGHT: 106 LBS | SYSTOLIC BLOOD PRESSURE: 149 MMHG | HEART RATE: 66 BPM | OXYGEN SATURATION: 98 % | BODY MASS INDEX: 22.94 KG/M2 | DIASTOLIC BLOOD PRESSURE: 61 MMHG

## 2020-05-18 DIAGNOSIS — Z99.2 ESRD ON DIALYSIS (H): Primary | ICD-10-CM

## 2020-05-18 DIAGNOSIS — Z48.89 ENCOUNTER FOR POST SURGICAL WOUND CHECK: ICD-10-CM

## 2020-05-18 DIAGNOSIS — N18.6 ESRD ON DIALYSIS (H): Primary | ICD-10-CM

## 2020-05-18 DIAGNOSIS — Z09 FOLLOW-UP EXAMINATION AFTER VASCULAR SURGERY: ICD-10-CM

## 2020-05-18 NOTE — LETTER
5/18/2020       RE: Lakhwinder Negron  [address]  [place] [address]     Dear Colleague,    Thank you for referring your patient, Lakhwinder Negron, to the [facility] at [facility]. Please see a copy of my visit note below.    Dialysis Access Service   Progress Note    S:  Ms. Negron is being seen today for surgical plan followup of her dialysis access.  She reports no issues with the wound, and  no steal syndrome of the distal extremity. She was seen in clinic to follow up right upper arm incision site and discuss left upper arm AV graft surgery on 2/24/2020. Patient returns in clinic to re-assess fluid collection in right upper arm AC fossa incision area prior to schedule surgery. She reports right arm incisions healed well without issues. C/O mild tingling/numbness in right hand fingers, sightly decreased muscle strength in right arm and shoulder after right arm surgery. Denies swelling and pain in right arm, a change of temperature/ color in right hand and fingers.    O:  BP: ()/()   Arterial Line BP: ()/()   GENERAL: alert, cooperative  Circulation:   Radial pulse 3+  Ulnar pulse  3+   Capillary refill:  capillary refill < 2 sec    Sensory exam:   arm: Normal   [x]           Abnormal   []          Comment:    hand: Normal   []           Abnormal   [x]          Comment:  mild tingling/numbness in right hand fingers  Motor exam:   arm: Normal   []           Abnormal   [x]          Comment: unable to raise right arm above shoulder    hand: Normal   []           Abnormal   [x]          Comment: less muscle strength   Access: R upper extremity wound(s) healed, non-tender. No venous hypertension. No edema in right arm and AC fossa area, without apparent infection.     Assessment & Plan: Ms Negron is not a great candidate for redo fistula in th other arm. SHe already has weakness in the ipsilateral arm and does not want to risk the same with the other arm. Plus her  veins are small.   Recommend PD cath with family assistance. SHe will discuss with family, attend education and decide.     The surgical risks and benefits were reviewed and questions were answered. We discussed the day of surgery plan, anesthesia, postop care, risk of infection,  injury to surrounding structures, surgical revision, as well  need for site abandonment.The patient does not wish to proceed with surgery for permanent access creation at this time. The patient was counselled to contact our nurse coordinator, CHANO Walter CNS (Sum) at [phone] with any questions or concerns.  Thank you for the opportunity to participate in Ms. Negron's care.    MARTA Montaño (Sum)  Dialysis Vascular Access/SOT Clinical Nurse Specialist    Solid [facility] Transplant Service - [facility]   Phone # [phone]  Pager # [phone]    I have reviewed history, examined patient and discussed plan with the fellow/resident/YISSEL.  I concur with the findings in this note.       Risks of the surgical procedure including but not limited to the rare risk of mortality discussed in detail. Patient verbalized good understanding and had several pertinent questions which were answered satisfactorily.     .    TT: 25 min  CT: 15 min    .

## 2020-05-18 NOTE — PROGRESS NOTES
Dialysis Access Service   Progress Note    S:  Ms. Negron is being seen today for surgical plan followup of her dialysis access.  She reports no issues with the wound, and  no steal syndrome of the distal extremity. She was seen in clinic to follow up right upper arm incision site and discuss left upper arm AV graft surgery on 2/24/2020. Patient returns in clinic to re-assess fluid collection in right upper arm AC fossa incision area prior to schedule surgery. She reports right arm incisions healed well without issues. C/O mild tingling/numbness in right hand fingers, sightly decreased muscle strength in right arm and shoulder after right arm surgery. Denies swelling and pain in right arm, a change of temperature/ color in right hand and fingers.    O:  BP: ()/()   Arterial Line BP: ()/()   GENERAL: alert, cooperative  Circulation:   Radial pulse 3+  Ulnar pulse  3+   Capillary refill:  capillary refill < 2 sec    Sensory exam:   arm: Normal   [x]           Abnormal   []          Comment:    hand: Normal   []           Abnormal   [x]          Comment:  mild tingling/numbness in right hand fingers  Motor exam:   arm: Normal   []           Abnormal   [x]          Comment: unable to raise right arm above shoulder    hand: Normal   []           Abnormal   [x]          Comment: less muscle strength   Access: R upper extremity wound(s) healed, non-tender. No venous hypertension. No edema in right arm and AC fossa area, without apparent infection.     Assessment & Plan: Ms Negron is not a great candidate for redo fistula in th other arm. SHe already has weakness in the ipsilateral arm and does not want to risk the same with the other arm. Plus her veins are small.   Recommend PD cath with family assistance. SHe will discuss with family, attend education and decide.     The surgical risks and benefits were reviewed and questions were answered. We discussed the day of surgery plan, anesthesia, postop care, risk of infection,   injury to surrounding structures, surgical revision, as well  need for site abandonment.The patient does not wish to proceed with surgery for permanent access creation at this time. The patient was counselled to contact our nurse coordinator, CHANO Walter CNS (Sum) at [phone] with any questions or concerns.  Thank you for the opportunity to participate in Ms. Negron's care.    MARTA Montaño (Sum)  Dialysis Vascular Access/SOT Clinical Nurse Specialist    Solid Organ Transplant Service - Critical access hospital   Phone # [phone]  Pager # [phone]    I have reviewed history, examined patient and discussed plan with the fellow/resident/YISSEL.  I concur with the findings in this note.       Risks of the surgical procedure including but not limited to the rare risk of mortality discussed in detail. Patient verbalized good understanding and had several pertinent questions which were answered satisfactorily.     .    TT: 25 min  CT: 15 min    .

## 2020-06-04 ENCOUNTER — TELEPHONE (OUTPATIENT)
Dept: FAMILY MEDICINE | Facility: CLINIC | Age: 80
End: 2020-06-04
Payer: COMMERCIAL

## 2020-06-04 NOTE — TELEPHONE ENCOUNTER
[facility] utilizes an encounter to take the place of a direct phone call to your office. Please take a moment to review the below request. Please reply or route message to author of this encounter.  Message will act as a verbal OK of orders requested below. Thank you.    ORDER  Skilled nurse visits every other week x9 weeks, 3prns.   MD SUMMARY/PLAN OF CARE  SN to perform assessment with focus on edema, weight, dyspnea levels, cardiac assessment, oxygen safety and management, medication management and reconciliation, pain management, mental health status and need for referral or change in treatment plan, skin integrity, falls risk, and to notify physician concerns related to mental or physical health. Instruct on disease process, signs/symptoms of complications to report to agency/physician/911, emergency/safety and falls prevention plan, medication effects/SE, new/high risk/changed medications, diet, and activity. Implement interventions to monitor and mitigate pain, prevent pressure ulcers and confirm proper foot care.     3 prn visits for falls, medication changes/issues, change in mental or physical health, supervisory visits per agency protocol, recertification assessments.  Visits may be in person or via video conference based upon patient needs.

## 2020-06-16 DIAGNOSIS — Z53.9 DIAGNOSIS NOT YET DEFINED: Primary | ICD-10-CM

## 2020-06-16 PROCEDURE — 99207 C MD RECERTIFICATION HHA PT: CPT | Performed by: FAMILY MEDICINE

## 2020-06-22 ENCOUNTER — PATIENT OUTREACH (OUTPATIENT)
Dept: GERIATRIC MEDICINE | Facility: CLINIC | Age: 80
End: 2020-06-22

## 2020-06-22 NOTE — PROGRESS NOTES
[facility] Care Coordination Contact      [facility] Six-Month Telephone Assessment    6 month telephone assessment completed on 6/22/20..    ER visits: No  Hospitalizations: Yes -  [facility]  TCU stays: No  Significant health status changes: N/A  Falls/Injuries: No  ADL/IADL changes: No  Changes in services: No    Caregiver Assessment follow up:  NA    Goals: See POC in chart for goal progress documentation.  Lakhwinder is managing her health well. Hemodialysis is going well and family continues to provide daily support. Current support and services are meeting Lakhwinder's needs.     Will see member in 6 months for an annual health risk assessment.   Encouraged member to call CC with any questions or concerns in the meantime.       Shruthi Martins RN, PHN  [facility]  [phone]

## 2020-07-09 DIAGNOSIS — G89.4 CHRONIC PAIN SYNDROME: ICD-10-CM

## 2020-07-10 NOTE — TELEPHONE ENCOUNTER
Routing refill request to provider for review/approval because:  Drug not on the [facility] refill protocol       Last Written Prescription Date:  5/15/2020  Last Fill Quantity: 60,  # refills: 0   Last office visit: 3/13/2020 with prescribing provider:     Future Office Visit:      JANEL MagañaN, RN

## 2020-07-30 ENCOUNTER — TELEPHONE (OUTPATIENT)
Dept: FAMILY MEDICINE | Facility: CLINIC | Age: 80
End: 2020-07-30
Payer: COMMERCIAL

## 2020-07-31 DIAGNOSIS — I10 HYPERTENSION, GOAL BELOW 140/90: ICD-10-CM

## 2020-07-31 NOTE — TELEPHONE ENCOUNTER
[facility] utilizes an encounter to take the place of a direct phone call to your office. Please take a moment to review the below request. Please reply or route message to author of this encounter.  Message will act as a verbal OK of orders requested below. Thank you.    ORDER  Skilled nurse visits EOW x9 weeks, 3prns.   MD SUMMARY/PLAN OF CARE    SN to perform assessment with focus on edema, weight, dyspnea levels, cardiac assessment, oxygen safety and management, medication management and reconciliation, pain management, mental health status and need for referral or change in treatment plan, skin integrity, falls risk, and to notify physician concerns related to mental or physical health. Instruct on disease process, signs/symptoms of complications to report to agency/physician/911, emergency/safety and falls prevention plan, medication effects/SE, new/high risk/changed medications, diet, and activity. Implement interventions to monitor and mitigate pain, prevent pressure ulcers and confirm proper foot care.     3 prn visits for falls, medication changes/issues, change in mental or physical health, supervisory visits per agency protocol, recertification assessments.  Visits may be in person or via video conference based upon patient needs. Mandeep to begin week of 8/2/2020

## 2020-07-31 NOTE — TELEPHONE ENCOUNTER
The following home care orders are approved:    Skilled nurse visits EOW x9 weeks, 3prns.       Lisa Polk RN

## 2020-08-04 RX ORDER — FUROSEMIDE 20 MG
TABLET ORAL
Qty: 300 TABLET | Refills: 3 | Status: SHIPPED | OUTPATIENT
Start: 2020-08-04 | End: 2020-12-09

## 2020-08-12 ENCOUNTER — TELEPHONE (OUTPATIENT)
Dept: NEPHROLOGY | Facility: CLINIC | Age: 80
End: 2020-08-12

## 2020-08-12 DIAGNOSIS — K21.9 GASTROESOPHAGEAL REFLUX DISEASE WITHOUT ESOPHAGITIS: Primary | ICD-10-CM

## 2020-08-12 NOTE — TELEPHONE ENCOUNTER
Received message from [facility] that patient is requesting an alternative medication for Ranitidine.     No further information given.     Routing to Dr. Rodriguez to review and advise. Send new prescription to pharmacy if indicated.    Daysi Frias RN, BSN  Nephrology Care Coordinator  [facility]

## 2020-08-13 RX ORDER — FAMOTIDINE 20 MG/1
20 TABLET, FILM COATED ORAL
Qty: 36 TABLET | Refills: 3 | Status: SHIPPED | OUTPATIENT
Start: 2020-08-14 | End: 2021-07-23

## 2020-08-24 DIAGNOSIS — E11.22 TYPE 2 DIABETES MELLITUS WITH STAGE 3 CHRONIC KIDNEY DISEASE, WITHOUT LONG-TERM CURRENT USE OF INSULIN (H): ICD-10-CM

## 2020-08-24 DIAGNOSIS — N18.30 TYPE 2 DIABETES MELLITUS WITH STAGE 3 CHRONIC KIDNEY DISEASE, WITHOUT LONG-TERM CURRENT USE OF INSULIN (H): ICD-10-CM

## 2020-08-24 DIAGNOSIS — Z53.9 DIAGNOSIS NOT YET DEFINED: Primary | ICD-10-CM

## 2020-08-24 PROCEDURE — 99207 C MD RECERTIFICATION HHA PT: CPT | Performed by: FAMILY MEDICINE

## 2020-08-25 RX ORDER — GLIPIZIDE 2.5 MG/1
TABLET, EXTENDED RELEASE ORAL
Qty: 180 TABLET | Refills: 1 | Status: SHIPPED | OUTPATIENT
Start: 2020-08-25 | End: 2021-02-15

## 2020-08-25 NOTE — TELEPHONE ENCOUNTER
Routing refill request to provider for review/approval because:  Labs out of range:  Creatinine  Joelle Hassan RN  [facility]

## 2020-09-08 ENCOUNTER — TELEPHONE (OUTPATIENT)
Dept: FAMILY MEDICINE | Facility: CLINIC | Age: 80
End: 2020-09-08

## 2020-09-08 NOTE — TELEPHONE ENCOUNTER
Reason for Call:  Form, our goal is to have forms completed with 72 hours, however, some forms may require a visit or additional information.    Type of letter, form or note:  FMLA    Who is the form from?: Patient    Where did the form come from: Patient or family brought in       What clinic location was the form placed at?: [place]    Where the form was placed: left in first floor box for stephanie    What number is listed as a contact on the form?: [phone]       Additional comments: call son (DESHAUN) when form is completed and ready to  or call with any questions about form    Call taken on 9/8/2020 at 4:16 PM by Kayla Canas

## 2020-09-10 NOTE — TELEPHONE ENCOUNTER
FMLA  form from the [employer] faxed to Dr. Cason via [employer] for completion.  Original form kept in TC office    DARRIUS Bauman.

## 2020-09-14 NOTE — TELEPHONE ENCOUNTER
Original form placed at  for pt's son Ramin to .  Left voicemail updating Ramin form is ready.  Form sent for immediate abstraction. Copy of form placed in provider bin.     DARRIUS Bauman.

## 2020-09-17 DIAGNOSIS — G89.4 CHRONIC PAIN SYNDROME: ICD-10-CM

## 2020-09-17 NOTE — TELEPHONE ENCOUNTER
Controlled Substance Refill Request for Tylenol #3  Problem List Complete:  Yes  Patient is followed by Jonna Cason MD for ongoing prescription of pain medication.  All refills should be approved by this provider, or covering partner.  Tylenol #3  Maximum quantity per month: 60  Clinic visit frequency required: Q 6  months      Controlled substance agreement:      Encounter-Level CSA:      There are no encounter-level csa.          Pain Clinic evaluation in the past: No     DIRE Total Score(s):  No flowsheet data found.     Last [facility] website verification:  9/17/2020    RX [place] reviewed:  reviewed- no concerns    [place]  [email or web address]      Mnea Franklin RN, [facility] Triage

## 2020-09-17 NOTE — TELEPHONE ENCOUNTER
Reason for Call:  Medication or medication refill:    Do you use a [facility]?  Name of the pharmacy and phone number for the current request:  [facility] [place], [place] - [address].     Name of the medication requested: acetaminophen-codeine (TYLENOL #3) 300-30 MG tablet    Can we leave a detailed message on this number? YES    Phone number RN can be reached at: Other phone number:  [phone]    Best Time: anytime    Call taken on 9/17/2020 at 8:53 AM by Regan Ledezma

## 2020-09-27 DIAGNOSIS — M81.0 SENILE OSTEOPOROSIS: ICD-10-CM

## 2020-09-30 NOTE — TELEPHONE ENCOUNTER
Prescription approved per [facility] Refill Protocol.    Nancy Owusu RN  [employer]

## 2020-10-06 ENCOUNTER — PATIENT OUTREACH (OUTPATIENT)
Dept: GERIATRIC MEDICINE | Facility: CLINIC | Age: 80
End: 2020-10-06

## 2020-10-06 NOTE — PROGRESS NOTES
[facility] Care Coordination Contact    Called adult son Ramin to schedule annual HRA home visit. HRA has been scheduled for 10/12 at 11am.     Shruthi Martins RN, PHN  [facility]  [phone]

## 2020-10-12 ENCOUNTER — PATIENT OUTREACH (OUTPATIENT)
Dept: GERIATRIC MEDICINE | Facility: CLINIC | Age: 80
End: 2020-10-12

## 2020-10-12 DIAGNOSIS — I10 HYPERTENSION, GOAL BELOW 140/90: ICD-10-CM

## 2020-10-12 ASSESSMENT — ACTIVITIES OF DAILY LIVING (ADL): DEPENDENT_IADLS:: COOKING;CLEANING;LAUNDRY;SHOPPING;MEAL PREPARATION;MONEY MANAGEMENT;TRANSPORTATION

## 2020-10-12 ASSESSMENT — PATIENT HEALTH QUESTIONNAIRE - PHQ9: SUM OF ALL RESPONSES TO PHQ QUESTIONS 1-9: 4

## 2020-10-12 NOTE — PROGRESS NOTES
[facility] Care Coordination Contact    [facility] Home Visit Assessment     Home visit for Health Risk Assessment with Lakhwinder Negron completed on October 12, 2020    Type of residence:: Private home - stairs  Current living arrangement:: I live in a private home with family     Assessment completed with:: Care Team Member, Patient, Family    Current Care Plan  Member currently receiving the following home care services: Skilled Nursing   Member currently receiving the following community resources: Day Care, DME, Dialysis Services, PCA, Transportation Services      Medication Review  Medication reconciliation completed in Epic: If no, please explain Lakhwinder doesn't remember all the names of her medications but knows how they look like and what they are for. Lakhwinder has a nurse that stops by every other week to assist with medication set-up, management and teaching.   Medication set-up & administration: RN set up every two weeks.  Self-administers medications.  Medication Risk Assessment Medication (1 or more, place referral to MTM): N/A: No risk factors identified  MTM Referral Placed: No: No risk factors idenified    Mental/Behavioral Health   Depression Screening:      PHQ-9 Total Score: 4    Mental health DX:: Yes   Mental health DX how managed:: Other(Family support and Adult Day Care)    Falls Assessment:   Fallen 2 or more times in the past year?: No   Any fall with injury in the past year?: No    ADL/IADL Dependencies:   Dependent ADLs:: Ambulation-cane, Bathing, Dressing, Grooming, Incontinence, Transfers, Toileting  Dependent IADLs:: Cooking, Cleaning, Laundry, Shopping, Meal Preparation, Money Management, Transportation    [facility] sponsored benefits: Shared information re: Silver Sneakers/gym memberships, ASA, Calcium +D.    PCA Assessment completed at visit:  Yes Annual PCA assessment indicated 20 units per day of PCA. This is an increase from the previous year.     Elderly Waiver  Eligibility: Yes-will continue on EW    Care Plan & Recommendations: Lakhwinder is 80 years old, single and lives with her adult son and his family. Lakhwinder has a health hx of ESRD, Hemodialysis 3x/week, hx of CVA, residual rt side weakness, Diabetes, HTN, Hyperlipidemia, uneven gait, hx of Depression, CHF, Neuropathy and chronic pain syndrome. Lakhwinder states health is fair. Family provides support with IADLS/ADLS in addition to PCA. Home care nurse visits e/o week to set-up medication and provide teachings. Lakhwinder takes medical transportation to her dialysis treatments on Tuesday, Thursday and Saturday. Lakhwinder attends [facility] 2x a week for a couple hours. Lakhwinder reports no falls, ED or hospitalization within the past six months. Lakhwinder reports health is declining this year and is happy to have PCA, family and ADC service in place. Lakhwinder reports current support and services are meeting needs.     See [facility] for detailed assessment information.    Follow-Up Plan: Member informed of future contact, plan to f/u with member with a 6 month telephone assessment.  Contact information shared with member and family, encouraged member to call with any questions or concerns at any time.    [place] care continuum providers: Please refer to Health Care Home on the Epic Problem List to view this patient's [facility] Care Plan Summary.    Shruthi Martins RN, PHN  [facility]  [phone]

## 2020-10-14 RX ORDER — CARVEDILOL 25 MG/1
25 TABLET ORAL 2 TIMES DAILY WITH MEALS
Qty: 180 TABLET | Refills: 3 | Status: SHIPPED | OUTPATIENT
Start: 2020-10-14 | End: 2020-12-09

## 2020-10-14 NOTE — TELEPHONE ENCOUNTER
Routing refill request to provider for review/approval because:  Failed BP.    Mena Franklin RN, [facility] Triage

## 2020-10-15 DIAGNOSIS — Z87.19 HISTORY OF GI BLEED: ICD-10-CM

## 2020-10-19 ENCOUNTER — TELEPHONE (OUTPATIENT)
Dept: FAMILY MEDICINE | Facility: CLINIC | Age: 80
End: 2020-10-19

## 2020-10-19 NOTE — TELEPHONE ENCOUNTER
Home Health Cert and plan of care form faxed to Dr. Cason via [employer] for completion    DARRIUS Bauman.

## 2020-10-19 NOTE — TELEPHONE ENCOUNTER
Requested Prescriptions   Pending Prescriptions Disp Refills     ranitidine (ZANTAC) 150 MG tablet [Pharmacy Med Name: RANITIDINE HCL 150MG TABS] 90 tablet 3     Sig: TAKE ONE TABLET BY MOUTH EVERY DAY // IB HNUB NOJ 1 LUB PAB LASHA MOB NCAUJ PLAB       There is no refill protocol information for this order        Routing refill request to provider for review/approval because:  Drug not active on patient's medication list      Theresa Gregorio RN, BSN, PHN

## 2020-10-20 ENCOUNTER — PATIENT OUTREACH (OUTPATIENT)
Dept: GERIATRIC MEDICINE | Facility: CLINIC | Age: 80
End: 2020-10-20

## 2020-10-20 DIAGNOSIS — Z53.9 DIAGNOSIS NOT YET DEFINED: Primary | ICD-10-CM

## 2020-10-20 PROCEDURE — 99207 PR MD RECERTIFICATION HHA PT: CPT | Performed by: FAMILY MEDICINE

## 2020-10-20 NOTE — LETTER
November 10, 2020      MAURO LAZARO  [address] [place] [address]      Dear Mauro:    At [facility], we are dedicated to improving your health and well-being. Enclosed is the Comprehensive Care Plan that we developed with you on 10/12/2020. Please review the Care Plan carefully.    As a reminder, some of the things we discussed at your visit include:    Your physical and mental health    Ways to reduce falls    Health care needs you may have    Don t forget to contact your care coordinator if you:    Have been hospitalized or plan to be hospitalized     Have had a fall     Have experienced a change in physical health    Are experiencing emotional problems     If you do not agree with your Care Plan, have questions about it, or have experienced a change in your needs, please call me at [phone]. If you are hearing impaired, please call the [place] [employer] at 370 or [phone] (mooslf-pd-wrtgks relay service).    Sincerely,    Shruthi Martins RN, PHN    E-mail:[email or web address]  Phone: [phone]    Care Manager  [facility]) is a health plan that contracts with both Medicare and the [place] Medical Assistance (Medicaid) program to provide benefits of both programs to enrollees. Enrollment in [facility] depends on contract renewal.    [id number])     [id number] (11/18)

## 2020-10-20 NOTE — LETTER
[facility]  [address], [address]  [place] [address]  Phone:  [phone]  Fax:  [phone]        October 20, 2020    MAURO LAZARO  [address]  [place] [address]    Dear Mauro,    Faviola is a copy of your completed PCA Assessment and Service Plan.  This is for your records and no action is required by you.  If you have additional questions regarding your assessment please contact me at [phone]. If you feel that your needs are not being met, please contact the Clinical Supervisor at [phone].    Sincerely,    Shruthi Martins RN, PHN    E-mail:[email or web address]  Phone: [phone]    Care Manager  [facility]            Enclosure:  Completed PCA assessment

## 2020-10-20 NOTE — PROGRESS NOTES
[facility] Care Coordination Contact    [facility]:  Emailed completed PCA assessment to [facility].  Faxed copy of PCA assessment to PCA Agency and mailed copy to member.  Faxed MD Communication to PCP.     Mailed PCA & POC Signature page to member w/ self-stamped envelope for return.    Mikayla Robles  Care Management Specialist  [facility]  [phone]

## 2020-10-26 ENCOUNTER — OFFICE VISIT (OUTPATIENT)
Dept: FAMILY MEDICINE | Facility: CLINIC | Age: 80
End: 2020-10-26
Payer: COMMERCIAL

## 2020-10-26 VITALS
SYSTOLIC BLOOD PRESSURE: 146 MMHG | WEIGHT: 108 LBS | OXYGEN SATURATION: 96 % | DIASTOLIC BLOOD PRESSURE: 64 MMHG | TEMPERATURE: 97.1 F | BODY MASS INDEX: 23.37 KG/M2 | RESPIRATION RATE: 16 BRPM | HEART RATE: 76 BPM

## 2020-10-26 DIAGNOSIS — N18.6 TYPE 2 DIABETES MELLITUS WITH CHRONIC KIDNEY DISEASE ON CHRONIC DIALYSIS, WITHOUT LONG-TERM CURRENT USE OF INSULIN (H): Primary | ICD-10-CM

## 2020-10-26 DIAGNOSIS — I50.22 CHRONIC SYSTOLIC CONGESTIVE HEART FAILURE (H): ICD-10-CM

## 2020-10-26 DIAGNOSIS — G89.4 CHRONIC PAIN SYNDROME: ICD-10-CM

## 2020-10-26 DIAGNOSIS — S44.01XS: ICD-10-CM

## 2020-10-26 DIAGNOSIS — N18.6 ESRD ON DIALYSIS (H): ICD-10-CM

## 2020-10-26 DIAGNOSIS — Z99.2 ESRD ON DIALYSIS (H): ICD-10-CM

## 2020-10-26 DIAGNOSIS — I63.549 CEREBROVASCULAR ACCIDENT (CVA) DUE TO OCCLUSION OF CEREBELLAR ARTERY, UNSPECIFIED BLOOD VESSEL LATERALITY (H): ICD-10-CM

## 2020-10-26 DIAGNOSIS — E78.5 HYPERLIPIDEMIA LDL GOAL <100: ICD-10-CM

## 2020-10-26 DIAGNOSIS — N25.81 SECONDARY HYPERPARATHYROIDISM (H): ICD-10-CM

## 2020-10-26 DIAGNOSIS — Z99.2 TYPE 2 DIABETES MELLITUS WITH CHRONIC KIDNEY DISEASE ON CHRONIC DIALYSIS, WITHOUT LONG-TERM CURRENT USE OF INSULIN (H): Primary | ICD-10-CM

## 2020-10-26 DIAGNOSIS — E11.22 TYPE 2 DIABETES MELLITUS WITH CHRONIC KIDNEY DISEASE ON CHRONIC DIALYSIS, WITHOUT LONG-TERM CURRENT USE OF INSULIN (H): Primary | ICD-10-CM

## 2020-10-26 DIAGNOSIS — I10 HYPERTENSION, GOAL BELOW 140/90: ICD-10-CM

## 2020-10-26 LAB
CAPILLARY BLOOD COLLECTION: NORMAL
HBA1C MFR BLD: 6.1 % (ref 0–5.6)

## 2020-10-26 PROCEDURE — 83036 HEMOGLOBIN GLYCOSYLATED A1C: CPT | Performed by: FAMILY MEDICINE

## 2020-10-26 PROCEDURE — 99214 OFFICE O/P EST MOD 30 MIN: CPT | Performed by: FAMILY MEDICINE

## 2020-10-26 PROCEDURE — 36416 COLLJ CAPILLARY BLOOD SPEC: CPT | Performed by: FAMILY MEDICINE

## 2020-10-26 ASSESSMENT — PAIN SCALES - GENERAL: PAINLEVEL: MODERATE PAIN (5)

## 2020-10-26 NOTE — LETTER
October 28, 2020      Lakhwinder Negron  [address]  [place] [address]        Dear ,    Your test results are attached. I am happy to let you know that they are stable.     The diabetes looks good. We can recheck labs in 6 months.     Please call me if you have any questions about these test results or about your care.     Sincerely,       Jonna Cason MD         Results for orders placed or performed in visit on 10/26/20   Hemoglobin A1c     Status: Abnormal   Result Value Ref Range    Hemoglobin A1C 6.1 (H) 0 - 5.6 %   Capillary Blood Collection     Status: None   Result Value Ref Range    Capillary Blood Collection Capillary collection performed

## 2020-10-26 NOTE — PROGRESS NOTES
Subjective     Lakhwinder Negron is a 80 year old female who presents to clinic today for the following health issues:    HPI         Diabetes Follow-up    How often are you checking your blood sugar? One time daily  What time of day are you checking your blood sugars (select all that apply)?  Before meals  Have you had any blood sugars above 200?  No  Have you had any blood sugars below 70?  No    What symptoms do you notice when your blood sugar is low?  None    What concerns do you have today about your diabetes? None     Do you have any of these symptoms? (Select all that apply)  No numbness or tingling in feet.  No redness, sores or blisters on feet.  No complaints of excessive thirst.  No reports of blurry vision.  No significant changes to weight.    Have you had a diabetic eye exam in the last 12 months? No                Hyperlipidemia Follow-Up      Are you regularly taking any medication or supplement to lower your cholesterol?   Yes- Rosuvastatin    Are you having muscle aches or other side effects that you think could be caused by your cholesterol lowering medication?  No    Hypertension Follow-up      Do you check your blood pressure regularly outside of the clinic? No     Are you following a low salt diet? Yes    Are your blood pressures ever more than 140 on the top number (systolic) OR more   than 90 on the bottom number (diastolic), for example 140/90? No    BP Readings from Last 2 Encounters:   10/26/20 (!) 146/64   05/18/20 (!) 149/61     Hemoglobin A1C (%)   Date Value   10/26/2020 6.1 (H)   11/06/2019 5.4     LDL Cholesterol Calculated (mg/dL)   Date Value   03/15/2018 34   12/02/2016 85     LDL Cholesterol Direct (mg/dL)   Date Value   01/20/2020 106 (H)       Heart Failure Follow-up    Are you experiencing any shortness of breath? No    Are you experiencing any swelling in your legs or feet?  No    Are you using more pillows than usual? No    Do you cough at night?  No    Do you check your weight  daily?  Yes    Have you had a weight change recently?  No    Are you having any of the following side effects from your medications? (Select all that apply)  The patient does not report symptoms of dizziness, fatigue, cough, swelling, or slow heart beat.    Since your last visit, how many times have you gone to the cardiologist, urgent care, emergency room, or hospital because of your heart failure?   None      How many days per week do you miss taking your medication? 0    Musculoskeletal problem/pain  Onset/Duration: ongoing for over a year   Description  Location: arm  - right  Joint Swelling: no  Redness: no  Pain: YES  Warmth: no  Intensity:  moderate, severe  Progression of Symptoms:  worsening  Accompanying signs and symptoms:   Fevers: no  Numbness/tingling/weakness: YES  History  Trauma to the area: YES- surgery   Recent illness:  no  Previous similar problem: YES  Previous evaluation:  YES- US 02/14/2020  Precipitating or alleviating factors:  Aggravating factors include: none  Therapies tried and outcome: tylenol        Review of Systems   Constitutional, HEENT, cardiovascular, pulmonary, gi and gu systems are negative, except as otherwise noted.      Objective    BP (!) 146/64 (BP Location: Left arm, Patient Position: Chair, Cuff Size: Adult Regular)   Pulse 76   Temp 97.1  F (36.2  C) (Tympanic)   Resp 16   Wt 49 kg (108 lb)   LMP  (LMP Unknown)   SpO2 96%   BMI 23.37 kg/m    Body mass index is 23.37 kg/m .  Physical Exam   GENERAL: elderly, alert, well nourished, well hydrated, no distress  HENT: ear canals- normal; TMs- normal; Nose- normal; Mouth- no ulcers, no lesions, missing dentition  NECK: no tenderness, no adenopathy, no asymmetry, no masses, no stiffness; thyroid- normal to palpation  RESP: lungs clear to auscultation - no rales, no rhonchi, no wheezes  CV: regular rates and rhythm, normal S1 S2, no S3 or S4 and no murmur, no click or rub, normal pulses  ABDOMEN: soft, no tenderness, no   hepatosplenomegaly, no masses, normal bowel sounds  MS: extremities- no gross deformities noted, no edema  SKIN: no suspicious lesions, no rashes, age related skin changes with seborrheic keratosis and no actinic keratosis. Scar on right upper arm inside above elbow with some keloid formation. Tender.     NEURO: strength and tone- decreased  right greater than left, sensory exam- grossly normal, reflexes- symmetric  BACK: no CVA tenderness, no paralumbar tenderness  MENTAL STATUS EXAM:  Appearance/Behavior: no apparent distress, neatly groomed, dressed appropriately for weather, appears stated age and is frail-appearing  Speech: normal  Mood/Affect: normal affect  Insight: Good     Results for orders placed or performed in visit on 10/26/20   Hemoglobin A1c     Status: Abnormal   Result Value Ref Range    Hemoglobin A1C 6.1 (H) 0 - 5.6 %   Capillary Blood Collection     Status: None   Result Value Ref Range    Capillary Blood Collection Capillary collection performed            Assessment & Plan     Type 2 diabetes mellitus with chronic kidney disease on chronic dialysis, without long-term current use of insulin (H)  Recheck in good range.   - Hemoglobin A1c    Chronic systolic congestive heart failure (H)  No acute signs of failure. 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 Weakness on right side and needs cane to walk    ESRD on dialysis (H)  Doing well with her dialysis but had complications with the right arm access attempt and is reluctant to let them use the left arm. Using central line access.     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 Patient requests further evaluation and treatment. May need EMG.   - NEUROLOGY ADULT REFERRAL    Chronic pain syndrome  Would like to have Tylenol #3 for pain at night.   - acetaminophen-codeine (TYLENOL #3) 300-30 MG tablet  Dispense: 60 tablet; Refill: 3          CONSULTATION/REFERRAL to neurology  FUTURE LABS:       - Schedule non-fasting labs in 6 months- just needs A1C follow up   FUTURE APPOINTMENTS:       - Follow-up visit in 6 months or sooner if any questions or concerns.   Regular exercise  See Patient Instructions    Return in about 6 months (around 4/26/2021) for with me.    Jonna Cason MD  [facility]

## 2020-10-26 NOTE — PATIENT INSTRUCTIONS
"At [facility], we strive to deliver an exceptional experience to you, every time we see you. If you receive a survey, please complete it as we do value your feedback.  If you have MyChart, you can expect to receive results automatically within 24 hours of their completion.  Your provider will send a note interpreting your results as well.   If you do not have MyChart, you should receive your results in about a week by mail.    Your care team:                            Family Medicine Internal Medicine   MD Gera Frost, MD Jaskaran Cooley MD Katya Georgiev PA-C Megan Hill, APRN CNP    Fernando Causey, MD Pediatrics   Reagan Parham, PAMarquisC  Cathleen Adams, CNP MD Radha Holman APRN CNP   MD Candida Gonzalez MD Deborah Mielke, MD Kaleigh Vallejo, APRN CNP  Nohemi Jaeger PAYORDY Stallings, CNP  MD Saar Lemon MD Angela Wermerskirchen, MD      Clinic hours: Monday - Thursday 7 am-7 pm; Fridays 7 am-5 pm.   Urgent care: Monday - Friday 11 am-9 pm; Saturday and Sunday 9 am-5 pm.    Clinic: [phone]       [facility]: Monday - Thursday 8 am - 7 pm; Friday 8 am - 6 pm  [facility]: [phone]     Use www.[email or web address].org for 24/7 diagnosis and treatment of dozens of conditions.    Patient Education     Ulnar Nerve Palsy  The ulnar nerve travels down the arm from the shoulder to the hand. It controls movement and feeling in the wrist and hand. Damage to this nerve can cause a condition called ulnar nerve palsy.  A common cause of ulnar nerve palsy is leaning on the elbow for long periods of time. Injury to the arm or elbow, such as a fracture, can also damage the ulnar nerve.  Symptoms of ulnar nerve palsy include:    Numbness, tingling, or burning (often described as \"pins and needles\")    Pain    Weakness or muscle shrinking in " the hand and fingers  The treatment depends on the cause of the nerve damage. In some cases, the problem will go away on its own once pressure to the nerve is relieved. Certain tests may be done to help determine the injury and extent of damage. These include nerve conduction studies (NCS) and electromyography (EMG). Splinting the wrist to limit movement may help with healing. If the problem is due to trauma or injury, physical therapy may be prescribed. Corticosteroid injections into the area may reduce swelling and pressure on the nerve. Surgery to repair the nerve may be needed for chronic symptoms that don't respond to simpler treatments.  Home care    Rest the wrist and arm until normal feeling and strength return.    If you were given a splint or sling, wear it as directed.    Don't lean on your elbows.    If medicines were prescribed for pain or nerve sensations, take them as directed.  Follow-up care  Follow up with your healthcare provider or as advised by our staff.  When to seek medical advice  Call your healthcare provider right away if you have any of the following:    Increasing arm swelling or pain    Numbness or weakness of the arm    Symptoms spread to other parts of the body    Slurred speech, confusion    Trouble speaking, walking, or seeing  Date Last Reviewed: 4/1/2018 2000-2019 [employer]. [address] [place] [address], [place] [address]. All rights reserved. This information is not intended as a substitute for professional medical care. Always follow your healthcare professional's instructions.

## 2020-10-28 NOTE — RESULT ENCOUNTER NOTE
Dear Lakhwinder Negron,    Your test results are attached. I am happy to let you know that they are stable.    The diabetes looks good. We can recheck labs in 6 months.     Please call me if you have any questions about these test results or about your care.    Sincerely,    Jonna Cason MD

## 2020-11-11 NOTE — PROGRESS NOTES
[facility] Care Coordination Contact    Received after visit chart from care coordinator.  Completed following tasks: Mailed copy of care plan to client, Updated services in access and Submitted referrals/auths for [facility].    Provider Signature - No POC Shared:  Member indicates that they do not want their POC shared with any [facility] providers.    Mikayla Robles  Care Management Specialist  [facility]  [phone]

## 2020-11-20 ENCOUNTER — TELEPHONE (OUTPATIENT)
Dept: NEUROLOGY | Facility: CLINIC | Age: 80
End: 2020-11-20

## 2020-11-20 NOTE — TELEPHONE ENCOUNTER
I called patient and spoke with son.  They are able to do a video visit-send link to  [phone].   needed for appt but not rooming.    Shirley Nolan LPN

## 2020-12-07 NOTE — PROGRESS NOTES
[facility] Care Coordination Contact    Signature pages received and filed.  Sent PCA Signature page to provider and HP.    Mikayla Robles  Care Management Specialist  [facility]  [phone]

## 2020-12-09 ENCOUNTER — CARE COORDINATION (OUTPATIENT)
Dept: NEPHROLOGY | Facility: CLINIC | Age: 80
End: 2020-12-09

## 2020-12-09 DIAGNOSIS — I10 HYPERTENSION, GOAL BELOW 140/90: ICD-10-CM

## 2020-12-09 RX ORDER — FUROSEMIDE 20 MG
TABLET ORAL
Qty: 300 TABLET | Refills: 3 | COMMUNITY
Start: 2020-12-09 | End: 2022-03-11

## 2020-12-09 RX ORDER — HYDRALAZINE HYDROCHLORIDE 50 MG/1
75 TABLET, FILM COATED ORAL 3 TIMES DAILY
Qty: 180 TABLET | Refills: 3 | COMMUNITY
Start: 2020-12-09 | End: 2020-12-28

## 2020-12-09 RX ORDER — CARVEDILOL 12.5 MG/1
18.75 TABLET ORAL 2 TIMES DAILY WITH MEALS
COMMUNITY
Start: 2020-12-09 | End: 2021-04-08

## 2020-12-11 DIAGNOSIS — E78.5 HYPERLIPIDEMIA LDL GOAL <100: ICD-10-CM

## 2020-12-14 DIAGNOSIS — Z53.9 DIAGNOSIS NOT YET DEFINED: Primary | ICD-10-CM

## 2020-12-14 PROCEDURE — 99207 PR MD RECERTIFICATION HHA PT: CPT | Performed by: FAMILY MEDICINE

## 2020-12-15 RX ORDER — ROSUVASTATIN CALCIUM 20 MG/1
TABLET, COATED ORAL
Qty: 45 TABLET | Refills: 0 | Status: SHIPPED | OUTPATIENT
Start: 2020-12-15 | End: 2021-03-10

## 2020-12-15 NOTE — TELEPHONE ENCOUNTER
Prescription approved per [facility] Refill Protocol.  Joelle Hassan RN  [facility]

## 2020-12-16 ENCOUNTER — PATIENT OUTREACH (OUTPATIENT)
Dept: GERIATRIC MEDICINE | Facility: CLINIC | Age: 80
End: 2020-12-16

## 2020-12-16 NOTE — PROGRESS NOTES
[facility] Care Coordination Contact    Member's son, Ramin called to check on when new PCA hours will be effective. CC spoke to [facility] and was informed the effective date for new PCA reassessment will be 1/1/21 - 10/31/21.     CC informed Ramin of the new effective date.     Shruthi Martins RN, PHN  [facility]  [phone]

## 2020-12-28 DIAGNOSIS — I10 HYPERTENSION, GOAL BELOW 140/90: ICD-10-CM

## 2020-12-28 RX ORDER — HYDRALAZINE HYDROCHLORIDE 50 MG/1
75 TABLET, FILM COATED ORAL 3 TIMES DAILY
Qty: 405 TABLET | Refills: 3 | Status: SHIPPED | OUTPATIENT
Start: 2020-12-28 | End: 2021-01-07

## 2020-12-28 NOTE — TELEPHONE ENCOUNTER
Faxed refill request.   Medication requested: Hydralazine 50 mg . Take one and one half tablet twice a day Instructions different :   Pharmacy Requested: [facility]   Pt's last office visit: 11/4/19  Next scheduled office visit: none    Ashley Villa RN

## 2020-12-29 ENCOUNTER — TELEPHONE (OUTPATIENT)
Dept: NEPHROLOGY | Facility: CLINIC | Age: 80
End: 2020-12-29

## 2020-12-29 DIAGNOSIS — I10 HYPERTENSION, GOAL BELOW 140/90: ICD-10-CM

## 2020-12-29 NOTE — TELEPHONE ENCOUNTER
[facility] Call Center    Phone Message    May a detailed message be left on voicemail: yes     Reason for Call: Medication Question or concern regarding medication   Prescription Clarification  Name of Medication: hydrALAZINE (APRESOLINE) 50 MG tablet  Prescribing Provider: Jennifer   Pharmacy: [facility]   What on the order needs clarification? There are 2 sets of directions. Pharmacy would like to know what directions the patient should be using. Please advise. Thank you.    Action Taken: Message routed to:  Adult Clinics: Nephrology p [id number]    Travel Screening: Not Applicable

## 2020-12-29 NOTE — TELEPHONE ENCOUNTER
Message sent to Dr. Rodriguez for clarification.     Daysi Frias, RN, BSN  Nephrology Care Coordinator  [facility]

## 2020-12-30 NOTE — TELEPHONE ENCOUNTER
Left message for patient's home care nurse Lilliana to determine if patient has been taking Hydralazine BID or TID. I went through the med list with her home health nurse this month so I'm not sure why it is confusing in the chart. Her home health nurse is Lilliana is home RN: [phone]     Daysi Frias, RN, BSN  Nephrology Care Coordinator  [facility]

## 2021-01-04 ENCOUNTER — TELEPHONE (OUTPATIENT)
Dept: NEUROLOGY | Facility: CLINIC | Age: 81
End: 2021-01-04

## 2021-01-04 NOTE — TELEPHONE ENCOUNTER
1/4 Called and left voicemail. Provided phone number [phone] to reschedule new appointment with Dr. Lopez.     Jyoti Khan   Procedure    Ortho/Sports Med/Pod/Ent/Eye  [facility]   [phone]      ----- Message from Ramon Rodriguez MD sent at 1/2/2021  7:55 AM CST -----  I see the patient had an appt with neuology in Nov but failed for this appt. Can you help in getting her rescheduled in person with neurology?    Thank you, Ramon Rodriguez

## 2021-01-06 NOTE — TELEPHONE ENCOUNTER
Left another message for Lilliana to confirm dosing on Hydralazine in order to refill medication.     Daysi Frias RN, BSN  Nephrology Care Coordinator  [facility]

## 2021-01-07 RX ORDER — HYDRALAZINE HYDROCHLORIDE 50 MG/1
75 TABLET, FILM COATED ORAL 2 TIMES DAILY
Qty: 270 TABLET | Refills: 3 | Status: SHIPPED | OUTPATIENT
Start: 2021-01-07 | End: 2021-05-13

## 2021-01-07 NOTE — TELEPHONE ENCOUNTER
No answer from home care team regarding Hydralazine. RN contacted pharmacy who stated that the patient has been getting BID dosing refilled most recently. Advised that I would send in BID dosing to assure she does not run out of med, though will await return call from home care team.  Ramon Rodriguez MD Robinson, Amy J, RN   Caller: Unspecified (1 week ago)             I went through the med list with her home health nurse this month so I'm not sure why it is confusing in the chart. Her home health nurse is Lilliana is home RN: [phone]     Would you mind just clarifying BID or TID with her? Sorry for hte trouble.   Ramon Frias RN

## 2021-01-11 NOTE — TELEPHONE ENCOUNTER
1/11 2nd attempt.  Called and left voicemail. Provided phone number [phone] to reschedule new appointment with Dr. Lopez.      Jyoti Khan          Procedure    Ortho/Sports Med/Pod/Ent/Eye  [facility]   [phone]

## 2021-01-18 NOTE — TELEPHONE ENCOUNTER
1/18 3rd attempt.  Called and left voicemail. Provided phone number [phone] to reschedule new appointment with Dr. Lopez.      Jyoti Khan          Procedure    Ortho/Sports Med/Pod/Ent/Eye  [facility] [place]   [phone]

## 2021-02-03 ENCOUNTER — TELEPHONE (OUTPATIENT)
Dept: NEPHROLOGY | Facility: CLINIC | Age: 81
End: 2021-02-03

## 2021-02-03 NOTE — TELEPHONE ENCOUNTER
1st attempt.    Left voicemail for pt to call to schedule. Pt was a no show for appt back in November but Dr. Rodriguez would like her to be set up for a new Neurology Consult.     Pina Pederson  Medical Specialty Procedure   [facility] [place]  [phone]

## 2021-02-15 ENCOUNTER — TELEPHONE (OUTPATIENT)
Dept: NEPHROLOGY | Facility: CLINIC | Age: 81
End: 2021-02-15

## 2021-02-15 NOTE — TELEPHONE ENCOUNTER
2/15 Called and left voicemail. Provided phone number [phone] to schedule an neurology appointment with Dr. Houston or Dr. Lopez.     Jyoti [facility]   Procedure    Ortho/Sports Med/Pod/Ent/Eye  [facility]   [phone]      ----- Message from Ramon Rodriguez MD sent at 1/28/2021  4:35 PM CST -----  Can you help in arranging a neuro appt for Lakhwinder? She would need an inperson visit so may need to wait a bit if that is not a possibilty - the referral is for neuropathy.     Ramon

## 2021-02-18 DIAGNOSIS — Z53.9 DIAGNOSIS NOT YET DEFINED: Primary | ICD-10-CM

## 2021-02-18 PROCEDURE — 99207 PR MD RECERTIFICATION HHA PT: CPT | Performed by: FAMILY MEDICINE

## 2021-02-22 NOTE — TELEPHONE ENCOUNTER
2/22 2nd attempt.  Called and left voicemail. Provided phone number [phone] to schedule an neurology appointment with Dr. Houston or Dr. Lopez.      Jyoti Khan          Procedure    Ortho[facility]Sports Med[facility]Pod[facility]Ent[facility]   [phone]

## 2021-03-01 NOTE — TELEPHONE ENCOUNTER
3/1 3rd attempt.  Called and left voicemail. Provided phone number [phone] to schedule an neurology appointment with Dr. Houston or Dr. Lopez.      Jyoti Khan          Procedure    Ortho/Sports Med/Pod/Ent/Eye  [facility] [place]   [phone]

## 2021-03-09 DIAGNOSIS — I10 HYPERTENSION, GOAL BELOW 140/90: ICD-10-CM

## 2021-03-09 DIAGNOSIS — E78.5 HYPERLIPIDEMIA LDL GOAL <100: ICD-10-CM

## 2021-03-10 RX ORDER — FUROSEMIDE 20 MG
TABLET ORAL
Qty: 300 TABLET | Refills: 3 | OUTPATIENT
Start: 2021-03-10

## 2021-03-10 RX ORDER — ROSUVASTATIN CALCIUM 20 MG/1
TABLET, COATED ORAL
Qty: 45 TABLET | Refills: 3 | Status: SHIPPED | OUTPATIENT
Start: 2021-03-10 | End: 2022-03-02

## 2021-03-10 NOTE — TELEPHONE ENCOUNTER
Routing refill request to provider for review/approval because:  Labs not current:  Lipids  Medication is reported/historical  Cesia Perez BSN, RN

## 2021-03-16 NOTE — TELEPHONE ENCOUNTER
4th attempt.    Chart letter printed & sent.    Pina Pederson  Medical Specialty Procedure   [facility] [place]

## 2021-04-02 ENCOUNTER — TELEPHONE (OUTPATIENT)
Dept: CARDIOLOGY | Facility: CLINIC | Age: 81
End: 2021-04-02

## 2021-04-02 NOTE — TELEPHONE ENCOUNTER
Received fax from pharmacy asking to clarify patient's dose of carvedilol. Patient is asking for a refill of 12.5 mg but [facility] only have 25 mg in their records. The dose was changed from 12.5 mg to 25 mg by Dr Cason.   Left message for Lilliana LIU to call clinic back and verify what dose patient is taking at home.     DBaMARJORIE hernodn

## 2021-04-06 DIAGNOSIS — M81.0 SENILE OSTEOPOROSIS: ICD-10-CM

## 2021-04-07 NOTE — TELEPHONE ENCOUNTER
"Requested Prescriptions   Pending Prescriptions Disp Refills    Calcium Carbonate-Vitamin D3 600-400 MG-UNIT TABS [Pharmacy Med Name: CALCIUM CARB-PALMIRA 600-400 TABS] 60 tablet 5     Sig: TAKE ONE TABLET BY MOUTH TWICE A DAY // IB ZATIFFANIE NOJ IB LUB, IB HNUB NOJ OB ZATIFFANIE PAB LASHA POB TXHA       Vitamin Supplements (Adult) Protocol Failed - 4/6/2021  5:04 AM        Failed - Medication is active on med list        Passed - High dose Vitamin D not ordered        Passed - Recent (12 mo) or future (30 days) visit within the authorizing provider's specialty     Patient has had an office visit with the authorizing provider or a provider within the authorizing providers department within the previous 12 mos or has a future within next 30 days. See \"Patient Info\" tab in inbasket, or \"Choose Columns\" in Meds & Orders section of the refill encounter.                 Carmina Bryant RN  "

## 2021-04-08 DIAGNOSIS — Z53.9 DIAGNOSIS NOT YET DEFINED: Primary | ICD-10-CM

## 2021-04-08 DIAGNOSIS — I10 HYPERTENSION, GOAL BELOW 140/90: ICD-10-CM

## 2021-04-08 PROCEDURE — 99207 PR MD RECERTIFICATION HHA PT: CPT | Performed by: FAMILY MEDICINE

## 2021-04-08 RX ORDER — CARVEDILOL 12.5 MG/1
12.5 TABLET ORAL 2 TIMES DAILY WITH MEALS
Qty: 60 TABLET | Refills: 3 | Status: SHIPPED | OUTPATIENT
Start: 2021-04-08 | End: 2021-05-13

## 2021-05-09 ENCOUNTER — TELEPHONE (OUTPATIENT)
Dept: FAMILY MEDICINE | Facility: CLINIC | Age: 81
End: 2021-05-09

## 2021-05-10 NOTE — TELEPHONE ENCOUNTER
Dr. Cason completed form for patient and this form has been faxed back to fax # [phone].  Gamal Negron,  For 1st Floor Primary Care

## 2021-05-13 ENCOUNTER — PATIENT OUTREACH (OUTPATIENT)
Dept: GERIATRIC MEDICINE | Facility: CLINIC | Age: 81
End: 2021-05-13

## 2021-05-13 DIAGNOSIS — I10 HYPERTENSION, GOAL BELOW 140/90: ICD-10-CM

## 2021-05-13 DIAGNOSIS — G89.4 CHRONIC PAIN SYNDROME: ICD-10-CM

## 2021-05-13 RX ORDER — CARVEDILOL 12.5 MG/1
18.75 TABLET ORAL 2 TIMES DAILY WITH MEALS
Qty: 270 TABLET | Refills: 3 | Status: SHIPPED | OUTPATIENT
Start: 2021-05-13 | End: 2022-05-11

## 2021-05-13 RX ORDER — HYDRALAZINE HYDROCHLORIDE 50 MG/1
75 TABLET, FILM COATED ORAL 3 TIMES DAILY
Qty: 405 TABLET | Refills: 3 | Status: SHIPPED | OUTPATIENT
Start: 2021-05-13 | End: 2022-05-11

## 2021-05-13 NOTE — TELEPHONE ENCOUNTER
Routing refill request to provider for review/approval because:  Drug not on the [facility] refill protocol     Ashley ISLASN, RN

## 2021-05-13 NOTE — PROGRESS NOTES
[facility] Care Coordination Contact    Called member and adult son Ramin to complete six month assessment and left a message requesting a return call.      Shruthi Martins RN, PHN  [facility]  [phone]

## 2021-06-10 DIAGNOSIS — Z53.9 DIAGNOSIS NOT YET DEFINED: Primary | ICD-10-CM

## 2021-06-10 PROCEDURE — 99207 PR MD RECERTIFICATION HHA PT: CPT | Performed by: FAMILY MEDICINE

## 2021-06-25 NOTE — PROGRESS NOTES
Order(s) created erroneously. Erroneous order ID: [id number]   Order canceled by: CHASE TINEO   Order cancel date/time: 06/25/2021 8:41 AM

## 2021-06-27 NOTE — PROGRESS NOTES
ED HPI GENERAL MEDICAL PROBLEM





- General


Chief Complaint: Gastrointestinal Problem


Stated Complaint: STOMACH PAIN, DIARRHEA. BLOODY, BLACK STOOL


Time Seen by Provider: 06/27/21 00:10


Source of Information: Reports: Patient


History Limitations: Reports: No Limitations





- History of Present Illness


INITIAL COMMENTS - FREE TEXT/NARRATIVE: 





ED with c/o diarrhea, black stools, some cramping after eating.  low grade 

fever, thursday.  No c/o sore throat or ear ear pain








- Related Data


                                    Allergies











Allergy/AdvReac Type Severity Reaction Status Date / Time


 


amoxicillin Allergy  Rash Verified 06/27/21 00:14


 


cefdinir [From Omnicef] Allergy  Rash Verified 06/27/21 00:14











Home Meds: 


                                    Home Meds





Acetaminophen [Tylenol Solution 160 MG/5 ML] 160 mg PO Q4H 09/06/18 [History]


Ibuprofen [Motrin 100 MG/5 ML Susp] 100 mg PO Q6H 09/06/18 [History]


Albuterol Sulfate 1 ampule INH ASDIRECTED PRN 05/05/21 [History]


Budesonide [Pulmicort] 1 ampule INH ASDIRECTED PRN 05/05/21 [History]


Montelukast [Singulair] 4 mg PO BEDTIME 05/05/21 [History]


Cetirizine [ZyrTEC] 2.5 mg PO DAILY 06/18/21 [History]











Past Medical History





- Past Health History


Medical/Surgical History: Denies Medical/Surgical History


HEENT History: Reports: Otitis Media


Cardiovascular History: Reports: None


Respiratory History: Reports: Asthma


Gastrointestinal History: Reports: Chronic Constipation


Other Gastrointestinal History: viral gastroenteritis 3/4/19


Genitourinary History: Reports: None


Musculoskeletal History: Reports: None


Neurological History: Reports: None


Psychiatric History: Reports: None


Endocrine/Metabolic History: Reports: None


Hematologic History: Reports: None


Immunologic History: Reports: None


Oncologic (Cancer) History: Reports: None


Dermatologic History: Reports: None





- Infectious Disease History


Infectious Disease History: Reports: None





- Past Surgical History


HEENT Surgical History: Reports: None


Respiratory Surgical History: Reports: None





Social & Family History





- Family History


Family Medical History: No Pertinent Family History





- Tobacco Use


Second Hand Smoke Exposure: No





- Caffeine Use


Caffeine Use: Reports: None





- Living Situation & Occupation


Living situation: Reports: with Family





ED ROS GENERAL





- Review of Systems


Review Of Systems: Comprehensive ROS is negative, except as noted in HPI.





ED EXAM, GI/ABD





- Physical Exam


Exam: See Below


Exam Limited By: No Limitations


General Appearance: Alert, No Apparent Distress


Eyes: Bilateral: EOMI


Ears: Normal External Exam


Nose: Normal Inspection


Throat/Mouth: Normal Inspection, Other (mucus membranes moist)


Head: Atraumatic, Normocephalic


Neck: Normal Inspection


Respiratory/Chest: No Respiratory Distress, Lungs Clear


Cardiovascular: Normal Peripheral Pulses, Regular Rate, Rhythm


GI/Abdominal Exam: Normal Bowel Sounds, Soft, No Distention.  No: Guarding, 

Rigid, Rebound, Tender


Extremities: Normal Inspection


Neurological: Normal Cognition


Skin Exam: Warm, Dry, Intact, Normal Color





Course





- Vital Signs


Last Recorded V/S: 


                                Last Vital Signs











Temp  98.3 F   06/27/21 00:11


 


Pulse  80   06/27/21 00:11


 


Resp      


 


BP      


 


Pulse Ox  98   06/27/21 00:11














- Orders/Labs/Meds


Orders: 


                               Active Orders 24 hr











 Category Date Time Status


 


 CLOSTRIDIUM DIFFICILE TOX RFLX [MREF] Stat Lab  06/27/21 00:23 Ordered


 


 CULTURE STOOL [RM] Stat Lab  06/27/21 00:23 Ordered


 


 Isolation [COMM] Stat Oth  06/27/21 00:25 Active











Labs: 


                                Laboratory Tests











  06/27/21 06/27/21 Range/Units





  00:45 00:45 


 


WBC  9.1   (5.0-16.0)  10^3/uL


 


RBC  4.38   (3.9-5.3)  10^6/uL


 


Hgb  11.7   (11.5-13.5)  g/dL


 


Hct  33.1 L   (34.0-40.0)  %


 


MCV  75.6   (75-87)  fL


 


MCH  26.7   (24.0-30.0)  pg


 


MCHC  35.3   (31.0-37.0)  g/dL


 


Plt Count  207  D   (150-300)  10^3/uL


 


Neut % (Auto)  55.9 H   (17.0-53.0)  %


 


Lymph % (Auto)  24.1 L   (30.0-60.0)  %


 


Mono % (Auto)  14.2 H   (2-8)  %


 


Eos % (Auto)  5.6 H   (1.0-5.0)  %


 


Baso % (Auto)  0.2 L   (1.0-2.0)  %


 


Sodium   140  (136-145)  mmol/L


 


Potassium   4.0  (3.5-5.1)  mmol/L


 


Chloride   104  ()  mmol/L


 


Carbon Dioxide   22  (21-32)  mmol/L


 


Anion Gap   18.0 H  (7-13)  mEq/L


 


BUN   13  (7-18)  mg/dL


 


Creatinine   0.28 L  (0.70-1.30)  mg/dL


 


Est Cr Clr Drug Dosing   TNP  


 


Estimated GFR (MDRD)   150  


 


Glucose   91  ()  mg/dL


 


Calcium   8.9  (8.5-10.1)  mg/dL














Departure





- Departure


Time of Disposition: 01:44


Disposition: Home, Self-Care 01


Condition: Good


Clinical Impression: 


Diarrhea


Qualifiers:


 Diarrhea type: unspecified type Qualified Code(s): R19.7 - Diarrhea, 

unspecified








- Discharge Information


*PRESCRIPTION DRUG MONITORING PROGRAM REVIEWED*: Not Applicable


*COPY OF PRESCRIPTION DRUG MONITORING REPORT IN PATIENT HENNY: Not Applicable


Instructions:  Food Choices to Help Relieve Diarrhea, Pediatric, Easy-to-Read, 

Diarrhea, Child


Forms:  ED Department Discharge


Additional Instructions: 


small amounts fluid more frequently


avoid milk products 24 hours


clinic follow up


tylenol for age every 4 hours as needed for fever/ discomfort








Sepsis Event Note (ED)





- Focused Exam


Vital Signs: 


                                   Vital Signs











  Temp Pulse Pulse Ox


 


 06/27/21 00:11  98.3 F  80  98














- My Orders


Last 24 Hours: 


My Active Orders





06/27/21 00:23


CLOSTRIDIUM DIFFICILE TOX RFLX [MREF] Stat 


CULTURE STOOL [RM] Stat 





06/27/21 00:25


Isolation [COMM] Stat 














- Assessment/Plan


Last 24 Hours: 


My Active Orders





06/27/21 00:23


CLOSTRIDIUM DIFFICILE TOX RFLX [MREF] Stat 


CULTURE STOOL [RM] Stat 





06/27/21 00:25


Isolation [COMM] Stat HPI      ROS      Physical Exam

## 2021-07-21 NOTE — TELEPHONE ENCOUNTER
"Requested Prescriptions   Pending Prescriptions Disp Refills     Calcium Carbonate-Vitamin D3 (CALCIUM 600-D) 600-400 MG-UNIT TABS [Pharmacy Med Name: CALCIUM 600-D 600-400MG-UNIT TABS]  Last Written Prescription Date:  05/29/19  Last Fill Quantity: 60,  # refills: 5   Last Office Visit with G, P or [facility] prescribing provider:  10/25/19-Yoav   Future Office Visit:    60 tablet 5     Sig: TAKE ONE TABLET BY MOUTH TWICE A DAY FOR BONE HEALTH // IB ZAUG NOJ IB LUB, IB HNUB NOJ OB ZAUG PAB LASHA POB TXHA       Vitamin Supplements (Adult) Protocol Failed - 11/6/2019  5:27 AM        Failed - Medication is active on med list        Passed - High dose Vitamin D not ordered        Passed - Recent (12 mo) or future (30 days) visit within the authorizing provider's specialty     Patient has had an office visit with the authorizing provider or a provider within the authorizing providers department within the previous 12 mos or has a future within next 30 days. See \"Patient Info\" tab in inbasket, or \"Choose Columns\" in Meds & Orders section of the refill encounter.                "
Prescription refilled. Jonna Cason MD   
Routing refill request to provider for review/approval because:  Drug not active on patient's medication list  Taking alternatives prescribed by Dr. Rodriguez (?)    Nancy Owusu RN  [facility]            
[place] citizen

## 2021-07-22 DIAGNOSIS — E11.22 TYPE 2 DIABETES MELLITUS WITH DIABETIC CHRONIC KIDNEY DISEASE (H): ICD-10-CM

## 2021-07-22 DIAGNOSIS — I10 ESSENTIAL HYPERTENSION: ICD-10-CM

## 2021-07-22 RX ORDER — LANCETS 33 GAUGE
EACH MISCELLANEOUS
Qty: 200 EACH | Refills: 0 | Status: SHIPPED | OUTPATIENT
Start: 2021-07-22 | End: 2021-10-20

## 2021-07-22 NOTE — TELEPHONE ENCOUNTER
Medication is being filled for 1 time refill only due to:  Patient needs to be seen because due for 6 month diabetic follow-up. .        Antonieta Macdonald RN  [facility]

## 2021-07-23 DIAGNOSIS — K21.9 GASTROESOPHAGEAL REFLUX DISEASE WITHOUT ESOPHAGITIS: ICD-10-CM

## 2021-07-23 RX ORDER — FAMOTIDINE 20 MG/1
20 TABLET, FILM COATED ORAL
Qty: 36 TABLET | Refills: 3 | Status: SHIPPED | OUTPATIENT
Start: 2021-07-23 | End: 2022-05-11

## 2021-08-02 ENCOUNTER — TELEPHONE (OUTPATIENT)
Dept: NURSING | Facility: CLINIC | Age: 81
End: 2021-08-02

## 2021-08-02 NOTE — TELEPHONE ENCOUNTER
Nurse Tijerina from [facility] calling for verbal order from PCP for skilled nursing every other week for 9 weeks. Requesting call back to [phone].    Mirta Nazario RN 08/02/21 1:19 PM   [facility] Triage Nurse Advisor

## 2021-08-06 DIAGNOSIS — Z53.9 DIAGNOSIS NOT YET DEFINED: Primary | ICD-10-CM

## 2021-08-06 PROCEDURE — 99207 PR MD RECERTIFICATION HHA PT: CPT | Performed by: FAMILY MEDICINE

## 2021-09-08 ENCOUNTER — PATIENT OUTREACH (OUTPATIENT)
Dept: GERIATRIC MEDICINE | Facility: CLINIC | Age: 81
End: 2021-09-08

## 2021-09-08 DIAGNOSIS — T82.898D PROBLEM WITH DIALYSIS ACCESS, SUBSEQUENT ENCOUNTER: Primary | ICD-10-CM

## 2021-09-08 NOTE — PROGRESS NOTES
[facility] Care Coordination Contact    Called adult son Ramin to schedule annual HRA home visit. HRA has been scheduled for 9/13/21 at 10am.     Shruthi Martins RN, PHN  [facility]  [phone]

## 2021-09-13 ENCOUNTER — PATIENT OUTREACH (OUTPATIENT)
Dept: GERIATRIC MEDICINE | Facility: CLINIC | Age: 81
End: 2021-09-13

## 2021-09-13 ASSESSMENT — PATIENT HEALTH QUESTIONNAIRE - PHQ9: SUM OF ALL RESPONSES TO PHQ QUESTIONS 1-9: 4

## 2021-09-13 ASSESSMENT — ACTIVITIES OF DAILY LIVING (ADL)
DEPENDENT_IADLS:: CLEANING;LAUNDRY;COOKING;SHOPPING;MEAL PREPARATION;MEDICATION MANAGEMENT;MONEY MANAGEMENT;TRANSPORTATION

## 2021-09-13 NOTE — PROGRESS NOTES
[facility] Care Coordination Contact    [facility] Home Visit Assessment     Home visit for Health Risk Assessment with Lakhwinder Negron completed on September 13, 2021    Type of residence:: Private home - stairs  Current living arrangement:: I live in a private home with family     Assessment completed with:: Patient, Care Team Member    Current Care Plan  Member currently receiving the following home care services: Skilled Nursing   Member currently receiving the following community resources: Day Care, Dialysis Services, DME, Home Care, PCA, Transportation Services      Medication Review  Medication reconciliation completed in Epic: Yes  Medication set-up & administration: RN set up every two weeks.  Self-administers medications.  Medication Risk Assessment Medication (1 or more, place referral to MTM): N/A: No risk factors identified  MTM Referral Placed: No: No risk factors idenified    Mental/Behavioral Health   Depression Screening:      PHQ-9 Total Score: 4    Mental health DX:: No        Falls Assessment:   Fallen 2 or more times in the past year?: No   Any fall with injury in the past year?: No    ADL/IADL Dependencies:   Dependent ADLs:: Ambulation-cane, Ambulation-walker, Dressing, Bathing, Grooming, Transfers, Toileting  Dependent IADLs:: Cleaning, Laundry, Cooking, Shopping, Meal Preparation, Medication Management, Money Management, Transportation    [facility] sponsored benefits: Shared information re: Silver Sneakers/gym memberships, ASA, Calcium +D.    PCA Assessment completed at visit: Yes Annual PCA assessment indicated 20 units per day of PCA. This is the same as the previous assessment.     Elderly Waiver Eligibility: Yes-will continue on EW    Care Plan & Recommendations: Lakhwinder Negron is 81 y.o. and lives with her adult son. Lakhwinder has a health h/o CVA, rt side residual weakness, Diabetes, Hemodialysis d/t ESRD, CHF, HTN and hyperparathyroidism. Lakhwinder states her health is stable, she  receives hemodialysis 3x a week, attends [facility] 3x a week and receives PCA at 5 hours daily. Lakhwinder receives biweekly nurse visits to assist with medication management/set-up and teachings. Lakhwinder reports no ED, Falls or Hospitalizations within the past year. Lakhwinder states her current support and services are meeting needs. Lakhwinder states BH is stable and going to [facility] helps member cope with grief from loss of spouse and health concerns. Lakhwinder would like to resume current support and services.     See [facility] for detailed assessment information.    Follow-Up Plan: Member informed of future contact, plan to f/u with member with a 6 month telephone assessment.  Contact information shared with member and family, encouraged member to call with any questions or concerns at any time.    [place] care continuum providers: Please refer to Health Care Home on the Epic Problem List to view this patient's [facility] Care Plan Summary.    Shruthi Martins RN, PHN  [facility]  [phone]

## 2021-09-15 ENCOUNTER — PATIENT OUTREACH (OUTPATIENT)
Dept: GERIATRIC MEDICINE | Facility: CLINIC | Age: 81
End: 2021-09-15

## 2021-09-16 DIAGNOSIS — M79.89 RIGHT LEG SWELLING: Primary | ICD-10-CM

## 2021-09-20 ENCOUNTER — PATIENT OUTREACH (OUTPATIENT)
Dept: GERIATRIC MEDICINE | Facility: CLINIC | Age: 81
End: 2021-09-20

## 2021-09-20 DIAGNOSIS — M81.0 SENILE OSTEOPOROSIS: ICD-10-CM

## 2021-09-20 NOTE — LETTER
October 13, 2021      MAURO LAZARO  [address] [place] [address]      Dear Mauro:    At [facility], we are dedicated to improving your health and well-being. Enclosed is the Comprehensive Care Plan that we developed with you on 09/13/2021. Please review the Care Plan carefully.    As a reminder, some of the things we discussed at your visit include:    Your physical and mental health    Ways to reduce falls    Health care needs you may have    Don t forget to contact your care coordinator if you:    Have been hospitalized or plan to be hospitalized     Have had a fall     Have experienced a change in physical health    Are experiencing emotional problems     If you do not agree with your Care Plan, have questions about it, or have experienced a change in your needs, please call me at [phone]. If you are hearing impaired, please call the [place] [employer] at 692 or [phone] (cuprkn-ry-mmxsoi relay service).    Sincerely,    Shruthi Martins RN, PHN    E-mail:[email or web address]  Phone: [phone]    Care Manager  [facility] ([facility]) is a health plan that contracts with both Medicare and the [place] Medical Assistance (Medicaid) program to provide benefits of both programs to enrollees. Enrollment in [facility] depends on contract renewal.    [id number])     [id number] (11/18)

## 2021-09-20 NOTE — LETTER
[facility]  [address], [address]  [place] [address]  Phone:  [phone]  Fax:  [phone]      September 20, 2021    MAURO LAZARO  [address]  [place] [facility] [address]    Dear Mauro,    Enclosed is a copy of your completed PCA Assessment and Service Plan.  This is for your records and no action is required by you.  If you have additional questions regarding your assessment please contact me at [phone]. If you feel that your needs are not being met, please contact the Clinical Supervisor at [phone].    Sincerely,    Shruthi Martins RN, PHN    E-mail:[email or web address]  Phone: [phone]    Care Manager  [facility]            Enclosure:  Completed PCA assessment

## 2021-09-23 ENCOUNTER — TELEPHONE (OUTPATIENT)
Dept: FAMILY MEDICINE | Facility: CLINIC | Age: 81
End: 2021-09-23

## 2021-09-23 NOTE — PROGRESS NOTES
[facility] Care Coordination Contact    Mailed POC Signature page to member w/ self-stamped envelope for return.    Mikayla Robles  Care Management Specialist  [facility]  [phone]

## 2021-09-23 NOTE — TELEPHONE ENCOUNTER
.Reason for call:  Order   Order or referral being requested: continuation skilled nursing  Reason for request: med management  Date needed: as soon as possible  Has the patient been seen by the PCP for this problem? YES    Additional comments: every other week for 9 weeks call [phone] okay to [facility]    Phone number to reach patient:  Home number on file [phone] (home)    Best Time:  anytime    Can we leave a detailed message on this number?  YES    Travel screening: Negative

## 2021-09-24 ENCOUNTER — TELEPHONE (OUTPATIENT)
Dept: NEPHROLOGY | Facility: CLINIC | Age: 81
End: 2021-09-24

## 2021-09-24 NOTE — TELEPHONE ENCOUNTER
1st attempt - 9/23    Left voicemail to schedule ultrasound of legs, imaging # left for pt son to call.    2nd attempt - 9/24    See note above.    Chart letter printed & sent.    Pina Pederson  Medical Specialty Procedure   [facility]

## 2021-09-27 ENCOUNTER — OFFICE VISIT (OUTPATIENT)
Dept: FAMILY MEDICINE | Facility: CLINIC | Age: 81
End: 2021-09-27
Payer: COMMERCIAL

## 2021-09-27 VITALS
HEIGHT: 57 IN | BODY MASS INDEX: 23.95 KG/M2 | OXYGEN SATURATION: 97 % | DIASTOLIC BLOOD PRESSURE: 66 MMHG | TEMPERATURE: 97.2 F | WEIGHT: 111 LBS | SYSTOLIC BLOOD PRESSURE: 171 MMHG | HEART RATE: 63 BPM

## 2021-09-27 DIAGNOSIS — I10 HYPERTENSION, GOAL BELOW 140/90: ICD-10-CM

## 2021-09-27 DIAGNOSIS — E11.22 TYPE 2 DIABETES MELLITUS WITH CHRONIC KIDNEY DISEASE ON CHRONIC DIALYSIS, WITHOUT LONG-TERM CURRENT USE OF INSULIN (H): Primary | ICD-10-CM

## 2021-09-27 DIAGNOSIS — E44.0 MODERATE MALNUTRITION (H): ICD-10-CM

## 2021-09-27 DIAGNOSIS — Z99.2 TYPE 2 DIABETES MELLITUS WITH CHRONIC KIDNEY DISEASE ON CHRONIC DIALYSIS, WITHOUT LONG-TERM CURRENT USE OF INSULIN (H): Primary | ICD-10-CM

## 2021-09-27 DIAGNOSIS — N18.6 TYPE 2 DIABETES MELLITUS WITH CHRONIC KIDNEY DISEASE ON CHRONIC DIALYSIS, WITHOUT LONG-TERM CURRENT USE OF INSULIN (H): Primary | ICD-10-CM

## 2021-09-27 DIAGNOSIS — N18.6 ESRD ON DIALYSIS (H): ICD-10-CM

## 2021-09-27 DIAGNOSIS — N25.81 SECONDARY HYPERPARATHYROIDISM (H): ICD-10-CM

## 2021-09-27 DIAGNOSIS — I50.22 CHRONIC SYSTOLIC CONGESTIVE HEART FAILURE (H): ICD-10-CM

## 2021-09-27 DIAGNOSIS — E78.5 HYPERLIPIDEMIA LDL GOAL <100: ICD-10-CM

## 2021-09-27 DIAGNOSIS — Z99.2 ESRD ON DIALYSIS (H): ICD-10-CM

## 2021-09-27 LAB
CHOLEST SERPL-MCNC: 182 MG/DL
FASTING STATUS PATIENT QL REPORTED: NO
HBA1C MFR BLD: 5.2 % (ref 0–5.6)
HDLC SERPL-MCNC: 48 MG/DL
LDLC SERPL CALC-MCNC: 83 MG/DL
NONHDLC SERPL-MCNC: 134 MG/DL
TRIGL SERPL-MCNC: 256 MG/DL

## 2021-09-27 PROCEDURE — 36415 COLL VENOUS BLD VENIPUNCTURE: CPT | Performed by: FAMILY MEDICINE

## 2021-09-27 PROCEDURE — 99214 OFFICE O/P EST MOD 30 MIN: CPT | Performed by: FAMILY MEDICINE

## 2021-09-27 PROCEDURE — 83036 HEMOGLOBIN GLYCOSYLATED A1C: CPT | Performed by: FAMILY MEDICINE

## 2021-09-27 PROCEDURE — 80061 LIPID PANEL: CPT | Performed by: FAMILY MEDICINE

## 2021-09-27 ASSESSMENT — MIFFLIN-ST. JEOR: SCORE: 832.37

## 2021-09-27 ASSESSMENT — PAIN SCALES - GENERAL: PAINLEVEL: NO PAIN (0)

## 2021-09-27 NOTE — PROGRESS NOTES
Assessment & Plan     Type 2 diabetes mellitus with chronic kidney disease on chronic dialysis, without long-term current use of insulin (H)  Well controlled on medications, may be lower A1C due to anemia but blood sugars are in good range and not too low at home.   - Hemoglobin A1c; Future  - Hemoglobin A1c    Moderate malnutrition (H)  Improved with dialysis and weight is stable    ESRD on dialysis (H)  Doing well on dialysis with some fatigue    Chronic systolic congestive heart failure (H)  No current signs of decompenstation    Secondary hyperparathyroidism (H)  Stable and monitored by renal    Hyperlipidemia LDL goal <100  Well controlled on medications   - Lipid panel reflex to direct LDL Non-fasting; Future  - Lipid panel reflex to direct LDL Non-fasting    Hypertension, goal below 140/90  Elevated today. Will follow up with dialysis.                CONSULTATION/REFERRAL to nephrology for continue dialysis and monitoring of renal status, blood pressure   FUTURE LABS:       - Schedule non-fasting labs in 6 months  FUTURE APPOINTMENTS:       - Follow-up visit in 6 months or sooner if any questions or concerns. Will see a new provider in 6 months.   See Patient Instructions    No follow-ups on file.    Jonna Cason MD  [facility] GRADY Keane is a 81 year old who presents for the following health issues     HPI     Diabetes Follow-up    How often are you checking your blood sugar? Two times daily  Blood sugar testing frequency justification:  Adjustment of medication(s)  What time of day are you checking your blood sugars (select all that apply)?  Before and after meals  Have you had any blood sugars above 200?  No  Have you had any blood sugars below 70?  No    What symptoms do you notice when your blood sugar is low?  None    What concerns do you have today about your diabetes? None     Do you have any of these symptoms? (Select all that apply)  Numbness in  feet    Have you had a diabetic eye exam in the last 12 months? Up to date         BP Readings from Last 2 Encounters:   09/27/21 (!) 171/66   10/26/20 (!) 146/64     Hemoglobin A1C (%)   Date Value   09/27/2021 5.2   10/26/2020 6.1 (H)   11/06/2019 5.4     LDL Cholesterol Calculated (mg/dL)   Date Value   09/27/2021 83   03/15/2018 34   12/02/2016 85     LDL Cholesterol Direct (mg/dL)   Date Value   01/20/2020 106 (H)         Hyperlipidemia Follow-Up      Are you regularly taking any medication or supplement to lower your cholesterol?   Yes- Crestor 20 mg    Are you having muscle aches or other side effects that you think could be caused by your cholesterol lowering medication?  No    Hypertension Follow-up      Do you check your blood pressure regularly outside of the clinic? Yes     Are you following a low salt diet? Yes    Are your blood pressures ever more than 140 on the top number (systolic) OR more   than 90 on the bottom number (diastolic), for example 140/90? Yes Managed with nephrology. Changes with dialysis.     Heart Failure Follow-up  Are you experiencing any shortness of breath? Yes, with activity only   How would you describe your shortness of breath?  Same as usual        Are you experiencing any swelling in your legs or feet?  Right leg was swollen temporarily due to poorly fitted support stocking according to patient and her grandson. They are not using this anymore and the right ankle and foot swelling has resolved. Right leg ultrasound recommended but not done yet. No pain.     Are you using more pillows than usual? No    Do you cough at night?  No    Do you check your weight daily?  Yes    Have you had a weight change recently?  No    Are you having any of the following side effects from your medications? (Select all that apply)  Fatigue    Since your last visit, how many times have you gone to the cardiologist, urgent care, emergency room, or hospital because of your heart failure?    "None    Chronic Kidney Disease Follow-up      Do you take any over the counter pain medicine?: No     Almost 2 years on dialysis. Uses central catheter. Access in arm failed due to small blood vessel. Declined attempt in dominant arm. Doing much better and glad that she started this.             Review of Systems   Constitutional, HEENT, cardiovascular, pulmonary, gi and gu systems are negative, except as otherwise noted.      Objective    BP (!) 166/60 (BP Location: Left arm, Patient Position: Chair, Cuff Size: Adult Regular)   Pulse 63   Temp 97.2  F (36.2  C) (Tympanic)   Ht 1.448 m (4' 9\")   Wt 50.3 kg (111 lb)   LMP  (LMP Unknown)   SpO2 97%   BMI 24.02 kg/m    Body mass index is 24.02 kg/m .  Physical Exam   GENERAL: elderly, alert, well nourished, well hydrated, no distress, uses assistance for walking with right side weakness.   HENT: ear canals- normal; TMs- normal; Nose- normal; Mouth- no ulcers, no lesions, missing dentition  NECK: no tenderness, no adenopathy, no asymmetry, no masses, no stiffness; thyroid- normal to palpation  RESP: lungs clear to auscultation - no rales, no rhonchi, no wheezes  CV: regular rates and rhythm, normal S1 S2, no S3 or S4 and no murmur, no click or rub, normal pulses, central line catheter in place.   ABDOMEN: soft, no tenderness, no  hepatosplenomegaly, no masses, normal bowel sounds  MS: extremities- no gross deformities noted, no edema  SKIN: no suspicious lesions, no rashes, age related skin changes with seborrheic keratosis and no actinic keratosis.    NEURO: strength and tone- decreased, sensory exam- grossly normal, reflexes- symmetric  BACK: no CVA tenderness, no paralumbar tenderness  MENTAL STATUS EXAM:  Appearance/Behavior: no apparent distress, neatly groomed, dressed appropriately for weather, appears stated age and is frail-appearing  Speech: normal  Mood/Affect: normal affect  Insight: Good     Results for orders placed or performed in visit on 09/27/21 "   Hemoglobin A1c     Status: Normal   Result Value Ref Range    Hemoglobin A1C 5.2 0.0 - 5.6 %   Lipid panel reflex to direct LDL Non-fasting     Status: Abnormal   Result Value Ref Range    Cholesterol 182 <200 mg/dL    Triglycerides 256 (H) <150 mg/dL    Direct Measure HDL 48 (L) >=50 mg/dL    LDL Cholesterol Calculated 83 <=100 mg/dL    Non HDL Cholesterol 134 (H) <130 mg/dL    Patient Fasting > 8hrs? No     Narrative    Cholesterol  Desirable:  <200 mg/dL    Triglycerides  Normal:  Less than 150 mg/dL  Borderline High:  150-199 mg/dL  High:  200-499 mg/dL  Very High:  Greater than or equal to 500 mg/dL    Direct Measure HDL  Female:  Greater than or equal to 50 mg/dL   Male:  Greater than or equal to 40 mg/dL    LDL Cholesterol  Desirable:  <100mg/dL  Above Desirable:  100-129 mg/dL   Borderline High:  130-159 mg/dL   High:  160-189 mg/dL   Very High:  >= 190 mg/dL    Non HDL Cholesterol  Desirable:  130 mg/dL  Above Desirable:  130-159 mg/dL  Borderline High:  160-189 mg/dL  High:  190-219 mg/dL  Very High:  Greater than or equal to 220 mg/dL

## 2021-09-27 NOTE — LETTER
September 28, 2021      Lakhwinder Negron  [address] [place] [address]        Dear Lakhwinder Negron,     Your test results are attached. I am happy to let you know that they are stable.     The diabetes test and cholesterol are very good. We can recheck labs in 6 months. Dr. Borja can check these for you.     Please call me if you have any questions about these test results or about your care.     Sincerely,     Jonna Cason MD      Resulted Orders   Hemoglobin A1c   Result Value Ref Range    Hemoglobin A1C 5.2 0.0 - 5.6 %      Comment:      Normal <5.7%   Prediabetes 5.7-6.4%    Diabetes 6.5% or higher     Note: Adopted from ADA consensus guidelines.   Lipid panel reflex to direct LDL Non-fasting   Result Value Ref Range    Cholesterol 182 <200 mg/dL    Triglycerides 256 (H) <150 mg/dL    Direct Measure HDL 48 (L) >=50 mg/dL    LDL Cholesterol Calculated 83 <=100 mg/dL    Non HDL Cholesterol 134 (H) <130 mg/dL    Patient Fasting > 8hrs? No     Narrative    Cholesterol  Desirable:  <200 mg/dL    Triglycerides  Normal:  Less than 150 mg/dL  Borderline High:  150-199 mg/dL  High:  200-499 mg/dL  Very High:  Greater than or equal to 500 mg/dL    Direct Measure HDL  Female:  Greater than or equal to 50 mg/dL   Male:  Greater than or equal to 40 mg/dL    LDL Cholesterol  Desirable:  <100mg/dL  Above Desirable:  100-129 mg/dL   Borderline High:  130-159 mg/dL   High:  160-189 mg/dL   Very High:  >= 190 mg/dL    Non HDL Cholesterol  Desirable:  130 mg/dL  Above Desirable:  130-159 mg/dL  Borderline High:  160-189 mg/dL  High:  190-219 mg/dL  Very High:  Greater than or equal to 220 mg/dL

## 2021-09-27 NOTE — PATIENT INSTRUCTIONS
At [facility], we strive to deliver an exceptional experience to you, every time we see you. If you receive a survey, please complete it as we do value your feedback.  If you have MyChart, you can expect to receive results automatically within 24 hours of their completion.  Your provider will send a note interpreting your results as well.   If you do not have MyChart, you should receive your results in about a week by mail.    Your care team:                            Family Medicine Internal Medicine   MD Gera Frost MD Shantel Branch-Fleming, MD Srinivasa Vaka, MD Katya Belousova, PAYORDY Lange, APRN CNP    Fernando Causey, MD Pediatrics   Reagan Parham, PAYORDY Adams, CNP MD Radha Holman APRN CNP   MD Candida Gonzalez MD Deborah Mielke, MD Kaleigh Vallejo, APRN [facility]      Clinic hours: Monday - Thursday 7 am-6 pm; Fridays 7 am-5 pm.   Urgent care: Monday - Friday 10 am- 8 pm; Saturday and Sunday 9 am-5 pm.    Clinic: [phone]       [facility]: Monday - Thursday 8 am - 7 pm; Friday 8 am - 6 pm  [facility]: [phone]     Use [email or web address] for 24/7 diagnosis and treatment of dozens of conditions.    Please call to schedule your right leg ultrasound at [facility] imaging [place] by calling [phone].

## 2021-09-28 NOTE — RESULT ENCOUNTER NOTE
Dear Lakhwinder Negron,    Your test results are attached. I am happy to let you know that they are stable.    The diabetes test and cholesterol are very good. We can recheck labs in 6 months. Dr. Borja can check these for you.     Please call me if you have any questions about these test results or about your care.    Sincerely,    Jonna Cason MD

## 2021-09-28 NOTE — TELEPHONE ENCOUNTER
This writer attempted to contact Home Care on 09/28/21      Reason for call informed message below and left detailed message.      If patient calls back:   1st floor [place] Care Team (MA/TC) called. Inform patient that someone from the team will contact them, document that pt called and route to care team.         Malorie Almaraz MA

## 2021-09-28 NOTE — TELEPHONE ENCOUNTER
All PT, OT and nursing orders are approved as requested.  Office visit done on 9-. Jonna Cason MD

## 2021-09-29 PROBLEM — E44.0 MODERATE MALNUTRITION (H): Status: RESOLVED | Noted: 2021-09-27 | Resolved: 2021-09-29

## 2021-09-29 PROBLEM — E16.2 HYPOGLYCEMIA: Status: RESOLVED | Noted: 2017-10-17 | Resolved: 2021-09-29

## 2021-09-29 PROBLEM — I50.33 ACUTE ON CHRONIC DIASTOLIC CHF (CONGESTIVE HEART FAILURE) (H): Status: RESOLVED | Noted: 2018-03-09 | Resolved: 2021-09-29

## 2021-09-29 PROBLEM — K92.2 ACUTE UPPER GI BLEED: Status: RESOLVED | Noted: 2017-07-19 | Resolved: 2021-09-29

## 2021-09-29 PROBLEM — Z99.2 ENCOUNTER REGARDING VASCULAR ACCESS FOR DIALYSIS FOR ESRD (H): Status: RESOLVED | Noted: 2020-01-10 | Resolved: 2021-09-29

## 2021-09-29 PROBLEM — I50.9 ACUTE DECOMPENSATED HEART FAILURE (H): Status: RESOLVED | Noted: 2018-05-08 | Resolved: 2021-09-29

## 2021-09-29 PROBLEM — I51.89 DIASTOLIC DYSFUNCTION: Status: RESOLVED | Noted: 2017-10-17 | Resolved: 2021-09-29

## 2021-09-29 PROBLEM — N18.6 ENCOUNTER REGARDING VASCULAR ACCESS FOR DIALYSIS FOR ESRD (H): Status: RESOLVED | Noted: 2020-01-10 | Resolved: 2021-09-29

## 2021-09-29 PROBLEM — D50.9 IRON DEFICIENCY ANEMIA: Status: RESOLVED | Noted: 2018-03-09 | Resolved: 2021-09-29

## 2021-09-29 PROBLEM — J15.69 GRAM-NEGATIVE PNEUMONIA (H): Status: RESOLVED | Noted: 2018-05-08 | Resolved: 2021-09-29

## 2021-09-29 PROBLEM — D62 ACUTE BLOOD LOSS ANEMIA: Status: RESOLVED | Noted: 2017-07-19 | Resolved: 2021-09-29

## 2021-09-29 PROBLEM — I15.0 RENOVASCULAR HYPERTENSION: Status: RESOLVED | Noted: 2020-01-16 | Resolved: 2021-09-29

## 2021-10-05 DIAGNOSIS — I10 HYPERTENSION, GOAL BELOW 140/90: ICD-10-CM

## 2021-10-05 RX ORDER — AMLODIPINE BESYLATE 10 MG/1
TABLET ORAL
Qty: 90 TABLET | Refills: 3 | Status: SHIPPED | OUTPATIENT
Start: 2021-10-05 | End: 2022-05-11

## 2021-10-05 NOTE — TELEPHONE ENCOUNTER
Routing refill request to provider for review/approval because:  Labs not current:    Creatinine   Date Value Ref Range Status   01/24/2020 3.96 (H) 0.52 - 1.04 mg/dL Final       BP Readings from Last 6 Encounters:   09/27/21 (!) 171/66   10/26/20 (!) 146/64   05/18/20 (!) 149/61   03/13/20 (!) 166/60   02/24/20 (!) 144/64   02/12/20 (!) 140/68     Health Maintenance Due   Topic Date Due     HF ACTION PLAN  Never done     URINE DRUG SCREEN  Never done     MEDICARE ANNUAL WELLNESS VISIT  Never done     ZOSTER IMMUNIZATION (2 of 3) 05/19/2011     ADVANCE CARE PLANNING  09/25/2017     EYE EXAM  11/14/2017     ALT  03/08/2019     DIABETIC FOOT EXAM  03/15/2019     BMP  05/04/2020     MICROALBUMIN  10/07/2020     CBC  01/24/2021     INFLUENZA VACCINE (1) 09/01/2021     Narda Albrecht RN

## 2021-10-08 DIAGNOSIS — Z53.9 DIAGNOSIS NOT YET DEFINED: Primary | ICD-10-CM

## 2021-10-08 PROCEDURE — G0179 MD RECERTIFICATION HHA PT: HCPCS | Performed by: FAMILY MEDICINE

## 2021-10-11 NOTE — TELEPHONE ENCOUNTER
acetaminophen-codeine (TYLENOL #3) 300-30 MG per tablet      Last Written Prescription Date:  12/12/16  Last Fill Quantity: 0,   # refills: 0  [place] [facility] provider: 1/12/17  Future Office visit:    Next 5 appointments (look out 90 days)     Mar 09, 2017  9:00 AM   Office Visit with Jonna Cason MD   [facility]    [address] [address]   [phone]                   Routing refill request to provider for review/approval because:  Medication is reported/historical    calcium-vitamin D (CALTRATE) 600-400 MG-UNIT per tablet      Last Written Prescription Date: 7/11/16  Last Fill Quantity: 60,  # refills: 6   Last [facility] Visit [place] [facility] prescribing provider: 1/12/17                                         Next 5 appointments (look out 90 days)     Mar 09, 2017  9:00 AM   Office Visit with J[facility] Cason MD   [facility]    [address] [address]   [phone]                  ONE TOUCH ULTRA test strip         Last Written Prescription Date: 6/13/16  Last Fill Quantity: 200, # refills: 1  Last [place] Visit [place] [facility]  [facility] prescribing provider:  1/12/17   Next 5 appointments (look out 90 days)     Mar 09, 2017  9:00 AM   Office Visit with Jonna Cason MD   [facility]    [address] [address]   [phone]                   BP Readings from Last 3 Encounters:   01/12/17 132/70   12/09/16 138/68   10/03/16 152/79     MICROL     2810   12/2/2016  No results found for this basename: microalbumin  CREATININE   Date Value Ref Range Status   12/02/2016 1.66* 0.52 - 1.04 mg/dL Final   ]  GFR ESTIMATE   Date Value Ref Range Status   12/02/2016 30* >60 mL/min/1.7m2 Final     Comment:     Non   GFR Calc   08/16/2016 34* >60 mL/min/1.7m2 Final     Comment:     Non  GFR Calc   06/03/2016 33* >60 mL/min/1.7m2 Final     Comment:     Non  GFR Calc     GFR ESTIMATE IF BLACK   Date Value Ref Range Status   12/02/2016 36* >60 mL/min/1.7m2 Final     Comment:      GFR Calc   08/16/2016 41* >60 mL/min/1.7m2 Final     Comment:      GFR Calc   06/03/2016 40* >60 mL/min/1.7m2 Final     Comment:      GFR Calc     CHOL      177   12/2/2016  HDL       40   12/2/2016  LDL       85   12/2/2016  LDL       65   3/18/2014  TRIG      262   12/2/2016  CHOLHDLRATIO      5.2   8/6/2015  AST       40   9/22/2014  ALT       37   9/22/2014  A1C      6.1   12/2/2016  A1C      6.1   6/3/2016  A1C      6.3   1/28/2016  A1C      6.3   10/29/2015  A1C      7.0   8/6/2015  POTASSIUM   Date Value Ref Range Status   12/02/2016 4.3 3.4 - 5.3 mmol/L Final            no visual changes.

## 2021-10-13 NOTE — PROGRESS NOTES
[facility] Care Coordination Contact    Received after visit chart from care coordinator.  Completed following tasks: Mailed copy of care plan to client.    Updated services in access and Submitted referrals/auths for [facility].     Provider Signature - No POC Shared:  Member indicates that they do not want their POC shared with any [facility] providers.    Mikayla Robles  Care Management Specialist  [facility]  [phone]

## 2021-10-28 DIAGNOSIS — E11.42 TYPE 2 DIABETES MELLITUS WITH DIABETIC POLYNEUROPATHY, UNSPECIFIED WHETHER LONG TERM INSULIN USE (H): ICD-10-CM

## 2021-10-28 RX ORDER — GLIPIZIDE 2.5 MG/1
TABLET, EXTENDED RELEASE ORAL
Qty: 180 TABLET | Refills: 1 | Status: SHIPPED | OUTPATIENT
Start: 2021-10-28 | End: 2022-04-08

## 2021-10-28 NOTE — TELEPHONE ENCOUNTER
Patient advised of the right to post-operative care by the surgeon. Patient is fully informed of, and agreed to, co-management with their primary optometric physician. Post-operative care by the surgeon is not medically necessary and co-management is clinically appropriate. Patient has received itemization of fees related to cataract surgery. Transfer of care letter completed for the patient. Transfer care of left eye to Dr. Vasiliy Fatima on 1/25/19. Patient instructed to call immediately if any new distortion, blurring, decreased vision or eye pain. Routing refill request to provider for review/approval because:  Labs out of range:  cr  Cesia Perez BSN, RN

## 2021-11-24 ENCOUNTER — TELEPHONE (OUTPATIENT)
Dept: FAMILY MEDICINE | Facility: CLINIC | Age: 81
End: 2021-11-24
Payer: COMMERCIAL

## 2021-11-24 NOTE — TELEPHONE ENCOUNTER
.Reason for call:  Order   Order or referral being requested: skilled nursing  Reason for request: medication and chronic disease management  Date needed: as soon as possible  Has the patient been seen by the PCP for this problem? YES    Additional comments: every other week 1 visit for 9 weeks call [phone] [facility] to [facility]    Phone number to reach patient:  Home number on file [phone] (home)    Best Time:  anytime    Can we leave a detailed message on this number?  YES    Travel screening: Negative

## 2021-11-29 DIAGNOSIS — G89.4 CHRONIC PAIN SYNDROME: ICD-10-CM

## 2021-11-29 DIAGNOSIS — M79.621 PAIN OF RIGHT UPPER ARM: ICD-10-CM

## 2021-11-29 RX ORDER — ACETAMINOPHEN 325 MG/1
325-650 TABLET ORAL EVERY 6 HOURS PRN
Qty: 50 TABLET | Refills: 1 | Status: SHIPPED | OUTPATIENT
Start: 2021-11-29 | End: 2023-01-01

## 2021-11-29 NOTE — TELEPHONE ENCOUNTER
Routing refill request to provider for review/approval because:  Drug not on the [facility] refill protocol   Shelley Moya RN

## 2021-11-29 NOTE — TELEPHONE ENCOUNTER
Left detail message for Home Care . Informed her of approved orders and left number to call back with questions.    Jessica De León, CMA

## 2021-12-20 ENCOUNTER — OFFICE VISIT (OUTPATIENT)
Dept: FAMILY MEDICINE | Facility: CLINIC | Age: 81
End: 2021-12-20
Payer: COMMERCIAL

## 2021-12-20 VITALS
WEIGHT: 108 LBS | BODY MASS INDEX: 23.3 KG/M2 | HEART RATE: 70 BPM | TEMPERATURE: 98.5 F | DIASTOLIC BLOOD PRESSURE: 70 MMHG | SYSTOLIC BLOOD PRESSURE: 199 MMHG | OXYGEN SATURATION: 97 % | HEIGHT: 57 IN

## 2021-12-20 DIAGNOSIS — Z00.00 ENCOUNTER FOR MEDICARE ANNUAL WELLNESS EXAM: Primary | ICD-10-CM

## 2021-12-20 DIAGNOSIS — I10 HYPERTENSION, GOAL BELOW 140/90: ICD-10-CM

## 2021-12-20 DIAGNOSIS — E78.5 HYPERLIPIDEMIA LDL GOAL <100: ICD-10-CM

## 2021-12-20 DIAGNOSIS — E11.22 TYPE 2 DIABETES MELLITUS WITH CHRONIC KIDNEY DISEASE ON CHRONIC DIALYSIS, WITHOUT LONG-TERM CURRENT USE OF INSULIN (H): ICD-10-CM

## 2021-12-20 DIAGNOSIS — Z99.2 TYPE 2 DIABETES MELLITUS WITH CHRONIC KIDNEY DISEASE ON CHRONIC DIALYSIS, WITHOUT LONG-TERM CURRENT USE OF INSULIN (H): ICD-10-CM

## 2021-12-20 DIAGNOSIS — N18.6 ESRD ON DIALYSIS (H): ICD-10-CM

## 2021-12-20 DIAGNOSIS — Z99.2 ESRD ON DIALYSIS (H): ICD-10-CM

## 2021-12-20 DIAGNOSIS — N18.6 TYPE 2 DIABETES MELLITUS WITH CHRONIC KIDNEY DISEASE ON CHRONIC DIALYSIS, WITHOUT LONG-TERM CURRENT USE OF INSULIN (H): ICD-10-CM

## 2021-12-20 DIAGNOSIS — B35.1 ONYCHOMYCOSIS: ICD-10-CM

## 2021-12-20 LAB
ALT SERPL W P-5'-P-CCNC: 21 U/L (ref 0–50)
HBA1C MFR BLD: 5.5 % (ref 0–5.6)

## 2021-12-20 PROCEDURE — 99397 PER PM REEVAL EST PAT 65+ YR: CPT | Performed by: NURSE PRACTITIONER

## 2021-12-20 PROCEDURE — 99214 OFFICE O/P EST MOD 30 MIN: CPT | Mod: 25 | Performed by: NURSE PRACTITIONER

## 2021-12-20 PROCEDURE — 36415 COLL VENOUS BLD VENIPUNCTURE: CPT | Performed by: NURSE PRACTITIONER

## 2021-12-20 PROCEDURE — 84460 ALANINE AMINO (ALT) (SGPT): CPT | Performed by: NURSE PRACTITIONER

## 2021-12-20 PROCEDURE — 83036 HEMOGLOBIN GLYCOSYLATED A1C: CPT | Performed by: NURSE PRACTITIONER

## 2021-12-20 ASSESSMENT — ENCOUNTER SYMPTOMS
FREQUENCY: 0
PALPITATIONS: 0
CONSTIPATION: 0
HEADACHES: 1
DYSURIA: 0
DIZZINESS: 0
DIARRHEA: 0
HEMATOCHEZIA: 0
WEAKNESS: 0
SORE THROAT: 0
COUGH: 0
CHILLS: 0
MYALGIAS: 1
ABDOMINAL PAIN: 0
NAUSEA: 0
ARTHRALGIAS: 0
HEMATURIA: 0
HEARTBURN: 0
PARESTHESIAS: 0
NERVOUS/ANXIOUS: 0
EYE PAIN: 0
JOINT SWELLING: 0
FEVER: 0
SHORTNESS OF BREATH: 0

## 2021-12-20 ASSESSMENT — ACTIVITIES OF DAILY LIVING (ADL)
CURRENT_FUNCTION: HOUSEWORK REQUIRES ASSISTANCE
CURRENT_FUNCTION: BATHING REQUIRES ASSISTANCE
CURRENT_FUNCTION: SHOPPING REQUIRES ASSISTANCE
CURRENT_FUNCTION: MONEY MANAGEMENT REQUIRES ASSISTANCE
CURRENT_FUNCTION: PREPARING MEALS REQUIRES ASSISTANCE
CURRENT_FUNCTION: TRANSPORTATION REQUIRES ASSISTANCE
CURRENT_FUNCTION: MEDICATION ADMINISTRATION REQUIRES ASSISTANCE
CURRENT_FUNCTION: LAUNDRY REQUIRES ASSISTANCE

## 2021-12-20 ASSESSMENT — MIFFLIN-ST. JEOR: SCORE: 818.76

## 2021-12-20 NOTE — PATIENT INSTRUCTIONS
Patient Education   Personalized Prevention Plan  You are due for the preventive services outlined below.  Your care team is available to assist you in scheduling these services.  If you have already completed any of these items, please share that information with your care team to update in your medical record.  Health Maintenance Due   Topic Date Due     Heart Failure Action Plan  Never done     URINE DRUG SCREEN  Never done     Annual Wellness Visit  Never done     Zoster (Shingles) Vaccine (2 of 3) 05/19/2011     Discuss Advance Care Planning  09/25/2017     Eye Exam  11/14/2017     Liver Monitoring Lab  03/08/2019     Diabetic Foot Exam  03/15/2019     Kidney Microalbumin Urine Test  01/07/2020     Basic Metabolic Panel  05/04/2020     Hemoglobin  07/24/2020     Complete Blood Count  01/24/2021

## 2021-12-20 NOTE — LETTER
December 21, 2021      Lakhwinder Negron  [address]  [place] [address]        Dear ,    We are writing to inform you of your test results.    Your lab results were normal for diabetes and ALT. Please let us know if you have any questions.   Thank you for allowing us to participate in your care.    Resulted Orders   Hemoglobin A1c   Result Value Ref Range    Hemoglobin A1C 5.5 0.0 - 5.6 %      Comment:      Normal <5.7%   Prediabetes 5.7-6.4%    Diabetes 6.5% or higher     Note: Adopted from ADA consensus guidelines.   ALT   Result Value Ref Range    ALT 21 0 - 50 U/L       If you have any questions or concerns, please call the clinic at the number listed above.       Sincerely,      CHANO Marie CNP
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
## 2021-12-30 DIAGNOSIS — Z53.9 DIAGNOSIS NOT YET DEFINED: Primary | ICD-10-CM

## 2021-12-30 PROCEDURE — G0179 MD RECERTIFICATION HHA PT: HCPCS | Performed by: FAMILY MEDICINE

## 2022-01-01 ENCOUNTER — TELEPHONE (OUTPATIENT)
Dept: FAMILY MEDICINE | Facility: CLINIC | Age: 82
End: 2022-01-01

## 2022-01-01 ENCOUNTER — MEDICAL CORRESPONDENCE (OUTPATIENT)
Dept: HEALTH INFORMATION MANAGEMENT | Facility: CLINIC | Age: 82
End: 2022-01-01

## 2022-01-01 ENCOUNTER — PATIENT OUTREACH (OUTPATIENT)
Dept: GERIATRIC MEDICINE | Facility: CLINIC | Age: 82
End: 2022-01-01

## 2022-01-01 ENCOUNTER — ANCILLARY PROCEDURE (OUTPATIENT)
Dept: GENERAL RADIOLOGY | Facility: CLINIC | Age: 82
End: 2022-01-01
Payer: COMMERCIAL

## 2022-01-01 ENCOUNTER — OFFICE VISIT (OUTPATIENT)
Dept: FAMILY MEDICINE | Facility: CLINIC | Age: 82
End: 2022-01-01
Payer: COMMERCIAL

## 2022-01-01 ENCOUNTER — TELEPHONE (OUTPATIENT)
Dept: FAMILY MEDICINE | Facility: CLINIC | Age: 82
End: 2022-01-01
Payer: COMMERCIAL

## 2022-01-01 ENCOUNTER — MEDICAL CORRESPONDENCE (OUTPATIENT)
Dept: HEALTH INFORMATION MANAGEMENT | Facility: CLINIC | Age: 82
End: 2022-01-01
Payer: COMMERCIAL

## 2022-01-01 ENCOUNTER — PATIENT OUTREACH (OUTPATIENT)
Dept: GERIATRIC MEDICINE | Facility: CLINIC | Age: 82
End: 2022-01-01
Payer: COMMERCIAL

## 2022-01-01 VITALS
HEART RATE: 58 BPM | BODY MASS INDEX: 22.03 KG/M2 | TEMPERATURE: 97.2 F | WEIGHT: 100.1 LBS | DIASTOLIC BLOOD PRESSURE: 80 MMHG | DIASTOLIC BLOOD PRESSURE: 60 MMHG | RESPIRATION RATE: 19 BRPM | RESPIRATION RATE: 18 BRPM | WEIGHT: 101.8 LBS | HEART RATE: 67 BPM | BODY MASS INDEX: 21.66 KG/M2 | SYSTOLIC BLOOD PRESSURE: 180 MMHG | OXYGEN SATURATION: 98 % | OXYGEN SATURATION: 96 % | SYSTOLIC BLOOD PRESSURE: 170 MMHG | TEMPERATURE: 97.7 F

## 2022-01-01 VITALS
OXYGEN SATURATION: 97 % | RESPIRATION RATE: 19 BRPM | TEMPERATURE: 98.1 F | HEART RATE: 62 BPM | WEIGHT: 108.2 LBS | DIASTOLIC BLOOD PRESSURE: 75 MMHG | BODY MASS INDEX: 23.41 KG/M2 | SYSTOLIC BLOOD PRESSURE: 185 MMHG

## 2022-01-01 DIAGNOSIS — Z99.2 ESRD ON DIALYSIS (H): ICD-10-CM

## 2022-01-01 DIAGNOSIS — J18.9 HCAP (HEALTHCARE-ASSOCIATED PNEUMONIA): Primary | ICD-10-CM

## 2022-01-01 DIAGNOSIS — N18.6 ESRD ON DIALYSIS (H): ICD-10-CM

## 2022-01-01 DIAGNOSIS — I10 ESSENTIAL HYPERTENSION WITH GOAL BLOOD PRESSURE LESS THAN 140/90: ICD-10-CM

## 2022-01-01 DIAGNOSIS — R06.02 SHORTNESS OF BREATH: Primary | ICD-10-CM

## 2022-01-01 DIAGNOSIS — E11.42 TYPE 2 DIABETES MELLITUS WITH DIABETIC POLYNEUROPATHY, WITHOUT LONG-TERM CURRENT USE OF INSULIN (H): ICD-10-CM

## 2022-01-01 DIAGNOSIS — N18.5 ANEMIA OF CHRONIC RENAL FAILURE, STAGE 5 (H): ICD-10-CM

## 2022-01-01 DIAGNOSIS — Z53.9 DIAGNOSIS NOT YET DEFINED: Primary | ICD-10-CM

## 2022-01-01 DIAGNOSIS — K20.90 ESOPHAGITIS: ICD-10-CM

## 2022-01-01 DIAGNOSIS — I10 HYPERTENSION, GOAL BELOW 140/90: ICD-10-CM

## 2022-01-01 DIAGNOSIS — I21.4 NSTEMI (NON-ST ELEVATED MYOCARDIAL INFARCTION) (H): ICD-10-CM

## 2022-01-01 DIAGNOSIS — J18.9 HCAP (HEALTHCARE-ASSOCIATED PNEUMONIA): ICD-10-CM

## 2022-01-01 DIAGNOSIS — E11.22 TYPE 2 DIABETES MELLITUS WITH CHRONIC KIDNEY DISEASE ON CHRONIC DIALYSIS, WITHOUT LONG-TERM CURRENT USE OF INSULIN (H): ICD-10-CM

## 2022-01-01 DIAGNOSIS — Z99.2 TYPE 2 DIABETES MELLITUS WITH CHRONIC KIDNEY DISEASE ON CHRONIC DIALYSIS, WITHOUT LONG-TERM CURRENT USE OF INSULIN (H): ICD-10-CM

## 2022-01-01 DIAGNOSIS — I50.22 CHRONIC SYSTOLIC CONGESTIVE HEART FAILURE (H): ICD-10-CM

## 2022-01-01 DIAGNOSIS — E78.5 HYPERLIPIDEMIA LDL GOAL <100: ICD-10-CM

## 2022-01-01 DIAGNOSIS — D63.1 ANEMIA OF CHRONIC RENAL FAILURE, STAGE 5 (H): ICD-10-CM

## 2022-01-01 DIAGNOSIS — G89.4 CHRONIC PAIN SYNDROME: ICD-10-CM

## 2022-01-01 DIAGNOSIS — D50.0 ANEMIA DUE TO GASTROINTESTINAL BLOOD LOSS: Primary | ICD-10-CM

## 2022-01-01 DIAGNOSIS — E11.22 TYPE 2 DIABETES MELLITUS WITH END-STAGE RENAL DISEASE (H): ICD-10-CM

## 2022-01-01 DIAGNOSIS — N18.6 TYPE 2 DIABETES MELLITUS WITH CHRONIC KIDNEY DISEASE ON CHRONIC DIALYSIS, WITHOUT LONG-TERM CURRENT USE OF INSULIN (H): ICD-10-CM

## 2022-01-01 DIAGNOSIS — J96.01 ACUTE RESPIRATORY FAILURE WITH HYPOXIA (H): Primary | ICD-10-CM

## 2022-01-01 DIAGNOSIS — Z23 ENCOUNTER FOR IMMUNIZATION: ICD-10-CM

## 2022-01-01 DIAGNOSIS — N18.6 TYPE 2 DIABETES MELLITUS WITH END-STAGE RENAL DISEASE (H): ICD-10-CM

## 2022-01-01 DIAGNOSIS — E78.5 HYPERLIPIDEMIA LDL GOAL <70: ICD-10-CM

## 2022-01-01 LAB
CODEINE UR CFM-MCNC: 8060 NG/ML
CODEINE/CREAT UR: ABNORMAL NG/MG {CREAT}
HBA1C MFR BLD: 5.4 % (ref 0–5.6)
MORPHINE UR CFM-MCNC: 255 NG/ML
MORPHINE/CREAT UR: 944 NG/MG {CREAT}
NORCODEINE UR-MCNC: 406 NG/ML
NORCODEINE/CREAT UR CFM: 1504 NG/MG {CREAT}

## 2022-01-01 PROCEDURE — 99495 TRANSJ CARE MGMT MOD F2F 14D: CPT | Performed by: PREVENTIVE MEDICINE

## 2022-01-01 PROCEDURE — G0179 MD RECERTIFICATION HHA PT: HCPCS | Performed by: FAMILY MEDICINE

## 2022-01-01 PROCEDURE — 99207 PR MD RECERTIFICATION HHA PT: CPT | Performed by: FAMILY MEDICINE

## 2022-01-01 PROCEDURE — 83036 HEMOGLOBIN GLYCOSYLATED A1C: CPT | Performed by: FAMILY MEDICINE

## 2022-01-01 PROCEDURE — 99214 OFFICE O/P EST MOD 30 MIN: CPT | Performed by: FAMILY MEDICINE

## 2022-01-01 PROCEDURE — 36416 COLLJ CAPILLARY BLOOD SPEC: CPT | Performed by: FAMILY MEDICINE

## 2022-01-01 PROCEDURE — 99213 OFFICE O/P EST LOW 20 MIN: CPT | Performed by: INTERNAL MEDICINE

## 2022-01-01 PROCEDURE — 71046 X-RAY EXAM CHEST 2 VIEWS: CPT | Mod: TC | Performed by: RADIOLOGY

## 2022-01-01 RX ORDER — ISOSORBIDE MONONITRATE 30 MG/1
30 TABLET, EXTENDED RELEASE ORAL DAILY
Status: ON HOLD
Start: 2022-01-01 | End: 2023-01-01

## 2022-01-01 RX ORDER — ISOSORBIDE MONONITRATE 60 MG/1
60 TABLET, EXTENDED RELEASE ORAL DAILY
Qty: 90 TABLET | Refills: 3 | Status: SHIPPED | OUTPATIENT
Start: 2022-01-01 | End: 2022-01-01

## 2022-01-01 RX ORDER — FERROUS SULFATE 325(65) MG
325 TABLET, DELAYED RELEASE (ENTERIC COATED) ORAL 3 TIMES DAILY
Qty: 270 TABLET | Refills: 3 | Status: SHIPPED | OUTPATIENT
Start: 2022-01-01 | End: 2023-01-01

## 2022-01-01 SDOH — ECONOMIC STABILITY: TRANSPORTATION INSECURITY
IN THE PAST 12 MONTHS, HAS THE LACK OF TRANSPORTATION KEPT YOU FROM MEDICAL APPOINTMENTS OR FROM GETTING MEDICATIONS?: NO

## 2022-01-01 SDOH — ECONOMIC STABILITY: INCOME INSECURITY: IN THE LAST 12 MONTHS, WAS THERE A TIME WHEN YOU WERE NOT ABLE TO PAY THE MORTGAGE OR RENT ON TIME?: NO

## 2022-01-01 SDOH — ECONOMIC STABILITY: TRANSPORTATION INSECURITY
IN THE PAST 12 MONTHS, HAS LACK OF TRANSPORTATION KEPT YOU FROM MEETINGS, WORK, OR FROM GETTING THINGS NEEDED FOR DAILY LIVING?: NO

## 2022-01-01 ASSESSMENT — PAIN SCALES - GENERAL
PAINLEVEL: NO PAIN (0)

## 2022-01-01 ASSESSMENT — LIFESTYLE VARIABLES
AUDIT-C TOTAL SCORE: 0
HOW OFTEN DO YOU HAVE A DRINK CONTAINING ALCOHOL: NEVER
SKIP TO QUESTIONS 9-10: 1
HOW OFTEN DO YOU HAVE SIX OR MORE DRINKS ON ONE OCCASION: NEVER
HOW MANY STANDARD DRINKS CONTAINING ALCOHOL DO YOU HAVE ON A TYPICAL DAY: PATIENT DOES NOT DRINK

## 2022-01-01 ASSESSMENT — ACTIVITIES OF DAILY LIVING (ADL)
DEPENDENT_IADLS:: CLEANING;COOKING;LAUNDRY;SHOPPING;MEAL PREPARATION;MEDICATION MANAGEMENT;MONEY MANAGEMENT;TRANSPORTATION

## 2022-01-05 DIAGNOSIS — Z53.9 DIAGNOSIS NOT YET DEFINED: Primary | ICD-10-CM

## 2022-01-05 PROCEDURE — 99207 PR MD RECERTIFICATION HHA PT: CPT | Performed by: FAMILY MEDICINE

## 2022-01-20 ENCOUNTER — APPOINTMENT (OUTPATIENT)
Dept: INTERPRETER SERVICES | Facility: CLINIC | Age: 82
End: 2022-01-20
Payer: COMMERCIAL

## 2022-01-21 ENCOUNTER — TELEPHONE (OUTPATIENT)
Dept: FAMILY MEDICINE | Facility: CLINIC | Age: 82
End: 2022-01-21
Payer: COMMERCIAL

## 2022-01-21 NOTE — TELEPHONE ENCOUNTER
This writer attempted to contact Lilliana with [facility] on 01/21/22      Reason for call orders bellow and left detailed message.  Confidential voicemail. Left message with order approval.    If patient calls back:   Registered Nurse called. Refer call to Shelley Baker RN Team      Holli Man RN  [facility]

## 2022-01-21 NOTE — TELEPHONE ENCOUNTER
[facility] calling for verbal orders:  Skilled Nursing every other week for 9 weeks.  Adrianne Ruth [facility]  2nd Floor  Primary Care

## 2022-02-02 DIAGNOSIS — Z53.9 DIAGNOSIS NOT YET DEFINED: Primary | ICD-10-CM

## 2022-02-02 PROCEDURE — 99207 PR MD RECERTIFICATION HHA PT: CPT | Performed by: NURSE PRACTITIONER

## 2022-02-16 ENCOUNTER — TELEPHONE (OUTPATIENT)
Dept: NEPHROLOGY | Facility: CLINIC | Age: 82
End: 2022-02-16
Payer: COMMERCIAL

## 2022-02-16 NOTE — TELEPHONE ENCOUNTER
[facility] Call [facility]    Phone Message    May a detailed message be left on voicemail: yes     Reason for Call: Other: Felecia from [employer] on behalf of [employer] called and is inquiring about where the pt receives her dialysis from 2020, 2021 and 2022. Please call Felecia back to further discuss. Thanks!     Action Taken: Message routed to:  Adult Clinics: Nephrology p [id number]    Travel Screening: Not Applicable

## 2022-02-17 NOTE — TELEPHONE ENCOUNTER
Informed Felecia patient dialyzes at [facility].     Daysi Frias, RN, BSN  Nephrology Care Coordinator  [facility]

## 2022-03-02 ENCOUNTER — OFFICE VISIT (OUTPATIENT)
Dept: FAMILY MEDICINE | Facility: CLINIC | Age: 82
End: 2022-03-02
Payer: COMMERCIAL

## 2022-03-02 VITALS
SYSTOLIC BLOOD PRESSURE: 190 MMHG | RESPIRATION RATE: 24 BRPM | DIASTOLIC BLOOD PRESSURE: 80 MMHG | BODY MASS INDEX: 23.83 KG/M2 | OXYGEN SATURATION: 95 % | HEART RATE: 72 BPM | WEIGHT: 110.13 LBS | TEMPERATURE: 97.7 F

## 2022-03-02 DIAGNOSIS — E78.5 HYPERLIPIDEMIA LDL GOAL <100: ICD-10-CM

## 2022-03-02 DIAGNOSIS — E11.22 TYPE 2 DIABETES MELLITUS WITH CHRONIC KIDNEY DISEASE ON CHRONIC DIALYSIS, WITHOUT LONG-TERM CURRENT USE OF INSULIN (H): Primary | ICD-10-CM

## 2022-03-02 DIAGNOSIS — I12.9 RENAL HYPERTENSION: ICD-10-CM

## 2022-03-02 DIAGNOSIS — Z99.2 TYPE 2 DIABETES MELLITUS WITH CHRONIC KIDNEY DISEASE ON CHRONIC DIALYSIS, WITHOUT LONG-TERM CURRENT USE OF INSULIN (H): Primary | ICD-10-CM

## 2022-03-02 DIAGNOSIS — N18.6 TYPE 2 DIABETES MELLITUS WITH CHRONIC KIDNEY DISEASE ON CHRONIC DIALYSIS, WITHOUT LONG-TERM CURRENT USE OF INSULIN (H): Primary | ICD-10-CM

## 2022-03-02 LAB — HBA1C MFR BLD: 4.9 % (ref 0–5.6)

## 2022-03-02 PROCEDURE — 83036 HEMOGLOBIN GLYCOSYLATED A1C: CPT | Performed by: FAMILY MEDICINE

## 2022-03-02 PROCEDURE — 36416 COLLJ CAPILLARY BLOOD SPEC: CPT | Performed by: FAMILY MEDICINE

## 2022-03-02 PROCEDURE — 99214 OFFICE O/P EST MOD 30 MIN: CPT | Performed by: FAMILY MEDICINE

## 2022-03-02 RX ORDER — ACETAMINOPHEN 160 MG
1 TABLET,DISINTEGRATING ORAL DAILY
COMMUNITY
Start: 2022-02-05 | End: 2023-01-01

## 2022-03-02 ASSESSMENT — ENCOUNTER SYMPTOMS: HYPERTENSION: 1

## 2022-03-02 ASSESSMENT — PAIN SCALES - GENERAL: PAINLEVEL: NO PAIN (0)

## 2022-03-02 NOTE — LETTER
March 2, 2022      Lakhwinder Negron  [address]  [place] [address]        Dear Lakhwinder,     Your diabetes test was good. If you are experiencing any low sugar symptoms, you can discontinue the glipizide and we can recheck in 3-6 months.     Sincerely,   Fernando Causey MD       Resulted Orders   Hemoglobin A1c   Result Value Ref Range    Hemoglobin A1C 4.9 0.0 - 5.6 %      Comment:      Normal <5.7%   Prediabetes 5.7-6.4%    Diabetes 6.5% or higher     Note: Adopted from ADA consensus guidelines.

## 2022-03-02 NOTE — PROGRESS NOTES
Forest Keaen is a 81 year old who presents for the following health issues:    Hypertension     History of Present Illness       Diabetes:   She presents for follow up of diabetes.  She is checking home blood glucose two times daily. She checks blood glucose before meals and at bedtime.  Blood glucose is sometimes over 200 and never under 70. She is aware of hypoglycemia symptoms including shakiness and weakness. She is concerned about other. She is not experiencing numbness or burning in feet, excessive thirst, blurry vision, weight changes or redness, sores or blisters on feet. The patient has not had a diabetic eye exam in the last 12 months.         Hypertension: She presents for follow up of hypertension.  She does check blood pressure  regularly outside of the clinic. Outside blood pressures have been over 140/90. She follows a low salt diet.     She eats 2-3 servings of fruits and vegetables daily.She consumes 1 sweetened beverage(s) daily.She exercises with enough effort to increase her heart rate 9 or less minutes per day.  She exercises with enough effort to increase her heart rate 3 or less days per week.   She is taking medications regularly.       Diabetes Follow-up    How often are you checking your blood sugar? One time daily  What time of day are you checking your blood sugars (select all that apply)?  Before meals  Have you had any blood sugars above 200?  No  Have you had any blood sugars below 70?  No    What symptoms do you notice when your blood sugar is low?  None    What concerns do you have today about your diabetes? None     Do you have any of these symptoms? (Select all that apply)  No numbness or tingling in feet.  No redness, sores or blisters on feet.  No complaints of excessive thirst.  No reports of blurry vision.  No significant changes to weight.    Have you had a diabetic eye exam in the last 12 months? No        BP Readings from Last 2 Encounters:   03/02/22 (!) 202/50    12/20/21 (!) 199/70     Hemoglobin A1C POCT (%)   Date Value   10/26/2020 6.1 (H)   11/06/2019 5.4     Hemoglobin A1C (%)   Date Value   12/20/2021 5.5   09/27/2021 5.2     LDL Cholesterol Calculated (mg/dL)   Date Value   09/27/2021 83   03/15/2018 34   12/02/2016 85     LDL Cholesterol Direct (mg/dL)   Date Value   01/20/2020 106 (H)         Hypertension Follow-up      Do you check your blood pressure regularly outside of the clinic? Yes     Are you following a low salt diet? Yes    Are your blood pressures ever more than 140 on the top number (systolic) OR more   than 90 on the bottom number (diastolic), for example 140/90? Yes      Review of Systems   Constitutional, HEENT, cardiovascular, pulmonary, GI, , musculoskeletal, neuro, skin, endocrine and psych systems are negative, except as otherwise noted.      Objective    BP (!) 190/80   Pulse 72   Temp 97.7  F (36.5  C) (Tympanic)   Resp 24   Wt 50 kg (110 lb 2 oz)   LMP  (LMP Unknown)   SpO2 95%   BMI 23.83 kg/m    Body mass index is 23.83 kg/m .  Physical Exam   GENERAL: healthy, alert and no distress  NECK: no adenopathy, no asymmetry, masses, or scars and thyroid normal to palpation  RESP: lungs clear to auscultation - no rales, rhonchi or wheezes  CV: regular rate and rhythm, normal S1 S2, no S3 or S4, no murmur, click or rub, no peripheral edema and peripheral pulses strong  ABDOMEN: soft, nontender, no hepatosplenomegaly, no masses and bowel sounds normal  MS: no gross musculoskeletal defects noted, no edema  Diabetic foot exam: normal DP and PT pulses, no trophic changes or ulcerative lesions and normal sensory exam    A/P:  (E11.22,  N18.6,  Z99.2) Type 2 diabetes mellitus with chronic kidney disease on chronic dialysis, without long-term current use of insulin (H)  (primary encounter diagnosis)  Comment:   Plan: Hemoglobin A1c        Usually controlled. Recheck a1c. RTC in 6 months.    (I12.9) Renal hypertension  Comment:   Plan: Hemoglobin  A1c        Not controlled. Patient sees Nephrology. Patient stated usually better at home but in between days from dialysis runs her bp's does go up.    (E78.5) Hyperlipidemia LDL goal <100  Comment:   Plan: controlled.    Fernando Causey MD

## 2022-03-24 ENCOUNTER — TELEPHONE (OUTPATIENT)
Dept: FAMILY MEDICINE | Facility: CLINIC | Age: 82
End: 2022-03-24
Payer: COMMERCIAL

## 2022-03-24 ENCOUNTER — APPOINTMENT (OUTPATIENT)
Dept: INTERPRETER SERVICES | Facility: CLINIC | Age: 82
End: 2022-03-24
Payer: COMMERCIAL

## 2022-03-24 NOTE — TELEPHONE ENCOUNTER
The Home Care/Assisted Living/Nursing Facility is calling regarding an established patient.  Has the patient seen Home Care in the past or is currently residing in Assisted Living or Nursing Facility? Yes.     Lilliana calling from [facility] requesting the following orders that are within the Home Care, Assisted Living or Nursing Home Eval and Treatment standing order and can be signed as standing order signature required by RN.    Preferred Call Back Number:[phone]  Home care visits continuation, skilled nursing every other week for 9 weeks for med mgmt  Any additional Orders:  Any order requested not stated above are outside of the standing order and must be routed to a licensed practitioner for approval.    No    Writer has verified Requestor will send fax to have orders signed.    Katt Paul RN  [facility]

## 2022-03-25 ENCOUNTER — MEDICAL CORRESPONDENCE (OUTPATIENT)
Dept: HEALTH INFORMATION MANAGEMENT | Facility: CLINIC | Age: 82
End: 2022-03-25
Payer: COMMERCIAL

## 2022-03-26 ENCOUNTER — MEDICAL CORRESPONDENCE (OUTPATIENT)
Dept: HEALTH INFORMATION MANAGEMENT | Facility: CLINIC | Age: 82
End: 2022-03-26
Payer: COMMERCIAL

## 2022-04-01 DIAGNOSIS — Z53.9 DIAGNOSIS NOT YET DEFINED: Primary | ICD-10-CM

## 2022-04-01 PROCEDURE — 99207 PR MD RECERTIFICATION HHA PT: CPT | Performed by: FAMILY MEDICINE

## 2022-04-05 ENCOUNTER — TELEPHONE (OUTPATIENT)
Dept: NEPHROLOGY | Facility: CLINIC | Age: 82
End: 2022-04-05
Payer: COMMERCIAL

## 2022-04-05 NOTE — TELEPHONE ENCOUNTER
[facility] Call [facility]    Phone Message    May a detailed message be left on voicemail: yes     Reason for Call: Ashley from [facility] calling to get signed orders for patient new Lasix dosage and Tylenol. Orders were faxed to clinic on 4/1. Please reach out to Ashley when orders have been signed and faxed back. Thank you!    Action Taken: Message routed to:  [facility] Neph    Travel Screening: Not Applicable

## 2022-04-06 NOTE — CONFIDENTIAL NOTE
Asked for orders to be refaxed today as clinic has not seen them. Provider in clinic today.    Daysi Frias RN, BSN  Nephrology Care Coordinator  [facility]

## 2022-04-08 ENCOUNTER — TELEPHONE (OUTPATIENT)
Dept: FAMILY MEDICINE | Facility: CLINIC | Age: 82
End: 2022-04-08
Payer: COMMERCIAL

## 2022-04-08 DIAGNOSIS — E11.42 TYPE 2 DIABETES MELLITUS WITH DIABETIC POLYNEUROPATHY, UNSPECIFIED WHETHER LONG TERM INSULIN USE (H): ICD-10-CM

## 2022-04-08 RX ORDER — GLIPIZIDE 2.5 MG/1
TABLET, EXTENDED RELEASE ORAL
Qty: 180 TABLET | Refills: 1 | OUTPATIENT
Start: 2022-04-08

## 2022-04-08 NOTE — TELEPHONE ENCOUNTER
"Requested Prescriptions   Pending Prescriptions Disp Refills    glipiZIDE (GLUCOTROL XL) 2.5 MG 24 hr tablet 180 tablet 1        Sulfonylurea Agents Failed - 4/8/2022  8:39 AM        Failed - Patient has a recent creatinine (normal) within the past 12 mos.     Recent Labs   Lab Test 01/24/20  0647   CR 3.96*       Ok to refill medication if creatinine is low          Passed - Patient has documented A1c within the specified period of time.     If HgbA1C is 8 or greater, it needs to be on file within the past 3 months.  If less than 8, must be on file within the past 6 months.     Recent Labs   Lab Test 03/02/22  1620   A1C 4.9             Passed - Medication is active on med list        Passed - Patient is age 18 or older        Passed - No active pregnancy on record        Passed - Patient has not had a positive pregnancy test within the past 12 mos.        Passed - Recent (6 mo) or future (30 days) visit within the authorizing provider's specialty     Patient had office visit in the last 6 months or has a visit in the next 30 days with authorizing provider or within the authorizing provider's specialty.  See \"Patient Info\" tab in inbasket, or \"Choose Columns\" in Meds & Orders section of the refill encounter.                  "

## 2022-04-08 NOTE — TELEPHONE ENCOUNTER
[facility] Call [facility]    Phone Message    May a detailed message be left on voicemail: yes     Reason for Call: Other: Ashley from [facility] called back.  She refaxed the orders on 4/6/22 to [phone] but has not gotten the signed orders back.  Please sign/date/fax back or if still not there please give Ashley a call      Action Taken: Message routed to:  Other: [facility]    Travel Screening: Not Applicable

## 2022-04-08 NOTE — TELEPHONE ENCOUNTER
Writer calling patient's son Ramin Negron to relay provider message below and schedule patient for visit in July to recheck and follow up on Diabetes.     Consent to communicate on file for Ramin Negron. Ramin Negron is listed as contact for scheduling visits.     Ramin states he understands the patient should stop taking glipizide at this time.     Writer scheduled patient for visit on 7/6/22.     Keyanna Riddle RN  [facility]

## 2022-04-08 NOTE — TELEPHONE ENCOUNTER
Diabetes under very good control. I would like to stop this medication (glipizide) and recheck in 3 months. Please assist patient to schedule office visit with me. Thank you

## 2022-04-08 NOTE — TELEPHONE ENCOUNTER
Fax received from [facility].  Placed in Dr. Rodriguez's Review Folder.    Cora Hayes LPN

## 2022-04-08 NOTE — TELEPHONE ENCOUNTER
"Returned call and spoke with \"Yaritza\"  Informed her Fax has not been received.  Gave Fax #[phone] to reFax.  Also, Dr. Rodriguez will be back in clinic Monday.    Yaritza stated she will reFax and will await signed copy on Monday.    Cora Hayes LPN    "

## 2022-04-10 ENCOUNTER — TELEPHONE (OUTPATIENT)
Dept: FAMILY MEDICINE | Facility: CLINIC | Age: 82
End: 2022-04-10
Payer: COMMERCIAL

## 2022-04-10 NOTE — TELEPHONE ENCOUNTER
glipiZIDE (GLUCOTROL XL) 2.5 MG 24 hr tablet (Discontinued) 180 tablet 1 10/28/2021 4/8/2022     This is discontinued medication that the patient is requesting.

## 2022-04-11 ENCOUNTER — PATIENT OUTREACH (OUTPATIENT)
Dept: GERIATRIC MEDICINE | Facility: CLINIC | Age: 82
End: 2022-04-11
Payer: COMMERCIAL

## 2022-04-11 NOTE — TELEPHONE ENCOUNTER
Medication was discontinued. Please see telephone encounter from 4/8  Please see if family has any questions or why the new request for this medication  Thank you

## 2022-04-11 NOTE — TELEPHONE ENCOUNTER
Writer contacted patient's Son Ramin. Consent to communicate on file with son Ramin.     Son was relayed provider message below. Son verbalized understanding and had no further questions or concerns at this time.     Son was relayed to update patient's RN, Lilliana, that is on file. Patient was given number to call patient's home RN. [phone].     Son will call home RN to give update.     No further questions or concerns at this time.     Patricia Nunn RN, BSN  [facility]

## 2022-04-11 NOTE — PROGRESS NOTES
[facility] Care Coordination Contact      [facility] Six-Month Telephone Assessment    6 month telephone assessment completed on 4/11/2022.    ER visits: No  Hospitalizations: No  TCU stays: No  Significant health status changes: None reported.   Falls/Injuries: No  ADL/IADL changes: No  Changes in services: No    Caregiver Assessment follow up:  NA    Goals: See POC in chart for goal progress documentation.  Six month follow-up call with member's son, Ramin Negron. Per Ramin, member's health has been stable. Member continues to attend ADC, utilize PCA service daily, has SNV and receives medical transportation weekly for dialysis appointments. Ramin states current support and services are meeting needs. POC reviewed and updated.     Will see member in 6 months for an annual health risk assessment.   Encouraged member to call CC with any questions or concerns in the meantime.       Shruthi Martins RN, PHN  [facility]  [phone]

## 2022-04-12 NOTE — TELEPHONE ENCOUNTER
Dr. Rodriguez reviewed Form received from [facility] and wrote a response.  Faxed back to [facility] at [phone].  Confirmation success using [employer].    Original sent to HIM's to be scanned to chart.    Cora Hayes LPN

## 2022-04-13 ENCOUNTER — MEDICAL CORRESPONDENCE (OUTPATIENT)
Dept: HEALTH INFORMATION MANAGEMENT | Facility: CLINIC | Age: 82
End: 2022-04-13
Payer: COMMERCIAL

## 2022-04-13 NOTE — TELEPHONE ENCOUNTER
Ashley is refaxing the orders and stating that Dr. Rodriguez needs to be the one to sign them. Call Ashley with any questions, thanks!

## 2022-04-18 ENCOUNTER — PATIENT OUTREACH (OUTPATIENT)
Dept: GERIATRIC MEDICINE | Facility: CLINIC | Age: 82
End: 2022-04-18
Payer: COMMERCIAL

## 2022-04-18 ENCOUNTER — MEDICAL CORRESPONDENCE (OUTPATIENT)
Dept: HEALTH INFORMATION MANAGEMENT | Facility: CLINIC | Age: 82
End: 2022-04-18
Payer: COMMERCIAL

## 2022-04-18 NOTE — PROGRESS NOTES
TRANSITIONS OF CARE (FREDDY) LOG   FREDDY tasks should be completed by the CC within one (1) business day of notification of each transition. Follow up contact with member is required after return to their usual care setting.  Note:  If CC finds out about the transitions fifteen (15) days or more after the member has returned to their usual care setting, no FREDDY log is needed. However, the CC should check in with the member to discuss the transition process, any changes needed to the care plan and document it in a case note.    Member Name:  Lakhwinder Negron [facility] Member ID[facility] [id number]   Product: [facility] Care Coordinator Contact:  Shruthi Martins RN, PHN Agency/County/Care System: [facility]   Transition Communication Actions from Care Management Contact   Transition #1   Notification Date: 4/18/2022 Transition Date:   4/16/2022 Transition From: Home     Is this the member s usual care setting?               yes Transition To: Hospital, [facility]   Transition Type:  Unplanned  Reason for Admission/Comments:  Upper GI Bleed  Contact member/responsible party to offer assistance with transition Date completed: 4/18/22 - Spoke to member's son, Ramin Negron.     Notes from conversation with the member/responsible party, provider, discharging and receiving facility (as applicable): CC contacted Hospital /discharge planner - Jessica  at [facility] ([phone]).   CC reached out to adult son Ramin Negron regarding transition and offered support as needed.  Reviewed and update care plan as needed.  Notified community service providers and placed services Adult Day Care PCA RN on hold as needed.  Transition log initiated.   PCP notified of hospitalization via EMR.    Ramin informed CC member has 3 ulcers/GI bleed. Ramin states member will likely discharge home today or tomorrow. CC encourage Ramin to contact CC as needed.    Shurthi Martins RN, PHN  [place]  [facility]  [phone]           Shared CC contact info, care plan/services with receiving setting--Date completed: 4/18/22   Name & Title of receiving setting contact: [facility]   Notified PCP of transition--Date completed:  4/16/22     via  EMR  Name of PCP: Vee Lange     Transition #2   Notification Date: 4/21/22 Transition Date:   4/20/22 Transition From: Hospital, [facility]     Is this the member s usual care setting?               no Transition To: Rehabiliation Facility, [facility]   Transition Type:  Planned  Reason for Admission/Comments:  Upper GI Bleed     Contact member/responsible party to offer assistance with transition Date completed: 4/21/22 - Spoke to member's son, Ramin regarding TCU admission.     Notes from conversation with the member/responsible party, provider, discharging and receiving facility (as applicable): OBRA 1 right faxed to [facility] admissions office.   CC contacted Nursing Home LSW ([facility] at [phone]) and left a message with this CC contact information, reviewed community POC as well requested to be notified of concerns, care conferences and discharge planning.  CC reached out to adult son Ramin  regarding transition and offered support as needed.    Transition log updated.   PCP notified of transition to TCU via EMR.  OBRA I faxed to [facility] at [phone] attn: Social Mabel Maguire.     4/25/22 - VM from Katt Monroe indicating member is doing well in therapy and will likely discharge in a week. Katt will update CC on member's progress next week.     5/3/22 - CC spoke to member's son, Ramin and he states member is doing well. Member will likely discharge home on 5/4/22. CC spoke to Katt Galindo and she confirmed member will discharge likely at 11am on 5/4/22. Katt states member has been doing well with therapy using her cane. Katt anticipates member will continue the  home and community based services she is currently receiving. No new DME or therapy ordered. Lakhwinder currently receives PCA, ADC, biweekly SNV, medical transportation for Dialysis service x3 weekly.     Shruthi Martins RN, MARYLU  [facility]  [phone]         Shared CC contact info, care plan/services with receiving setting--Date completed: 4/21/22   Name & Title of receiving setting contact: [facility]    Notified PCP of transition--Date completed:  4/21/22     via  EMR  Name of PCP: Vee Lange      Transition #3   Notification Date: 5/4/2022 Transition Date:   5/4/2022 Transition From: Rehabiliation Facility, [facility]     Is this the member s usual care setting?               no Transition To: Home   Transition Type:  Planned  Reason for Admission/Comments:  Upper GI Bleed   Contact member/responsible party to offer assistance with transition Date completed: 5/4/22 - SonRamin.     Notes from conversation with the member/responsible party, provider, discharging and receiving facility (as applicable): CC contacted adult son Ramin Negron and reviewed discharge summary.  Member has a follow-up appointment with PCP in 7 days: No: Offered Assistance with setting up a follow up appointment. Ramin states he will schedule a follow-up appointment tomorrow.   Member has had a change in condition: No  Home visit needed: No  Care plan reviewed and updated.  The following home based services Adult Day Care PCA RN were resumed.  New referrals placed: No  Transition log completed.   PCP notified of transition back to home via EMR.    Ramin states member is back at baseline. Member will resume current support and services. Family will assists with medication, IADLS/ADLS, appointments and transportation as needed. No new services or DME requested/ordered.     Shruthi Martins RN, N  [facility]  [phone]         Shared CC contact info, care plan/services with receiving  setting--Date completed: See Above    Name & Title of receiving setting contact:    Notified PCP of transition--Date completed:  5/4/22     via  EMR  Name of PCP: Vee Lange      *RETURN TO USUAL CARE SETTING: *Complete tasks below when the member is discharging TO their usual care setting within one (1) business day of notification..      For situations where the Care Coordinator is notified of the discharge prior to the date of discharge, the Care Coordinator must follow up with the member or designated representative to confirm that discharge actually occurred and discuss required FREDDY tasks as outlined in the FREDDY Instructions.  (This includes situations where it may be a  new  usual care setting for the member. (i.e., a community member who decides upon permanent nursing home placement following hospitalization and rehab).    Discuss with Member/Responsible Party:    Check  Yes  - if the member, family member and/or SNF/facility staff manages the following:    If  No  provide explanation in the comments section.          Date completed: 5/4/2022 Communicated with member or their designated representative about the following:  care transition process; about changes to the member s health status; plan of care updates; education about transitions and how to prevent unplanned transitions/readmissions    Four Pillars for Optimal Transition:    Check  Yes  - if the member, family member and/or SNF/facility staff manages the following:    If  No  provide explanation in the comments section.          []  Yes     [x]  No Does the member have a follow-up appointment scheduled with primary care or specialist? (Mental health hospitalizations--the appt. should be w/in 7 days)              For mental health hospitalizations:  []  Yes     []  No     Does the member have a follow-up appointment scheduled with a mental health practitioner within 7 days of discharge?  [x]  Yes     []  No     Has a medication review been  completed with member? If no, refer to PCP, home care nurse, MTM, pharmacist  [x]  Yes     []  No     Can the member manage their medications or is there a system in place to manage medications (e.g. home care set-up)?         [x]  Yes     []  No     Can the member verbalize warning signs and symptoms to watch for and how to respond?  [x]  Yes     []  No     Does the member have a copy of and understand their discharge instructions?  If no, assist to obtain copy of discharge instructions, review discharge instructions, and assist to contact PCP to discuss questions about their recent hospitalization.  [x]  Yes     []  No     Does the member have adequate food, housing and transportation?  If no, add goal and discuss additional supports available to the member                                                                                                                                                                                 [x]  Yes     []  No     Is the member safe in their home?  If no, document needs and support provided                                                                                                                                                                          []  Yes     [x]  No     Are there any concerns of vulnerability, abuse, or neglect?  If yes, document concerns and actions taken by Care Coordinator as a mandated                                                                                                                                                                              [x]  Yes     []  No     Does the member use a Personal Health Care Record?  Check  Yes  if visit summary, discharge summary, and/or healthcare summary are being used as a PHR.                                                                                                                                                                                  [x]  Yes     []  No     Have you  reviewed the discharge summary with the member? If  No  provide explanation in comments.  [x]  Yes     []  No     Have you updated the member s care plan/support plan? Add new diagnosis, medications, treatments, goals & interventions, as applicable. If No, provide explanation in comments.    Comments:     Member's son, Ramin will schedule a follow-up appointment with PCP.      Notes from conversation with the member/responsible party, provider, discharging and receiving facility (as applicable): See Above    Shruthi Martins RN, PHN  [facility]  [phone]

## 2022-04-19 ENCOUNTER — MEDICAL CORRESPONDENCE (OUTPATIENT)
Dept: HEALTH INFORMATION MANAGEMENT | Facility: CLINIC | Age: 82
End: 2022-04-19
Payer: COMMERCIAL

## 2022-04-19 NOTE — TELEPHONE ENCOUNTER
Faxed back to [facility].  Form signed by Dr. Rodriguez.  Confirmation success using [employer].    Cora Hayes LPN

## 2022-05-10 ENCOUNTER — TELEPHONE (OUTPATIENT)
Dept: FAMILY MEDICINE | Facility: CLINIC | Age: 82
End: 2022-05-10
Payer: COMMERCIAL

## 2022-05-10 NOTE — TELEPHONE ENCOUNTER
Patient called to clinic via [facility] , along with home care nurse, to request hospital visit follow up appointment with Dr. Causey this week.    Patient was in [facility] from 4/15-4/20 and then was discharged to a TCU. Is home now, didn't realize should have followed up with primary care previously to now and provider is now fully booked with schedule.  Patient was admitted with Upper GI bleed, NSTEMI, and anemia.  Has has multiple medication changes, needs follow up this week. Requesting to see Dr. Causey (PCP is noted at [facility] but patient doesn't want to have her as PCP, wants Dr. Causey and asking for chart to be changed to reflect this).    Provider schedule is fully booked except for few virtual openings. Patient and family want to be seen in person. Are asking if provider is willing to see her this week.  Patient would be brought in by son. Available to come in any time on Wednesday or Friday. If on Thursday, has to be after 2:00 pm due to patient having dialysis in the early part of that day.    Call back to son, Ramin @ [phone] with outcome and instructions. Okay to leave detailed message on voicemail if no answer.      Routing to provider to review and advise on adding patient on to schedule this week for hospital visit follow up. Please indicate preferred day/times, if proceeding with this. Thank you.        Nancy Owusu RN  [facility]

## 2022-05-11 ENCOUNTER — OFFICE VISIT (OUTPATIENT)
Dept: FAMILY MEDICINE | Facility: CLINIC | Age: 82
End: 2022-05-11
Payer: COMMERCIAL

## 2022-05-11 VITALS
DIASTOLIC BLOOD PRESSURE: 63 MMHG | BODY MASS INDEX: 23.6 KG/M2 | HEIGHT: 57 IN | WEIGHT: 109.4 LBS | OXYGEN SATURATION: 100 % | SYSTOLIC BLOOD PRESSURE: 177 MMHG | HEART RATE: 71 BPM | TEMPERATURE: 98.4 F

## 2022-05-11 DIAGNOSIS — Z99.2 TYPE 2 DIABETES MELLITUS WITH CHRONIC KIDNEY DISEASE ON CHRONIC DIALYSIS, WITHOUT LONG-TERM CURRENT USE OF INSULIN (H): ICD-10-CM

## 2022-05-11 DIAGNOSIS — I63.549 CEREBROVASCULAR ACCIDENT (CVA) DUE TO OCCLUSION OF CEREBELLAR ARTERY, UNSPECIFIED BLOOD VESSEL LATERALITY (H): ICD-10-CM

## 2022-05-11 DIAGNOSIS — Z79.899 ENCOUNTER FOR LONG-TERM (CURRENT) USE OF MEDICATIONS: ICD-10-CM

## 2022-05-11 DIAGNOSIS — N18.6 TYPE 2 DIABETES MELLITUS WITH CHRONIC KIDNEY DISEASE ON CHRONIC DIALYSIS, WITHOUT LONG-TERM CURRENT USE OF INSULIN (H): ICD-10-CM

## 2022-05-11 DIAGNOSIS — E11.22 TYPE 2 DIABETES MELLITUS WITH CHRONIC KIDNEY DISEASE ON CHRONIC DIALYSIS, WITHOUT LONG-TERM CURRENT USE OF INSULIN (H): ICD-10-CM

## 2022-05-11 DIAGNOSIS — I10 ESSENTIAL HYPERTENSION WITH GOAL BLOOD PRESSURE LESS THAN 140/90: ICD-10-CM

## 2022-05-11 DIAGNOSIS — M81.0 SENILE OSTEOPOROSIS: ICD-10-CM

## 2022-05-11 DIAGNOSIS — G81.91 RIGHT HEMIPARESIS (H): ICD-10-CM

## 2022-05-11 DIAGNOSIS — K22.70 BARRETT'S ESOPHAGUS WITHOUT DYSPLASIA: ICD-10-CM

## 2022-05-11 DIAGNOSIS — K27.9 PUD (PEPTIC ULCER DISEASE): Primary | ICD-10-CM

## 2022-05-11 DIAGNOSIS — N25.81 SECONDARY HYPERPARATHYROIDISM (H): ICD-10-CM

## 2022-05-11 DIAGNOSIS — I50.22 CHRONIC SYSTOLIC CONGESTIVE HEART FAILURE (H): ICD-10-CM

## 2022-05-11 DIAGNOSIS — E78.5 HYPERLIPIDEMIA LDL GOAL <70: ICD-10-CM

## 2022-05-11 DIAGNOSIS — G89.4 CHRONIC PAIN SYNDROME: ICD-10-CM

## 2022-05-11 DIAGNOSIS — I21.4 NSTEMI (NON-ST ELEVATED MYOCARDIAL INFARCTION) (H): ICD-10-CM

## 2022-05-11 DIAGNOSIS — K21.9 GASTROESOPHAGEAL REFLUX DISEASE WITHOUT ESOPHAGITIS: ICD-10-CM

## 2022-05-11 LAB
BASOPHILS # BLD AUTO: 0.1 10E3/UL (ref 0–0.2)
BASOPHILS NFR BLD AUTO: 1 %
CHOLEST SERPL-MCNC: 110 MG/DL
CREAT UR-MCNC: 27 MG/DL
EOSINOPHIL # BLD AUTO: 0.1 10E3/UL (ref 0–0.7)
EOSINOPHIL NFR BLD AUTO: 1 %
ERYTHROCYTE [DISTWIDTH] IN BLOOD BY AUTOMATED COUNT: 13.8 % (ref 10–15)
FASTING STATUS PATIENT QL REPORTED: YES
HCT VFR BLD AUTO: 31.2 % (ref 35–47)
HDLC SERPL-MCNC: 71 MG/DL
HGB BLD-MCNC: 10.4 G/DL (ref 11.7–15.7)
IMM GRANULOCYTES # BLD: 0 10E3/UL
IMM GRANULOCYTES NFR BLD: 0 %
LDLC SERPL CALC-MCNC: 20 MG/DL
LYMPHOCYTES # BLD AUTO: 1.1 10E3/UL (ref 0.8–5.3)
LYMPHOCYTES NFR BLD AUTO: 18 %
MCH RBC QN AUTO: 32.4 PG (ref 26.5–33)
MCHC RBC AUTO-ENTMCNC: 33.3 G/DL (ref 31.5–36.5)
MCV RBC AUTO: 97 FL (ref 78–100)
MONOCYTES # BLD AUTO: 0.4 10E3/UL (ref 0–1.3)
MONOCYTES NFR BLD AUTO: 6 %
NEUTROPHILS # BLD AUTO: 4.6 10E3/UL (ref 1.6–8.3)
NEUTROPHILS NFR BLD AUTO: 74 %
NONHDLC SERPL-MCNC: 39 MG/DL
PLATELET # BLD AUTO: 122 10E3/UL (ref 150–450)
RBC # BLD AUTO: 3.21 10E6/UL (ref 3.8–5.2)
TRIGL SERPL-MCNC: 93 MG/DL
WBC # BLD AUTO: 6.3 10E3/UL (ref 4–11)

## 2022-05-11 PROCEDURE — 36415 COLL VENOUS BLD VENIPUNCTURE: CPT | Performed by: FAMILY MEDICINE

## 2022-05-11 PROCEDURE — 85025 COMPLETE CBC W/AUTO DIFF WBC: CPT | Performed by: FAMILY MEDICINE

## 2022-05-11 PROCEDURE — 80307 DRUG TEST PRSMV CHEM ANLYZR: CPT | Performed by: FAMILY MEDICINE

## 2022-05-11 PROCEDURE — 99214 OFFICE O/P EST MOD 30 MIN: CPT | Performed by: FAMILY MEDICINE

## 2022-05-11 PROCEDURE — 80061 LIPID PANEL: CPT | Performed by: FAMILY MEDICINE

## 2022-05-11 RX ORDER — FUROSEMIDE 80 MG
TABLET ORAL
Qty: 90 TABLET | Refills: 3 | Status: SHIPPED | OUTPATIENT
Start: 2022-05-11 | End: 2023-01-01

## 2022-05-11 RX ORDER — FAMOTIDINE 20 MG/1
20 TABLET, FILM COATED ORAL
Qty: 36 TABLET | Refills: 4 | Status: ON HOLD | OUTPATIENT
Start: 2022-05-11 | End: 2023-01-01

## 2022-05-11 RX ORDER — CARVEDILOL 25 MG/1
25 TABLET ORAL 2 TIMES DAILY WITH MEALS
Qty: 180 TABLET | Refills: 3 | Status: ON HOLD | OUTPATIENT
Start: 2022-05-11 | End: 2023-01-01

## 2022-05-11 RX ORDER — HYDRALAZINE HYDROCHLORIDE 50 MG/1
75 TABLET, FILM COATED ORAL 3 TIMES DAILY
Qty: 405 TABLET | Refills: 3 | Status: SHIPPED | OUTPATIENT
Start: 2022-05-11 | End: 2023-01-01

## 2022-05-11 RX ORDER — PANTOPRAZOLE SODIUM 40 MG/1
40 TABLET, DELAYED RELEASE ORAL 2 TIMES DAILY
Qty: 180 TABLET | Refills: 3 | Status: ON HOLD | OUTPATIENT
Start: 2022-05-11 | End: 2023-01-01

## 2022-05-11 RX ORDER — AMLODIPINE BESYLATE 10 MG/1
10 TABLET ORAL DAILY
Qty: 90 TABLET | Refills: 3 | Status: SHIPPED | OUTPATIENT
Start: 2022-05-11 | End: 2023-01-01

## 2022-05-11 RX ORDER — ISOSORBIDE MONONITRATE 30 MG/1
30 TABLET, EXTENDED RELEASE ORAL DAILY
Qty: 90 TABLET | Refills: 3 | Status: SHIPPED | OUTPATIENT
Start: 2022-05-11 | End: 2022-01-01

## 2022-05-11 RX ORDER — ATORVASTATIN CALCIUM 40 MG/1
40 TABLET, FILM COATED ORAL DAILY
Qty: 90 TABLET | Refills: 3 | Status: ON HOLD | OUTPATIENT
Start: 2022-05-11 | End: 2023-01-01

## 2022-05-11 ASSESSMENT — PAIN SCALES - GENERAL: PAINLEVEL: NO PAIN (0)

## 2022-05-11 NOTE — LETTER
May 16, 2022      Lakhwinder Negron  [address]  [place] [address]        Dear ,    We are writing to inform you of your test results.    Your hemoglobin is stable compared from last test at the hospital. Your cholesterol is at goal. Please follow up in about 3 months for routine diabetes visit for recheck. Please also follow up with Gastroenterology, Cardiology and Nephrology.    Resulted Orders   Lipid panel reflex to direct LDL Non-fasting   Result Value Ref Range    Cholesterol 110 <200 mg/dL    Triglycerides 93 <150 mg/dL    Direct Measure HDL 71 >=50 mg/dL    LDL Cholesterol Calculated 20 <=100 mg/dL    Non HDL Cholesterol 39 <130 mg/dL    Patient Fasting > 8hrs? Yes     Narrative    Cholesterol  Desirable:  <200 mg/dL    Triglycerides  Normal:  Less than 150 mg/dL  Borderline High:  150-199 mg/dL  High:  200-499 mg/dL  Very High:  Greater than or equal to 500 mg/dL    Direct Measure HDL  Female:  Greater than or equal to 50 mg/dL   Male:  Greater than or equal to 40 mg/dL    LDL Cholesterol  Desirable:  <100mg/dL  Above Desirable:  100-129 mg/dL   Borderline High:  130-159 mg/dL   High:  160-189 mg/dL   Very High:  >= 190 mg/dL    Non HDL Cholesterol  Desirable:  130 mg/dL  Above Desirable:  130-159 mg/dL  Borderline High:  160-189 mg/dL  High:  190-219 mg/dL  Very High:  Greater than or equal to 220 mg/dL   Urine Drug Confirmation Panel   Result Value Ref Range    Codeine ng/mL 8,060 (H) <50 ng/mL    Codeine 29,852 Absent ng/mg [creat]    Morphine ng/mL 255 (H) <50 ng/mL    Morphine 944 Absent ng/mg [creat]      Comment:      Potential sources of morphine include administration of codeine or morphine, use of heroin, or ingestion of poppy seeds.  Large amounts of morphine in the absence of codeine include administration of morphine or use of heroin.    Norcodeine ng/mL 406 (H) <50 ng/mL    Norcodeine 1,504 Absent ng/mg [creat]      Comment:      Norcodone is an expected metabolite of codeine.     Narrative    This test was developed and its performance characteristics determined by the [facility],  Special [facility] Laboratory. It has not been cleared or approved by the FDA. The laboratory is regulated under CLIA as qualified to perform high-complexity testing. This test is used for clinical purposes. It should not be regarded as investigational or for research.   Urine Creatinine for Drug Screen Panel   Result Value Ref Range    Creatinine Urine for Drug Screen 27 mg/dL      Comment:      The reference range has not been established for creatinine in random urines. The results should be integrated into the clinical context for interpretation.   CBC with platelets and differential   Result Value Ref Range    WBC Count 6.3 4.0 - 11.0 10e3/uL    RBC Count 3.21 (L) 3.80 - 5.20 10e6/uL    Hemoglobin 10.4 (L) 11.7 - 15.7 g/dL    Hematocrit 31.2 (L) 35.0 - 47.0 %    MCV 97 78 - 100 fL    MCH 32.4 26.5 - 33.0 pg    MCHC 33.3 31.5 - 36.5 g/dL    RDW 13.8 10.0 - 15.0 %    Platelet Count 122 (L) 150 - 450 10e3/uL    % Neutrophils 74 %    % Lymphocytes 18 %    % Monocytes 6 %    % Eosinophils 1 %    % Basophils 1 %    % Immature Granulocytes 0 %    Absolute Neutrophils 4.6 1.6 - 8.3 10e3/uL    Absolute Lymphocytes 1.1 0.8 - 5.3 10e3/uL    Absolute Monocytes 0.4 0.0 - 1.3 10e3/uL    Absolute Eosinophils 0.1 0.0 - 0.7 10e3/uL    Absolute Basophils 0.1 0.0 - 0.2 10e3/uL    Absolute Immature Granulocytes 0.0 <=0.4 10e3/uL       If you have any questions or concerns, please call the clinic at the number listed above.       Sincerely,      Fernando Causey MD

## 2022-05-11 NOTE — LETTER
Opioid / Opioid Plus Controlled Substance Agreement    This is an agreement between you and your provider about the safe and appropriate use of controlled substance/opioids prescribed by your care team. Controlled substances are medicines that can cause physical and mental dependence (abuse).    There are strict laws about having and using these medicines. We here at [facility] are committing to working with you in your efforts to get better. To support you in this work, we ll help you schedule regular office appointments for medicine refills. If we must cancel or change your appointment for any reason, we ll make sure you have enough medicine to last until your next appointment.     As a Provider, I will:    Listen carefully to your concerns and treat you with respect.     Recommend a treatment plan that I believe is in your best interest. This plan may involve therapies other than opioid pain medication.     Talk with you often about the possible benefits, and the risk of harm of any medicine that we prescribe for you.     Provide a plan on how to taper (discontinue or go off) using this medicine if the decision is made to stop its use.    As a Patient, I understand that opioid(s):     Are a controlled substance prescribed by my care team to help me function or work and manage my condition(s).     Are strong medicines and can cause serious side effects such as:    Drowsiness, which can seriously affect my driving ability    A lower breathing rate, enough to cause death    Harm to my thinking ability     Depression     Abuse of and addiction to this medicine    Need to be taken exactly as prescribed. Combining opioids with certain medicines or chemicals (such as illegal drugs, sedatives, sleeping pills, and benzodiazepines) can be dangerous or even fatal. If I stop opioids suddenly, I may have severe withdrawal symptoms.    Do not work for all types of pain nor for all patients. If they re not helpful, I may  be asked to stop them.      The risks, benefits and side effects of these medicine(s) were explained to me. I agree that:  1. I will take part in other treatments as advised by my care team. This may be psychiatry or counseling, physical therapy, behavioral therapy, group treatment or a referral to a specialist.     2. I will keep all my appointments. I understand that this is part of the monitoring of opioids. My care team may require an office visit for EVERY opioid/controlled substance refill. If I miss appointments or don t follow instructions, my care team may stop my medicine.    3. I will take my medicines as prescribed. I will not change the dose or schedule unless my care team tells me to. There will be no refills if I run out early.     4. I may be asked to come to the clinic and complete a urine drug test or complete a pill count at any time. If I don t give a urine sample or participate in a pill count, the care team may stop my medicine.    5. I will only receive prescriptions from this clinic for chronic pain. If I am treated by another provider for acute pain issues, I will tell them that I am taking opioid pain medication for chronic pain and that I have a treatment agreement with this provider. I will inform my [facility] care team within one business day if I am given a prescription for any pain medication by another healthcare provider. My [facility] care team can contact other providers and pharmacists about my use of any medicines.    6. It is up to me to make sure that I don t run out of my medicines on weekends or holidays. If my care team is willing to refill my opioid prescription without a visit, I must request refills only during office hours. Refills may take up to 3 business days to process. I will use one pharmacy to fill all my opioid and other controlled substance prescriptions. I will notify the clinic about any changes to my insurance or medication  availability.    7. I am responsible for my prescriptions. If the medicine/prescription is lost, stolen or destroyed, it will not be replaced. I also agree not to share controlled substance medicines with anyone.    8. I am aware I should not use any illegal or recreational drugs. I agree not to drink alcohol unless my care team says I can.       9. If I enroll in the [place] [facility] program, I will tell my care team prior to my next refill.     10. I will tell my care team right away if I become pregnant, have a new medical problem treated outside of my regular clinic, or have a change in my medications.    11. I understand that this medicine can affect my thinking, judgment and reaction time. Alcohol and drugs affect the brain and body, which can affect the safety of my driving. Being under the influence of alcohol or drugs can affect my decision-making, behaviors, personal safety, and the safety of others. Driving while impaired (DWI) can occur if a person is driving, operating, or in physical control of a car, motorcycle, boat, snowmobile, ATV, motorbike, off-road vehicle, or any other motor vehicle ([place] Statute 169A.20). I understand the risk if I choose to drive or operate any vehicle or machinery.    I understand that if I do not follow any of the conditions above, my prescriptions or treatment may be stopped or changed.          Opioids  What You Need to Know    What are opioids?   Opioids are pain medicines that must be prescribed by a doctor. They are also known as narcotics.     Examples are:   1. morphine (MS Contin, Kailyn)  2. oxycodone (Oxycontin)  3. oxycodone and acetaminophen (Percocet)  4. hydrocodone and acetaminophen (Vicodin, Norco)   5. fentanyl patch (Duragesic)   6. hydromorphone (Dilaudid)   7. methadone  8. codeine (Tylenol #3)     What do opioids do well?   Opioids are best for severe short-term pain such as after a surgery or injury. They may work well for cancer pain. They may  help some people with long-lasting (chronic) pain.     What do opioids NOT do well?   Opioids never get rid of pain entirely, and they don t work well for most patients with chronic pain. Opioids don t reduce swelling, one of the causes of pain.                                    Other ways to manage chronic pain and improve function include:       Treat the health problem that may be causing pain    Anti-inflammation medicines, which reduce swelling and tenderness, such as ibuprofen (Advil, Motrin) or naproxen (Aleve)    Acetaminophen (Tylenol)    Antidepressants and anti-seizure medicines, especially for nerve pain    Topical treatments such as patches or creams    Injections or nerve blocks    Chiropractic or osteopathic treatment    Acupuncture, massage, deep breathing, meditation, visual imagery, aromatherapy    Use heat or ice at the pain site    Physical therapy     Exercise    Stop smoking    Take part in therapy       Risks and side effects     Talk to your doctor before you start or decide to keep taking opioids. Possible side effects include:      Lowering your breathing rate enough to cause death    Overdose, including death, especially if taking higher than prescribed doses    Worse depression symptoms; less pleasure in things you usually enjoy    Feeling tired or sluggish    Slower thoughts or cloudy thinking    Being more sensitive to pain over time; pain is harder to control    Trouble sleeping or restless sleep    Changes in hormone levels (for example, less testosterone)    Changes in sex drive or ability to have sex    Constipation    Unsafe driving    Itching and sweating    Dizziness    Nausea, throwing up and dry mouth    What else should I know about opioids?    Opioids may lead to dependence, tolerance, or addiction.      Dependence means that if you stop or reduce the medicine too quickly, you will have withdrawal symptoms. These include loose poop (diarrhea), jitters, flu-like symptoms,  nervousness and tremors. Dependence is not the same as addiction.                       Tolerance means needing higher doses over time to get the same effect. This may increase the chance of serious side effects.      Addiction is when people improperly use a substance that harms their body, their mind or their relations with others. Use of opiates can cause a relapse of addiction if you have a history of drug or alcohol abuse.      People who have used opioids for a long time may have a lower quality of life, worse depression, higher levels of pain and more visits to doctors.    You can overdose on opioids. Take these steps to lower your risk of overdose:    1. Recognize the signs:  Signs of overdose include decrease or loss of consciousness (blackout), slowed breathing, trouble waking up and blue lips. If someone is worried about overdose, they should call 911.    2. Talk to your doctor about Narcan (naloxone).   If you are at risk for overdose, you may be given a prescription for Narcan. This medicine very quickly reverses the effects of opioids.   If you overdose, a friend or family member can give you Narcan while waiting for the ambulance. They need to know the signs of overdose and how to give Narcan.     3. Don't use alcohol or street drugs.   Taking them with opioids can cause death.    4. Do not take any of these medicines unless your doctor says it s OK. Taking these with opioids can cause death:    Benzodiazepines, such as lorazepam (Ativan), alprazolam (Xanax) or diazepam (Valium)    Muscle relaxers, such as cyclobenzaprine (Flexeril)    Sleeping pills like zolpidem (Ambien)     Other opioids      How to keep you and other people safe while taking opioids:    1. Never share your opioids with others.  Opioid medicines are regulated by the Drug Enforcement Agency ([employer]). Selling or sharing medications is a criminal act.    2. Be sure to store opioids in a secure place, locked up if possible. Young children  can easily swallow them and overdose.    3. When you are traveling with your medicines, keep them in the original bottles. If you use a pill box, be sure you also carry a copy of your medicine list from your clinic or pharmacy.    4. Safe disposal of opioids    Most pharmacies have places to get rid of medicine, called disposal kiosks. Medicine disposal options are also available in every [facility]. Search your county and  medication disposal  to find more options. You can find more details at:  [email or web address]     I agree that my provider, clinic care team, and pharmacy may work with any city, state or federal law enforcement agency that investigates the misuse, sale, or other diversion of my controlled medicine. I will allow my provider to discuss my care with, or share a copy of, this agreement with any other treating provider, pharmacy or emergency room where I receive care.    I have read this agreement and have asked questions about anything I did not understand.    _______________________________________________________  Patient Signature - Lakhwinder Negron _____________________                   Date     _______________________________________________________  Provider Signature - Fernando Causey MD, MD   _____________________                   Date     _______________________________________________________  Witness Signature (required if provider not present while patient signing)   _____________________                   Date

## 2022-05-11 NOTE — PROGRESS NOTES
"  Forest Keane is a 82 year old who presents for the following health issues  accompanied by her son. Son is interpreting for her.     [facility]       Hospital Follow-up Visit:    Hospital/Nursing Home/IP Rehab Facility: [facility]  Date of Admission: 4/15/2022  Date of Discharge: 4/21/2022  Reason(s) for Admission: Upper GI bleed and NSTEMI      Was your hospitalization related to COVID-19? No   Problems taking medications regularly:  None  Medication changes since discharge: None  Problems adhering to non-medication therapy:  None    Summary of hospitalization:  CareEverywhere information obtained and reviewed  Diagnostic Tests/Treatments reviewed.  Follow up needed: none  Other Healthcare Providers Involved in Patient s Care:         None   Update since discharge: improved.          Review of Systems   Constitutional, HEENT, cardiovascular, pulmonary, GI, , musculoskeletal, neuro, skin, endocrine and psych systems are negative, except as otherwise noted.      Objective    BP (!) 177/63 (BP Location: Left arm, Patient Position: Sitting, Cuff Size: Adult Regular)   Pulse 71   Temp 98.4  F (36.9  C) (Tympanic)   Ht 1.448 m (4' 9\")   Wt 49.6 kg (109 lb 6.4 oz)   LMP  (LMP Unknown)   SpO2 100%   BMI 23.67 kg/m    Body mass index is 23.67 kg/m .  Physical Exam   GENERAL: healthy, alert and no distress  NECK: no adenopathy, no asymmetry, masses, or scars and thyroid normal to palpation  RESP: lungs clear to auscultation - no rales, rhonchi or wheezes  CV: regular rate and rhythm, normal S1 S2, no S3 or S4, no murmur, click or rub, no peripheral edema and peripheral pulses strong  ABDOMEN: soft, nontender, no hepatosplenomegaly, no masses and bowel sounds normal  MS: no gross musculoskeletal defects noted, no edema  Diabetic foot exam: normal DP and PT pulses, no trophic changes or ulcerative lesions and normal sensory exam    A/P:  (K27.9) PUD (peptic ulcer disease)  (primary encounter " diagnosis)  Comment:   Plan: CBC with platelets and differential,         pantoprazole (PROTONIX) 40 MG EC tablet, Adult         Gastro Ref - Consult Only        Will f/u with GI for repeat EGD. Recheck hemoglobin. Last hemoglobin 10.3 (4/20/22).    (K22.70) Rivers's esophagus without dysplasia  Comment:   Plan: pantoprazole (PROTONIX) 40 MG EC tablet, Adult         Gastro Ref - Consult Only        As above.     (I21.4) NSTEMI (non-ST elevated myocardial infarction) (H)  Comment:   Plan: Adult Cardiology Eval  Referral,         carvedilol (COREG) 25 MG tablet, atorvastatin         (LIPITOR) 40 MG tablet, aspirin (ASA) 81 MG EC         tablet, isosorbide mononitrate (IMDUR) 30 MG 24        hr tablet        Presumed CAD and secondary to severe anemia. Medical management at this time. No symptoms. Referred to Cardiology for evaluation and recommendations.    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 Not on any medications.    (I10) Essential hypertension with goal blood pressure less than 140/90  Comment:   Plan: carvedilol (COREG) 25 MG tablet, amLODIPine         (NORVASC) 10 MG tablet, furosemide (LASIX) 80         MG tablet, hydrALAZINE (APRESOLINE) 50 MG         tablet, isosorbide mononitrate (IMDUR) 30 MG 24        hr tablet        Not controlled. H/o ESRD. Added Imdur 30 mg daily. Recheck at next visit. Following Nephrology.    (E78.5) Hyperlipidemia LDL goal <70  Comment:   Plan: atorvastatin (LIPITOR) 40 MG tablet, Lipid         panel reflex to direct LDL Non-fasting        Recheck while on atorvastatin. Adjust dose if needed.    (G89.4) Chronic pain syndrome  Comment:   Plan: [id number] - Urine Drug Confirmation Panel         (Comprehensive)        CSA signed and abstracted. UDS done today.    (Z94.845) Encounter for long-term (current) use of  medications  Comment:   Plan: UDS done today for above.    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    (I50.22) Chronic systolic congestive heart failure (H)  Comment:   Plan: stable.    (N25.81) Secondary hyperparathyroidism (H)  Comment:   Plan: stable.    (M81.0) Senile osteoporosis  Comment:   Plan: Calcium Carb-Cholecalciferol (CALCIUM         CARBONATE-VITAMIN D3) 600-400 MG-UNIT TABS        Needed refills.    Fernando Causey MD

## 2022-05-13 NOTE — TELEPHONE ENCOUNTER
The Home Care/Assisted Living/Nursing Facility is calling regarding an established patient.  Has the patient seen Home Care in the past or is currently residing in Assisted Living or Nursing Facility? Yes.     MARJORIE Tijerina calling from [facility] requesting the following orders that are within the Home Care, Assisted Living or Nursing Home Eval and Treatment standing order and can be signed as standing order signature required by RN.    Preferred Call Back Number: [phone]    Home Care Visits Continuation - skilled nursing every other week, for three more weeks. Pt had 60 day eval today.      Any additional Orders:  Are there any orders requested, not stated above, that are outside of the standing order and must be routed to a licensed practitioner for approval?    No other orders needed outside of the standing orders.     Routing as FYI. [facility] will fax over orders for signature.     Thank you,   Pina Dexter RN, BSN  [facility]

## 2022-05-16 NOTE — PROGRESS NOTES
TRANSITIONS OF CARE (FREDDY) LOG   FREDDY tasks should be completed by the CC within one (1) business day of notification of each transition. Follow up contact with member is required after return to their usual care setting.  Note:  If CC finds out about the transitions fifteen (15) days or more after the member has returned to their usual care setting, no FREDDY log is needed. However, the CC should check in with the member to discuss the transition process, any changes needed to the care plan and document it in a case note.    Member Name:  [facility]akhwinder [facility]gron [facility] [id number]   Product: [facility] [place] Coordinator Contact:  Shruthi Martins RN, PHN Agency/County/Care System: [facility]   Transition Communication Actions from Care Management Contact   Transition #1   Notification Date: 5/16/2022 Transition Date:   5/16/2022 Transition From: Home     Is this the member s usual care setting?               yes Transition To: Hospital, [facility]   Transition Type:  Unplanned  Reason for Admission/Comments:  SOB  Contact member/responsible party to offer assistance with transition Date completed: 5/16/2022 - Call from member's sonRamin to inform CC of member's transition to [facility].     Notes from conversation with the member/responsible party, provider, discharging and receiving facility (as applicable): CC contacted Hospital /discharge planner. Left a message on the general vmx for [facility]'s social workers on floor A5. Per [facility] staff on A5, a  has not been assigned. Lleft a message with this CC contact information, reviewed community POC as well requested to be notified of concerns, care conferences and discharge planning.  CC reached out to adult son Ramin regarding transition and offered support as needed.  Reviewed and update care plan as needed.  Notified community service providers and placed services Adult Day Care  PCA RN EW transportation on hold as needed.  Transition log initiated.   PCP notified of hospitalization via EMR.    Shruthi Martins RN, N  [facility]  [phone]         Shared CC contact info, care plan/services with receiving setting--Date completed: 5/16/2022   Name & Title of receiving setting contact: [facility]   Notified PCP of transition--Date completed:  5/16/2022     via  EMR  Name of PCP: Vee Lange     Transition #2   Notification Date: 5/19/2022 Transition Date:   5/18/2022 Transition From: [facility]     Is this the member s usual care setting?               no Transition To: Home   Transition Type:  Planned  Reason for Admission/Comments:  SOB    Contact member/responsible party to offer assistance with transition Date completed: 5/19/22 - Spoke to member, Lakhwinder Negron.     Notes from conversation with the member/responsible party, provider, discharging and receiving facility (as applicable): CC contacted member and reviewed discharge summary.  Member has a follow-up appointment with PCP in 7 days: No: Offered Assistance with setting up a follow up appointment. Lakhwinder states her son, Ramin will schedule the follow-up appointment for her.   Member has had a change in condition: No  Home visit needed: No  Care plan reviewed and updated.  The following home based services Adult Day Care PCA RN were resumed.  New referrals placed: No  Transition log completed.   PCP notified of transition back to home via EMR.    Lakhwinder is on antibiotics and states her home care RN stopped by today to assist with medication set-up and review. Lakhwinder states she is feeling better and has resumed hemodialysis and will likely return to Adult Day Care next week. No DME requested. Lakhwinder will resume PCA, ADC and SNV.     Shruthi Martins RN, N  [facility]  [phone]         Shared CC contact info, care plan/services with receiving setting--Date completed: See Above   Name &  Title of receiving setting contact: [facility]   Notified PCP of transition--Date completed:  5/19/22     via  EMR  Name of PCP: Vee Lange      *RETURN TO USUAL CARE SETTING: *Complete tasks below when the member is discharging TO their usual care setting within one (1) business day of notification..      For situations where the Care Coordinator is notified of the discharge prior to the date of discharge, the Care Coordinator must follow up with the member or designated representative to confirm that discharge actually occurred and discuss required FREDDY tasks as outlined in the FREDDY Instructions.  (This includes situations where it may be a  new  usual care setting for the member. (i.e., a community member who decides upon permanent nursing home placement following hospitalization and rehab).    Discuss with Member/Responsible Party:    Check  Yes  - if the member, family member and/or SNF/facility staff manages the following:    If  No  provide explanation in the comments section.          Date completed: 5/19/22 Communicated with member or their designated representative about the following:  care transition process; about changes to the member s health status; plan of care updates; education about transitions and how to prevent unplanned transitions/readmissions    Four Pillars for Optimal Transition:    Check  Yes  - if the member, family member and/or SNF/facility staff manages the following:    If  No  provide explanation in the comments section.          []  Yes     [x]  No Does the member have a follow-up appointment scheduled with primary care or specialist? (Mental health hospitalizations--the appt. should be w/in 7 days)              For mental health hospitalizations:  []  Yes     []  No     Does the member have a follow-up appointment scheduled with a mental health practitioner within 7 days of discharge?  [x]  Yes     []  No     Has a medication review been completed with  member? If no, refer to PCP, home care nurse, MTM, pharmacist  [x]  Yes     []  No     Can the member manage their medications or is there a system in place to manage medications (e.g. home care set-up)?         [x]  Yes     []  No     Can the member verbalize warning signs and symptoms to watch for and how to respond?  [x]  Yes     []  No     Does the member have a copy of and understand their discharge instructions?  If no, assist to obtain copy of discharge instructions, review discharge instructions, and assist to contact PCP to discuss questions about their recent hospitalization.  [x]  Yes     []  No     Does the member have adequate food, housing and transportation?  If no, add goal and discuss additional supports available to the member                                                                                                                                                                                 [x]  Yes     []  No     Is the member safe in their home?  If no, document needs and support provided                                                                                                                                                                          []  Yes     [x]  No     Are there any concerns of vulnerability, abuse, or neglect?  If yes, document concerns and actions taken by Care Coordinator as a mandated                                                                                                                                                                              [x]  Yes     []  No     Does the member use a Personal Health Care Record?  Check  Yes  if visit summary, discharge summary, and/or healthcare summary are being used as a PHR.                                                                                                                                                                                  [x]  Yes     []  No     Have you reviewed the  discharge summary with the member? If  No  provide explanation in comments.  [x]  Yes     []  No     Have you updated the member s care plan/support plan? Add new diagnosis, medications, treatments, goals & interventions, as applicable. If No, provide explanation in comments.    Comments:      Lakhwinder states her son will schedule a follow-up appointment with PCP when he returns home. SonRamin will be assisting Lakhwinder to her appointment.     Notes from conversation with the member/responsible party, provider, discharging and receiving facility (as applicable): See Above    Shruthi Martins RN, PHN  [facility]  [phone]

## 2022-05-19 NOTE — TELEPHONE ENCOUNTER
Bonnie from [facility] is calling to inform that patient has been prescribed Ferrous Sulfate at the hospital and it was not reflected in after visit summary from last time, it is not on medication list.    Patient is taking Ferrous Sulfate 325 mg TID.    Please send to the pharmacy if appropriate.      Please inform Home Care Main office [phone]  Bonnie will be with the company till noon today [phone]    Shelley Moya RN

## 2022-05-25 NOTE — TELEPHONE ENCOUNTER
Viri RN with [facility] is calling to request orders for skilled nursing every other week x 60 days & 3prn visits for med mgmt  Defer orders to provider for approval due to recent hospitalization on 5/16/22  Katt Paul RN  [facility]

## 2022-05-26 NOTE — TELEPHONE ENCOUNTER
This writer attempted to contact Viri on 05/26/22      Reason for call informed orders below and left detailed message.      If patient calls back:   2nd floor [place] Care Team (MA/TC) called. Inform patient that someone from the team will contact them, document that pt called and route to care team.         Malorie Almaraz MA

## 2022-05-31 NOTE — TELEPHONE ENCOUNTER
Katt Paul, RN  [facility] 1 minute ago (2:54 PM)     SP    Please reschedule patient for 1pm with Dr padilla on 6/7 and forward message onto him to see if patient can be seen sooner. There are no appts available on his schedule.      Switched appt to 6/7/2022 at 1:00 per RN. Routing to Dr Padilla to advise.  Adrianne Ruth [facility]  2nd Floor  Primary Care

## 2022-05-31 NOTE — TELEPHONE ENCOUNTER
Reason for Call:  Other appointment    Detailed comments: PATIENT IS SCHEDULED FOR A HOSPITAL FOLLOW UP 7.13.2022. PATIENT WOULD LIKE TO KNOW IF THERE IS ANYTHING AVAILABLE WITH PROVIDER HO SOONER.     Phone Number Patient can be reached at: Home number on file [phone] (home)    Best Time: NA     Can we leave a detailed message on this number? Not Applicable    Call taken on 5/31/2022 at 11:58 AM by Ace Hwang

## 2022-06-02 NOTE — TELEPHONE ENCOUNTER
Form received via fax from adult [facility] day care, forms completed and faxed backk along with med list [phone], copy placed in abstract and original in tc bin

## 2022-07-13 NOTE — LETTER
July 13, 2022      Christyfrankie BREEN Jamil  [address]  [place] [facility] [address]        Dear MsKaylee,    We are writing to inform you of your test results.    Your diabetes test is at goal. Please follow up in 6 months for routine Medicare Wellness visit.    Resulted Orders   Hemoglobin A1c   Result Value Ref Range    Hemoglobin A1C 5.4 0.0 - 5.6 %      Comment:      Normal <5.7%   Prediabetes 5.7-6.4%    Diabetes 6.5% or higher     Note: Adopted from ADA consensus guidelines.       If you have any questions or concerns, please call the clinic at the number listed above.       Sincerely,      Fernando Causey MD

## 2022-07-13 NOTE — PROGRESS NOTES
Forest Keane is a 82 year old presenting for the following health issues:  No chief complaint on file.      HPI       Hospital Follow-up Visit:    Hospital/Nursing Home/IP Rehab Facility: [facility]  Date of Admission: 5/16  Date of Discharge: 5/18  Reason(s) for Admission: shortness of breath    Was your hospitalization related to COVID-19? No   Problems taking medications regularly:  None  Medication changes since discharge: None  Problems adhering to non-medication therapy:  None    Summary of hospitalization:  CareEverywhere information obtained and reviewed  Diagnostic Tests/Treatments reviewed.  Follow up needed: none  Other Healthcare Providers Involved in Patient s Care:         None  Update since discharge: improved. Post Medication Reconciliation Status:        Plan of care communicated with patient     Diabetes Follow-up      How often are you checking your blood sugar? Not at all    What concerns do you have today about your diabetes? None     Do you have any of these symptoms? (Select all that apply)  No numbness or tingling in feet.  No redness, sores or blisters on feet.  No complaints of excessive thirst.  No reports of blurry vision.  No significant changes to weight.    Have you had a diabetic eye exam in the last 12 months? No        BP Readings from Last 2 Encounters:   07/13/22 (!) 185/75   05/11/22 (!) 177/63     Hemoglobin A1C POCT (%)   Date Value   10/26/2020 6.1 (H)   11/06/2019 5.4     Hemoglobin A1C (%)   Date Value   03/02/2022 4.9   12/20/2021 5.5     LDL Cholesterol Calculated (mg/dL)   Date Value   05/11/2022 20   09/27/2021 83   03/15/2018 34   12/02/2016 85     LDL Cholesterol Direct (mg/dL)   Date Value   01/20/2020 106 (H)       Hyperlipidemia Follow-Up      Are you regularly taking any medication or supplement to lower your cholesterol?   Yes- atorvastatin    Are you having muscle aches or other side effects that you think could be caused by your cholesterol  lowering medication?  No    Hypertension Follow-up      Do you check your blood pressure regularly outside of the clinic? Yes     Are you following a low salt diet? Yes    Are your blood pressures ever more than 140 on the top number (systolic) OR more   than 90 on the bottom number (diastolic), for example 140/90? Yes      Review of Systems   Constitutional, HEENT, cardiovascular, pulmonary, GI, , musculoskeletal, neuro, skin, endocrine and psych systems are negative, except as otherwise noted.      Objective    BP (!) 185/75 (BP Location: Left arm, Patient Position: Sitting, Cuff Size: Adult Regular)   Pulse 62   Temp 98.1  F (36.7  C) (Oral)   Resp 19   Wt 49.1 kg (108 lb 3.2 oz)   LMP  (LMP Unknown)   SpO2 97%   BMI 23.41 kg/m    Body mass index is 23.41 kg/m .  Physical Exam   GENERAL: healthy, alert and no distress  NECK: no adenopathy, no asymmetry, masses, or scars and thyroid normal to palpation  RESP: lungs clear to auscultation - no rales, rhonchi or wheezes  CV: regular rate and rhythm, normal S1 S2, no S3 or S4, no murmur, click or rub, no peripheral edema and peripheral pulses strong  ABDOMEN: soft, nontender, no hepatosplenomegaly, no masses and bowel sounds normal  MS: no gross musculoskeletal defects noted, no edema  Diabetic foot exam: normal DP and PT pulses, no trophic changes or ulcerative lesions and normal sensory exam    A/P:  (R06.02) Shortness of breath  (primary encounter diagnosis)  Comment:   Plan: secondary to fluid overload and possible pneumonia. Patient has finished antibiotics. Back at baseline. No concerns.    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 Recheck in 6 months.    (I10) Essential hypertension with goal blood pressure less than 140/90  Comment:   Plan: isosorbide mononitrate (IMDUR)  60 MG 24 hr         tablet        Not controlled. Increased isosorbide from 30 mg daily to 60 mg daily. Patient follows up with Nephrology.    (E78.5) Hyperlipidemia LDL goal <70  Comment:   Plan: controlled.    (Z23) Encounter for immunization  Comment:   Plan: declined 2nd COVID-19 booster at this time.    (I21.4) NSTEMI (non-ST elevated myocardial infarction) (H)  Comment:   Plan: isosorbide mononitrate (IMDUR) 60 MG 24 hr         tablet        Increased dose as above.    Fernando Causey MD              .  ..

## 2022-07-22 NOTE — TELEPHONE ENCOUNTER
The Home Care/Assisted Living/Nursing Facility is calling regarding an established patient.  Has the patient seen Home Care in the past or is currently residing in Assisted Living or Nursing Facility? Yes.     Latasha calling from [facility] requesting the following orders that are within the Home Care, Assisted Living or Nursing Home Eval and Treatment standing order and can be signed as standing order signature required by RN.    Preferred Call Back Number: [phone]    Home Care Visits Continuation Skilled nursing every other week x 9 weeks and 3 PRN Visits    Any additional Orders:  Are there any orders requested, not stated above, that are outside of the standing order and must be routed to a licensed practitioner for approval?    No    Writer has verified Requestor will send fax to have orders signed.    Katt Paul RN  [facility]

## 2022-07-27 NOTE — PROGRESS NOTES
[facility] Care Coordination Contact    Called adult son Ramin to schedule annual HRA home visit. HRA has been scheduled for 8/1/22 at 10:30 am.     Shruthi Martins RN, PHN  [facility]  [phone]

## 2022-08-01 NOTE — PROGRESS NOTES
[facility] Care Coordination Contact    [facility] Home Visit Assessment     Home visit for Health Risk Assessment with Lakhwinder JAMSHID Jamil completed on August 1, 2022    Type of residence:: Private home - stairs  Current living arrangement:: I live in a private home with family     Assessment completed with:: Patient, Care Team Member    Current Care Plan  Member currently receiving the following home care services: Skilled Nursing   Member currently receiving the following community resources: PCA, Day Care, DME, Dialysis Services, Transportation Services      Medication Review  Medication reconciliation completed in Epic: Yes  Medication set-up & administration: RN set up every two weeks.  Self-administers medications.  Medication Risk Assessment Medication (1 or more, place referral to MTM): N/A: No risk factors identified  MTM Referral Placed: No: No risk factors idenified    Mental/Behavioral Health   Depression Screening:   PHQ-2 Total Score (Adult) - Positive if 3 or more points; Administer PHQ-9 if positive: 0       Mental health DX:: No        Falls Assessment:   Fallen 2 or more times in the past year?: No   Any fall with injury in the past year?: No    ADL/IADL Dependencies:   Dependent ADLs:: Ambulation-cane, Bathing, Dressing, Grooming, Transfers  Dependent IADLs:: Cleaning, Cooking, Laundry, Shopping, Meal Preparation, Medication Management, Money Management, Transportation    [facility] benefits: Shared information re: Silver Sneakers/gym memberships, ASA, Calcium +D.    PCA Assessment completed at visit: Yes Annual PCA assessment indicated 22 units per day of PCA. This is an increase from the previous assessment.     Elderly Waiver Eligibility: Yes-will continue on EW    Care Plan & Recommendations: CC completed annual home visit with Lakhwinder Negron. Member states health is stable and member continues to manage chronic health conditions with support from PCA, medical providers and  family. Xee continues dialysis 3x a week, attends ADC 2 - 3 days weekly, receives bi-weekly nurse visits for medication management and utilize medical transportation for medical appointments. Xee ambulates with a cane and reports receiving support from caregiver with most IADLS/ADLS. Xee reports current support and services are meeting needs. Xee reports no falls within the past year. Xee will resume PCA, ADC, RN visits and medical transportation.     See [facility] for detailed assessment information.    Follow-Up Plan: Member informed of future contact, plan to f/u with member with a 6 month telephone assessment.  Contact information shared with member and family, encouraged member to call with any questions or concerns at any time.    [facility] care continuum providers: Please see Snapshot and Care Management Flowsheets for Specific details of care plan.    This CC note routed to PCP.    Shruthi Martins, RN, PHN  [facility]  [phone]

## 2022-08-05 NOTE — LETTER
[facility]  [address], [address]  [place] [address]  Phone:  [phone]  Fax:  [phone]      August 5, 2022    MAURO LAZARO  [address]  [place] [address]    Dear Mauro,    Enclosed is a copy of your completed PCA Assessment and Service Plan.  This is for your records and no action is required by you.  If you have additional questions regarding your assessment please contact me at [phone]. If you feel that your needs are not being met, please contact the Clinical Supervisor at [phone].    Sincerely,    Shruthi Martins RN, PHN    E-mail:[email or web address]  Phone: [phone]    Care Manager  [facility]            Enclosure:  Completed PCA assessment

## 2022-08-05 NOTE — PROGRESS NOTES
[facility] Care Coordination Contact    [facility]:  Emailed completed PCA assessment to [facility].  Faxed copy of PCA assessment to PCA Agency and mailed copy to member.  Faxed MD Communication to PCP.

## 2022-08-12 NOTE — LETTER
August 12, 2022      MAURO LAZARO  [address] [place] [address]      Dear Mauro:    At [facility], we are dedicated to improving your health and well-being. Enclosed is the Comprehensive Care Plan that we developed with you on 8/1/22. Please review the Care Plan carefully.    As a reminder, some of the things we discussed at your visit include:    Your physical and mental health    Ways to reduce falls    Health care needs you may have    Don t forget to contact your care coordinator if you:    Have been hospitalized or plan to be hospitalized     Have had a fall     Have experienced a change in physical health    Are experiencing emotional problems     If you do not agree with your Care Plan, have questions about it, or have experienced a change in your needs, please call me at [phone]. If you are hearing impaired, please call the [place] [employer]ay at 185 or [phone] (dasrpq-bu-yknrhk relay service).    Sincerely,    Shruthi Martins RN, PHN    E-mail:[email or web address]  Phone: [phone]    Care Manager  [facility] is a health plan that contracts with both Medicare and the [place] Medical Assistance (Medicaid) program to provide benefits of both programs to enrollees. Enrollment in [facility] depends on contract renewal.    [id number])     [id number] (11/18)

## 2022-08-12 NOTE — PROGRESS NOTES
[facility] Care Coordination Contact    Received after visit chart from care coordinator.  Completed following tasks: Mailed copy of care plan to client, Updated services in Database, Submitted referrals/auths for ADC w/rides and Mailed [facility] Safe Medication Disposal    and Provider Signature - No POC Shared:  Member indicates that they do not want their POC shared with any EW providers.     Uploaded ZBIGNIEW into member's chart.    Renetta Mirza  Case Management Specialist  [facility]  [phone]

## 2022-08-17 NOTE — PROGRESS NOTES
CC updated program tasks and targets for [employer] launch     Shruthi Martins RN, PHN  [facility]  [phone]

## 2022-09-21 NOTE — TELEPHONE ENCOUNTER
The Home Care/Assisted Living/Nursing Facility is calling regarding an established patient.  Has the patient seen Home Care in the past or is currently residing in Assisted Living or Nursing Facility? Yes.     MARJORIE White calling from [facility] requesting the following orders that are within the Home Care, Assisted Living or Nursing Home Eval and Treatment standing order and can be signed as standing order signature required by RN.    Preferred Call Back Number: [phone]    Home Care Visits Continuation   Skilled nursing visits one time every other week for the next 60 days.  3 as needed visits.     Any additional Orders:  Are there any orders requested, not stated above, that are outside of the standing order and must be routed to a licensed practitioner for approval?    No    Writer has verified Requestor will send fax to have orders signed.

## 2022-09-27 NOTE — PROGRESS NOTES
[facility] Care Coordination Contact    Call from Lakhwinder's son, Ramin informing CC member will be moving 10/1/22 to new address: [address] [place] [address] [place], [place] [address].     [id number] submitted to [facility].     Shruthi Martins RN, PHN  [facility]  [phone]

## 2022-10-17 NOTE — TELEPHONE ENCOUNTER
Keyanna, representative from [facility], calling to verify home health care services. Keyanna states that they are looking to see if past HHC agency would be able to re-establish care with patient after discharge. Number given for [facility] so Keyanna can investigate. No further questions or concerns.      JANEL TapiaN, RN  [facility] Primary Care [facility]

## 2022-10-18 NOTE — PROGRESS NOTES
TRANSITIONS OF CARE (FREDDY) LOG   FREDDY tasks should be completed by the CC within one (1) business day of notification of each transition. Follow up contact with member is required after return to their usual care setting.  Note:  If CC finds out about the transitions fifteen (15) days or more after the member has returned to their usual care setting, no FREDDY log is needed. However, the CC should check in with the member to discuss the transition process, any changes needed to the care plan and document it in a case note.    Member Name:  Lakhwinder Negron MCO [facility] Member [facility] [id number]   [place] [facility] [place] Contact:  Shruthi Martins RN, PHN Agency/County/Care System: [facility]   Transition Communication Actions from Care Management Contact   Transition #1   Notification Date: 10/18/22 Transition Date:   10/15/22 Transition From: Home     Is this the member s usual care setting?               yes Transition To: Hospital, [facility]   Transition Type:  Unplanned  Reason for Admission/Comments:  Respiratory Failure   Contact member/responsible party to offer assistance with transition Date completed: 10/18/22 - Lakhwinder Negron     Notes from conversation with the member/responsible party, provider, discharging and receiving facility (as applicable): CC did not contact hospital discharge planner as member discharged home prior to CC's notification.   CC reached out to member regarding transition and offered support as needed.      Shruthi Martins RN, PHN  [facility]  [phone]             Shared CC contact info, care plan/services with receiving setting--Date completed: N/A - member discharged home prior to CC's notification.   Name & Title of receiving setting contact: [facility]   Notified PCP of transition--Date completed:  10/18/22     via  EMR  Name of PCP: Vee River     Transition #2   Notification Date: 10/18/22 Transition Date:    10/17/22 Transition From: [facility]     Is this the member s usual care setting?               no Transition To: Home   Transition Type:  Planned  Reason for Admission/Comments:  Respiratory Failure     Contact member/responsible party to offer assistance with transition Date completed: 10/18/22 - Lakhwinder Negron    Notes from conversation with the member/responsible party, provider, discharging and receiving facility (as applicable): CC contacted member and reviewed discharge summary.  Member has a follow-up appointment with PCP in 7 days: Member has an appointment with PCP on 11/2/22.   Member has had a change in condition: No  Home visit needed: No  Care plan reviewed and updated.  The following home based services None were resumed.  New referrals placed: No  Transition log completed.   PCP notified of transition back to home via EMR.    Shruthi Martins RN, N  [facility]  [phone]         Shared CC contact info, care plan/services with receiving setting--Date completed: See Above  Name & Title of receiving setting contact: [facility]   Notified PCP of transition--Date completed:  10/18/22     via  EMR  Name of PCP: Vee River      *RETURN TO USUAL CARE SETTING: *Complete tasks below when the member is discharging TO their usual care setting within one (1) business day of notification..      For situations where the Care Coordinator is notified of the discharge prior to the date of discharge, the Care Coordinator must follow up with the member or designated representative to confirm that discharge actually occurred and discuss required FREDDY tasks as outlined in the FREDDY Instructions.  (This includes situations where it may be a  new  usual care setting for the member. (i.e., a community member who decides upon permanent nursing home placement following hospitalization and rehab).    Discuss with Member/Responsible Party:    Check  Yes  - if the member,  family member and/or SNF/facility staff manages the following:    If  No  provide explanation in the comments section.          Date completed: 10/18/22 Communicated with member or their designated representative about the following:  care transition process; about changes to the member s health status; plan of care updates; education about transitions and how to prevent unplanned transitions/readmissions    Four Pillars for Optimal Transition:    Check  Yes  - if the member, family member and/or SNF/facility staff manages the following:    If  No  provide explanation in the comments section.          []  Yes     [x]  No Does the member have a follow-up appointment scheduled with primary care or specialist? (Mental health hospitalizations--the appt. should be w/in 7 days)              For mental health hospitalizations:  []  Yes     [x]  No     Does the member have a follow-up appointment scheduled with a mental health practitioner within 7 days of discharge?  [x]  Yes     []  No     Has a medication review been completed with member? If no, refer to PCP, home care nurse, MTM, pharmacist  [x]  Yes     []  No     Can the member manage their medications or is there a system in place to manage medications (e.g. home care set-up)?         [x]  Yes     []  No     Can the member verbalize warning signs and symptoms to watch for and how to respond?  [x]  Yes     []  No     Does the member have a copy of and understand their discharge instructions?  If no, assist to obtain copy of discharge instructions, review discharge instructions, and assist to contact PCP to discuss questions about their recent hospitalization.  [x]  Yes     []  No     Does the member have adequate food, housing and transportation?  If no, add goal and discuss additional supports available to the member                                                                                                                                                                                  [x]  Yes     []  No     Is the member safe in their home?  If no, document needs and support provided                                                                                                                                                                          []  Yes     [x]  No     Are there any concerns of vulnerability, abuse, or neglect?  If yes, document concerns and actions taken by Care Coordinator as a mandated                                                                                                                                                                              [x]  Yes     []  No     Does the member use a Personal Health Care Record?  Check  Yes  if visit summary, discharge summary, and/or healthcare summary are being used as a PHR.                                                                                                                                                                                  [x]  Yes     []  No     Have you reviewed the discharge summary with the member? If  No  provide explanation in comments.  [x]  Yes     []  No     Have you updated the member s care plan/support plan? Add new diagnosis, medications, treatments, goals & interventions, as applicable. If No, provide explanation in comments.    Comments:           Notes from conversation with the member/responsible party, provider, discharging and receiving facility (as applicable): N/A - Member's next follow-up appointment with PCP is on 11/2/22.     Shruthi Martins RN, PHN  [facility]  [phone]

## 2022-10-19 NOTE — TELEPHONE ENCOUNTER
This writer attempted to contact Vicenta RN on 10/19/22      Reason for call relay verbal orders and left detailed message.      If RN calls back:   Registered Nurse called. Route to Shelley Baker RN line. Relay providers verbal orders as follows:    - Skilled nursinx/month for 1 month, and 2 PRN visits  - PT to eval and treat for strengthening  - OT to eval and treat for home safety     Also re-iterate that patient will be seeing Dr. Borja for hospital f/u.      VALERY Tapia, RN  [facility] Primary Care [facility]

## 2022-10-19 NOTE — TELEPHONE ENCOUNTER
Vicenta RN from [facility] calling for verbal [facility] orders, as patient was recently discharged from the hospital. Requested orders as follows:    - Skilled nursinx/month for 1 month, and 2 PRN visits  - PT to eval and treat for strengthening  - OT to eval and treat for home safety      No further requests or concerns. Vicenta requesting call back at [phone] once verbal orders are in.      Routing to provider to review and advise.      JANEL TapiaN, RN  [facility] Primary Care [facility]

## 2022-11-02 NOTE — PROGRESS NOTES
Assessment & Plan     Acute respiratory failure with hypoxia (H)  -breathing is at baseline  - Nutrition Referral    Type 2 diabetes mellitus with diabetic polyneuropathy, without long-term current use of insulin (H)  - Nutrition Referral    Lab Results   Component Value Date    A1C 5.4 07/13/2022    A1C 4.9 03/02/2022    A1C 5.5 12/20/2021    A1C 5.2 09/27/2021    A1C 6.1 10/26/2020    A1C 5.4 11/06/2019    A1C 5.4 11/04/2019    A1C 6.4 02/19/2019    A1C 6.8 09/24/2018       ESRD on dialysis (H)  -HD on T/Th/Sa  - Nutrition Referral  -will get labs at Dialysis tomorrow     Anemia of chronic renal failure, stage 5 (H)  -per Nephrology   - Nutrition Referral    Hypertension, goal below 140/90  -elevated  -has dialysis tomorrow     NSTEMI (non-ST elevated myocardial infarction) (H)  -Elevated troponin, type II NSTEMI, not ACS in the hospital   -Evaluated by cardiology, who recommend no further work-up.  - isosorbide mononitrate (IMDUR) 30 MG 24 hr tablet    Chronic systolic congestive heart failure (H)  -Repeat echo with EF of 51%, with moderate aortic regurgitation  -no signs of heart failure on exam today     Essential hypertension with goal blood pressure less than 140/90  -continue medications Coreg, amlodipine, hydralazine, and Imdur  - isosorbide mononitrate (IMDUR) 30 MG 24 hr tablet    Esophagitis  -Persistent esophagitis and gastritis on EGD with hx of GI bleed - EGD shows LA grade a reflux esophagitis, gastritis with normal duodenum. Continue PPI BID x 3 months with repeat EGD in 3 months. Follow-up with [facility] in 6 weeks.  - Nutrition Referral      Review of external notes as documented elsewhere in note  35 minutes spent on the date of the encounter doing chart review, history and exam, documentation and further activities per the note        Return in about 3 months (around 2/2/2023) for Follow up, in person.    Haylee Borja MD MPH    [facility]    Forest Keane is  a 82 year old accompanied by her great grandson, presenting for the following health issues:  Hospital F/U      HPI       Hospital Follow-up Visit:    Hospital/Nursing Home/IP Rehab Facility: [facility]  Date of Admission: 10/15/2022  Date of Discharge: 10/18/2022  Reason(s) for Admission: Respiratory Failure    Was your hospitalization related to COVID-19? No   Problems taking medications regularly:  None  Medication changes since discharge: None  Problems adhering to non-medication therapy:  None    Summary of hospitalization:  CareEverywhere information obtained and reviewed  Diagnostic Tests/Treatments reviewed.  Follow up needed: [facility] follow up in 6 weeks   Other Healthcare Providers Involved in Patient s Care:         Nephrology and GI  Update since discharge: improved.         Plan of care communicated with patient and family             Here with great grand son  Lives with family  Code status is DNR/DNI    Since discharge from hospital:  At baseline, as usual no energy  Breathing OK  No more short of breath than usual  No falls  Uses a walker and cane  No abdominal pain  No emesis  No fever   Has been taking medication regularly   Family member is a PCA  No dark or black stools since being home   No increased leg edema  No syncope  No dysarthria   2 weeks ago had Flu vaccine at Dialysis   No dysphagia     The following is a summary from the hospital admission:        HOSPITAL DISCHARGE SUMMARY    Patient Name: Lakhwinder Negron  YOB: 1940   Medical Record Number: [id number]   Attending Provider: Rose Valentin MD   Admission Date: 10/15/2022  Discharge Date: 10/17/2022    She will be discharged on 10/17/22 to home.    DISCHARGE DIAGNOSES:  Acute respiratory failure with hypoxia 2/2 acute on chronic systolic heart failure   Hypertensive emergency, resolved   Elevated troponin, type II NSTEMI  ESRD on HD  Persistent esophagitis and gastritis on EGD with hx of GI bleed   Transient  dysarthria, resolved; hx of TIA     HOSPITAL COURSE:  Lakhwinder Negron is a 82 year old female with history of ESRD with HD on T/Th/Sa, hypertension, and history of GI bleed who was admitted with difficulty breathing, found to be in hypertensive emergency, with acute on chronic systolic heart failure. Patient was severely hypoxic by EMS. VBG in the ED shows pH 7.29, CO2 54 so she was placed on BiPAP. CXR shows bibasilar pulmonary opacities c/w with fluid overload. SBP's were in the 200's and improved to 150's after starting BiPAP. Troponin was elevated but EKG non-ischemic. Patient reports dietary indiscretion as a contributing factor. Patient was also started on her PTA Coreg, amlodipine, and Lasix with adequate BP control. Nephrology was consulted and dialyzed the following day (her normal HD day). During admission, patient reported epigastric pain and discolored and dark stools. Presenting Hgb 12.5, down trended to 10. GI consulted and obtained CT AP which showed diffusely thickened gastric wall. Given pt's history of severe reflux esophagitis and PUD, she underwent EGD which showed LA grade A reflux esophagitis, small hiatal hernia, nodular erosive gastritis which were biopsied. Patient was advised to continue PPI x 3 months and repeat EGD in 3 months. Follow-up with [facility] in 6 weeks. Follow up with PCP in 2 weeks.     PROBLEM LIST:   Acute respiratory failure with hypoxia 2/2 acute on chronic systolic heart failure - See above  Hypertensive emergency, resolved - See above. Continued PTA Coreg, amlodipine, hydralazine, and Imdur.  Elevated troponin, type II NSTEMI, not ACS - Repeat echo with EF of 51%, with moderate aortic regurgitation. Evaluated by cardiology, who recommend no further work-up.  ESRD on HD - Nephrology consulted and resumed PTA dialysis schedule while admitted.   Persistent esophagitis and gastritis on EGD with hx of GI bleed - EGD shows LA grade a reflux esophagitis, gastritis with normal  duodenum. Continue PPI BID x 3 months with repeat EGD in 3 months. Follow-up with [facility] in 6 weeks.  Transient dysarthria, resolved; hx of TIA - On 10/15, patient had increased speech difficulty. CT head negative and dysarthria resolved. Has baseline mild R sided weakness.     MEDICATIONS CONTINUED UNCHANGED   Details   amLODIPine (NORVASC) 10 mg oral tablet Take 1 tablet (10 mg) by mouth once daily.  Qty: 30 tablet, Refills: 0     aspirin 81 mg oral enteric coated tablet Take 1 tablet (81 mg) by mouth once daily.  Refills: 0     atorvastatin (LIPITOR) 40 mg oral tablet Take 1 tablet (40 mg) by mouth at bedtime.  Refills: 0     calcium-cholecalciferol, D3, 600 mg-10 mcg (400 unit) oral tablet Take 1 tablet by mouth twice a day with breakfast and dinner.     carvedilol (COREG) 25 mg oral tablet Take 1 tablet (25 mg) by mouth twice a day with breakfast and dinner.  Refills: 0     Cholecalciferol, Vitamin D3, 50 mcg (2,000 unit) oral capsule Take 2,000 Units by mouth once daily.     codeine 30 mg-acetaminophen 300 mg (TYLENOL #3) 300-30 mg oral tablet Take 1 tablet by mouth twice a day as needed for pain.     famotidine (PEPCID) 20 mg oral tablet Take 20 mg by mouth three times per week.     ferrous sulfate (FERATAB) 325 mg (65 mg iron) oral tablet Take 325 mg by mouth three times a day.     furosemide (LASIX) 80 mg oral tablet Take 80 mg by mouth Once a day every Sunday, Monday, Wednesday and Friday. NON-DIALYSIS DAYS     hydrALAZINE (APRESOLINE) 50 mg oral tablet Take 75 mg by mouth three times a day.     isosorbide mononitrate (IMDUR) 30 mg oral extended release tablet 24 HR Take 30 mg by mouth once daily.     pantoprazole (PROTONIX) 40 mg oral delayed release tablet Take 1 tablet (40 mg) by mouth twice a day Indications: GI bleed.  Refills: 0        Review of Systems   Constitutional, HEENT, cardiovascular, pulmonary, gi and gu systems are negative, except as otherwise noted.      Objective    BP (!) 180/80    Pulse 67   Temp 97.2  F (36.2  C) (Tympanic)   Resp 19   Wt 45.4 kg (100 lb 1.6 oz)   LMP  (LMP Unknown)   SpO2 98%   BMI 21.66 kg/m    Body mass index is 21.66 kg/m .  Physical Exam   GENERAL APPEARANCE: elderly female, ambulating with a cane   NECK: no adenopathy and trachea midline and normal to palpation  RESP: Harsh breath sounds, decreased sounds at bases   CV: regular rates and rhythm, normal S1 S2  ABDOMEN: soft, non-tender and no rebound or guarding   MS: extremities normal- no gross deformities noted, no edema   SKIN: no suspicious lesions or rashes  NEURO: Right sided weakness+ mentation intact and speech normal  PSYCH: mentation appears normal      No results found for this or any previous visit (from the past 24 hour(s)).

## 2022-11-09 NOTE — TELEPHONE ENCOUNTER
Order/Referral Request    Who is requesting: [facility] Chani    Orders being requested: Verbal orders for 1 F/U visit for next week.     Reason service is needed/diagnosis: F/U    When are orders needed by: asap    Has this been discussed with Provider: Yes    Does patient have a preference on a Group/Provider/Facility? n    Does patient have an appointment scheduled?: No    Where to send orders: Verbal orders    Okay to leave a detailed message?: Yes at Other phone number:  [phone]

## 2022-11-09 NOTE — TELEPHONE ENCOUNTER
This writer attempted to contact Chani on 11/09/22      Reason for call informed orders and left detailed message.      If patient calls back:   2nd floor [place] Care Team (MA/TC) called. Inform patient that someone from the team will contact them, document that pt called and route to care team.         Malorie Almaraz MA

## 2022-11-16 NOTE — TELEPHONE ENCOUNTER
The Home Care/Assisted Living/Nursing Facility is calling regarding an established patient.  Has the patient seen Home Care in the past or is currently residing in Assisted Living or Nursing Facility? Yes.     MARJORIE Meredith calling from [facility] requesting the following orders that are within the Home Care, Assisted Living or Nursing Home Eval and Treatment standing order and can be signed as standing order signature required by RN.    Preferred Call Back Number: [phone]    Home Care Visits Continuation   - Skilled nursing re-certification: 1x/month for 1 month, 2x/month for 2 months, and 3 PRN visits      Any additional Orders:  Are there any orders requested, not stated above, that are outside of the standing order and must be routed to a licensed practitioner for approval?    No        JANEL TapiaN, RN  [facility] Primary Care [facility]

## 2022-11-17 NOTE — TELEPHONE ENCOUNTER
Tracie, physical therapist from [facility] calling to get verbal orders for therapy twice weekly x 2 weeks.  This RN gave approval for this order per protocol.     Ashley Del Toro BSN, RN

## 2022-12-12 NOTE — Clinical Note
Shine Baxter,  [facility] patient Lakhwinder Negron discharge from [facility] back to her home on 12/14/22.   Thank you,  Shruthi Martins RN, [facility] [phone]

## 2022-12-12 NOTE — PROGRESS NOTES
TRANSITIONS OF CARE (FREDDY) LOG   FREDDY tasks should be completed by the CC within one (1) business day of notification of each transition. Follow up contact with member is required after return to their usual care setting.  Note:  If CC finds out about the transitions fifteen (15) days or more after the member has returned to their usual care setting, no FREDDY log is needed. However, the CC should check in with the member to discuss the transition process, any changes needed to the care plan and document it in a case note.    Member Name:  Lakhwinder Negron MCO Name:  [facility] M[employer]/Health Plan Member ID#: [id number]   Product: [facility] Care Coordinator Contact:  Shruthi Martins RN, PHN Agency/County/Care System: [facility]   Transition Communication Actions from Care Management Contact   Transition #1   Notification Date: 12/12/22 Transition Date:   12/10/22 Transition From: Home     Is this the member s usual care setting?               yes Transition To: Hospital, [facility]   Transition Type:  Unplanned  Reason for Admission/Comments:  Pneumonia  Contact member/responsible party to offer assistance with transition Date completed: 12/12/22 - Lakhwidner Negron    Notes from conversation with the member/responsible party, provider, discharging and receiving facility (as applicable): CC contacted Hospital /discharge planner  (Kalyani  - [phone] for Name and Phone Number) and reviewed community POC. CC requested to be notified of concerns, care conference dates and discharge planning.   CC reached out to member regarding transition and offered support as needed.  Reviewed and update care plan as needed.  Notified community service providers and placed services [facility] transportation on hold as needed.  Transition log initiated.   PCP notified of hospitalization via EMR.    Shruthi Martins RN, PHN  [facility]  [phone]         Shared CC contact info, care plan/services with  receiving setting--Date completed: 12/12/22   Name & Title of receiving setting contact: [facility]   Notified PCP of transition--Date completed:  12/12/22     via  EMR  Name of PCP: Vee River     Transition #2   Notification Date: 12/15/2022 Transition Date:   12/14/2022 Transition From: [facility], [facility]     Is this the member s usual care setting?               no Transition To: Home   Transition Type:  Planned  Reason for Admission/Comments:  Pneumonia   Contact member/responsible party to offer assistance with transition Date completed: 12/15/22 - Member    Notes from conversation with the member/responsible party, provider, discharging and receiving facility (as applicable): CC contacted member and reviewed discharge summary.  Member has a follow-up appointment with PCP in 7 days: Yes: scheduled on 12/29/22   Member has had a change in condition: No  Home visit needed: No  Care plan reviewed and updated.  The following home based services [facility] transportation were resumed.  New referrals placed: No  Transition log completed.   PCP notified of transition back to home via EMR.    Shruthi Martins RN, N  [facility]  [phone]         Shared CC contact info, care plan/services with receiving setting--Date completed: 12/15/22   Name & Title of receiving setting contact: [facility]   Notified PCP of transition--Date completed:  12/15/22     via  EMR  Name of PCP: Vee River         *RETURN TO USUAL CARE SETTING: *Complete tasks below when the member is discharging TO their usual care setting within one (1) business day of notification..      For situations where the Care Coordinator is notified of the discharge prior to the date of discharge, the Care Coordinator must follow up with the member or designated representative to confirm that discharge actually occurred and discuss required FREDDY tasks as outlined in  the FREDDY Instructions.  (This includes situations where it may be a  new  usual care setting for the member. (i.e., a community member who decides upon permanent nursing home placement following hospitalization and rehab).    Discuss with Member/Responsible Party:    Check  Yes  - if the member, family member and/or SNF/facility staff manages the following:    If  No  provide explanation in the comments section.          Date completed: 12/15/22 Communicated with member or their designated representative about the following:  care transition process; about changes to the member s health status; plan of care updates; education about transitions and how to prevent unplanned transitions/readmissions    Four Pillars for Optimal Transition:    Check  Yes  - if the member, family member and/or SNF/facility staff manages the following:    If  No  provide explanation in the comments section.          [x]  Yes     []  No Does the member have a follow-up appointment scheduled with primary care or specialist? (Mental health hospitalizations--the appt. should be w/in 7 days)              For mental health hospitalizations:  []  Yes     []  No     Does the member have a follow-up appointment scheduled with a mental health practitioner within 7 days of discharge?  [x]  Yes     []  No     Has a medication review been completed with member? If no, refer to PCP, home care nurse, MTM, pharmacist  [x]  Yes     []  No     Can the member manage their medications or is there a system in place to manage medications (e.g. home care set-up)?         [x]  Yes     []  No     Can the member verbalize warning signs and symptoms to watch for and how to respond?  [x]  Yes     []  No     Does the member have a copy of and understand their discharge instructions?  If no, assist to obtain copy of discharge instructions, review discharge instructions, and assist to contact PCP to discuss questions about their recent hospitalization.  [x]  Yes     []   No     Does the member have adequate food, housing and transportation?  If no, add goal and discuss additional supports available to the member                                                                                                                                                                                 [x]  Yes     []  No     Is the member safe in their home?  If no, document needs and support provided                                                                                                                                                                          []  Yes     [x]  No     Are there any concerns of vulnerability, abuse, or neglect?  If yes, document concerns and actions taken by Care Coordinator as a mandated                                                                                                                                                                              [x]  Yes     []  No     Does the member use a Personal Health Care Record?  Check  Yes  if visit summary, discharge summary, and/or healthcare summary are being used as a PHR.                                                                                                                                                                                  [x]  Yes     []  No     Have you reviewed the discharge summary with the member? If  No  provide explanation in comments.  [x]  Yes     []  No     Have you updated the member s care plan/support plan? Add new diagnosis, medications, treatments, goals & interventions, as applicable. If No, provide explanation in comments.    Comments:           Notes from conversation with the member/responsible party, provider, discharging and receiving facility (as applicable): See above.     Shruthi Martins RN, PHN  [facility]  [phone]

## 2022-12-16 NOTE — PROGRESS NOTES
Please call patient with results (If unable to reach patient, this may be sent as a letter instead)  Talk with family member who speaks English:    Dear Lakhwinder Negron,     The kidney test is a little worse, so I will send the results to Dr. Rodriguez to review. You are scheduled to see her in August. I will see if she wants me to make any changes in your medication or do follow up testing before you see her. You are scheduled to see Dr. Rodriguez August 16 to check your kidneys.    The diabetes test looks very good.       Jonna Cason MD
show

## 2022-12-16 NOTE — TELEPHONE ENCOUNTER
Twyla RN with [facility] called to request orders.  Pt was recently in the hospital with pneumonia.    Skilled nursing 1 time a week for 3 weeks.   -1 time a week every 2 weeks   -2 PRN        Routing to provider.      Holli Man RN  [facility]

## 2022-12-19 NOTE — TELEPHONE ENCOUNTER
Writer contacted Twyla AMADOR,  with [facility] in regards to provider message below.     Writer left a detailed voice message on a confidential voicemail and verified patient's name, date of birth and relayed provider message below. Writer advised if the RN had any further questions or concerns to contact the [facility] at [phone] and ask to speak with a nurse.     Patricia Nunn RN, BSN  [facility]

## 2022-12-29 NOTE — PROGRESS NOTES
SUBJECTIVE    Lakhwinder is a 82 year old diabetic female with ESRD, presenting for the following health issues:  Hospital F/U and Recheck Medication (Patient is unsure what medications she is taking as her care givers are the ones administrating her medication. )    Hospital Follow-up Visit:    Hospital/Nursing Home/IP Rehab Facility: [facility]  Date of Admission: 12/09/2022  Date of Discharge: 12/15/2022  Reason(s) for Admission: HCAP- healthcare- associated pneumonia    Was your hospitalization related to COVID-19? No   Problems taking medications regularly:  None  Medication changes since discharge: Discharge summary reviewed.  Problems adhering to non-medication therapy:  None- caregivers help patient    Summary of hospitalization:  CareEverywhere information obtained and reviewed  Diagnostic Tests/Treatments reviewed.  Follow up needed: Yes.  Other Healthcare Providers Involved in Patient s Care:         None  Update since discharge: stable.        REVIEW OF SYSTEMS   CONSTITUTIONAL: NEGATIVE for fever, chills, change in weight  INTEGUMENTARY/SKIN: NEGATIVE for worrisome rashes, moles or lesions  EYES: NEGATIVE for vision changes or irritation  ENT/MOUTH: NEGATIVE for ear, mouth and throat problems  RESP:NEGATIVE for hemoptysis and pleurisy  BREAST: NEGATIVE for masses, tenderness or discharge  CV: POSITIVE for HX HTN and NEGATIVE for palpitations and paroxysmal nocturnal dyspnea  GI: NEGATIVE for nausea, abdominal pain, heartburn, or change in bowel habits  : POSITIVE for end-stage renal disease, currently on hemodialysis.  MUSCULOSKELETAL: NEGATIVE for significant arthralgias or myalgia  NEURO: NEGATIVE for weakness, dizziness or paresthesias  ENDOCRINE: POSITIVE  for type 2 diabetes.  HEME/ALLERGY/IMMUNE: POSITIVE  for    PSYCHIATRIC: NEGATIVE for changes in mood or affect      OBJECTIVE   BP (!) 181/64 (BP Location: Left arm, Patient Position: Sitting, Cuff Size: Adult Regular)   Pulse 58   Temp  97.7  F (36.5  C) (Tympanic)   Resp 18   Wt 46.2 kg (101 lb 12.8 oz)   LMP  (LMP Unknown)   SpO2 96%   BMI 22.03 kg/m    Body mass index is 22.03 kg/m .  Physical Exam   GENERAL: alert, no distress, elderly and fatigued  EYES: Eyes grossly normal to inspection and conjunctivae and sclerae normal  HENT: normal cephalic/atraumatic  NECK: no adenopathy  RESP: Harsh breath sounds bilaterally.  CV: regular rates and rhythm and no peripheral edema  MS: no gross musculoskeletal defects noted, no edema  NEURO: Normal strength and tone, mentation intact and speech normal  PSYCH: mentation appears normal, affect normal/bright    DIAGNOSTICS  Epic reviewed.    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    Gera Kirk MD  Internal Medicine

## 2023-01-01 ENCOUNTER — APPOINTMENT (OUTPATIENT)
Dept: ULTRASOUND IMAGING | Facility: HOSPITAL | Age: 83
DRG: 280 | End: 2023-01-01
Attending: INTERNAL MEDICINE
Payer: COMMERCIAL

## 2023-01-01 ENCOUNTER — APPOINTMENT (OUTPATIENT)
Dept: INTERPRETER SERVICES | Facility: CLINIC | Age: 83
End: 2023-01-01
Payer: OTHER MISCELLANEOUS

## 2023-01-01 ENCOUNTER — PATIENT OUTREACH (OUTPATIENT)
Dept: GERIATRIC MEDICINE | Facility: CLINIC | Age: 83
End: 2023-01-01
Payer: COMMERCIAL

## 2023-01-01 ENCOUNTER — TELEPHONE (OUTPATIENT)
Dept: FAMILY MEDICINE | Facility: CLINIC | Age: 83
End: 2023-01-01
Payer: COMMERCIAL

## 2023-01-01 ENCOUNTER — MEDICAL CORRESPONDENCE (OUTPATIENT)
Dept: HEALTH INFORMATION MANAGEMENT | Facility: CLINIC | Age: 83
End: 2023-01-01
Payer: OTHER MISCELLANEOUS

## 2023-01-01 ENCOUNTER — MEDICAL CORRESPONDENCE (OUTPATIENT)
Dept: HEALTH INFORMATION MANAGEMENT | Facility: CLINIC | Age: 83
End: 2023-01-01
Payer: COMMERCIAL

## 2023-01-01 ENCOUNTER — MEDICAL CORRESPONDENCE (OUTPATIENT)
Dept: HEALTH INFORMATION MANAGEMENT | Facility: CLINIC | Age: 83
End: 2023-01-01

## 2023-01-01 ENCOUNTER — TRANSFERRED RECORDS (OUTPATIENT)
Dept: HEALTH INFORMATION MANAGEMENT | Facility: CLINIC | Age: 83
End: 2023-01-01

## 2023-01-01 ENCOUNTER — PATIENT OUTREACH (OUTPATIENT)
Dept: GERIATRIC MEDICINE | Facility: CLINIC | Age: 83
End: 2023-01-01

## 2023-01-01 ENCOUNTER — HOSPITAL ENCOUNTER (INPATIENT)
Facility: HOSPITAL | Age: 83
LOS: 2 days | Discharge: HOSPICE | DRG: 291 | End: 2023-04-16
Attending: EMERGENCY MEDICINE | Admitting: HOSPITALIST
Payer: COMMERCIAL

## 2023-01-01 ENCOUNTER — APPOINTMENT (OUTPATIENT)
Dept: RADIOLOGY | Facility: HOSPITAL | Age: 83
DRG: 291 | End: 2023-01-01
Attending: EMERGENCY MEDICINE
Payer: COMMERCIAL

## 2023-01-01 ENCOUNTER — APPOINTMENT (OUTPATIENT)
Dept: CT IMAGING | Facility: HOSPITAL | Age: 83
DRG: 280 | End: 2023-01-01
Attending: EMERGENCY MEDICINE
Payer: COMMERCIAL

## 2023-01-01 ENCOUNTER — TELEPHONE (OUTPATIENT)
Dept: CARDIOLOGY | Facility: CLINIC | Age: 83
End: 2023-01-01
Payer: COMMERCIAL

## 2023-01-01 ENCOUNTER — APPOINTMENT (OUTPATIENT)
Dept: PHYSICAL THERAPY | Facility: HOSPITAL | Age: 83
DRG: 280 | End: 2023-01-01
Attending: INTERNAL MEDICINE
Payer: COMMERCIAL

## 2023-01-01 ENCOUNTER — APPOINTMENT (OUTPATIENT)
Dept: PHYSICAL THERAPY | Facility: HOSPITAL | Age: 83
DRG: 280 | End: 2023-01-01
Payer: COMMERCIAL

## 2023-01-01 ENCOUNTER — PATIENT OUTREACH (OUTPATIENT)
Dept: CARE COORDINATION | Facility: CLINIC | Age: 83
End: 2023-01-01
Payer: COMMERCIAL

## 2023-01-01 ENCOUNTER — PATIENT OUTREACH (OUTPATIENT)
Dept: GERIATRIC MEDICINE | Facility: CLINIC | Age: 83
End: 2023-01-01
Payer: OTHER MISCELLANEOUS

## 2023-01-01 ENCOUNTER — APPOINTMENT (OUTPATIENT)
Dept: CARDIOLOGY | Facility: HOSPITAL | Age: 83
DRG: 280 | End: 2023-01-01
Attending: INTERNAL MEDICINE
Payer: COMMERCIAL

## 2023-01-01 ENCOUNTER — HOSPITAL ENCOUNTER (INPATIENT)
Facility: HOSPITAL | Age: 83
LOS: 10 days | Discharge: HOSPICE | End: 2023-04-26
Attending: INTERNAL MEDICINE | Admitting: INTERNAL MEDICINE
Payer: OTHER MISCELLANEOUS

## 2023-01-01 ENCOUNTER — NURSE TRIAGE (OUTPATIENT)
Dept: NURSING | Facility: CLINIC | Age: 83
End: 2023-01-01
Payer: OTHER MISCELLANEOUS

## 2023-01-01 ENCOUNTER — APPOINTMENT (OUTPATIENT)
Dept: OCCUPATIONAL THERAPY | Facility: HOSPITAL | Age: 83
DRG: 280 | End: 2023-01-01
Attending: INTERNAL MEDICINE
Payer: COMMERCIAL

## 2023-01-01 ENCOUNTER — HOSPITAL ENCOUNTER (INPATIENT)
Facility: HOSPITAL | Age: 83
LOS: 7 days | Discharge: HOME HEALTH | DRG: 280 | End: 2023-04-04
Attending: EMERGENCY MEDICINE | Admitting: INTERNAL MEDICINE
Payer: COMMERCIAL

## 2023-01-01 VITALS
HEIGHT: 57 IN | TEMPERATURE: 97.9 F | HEART RATE: 66 BPM | RESPIRATION RATE: 20 BRPM | OXYGEN SATURATION: 100 % | WEIGHT: 85.54 LBS | BODY MASS INDEX: 18.45 KG/M2 | SYSTOLIC BLOOD PRESSURE: 145 MMHG | DIASTOLIC BLOOD PRESSURE: 65 MMHG

## 2023-01-01 VITALS
OXYGEN SATURATION: 99 % | TEMPERATURE: 98 F | HEART RATE: 84 BPM | SYSTOLIC BLOOD PRESSURE: 121 MMHG | RESPIRATION RATE: 20 BRPM | DIASTOLIC BLOOD PRESSURE: 65 MMHG

## 2023-01-01 VITALS
OXYGEN SATURATION: 97 % | TEMPERATURE: 98 F | RESPIRATION RATE: 20 BRPM | SYSTOLIC BLOOD PRESSURE: 134 MMHG | HEART RATE: 70 BPM | DIASTOLIC BLOOD PRESSURE: 65 MMHG

## 2023-01-01 DIAGNOSIS — R79.89 ELEVATED TROPONIN: ICD-10-CM

## 2023-01-01 DIAGNOSIS — I50.9 ACUTE DECOMPENSATED HEART FAILURE (H): Primary | ICD-10-CM

## 2023-01-01 DIAGNOSIS — R06.02 SHORTNESS OF BREATH: ICD-10-CM

## 2023-01-01 DIAGNOSIS — K21.9 GASTROESOPHAGEAL REFLUX DISEASE WITHOUT ESOPHAGITIS: ICD-10-CM

## 2023-01-01 DIAGNOSIS — Z99.2 ESRD (END STAGE RENAL DISEASE) ON DIALYSIS (H): ICD-10-CM

## 2023-01-01 DIAGNOSIS — N18.6 ESRD (END STAGE RENAL DISEASE) ON DIALYSIS (H): ICD-10-CM

## 2023-01-01 DIAGNOSIS — I21.4 NSTEMI (NON-ST ELEVATED MYOCARDIAL INFARCTION) (H): Primary | ICD-10-CM

## 2023-01-01 DIAGNOSIS — M62.81 GENERALIZED MUSCLE WEAKNESS: ICD-10-CM

## 2023-01-01 DIAGNOSIS — I50.9 ACUTE ON CHRONIC CONGESTIVE HEART FAILURE, UNSPECIFIED HEART FAILURE TYPE (H): ICD-10-CM

## 2023-01-01 DIAGNOSIS — Z53.9 DIAGNOSIS NOT YET DEFINED: Primary | ICD-10-CM

## 2023-01-01 DIAGNOSIS — K27.9 PUD (PEPTIC ULCER DISEASE): ICD-10-CM

## 2023-01-01 DIAGNOSIS — Z51.5 HOSPICE CARE: Primary | ICD-10-CM

## 2023-01-01 DIAGNOSIS — J90 PLEURAL EFFUSION: ICD-10-CM

## 2023-01-01 LAB
% LINING CELLS, BODY FLUID: 2 %
ABO/RH(D): NORMAL
ABO/RH(D): NORMAL
ACANTHOCYTES BLD QL SMEAR: SLIGHT
ALBUMIN SERPL BCG-MCNC: 3.1 G/DL (ref 3.5–5.2)
ANION GAP SERPL CALCULATED.3IONS-SCNC: 10 MMOL/L (ref 7–15)
ANION GAP SERPL CALCULATED.3IONS-SCNC: 12 MMOL/L (ref 7–15)
ANION GAP SERPL CALCULATED.3IONS-SCNC: 14 MMOL/L (ref 7–15)
ANION GAP SERPL CALCULATED.3IONS-SCNC: 8 MMOL/L (ref 7–15)
ANTIBODY SCREEN: NEGATIVE
ANTIBODY SCREEN: NEGATIVE
APPEARANCE FLD: CLEAR
ATRIAL RATE - MUSE: 75 BPM
ATRIAL RATE - MUSE: 92 BPM
BACTERIA PLR CULT: NO GROWTH
BASE EXCESS BLDV CALC-SCNC: -0.1 MMOL/L
BASOPHILS # BLD AUTO: 0 10E3/UL (ref 0–0.2)
BASOPHILS NFR BLD AUTO: 1 %
BUN SERPL-MCNC: 13 MG/DL (ref 8–23)
BUN SERPL-MCNC: 14.5 MG/DL (ref 8–23)
BUN SERPL-MCNC: 18 MG/DL (ref 8–23)
BUN SERPL-MCNC: 20.5 MG/DL (ref 8–23)
BUN SERPL-MCNC: 23.2 MG/DL (ref 8–23)
BUN SERPL-MCNC: 26.2 MG/DL (ref 8–23)
BURR CELLS BLD QL SMEAR: SLIGHT
CALCIUM SERPL-MCNC: 8.1 MG/DL (ref 8.8–10.2)
CALCIUM SERPL-MCNC: 8.2 MG/DL (ref 8.8–10.2)
CALCIUM SERPL-MCNC: 8.2 MG/DL (ref 8.8–10.2)
CALCIUM SERPL-MCNC: 8.7 MG/DL (ref 8.8–10.2)
CALCIUM SERPL-MCNC: 8.9 MG/DL (ref 8.8–10.2)
CALCIUM SERPL-MCNC: 8.9 MG/DL (ref 8.8–10.2)
CELL COUNT BODY FLUID SOURCE: NORMAL
CHLORIDE SERPL-SCNC: 100 MMOL/L (ref 98–107)
CHLORIDE SERPL-SCNC: 100 MMOL/L (ref 98–107)
CHLORIDE SERPL-SCNC: 102 MMOL/L (ref 98–107)
CHLORIDE SERPL-SCNC: 96 MMOL/L (ref 98–107)
CHLORIDE SERPL-SCNC: 97 MMOL/L (ref 98–107)
CHLORIDE SERPL-SCNC: 99 MMOL/L (ref 98–107)
COLOR FLD: YELLOW
CREAT SERPL-MCNC: 2.3 MG/DL (ref 0.51–0.95)
CREAT SERPL-MCNC: 2.68 MG/DL (ref 0.51–0.95)
CREAT SERPL-MCNC: 2.82 MG/DL (ref 0.51–0.95)
CREAT SERPL-MCNC: 3.04 MG/DL (ref 0.51–0.95)
CREAT SERPL-MCNC: 3.11 MG/DL (ref 0.51–0.95)
CREAT SERPL-MCNC: 3.91 MG/DL (ref 0.51–0.95)
DEPRECATED HCO3 PLAS-SCNC: 21 MMOL/L (ref 22–29)
DEPRECATED HCO3 PLAS-SCNC: 22 MMOL/L (ref 22–29)
DEPRECATED HCO3 PLAS-SCNC: 23 MMOL/L (ref 22–29)
DEPRECATED HCO3 PLAS-SCNC: 23 MMOL/L (ref 22–29)
DEPRECATED HCO3 PLAS-SCNC: 26 MMOL/L (ref 22–29)
DEPRECATED HCO3 PLAS-SCNC: 27 MMOL/L (ref 22–29)
DIASTOLIC BLOOD PRESSURE - MUSE: 89 MMHG
DIASTOLIC BLOOD PRESSURE - MUSE: NORMAL MMHG
EOSINOPHIL # BLD AUTO: 0 10E3/UL (ref 0–0.7)
EOSINOPHIL # BLD AUTO: 0.1 10E3/UL (ref 0–0.7)
EOSINOPHIL # BLD AUTO: 0.1 10E3/UL (ref 0–0.7)
EOSINOPHIL NFR BLD AUTO: 1 %
EOSINOPHIL NFR BLD AUTO: 2 %
EOSINOPHIL NFR BLD AUTO: 2 %
ERYTHROCYTE [DISTWIDTH] IN BLOOD BY AUTOMATED COUNT: 14.1 % (ref 10–15)
ERYTHROCYTE [DISTWIDTH] IN BLOOD BY AUTOMATED COUNT: 15 % (ref 10–15)
ERYTHROCYTE [DISTWIDTH] IN BLOOD BY AUTOMATED COUNT: 15 % (ref 10–15)
ERYTHROCYTE [DISTWIDTH] IN BLOOD BY AUTOMATED COUNT: 15.3 % (ref 10–15)
GFR SERPL CREATININE-BSD FRML MDRD: 11 ML/MIN/1.73M2
GFR SERPL CREATININE-BSD FRML MDRD: 14 ML/MIN/1.73M2
GFR SERPL CREATININE-BSD FRML MDRD: 15 ML/MIN/1.73M2
GFR SERPL CREATININE-BSD FRML MDRD: 16 ML/MIN/1.73M2
GFR SERPL CREATININE-BSD FRML MDRD: 17 ML/MIN/1.73M2
GFR SERPL CREATININE-BSD FRML MDRD: 20 ML/MIN/1.73M2
GLUCOSE BLDC GLUCOMTR-MCNC: 103 MG/DL (ref 70–99)
GLUCOSE BLDC GLUCOMTR-MCNC: 105 MG/DL (ref 70–99)
GLUCOSE BLDC GLUCOMTR-MCNC: 107 MG/DL (ref 70–99)
GLUCOSE BLDC GLUCOMTR-MCNC: 111 MG/DL (ref 70–99)
GLUCOSE BLDC GLUCOMTR-MCNC: 115 MG/DL (ref 70–99)
GLUCOSE BLDC GLUCOMTR-MCNC: 121 MG/DL (ref 70–99)
GLUCOSE BLDC GLUCOMTR-MCNC: 122 MG/DL (ref 70–99)
GLUCOSE BLDC GLUCOMTR-MCNC: 128 MG/DL (ref 70–99)
GLUCOSE BLDC GLUCOMTR-MCNC: 130 MG/DL (ref 70–99)
GLUCOSE BLDC GLUCOMTR-MCNC: 135 MG/DL (ref 70–99)
GLUCOSE BLDC GLUCOMTR-MCNC: 136 MG/DL (ref 70–99)
GLUCOSE BLDC GLUCOMTR-MCNC: 137 MG/DL (ref 70–99)
GLUCOSE BLDC GLUCOMTR-MCNC: 143 MG/DL (ref 70–99)
GLUCOSE BLDC GLUCOMTR-MCNC: 150 MG/DL (ref 70–99)
GLUCOSE BLDC GLUCOMTR-MCNC: 163 MG/DL (ref 70–99)
GLUCOSE BLDC GLUCOMTR-MCNC: 175 MG/DL (ref 70–99)
GLUCOSE BLDC GLUCOMTR-MCNC: 176 MG/DL (ref 70–99)
GLUCOSE BLDC GLUCOMTR-MCNC: 189 MG/DL (ref 70–99)
GLUCOSE BLDC GLUCOMTR-MCNC: 198 MG/DL (ref 70–99)
GLUCOSE BLDC GLUCOMTR-MCNC: 202 MG/DL (ref 70–99)
GLUCOSE BLDC GLUCOMTR-MCNC: 74 MG/DL (ref 70–99)
GLUCOSE BLDC GLUCOMTR-MCNC: 83 MG/DL (ref 70–99)
GLUCOSE BLDC GLUCOMTR-MCNC: 85 MG/DL (ref 70–99)
GLUCOSE BLDC GLUCOMTR-MCNC: 89 MG/DL (ref 70–99)
GLUCOSE BLDC GLUCOMTR-MCNC: 90 MG/DL (ref 70–99)
GLUCOSE BLDC GLUCOMTR-MCNC: 94 MG/DL (ref 70–99)
GLUCOSE BLDC GLUCOMTR-MCNC: 99 MG/DL (ref 70–99)
GLUCOSE BODY FLUID SOURCE: NORMAL
GLUCOSE FLD-MCNC: 113 MG/DL
GLUCOSE SERPL-MCNC: 112 MG/DL (ref 70–99)
GLUCOSE SERPL-MCNC: 121 MG/DL (ref 70–99)
GLUCOSE SERPL-MCNC: 160 MG/DL (ref 70–99)
GLUCOSE SERPL-MCNC: 175 MG/DL (ref 70–99)
GLUCOSE SERPL-MCNC: 180 MG/DL (ref 70–99)
GLUCOSE SERPL-MCNC: 84 MG/DL (ref 70–99)
HBV SURFACE AG SERPL QL IA: NONREACTIVE
HCO3 BLDV-SCNC: 27 MMOL/L (ref 24–30)
HCT VFR BLD AUTO: 37.6 % (ref 35–47)
HCT VFR BLD AUTO: 37.9 % (ref 35–47)
HCT VFR BLD AUTO: 40.4 % (ref 35–47)
HCT VFR BLD AUTO: 44.8 % (ref 35–47)
HGB BLD-MCNC: 11.6 G/DL (ref 11.7–15.7)
HGB BLD-MCNC: 11.9 G/DL (ref 11.7–15.7)
HGB BLD-MCNC: 12.5 G/DL (ref 11.7–15.7)
HGB BLD-MCNC: 13.6 G/DL (ref 11.7–15.7)
HOLD SPECIMEN: NORMAL
IMM GRANULOCYTES # BLD: 0 10E3/UL
IMM GRANULOCYTES NFR BLD: 0 %
INTERPRETATION ECG - MUSE: NORMAL
INTERPRETATION ECG - MUSE: NORMAL
LD BODY BODY FLUID SOURCE: NORMAL
LDH FLD L TO P-CCNC: 72 U/L
LDH SERPL L TO P-CCNC: 200 U/L (ref 0–250)
LYMPHOCYTES # BLD AUTO: 1 10E3/UL (ref 0.8–5.3)
LYMPHOCYTES # BLD AUTO: 1 10E3/UL (ref 0.8–5.3)
LYMPHOCYTES # BLD AUTO: 1.1 10E3/UL (ref 0.8–5.3)
LYMPHOCYTES NFR BLD AUTO: 31 %
LYMPHOCYTES NFR BLD AUTO: 34 %
LYMPHOCYTES NFR BLD AUTO: 41 %
LYMPHOCYTES NFR FLD MANUAL: 44 %
MAGNESIUM SERPL-MCNC: 1.8 MG/DL (ref 1.7–2.3)
MCH RBC QN AUTO: 31.6 PG (ref 26.5–33)
MCH RBC QN AUTO: 32.3 PG (ref 26.5–33)
MCH RBC QN AUTO: 32.6 PG (ref 26.5–33)
MCH RBC QN AUTO: 32.6 PG (ref 26.5–33)
MCHC RBC AUTO-ENTMCNC: 30.4 G/DL (ref 31.5–36.5)
MCHC RBC AUTO-ENTMCNC: 30.9 G/DL (ref 31.5–36.5)
MCHC RBC AUTO-ENTMCNC: 30.9 G/DL (ref 31.5–36.5)
MCHC RBC AUTO-ENTMCNC: 31.4 G/DL (ref 31.5–36.5)
MCV RBC AUTO: 103 FL (ref 78–100)
MCV RBC AUTO: 104 FL (ref 78–100)
MCV RBC AUTO: 106 FL (ref 78–100)
MCV RBC AUTO: 106 FL (ref 78–100)
MONOCYTES # BLD AUTO: 0.2 10E3/UL (ref 0–1.3)
MONOCYTES NFR BLD AUTO: 6 %
MONOS+MACROS NFR FLD MANUAL: 53 %
NEUTROPHILS # BLD AUTO: 1.3 10E3/UL (ref 1.6–8.3)
NEUTROPHILS # BLD AUTO: 1.7 10E3/UL (ref 1.6–8.3)
NEUTROPHILS # BLD AUTO: 2.1 10E3/UL (ref 1.6–8.3)
NEUTROPHILS NFR BLD AUTO: 50 %
NEUTROPHILS NFR BLD AUTO: 58 %
NEUTROPHILS NFR BLD AUTO: 60 %
NEUTS BAND NFR FLD MANUAL: 1 %
NRBC # BLD AUTO: 0 10E3/UL
NRBC BLD AUTO-RTO: 0 /100
NT-PROBNP SERPL-MCNC: ABNORMAL PG/ML (ref 0–1800)
NT-PROBNP SERPL-MCNC: ABNORMAL PG/ML (ref 0–1800)
OXYHGB MFR BLDV: 71.6 % (ref 70–75)
P AXIS - MUSE: 80 DEGREES
P AXIS - MUSE: 9 DEGREES
PATH REPORT.COMMENTS IMP SPEC: NORMAL
PATH REPORT.FINAL DX SPEC: NORMAL
PATH REPORT.GROSS SPEC: NORMAL
PATH REPORT.MICROSCOPIC SPEC OTHER STN: NORMAL
PATH REPORT.RELEVANT HX SPEC: NORMAL
PCO2 BLDV: 56 MM HG (ref 35–50)
PH BLDV: 7.29 [PH] (ref 7.35–7.45)
PH BODY FLUID SOURCE: NORMAL
PH FLD: 8 PH
PHOSPHATE SERPL-MCNC: 3.5 MG/DL (ref 2.5–4.5)
PLAT MORPH BLD: ABNORMAL
PLATELET # BLD AUTO: 37 10E3/UL (ref 150–450)
PLATELET # BLD AUTO: 38 10E3/UL (ref 150–450)
PLATELET # BLD AUTO: 67 10E3/UL (ref 150–450)
PLATELET # BLD AUTO: 94 10E3/UL (ref 150–450)
PO2 BLDV: 43 MM HG (ref 25–47)
POTASSIUM SERPL-SCNC: 3.7 MMOL/L (ref 3.4–5.3)
POTASSIUM SERPL-SCNC: 3.8 MMOL/L (ref 3.4–5.3)
POTASSIUM SERPL-SCNC: 3.8 MMOL/L (ref 3.4–5.3)
POTASSIUM SERPL-SCNC: 3.9 MMOL/L (ref 3.4–5.3)
POTASSIUM SERPL-SCNC: 4 MMOL/L (ref 3.4–5.3)
POTASSIUM SERPL-SCNC: 4.3 MMOL/L (ref 3.4–5.3)
PR INTERVAL - MUSE: 128 MS
PR INTERVAL - MUSE: 182 MS
PROT FLD-MCNC: 2.7 G/DL
PROT SERPL-MCNC: 4.9 G/DL (ref 6.4–8.3)
PROTEIN BODY FLUID SOURCE: NORMAL
QRS DURATION - MUSE: 92 MS
QRS DURATION - MUSE: 92 MS
QT - MUSE: 396 MS
QT - MUSE: 428 MS
QTC - MUSE: 477 MS
QTC - MUSE: 489 MS
R AXIS - MUSE: -32 DEGREES
R AXIS - MUSE: 131 DEGREES
RBC # BLD AUTO: 3.56 10E6/UL (ref 3.8–5.2)
RBC # BLD AUTO: 3.68 10E6/UL (ref 3.8–5.2)
RBC # BLD AUTO: 3.83 10E6/UL (ref 3.8–5.2)
RBC # BLD AUTO: 4.31 10E6/UL (ref 3.8–5.2)
RBC MORPH BLD: ABNORMAL
SAO2 % BLDV: 73.3 % (ref 70–75)
SARS-COV-2 RNA RESP QL NAA+PROBE: NEGATIVE
SARS-COV-2 RNA RESP QL NAA+PROBE: NEGATIVE
SODIUM SERPL-SCNC: 132 MMOL/L (ref 136–145)
SODIUM SERPL-SCNC: 133 MMOL/L (ref 136–145)
SODIUM SERPL-SCNC: 136 MMOL/L (ref 136–145)
SPECIMEN EXPIRATION DATE: NORMAL
SPECIMEN EXPIRATION DATE: NORMAL
SYSTOLIC BLOOD PRESSURE - MUSE: 196 MMHG
SYSTOLIC BLOOD PRESSURE - MUSE: NORMAL MMHG
T AXIS - MUSE: 54 DEGREES
T AXIS - MUSE: 66 DEGREES
TROPONIN T SERPL HS-MCNC: 147 NG/L
TROPONIN T SERPL HS-MCNC: 202 NG/L
TROPONIN T SERPL HS-MCNC: 207 NG/L
TROPONIN T SERPL HS-MCNC: 211 NG/L
VENTRICULAR RATE- MUSE: 75 BPM
VENTRICULAR RATE- MUSE: 92 BPM
WBC # BLD AUTO: 2.6 10E3/UL (ref 4–11)
WBC # BLD AUTO: 3 10E3/UL (ref 4–11)
WBC # BLD AUTO: 3.4 10E3/UL (ref 4–11)
WBC # BLD AUTO: 4.1 10E3/UL (ref 4–11)
WBC # FLD AUTO: 62 /UL

## 2023-01-01 PROCEDURE — 0W9B3ZZ DRAINAGE OF LEFT PLEURAL CAVITY, PERCUTANEOUS APPROACH: ICD-10-PCS | Performed by: INTERNAL MEDICINE

## 2023-01-01 PROCEDURE — 90935 HEMODIALYSIS ONE EVALUATION: CPT

## 2023-01-01 PROCEDURE — C9803 HOPD COVID-19 SPEC COLLECT: HCPCS

## 2023-01-01 PROCEDURE — 99232 SBSQ HOSP IP/OBS MODERATE 35: CPT | Performed by: INTERNAL MEDICINE

## 2023-01-01 PROCEDURE — 110N000005 HC R&B HOSPICE, ACCENT

## 2023-01-01 PROCEDURE — 250N000013 HC RX MED GY IP 250 OP 250 PS 637: Performed by: INTERNAL MEDICINE

## 2023-01-01 PROCEDURE — 99231 SBSQ HOSP IP/OBS SF/LOW 25: CPT | Performed by: INTERNAL MEDICINE

## 2023-01-01 PROCEDURE — 82805 BLOOD GASES W/O2 SATURATION: CPT | Performed by: INTERNAL MEDICINE

## 2023-01-01 PROCEDURE — 93005 ELECTROCARDIOGRAM TRACING: CPT | Performed by: EMERGENCY MEDICINE

## 2023-01-01 PROCEDURE — 99233 SBSQ HOSP IP/OBS HIGH 50: CPT | Performed by: INTERNAL MEDICINE

## 2023-01-01 PROCEDURE — 80069 RENAL FUNCTION PANEL: CPT | Performed by: HOSPITALIST

## 2023-01-01 PROCEDURE — 99207 PR APP CREDIT; MD BILLING SHARED VISIT: CPT | Performed by: INTERNAL MEDICINE

## 2023-01-01 PROCEDURE — 97110 THERAPEUTIC EXERCISES: CPT | Mod: GP

## 2023-01-01 PROCEDURE — 36415 COLL VENOUS BLD VENIPUNCTURE: CPT | Performed by: HOSPITALIST

## 2023-01-01 PROCEDURE — 84155 ASSAY OF PROTEIN SERUM: CPT | Performed by: INTERNAL MEDICINE

## 2023-01-01 PROCEDURE — 99222 1ST HOSP IP/OBS MODERATE 55: CPT | Performed by: INTERNAL MEDICINE

## 2023-01-01 PROCEDURE — 36415 COLL VENOUS BLD VENIPUNCTURE: CPT | Performed by: INTERNAL MEDICINE

## 2023-01-01 PROCEDURE — 250N000011 HC RX IP 250 OP 636: Performed by: EMERGENCY MEDICINE

## 2023-01-01 PROCEDURE — 255N000002 HC RX 255 OP 636: Performed by: INTERNAL MEDICINE

## 2023-01-01 PROCEDURE — 99207 PR CDG-CUT & PASTE-POTENTIAL IMPACT ON LEVEL: CPT | Mod: GV | Performed by: INTERNAL MEDICINE

## 2023-01-01 PROCEDURE — 97535 SELF CARE MNGMENT TRAINING: CPT | Mod: GO

## 2023-01-01 PROCEDURE — 82945 GLUCOSE OTHER FLUID: CPT | Performed by: INTERNAL MEDICINE

## 2023-01-01 PROCEDURE — 99207 PR CDG-CUT & PASTE-POTENTIAL IMPACT ON LEVEL: CPT | Performed by: INTERNAL MEDICINE

## 2023-01-01 PROCEDURE — 86901 BLOOD TYPING SEROLOGIC RH(D): CPT | Performed by: INTERNAL MEDICINE

## 2023-01-01 PROCEDURE — 83615 LACTATE (LD) (LDH) ENZYME: CPT | Performed by: INTERNAL MEDICINE

## 2023-01-01 PROCEDURE — 99223 1ST HOSP IP/OBS HIGH 75: CPT | Performed by: INTERNAL MEDICINE

## 2023-01-01 PROCEDURE — 85018 HEMOGLOBIN: CPT | Performed by: INTERNAL MEDICINE

## 2023-01-01 PROCEDURE — 99285 EMERGENCY DEPT VISIT HI MDM: CPT | Mod: 25

## 2023-01-01 PROCEDURE — 250N000011 HC RX IP 250 OP 636: Performed by: INTERNAL MEDICINE

## 2023-01-01 PROCEDURE — 86850 RBC ANTIBODY SCREEN: CPT | Performed by: INTERNAL MEDICINE

## 2023-01-01 PROCEDURE — 210N000001 HC R&B IMCU HEART CARE

## 2023-01-01 PROCEDURE — 87340 HEPATITIS B SURFACE AG IA: CPT | Performed by: INTERNAL MEDICINE

## 2023-01-01 PROCEDURE — G0180 MD CERTIFICATION HHA PATIENT: HCPCS | Performed by: NURSE PRACTITIONER

## 2023-01-01 PROCEDURE — U0005 INFEC AGEN DETEC AMPLI PROBE: HCPCS | Performed by: EMERGENCY MEDICINE

## 2023-01-01 PROCEDURE — 250N000013 HC RX MED GY IP 250 OP 250 PS 637: Performed by: HOSPITALIST

## 2023-01-01 PROCEDURE — 96365 THER/PROPH/DIAG IV INF INIT: CPT

## 2023-01-01 PROCEDURE — 84484 ASSAY OF TROPONIN QUANT: CPT | Performed by: INTERNAL MEDICINE

## 2023-01-01 PROCEDURE — 36415 COLL VENOUS BLD VENIPUNCTURE: CPT | Performed by: EMERGENCY MEDICINE

## 2023-01-01 PROCEDURE — 250N000013 HC RX MED GY IP 250 OP 250 PS 637: Performed by: NURSE PRACTITIONER

## 2023-01-01 PROCEDURE — 99239 HOSP IP/OBS DSCHRG MGMT >30: CPT | Mod: GV | Performed by: INTERNAL MEDICINE

## 2023-01-01 PROCEDURE — 80048 BASIC METABOLIC PNL TOTAL CA: CPT | Performed by: INTERNAL MEDICINE

## 2023-01-01 PROCEDURE — C1894 INTRO/SHEATH, NON-LASER: HCPCS | Performed by: INTERNAL MEDICINE

## 2023-01-01 PROCEDURE — U0003 INFECTIOUS AGENT DETECTION BY NUCLEIC ACID (DNA OR RNA); SEVERE ACUTE RESPIRATORY SYNDROME CORONAVIRUS 2 (SARS-COV-2) (CORONAVIRUS DISEASE [COVID-19]), AMPLIFIED PROBE TECHNIQUE, MAKING USE OF HIGH THROUGHPUT TECHNOLOGIES AS DESCRIBED BY CMS-2020-01-R: HCPCS | Performed by: INTERNAL MEDICINE

## 2023-01-01 PROCEDURE — 82962 GLUCOSE BLOOD TEST: CPT

## 2023-01-01 PROCEDURE — 87070 CULTURE OTHR SPECIMN AEROBIC: CPT | Performed by: INTERNAL MEDICINE

## 2023-01-01 PROCEDURE — 93306 TTE W/DOPPLER COMPLETE: CPT | Mod: 26 | Performed by: INTERNAL MEDICINE

## 2023-01-01 PROCEDURE — 80048 BASIC METABOLIC PNL TOTAL CA: CPT | Performed by: EMERGENCY MEDICINE

## 2023-01-01 PROCEDURE — 85025 COMPLETE CBC W/AUTO DIFF WBC: CPT | Performed by: INTERNAL MEDICINE

## 2023-01-01 PROCEDURE — 85027 COMPLETE CBC AUTOMATED: CPT | Performed by: EMERGENCY MEDICINE

## 2023-01-01 PROCEDURE — 83880 ASSAY OF NATRIURETIC PEPTIDE: CPT | Performed by: EMERGENCY MEDICINE

## 2023-01-01 PROCEDURE — 120N000001 HC R&B MED SURG/OB

## 2023-01-01 PROCEDURE — 99231 SBSQ HOSP IP/OBS SF/LOW 25: CPT | Mod: GV | Performed by: INTERNAL MEDICINE

## 2023-01-01 PROCEDURE — 71045 X-RAY EXAM CHEST 1 VIEW: CPT

## 2023-01-01 PROCEDURE — 90937 HEMODIALYSIS REPEATED EVAL: CPT

## 2023-01-01 PROCEDURE — 99239 HOSP IP/OBS DSCHRG MGMT >30: CPT | Performed by: INTERNAL MEDICINE

## 2023-01-01 PROCEDURE — 250N000009 HC RX 250: Performed by: INTERNAL MEDICINE

## 2023-01-01 PROCEDURE — 84157 ASSAY OF PROTEIN OTHER: CPT | Performed by: INTERNAL MEDICINE

## 2023-01-01 PROCEDURE — 83735 ASSAY OF MAGNESIUM: CPT | Performed by: EMERGENCY MEDICINE

## 2023-01-01 PROCEDURE — 5A1D70Z PERFORMANCE OF URINARY FILTRATION, INTERMITTENT, LESS THAN 6 HOURS PER DAY: ICD-10-PCS | Performed by: NURSE PRACTITIONER

## 2023-01-01 PROCEDURE — 99233 SBSQ HOSP IP/OBS HIGH 50: CPT | Performed by: NURSE PRACTITIONER

## 2023-01-01 PROCEDURE — 82310 ASSAY OF CALCIUM: CPT | Performed by: INTERNAL MEDICINE

## 2023-01-01 PROCEDURE — 85025 COMPLETE CBC W/AUTO DIFF WBC: CPT | Performed by: EMERGENCY MEDICINE

## 2023-01-01 PROCEDURE — 99223 1ST HOSP IP/OBS HIGH 75: CPT | Mod: AI | Performed by: HOSPITALIST

## 2023-01-01 PROCEDURE — 250N000012 HC RX MED GY IP 250 OP 636 PS 637: Performed by: INTERNAL MEDICINE

## 2023-01-01 PROCEDURE — 97165 OT EVAL LOW COMPLEX 30 MIN: CPT | Mod: GO

## 2023-01-01 PROCEDURE — 32555 ASPIRATE PLEURA W/ IMAGING: CPT

## 2023-01-01 PROCEDURE — 272N000710 US THORACENTESIS

## 2023-01-01 PROCEDURE — 97530 THERAPEUTIC ACTIVITIES: CPT | Mod: GP

## 2023-01-01 PROCEDURE — 89051 BODY FLUID CELL COUNT: CPT | Performed by: INTERNAL MEDICINE

## 2023-01-01 PROCEDURE — 83986 ASSAY PH BODY FLUID NOS: CPT | Performed by: INTERNAL MEDICINE

## 2023-01-01 PROCEDURE — 258N000003 HC RX IP 258 OP 636: Performed by: INTERNAL MEDICINE

## 2023-01-01 PROCEDURE — 4A023N7 MEASUREMENT OF CARDIAC SAMPLING AND PRESSURE, LEFT HEART, PERCUTANEOUS APPROACH: ICD-10-PCS | Performed by: INTERNAL MEDICINE

## 2023-01-01 PROCEDURE — 99285 EMERGENCY DEPT VISIT HI MDM: CPT | Mod: 25,CS

## 2023-01-01 PROCEDURE — B2111ZZ FLUOROSCOPY OF MULTIPLE CORONARY ARTERIES USING LOW OSMOLAR CONTRAST: ICD-10-PCS | Performed by: INTERNAL MEDICINE

## 2023-01-01 PROCEDURE — 88305 TISSUE EXAM BY PATHOLOGIST: CPT | Mod: 26 | Performed by: PATHOLOGY

## 2023-01-01 PROCEDURE — 97162 PT EVAL MOD COMPLEX 30 MIN: CPT | Mod: GP

## 2023-01-01 PROCEDURE — 99231 SBSQ HOSP IP/OBS SF/LOW 25: CPT | Performed by: STUDENT IN AN ORGANIZED HEALTH CARE EDUCATION/TRAINING PROGRAM

## 2023-01-01 PROCEDURE — 71250 CT THORAX DX C-: CPT

## 2023-01-01 PROCEDURE — 84484 ASSAY OF TROPONIN QUANT: CPT | Performed by: EMERGENCY MEDICINE

## 2023-01-01 PROCEDURE — C1769 GUIDE WIRE: HCPCS | Performed by: INTERNAL MEDICINE

## 2023-01-01 PROCEDURE — 272N000001 HC OR GENERAL SUPPLY STERILE: Performed by: INTERNAL MEDICINE

## 2023-01-01 PROCEDURE — 99418 PROLNG IP/OBS E/M EA 15 MIN: CPT | Performed by: NURSE PRACTITIONER

## 2023-01-01 PROCEDURE — 99232 SBSQ HOSP IP/OBS MODERATE 35: CPT | Performed by: NURSE PRACTITIONER

## 2023-01-01 PROCEDURE — 88112 CYTOPATH CELL ENHANCE TECH: CPT | Mod: 26 | Performed by: PATHOLOGY

## 2023-01-01 PROCEDURE — 999N000157 HC STATISTIC RCP TIME EA 10 MIN

## 2023-01-01 PROCEDURE — 93458 L HRT ARTERY/VENTRICLE ANGIO: CPT | Mod: 26 | Performed by: INTERNAL MEDICINE

## 2023-01-01 PROCEDURE — 94660 CPAP INITIATION&MGMT: CPT

## 2023-01-01 PROCEDURE — 88112 CYTOPATH CELL ENHANCE TECH: CPT | Mod: TC | Performed by: INTERNAL MEDICINE

## 2023-01-01 PROCEDURE — 999N000208 ECHOCARDIOGRAM COMPLETE

## 2023-01-01 PROCEDURE — 99223 1ST HOSP IP/OBS HIGH 75: CPT | Performed by: NURSE PRACTITIONER

## 2023-01-01 PROCEDURE — 93458 L HRT ARTERY/VENTRICLE ANGIO: CPT | Performed by: INTERNAL MEDICINE

## 2023-01-01 RX ORDER — ASPIRIN 325 MG
325 TABLET ORAL ONCE
Status: COMPLETED | OUTPATIENT
Start: 2023-01-01 | End: 2023-01-01

## 2023-01-01 RX ORDER — ATROPINE SULFATE 10 MG/ML
2 SOLUTION/ DROPS OPHTHALMIC EVERY 4 HOURS PRN
Status: DISCONTINUED | OUTPATIENT
Start: 2023-01-01 | End: 2023-01-01 | Stop reason: HOSPADM

## 2023-01-01 RX ORDER — HEPARIN SODIUM 1000 [USP'U]/ML
2300 INJECTION, SOLUTION INTRAVENOUS; SUBCUTANEOUS
Status: DISCONTINUED | OUTPATIENT
Start: 2023-01-01 | End: 2023-01-01 | Stop reason: HOSPADM

## 2023-01-01 RX ORDER — NALOXONE HYDROCHLORIDE 0.4 MG/ML
0.1 INJECTION, SOLUTION INTRAMUSCULAR; INTRAVENOUS; SUBCUTANEOUS
Status: DISCONTINUED | OUTPATIENT
Start: 2023-01-01 | End: 2023-01-01 | Stop reason: HOSPADM

## 2023-01-01 RX ORDER — NALOXONE HYDROCHLORIDE 0.4 MG/ML
0.2 INJECTION, SOLUTION INTRAMUSCULAR; INTRAVENOUS; SUBCUTANEOUS
Status: DISCONTINUED | OUTPATIENT
Start: 2023-01-01 | End: 2023-01-01 | Stop reason: HOSPADM

## 2023-01-01 RX ORDER — PIPERACILLIN SODIUM, TAZOBACTAM SODIUM 3; .375 G/15ML; G/15ML
3.38 INJECTION, POWDER, LYOPHILIZED, FOR SOLUTION INTRAVENOUS ONCE
Status: COMPLETED | OUTPATIENT
Start: 2023-01-01 | End: 2023-01-01

## 2023-01-01 RX ORDER — CARBOXYMETHYLCELLULOSE SODIUM 5 MG/ML
1-2 SOLUTION/ DROPS OPHTHALMIC
Status: DISCONTINUED | OUTPATIENT
Start: 2023-01-01 | End: 2023-01-01 | Stop reason: HOSPADM

## 2023-01-01 RX ORDER — LEVOFLOXACIN 500 MG/1
500 TABLET, FILM COATED ORAL
Status: ON HOLD | COMMUNITY
End: 2023-01-01

## 2023-01-01 RX ORDER — GLYCOPYRROLATE 0.2 MG/ML
0.2 INJECTION, SOLUTION INTRAMUSCULAR; INTRAVENOUS EVERY 4 HOURS PRN
Status: CANCELLED | OUTPATIENT
Start: 2023-01-01

## 2023-01-01 RX ORDER — PANTOPRAZOLE SODIUM 40 MG/1
40 TABLET, DELAYED RELEASE ORAL
Qty: 3 TABLET | Refills: 0 | Status: SHIPPED | OUTPATIENT
Start: 2023-01-01 | End: 2023-01-01

## 2023-01-01 RX ORDER — ATORVASTATIN CALCIUM 40 MG/1
80 TABLET, FILM COATED ORAL EVERY EVENING
Status: DISCONTINUED | OUTPATIENT
Start: 2023-01-01 | End: 2023-01-01 | Stop reason: HOSPADM

## 2023-01-01 RX ORDER — NALOXONE HYDROCHLORIDE 0.4 MG/ML
0.4 INJECTION, SOLUTION INTRAMUSCULAR; INTRAVENOUS; SUBCUTANEOUS
Status: ACTIVE | OUTPATIENT
Start: 2023-01-01 | End: 2023-01-01

## 2023-01-01 RX ORDER — FAMOTIDINE 20 MG/1
20 TABLET, FILM COATED ORAL DAILY
Status: DISCONTINUED | OUTPATIENT
Start: 2023-01-01 | End: 2023-01-01

## 2023-01-01 RX ORDER — LORAZEPAM 1 MG/1
1 TABLET ORAL
Status: CANCELLED | OUTPATIENT
Start: 2023-01-01

## 2023-01-01 RX ORDER — PANTOPRAZOLE SODIUM 40 MG/1
40 TABLET, DELAYED RELEASE ORAL 2 TIMES DAILY
Status: DISCONTINUED | OUTPATIENT
Start: 2023-01-01 | End: 2023-01-01 | Stop reason: ALTCHOICE

## 2023-01-01 RX ORDER — METRONIDAZOLE 500 MG/1
500 TABLET ORAL 2 TIMES DAILY
Status: ON HOLD | COMMUNITY
End: 2023-01-01

## 2023-01-01 RX ORDER — ONDANSETRON 2 MG/ML
4 INJECTION INTRAMUSCULAR; INTRAVENOUS EVERY 6 HOURS PRN
Status: DISCONTINUED | OUTPATIENT
Start: 2023-01-01 | End: 2023-01-01 | Stop reason: HOSPADM

## 2023-01-01 RX ORDER — ATROPINE SULFATE 10 MG/ML
2 SOLUTION/ DROPS OPHTHALMIC EVERY 4 HOURS PRN
Qty: 2 ML | Refills: 0 | Status: SHIPPED | OUTPATIENT
Start: 2023-01-01

## 2023-01-01 RX ORDER — ACETAMINOPHEN 650 MG/1
650 SUPPOSITORY RECTAL EVERY 6 HOURS PRN
Status: DISCONTINUED | OUTPATIENT
Start: 2023-01-01 | End: 2023-01-01 | Stop reason: HOSPADM

## 2023-01-01 RX ORDER — LORAZEPAM 1 MG/1
1 TABLET ORAL
Status: DISCONTINUED | OUTPATIENT
Start: 2023-01-01 | End: 2023-01-01 | Stop reason: HOSPADM

## 2023-01-01 RX ORDER — AMLODIPINE BESYLATE 2.5 MG/1
2.5 TABLET ORAL DAILY
Status: DISCONTINUED | OUTPATIENT
Start: 2023-01-01 | End: 2023-01-01

## 2023-01-01 RX ORDER — GLYCOPYRROLATE 0.2 MG/ML
0.2 INJECTION, SOLUTION INTRAMUSCULAR; INTRAVENOUS EVERY 4 HOURS PRN
Status: DISCONTINUED | OUTPATIENT
Start: 2023-01-01 | End: 2023-01-01 | Stop reason: HOSPADM

## 2023-01-01 RX ORDER — PANTOPRAZOLE SODIUM 40 MG/1
40 TABLET, DELAYED RELEASE ORAL 2 TIMES DAILY
Status: DISCONTINUED | OUTPATIENT
Start: 2023-01-01 | End: 2023-01-01 | Stop reason: HOSPADM

## 2023-01-01 RX ORDER — LIDOCAINE 40 MG/G
CREAM TOPICAL
Status: DISCONTINUED | OUTPATIENT
Start: 2023-01-01 | End: 2023-01-01 | Stop reason: HOSPADM

## 2023-01-01 RX ORDER — ASPIRIN 325 MG
325 TABLET ORAL DAILY
Status: DISCONTINUED | OUTPATIENT
Start: 2023-01-01 | End: 2023-01-01 | Stop reason: ALTCHOICE

## 2023-01-01 RX ORDER — AMOXICILLIN 250 MG
1 CAPSULE ORAL 2 TIMES DAILY
Status: DISCONTINUED | OUTPATIENT
Start: 2023-01-01 | End: 2023-01-01 | Stop reason: HOSPADM

## 2023-01-01 RX ORDER — ONDANSETRON 4 MG/1
4 TABLET, ORALLY DISINTEGRATING ORAL EVERY 6 HOURS PRN
Status: DISCONTINUED | OUTPATIENT
Start: 2023-01-01 | End: 2023-01-01 | Stop reason: HOSPADM

## 2023-01-01 RX ORDER — AMLODIPINE BESYLATE 5 MG/1
5 TABLET ORAL DAILY
Status: DISCONTINUED | OUTPATIENT
Start: 2023-01-01 | End: 2023-01-01 | Stop reason: HOSPADM

## 2023-01-01 RX ORDER — FAMOTIDINE 20 MG/1
20 TABLET, FILM COATED ORAL
Status: DISCONTINUED | OUTPATIENT
Start: 2023-01-01 | End: 2023-01-01

## 2023-01-01 RX ORDER — LORAZEPAM 1 MG/1
1 TABLET ORAL
Qty: 10 TABLET | Refills: 0 | Status: SHIPPED | OUTPATIENT
Start: 2023-01-01

## 2023-01-01 RX ORDER — FERROUS SULFATE 325(65) MG
325 TABLET ORAL 3 TIMES DAILY
Status: DISCONTINUED | OUTPATIENT
Start: 2023-01-01 | End: 2023-01-01 | Stop reason: ALTCHOICE

## 2023-01-01 RX ORDER — HYDROMORPHONE HYDROCHLORIDE 1 MG/ML
0.5 SOLUTION ORAL
Status: DISCONTINUED | OUTPATIENT
Start: 2023-01-01 | End: 2023-01-01 | Stop reason: HOSPADM

## 2023-01-01 RX ORDER — ATORVASTATIN CALCIUM 40 MG/1
80 TABLET, FILM COATED ORAL EVERY EVENING
Status: DISCONTINUED | OUTPATIENT
Start: 2023-01-01 | End: 2023-01-01 | Stop reason: ALTCHOICE

## 2023-01-01 RX ORDER — ASPIRIN 325 MG
325 TABLET ORAL DAILY
Qty: 30 TABLET | Refills: 0 | Status: ON HOLD | OUTPATIENT
Start: 2023-01-01 | End: 2023-01-01

## 2023-01-01 RX ORDER — DIPHENHYDRAMINE HCL 25 MG
25-50 CAPSULE ORAL EVERY 6 HOURS PRN
Status: DISCONTINUED | OUTPATIENT
Start: 2023-01-01 | End: 2023-01-01 | Stop reason: HOSPADM

## 2023-01-01 RX ORDER — DOCUSATE SODIUM 100 MG/1
100 CAPSULE, LIQUID FILLED ORAL 2 TIMES DAILY
Status: DISCONTINUED | OUTPATIENT
Start: 2023-01-01 | End: 2023-01-01 | Stop reason: HOSPADM

## 2023-01-01 RX ORDER — FERROUS SULFATE 325(65) MG
325 TABLET ORAL 3 TIMES DAILY
Status: DISCONTINUED | OUTPATIENT
Start: 2023-01-01 | End: 2023-01-01 | Stop reason: HOSPADM

## 2023-01-01 RX ORDER — POLYETHYLENE GLYCOL 3350 17 G/17G
17 POWDER, FOR SOLUTION ORAL DAILY
Status: DISCONTINUED | OUTPATIENT
Start: 2023-01-01 | End: 2023-01-01

## 2023-01-01 RX ORDER — IODIXANOL 320 MG/ML
INJECTION, SOLUTION INTRAVASCULAR
Status: DISCONTINUED | OUTPATIENT
Start: 2023-01-01 | End: 2023-01-01 | Stop reason: HOSPADM

## 2023-01-01 RX ORDER — FAMOTIDINE 20 MG/1
20 TABLET, FILM COATED ORAL
Status: DISCONTINUED | OUTPATIENT
Start: 2023-01-01 | End: 2023-01-01 | Stop reason: HOSPADM

## 2023-01-01 RX ORDER — CALCIUM ACETATE 667 MG/1
667 CAPSULE ORAL
Status: ON HOLD | COMMUNITY
End: 2023-01-01

## 2023-01-01 RX ORDER — NALOXONE HYDROCHLORIDE 0.4 MG/ML
0.1 INJECTION, SOLUTION INTRAMUSCULAR; INTRAVENOUS; SUBCUTANEOUS
Status: CANCELLED | OUTPATIENT
Start: 2023-01-01

## 2023-01-01 RX ORDER — HALOPERIDOL 2 MG/ML
1 SOLUTION ORAL
Status: CANCELLED | OUTPATIENT
Start: 2023-01-01

## 2023-01-01 RX ORDER — OXYCODONE HYDROCHLORIDE 5 MG/1
2.5 TABLET ORAL EVERY 4 HOURS PRN
Status: ON HOLD | COMMUNITY
End: 2023-01-01

## 2023-01-01 RX ORDER — LEVOFLOXACIN 500 MG/1
500 TABLET, FILM COATED ORAL
Status: COMPLETED | OUTPATIENT
Start: 2023-01-01 | End: 2023-01-01

## 2023-01-01 RX ORDER — PANTOPRAZOLE SODIUM 40 MG/1
40 TABLET, DELAYED RELEASE ORAL
Status: DISCONTINUED | OUTPATIENT
Start: 2023-01-01 | End: 2023-01-01 | Stop reason: HOSPADM

## 2023-01-01 RX ORDER — PROCHLORPERAZINE MALEATE 5 MG
5 TABLET ORAL EVERY 6 HOURS PRN
Status: DISCONTINUED | OUTPATIENT
Start: 2023-01-01 | End: 2023-01-01 | Stop reason: HOSPADM

## 2023-01-01 RX ORDER — CARVEDILOL 3.12 MG/1
6.25 TABLET ORAL 2 TIMES DAILY
Status: DISCONTINUED | OUTPATIENT
Start: 2023-01-01 | End: 2023-01-01

## 2023-01-01 RX ORDER — FENTANYL CITRATE 50 UG/ML
INJECTION, SOLUTION INTRAMUSCULAR; INTRAVENOUS
Status: DISCONTINUED | OUTPATIENT
Start: 2023-01-01 | End: 2023-01-01 | Stop reason: HOSPADM

## 2023-01-01 RX ORDER — HYDROMORPHONE HYDROCHLORIDE 1 MG/ML
0.5 SOLUTION ORAL
Status: CANCELLED | OUTPATIENT
Start: 2023-01-01

## 2023-01-01 RX ORDER — ATROPINE SULFATE 10 MG/ML
2 SOLUTION/ DROPS OPHTHALMIC EVERY 4 HOURS PRN
Status: CANCELLED | OUTPATIENT
Start: 2023-01-01

## 2023-01-01 RX ORDER — HEPARIN SODIUM 1000 [USP'U]/ML
2200 INJECTION, SOLUTION INTRAVENOUS; SUBCUTANEOUS
Status: DISCONTINUED | OUTPATIENT
Start: 2023-01-01 | End: 2023-01-01 | Stop reason: HOSPADM

## 2023-01-01 RX ORDER — ONDANSETRON 2 MG/ML
4 INJECTION INTRAMUSCULAR; INTRAVENOUS EVERY 6 HOURS PRN
Status: CANCELLED | OUTPATIENT
Start: 2023-01-01

## 2023-01-01 RX ORDER — DEXTROSE MONOHYDRATE 25 G/50ML
25-50 INJECTION, SOLUTION INTRAVENOUS
Status: DISCONTINUED | OUTPATIENT
Start: 2023-01-01 | End: 2023-01-01 | Stop reason: HOSPADM

## 2023-01-01 RX ORDER — CARBOXYMETHYLCELLULOSE SODIUM 5 MG/ML
1-2 SOLUTION/ DROPS OPHTHALMIC
Status: CANCELLED | OUTPATIENT
Start: 2023-01-01

## 2023-01-01 RX ORDER — POLYETHYLENE GLYCOL 3350 17 G/17G
17 POWDER, FOR SOLUTION ORAL DAILY
Status: DISCONTINUED | OUTPATIENT
Start: 2023-01-01 | End: 2023-01-01 | Stop reason: HOSPADM

## 2023-01-01 RX ORDER — METRONIDAZOLE 500 MG/1
500 TABLET ORAL 2 TIMES DAILY
Status: COMPLETED | OUTPATIENT
Start: 2023-01-01 | End: 2023-01-01

## 2023-01-01 RX ORDER — LORAZEPAM 2 MG/ML
1 INJECTION INTRAMUSCULAR
Status: DISCONTINUED | OUTPATIENT
Start: 2023-01-01 | End: 2023-01-01 | Stop reason: HOSPADM

## 2023-01-01 RX ORDER — MINERAL OIL/HYDROPHIL PETROLAT
OINTMENT (GRAM) TOPICAL
Status: DISCONTINUED | OUTPATIENT
Start: 2023-01-01 | End: 2023-01-01 | Stop reason: HOSPADM

## 2023-01-01 RX ORDER — FLUMAZENIL 0.1 MG/ML
0.2 INJECTION, SOLUTION INTRAVENOUS
Status: ACTIVE | OUTPATIENT
Start: 2023-01-01 | End: 2023-01-01

## 2023-01-01 RX ORDER — NALOXONE HYDROCHLORIDE 0.4 MG/ML
0.2 INJECTION, SOLUTION INTRAMUSCULAR; INTRAVENOUS; SUBCUTANEOUS
Status: CANCELLED | OUTPATIENT
Start: 2023-01-01

## 2023-01-01 RX ORDER — NALOXONE HYDROCHLORIDE 0.4 MG/ML
0.4 INJECTION, SOLUTION INTRAMUSCULAR; INTRAVENOUS; SUBCUTANEOUS
Status: DISCONTINUED | OUTPATIENT
Start: 2023-01-01 | End: 2023-01-01 | Stop reason: HOSPADM

## 2023-01-01 RX ORDER — POLYETHYLENE GLYCOL 3350 17 G/17G
8.5 POWDER, FOR SOLUTION ORAL DAILY
Status: ON HOLD | COMMUNITY
End: 2023-01-01

## 2023-01-01 RX ORDER — POLYETHYLENE GLYCOL 3350 17 G/17G
8.5 POWDER, FOR SOLUTION ORAL DAILY PRN
Status: ON HOLD | COMMUNITY
End: 2023-01-01

## 2023-01-01 RX ORDER — FENTANYL CITRATE 50 UG/ML
25 INJECTION, SOLUTION INTRAMUSCULAR; INTRAVENOUS
Status: DISCONTINUED | OUTPATIENT
Start: 2023-01-01 | End: 2023-01-01

## 2023-01-01 RX ORDER — ACETAMINOPHEN 325 MG/1
650 TABLET ORAL EVERY 4 HOURS PRN
Status: CANCELLED | OUTPATIENT
Start: 2023-01-01

## 2023-01-01 RX ORDER — AMOXICILLIN 250 MG
1 CAPSULE ORAL 2 TIMES DAILY
Qty: 30 TABLET | Refills: 0 | Status: SHIPPED | OUTPATIENT
Start: 2023-01-01

## 2023-01-01 RX ORDER — LORAZEPAM 2 MG/ML
1 INJECTION INTRAMUSCULAR
Status: CANCELLED | OUTPATIENT
Start: 2023-01-01

## 2023-01-01 RX ORDER — METOPROLOL TARTRATE 1 MG/ML
5 INJECTION, SOLUTION INTRAVENOUS EVERY 5 MIN PRN
Status: DISCONTINUED | OUTPATIENT
Start: 2023-01-01 | End: 2023-01-01 | Stop reason: HOSPADM

## 2023-01-01 RX ORDER — ASPIRIN 325 MG
325 TABLET ORAL DAILY
Status: DISCONTINUED | OUTPATIENT
Start: 2023-01-01 | End: 2023-01-01 | Stop reason: HOSPADM

## 2023-01-01 RX ORDER — OXYCODONE HYDROCHLORIDE 5 MG/1
10 TABLET ORAL EVERY 4 HOURS PRN
Status: DISCONTINUED | OUTPATIENT
Start: 2023-01-01 | End: 2023-01-01 | Stop reason: HOSPADM

## 2023-01-01 RX ORDER — FAMOTIDINE 20 MG/1
20 TABLET, FILM COATED ORAL
Qty: 90 TABLET | Refills: 0 | Status: ON HOLD | OUTPATIENT
Start: 2023-01-01 | End: 2023-01-01

## 2023-01-01 RX ORDER — FAMOTIDINE 20 MG/1
20 TABLET, FILM COATED ORAL
Status: CANCELLED | OUTPATIENT
Start: 2023-01-01

## 2023-01-01 RX ORDER — CARVEDILOL 12.5 MG/1
25 TABLET ORAL 2 TIMES DAILY WITH MEALS
Status: DISCONTINUED | OUTPATIENT
Start: 2023-01-01 | End: 2023-01-01 | Stop reason: ALTCHOICE

## 2023-01-01 RX ORDER — MINERAL OIL/HYDROPHIL PETROLAT
OINTMENT (GRAM) TOPICAL
Status: CANCELLED | OUTPATIENT
Start: 2023-01-01

## 2023-01-01 RX ORDER — CALCIUM ACETATE 667 MG/1
667 CAPSULE ORAL
Status: DISCONTINUED | OUTPATIENT
Start: 2023-01-01 | End: 2023-01-01 | Stop reason: ALTCHOICE

## 2023-01-01 RX ORDER — FERROUS SULFATE 325(65) MG
325 TABLET, DELAYED RELEASE (ENTERIC COATED) ORAL
Status: ON HOLD | COMMUNITY
End: 2023-01-01

## 2023-01-01 RX ORDER — ACETAMINOPHEN 325 MG/1
650 TABLET ORAL EVERY 4 HOURS PRN
Status: DISCONTINUED | OUTPATIENT
Start: 2023-01-01 | End: 2023-01-01 | Stop reason: HOSPADM

## 2023-01-01 RX ORDER — ATORVASTATIN CALCIUM 80 MG/1
80 TABLET, FILM COATED ORAL EVERY EVENING
Qty: 30 TABLET | Refills: 0 | Status: ON HOLD | OUTPATIENT
Start: 2023-01-01 | End: 2023-01-01

## 2023-01-01 RX ORDER — MORPHINE SULFATE 2 MG/ML
1 INJECTION, SOLUTION INTRAMUSCULAR; INTRAVENOUS EVERY 4 HOURS PRN
Status: DISCONTINUED | OUTPATIENT
Start: 2023-01-01 | End: 2023-01-01 | Stop reason: HOSPADM

## 2023-01-01 RX ORDER — NITROGLYCERIN 0.4 MG/1
0.4 TABLET SUBLINGUAL EVERY 5 MIN PRN
Status: DISCONTINUED | OUTPATIENT
Start: 2023-01-01 | End: 2023-01-01 | Stop reason: HOSPADM

## 2023-01-01 RX ORDER — ASPIRIN 81 MG/1
81 TABLET ORAL DAILY
Status: ON HOLD | COMMUNITY
End: 2023-01-01

## 2023-01-01 RX ORDER — PROCHLORPERAZINE 25 MG
12.5 SUPPOSITORY, RECTAL RECTAL EVERY 12 HOURS PRN
Status: DISCONTINUED | OUTPATIENT
Start: 2023-01-01 | End: 2023-01-01 | Stop reason: HOSPADM

## 2023-01-01 RX ORDER — CALCIUM ACETATE 667 MG/1
667 CAPSULE ORAL
Status: DISCONTINUED | OUTPATIENT
Start: 2023-01-01 | End: 2023-01-01 | Stop reason: HOSPADM

## 2023-01-01 RX ORDER — HYDRALAZINE HYDROCHLORIDE 20 MG/ML
10 INJECTION INTRAMUSCULAR; INTRAVENOUS EVERY 4 HOURS PRN
Status: DISCONTINUED | OUTPATIENT
Start: 2023-01-01 | End: 2023-01-01 | Stop reason: HOSPADM

## 2023-01-01 RX ORDER — FUROSEMIDE 10 MG/ML
80 INJECTION INTRAMUSCULAR; INTRAVENOUS EVERY 12 HOURS
Status: DISCONTINUED | OUTPATIENT
Start: 2023-01-01 | End: 2023-01-01

## 2023-01-01 RX ORDER — ONDANSETRON 4 MG/1
4 TABLET, ORALLY DISINTEGRATING ORAL EVERY 6 HOURS PRN
Status: CANCELLED | OUTPATIENT
Start: 2023-01-01

## 2023-01-01 RX ORDER — HALOPERIDOL 2 MG/ML
1 SOLUTION ORAL
Status: DISCONTINUED | OUTPATIENT
Start: 2023-01-01 | End: 2023-01-01 | Stop reason: HOSPADM

## 2023-01-01 RX ORDER — HYDROMORPHONE HYDROCHLORIDE 1 MG/ML
0.5 SOLUTION ORAL
Qty: 10 ML | Refills: 0 | Status: SHIPPED | OUTPATIENT
Start: 2023-01-01

## 2023-01-01 RX ORDER — HYDROMORPHONE HYDROCHLORIDE 1 MG/ML
0.5 SOLUTION ORAL
Status: DISCONTINUED | OUTPATIENT
Start: 2023-01-01 | End: 2023-01-01

## 2023-01-01 RX ORDER — HEPARIN SODIUM 5000 [USP'U]/.5ML
5000 INJECTION, SOLUTION INTRAVENOUS; SUBCUTANEOUS EVERY 12 HOURS
Status: DISCONTINUED | OUTPATIENT
Start: 2023-01-01 | End: 2023-01-01 | Stop reason: ALTCHOICE

## 2023-01-01 RX ORDER — ACETAMINOPHEN 325 MG/1
650 TABLET ORAL EVERY 4 HOURS PRN
Qty: 20 TABLET | Refills: 0 | Status: SHIPPED | OUTPATIENT
Start: 2023-01-01

## 2023-01-01 RX ORDER — ISOSORBIDE MONONITRATE 30 MG/1
30 TABLET, EXTENDED RELEASE ORAL EVERY MORNING
Status: DISCONTINUED | OUTPATIENT
Start: 2023-01-01 | End: 2023-01-01 | Stop reason: HOSPADM

## 2023-01-01 RX ORDER — ATROPINE SULFATE 0.1 MG/ML
0.5 INJECTION INTRAVENOUS
Status: ACTIVE | OUTPATIENT
Start: 2023-01-01 | End: 2023-01-01

## 2023-01-01 RX ORDER — NALOXONE HYDROCHLORIDE 0.4 MG/ML
0.2 INJECTION, SOLUTION INTRAMUSCULAR; INTRAVENOUS; SUBCUTANEOUS
Status: ACTIVE | OUTPATIENT
Start: 2023-01-01 | End: 2023-01-01

## 2023-01-01 RX ORDER — OXYCODONE HYDROCHLORIDE 5 MG/1
5 TABLET ORAL EVERY 4 HOURS PRN
Status: DISCONTINUED | OUTPATIENT
Start: 2023-01-01 | End: 2023-01-01 | Stop reason: HOSPADM

## 2023-01-01 RX ORDER — HYDRALAZINE HYDROCHLORIDE 20 MG/ML
10 INJECTION INTRAMUSCULAR; INTRAVENOUS EVERY 4 HOURS PRN
Status: DISCONTINUED | OUTPATIENT
Start: 2023-01-01 | End: 2023-01-01 | Stop reason: ALTCHOICE

## 2023-01-01 RX ORDER — CARVEDILOL 12.5 MG/1
25 TABLET ORAL 2 TIMES DAILY
Status: DISCONTINUED | OUTPATIENT
Start: 2023-01-01 | End: 2023-01-01 | Stop reason: HOSPADM

## 2023-01-01 RX ORDER — HYDROMORPHONE HYDROCHLORIDE 1 MG/ML
1 SOLUTION ORAL EVERY 4 HOURS
Status: DISCONTINUED | OUTPATIENT
Start: 2023-01-01 | End: 2023-01-01 | Stop reason: HOSPADM

## 2023-01-01 RX ORDER — NITROGLYCERIN 0.4 MG/1
0.4 TABLET SUBLINGUAL EVERY 5 MIN PRN
Status: CANCELLED | OUTPATIENT
Start: 2023-01-01

## 2023-01-01 RX ORDER — POLYETHYLENE GLYCOL 3350 17 G/17G
17 POWDER, FOR SOLUTION ORAL DAILY
Status: CANCELLED | OUTPATIENT
Start: 2023-01-01

## 2023-01-01 RX ORDER — ACETAMINOPHEN 325 MG/1
650 TABLET ORAL EVERY 6 HOURS PRN
Status: DISCONTINUED | OUTPATIENT
Start: 2023-01-01 | End: 2023-01-01 | Stop reason: HOSPADM

## 2023-01-01 RX ORDER — POLYETHYLENE GLYCOL 3350 17 G/17G
17 POWDER, FOR SOLUTION ORAL DAILY PRN
Status: DISCONTINUED | OUTPATIENT
Start: 2023-01-01 | End: 2023-01-01 | Stop reason: HOSPADM

## 2023-01-01 RX ORDER — LIDOCAINE 40 MG/G
CREAM TOPICAL
Status: CANCELLED | OUTPATIENT
Start: 2023-01-01

## 2023-01-01 RX ORDER — NICOTINE POLACRILEX 4 MG
15-30 LOZENGE BUCCAL
Status: DISCONTINUED | OUTPATIENT
Start: 2023-01-01 | End: 2023-01-01 | Stop reason: HOSPADM

## 2023-01-01 RX ORDER — ISOSORBIDE MONONITRATE 30 MG/1
30 TABLET, EXTENDED RELEASE ORAL DAILY
Status: DISCONTINUED | OUTPATIENT
Start: 2023-01-01 | End: 2023-01-01 | Stop reason: ALTCHOICE

## 2023-01-01 RX ADMIN — ATROPINE SULFATE 2 DROP: 10 SOLUTION/ DROPS OPHTHALMIC at 18:20

## 2023-01-01 RX ADMIN — HYDROMORPHONE HYDROCHLORIDE 1 MG: 5 SOLUTION ORAL at 02:27

## 2023-01-01 RX ADMIN — FUROSEMIDE 80 MG: 10 INJECTION, SOLUTION INTRAVENOUS at 02:02

## 2023-01-01 RX ADMIN — HYDROMORPHONE HYDROCHLORIDE 1 MG: 5 SOLUTION ORAL at 14:00

## 2023-01-01 RX ADMIN — POLYETHYLENE GLYCOL 3350 17 G: 17 POWDER, FOR SOLUTION ORAL at 09:12

## 2023-01-01 RX ADMIN — FERROUS SULFATE TAB 325 MG (65 MG ELEMENTAL FE) 325 MG: 325 (65 FE) TAB at 07:55

## 2023-01-01 RX ADMIN — HYDROMORPHONE HYDROCHLORIDE 1 MG: 5 SOLUTION ORAL at 13:27

## 2023-01-01 RX ADMIN — CARVEDILOL 25 MG: 25 TABLET, FILM COATED ORAL at 17:40

## 2023-01-01 RX ADMIN — HYDROMORPHONE HYDROCHLORIDE 1 MG: 5 SOLUTION ORAL at 14:28

## 2023-01-01 RX ADMIN — SODIUM CHLORIDE 150 ML: 9 INJECTION, SOLUTION INTRAVENOUS at 12:13

## 2023-01-01 RX ADMIN — HYDROMORPHONE HYDROCHLORIDE 1 MG: 2 TABLET ORAL at 22:56

## 2023-01-01 RX ADMIN — ISOSORBIDE MONONITRATE 30 MG: 30 TABLET, EXTENDED RELEASE ORAL at 08:38

## 2023-01-01 RX ADMIN — CALCIUM ACETATE 667 MG: 667 CAPSULE ORAL at 17:00

## 2023-01-01 RX ADMIN — DIPHENHYDRAMINE HYDROCHLORIDE 25 MG: 25 CAPSULE ORAL at 14:00

## 2023-01-01 RX ADMIN — CARVEDILOL 6.25 MG: 3.12 TABLET, FILM COATED ORAL at 08:38

## 2023-01-01 RX ADMIN — PANTOPRAZOLE SODIUM 40 MG: 40 TABLET, DELAYED RELEASE ORAL at 21:33

## 2023-01-01 RX ADMIN — HYDROMORPHONE HYDROCHLORIDE 1 MG: 5 SOLUTION ORAL at 17:44

## 2023-01-01 RX ADMIN — HYDRALAZINE HYDROCHLORIDE 10 MG: 20 INJECTION INTRAMUSCULAR; INTRAVENOUS at 00:56

## 2023-01-01 RX ADMIN — DOCUSATE SODIUM 100 MG: 100 CAPSULE ORAL at 08:22

## 2023-01-01 RX ADMIN — ASPIRIN 325 MG ORAL TABLET 325 MG: 325 PILL ORAL at 08:37

## 2023-01-01 RX ADMIN — AMLODIPINE BESYLATE 5 MG: 5 TABLET ORAL at 08:42

## 2023-01-01 RX ADMIN — PANTOPRAZOLE SODIUM 40 MG: 40 TABLET, DELAYED RELEASE ORAL at 19:22

## 2023-01-01 RX ADMIN — SENNOSIDES AND DOCUSATE SODIUM 1 TABLET: 50; 8.6 TABLET ORAL at 09:09

## 2023-01-01 RX ADMIN — DOCUSATE SODIUM 100 MG: 100 CAPSULE ORAL at 19:34

## 2023-01-01 RX ADMIN — POLYETHYLENE GLYCOL 3350 17 G: 17 POWDER, FOR SOLUTION ORAL at 10:11

## 2023-01-01 RX ADMIN — FERROUS SULFATE TAB 325 MG (65 MG ELEMENTAL FE) 325 MG: 325 (65 FE) TAB at 08:39

## 2023-01-01 RX ADMIN — ATORVASTATIN CALCIUM 80 MG: 40 TABLET, FILM COATED ORAL at 20:15

## 2023-01-01 RX ADMIN — HYDROMORPHONE HYDROCHLORIDE 1 MG: 5 SOLUTION ORAL at 14:01

## 2023-01-01 RX ADMIN — HYDROMORPHONE HYDROCHLORIDE 1 MG: 5 SOLUTION ORAL at 06:26

## 2023-01-01 RX ADMIN — ATROPINE SULFATE 2 DROP: 10 SOLUTION/ DROPS OPHTHALMIC at 02:28

## 2023-01-01 RX ADMIN — CALCIUM ACETATE 667 MG: 667 CAPSULE ORAL at 08:22

## 2023-01-01 RX ADMIN — CARVEDILOL 25 MG: 25 TABLET, FILM COATED ORAL at 17:23

## 2023-01-01 RX ADMIN — ATORVASTATIN CALCIUM 80 MG: 40 TABLET, FILM COATED ORAL at 19:46

## 2023-01-01 RX ADMIN — FERROUS SULFATE TAB 325 MG (65 MG ELEMENTAL FE) 325 MG: 325 (65 FE) TAB at 20:16

## 2023-01-01 RX ADMIN — OXYCODONE HYDROCHLORIDE 5 MG: 5 TABLET ORAL at 20:27

## 2023-01-01 RX ADMIN — CARVEDILOL 25 MG: 25 TABLET, FILM COATED ORAL at 08:42

## 2023-01-01 RX ADMIN — CARVEDILOL 25 MG: 25 TABLET, FILM COATED ORAL at 12:16

## 2023-01-01 RX ADMIN — HYDROMORPHONE HYDROCHLORIDE 1 MG: 5 SOLUTION ORAL at 03:43

## 2023-01-01 RX ADMIN — ISOSORBIDE MONONITRATE 30 MG: 30 TABLET, EXTENDED RELEASE ORAL at 08:40

## 2023-01-01 RX ADMIN — CARVEDILOL 25 MG: 25 TABLET, FILM COATED ORAL at 08:22

## 2023-01-01 RX ADMIN — HYDROMORPHONE HYDROCHLORIDE 1 MG: 5 SOLUTION ORAL at 22:11

## 2023-01-01 RX ADMIN — AMLODIPINE BESYLATE 5 MG: 5 TABLET ORAL at 12:16

## 2023-01-01 RX ADMIN — HYDROMORPHONE HYDROCHLORIDE 0.5 MG: 5 SOLUTION ORAL at 04:10

## 2023-01-01 RX ADMIN — HYDRALAZINE HYDROCHLORIDE 10 MG: 20 INJECTION INTRAMUSCULAR; INTRAVENOUS at 02:18

## 2023-01-01 RX ADMIN — HYDROMORPHONE HYDROCHLORIDE 1 MG: 5 SOLUTION ORAL at 21:29

## 2023-01-01 RX ADMIN — DOCUSATE SODIUM 100 MG: 100 CAPSULE ORAL at 09:31

## 2023-01-01 RX ADMIN — CARVEDILOL 25 MG: 25 TABLET, FILM COATED ORAL at 10:00

## 2023-01-01 RX ADMIN — LORAZEPAM 1 MG: 1 TABLET ORAL at 03:14

## 2023-01-01 RX ADMIN — HYDROMORPHONE HYDROCHLORIDE 1 MG: 5 SOLUTION ORAL at 06:36

## 2023-01-01 RX ADMIN — DOCUSATE SODIUM 100 MG: 100 CAPSULE ORAL at 08:37

## 2023-01-01 RX ADMIN — HYDROMORPHONE HYDROCHLORIDE 1 MG: 2 TABLET ORAL at 04:06

## 2023-01-01 RX ADMIN — HYDROMORPHONE HYDROCHLORIDE 1 MG: 5 SOLUTION ORAL at 09:44

## 2023-01-01 RX ADMIN — CALCIUM ACETATE 667 MG: 667 CAPSULE ORAL at 12:09

## 2023-01-01 RX ADMIN — HYDROMORPHONE HYDROCHLORIDE 1 MG: 2 TABLET ORAL at 09:46

## 2023-01-01 RX ADMIN — HYDROMORPHONE HYDROCHLORIDE 1 MG: 5 SOLUTION ORAL at 10:56

## 2023-01-01 RX ADMIN — HYDROMORPHONE HYDROCHLORIDE 1 MG: 2 TABLET ORAL at 17:15

## 2023-01-01 RX ADMIN — HYDROMORPHONE HYDROCHLORIDE 0.5 MG: 5 SOLUTION ORAL at 12:17

## 2023-01-01 RX ADMIN — CARVEDILOL 25 MG: 25 TABLET, FILM COATED ORAL at 17:21

## 2023-01-01 RX ADMIN — HYDROMORPHONE HYDROCHLORIDE 1 MG: 5 SOLUTION ORAL at 14:24

## 2023-01-01 RX ADMIN — PANTOPRAZOLE SODIUM 40 MG: 40 TABLET, DELAYED RELEASE ORAL at 06:54

## 2023-01-01 RX ADMIN — HYDROMORPHONE HYDROCHLORIDE 0.5 MG: 5 SOLUTION ORAL at 12:46

## 2023-01-01 RX ADMIN — HYDROMORPHONE HYDROCHLORIDE 1 MG: 5 SOLUTION ORAL at 21:54

## 2023-01-01 RX ADMIN — CALCIUM ACETATE 667 MG: 667 CAPSULE ORAL at 09:30

## 2023-01-01 RX ADMIN — FAMOTIDINE 20 MG: 20 TABLET ORAL at 20:16

## 2023-01-01 RX ADMIN — CALCIUM ACETATE 667 MG: 667 CAPSULE ORAL at 17:40

## 2023-01-01 RX ADMIN — FERROUS SULFATE TAB 325 MG (65 MG ELEMENTAL FE) 325 MG: 325 (65 FE) TAB at 15:48

## 2023-01-01 RX ADMIN — CARVEDILOL 25 MG: 12.5 TABLET, FILM COATED ORAL at 08:39

## 2023-01-01 RX ADMIN — CALCIUM ACETATE 667 MG: 667 CAPSULE ORAL at 08:38

## 2023-01-01 RX ADMIN — SENNOSIDES AND DOCUSATE SODIUM 1 TABLET: 50; 8.6 TABLET ORAL at 21:29

## 2023-01-01 RX ADMIN — HYDROMORPHONE HYDROCHLORIDE 0.5 MG: 5 SOLUTION ORAL at 16:17

## 2023-01-01 RX ADMIN — FERROUS SULFATE TAB 325 MG (65 MG ELEMENTAL FE) 325 MG: 325 (65 FE) TAB at 08:22

## 2023-01-01 RX ADMIN — ONDANSETRON 4 MG: 2 INJECTION INTRAMUSCULAR; INTRAVENOUS at 16:48

## 2023-01-01 RX ADMIN — ISOSORBIDE MONONITRATE 30 MG: 30 TABLET, EXTENDED RELEASE ORAL at 08:39

## 2023-01-01 RX ADMIN — PIPERACILLIN AND TAZOBACTAM 3.38 G: 3; .375 INJECTION, POWDER, LYOPHILIZED, FOR SOLUTION INTRAVENOUS at 22:01

## 2023-01-01 RX ADMIN — HYDROMORPHONE HYDROCHLORIDE 1 MG: 5 SOLUTION ORAL at 17:20

## 2023-01-01 RX ADMIN — FAMOTIDINE 20 MG: 20 TABLET ORAL at 16:03

## 2023-01-01 RX ADMIN — ONDANSETRON 4 MG: 4 TABLET, ORALLY DISINTEGRATING ORAL at 11:13

## 2023-01-01 RX ADMIN — ASPIRIN 325 MG ORAL TABLET 325 MG: 325 PILL ORAL at 10:30

## 2023-01-01 RX ADMIN — HYDROMORPHONE HYDROCHLORIDE 1 MG: 5 SOLUTION ORAL at 02:16

## 2023-01-01 RX ADMIN — FERROUS SULFATE TAB 325 MG (65 MG ELEMENTAL FE) 325 MG: 325 (65 FE) TAB at 20:41

## 2023-01-01 RX ADMIN — HYDROMORPHONE HYDROCHLORIDE 1 MG: 5 SOLUTION ORAL at 17:33

## 2023-01-01 RX ADMIN — DOCUSATE SODIUM 100 MG: 100 CAPSULE ORAL at 20:41

## 2023-01-01 RX ADMIN — FERROUS SULFATE TAB 325 MG (65 MG ELEMENTAL FE) 325 MG: 325 (65 FE) TAB at 20:20

## 2023-01-01 RX ADMIN — SENNOSIDES AND DOCUSATE SODIUM 1 TABLET: 50; 8.6 TABLET ORAL at 22:11

## 2023-01-01 RX ADMIN — ISOSORBIDE MONONITRATE 30 MG: 30 TABLET, EXTENDED RELEASE ORAL at 08:42

## 2023-01-01 RX ADMIN — DOCUSATE SODIUM 100 MG: 100 CAPSULE ORAL at 08:41

## 2023-01-01 RX ADMIN — FERROUS SULFATE TAB 325 MG (65 MG ELEMENTAL FE) 325 MG: 325 (65 FE) TAB at 19:46

## 2023-01-01 RX ADMIN — SENNOSIDES AND DOCUSATE SODIUM 1 TABLET: 50; 8.6 TABLET ORAL at 21:06

## 2023-01-01 RX ADMIN — FERROUS SULFATE TAB 325 MG (65 MG ELEMENTAL FE) 325 MG: 325 (65 FE) TAB at 09:31

## 2023-01-01 RX ADMIN — DOCUSATE SODIUM 100 MG: 100 CAPSULE ORAL at 08:39

## 2023-01-01 RX ADMIN — CALCIUM ACETATE 667 MG: 667 CAPSULE ORAL at 14:44

## 2023-01-01 RX ADMIN — SENNOSIDES AND DOCUSATE SODIUM 1 TABLET: 50; 8.6 TABLET ORAL at 21:53

## 2023-01-01 RX ADMIN — POLYETHYLENE GLYCOL 3350 17 G: 17 POWDER, FOR SOLUTION ORAL at 08:21

## 2023-01-01 RX ADMIN — ATROPINE SULFATE 2 DROP: 10 SOLUTION/ DROPS OPHTHALMIC at 17:42

## 2023-01-01 RX ADMIN — HYDROMORPHONE HYDROCHLORIDE 1 MG: 5 SOLUTION ORAL at 13:31

## 2023-01-01 RX ADMIN — FERROUS SULFATE TAB 325 MG (65 MG ELEMENTAL FE) 325 MG: 325 (65 FE) TAB at 14:44

## 2023-01-01 RX ADMIN — METRONIDAZOLE 500 MG: 500 TABLET ORAL at 20:42

## 2023-01-01 RX ADMIN — PANTOPRAZOLE SODIUM 40 MG: 40 TABLET, DELAYED RELEASE ORAL at 07:55

## 2023-01-01 RX ADMIN — ATORVASTATIN CALCIUM 80 MG: 40 TABLET, FILM COATED ORAL at 20:41

## 2023-01-01 RX ADMIN — HYDROMORPHONE HYDROCHLORIDE 0.5 MG: 5 SOLUTION ORAL at 00:34

## 2023-01-01 RX ADMIN — HYDROMORPHONE HYDROCHLORIDE 0.5 MG: 5 SOLUTION ORAL at 13:26

## 2023-01-01 RX ADMIN — HYDROMORPHONE HYDROCHLORIDE 1 MG: 5 SOLUTION ORAL at 18:19

## 2023-01-01 RX ADMIN — POLYETHYLENE GLYCOL 3350 17 G: 17 POWDER, FOR SOLUTION ORAL at 08:41

## 2023-01-01 RX ADMIN — Medication 1 MG: at 22:07

## 2023-01-01 RX ADMIN — HYDROMORPHONE HYDROCHLORIDE 1 MG: 5 SOLUTION ORAL at 09:13

## 2023-01-01 RX ADMIN — HYDROMORPHONE HYDROCHLORIDE 0.5 MG: 5 SOLUTION ORAL at 08:32

## 2023-01-01 RX ADMIN — FERROUS SULFATE TAB 325 MG (65 MG ELEMENTAL FE) 325 MG: 325 (65 FE) TAB at 21:33

## 2023-01-01 RX ADMIN — FERROUS SULFATE TAB 325 MG (65 MG ELEMENTAL FE) 325 MG: 325 (65 FE) TAB at 10:00

## 2023-01-01 RX ADMIN — HYDROMORPHONE HYDROCHLORIDE 1 MG: 2 TABLET ORAL at 20:13

## 2023-01-01 RX ADMIN — FERROUS SULFATE TAB 325 MG (65 MG ELEMENTAL FE) 325 MG: 325 (65 FE) TAB at 12:33

## 2023-01-01 RX ADMIN — CALCIUM ACETATE 667 MG: 667 CAPSULE ORAL at 17:22

## 2023-01-01 RX ADMIN — FAMOTIDINE 20 MG: 20 TABLET ORAL at 08:37

## 2023-01-01 RX ADMIN — HYDROMORPHONE HYDROCHLORIDE 1 MG: 5 SOLUTION ORAL at 02:04

## 2023-01-01 RX ADMIN — ONDANSETRON 4 MG: 2 INJECTION INTRAMUSCULAR; INTRAVENOUS at 08:17

## 2023-01-01 RX ADMIN — HYDROMORPHONE HYDROCHLORIDE 1 MG: 5 SOLUTION ORAL at 09:09

## 2023-01-01 RX ADMIN — CARVEDILOL 25 MG: 25 TABLET, FILM COATED ORAL at 17:07

## 2023-01-01 RX ADMIN — DOCUSATE SODIUM 100 MG: 100 CAPSULE ORAL at 20:16

## 2023-01-01 RX ADMIN — DOCUSATE SODIUM 100 MG: 100 CAPSULE ORAL at 07:55

## 2023-01-01 RX ADMIN — PANTOPRAZOLE SODIUM 40 MG: 40 TABLET, DELAYED RELEASE ORAL at 10:03

## 2023-01-01 RX ADMIN — SENNOSIDES AND DOCUSATE SODIUM 1 TABLET: 50; 8.6 TABLET ORAL at 10:00

## 2023-01-01 RX ADMIN — INSULIN ASPART 1 UNITS: 100 INJECTION, SOLUTION INTRAVENOUS; SUBCUTANEOUS at 12:10

## 2023-01-01 RX ADMIN — CARVEDILOL 25 MG: 25 TABLET, FILM COATED ORAL at 09:31

## 2023-01-01 RX ADMIN — ASPIRIN 325 MG ORAL TABLET 325 MG: 325 PILL ORAL at 08:39

## 2023-01-01 RX ADMIN — PERFLUTREN 2 ML: 6.52 INJECTION, SUSPENSION INTRAVENOUS at 13:34

## 2023-01-01 RX ADMIN — HYDROMORPHONE HYDROCHLORIDE 1 MG: 5 SOLUTION ORAL at 01:20

## 2023-01-01 RX ADMIN — HYDROMORPHONE HYDROCHLORIDE 1 MG: 2 TABLET ORAL at 03:14

## 2023-01-01 RX ADMIN — SENNOSIDES AND DOCUSATE SODIUM 1 TABLET: 50; 8.6 TABLET ORAL at 08:21

## 2023-01-01 RX ADMIN — CALCIUM ACETATE 667 MG: 667 CAPSULE ORAL at 10:30

## 2023-01-01 RX ADMIN — OXYCODONE HYDROCHLORIDE 2.5 MG: 5 TABLET ORAL at 17:20

## 2023-01-01 RX ADMIN — CALCIUM ACETATE 667 MG: 667 CAPSULE ORAL at 12:16

## 2023-01-01 RX ADMIN — HYDROMORPHONE HYDROCHLORIDE 1 MG: 5 SOLUTION ORAL at 18:16

## 2023-01-01 RX ADMIN — HYDROMORPHONE HYDROCHLORIDE 1 MG: 5 SOLUTION ORAL at 10:35

## 2023-01-01 RX ADMIN — FERROUS SULFATE TAB 325 MG (65 MG ELEMENTAL FE) 325 MG: 325 (65 FE) TAB at 08:41

## 2023-01-01 RX ADMIN — HEPARIN SODIUM 2200 UNITS: 1000 INJECTION INTRAVENOUS; SUBCUTANEOUS at 15:13

## 2023-01-01 RX ADMIN — HYDROMORPHONE HYDROCHLORIDE 1 MG: 5 SOLUTION ORAL at 22:07

## 2023-01-01 RX ADMIN — HYDROMORPHONE HYDROCHLORIDE 1 MG: 5 SOLUTION ORAL at 03:56

## 2023-01-01 RX ADMIN — HYDROMORPHONE HYDROCHLORIDE 1 MG: 5 SOLUTION ORAL at 21:06

## 2023-01-01 RX ADMIN — HYDROMORPHONE HYDROCHLORIDE 1 MG: 5 SOLUTION ORAL at 10:13

## 2023-01-01 RX ADMIN — METRONIDAZOLE 500 MG: 500 TABLET ORAL at 21:33

## 2023-01-01 RX ADMIN — CARVEDILOL 25 MG: 12.5 TABLET, FILM COATED ORAL at 10:30

## 2023-01-01 RX ADMIN — ATORVASTATIN CALCIUM 80 MG: 40 TABLET, FILM COATED ORAL at 19:21

## 2023-01-01 RX ADMIN — FAMOTIDINE 20 MG: 20 TABLET ORAL at 17:32

## 2023-01-01 RX ADMIN — HYDROMORPHONE HYDROCHLORIDE 1 MG: 5 SOLUTION ORAL at 10:11

## 2023-01-01 RX ADMIN — Medication 1 MG: at 22:43

## 2023-01-01 RX ADMIN — HYDROMORPHONE HYDROCHLORIDE 0.5 MG: 5 SOLUTION ORAL at 23:06

## 2023-01-01 RX ADMIN — OXYCODONE HYDROCHLORIDE 5 MG: 5 TABLET ORAL at 00:21

## 2023-01-01 RX ADMIN — SENNOSIDES AND DOCUSATE SODIUM 1 TABLET: 50; 8.6 TABLET ORAL at 10:11

## 2023-01-01 RX ADMIN — FAMOTIDINE 20 MG: 20 TABLET ORAL at 17:20

## 2023-01-01 RX ADMIN — ACETAMINOPHEN 650 MG: 325 TABLET ORAL at 00:14

## 2023-01-01 RX ADMIN — HYDROMORPHONE HYDROCHLORIDE 1 MG: 5 SOLUTION ORAL at 01:34

## 2023-01-01 RX ADMIN — SENNOSIDES AND DOCUSATE SODIUM 1 TABLET: 50; 8.6 TABLET ORAL at 09:44

## 2023-01-01 RX ADMIN — CALCIUM ACETATE 667 MG: 667 CAPSULE ORAL at 17:05

## 2023-01-01 RX ADMIN — HYDROMORPHONE HYDROCHLORIDE 1 MG: 5 SOLUTION ORAL at 06:41

## 2023-01-01 RX ADMIN — ATORVASTATIN CALCIUM 80 MG: 40 TABLET, FILM COATED ORAL at 19:34

## 2023-01-01 RX ADMIN — PANTOPRAZOLE SODIUM 40 MG: 40 TABLET, DELAYED RELEASE ORAL at 10:36

## 2023-01-01 RX ADMIN — DIPHENHYDRAMINE HYDROCHLORIDE 25 MG: 25 CAPSULE ORAL at 02:06

## 2023-01-01 RX ADMIN — PANTOPRAZOLE SODIUM 40 MG: 40 TABLET, DELAYED RELEASE ORAL at 20:41

## 2023-01-01 RX ADMIN — ATORVASTATIN CALCIUM 80 MG: 40 TABLET, FILM COATED ORAL at 21:33

## 2023-01-01 RX ADMIN — METRONIDAZOLE 500 MG: 500 TABLET ORAL at 08:22

## 2023-01-01 RX ADMIN — HEPARIN SODIUM 2300 UNITS: 1000 INJECTION INTRAVENOUS; SUBCUTANEOUS at 15:13

## 2023-01-01 RX ADMIN — HYDROMORPHONE HYDROCHLORIDE 1 MG: 5 SOLUTION ORAL at 02:17

## 2023-01-01 RX ADMIN — PANTOPRAZOLE SODIUM 40 MG: 40 TABLET, DELAYED RELEASE ORAL at 10:00

## 2023-01-01 RX ADMIN — CARVEDILOL 25 MG: 25 TABLET, FILM COATED ORAL at 08:39

## 2023-01-01 RX ADMIN — ASPIRIN 325 MG ORAL TABLET 325 MG: 325 PILL ORAL at 09:31

## 2023-01-01 RX ADMIN — FAMOTIDINE 20 MG: 20 TABLET ORAL at 08:39

## 2023-01-01 RX ADMIN — FAMOTIDINE 20 MG: 20 TABLET ORAL at 17:01

## 2023-01-01 RX ADMIN — HYDROMORPHONE HYDROCHLORIDE 1 MG: 5 SOLUTION ORAL at 05:56

## 2023-01-01 RX ADMIN — LORAZEPAM 1 MG: 1 TABLET ORAL at 22:43

## 2023-01-01 RX ADMIN — INSULIN ASPART 1 UNITS: 100 INJECTION, SOLUTION INTRAVENOUS; SUBCUTANEOUS at 11:53

## 2023-01-01 RX ADMIN — DOCUSATE SODIUM 100 MG: 100 CAPSULE ORAL at 10:00

## 2023-01-01 RX ADMIN — PANTOPRAZOLE SODIUM 40 MG: 40 TABLET, DELAYED RELEASE ORAL at 19:35

## 2023-01-01 RX ADMIN — ISOSORBIDE MONONITRATE 30 MG: 30 TABLET, EXTENDED RELEASE ORAL at 09:59

## 2023-01-01 RX ADMIN — CALCIUM ACETATE 667 MG: 667 CAPSULE ORAL at 18:41

## 2023-01-01 RX ADMIN — HYDROMORPHONE HYDROCHLORIDE 0.5 MG: 5 SOLUTION ORAL at 05:07

## 2023-01-01 RX ADMIN — CALCIUM ACETATE 667 MG: 667 CAPSULE ORAL at 08:39

## 2023-01-01 RX ADMIN — AMLODIPINE BESYLATE 2.5 MG: 2.5 TABLET ORAL at 12:12

## 2023-01-01 RX ADMIN — HYDROMORPHONE HYDROCHLORIDE 1 MG: 5 SOLUTION ORAL at 18:07

## 2023-01-01 RX ADMIN — HYDROMORPHONE HYDROCHLORIDE 1 MG: 5 SOLUTION ORAL at 14:15

## 2023-01-01 RX ADMIN — HYDROMORPHONE HYDROCHLORIDE 1 MG: 5 SOLUTION ORAL at 01:51

## 2023-01-01 RX ADMIN — DOCUSATE SODIUM 100 MG: 100 CAPSULE ORAL at 19:46

## 2023-01-01 RX ADMIN — HYDROMORPHONE HYDROCHLORIDE 1 MG: 2 TABLET ORAL at 08:51

## 2023-01-01 RX ADMIN — ASPIRIN 325 MG ORAL TABLET 325 MG: 325 PILL ORAL at 06:55

## 2023-01-01 RX ADMIN — FERROUS SULFATE TAB 325 MG (65 MG ELEMENTAL FE) 325 MG: 325 (65 FE) TAB at 19:34

## 2023-01-01 RX ADMIN — HYDROMORPHONE HYDROCHLORIDE 1 MG: 5 SOLUTION ORAL at 08:11

## 2023-01-01 RX ADMIN — HYDROMORPHONE HYDROCHLORIDE 0.5 MG: 5 SOLUTION ORAL at 16:03

## 2023-01-01 RX ADMIN — FAMOTIDINE 20 MG: 20 TABLET ORAL at 19:34

## 2023-01-01 RX ADMIN — ACETAMINOPHEN 650 MG: 325 TABLET ORAL at 22:43

## 2023-01-01 RX ADMIN — AMLODIPINE BESYLATE 2.5 MG: 2.5 TABLET ORAL at 09:31

## 2023-01-01 RX ADMIN — HYDROMORPHONE HYDROCHLORIDE 1 MG: 5 SOLUTION ORAL at 06:54

## 2023-01-01 RX ADMIN — PANTOPRAZOLE SODIUM 40 MG: 40 TABLET, DELAYED RELEASE ORAL at 08:40

## 2023-01-01 RX ADMIN — ACETAMINOPHEN 650 MG: 325 TABLET ORAL at 22:07

## 2023-01-01 RX ADMIN — FERROUS SULFATE TAB 325 MG (65 MG ELEMENTAL FE) 325 MG: 325 (65 FE) TAB at 13:06

## 2023-01-01 RX ADMIN — DOCUSATE SODIUM 100 MG: 100 CAPSULE ORAL at 20:20

## 2023-01-01 RX ADMIN — POLYETHYLENE GLYCOL 3350 17 G: 17 POWDER, FOR SOLUTION ORAL at 10:00

## 2023-01-01 RX ADMIN — HYDROMORPHONE HYDROCHLORIDE 1 MG: 5 SOLUTION ORAL at 00:05

## 2023-01-01 RX ADMIN — ONDANSETRON 4 MG: 2 INJECTION INTRAMUSCULAR; INTRAVENOUS at 14:35

## 2023-01-01 RX ADMIN — HYDROMORPHONE HYDROCHLORIDE 1 MG: 5 SOLUTION ORAL at 10:04

## 2023-01-01 RX ADMIN — ISOSORBIDE MONONITRATE 30 MG: 30 TABLET, EXTENDED RELEASE ORAL at 10:30

## 2023-01-01 RX ADMIN — SENNOSIDES AND DOCUSATE SODIUM 1 TABLET: 50; 8.6 TABLET ORAL at 10:13

## 2023-01-01 RX ADMIN — HYDRALAZINE HYDROCHLORIDE 10 MG: 20 INJECTION INTRAMUSCULAR; INTRAVENOUS at 19:22

## 2023-01-01 RX ADMIN — CALCIUM ACETATE 667 MG: 667 CAPSULE ORAL at 19:21

## 2023-01-01 RX ADMIN — CARVEDILOL 25 MG: 25 TABLET, FILM COATED ORAL at 18:41

## 2023-01-01 RX ADMIN — HYDROMORPHONE HYDROCHLORIDE 1 MG: 5 SOLUTION ORAL at 21:01

## 2023-01-01 RX ADMIN — FERROUS SULFATE TAB 325 MG (65 MG ELEMENTAL FE) 325 MG: 325 (65 FE) TAB at 13:53

## 2023-01-01 RX ADMIN — ISOSORBIDE MONONITRATE 30 MG: 30 TABLET, EXTENDED RELEASE ORAL at 08:22

## 2023-01-01 RX ADMIN — HYDROMORPHONE HYDROCHLORIDE 1 MG: 5 SOLUTION ORAL at 14:35

## 2023-01-01 RX ADMIN — SENNOSIDES AND DOCUSATE SODIUM 1 TABLET: 50; 8.6 TABLET ORAL at 09:12

## 2023-01-01 RX ADMIN — PANTOPRAZOLE SODIUM 40 MG: 40 TABLET, DELAYED RELEASE ORAL at 08:22

## 2023-01-01 RX ADMIN — HYDROMORPHONE HYDROCHLORIDE 1 MG: 5 SOLUTION ORAL at 05:51

## 2023-01-01 RX ADMIN — HYDROMORPHONE HYDROCHLORIDE 1 MG: 5 SOLUTION ORAL at 11:13

## 2023-01-01 RX ADMIN — INSULIN ASPART 1 UNITS: 100 INJECTION, SOLUTION INTRAVENOUS; SUBCUTANEOUS at 08:23

## 2023-01-01 RX ADMIN — OXYCODONE HYDROCHLORIDE 5 MG: 5 TABLET ORAL at 00:40

## 2023-01-01 RX ADMIN — HYDROMORPHONE HYDROCHLORIDE 1 MG: 5 SOLUTION ORAL at 17:31

## 2023-01-01 RX ADMIN — HYDROMORPHONE HYDROCHLORIDE 0.5 MG: 5 SOLUTION ORAL at 20:49

## 2023-01-01 RX ADMIN — POLYETHYLENE GLYCOL 3350 17 G: 17 POWDER, FOR SOLUTION ORAL at 07:56

## 2023-01-01 RX ADMIN — PANTOPRAZOLE SODIUM 40 MG: 40 TABLET, DELAYED RELEASE ORAL at 09:30

## 2023-01-01 RX ADMIN — PANTOPRAZOLE SODIUM 40 MG: 40 TABLET, DELAYED RELEASE ORAL at 20:16

## 2023-01-01 RX ADMIN — ATORVASTATIN CALCIUM 80 MG: 40 TABLET, FILM COATED ORAL at 20:20

## 2023-01-01 RX ADMIN — HYDRALAZINE HYDROCHLORIDE 10 MG: 20 INJECTION INTRAMUSCULAR; INTRAVENOUS at 13:06

## 2023-01-01 RX ADMIN — LEVOFLOXACIN 500 MG: 500 TABLET, FILM COATED ORAL at 08:22

## 2023-01-01 RX ADMIN — ISOSORBIDE MONONITRATE 30 MG: 30 TABLET, EXTENDED RELEASE ORAL at 12:16

## 2023-01-01 RX ADMIN — ISOSORBIDE MONONITRATE 30 MG: 30 TABLET, EXTENDED RELEASE ORAL at 09:30

## 2023-01-01 RX ADMIN — CALCIUM ACETATE 667 MG: 667 CAPSULE ORAL at 13:06

## 2023-01-01 RX ADMIN — ASPIRIN 325 MG ORAL TABLET 325 MG: 325 PILL ORAL at 08:22

## 2023-01-01 RX ADMIN — CALCIUM ACETATE 667 MG: 667 CAPSULE ORAL at 17:23

## 2023-01-01 RX ADMIN — HYDROMORPHONE HYDROCHLORIDE 1 MG: 5 SOLUTION ORAL at 10:31

## 2023-01-01 RX ADMIN — FERROUS SULFATE TAB 325 MG (65 MG ELEMENTAL FE) 325 MG: 325 (65 FE) TAB at 08:42

## 2023-01-01 RX ADMIN — ONDANSETRON 4 MG: 4 TABLET, ORALLY DISINTEGRATING ORAL at 17:31

## 2023-01-01 RX ADMIN — POLYETHYLENE GLYCOL 3350 17 G: 17 POWDER, FOR SOLUTION ORAL at 10:29

## 2023-01-01 RX ADMIN — POLYETHYLENE GLYCOL 3350 17 G: 17 POWDER, FOR SOLUTION ORAL at 08:32

## 2023-01-01 RX ADMIN — POLYETHYLENE GLYCOL 3350 17 G: 17 POWDER, FOR SOLUTION ORAL at 08:51

## 2023-01-01 RX ADMIN — ONDANSETRON 4 MG: 2 INJECTION INTRAMUSCULAR; INTRAVENOUS at 22:53

## 2023-01-01 RX ADMIN — HYDROMORPHONE HYDROCHLORIDE 1 MG: 5 SOLUTION ORAL at 18:13

## 2023-01-01 RX ADMIN — CALCIUM ACETATE 667 MG: 667 CAPSULE ORAL at 12:49

## 2023-01-01 RX ADMIN — ACETAMINOPHEN 650 MG: 325 TABLET ORAL at 03:48

## 2023-01-01 RX ADMIN — PANTOPRAZOLE SODIUM 40 MG: 40 TABLET, DELAYED RELEASE ORAL at 20:20

## 2023-01-01 RX ADMIN — CALCIUM ACETATE 667 MG: 667 CAPSULE ORAL at 07:55

## 2023-01-01 RX ADMIN — ASPIRIN 325 MG ORAL TABLET 325 MG: 325 PILL ORAL at 08:42

## 2023-01-01 RX ADMIN — PANTOPRAZOLE SODIUM 40 MG: 40 TABLET, DELAYED RELEASE ORAL at 08:42

## 2023-01-01 RX ADMIN — CARVEDILOL 25 MG: 12.5 TABLET, FILM COATED ORAL at 19:21

## 2023-01-01 RX ADMIN — POLYETHYLENE GLYCOL 3350 17 G: 17 POWDER, FOR SOLUTION ORAL at 10:13

## 2023-01-01 RX ADMIN — PANTOPRAZOLE SODIUM 40 MG: 40 TABLET, DELAYED RELEASE ORAL at 19:46

## 2023-01-01 RX ADMIN — FERROUS SULFATE TAB 325 MG (65 MG ELEMENTAL FE) 325 MG: 325 (65 FE) TAB at 17:31

## 2023-01-01 RX ADMIN — PANTOPRAZOLE SODIUM 40 MG: 40 TABLET, DELAYED RELEASE ORAL at 08:39

## 2023-01-01 RX ADMIN — HYDROMORPHONE HYDROCHLORIDE 1 MG: 5 SOLUTION ORAL at 16:17

## 2023-01-01 RX ADMIN — CARVEDILOL 25 MG: 25 TABLET, FILM COATED ORAL at 19:46

## 2023-01-01 RX ADMIN — HYDROMORPHONE HYDROCHLORIDE 1 MG: 5 SOLUTION ORAL at 22:14

## 2023-01-01 RX ADMIN — CALCIUM ACETATE 667 MG: 667 CAPSULE ORAL at 13:48

## 2023-01-01 RX ADMIN — ASPIRIN 325 MG ORAL TABLET 325 MG: 325 PILL ORAL at 07:55

## 2023-01-01 SDOH — ECONOMIC STABILITY: INCOME INSECURITY: IN THE LAST 12 MONTHS, WAS THERE A TIME WHEN YOU WERE NOT ABLE TO PAY THE MORTGAGE OR RENT ON TIME?: NO

## 2023-01-01 ASSESSMENT — ACTIVITIES OF DAILY LIVING (ADL)
CONCENTRATING,_REMEMBERING_OR_MAKING_DECISIONS_DIFFICULTY: NO
ADLS_ACUITY_SCORE: 36
ADLS_ACUITY_SCORE: 36
WALKING_OR_CLIMBING_STAIRS_DIFFICULTY: YES
ADLS_ACUITY_SCORE: 36
EQUIPMENT_CURRENTLY_USED_AT_HOME: WALKER, ROLLING
ADLS_ACUITY_SCORE: 35
ADLS_ACUITY_SCORE: 35
ADLS_ACUITY_SCORE: 32
ADLS_ACUITY_SCORE: 33
ADLS_ACUITY_SCORE: 39
ADLS_ACUITY_SCORE: 28
DEPENDENT_IADLS:: CLEANING;COOKING;LAUNDRY;SHOPPING;MEAL PREPARATION;MEDICATION MANAGEMENT;MONEY MANAGEMENT;TRANSPORTATION;INCONTINENCE
ADLS_ACUITY_SCORE: 35
ADLS_ACUITY_SCORE: 37
DRESSING/BATHING_DIFFICULTY: NO
TRANSFERRING: 1-->ASSISTANCE (EQUIPMENT/PERSON) NEEDED (NOT DEVELOPMENTALLY APPROPRIATE)
ADLS_ACUITY_SCORE: 33
ADLS_ACUITY_SCORE: 39
DOING_ERRANDS_INDEPENDENTLY_DIFFICULTY: YES
TRANSFERRING: 1-->ASSISTANCE (EQUIPMENT/PERSON) NEEDED (NOT DEVELOPMENTALLY APPROPRIATE)
ADLS_ACUITY_SCORE: 32
TOILETING: 1-->ASSISTANCE (EQUIPMENT/PERSON) NEEDED (NOT DEVELOPMENTALLY APPROPRIATE)
ADLS_ACUITY_SCORE: 37
ADLS_ACUITY_SCORE: 32
FALL_HISTORY_WITHIN_LAST_SIX_MONTHS: NO
ADLS_ACUITY_SCORE: 36
ADLS_ACUITY_SCORE: 36
ADLS_ACUITY_SCORE: 37
ADLS_ACUITY_SCORE: 32
DOING_ERRANDS_INDEPENDENTLY_DIFFICULTY: YES
ADLS_ACUITY_SCORE: 32
ADLS_ACUITY_SCORE: 39
ADLS_ACUITY_SCORE: 37
TOILETING: 1-->ASSISTANCE (EQUIPMENT/PERSON) NEEDED
DRESSING/BATHING_DIFFICULTY: NO
ADLS_ACUITY_SCORE: 36
ADLS_ACUITY_SCORE: 33
TOILETING_ISSUES: YES
EQUIPMENT_CURRENTLY_USED_AT_HOME: WALKER, ROLLING;WHEELCHAIR, MANUAL
ADLS_ACUITY_SCORE: 32
DOING_ERRANDS_INDEPENDENTLY_DIFFICULTY: YES
ADLS_ACUITY_SCORE: 35
ADLS_ACUITY_SCORE: 36
TOILETING_ASSISTANCE: TOILETING DIFFICULTY, ASSISTANCE 1 PERSON
ADLS_ACUITY_SCORE: 39
ADLS_ACUITY_SCORE: 36
ADLS_ACUITY_SCORE: 36
ADLS_ACUITY_SCORE: 32
ADLS_ACUITY_SCORE: 35
ADLS_ACUITY_SCORE: 36
ADLS_ACUITY_SCORE: 39
ADLS_ACUITY_SCORE: 26
ADLS_ACUITY_SCORE: 37
ADLS_ACUITY_SCORE: 36
ADLS_ACUITY_SCORE: 36
ADLS_ACUITY_SCORE: 35
ADLS_ACUITY_SCORE: 36
ADLS_ACUITY_SCORE: 32
ADLS_ACUITY_SCORE: 37
ADLS_ACUITY_SCORE: 35
ADLS_ACUITY_SCORE: 36
ADLS_ACUITY_SCORE: 37
ADLS_ACUITY_SCORE: 39
ADLS_ACUITY_SCORE: 35
ADLS_ACUITY_SCORE: 36
ADLS_ACUITY_SCORE: 32
ADLS_ACUITY_SCORE: 36
ADLS_ACUITY_SCORE: 39
ADLS_ACUITY_SCORE: 33
ADLS_ACUITY_SCORE: 39
ADLS_ACUITY_SCORE: 36
ADLS_ACUITY_SCORE: 37
TOILETING: 1-->ASSISTANCE (EQUIPMENT/PERSON) NEEDED (NOT DEVELOPMENTALLY APPROPRIATE)
ADLS_ACUITY_SCORE: 33
ADLS_ACUITY_SCORE: 32
ADLS_ACUITY_SCORE: 36
TOILETING: 1-->ASSISTANCE (EQUIPMENT/PERSON) NEEDED
DIFFICULTY_EATING/SWALLOWING: NO
ADLS_ACUITY_SCORE: 36
ADLS_ACUITY_SCORE: 32
ADLS_ACUITY_SCORE: 39
ADLS_ACUITY_SCORE: 36
ADLS_ACUITY_SCORE: 33
ADLS_ACUITY_SCORE: 35
ADLS_ACUITY_SCORE: 37
TOILETING_ISSUES: YES
CONCENTRATING,_REMEMBERING_OR_MAKING_DECISIONS_DIFFICULTY: NO
ADLS_ACUITY_SCORE: 37
ADLS_ACUITY_SCORE: 32
ADLS_ACUITY_SCORE: 35
ADLS_ACUITY_SCORE: 39
ADLS_ACUITY_SCORE: 33
ADLS_ACUITY_SCORE: 26
ADLS_ACUITY_SCORE: 37
ADLS_ACUITY_SCORE: 36
ADLS_ACUITY_SCORE: 32
ADLS_ACUITY_SCORE: 33
WALKING_OR_CLIMBING_STAIRS: AMBULATION DIFFICULTY, REQUIRES EQUIPMENT
ADLS_ACUITY_SCORE: 36
ADLS_ACUITY_SCORE: 39
ADLS_ACUITY_SCORE: 39
ADLS_ACUITY_SCORE: 36
ADLS_ACUITY_SCORE: 36
ADLS_ACUITY_SCORE: 37
WALKING_OR_CLIMBING_STAIRS: AMBULATION DIFFICULTY, DEPENDENT
ADLS_ACUITY_SCORE: 35
ADLS_ACUITY_SCORE: 32
ADLS_ACUITY_SCORE: 39
ADLS_ACUITY_SCORE: 39
FALL_HISTORY_WITHIN_LAST_SIX_MONTHS: NO
ADLS_ACUITY_SCORE: 37
ADLS_ACUITY_SCORE: 32
ADLS_ACUITY_SCORE: 37
ADLS_ACUITY_SCORE: 36
ADLS_ACUITY_SCORE: 36
ADLS_ACUITY_SCORE: 26
ADLS_ACUITY_SCORE: 36
DIFFICULTY_EATING/SWALLOWING: NO
ADLS_ACUITY_SCORE: 33
ADLS_ACUITY_SCORE: 39
ADLS_ACUITY_SCORE: 35
ADLS_ACUITY_SCORE: 36
ADLS_ACUITY_SCORE: 26
ADLS_ACUITY_SCORE: 39
TRANSFERRING: 1-->ASSISTANCE (EQUIPMENT/PERSON) NEEDED
ADLS_ACUITY_SCORE: 33
ADLS_ACUITY_SCORE: 39
ADLS_ACUITY_SCORE: 39
ADLS_ACUITY_SCORE: 36
ADLS_ACUITY_SCORE: 35
ADLS_ACUITY_SCORE: 37
WEAR_GLASSES_OR_BLIND: YES
ADLS_ACUITY_SCORE: 36
ADLS_ACUITY_SCORE: 36
ADLS_ACUITY_SCORE: 35
ADLS_ACUITY_SCORE: 26
ADLS_ACUITY_SCORE: 26
CHANGE_IN_FUNCTIONAL_STATUS_SINCE_ONSET_OF_CURRENT_ILLNESS/INJURY: YES
ADLS_ACUITY_SCORE: 32
ADLS_ACUITY_SCORE: 39
ADLS_ACUITY_SCORE: 36
ADLS_ACUITY_SCORE: 36
ADLS_ACUITY_SCORE: 39
ADLS_ACUITY_SCORE: 35
ADLS_ACUITY_SCORE: 36
DIFFICULTY_EATING/SWALLOWING: NO
ADLS_ACUITY_SCORE: 36
ADLS_ACUITY_SCORE: 39
DIFFICULTY_COMMUNICATING: NO
ADLS_ACUITY_SCORE: 36
WEAR_GLASSES_OR_BLIND: YES
ADLS_ACUITY_SCORE: 38
ADLS_ACUITY_SCORE: 36
ADLS_ACUITY_SCORE: 39
ADLS_ACUITY_SCORE: 36
ADLS_ACUITY_SCORE: 36
ADLS_ACUITY_SCORE: 35
ADLS_ACUITY_SCORE: 36
ADLS_ACUITY_SCORE: 33
ADLS_ACUITY_SCORE: 36
ADLS_ACUITY_SCORE: 35
ADLS_ACUITY_SCORE: 32
ADLS_ACUITY_SCORE: 32
ADLS_ACUITY_SCORE: 36
ADLS_ACUITY_SCORE: 36
ADLS_ACUITY_SCORE: 28
ADLS_ACUITY_SCORE: 39
WALKING_OR_CLIMBING_STAIRS_DIFFICULTY: YES
ADLS_ACUITY_SCORE: 36
ADLS_ACUITY_SCORE: 35
ADLS_ACUITY_SCORE: 35
ADLS_ACUITY_SCORE: 32
ADLS_ACUITY_SCORE: 26
DEPENDENT_IADLS:: CLEANING;COOKING;LAUNDRY;SHOPPING;MEAL PREPARATION;MEDICATION MANAGEMENT;MONEY MANAGEMENT;TRANSPORTATION;INCONTINENCE
ADLS_ACUITY_SCORE: 39
ADLS_ACUITY_SCORE: 32
ADLS_ACUITY_SCORE: 39
EQUIPMENT_CURRENTLY_USED_AT_HOME: WALKER, ROLLING
ADLS_ACUITY_SCORE: 32
TOILETING_ASSISTANCE: TOILETING DIFFICULTY, ASSISTANCE 1 PERSON
ADLS_ACUITY_SCORE: 32
ADLS_ACUITY_SCORE: 33
ADLS_ACUITY_SCORE: 36
ADLS_ACUITY_SCORE: 39
ADLS_ACUITY_SCORE: 35
ADLS_ACUITY_SCORE: 36
ADLS_ACUITY_SCORE: 33
ADLS_ACUITY_SCORE: 39
ADLS_ACUITY_SCORE: 32
ADLS_ACUITY_SCORE: 26
ADLS_ACUITY_SCORE: 39
CHANGE_IN_FUNCTIONAL_STATUS_SINCE_ONSET_OF_CURRENT_ILLNESS/INJURY: YES
ADLS_ACUITY_SCORE: 39
ADLS_ACUITY_SCORE: 39
CONCENTRATING,_REMEMBERING_OR_MAKING_DECISIONS_DIFFICULTY: NO
ADLS_ACUITY_SCORE: 39
ADLS_ACUITY_SCORE: 38
ADLS_ACUITY_SCORE: 35
ADLS_ACUITY_SCORE: 36
ADLS_ACUITY_SCORE: 35
ADLS_ACUITY_SCORE: 35
ADLS_ACUITY_SCORE: 36
TRANSFERRING: 1-->ASSISTANCE (EQUIPMENT/PERSON) NEEDED
CHANGE_IN_FUNCTIONAL_STATUS_SINCE_ONSET_OF_CURRENT_ILLNESS/INJURY: YES
ADLS_ACUITY_SCORE: 39
ADLS_ACUITY_SCORE: 39
ADLS_ACUITY_SCORE: 36
FALL_HISTORY_WITHIN_LAST_SIX_MONTHS: NO
IADL_COMMENTS: ASSIST WITH ALL IADLS
ADLS_ACUITY_SCORE: 39
ADLS_ACUITY_SCORE: 36
ADLS_ACUITY_SCORE: 32
WALKING_OR_CLIMBING_STAIRS: TRANSFERRING DIFFICULTY, ASSISTANCE 1 PERSON
ADLS_ACUITY_SCORE: 37
ADLS_ACUITY_SCORE: 26
ADLS_ACUITY_SCORE: 36
ADLS_ACUITY_SCORE: 32
ADLS_ACUITY_SCORE: 36
ADLS_ACUITY_SCORE: 35
ADLS_ACUITY_SCORE: 36
DEPENDENT_IADLS:: CLEANING;COOKING;LAUNDRY;SHOPPING;MEAL PREPARATION;MEDICATION MANAGEMENT;MONEY MANAGEMENT;TRANSPORTATION;INCONTINENCE
ADLS_ACUITY_SCORE: 39
ADLS_ACUITY_SCORE: 36
ADLS_ACUITY_SCORE: 39
TOILETING_ISSUES: NO
ADLS_ACUITY_SCORE: 32
ADLS_ACUITY_SCORE: 36
WEAR_GLASSES_OR_BLIND: NO
ADLS_ACUITY_SCORE: 36
ADLS_ACUITY_SCORE: 32
ADLS_ACUITY_SCORE: 36
ADLS_ACUITY_SCORE: 36
ADLS_ACUITY_SCORE: 35
WALKING_OR_CLIMBING_STAIRS_DIFFICULTY: YES
ADLS_ACUITY_SCORE: 36
ADLS_ACUITY_SCORE: 26
ADLS_ACUITY_SCORE: 35
ADLS_ACUITY_SCORE: 35
ADLS_ACUITY_SCORE: 32
ADLS_ACUITY_SCORE: 39
ADLS_ACUITY_SCORE: 36
ADLS_ACUITY_SCORE: 39
ADLS_ACUITY_SCORE: 35
DRESSING/BATHING_DIFFICULTY: NO
ADLS_ACUITY_SCORE: 36
ADLS_ACUITY_SCORE: 33
ADLS_ACUITY_SCORE: 26
ADLS_ACUITY_SCORE: 39
ADLS_ACUITY_SCORE: 35
ADLS_ACUITY_SCORE: 35
ADLS_ACUITY_SCORE: 26

## 2023-01-01 ASSESSMENT — SOCIAL DETERMINANTS OF HEALTH (SDOH): HOW HARD IS IT FOR YOU TO PAY FOR THE VERY BASICS LIKE FOOD, HOUSING, MEDICAL CARE, AND HEATING?: NOT VERY HARD

## 2023-01-01 ASSESSMENT — LIFESTYLE VARIABLES
HOW OFTEN DO YOU HAVE A DRINK CONTAINING ALCOHOL: NEVER
HOW MANY STANDARD DRINKS CONTAINING ALCOHOL DO YOU HAVE ON A TYPICAL DAY: PATIENT DOES NOT DRINK
SKIP TO QUESTIONS 9-10: 1
HOW OFTEN DO YOU HAVE SIX OR MORE DRINKS ON ONE OCCASION: NEVER
AUDIT-C TOTAL SCORE: 0

## 2023-01-01 ASSESSMENT — EJECTION FRACTION: EF_VALUE: .29

## 2023-01-20 NOTE — TELEPHONE ENCOUNTER
Health care summary form received via fax from [facility]. Form placed in provider's bin to address.

## 2023-01-23 NOTE — PROGRESS NOTES
[facility] Care Coordination Contact  CC received notification of Emergency Room visit.  ER visit occurred on 1/21/23 at [facility] with Dx of Chest Pain.    CC contacted adult son Ramin and reviewed discharge summary.  Member has a follow-up appointment with PCP: No: Offered Assistance with setting up a follow up appointment  Member has had a change in condition: Yes: Per Ramin, member went back to [facility] a couple hours ago for chest pain. Ramin doesn't have any update as another family member is with member. Ramin will followp-up with CC on member's status.   New referrals placed: No  Home Visit Needed: No - member went back to ED.   Care plan reviewed and updated.  PCP notified of ED visit via EMR.    Shruthi Martins RN, PHN  [facility]  [phone]

## 2023-01-23 NOTE — TELEPHONE ENCOUNTER
Form faxed to [facility] [phone] along with problem list and medication list. Copy placed in abstract and original in tc bin.

## 2023-01-24 NOTE — PROGRESS NOTES
TRANSITIONS OF CARE (FREDDY) LOG   FREDDY tasks should be completed by the CC within one (1) business day of notification of each transition. Follow up contact with member is required after return to their usual care setting.  Note:  If CC finds out about the transitions fifteen (15) days or more after the member has returned to their usual care setting, no FREDDY log is needed. However, the CC should check in with the member to discuss the transition process, any changes needed to the care plan and document it in a case note.    Member Name:  Lakhwinder Negron MCO Name:  [employer] [facility] Member ID#: [id number]   Product: [facility] Care Coordinator Contact:  Shruthi Martins RN, PHN Agency/County/Care System: [facility]   Transition Communication Actions from Care Management Contact   Transition #1   Notification Date: 1/24/2023 Transition Date:   1/23/2023 Transition From: Home     Is this the member s usual care setting?               yes Transition To: Hospital, [facility]   Transition Type:  Unplanned  Reason for Admission/Comments:  Fluid Overload  Contact member/responsible party to offer assistance with transition Date completed: 1/24/2023 - spoke to member. Lakhwinder Negron also gave verbal consent to talk to her grandson, Ran Negron.     Notes from conversation with the member/responsible party, provider, discharging and receiving facility (as applicable): CC contacted Hospital /discharge planner  (Twila Nurse  - [phone]) for Name and Phone Number) and reviewed community POC. CC requested to be notified of concerns, care conference dates and discharge planning.   CC reached out to member and GrandsonRan regarding transition and offered support as needed.  Reviewed and update care plan as needed.  Notified community service providers and placed services [facility] RN EW transportation on hold as needed.  Transition log initiated.   PCP notified of hospitalization via  EMR.    Ran states member will likely be at [facility] for 3 - 4 additional days and possibly discharge to a TCU. Ran states family and member are discussing the possibility of moving member to an assisted living. Member/family will update CC.     2/2/23 - Spoke to member's son Ramin and he states member is still at [facility]. Ramin doesn't have an update on the discharge. Ramin will update CC. CC spoke to [facility] Care Manager, Yash regarding member's status. Member has an infection and will likely not discharge for a few more days. Per Yash, member will likely discharge to [facility] TCU.        Shruthi Martins, RN, N  [facility]  [phone]         Shared CC contact info, care plan/services with receiving setting--Date completed: 1/24/23   Name & Title of receiving setting contact: [facility]   Notified PCP of transition--Date completed:  1/24/23     via  EMR  Name of PCP: Vee River     Transition #2   Notification Date: 2/9/2023 Transition Date:   2/8/2023 Transition From: [facility], [facility]     Is this the member s usual care setting?               no Transition To: Rehabiliation Facility, [facility]   Transition Type:  Planned  Reason for Admission/Comments:  Fluid Overload  Contact member/responsible party to offer assistance with transition Date completed: 2/9/23 - Spoke to member's son, Ramin Negron.     Notes from conversation with the member/responsible party, provider, discharging and receiving facility (as applicable): OBRA 1 right faxed to [facility] admissions office.   CC contacted Nursing Home LSW (Niki,  - [phone]) and left a message with this CC contact information, reviewed community POC as well requested to be notified of concerns, care conferences and discharge planning.  CC reached out to adult son Ramin regarding transition and offered support as needed.    Transition log updated.   PCP, Perico  Vee Foreman, notified of transition to TCU via EMR.    2/13/23 - Received VM from , Sue ([phone]) informing CC the TCU will have more update on member's TCU and Discharge plan after 2/14/23. Niki asked CC to follow-up on 2/14/23.     2/14/23 - Left Niki Ahmadi a VM to follow-up with CC on member's plan at TCU.       Shruthi Martins RN, N  [facility]  [phone]         Shared CC contact info, care plan/services with receiving setting--Date completed: 2/9/23   Name & Title of receiving setting contact: [facility]   Notified PCP of transition--Date completed:  2/9/23     via  EMR  Name of PCP: Vee River      Transition #3   Notification Date: 2/27/2023 Transition Date:   20/24/2023 Transition From: Rehabiliation Facility, [facility]     Is this the member s usual care setting?               no Transition To: [facility]   Transition Type:  Unplanned  Reason for Admission/Comments:  SOB   Contact member/responsible party to offer assistance with transition Date completed: 2/27/23 - Spoke to member's son, Ramin.     Notes from conversation with the member/responsible party, provider, discharging and receiving facility (as applicable): CC contacted Hospital nurse, Angelina and provided member's care plan. Angelina states member had chest pain and SOB. Member is stable and is completing dialysis. After dialysis member will likely discharge tonight back to the TCU.   CC reached out to adult son Ramin regarding transition and offered support as needed.  Reviewed and update care plan as needed.  Notified community service providers and placed services None on hold as needed.  Transition log initiated.   PCP, Vee River, notified of hospitalization via EMR.    CC left a VM with Niki at the [facility] to confirm member's discharge back to [facility].     2/28/23 - CC spoke to the [facility] and confirmed member was not  readmitted back to the facility. CC spoke to nurseAngelina at [facility] and confirmed member was still at the hospital. Member did not discharge because her glucose level was too low after dialysis last night. Angelina states member will discharge tonight to the [facility]. Member's ride is set-up for 5pm.         Shruthi Martins RN, PHN  [facility]  [phone]         Shared CC contact info, care plan/services with receiving setting--Date completed: 2/27/23   Name & Title of receiving setting contact: [facility]   Notified PCP of transition--Date completed:  2/27/23     via  EMR  Name of PCP: Vee River      Transition #4   Notification Date: 2/28/2023 Transition Date:   2/28/2023 Transition From: [facility], [facility]     Is this the member s usual care setting?               no Transition To: Rehabiliation Facility, [facility]    Transition Type:  Planned  Reason for Admission/Comments:  SOB  Contact member/responsible party to offer assistance with transition Date completed: 2/28/23 - Ramin (son)     Notes from conversation with the member/responsible party, provider, discharging and receiving facility (as applicable): OBRA 1 right faxed to shelter admissions office.   CC contacted Nursing Home LSW ([facility]  [phone]) and reviewed community POC. CC requested to be notified of concerns, care conference dates and discharge planning.   CC reached out to adult son Ramin regarding transition and offered support as needed.    Transition log updated.   PCP, Vee River, notified of transition to TCU via EMR.    Niki Ahmadi states they will have a better idea of member's discharge plan by early next week. Niki asked CC to follow-up on 3/7/23 after 2pm.     3/7/23 - Left a VM for Social M[facility] Prince to contact CC on member's status. Niki called back and states member is doing well, walking 20 feet and able to walk a few steps. Niki  doesn't know the status on member's LUCY Drain at this time. Niki will know more tomorrow (3/8/23). Per the [facility], they would not be able to take member until LUCY Drain is out.      3/13/23 - Care Conference with member, grandkaral (Ran), Niki ( at the [facility]) and Colleen (Therapist at the [facility]). Xee's drain was take on off on on Friday. Xee has made progress in therapy. Xee can complete ADLS with direct and standby supervision. TCU plans to discharge Xee on 3/15/23 to [facility]. Xee is aware CC will close out EW d/t 30+ day in TCU. CC will reassess and open Xee to EW. Date of reassessment scheduled for 3/14/23.   CC spoke to Fue at [facility] and they agreed to admit member on 3/15/23. Advised Fue to contact Niki  on discharge instructions.     Shruthi Martins RN, PHN  [facility]  [phone]         Shared CC contact info, care plan/services with receiving setting--Date completed: see above  Name & Title of receiving setting contact: See above    Notified PCP of transition--Date completed:  3/1/23     via  EMR  Name of PCP: Vee River      Transition #5   Notification Date: 3/20/23 Transition Date:   3/15/23 Transition From: Rehabiliation Facility, [facility]      Is this the member s usual care setting?               no Transition To: Assisted Living, [facility]    Transition Type:  Planned  Reason for Admission/Comments:  SOB   Contact member/responsible party to offer assistance with transition Date completed: 3/20/23 - SonRamin    Notes from conversation with the member/responsible party, provider, discharging and receiving facility (as applicable): CC contacted adult son Ramin Negron and reviewed discharge summary.  Member has a follow-up appointment with PCP in 7 days: No - HENNY will arrange follow-up appointment.   Member has had a change in condition: Yes:   Home visit needed: No. CC completed Change in Condition reassessment on 3/14/23.    Care plan reviewed and updated.  The following home based services None were resumed.  New referrals placed: No  Transition log completed.   PCP, Vee River, notified of transition back to home via EMR.    Shruthi Martins RN, N  [facility]  [phone]         Shared CC contact info, care plan/services with receiving setting--Date completed: See above    Name & Title of receiving setting contact: See Above    Notified PCP of transition--Date completed:  3/20/23     via  EMR  Name of PCP: Vee River      *RETURN TO USUAL CARE SETTING: *Complete tasks below when the member is discharging TO their usual care setting within one (1) business day of notification..      For situations where the Care Coordinator is notified of the discharge prior to the date of discharge, the Care Coordinator must follow up with the member or designated representative to confirm that discharge actually occurred and discuss required FREDDY tasks as outlined in the FREDDY Instructions.  (This includes situations where it may be a  new  usual care setting for the member. (i.e., a community member who decides upon permanent nursing home placement following hospitalization and rehab).    Discuss with Member/Responsible Party:    Check  Yes  - if the member, family member and/or SNF/facility staff manages the following:    If  No  provide explanation in the comments section.          Date completed: 3/20/23 Communicated with member or their designated representative about the following:  care transition process; about changes to the member s health status; plan of care updates; education about transitions and how to prevent unplanned transitions/readmissions    Four Pillars for Optimal Transition:    Check  Yes  - if the member, family member and/or SNF/facility staff manages the following:    If  No  provide explanation in the comments section.          []  Yes     [x]  No Does the member have a follow-up appointment  scheduled with primary care or specialist? (Mental health hospitalizations--the appt. should be w/in 7 days)              For mental health hospitalizations:  []  Yes     []  No     Does the member have a follow-up appointment scheduled with a mental health practitioner within 7 days of discharge?  [x]  Yes     []  No     Has a medication review been completed with member? If no, refer to PCP, home care nurse, MTM, pharmacist  [x]  Yes     []  No     Can the member manage their medications or is there a system in place to manage medications (e.g. home care set-up)?         [x]  Yes     []  No     Can the member verbalize warning signs and symptoms to watch for and how to respond?  [x]  Yes     []  No     Does the member have a copy of and understand their discharge instructions?  If no, assist to obtain copy of discharge instructions, review discharge instructions, and assist to contact PCP to discuss questions about their recent hospitalization.  [x]  Yes     []  No     Does the member have adequate food, housing and transportation?  If no, add goal and discuss additional supports available to the member                                                                                                                                                                                 [x]  Yes     []  No     Is the member safe in their home?  If no, document needs and support provided                                                                                                                                                                          []  Yes     [x]  No     Are there any concerns of vulnerability, abuse, or neglect?  If yes, document concerns and actions taken by Care Coordinator as a mandated                                                                                                                                                                              [x]  Yes     []  No     Does the  member use a Personal Health Care Record?  Check  Yes  if visit summary, discharge summary, and/or healthcare summary are being used as a PHR.                                                                                                                                                                                  [x]  Yes     []  No     Have you reviewed the discharge summary with the member? If  No  provide explanation in comments.  [x]  Yes     []  No     Have you updated the member s care plan/support plan? Add new diagnosis, medications, treatments, goals & interventions, as applicable. If No, provide explanation in comments.    Comments:           Notes from conversation with the member/responsible party, provider, discharging and receiving facility (as applicable): Reviewed discharge summary with son, Berna Murphy FCI will assist in scheduling a follow-up appointment for member.     Shruthi Martins RN, PHN  [facility]  [phone]

## 2023-02-02 NOTE — PROGRESS NOTES
[facility] Care Coordination Contact      [facility] Six-Month Telephone Assessment    6 month telephone assessment completed on 2/2/2023.    ER visits: Yes -  [facility]  Hospitalizations: Yes -  [facility]  TCU stays: No  Significant health status changes: Member is currently hospitalized for fluid overload at [facility]. Member will likely discharge in a few days to a TCU.   Falls/Injuries: No  ADL/IADL changes: No  Changes in services: No    Caregiver Assessment follow up:  N/A    Goals: See POC in chart for goal progress documentation.  Member will likely have a new reassessment for change in condition. Family will contact CC once member discharge from the hospital. Care Plan reviewed and updated.     Will see member in 6 months for an annual health risk assessment.   Encouraged member to call CC with any questions or concerns in the meantime.     Shruthi Martins RN, PHN  [facility]  [phone]

## 2023-02-09 NOTE — PROGRESS NOTES
ASSUMED CARE OF PT PT RESTING ON CART LIGHTS DIMMED UPDATED ON NO BED ASSIGNMENT AS ON NOW WILL CONT MONITORING [facility] Care Coordination Contact    Left  for member and/or Son, Ramin to contact CC to schedule annual home visit.     Shruthi Martins RN, PHN  [facility]  [phone]     (1) Female

## 2023-02-09 NOTE — TELEPHONE ENCOUNTER
Bonnie, called today in regards to the patient being in the hospital last week. Medications have not been update to match [facility] list. Bonnie is the home nurse for this patient and can be reached at [phone].    Zach Fried Medical Assistant       Date Of Previous Biopsy (Optional): 01/23/2023

## 2023-02-24 NOTE — PROGRESS NOTES
2/22/23 - Care conference with member, member's grandson Ran, Niki, [facility]'s nursing and therapy department. Per care team, member requires help with dressing and can ambulate with walker for 40 ft with supervision. Member has a drain for IV antibiotics. Member will continue therapy at [facility] and care team is anticipating a discharge date of 3/7. Member will discharge to [facility].     Shruthi Martins RN, PHN  [facility]  [phone]

## 2023-03-14 NOTE — PROGRESS NOTES
Open program per regulatory process.     Shruthi Martins RN, N  [facility]  [phone]

## 2023-03-14 NOTE — PROGRESS NOTES
[facility] Care Coordination Contact    Called member to schedule annual HRA home visit. HRA has been scheduled for 3/14/2023 at 2pm. Early HRA due to change in condition and to open Xee back to EW.     Shruthi Martins RN, PHN  [facility]  [phone]

## 2023-03-14 NOTE — PROGRESS NOTES
Closed program to open a new program per regulatory process.     Shruthi Martins RN, N  [facility]  [phone]

## 2023-03-14 NOTE — PROGRESS NOTES
[facility] Care Coordination Contact    [facility] Change in Condition Assessment    Home visit for Change in Condition Health Risk Assessment with Lakhwinder Negron completed on March 14, 2023    Reason for Early reassessment: Health Status Change  Yes, if yes explain Lakhwinder has been in and out of the hospital since 12/9/2022 for Pneumonia, Chest Pain and Hypoxia. Lakhwinder is currently a the TCU and plans to d/c to [facility] on 3/15/23.    Type of residence:: Private home - stairs  Current living arrangement:: I live in a private home with family     Assessment completed with:: Patient, Care Team Member    Current Care Plan  Member currently receiving the following home care services:     Member currently receiving the following community resources: Dialysis Services, DME, Transportation Services      Medication Review  Medication reconciliation completed in Epic: Yes  Medication set-up & administration: Family/informal caregiver sets up Weekly.  Family caregiver administers medications.  Medication Risk Assessment Medication (1 or more, place referral to MTM): N/A: No risk factors identified  MTM Referral Placed: No: No risk factors idenified    Mental/Behavioral Health   Depression Screening:   PHQ-2 Total Score (Adult) - Positive if 3 or more points; Administer PHQ-9 if positive: 0        Mental health DX:: No        Falls Assessment:   Fallen 2 or more times in the past year?: No   Any fall with injury in the past year?: No    ADL/IADL Dependencies:   Dependent ADLs:: Ambulation-walker, Ambulation-cane, Bathing, Dressing, Eating, Grooming, Incontinence, Positioning, Transfers, Wheelchair-with assist, Toileting  Dependent IADLs:: Cleaning, Cooking, Laundry, Shopping, Meal Preparation, Medication Management, Money Management, Transportation, Incontinence    [facility] sponsored benefits: Shared information re: Silver Sneakers/gym memberships, ASA, Calcium +D.    PCA Assessment completed at visit: Not  Applicable      Elderly Waiver Eligibility: Yes-will open to EW    Care Plan & Recommendations: Lakhwinder Negron exited from Elderly Waiver on 3/13/23 d/t being at the TCU for 30+ days. CC completed a change in condition assessment on 3/14/23 and will open member back to EW. Lakhwinder has a health history of Right Hemiparesis, Type II Diabetes and ESRD on dialysis. Lakhwinder states the past several months her health has been declining. Lakhwinder states she can't do most ADLS without hands on assistance. Lakhwinder requires help with all IADLS. Lakhwinder has made the decision to go to [facility] as her children are not able to provide the care Lakhwinder requires as they have other responsibilities. [facility] is aware CC will open member back to EW. [facility] agree to admit member on 3/15/23. Encourage Xee to contact CC with any questions or concerns.     CC will fax [phone] to [facility].     See [facility] for detailed assessment information.    Follow-Up Plan: Member informed of future contact, plan to f/u with member with a 6 month telephone assessment.  Contact information shared with member and family, encouraged member to call with any questions or concerns at any time.    [facility] care continuum providers: Please see Snapshot and Care Management Flowsheets for Specific details of care plan.    This CC note routed to PCP.     Shruthi Martins RN, PHN  [facility]  [phone]

## 2023-03-14 NOTE — Clinical Note
Shine Baxter,  I completed a change in condition assessment with your patient Lakhwinder Negron on 3/14/2023.   Please let me know if you have any questions.   Thank you,  Shruthi Martins RN, N [facility] [phone]

## 2023-03-22 NOTE — TELEPHONE ENCOUNTER
The Home Care/Assisted Living/Nursing Facility is calling regarding an established patient.  Has the patient seen Home Care in the past or is currently residing in Assisted Living or Nursing Facility? No.     Tiffanie calling from [facility]  requesting the following orders that are NOT within the Home Care, Assisted Living or Nursing Home Eval and Treatment standing order and must be ordered by a Licensed Practitioner.    Preferred Call Back Number: [phone]    Requesting PT 1x for one week. PT visited patient this AM.     FYI: per Tiffanie patient declined future home care services.     Routing to Licensed Practitioner (Provider) to review request and provide approval or recommendation.    Kaleigh Dexter RN    [facility]

## 2023-03-22 NOTE — TELEPHONE ENCOUNTER
PT visited patient once today already. The below request is for today's visit.     Vee, if you prefer to give approval for PT for 3/22/23 service only, we can do that versus 1x for one week?    Thanks!      Kaleigh Dexter RN    [facility]

## 2023-03-23 NOTE — TELEPHONE ENCOUNTER
This writer attempted to contact Tiffanie JUAREZ on 03/23/23      Reason for call verbal orders and unable to leave message.      If patient calls back:   Registered Nurse called. Relay provider approval for requested [facility] orders      JANEL TapiaN, RN  [facility]

## 2023-03-24 NOTE — TELEPHONE ENCOUNTER
Left detailed message on Tiffanie's secure voicemail relayed provider's approval for requested HHC orders, clinic number also left if Tiffanie has any further questions or concerns      JANEL TapiaN, RN  [facility]

## 2023-03-28 PROBLEM — R79.89 ELEVATED TROPONIN: Status: ACTIVE | Noted: 2023-01-01

## 2023-03-28 PROBLEM — R06.02 SHORTNESS OF BREATH: Status: ACTIVE | Noted: 2023-01-01

## 2023-03-28 PROBLEM — N18.6 ESRD (END STAGE RENAL DISEASE) ON DIALYSIS (H): Status: ACTIVE | Noted: 2023-01-01

## 2023-03-28 PROBLEM — J90 PLEURAL EFFUSION: Status: ACTIVE | Noted: 2023-01-01

## 2023-03-28 PROBLEM — Z99.2 ESRD (END STAGE RENAL DISEASE) ON DIALYSIS (H): Status: ACTIVE | Noted: 2023-01-01
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
## 2023-03-28 NOTE — ED TRIAGE NOTES
Patient is a resident of a nursing home who started developing chest tightness the last 24 hours that has since progressed to chest pain the last 2 hours. Patient has history of chf, kidney failure on dialysis. Patient given nitroglycerin and aspirin en route.      Triage Assessment     Row Name 03/28/23 0615       Triage Assessment (Adult)    Airway WDL WDL       Respiratory WDL    Respiratory WDL WDL       Skin Circulation/Temperature WDL    Skin Circulation/Temperature WDL WDL       Cardiac WDL    Cardiac WDL X;chest pain       Chest Pain Assessment    Chest Pain Location midsternal    Character aching;sharp    Precipitating Factors at rest    Alleviating Factors nothing       Peripheral/Neurovascular WDL    Peripheral Neurovascular WDL WDL       Cognitive/Neuro/Behavioral WDL    Cognitive/Neuro/Behavioral WDL WDL

## 2023-03-29 NOTE — CONSULTS
PULMONARY/CRITICAL CARE CONSULT NOTE    Date / Time of Admission:  3/28/2023  7:11 PM  Assessment:   82yoM with ESRD, DMII, CVA who presented with large left-sided effusion. Differential includes parapneumonic effusion versus empyema given hx of klebsiella bacteremia versus transudatative from volume overload. Less likely inflammatory.     Clinically Significant Risk Factors Present on Admission                # Thrombocytopenia: Lowest platelets = 67 in last 2 days, will monitor for bleeding   # Hypertension: home medication list includes antihypertensive(s)   # Acute Respiratory Failure: based on blood gas results.  Continue supplemental oxygen as needed           Code Status: No CPR- Do NOT Intubate       Principal Problem:    ESRD (end stage renal disease) on dialysis (H)  Active Problems:    Shortness of breath    Pleural effusion    Elevated troponin      Plan:   US Thoracentesis performed  Glucose, LDH and total protein pending. This will help us determine source of effusion but I am favoring volume overload in setting of ESRD and HFpEF. Does not appear to be empyema based on pH.   Agree with additional UF as tolerated.   Will follow up tomorrow when this has resulted        Subjective:    Cc: shortness of breath    HPI: 82 year old female with history of diastolic dysfunction, CVA, DMII, ESRD on HD who presented after HD with shortness of breath. Troponin elevated on admission with large left-sided pleural effusion noted. UF attempted overnight with only 900cc removed. O2 initiated.     Past Medical History:   Diagnosis Date     Acute on chronic diastolic CHF (congestive heart failure) (H) 3/9/2018     Arthritis      Cataract      CVA (cerebral vascular accident) (H) 2001    Visual changes - RT Leg weakness     DM (diabetes mellitus) (H)     dx around 15 years ago.      Encounter regarding vascular access for dialysis for ESRD (H) 1/10/2020    Added automatically from request for surgery [id number]      Gram-negative pneumonia (H) 5/8/2018     Hypertension      Moderate malnutrition (H) 9/27/2021     neuropathy      Nonproliferative diabetic retinopathy of both eyes (H) 5/12/2011       Social History     Tobacco Use     Smoking status: Never     Smokeless tobacco: Never   Substance Use Topics     Alcohol use: No       Family History   Problem Relation Age of Onset     Unknown/Adopted Mother      Unknown/Adopted Father      Blood Disease Son         passed at age 3 - patient reports due to low blood counts     Cancer No family hx of      Diabetes No family hx of      Hypertension No family hx of      Cerebrovascular Disease No family hx of      Thyroid Disease No family hx of      Glaucoma No family hx of      Macular Degeneration No family hx of      Kidney Disease No family hx of        Current Facility-Administered Medications   Medication     - MEDICATION INSTRUCTIONS -     0.9% sodium chloride BOLUS     acetaminophen (TYLENOL) tablet 650 mg    Or     acetaminophen (TYLENOL) Suppository 650 mg     aspirin (ASA) tablet 325 mg     atorvastatin (LIPITOR) tablet 80 mg     carvedilol (COREG) tablet 25 mg     glucose gel 15-30 g    Or     dextrose 50 % injection 25-50 mL    Or     glucagon injection 1 mg     docusate sodium (COLACE) capsule 100 mg     [START ON 3/31/2023] famotidine (PEPCID) tablet 20 mg     hydrALAZINE (APRESOLINE) injection 10 mg     insulin aspart (NovoLOG) injection (RAPID ACTING)     insulin aspart (NovoLOG) injection (RAPID ACTING)     isosorbide mononitrate (IMDUR) 24 hr tablet 30 mg     lidocaine (LMX4) cream     lidocaine 1 % 0.1-1 mL     melatonin tablet 1 mg     metoprolol (LOPRESSOR) injection 5 mg     morphine (PF) injection 1 mg     naloxone (NARCAN) injection 0.2 mg    Or     naloxone (NARCAN) injection 0.4 mg    Or     naloxone (NARCAN) injection 0.2 mg    Or     naloxone (NARCAN) injection 0.4 mg     ondansetron (ZOFRAN ODT) ODT tab 4 mg    Or     ondansetron (ZOFRAN) injection 4 mg  "    oxyCODONE IR (ROXICODONE) half-tab 2.5 mg     prochlorperazine (COMPAZINE) injection 5 mg    Or     prochlorperazine (COMPAZINE) tablet 5 mg    Or     prochlorperazine (COMPAZINE) suppository 12.5 mg     Reason ACE/ARB/ARNI order not selected     sodium chloride (PF) 0.9% PF flush 3 mL     sodium chloride (PF) 0.9% PF flush 3 mL     sodium phosphate (FLEET ENEMA) 1 enema     Current Outpatient Medications   Medication     aspirin 81 MG EC tablet     atorvastatin (LIPITOR) 40 MG tablet     calcium acetate (PHOSLO) 667 MG CAPS capsule     carvedilol (COREG) 25 MG tablet     Cholecalciferol (VITAMIN D3) 50 MCG (2000 UT) CAPS     famotidine (PEPCID) 20 MG tablet     ferrous sulfate (FE TABS) 325 (65 Fe) MG EC tablet     isosorbide mononitrate (IMDUR) 30 MG 24 hr tablet     levofloxacin (LEVAQUIN) 500 MG tablet     metroNIDAZOLE (FLAGYL) 500 MG tablet     oxyCODONE (ROXICODONE) 5 MG tablet     pantoprazole (PROTONIX) 40 MG EC tablet     polyethylene glycol (MIRALAX) 17 g packet     ACE/ARB NOT PRESCRIBED, INTENTIONAL,     blood glucose (ONETOUCH ULTRA) test strip     Lancets (ONETOUCH DELICA PLUS [id number]) MISC         Review of Systems: 12-point Review performed and negative aside from that noted in HPI.    Objective:    Vital signs:  /59   Pulse 66   Temp 98.2  F (36.8  C) (Oral)   Resp 27   Ht 1.448 m (4' 9\")   Wt 46.3 kg (102 lb)   LMP  (LMP Unknown)   SpO2 100%   BMI 22.07 kg/m      GENERAL APPEARANCE: healthy, alert and no distress     EYES: EOMI, - PERRL     NECK: no adenopathy, no asymmetry, masses, or scars and thyroid normal to palpation     RESP: lungs clear to auscultation - no rales, rhonchi or wheezes     CV: regular rates and rhythm, normal S1 S2, no S3 or S4 and no murmur, click or rub -     ABDOMEN:  soft, nontender, no HSM or masses and bowel sounds normal     MS: extremities normal- no gross deformities noted, no evidence of inflammation in joints, FROM in all extremities.     SKIN: " no suspicious lesions or rashes     NEURO: Normal strength and tone, sensory exam grossly normal, mentation intact and speech normal     PSYCH: mentation appears normal. and affect normal/bright     LYMPHATICS: No axillary, cervical, inguinal, or supraclavicular nodes        Data    CT chest: personally reviewed  EXAM: CT CHEST ABDOMEN PELVIS W/O CONTRAST  LOCATION: [facility]  DATE/TIME: 3/28/2023 8:04 PM     INDICATION: pain, short of breath  COMPARISON: None.  TECHNIQUE: CT scan of the chest, abdomen, and pelvis was performed without IV contrast. Multiplanar reformats were obtained. Dose reduction techniques were used.   CONTRAST: None.     FINDINGS:   LUNGS AND PLEURA: Bilateral groundglass opacities with interlobular septal thickening and mild bronchial wall thickening. Moderate-large left pleural effusion with associated complete left lower lobe and lingular collapse. Small-moderate right pleural   effusion with right lower lobe airspace opacities.     MEDIASTINUM/AXILLAE: No lymphadenopathy. No thoracic aortic aneurysms. Left neck central venous catheter terminates in the right atrium.     CORONARY ARTERY CALCIFICATION: Moderate.     HEPATOBILIARY: Cholecystectomy. No biliary dilation.     PANCREAS: Normal.     SPLEEN: Normal.     ADRENAL GLANDS: Normal.     KIDNEYS/BLADDER: Atrophic bilaterally. No collecting system dilation. Bladder is completely collapsed and unable to be evaluated.     BOWEL: Normal.     LYMPH NODES: Normal.     VASCULATURE: Moderate diffuse atherosclerotic calcification of the abdominal aorta, visceral branches, and iliofemoral arteries. No aneurysm.     PELVIC ORGANS: Normal.     MUSCULOSKELETAL: Diffuse body wall edema. No aggressive or destructive osseous lesions.                                                                      IMPRESSION:  1.  Moderate-large left pleural effusion with resultant complete left lower lobe and lingular collapse.  Small-moderate right pleural effusion with associated airspace opacities, also likely compressive atelectasis although superimposed pneumonia is also   possible.     2.  Mild pulmonary edema.    Laboratory:  Results for orders placed or performed during the hospital encounter of 03/28/23   Basic metabolic panel   Result Value Ref Range    Sodium 136 136 - 145 mmol/L    Potassium 3.8 3.4 - 5.3 mmol/L    Chloride 100 98 - 107 mmol/L    Carbon Dioxide (CO2) 22 22 - 29 mmol/L    Anion Gap 14 7 - 15 mmol/L    Urea Nitrogen 23.2 (H) 8.0 - 23.0 mg/dL    Creatinine 3.11 (H) 0.51 - 0.95 mg/dL    Calcium 8.9 8.8 - 10.2 mg/dL    Glucose 180 (H) 70 - 99 mg/dL    GFR Estimate 14 (L) >60 mL/min/1.73m2     Lab Results   Component Value Date    WBC 3.4 (L) 03/28/2023    HGB 11.6 (L) 03/28/2023    HCT 37.6 03/28/2023     (H) 03/28/2023    PLT 67 (L) 03/28/2023
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
## 2023-03-29 NOTE — ED NOTES
Report given to Kimmy AMADOR at [facility], no further question from staff, # [phone]

## 2023-03-29 NOTE — PROGRESS NOTES
[facility] Care Coordination Contact    [facility]  Ambulatory Care Coordination to Inpatient Care Management   Hand-In Communication    Date:  March 29, 2023  Name: Lakhwinder Negron is enrolled in [facility] Care Coordination program and I am covering for the Lead Care Coordinator Shruthi Martins RN  CC Contact Information:.   Payor Source: Payor: [employer] / Plan: [facility] DUAL / Product Type: HMO /   Current services in place:     Please see the CC Snaphot and Care Management Flowsheets for specific details of this Lakhwinder Negron care plan.   Additional details/specific concerns r/t this admission:    No additional concerns at this time .    I will follow this admission in Epic. Please feel free to contact me with questions or for further collaboration in discharge planning.    Yolis Cantu RN - covering for Lead CC Shruthi Martins RN 3/29/23 and 3/30/23  [facility]  Office:[phone]  Cell Phone: [phone]

## 2023-03-29 NOTE — PROGRESS NOTES
Receive page from hospitalist demanding for emergent dialysis for the patient who was just dialyzed a few hours ago and left with lower than TW now with sob.   CT showed large pleural effusion and stated patient is unstable.  BP in 150-170. Per RT patient is laying comfortably on Bipap.     Recommendation:   Stabilize patient per primary team  Plan to dialyze ASAP and UF as tolerated.   Management of respiratory status per primary team   Discussed with dialysis nurse.     Call with questions.      Thao Asencio MD  [facility]

## 2023-03-29 NOTE — PROGRESS NOTES
HD Progress Note     Assessment: Pt not awake but not responding likely r/t bipap and effort to breath. No notable edema.      Vascular Access: Dressing intact with no sign of issue changed today. Patent, able to aspirate and flush both ports ~ set for prescribed BFR at 400 as ordered with good pressures.      Dialyzer/Rinse: Optiflux 160 / rinsed good     HD time: 3.0 hrs      HD fluid removal goal: 3-4 and to challenge per MD     Treatment: set for 3.6 reducing goal and giving NS boluses due to hypotension. UF of 1.5 with overall profile B achieved after correcting steep profile C.      Fluid Removed Total: 1500ml              Net:  900ml     Interventions: Crit-line and vitals monitoring with goal adjustments reflected on flowsheet.      Plan: per renal team.

## 2023-03-29 NOTE — CONSULTS
RENAL CONSULT NOTE    REQUESTING PHYSICIAN: Dr. Abreu    REASON FOR CONSULT: ESRD     ASSESSMENT/PLAN:  ESRD:  Outpatient unit [facility] TTS (TT 180min, TW 42.5kg).  Dialyzed Tuesday and left at TW.  Dialyzed again emergently overnight for SOB.  Only able to tolerate 900ml UF due to HoTN.  Crit line also suggested she was dry.    Plan:  Dialysis tomorrow to keep on TTS schedule.  Fluid restriction     SOB  Large left pleural effusion.  S/p Thoracentesis 900ml removed today  Pulmonary consulted.  UF with dialysis to keep euvolemic     HTN  Coreg  25mg BID, isosorbide 30mg daily  BP significantly elevated on admission, better this morning.  -Only getting 6.25mg BID of coreg here->increase to home dose.  -Stop IV lasix as she is anuric  -UF with dialysis     DM2    HF  Echo pending  Cardiology following  Stop IV lasix as she is anuric  -UF with dialysis     Liver abscess  Recently hospitalized at [facility] with klebsiella PNA/bacteremia with metastatic spread causing liver abscess  On levofloxacin and metronidazole PTA per pharmacy note.    CKD-MBD  Phoslo 1 TID AC    Anemia  hgb 11.6  Mircera at outpatient HD unit.       HPI: Lakhwinder Negron is a 82 year old 82 year old with PMH ESRD, HTN, DM2, CVA who presented to the ER with c/o SOB and chest tightness after dialysis.  Imaging showed large left pleural effusion.  Dialysis attempted overnight, but she was only able to tolerate net UF of 900mL due to hypotension.  Crit line also suggested she was getting dry.   On Bipap overnight.  This morning Lakhwinder is seen with assist of phone .  She reports breathing is easier than yesterday.  Denies pain.  No appetite.  Anuric  Reviewed plan for dialysis tomorrow.   Looking for her phone.  RN states she will help Lakhwinder find it.       REVIEW OF SYSTEMS:  ROS was completely reviewed and otherwise negative and non-contributory      Past Medical History:   Diagnosis Date     Acute on chronic diastolic CHF  (congestive heart failure) (H) 3/9/2018     Arthritis      Cataract      CVA (cerebral vascular accident) (H) 2001    Visual changes - RT Leg weakness     DM (diabetes mellitus) (H)     dx around 15 years ago.      Encounter regarding vascular access for dialysis for ESRD (H) 1/10/2020    Added automatically from request for surgery [id number]     Gram-negative pneumonia (H) 5/8/2018     Hypertension      Moderate malnutrition (H) 9/27/2021     neuropathy      Nonproliferative diabetic retinopathy of both eyes (H) 5/12/2011     Social History     Socioeconomic History     Marital status:      Spouse name: Not on file     Number of children: 2     Years of education: Not on file     Highest education level: Not on file   Occupational History     Not on file   Tobacco Use     Smoking status: Never     Smokeless tobacco: Never   Vaping Use     Vaping Use: Never used   Substance and Sexual Activity     Alcohol use: No     Drug use: No     Sexual activity: Not Currently     Partners: Male     Birth control/protection: None   Other Topics Concern     Parent/sibling w/ CABG, MI or angioplasty before 65F 55M? No   Social History Narrative     Not on file     Social Determinants of Health     Financial Resource Strain: Low Risk      Difficulty of Paying Living Expenses: Not very hard   Food Insecurity: Not on file   Transportation Needs: No Transportation Needs     Lack of Transportation (Medical): No     Lack of Transportation (Non-Medical): No   Physical Activity: Not on file   Stress: Not on file   Social Connections: Not on file   Intimate Partner Violence: Not on file   Housing Stability: Unknown     Unable to Pay for Housing in the Last Year: No     Number of Places Lived in the Last Year: Not on file     Unstable Housing in the Last Year: No          ALLERGIES/SENSITIVITIES:  No Known Allergies      PHYSICAL EXAM:  Physical Exam   Temp: (!) 96.3  F (35.7  C) Temp src: Axillary BP: (!) 156/70 Pulse: 65   Resp: 21  SpO2: 100 % O2 Device: Oxymizer cannula    Vitals:    23 1840   Weight: 46.3 kg (102 lb)     Vital Signs with Ranges  Temp:  [96.3  F (35.7  C)-97  F (36.1  C)] 96.3  F (35.7  C)  Pulse:  [62-88] 65  Resp:  [12-40] 21  BP: ()/(50-98) 156/70  SpO2:  [96 %-100 %] 100 %  No intake/output data recorded.    Temp (24hrs), Av.7  F (35.9  C), Min:96.3  F (35.7  C), Max:97  F (36.1  C)      Patient Vitals for the past 72 hrs:   Weight   23 1840 46.3 kg (102 lb)       General: Alert, NAD  HEENT:  normocephalic  RESPIRATORY:  Lungs clear ant, normal effort, room air   CARDIOVASCULAR:  RRR,  no murmur, no rub.  No leg edema  ABDOMEN:  soft,  non-distended   GENITOURINARY:  No shepard   INTEGUMENTARY: Warm, dry, no rash  NEUROLOGIC: grossly intact   Psych: calm, cooperative  ACCESS: left CVC    Laboratory:     Recent Labs   Lab 23   WBC 3.4*   RBC 3.56*   HGB 11.6*   HCT 37.6   PLT 67*       Basic Metabolic Panel:  Recent Labs   Lab 23  0825 23  0252 23  0114 23   NA  --  136  --  133*   POTASSIUM  --  3.8  --  3.7   CHLORIDE  --  100  --  100   CO2  --  22  --  23   BUN  --  23.2*  --  20.5   CR  --  3.11*  --  2.82*   GLC 99 180* 136* 160*   FALLON  --  8.9  --  8.9       INRNo lab results found in last 7 days.    Recent Labs   Lab Test 23  0252 23   POTASSIUM 3.8 3.7   CHLORIDE 100 100   BUN 23.2* 20.5      Recent Labs   Lab Test 21  1135 19  1404 10/22/19  1053 18  1604 18  1615 18  1601 18  1641 17  0905 17  1028   ALBUMIN  --  4.0 3.8   < >  --    < > 3.6   < >  --    BILITOTAL  --   --   --   --   --   --  0.2  --   --    ALT 21  --   --   --   --   --  45  --   --    AST  --   --   --   --   --   --  44  --   --    PROTEIN  --   --   --   --  100*  --   --   --  >600*    < > = values in this interval not displayed.       Personally reviewed today's laboratory studies      Thank you for involving us  in the care of this patient. We will continue to follow along with you.      Irene Sansd NP  [facility]  [phone]

## 2023-03-29 NOTE — PROGRESS NOTES
[facility]    Medicine Progress Note - Hospitalist Service    Date of Admission:  3/28/2023    Assessment & Plan     Lakhwinder Negron is an 82 year old female with medical history of chronic diastolic heart failure, CVA, type 2 diabetes mellitus, hypertension, ESRD on hemodialysis, admitted on 3/28/202 with worsening shortness of breath at rest after hemodialysis, and substernal chest tightness.       Acute on chronic systolic heart failure: Elevated BNP with CT of the chest showing a moderate to large left pleural effusion and mild pulmonary edema.   - Not making much urine currently and so doubt that diuretics will help much.  - Hemodialysis per nephrology    NSTEMI: Elevated to troponin of 207. Has been stable after admission. Echocardiogram showed LVEF 30-35%, compared to 64% in 2018, with akinetic in the apical inferior and anterior wall.  - Cardiology consulted and input appreciated: Plan cardiac cath when appropriate  - Start aspirin 325 mg daily and lipitor 80 mg daily  - Continue PTA carvedilol and Imdur     Large left pleural effusion: due to fluid overload. Received thoracentesis today and 900 mL of clear yellow fluid were removed.   - Pulmonology assistance appreciated  - Follow up fluid analysis  - Continue to monitor respiratory status.     Acute hypoxic respiratory failure: Patient is DNR.  - Oxygen supplementation to keep saturations over 92%.    Recent Klebsiella pneumoniae bacteremia and liver abscesses: admitted in outside facility from 1/23-2/8/23.   - Continue Metronidazole 500 mg PO q 12 + Levofloxacin 500 mg q 48 hrs x 3 weeks ending date 3/31/23.  - Follow up in the ID clinic ([facility]) and US liver in 3 weeks         Diet: Fluid restriction 1000 ML FLUID  Combination Diet Renal Diet (dialysis); Low Saturated Fat Na <2400mg Diet, No Caffeine Diet    DVT Prophylaxis: Heparin SQ  Girard Catheter: Not present  Lines: PRESENT             Cardiac Monitoring: ACTIVE order.  Indication: Acute decompensated heart failure (48 hours)  Code Status: No CPR- Do NOT Intubate      Clinically Significant Risk Factors Present on Admission                # Thrombocytopenia: Lowest platelets = 67 in last 2 days, will monitor for bleeding   # Hypertension: home medication list includes antihypertensive(s)   # Acute Respiratory Failure: based on blood gas results.  Continue supplemental oxygen as needed             Disposition Plan      Expected Discharge Date: 04/02/2023                  Lauren He MD  Hospitalist Service  [facility]  Securely message with [employer] (more info)  Text page via [employer] Paging/Directory   ______________________________________________________________________    Interval History   Patient was seen after she returned from thoracentesis. She reports that she feels better overall. Her SOB improved. No chest pain.     Physical Exam   Vital Signs: Temp: 98.2  F (36.8  C) Temp src: Oral BP: 125/59 Pulse: 66   Resp: 27 SpO2: 100 % O2 Device: None (Room air)    Weight: 102 lbs 0 oz    General appearance: not in acute distress  HEENT: PERRL, EOMI  Lungs: Clear breath sounds in bilateral lung fields  Cardiovascular: Regular rate and rhythm, normal S1-S2  Abdomen: Soft, non tender, no distension  Musculoskeletal: No joint swelling  Skin: No rash and no edema  Neurology: AAO ×3.  Cranial nerves II - XII normal.  Normal muscle strength in all four extremities.    Medical Decision Making       45 MINUTES SPENT BY ME on the date of service doing chart review, history, exam, documentation & further activities per the note.      Data     I have personally reviewed the following data over the past 24 hrs:    3.4 (L)  \   11.6 (L)   / 67 (L)     136 100 23.2 (H) /  94   3.8 22 3.11 (H) \       Trop: 211 (HH) BNP: >70,000 (H)       Imaging results reviewed over the past 24 hrs:   Recent Results (from the past 24 hour(s))   CT Chest Abdomen Pelvis w/o Contrast     Narrative    EXAM: CT CHEST ABDOMEN PELVIS W/O CONTRAST  LOCATION: [facility]  DATE/TIME: 3/28/2023 8:04 PM    INDICATION: pain, short of breath  COMPARISON: None.  TECHNIQUE: CT scan of the chest, abdomen, and pelvis was performed without IV contrast. Multiplanar reformats were obtained. Dose reduction techniques were used.   CONTRAST: None.    FINDINGS:   LUNGS AND PLEURA: Bilateral groundglass opacities with interlobular septal thickening and mild bronchial wall thickening. Moderate-large left pleural effusion with associated complete left lower lobe and lingular collapse. Small-moderate right pleural   effusion with right lower lobe airspace opacities.    MEDIASTINUM/AXILLAE: No lymphadenopathy. No thoracic aortic aneurysms. Left neck central venous catheter terminates in the right atrium.    CORONARY ARTERY CALCIFICATION: Moderate.    HEPATOBILIARY: Cholecystectomy. No biliary dilation.    PANCREAS: Normal.    SPLEEN: Normal.    ADRENAL GLANDS: Normal.    KIDNEYS/BLADDER: Atrophic bilaterally. No collecting system dilation. Bladder is completely collapsed and unable to be evaluated.    BOWEL: Normal.    LYMPH NODES: Normal.    VASCULATURE: Moderate diffuse atherosclerotic calcification of the abdominal aorta, visceral branches, and iliofemoral arteries. No aneurysm.    PELVIC ORGANS: Normal.    MUSCULOSKELETAL: Diffuse body wall edema. No aggressive or destructive osseous lesions.      Impression    IMPRESSION:  1.  Moderate-large left pleural effusion with resultant complete left lower lobe and lingular collapse. Small-moderate right pleural effusion with associated airspace opacities, also likely compressive atelectasis although superimposed pneumonia is also   possible.    2.  Mild pulmonary edema.   US Thoracentesis    Narrative    EXAM:   1. LEFT  THORACENTESIS  2. ULTRASOUND GUIDANCE  LOCATION: [facility]  DATE/TIME: 3/29/2023 12:14 PM    INDICATION:  Pleural effusion.    PROCEDURE: Informed consent obtained. Time out performed. The chest was prepped and draped in sterile fashion. 6  mL of 1 % lidocaine was infused into the local soft tissues. Under direct ultrasound guidance, a 5 Portuguese catheter system was placed into   the pleural effusion.     900 mL of clear yellow fluid were removed and sent to lab, if requested.    Patient tolerated procedure well.    Ultrasound imaging was obtained and placed in the patient's permanent medical record.      Impression    IMPRESSION:  Status post left  ultrasound-guided thoracentesis.    Reference CPT Code: 02172   Echocardiogram Complete    Narrative    [id number]  [id number]  [id number]  [id number]^GEORGE^[place] [address]     Name: MAURO LAZARO  MRN: [id number]  : 1940  Study Date: 2023 01:08 PM  Age: 82 yrs  Gender: Female  Patient [facility]: [facility]  Reason For Study: Syncope  Ordering Physician: GELACIO VAZQUEZ  Referring Physician: GELACIO VAZQUEZ  Performed By:      BSA: 1.4 m2  Height: 57 in  Weight: 102 lb  HR: 64  ______________________________________________________________________________  Procedure  Complete Echo Adult. Definity (NDC #95099-728) given intravenously.  ______________________________________________________________________________  Interpretation Summary     Normal ventricular size. Left ventricular systolic function is moderate to  severely reduced. Left ventricular ejection fraction estimated at 30 to 35%.  The left ventricular apex appears akinetic to possibly dyskinetic. The apical  inferior wall appears akinetic. The apical anterior wall appears  akinetic.Diastolic Doppler findings (E/E' ratio and/or other parameters)  suggest left ventricular filling pressures are increased  Normal right ventricular size. Mild decrease in right ventricular systolic  function suggested.  Mild left atrial enlargement.  Mild mitral regurgitation.  Mild  tricuspid regurgitation. Estimate of RV systolic pressure is normal at 23  mmHg plus right atrial pressure  Moderate aortic regurgitation     ______________________________________________________________________________  Left Ventricle  The left ventricle is normal in size. There is normal left ventricular wall  thickness. Diastolic Doppler findings (E/E' ratio and/or other parameters)  suggest left ventricular filling pressures are increased. Left ventricular  systolic function is moderate to severely reduced. Left ventricular ejection  fraction estimated at 30 to 35% with regional wall motion abnormality as  described. The left ventricular apex is akinetic to dyskinetic.  The apical inferior wall is akinetic. The apical anterior wall is akinetic.     Right Ventricle  The right ventricle is normal size. Mildly decreased right ventricular  systolic function. TAPSE is abnormal, which is consistent with abnormal right  ventricular systolic function.     Atria  The left atrium is mildly dilated. Right atrial size is normal. Lipomatous  hypertrophy of the interatrial septum is noted. Cannot exclude patent foramen  ovale.     Mitral Valve  Mitral valve leaflets appear normal. There is mild (1+) mitral regurgitation.     Tricuspid Valve  The tricuspid valve is not well visualized, but is grossly normal. There is  mild (1+) tricuspid regurgitation.     Aortic Valve  The aortic valve is trileaflet with aortic valve sclerosis. There is moderate  (2+) aortic regurgitation. No hemodynamically significant valvular aortic  stenosis.     Pulmonic Valve  The pulmonic valve is not well seen, but is grossly normal. There is trace  pulmonic valvular regurgitation.     Vessels  The aorta root is normal. Normal size ascending aorta. IVC diameter <2.1 cm  collapsing >50% with sniff suggests a normal RA pressure of 3 mmHg.     Pericardium  There is no pericardial effusion.      ______________________________________________________________________________  MMode/2D Measurements & Calculations  IVSd: 1.1 cm  LVIDd: 4.6 cm  LVIDs: 3.4 cm  LVPWd: 1.1 cm  FS: 26.5 %     LV mass(C)d: 174.9 grams  LV mass(C)dI: 129.5 grams/m2  Ao root diam: 3.2 cm  LA dimension: 3.2 cm  asc Aorta Diam: 3.4 cm  LA/Ao: 0.98  LVOT diam: 1.8 cm  LVOT area: 2.4 cm2  LA Volume Indexed (AL/bp): 34.2 ml/m2  RV Base: 3.6 cm  RWT: 0.47  TAPSE: 1.4 cm     Time Measurements  MM HR: 63.0 BPM     Doppler Measurements & Calculations  MV E max galindo: 51.8 cm/sec  MV A max galindo: 96.0 cm/sec  MV E/A: 0.54  MV max PG: 3.8 mmHg  MV mean P.5 mmHg  MV V2 VTI: 26.8 cm  MVA(VTI): 1.7 cm2  MV dec slope: 186.4 cm/sec2  MV dec time: 0.28 sec  Ao V2 max: 126.4 cm/sec  Ao max P.0 mmHg  Ao V2 mean: 92.1 cm/sec  Ao mean PG: 3.7 mmHg  Ao V2 VTI: 28.6 cm  SAYRA(I,D): 1.6 cm2  SAYRA(V,D): 1.5 cm2  AI P1/2t: 467.9 msec  LV V1 max P.5 mmHg  LV V1 max: 78.8 cm/sec  LV V1 VTI: 18.8 cm  SV(LVOT): 45.7 ml  SI(LVOT): 33.8 ml/m2  PA acc time: 0.08 sec  TR max galindo: 240.3 cm/sec  TR max P.1 mmHg  AV Galindo Ratio (DI): 0.62  SAYRA Index (cm2/m2): 1.2  E/E' av.9  Lateral E/e': 17.6     Medial E/e': 24.2  RV S Galindo: 5.1 cm/sec     ______________________________________________________________________________  Report approved by: Riley Toussaint 2023 02:16 PM

## 2023-03-29 NOTE — PROGRESS NOTES
PT taken off Bipap and placed on 3 LPM N/C. PT tolerated change well. Bipap left on standby.    Micah Camacho, RT

## 2023-03-29 NOTE — ED NOTES
Paged Dr. Abreu regarding pt working hard to breath. Rt called for assistance. Pt moved to room 1.

## 2023-03-29 NOTE — ED NOTES
EMERGENCY DEPARTMENT SIGN OUT NOTE        ED COURSE AND MEDICAL DECISION MAKING    In brief, Lakhwinder Negron is a 82 year old female who initially presented fluid overload and CHF exacerbation.  Patient awaiting dialysis.    2:43 AM patient had already been admitted however rapid response was called.  I did go to evaluate patient while hospitalist was responding.  Patient with increased work of breathing and lethargy with continued crackles and coarse lung sounds will be placed on BiPAP.  RT ready here and placing patient on BiPAP in addition hospitalist coming to evaluate patient and will continue under their care.    FINAL IMPRESSION    1. Pleural effusion    2. Shortness of breath    3. Elevated troponin    4. ESRD (end stage renal disease) on dialysis (H)        ED MEDS  Medications   lidocaine 1 % 0.1-1 mL (has no administration in time range)   lidocaine (LMX4) cream (has no administration in time range)   sodium chloride (PF) 0.9% PF flush 3 mL (3 mLs Intracatheter $Given 3/29/23 0119)   sodium chloride (PF) 0.9% PF flush 3 mL (has no administration in time range)   melatonin tablet 1 mg (has no administration in time range)   acetaminophen (TYLENOL) tablet 650 mg (has no administration in time range)     Or   acetaminophen (TYLENOL) Suppository 650 mg (has no administration in time range)   oxyCODONE IR (ROXICODONE) half-tab 2.5 mg (has no administration in time range)   morphine (PF) injection 1 mg (has no administration in time range)   docusate sodium (COLACE) capsule 100 mg (has no administration in time range)   sodium phosphate (FLEET ENEMA) 1 enema (has no administration in time range)   ondansetron (ZOFRAN ODT) ODT tab 4 mg (has no administration in time range)     Or   ondansetron (ZOFRAN) injection 4 mg (has no administration in time range)   prochlorperazine (COMPAZINE) injection 5 mg (has no administration in time range)     Or   prochlorperazine (COMPAZINE) tablet 5 mg (has no administration in time  range)     Or   prochlorperazine (COMPAZINE) suppository 12.5 mg (has no administration in time range)   famotidine (PEPCID) tablet 20 mg (has no administration in time range)   - MEDICATION INSTRUCTIONS - (has no administration in time range)   Reason ACE/ARB/ARNI order not selected (has no administration in time range)   carvedilol (COREG) tablet 6.25 mg (has no administration in time range)   hydrALAZINE (APRESOLINE) injection 10 mg (10 mg Intravenous $Given 3/29/23 0218)   isosorbide mononitrate (IMDUR) 24 hr tablet 30 mg (has no administration in time range)   furosemide (LASIX) injection 80 mg (80 mg Intravenous $Given 3/29/23 0202)   aspirin (ASA) tablet 325 mg (has no administration in time range)   atorvastatin (LIPITOR) tablet 80 mg (has no administration in time range)   glucose gel 15-30 g (has no administration in time range)     Or   dextrose 50 % injection 25-50 mL (has no administration in time range)     Or   glucagon injection 1 mg (has no administration in time range)   insulin aspart (NovoLOG) injection (RAPID ACTING) (has no administration in time range)   insulin aspart (NovoLOG) injection (RAPID ACTING) (0 Units Subcutaneous Not Given 3/29/23 0118)   0.9% sodium chloride BOLUS (has no administration in time range)   0.9% sodium chloride BOLUS (has no administration in time range)   0.9% sodium chloride BOLUS (has no administration in time range)   No heparin via hemodialysis machine (has no administration in time range)   metoprolol (LOPRESSOR) injection 5 mg (has no administration in time range)   piperacillin-tazobactam (ZOSYN) 3.375 g vial to attach to  mL bag (0 g Intravenous Stopped 3/28/23 2235)       LAB  Labs Ordered and Resulted from Time of ED Arrival to Time of ED Departure   BASIC METABOLIC PANEL - Abnormal       Result Value    Sodium 133 (*)     Potassium 3.7      Chloride 100      Carbon Dioxide (CO2) 23      Anion Gap 10      Urea Nitrogen 20.5      Creatinine 2.82 (*)      Calcium 8.9      Glucose 160 (*)     GFR Estimate 16 (*)    TROPONIN T, HIGH SENSITIVITY - Abnormal    Troponin T, High Sensitivity 207 (*)    NT PROBNP INPATIENT - Abnormal    N terminal Pro BNP Inpatient >70,000 (*)    CBC WITH PLATELETS AND DIFFERENTIAL - Abnormal    WBC Count 3.4 (*)     RBC Count 3.56 (*)     Hemoglobin 11.6 (*)     Hematocrit 37.6       (*)     MCH 32.6      MCHC 30.9 (*)     RDW 15.3 (*)     Platelet Count 67 (*)     % Neutrophils 60      % Lymphocytes 31      % Monocytes 6      % Eosinophils 2      % Basophils 1      % Immature Granulocytes 0      NRBCs per 100 WBC 0      Absolute Neutrophils 2.1      Absolute Lymphocytes 1.0      Absolute Monocytes 0.2      Absolute Eosinophils 0.1      Absolute Basophils 0.0      Absolute Immature Granulocytes 0.0      Absolute NRBCs 0.0     TROPONIN T, HIGH SENSITIVITY - Abnormal    Troponin T, High Sensitivity 202 (*)    GLUCOSE BY METER - Abnormal    GLUCOSE BY METER POCT 136 (*)    TROPONIN T, HIGH SENSITIVITY - Abnormal    Troponin T, High Sensitivity 211 (*)    BLOOD GAS VENOUS - Abnormal    pH Venous 7.29 (*)     pCO2 Venous 56 (*)     pO2 Venous 43      Bicarbonate Venous 27      Base Excess/Deficit (+/-) -0.1      Oxyhemoglobin Venous 71.6      O2 Sat, Venous 73.3     MAGNESIUM - Normal    Magnesium 1.8     GLUCOSE MONITOR NURSING POCT   GLUCOSE MONITOR NURSING POCT   GLUCOSE MONITOR NURSING POCT   GLUCOSE MONITOR NURSING POCT   GLUCOSE MONITOR NURSING POCT     RADIOLOGY    CT Chest Abdomen Pelvis w/o Contrast   Final Result   IMPRESSION:   1.  Moderate-large left pleural effusion with resultant complete left lower lobe and lingular collapse. Small-moderate right pleural effusion with associated airspace opacities, also likely compressive atelectasis although superimposed pneumonia is also    possible.      2.  Mild pulmonary edema.      Echocardiogram Complete    (Results Pending)       DISCHARGE MEDS  New Prescriptions    No  medications on file       Marcel Lozada MD  [facility] EMERGENCY DEPARTMENT  [facility] [facility] [id number] [phone]     Marcel Lozada MD  03/29/23 0702

## 2023-03-29 NOTE — PROGRESS NOTES
TRANSITIONS OF CARE (FREDDY) LOG   FREDDY tasks should be completed by the CC within one (1) business day of notification of each transition. Follow up contact with member is required after return to their usual care setting.  Note:  If CC finds out about the transitions fifteen (15) days or more after the member has returned to their usual care setting, no FREDDY log is needed. However, the CC should check in with the member to discuss the transition process, any changes needed to the care plan and document it in a case note.    Member Name:  Lakhwinder Negron MCO [facility]:  [facility] Member ID#: [id number]   Product: [facility] Care Coordinator Contact:  Shruthi Martins RN, PHN Agency/County/Care System: [facility]   Transition Communication Actions from Care Management Contact   Transition #1   Notification Date: 3/29/23 Transition Date:   3/28/23 Transition From: Home     Is this the member s usual care setting?               yes Transition To: [facility]   Transition Type:  Unplanned  Reason for Admission/Comments:  Shortness of breath, possible pleural effusion  Contact member/responsible party to offer assistance with transition Date completed: 3/29/23    Notes from conversation with the member/responsible party, provider, discharging and receiving facility (as applicable):   Date 3/29/23:CC contacted Hospital /discharge planner via the [facility] Transitional Care Hand-In Process, with community care plan included. Shared that this CC is covering for Lead CC Shruthi Martins RN 3/29 and 3/30.   CC reached out to family son/Ramin regarding transition and left a message requesting a return call. Also shared that this CC is covering for Lead CC Shruthi AMADOR 3/29 and 3/30.   Reviewed and update care plan as needed.  Notified community service providers and placed services Adult Day Care PCA RN on hold as needed.  Transition log initiated.   PCP, Vee River, notified of  hospitalization via EMR.    Yolis Cantu RN  [facility]  Office:[phone]  Cell Phone: [phone]        4/3/23 - CC spoke to member's sonRamin to follow-up on member's status. Per Ramin, family decided not to move forward with angiogram/angioplasty. CC reviewed medical records and confirmed today's procedure was cancelled. Per Ramin, member will likely discharge back to [facility] on 4/4/23. Spoke to Peyton at [place] and she will leave the care manager assigned to Lakhwinder Negron to contact CC on member's status and discharge plan.       Shruthi Martins RN, N  [facility]  [phone]           Shared CC contact info, care plan/services with receiving setting--Date completed: 3/29/23   Name & Title of receiving setting contact: [facility]   Notified PCP of transition--Date completed:  3/29/23     via  EMR  Name of PCP: Vee River     Transition #2   Notification Date: 4/4/2023 Transition Date:   4/4/2023 Transition From: Hospital, [facility]     Is this the member s usual care setting?               no Transition To: Assisted Living, [facility]   Transition Type:  Planned  Reason for Admission/Comments:  SOB/NSTEMI  Contact member/responsible party to offer assistance with transition Date completed: 4/4/23 - Spoke to member's son, Ramin Negron and [facility] Director, Geoffrey.     Notes from conversation with the member/responsible party, provider, discharging and receiving facility (as applicable):   Date 4/4/23 :CC contacted adult son Ramin  and [facility] Director, Grege and reviewed discharge summary.  Member has a follow-up appointment with PCP in 7 days: Yes: scheduled on 4/18/23   Member has had a change in condition: No  Home visit needed: No  Care plan reviewed and updated.  The following home based services Assisted Living were resumed.  New referrals placed: Yes - Home Care services (PT/OT/Nurse).   Transition log  completed.   PCP, Vee River, notified of transition back to home via EMR.    Xee will resume the services at [facility]. If there are any changes with current service plan/care, the HENNY and/or family will contact CC.     Shruthi Martins RN, N  [facility]  [phone]         Shared CC contact info, care plan/services with receiving setting--Date completed: See above   Name & Title of receiving setting contact: [facility]   Notified PCP of transition--Date completed:  4/4/23     via  EMR  Name of PCP: Vee River      *RETURN TO USUAL CARE SETTING: *Complete tasks below when the member is discharging TO their usual care setting within one (1) business day of notification..      For situations where the Care Coordinator is notified of the discharge prior to the date of discharge, the Care Coordinator must follow up with the member or designated representative to confirm that discharge actually occurred and discuss required FREDDY tasks as outlined in the FREDDY Instructions.  (This includes situations where it may be a  new  usual care setting for the member. (i.e., a community member who decides upon permanent nursing home placement following hospitalization and rehab).    Discuss with Member/Responsible Party:    Check  Yes  - if the member, family member and/or SNF/facility staff manages the following:    If  No  provide explanation in the comments section.          Date completed: 4/4/24 Communicated with member or their designated representative about the following:  care transition process; about changes to the member s health status; plan of care updates; education about transitions and how to prevent unplanned transitions/readmissions    Four Pillars for Optimal Transition:    Check  Yes  - if the member, family member and/or SNF/facility staff manages the following:    If  No  provide explanation in the comments section.          []  Yes     [x]  No Does the  member have a follow-up appointment scheduled with primary care or specialist? (Mental health hospitalizations--the appt. should be w/in 7 days)              For mental health hospitalizations:  []  Yes     []  No     Does the member have a follow-up appointment scheduled with a mental health practitioner within 7 days of discharge?  [x]  Yes     []  No     Has a medication review been completed with member? If no, refer to PCP, home care nurse, MTM, pharmacist  [x]  Yes     []  No     Can the member manage their medications or is there a system in place to manage medications (e.g. home care set-up)?         [x]  Yes     []  No     Can the member verbalize warning signs and symptoms to watch for and how to respond?  [x]  Yes     []  No     Does the member have a copy of and understand their discharge instructions?  If no, assist to obtain copy of discharge instructions, review discharge instructions, and assist to contact PCP to discuss questions about their recent hospitalization.  [x]  Yes     []  No     Does the member have adequate food, housing and transportation?  If no, add goal and discuss additional supports available to the member                                                                                                                                                                                 [x]  Yes     []  No     Is the member safe in their home?  If no, document needs and support provided                                                                                                                                                                          []  Yes     [x]  No     Are there any concerns of vulnerability, abuse, or neglect?  If yes, document concerns and actions taken by Care Coordinator as a mandated                                                                                                                                                                               [x]  Yes     []  No     Does the member use a Personal Health Care Record?  Check  Yes  if visit summary, discharge summary, and/or healthcare summary are being used as a PHR.                                                                                                                                                                                  [x]  Yes     []  No     Have you reviewed the discharge summary with the member? If  No  provide explanation in comments.  [x]  Yes     []  No     Have you updated the member s care plan/support plan? Add new diagnosis, medications, treatments, goals & interventions, as applicable. If No, provide explanation in comments.    Comments:      Follow-up appointment with PCP on 4/18/23.    Notes from conversation with the member/responsible party, provider, discharging and receiving facility (as applicable): See above.     Shruthi Martins RN, PHN  [facility]  [phone]

## 2023-03-29 NOTE — ED NOTES
Pt food tray taken out of room d/t NPO status. Pt is agreeable with plan, thoracentesis scheduled for this morning.
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
## 2023-03-29 NOTE — PHARMACY-ADMISSION MEDICATION HISTORY
Pharmacy Note - Admission Medication History    Pertinent Provider Information: Obtained current medication list from [facility] [address] [place] [address]    IRON: Order at dispensing pharmacy [facility] reads to take Ferrous Sulfate 325 mg, 1 tablet by mouth three times a day.  Facility provided list says to Take 1 tablet by mouth three times a week on Monday Wednesday and Friday.     Patient has been taking Levofloxacin 500 mg every 48 hours - last dose due 3/30. Also taking Metronidazole 500 mg BID - last dose 3/30 (indication: liver abscess)     The following medications were not on the med list provided from facility and removed:  Amlodipine 10 mg 1 tablet daily (last filled 12/31/22 for 90 day supply)  Furosemide 80 mg 1 tablet daily in the AM on non-dialysis days (last fill #90 on 3/5)  Hydralazine 50 mg tablet 1.5 tablets TID for blood pressure last filled 90 day supply on 2/6.     ______________________________________________________________________    Prior To Admission (PTA) med list completed and updated in EMR.       PTA Med List   Medication Sig Last Dose     aspirin 81 MG EC tablet Take 81 mg by mouth daily Past Week at am     atorvastatin (LIPITOR) 40 MG tablet Take 1 tablet (40 mg) by mouth daily For cholesterol. Past Week at pm     calcium acetate (PHOSLO) 667 MG CAPS capsule Take 667 mg by mouth 3 times daily (with meals) Past Week at pm     carvedilol (COREG) 25 MG tablet Take 1 tablet (25 mg) by mouth 2 times daily (with meals) For blood pressure and heart. Past Week at pm     Cholecalciferol (VITAMIN D3) 50 MCG (2000 UT) CAPS Take 1 capsule by mouth daily Past Week at am     famotidine (PEPCID) 20 MG tablet Take 1 tablet (20 mg) by mouth three times a week 1 tab to be taken only after dialysis three times a week (Patient taking differently: Take 20 mg by mouth three times a week 1 tab to be taken only after dialysis three times a week . Takes on Monday Wednesday  and Friday.) Past Week     ferrous sulfate (FE TABS) 325 (65 Fe) MG EC tablet Take 1 tablet (325 mg) by mouth 3 times daily Unknown     isosorbide mononitrate (IMDUR) 30 MG 24 hr tablet Take 1 tablet (30 mg) by mouth daily For blood pressure and heart. Past Week at am     levofloxacin (LEVAQUIN) 500 MG tablet Take 500 mg by mouth every 48 hours Last dose due 3/30/23. 3/28/2023 at am     metroNIDAZOLE (FLAGYL) 500 MG tablet Take 500 mg by mouth 2 times daily Last dose due 3/30/23. 3/28/2023 at am     oxyCODONE (ROXICODONE) 5 MG tablet Take 2.5 mg by mouth every 4 hours as needed for severe pain (7-10) Past Month at prn     pantoprazole (PROTONIX) 40 MG EC tablet Take 1 tablet (40 mg) by mouth 2 times daily For stomach ulcer. Past Week at pm     polyethylene glycol (MIRALAX) 17 g packet Take 17 g by mouth daily Hold if more than 2 stools per day Past Week at 09:00       Information source(s): Facility ([facility]/) medication list/MAR and [facility]  Method of interview communication: N/A    Summary of Changes to PTA Med List  New: Oxycodone. Miralax, Levofloxacin, Metronidazole, Aspirin, Calcium Acetate  Discontinued: Amlodipine, Acetaminophen, Tylenol#3, Furosemide, Hydralazine,   Changed:     Patient was asked about OTC/herbal products specifically.  PTA med list reflects this.    In the past week, patient estimated taking medication this percent of the time:  greater than 90%.    Medication Affordability:  Not including over the counter (OTC) medications, was there a time in the past 12 months when you did not take your medications as prescribed because of cost?: Unable to Assess    Allergies were reviewed, assessed, and updated with the patient.      Patient did not bring any medications to the hospital and can't retrieve from home. No multi-dose medications are available for use during hospital stay.     The information provided in this note is only as accurate as the sources available at the time  of the update(s).    Thank you for the opportunity to participate in the care of this patient.    LOUIE AKBAR [facility]  3/29/2023 11:24 AM

## 2023-03-29 NOTE — ED NOTES
"RT called to patient's room for increased work of breathing and oxygen needs.     Patient placed on Bipap, settings: IPAP 12, EPAP 5, RR 14, 30% FiO2     Patient currently resting comfortably.     BP (!) 155/71   Pulse 79   Temp 97  F (36.1  C) (Oral)   Resp 22   Ht 1.448 m (4' 9\")   Wt 46.3 kg (102 lb)   LMP  (LMP Unknown)   SpO2 100%   BMI 22.07 kg/m      Nkechi Roman, RT    "

## 2023-03-30 NOTE — PLAN OF CARE
Problem: Plan of Care - These are the overarching goals to be used throughout the patient stay.    Goal: Optimal Comfort and Wellbeing  3/30/2023 1434 by Aamir Tierney, RN  Outcome: Progressing  3/30/2023 1432 by Aamir Tierney, RN  Outcome: Progressing     Problem: Gas Exchange Impaired  Goal: Optimal Gas Exchange  Outcome: Progressing     Angiogram scheduled for tomorrow. Pt is aware of NPO status at midnight. Education on angiogram procedure was provided with [facility] . Dialysis today (600 ml removed). SpO2 97% on RA. Crackles on bases. Walked 10 ft with PT w/assist of 1. Sinus rhythm w/inverted T wave on tele.

## 2023-03-30 NOTE — PRE-PROCEDURE
GENERAL PRE-PROCEDURE:   Procedure:  Cor angio with possible intervention  Date/Time:  3/30/2023 12:12 PM    Written consent obtained?: Yes    Risks and benefits: Risks, benefits and alternatives were discussed    Consent given by:  Patient  Patient states understanding of procedure being performed: Yes    Patient's understanding of procedure matches consent: Yes    Procedure consent matches procedure scheduled: Yes    Expected level of sedation:  Moderate  Appropriately NPO:  Yes  ASA Class:  3 (new CM (EF 30-35%), hx HFpEF, HTN, NSTEMI, ESRD on HD, CVA DM, +LAMAS/CP)  Mallampati  :  Grade 2- soft palate, base of uvula, tonsillar pillars, and portion of posterior pharyngeal wall visible  Lungs:  Lungs clear with good breath sounds bilaterally  Heart:  Normal heart sounds and rate  History & Physical reviewed:  History and physical reviewed and no updates needed  Statement of review:  I have reviewed the lab findings, diagnostic data, medications, and the plan for sedation

## 2023-03-30 NOTE — PROGRESS NOTES
HD Progress Note    Assessment:Pt AAO x4, denied c/o SOB, no edema present.  present.    Vascular Access: Left tunneled internal jugular catheter patent, both ports aspirated and flushed well. BFR at 400 with good pressures. Dressing dry and intact, last changed 03/29/23. Ports locked with Heparin post Tx.    Dialyzer/Rinse: 160NR / lightly streaked    HD time: 3 hrs    HD fluid removal goal: 2-3 kg    Treatment: Started out with a goal of 2500 ml. Within 1 hr pts SBP dropped to 70's, denied c/o. Goal reduced and  ml bolus x 2 given. SBP improved and pt completed remaining Tx w/o further complications.     Fluid Removed Total: 1200 ml              Net: 600 ml     Interventions: VS monitoring q 15 min and as needed.    Plan: HD q TTS

## 2023-03-30 NOTE — PROGRESS NOTES
03/30/23 0954   Appointment Info   Signing Clinician's Name / Credentials (OT) Nkechi Díaz, OTR/L       Present yes   Language hmong   Living Environment   People in Home facility resident   Current Living Arrangements residential facility   Home Accessibility no concerns   Living Environment Comments pt states she was living with her grandson until she got sick in December and has lived in SNF ever since   Self-Care   Usual Activity Tolerance fair   Current Activity Tolerance fair   Regular Exercise No   Equipment Currently Used at Home walker, rolling;wheelchair, manual   Activity/Exercise/Self-Care Comment pt reports getting assist with bathing and toileting.  She dresses herself but gets assist when she is feeling weak   Instrumental Activities of Daily Living (IADL)   IADL Comments Assist with all IADLs   General Information   Onset of Illness/Injury or Date of Surgery 03/28/23   Referring Physician Maria E Abreu MD   Patient/Family Therapy Goal Statement (OT) pt states she plans to return to SNF but would like to get stronger to feel better, stronger, and increase her independence   Additional Occupational Profile Info/Pertinent History of Current Problem 82 year old female with medical history of chronic diastolic heart failure, CVA, type 2 diabetes mellitus, hypertension, ESRD-HDD, admitted on 3/28/202 with worsening shortness of breath at rest after hemodialysis, and substernal chest tightness.  ED work-up showed troponin of 207, elevated BNP with CT of the chest showing a moderate to large left pleural effusion and mild pulmonary edema.   Existing Precautions/Restrictions fall   Strength Comprehensive (MMT)   Comment, General Manual Muscle Testing (MMT) Assessment overall weakness   Bed Mobility   Bed Mobility supine-sit   Supine-Sit [place] (Bed Mobility) moderate assist (50% patient effort)   Assistive Device (Bed Mobility) bed rails   Transfers   Transfers bed-chair  transfer;toilet transfer   Transfer Skill: Bed to Chair/Chair to Bed   Bed-Chair [place] (Transfers) moderate assist (50% patient effort)   Toilet Transfer   Type (Toilet Transfer) sit-stand   [place] Level (Toilet Transfer) moderate assist (50% patient effort)   Assistive Device (Toilet Transfer) commode chair   Toilet Transfer Comments bedside   Balance   Balance Comments no loss of balance   Activities of Daily Living   BADL Assessment/Intervention toileting   Toileting   Assistive Devices (Toileting) commode chair   [place] Level (Toileting) maximum assist (25% patient effort)   Clinical Impression   Criteria for Skilled Therapeutic Interventions Met (OT) Yes, treatment indicated   OT Diagnosis reduced functional moblity and endurance   Influenced by the following impairments CHF   OT Problem List-Impairments impacting ADL problems related to;activity tolerance impaired;mobility;strength   Assessment of Occupational Performance 3-5 Performance Deficits   Identified Performance Deficits reduced functional mobilty, dressing, grooming balance   Planned Therapy Interventions (OT) ADL retraining;bed mobility training;strengthening;transfer training;progressive activity/exercise   Intervention Comments discussed treatment options with patient.  Pt states her goal is to work with therapy and get stonger   Clinical Decision Making Complexity (OT) moderate complexity   Risk & Benefits of therapy have been explained evaluation/treatment results reviewed;care plan/treatment goals reviewed;risks/benefits reviewed;current/potential barriers reviewed;participants voiced agreement with care plan;participants included;patient   OT Total Evaluation Time   OT Eval, Low Complexity Minutes (16336) 20   OT Goals   Therapy Frequency (OT) Daily   OT Predicted Duration/Target Date for Goal Attainment 04/04/23   OT Goals Lower Body Dressing;Hygiene/Grooming;Transfers;Aerobic Activity   OT: Hygiene/Grooming minimal  assist;while standing   OT: Lower Body Dressing Supervision/stand-by assist   OT: Transfer Minimal assist;with assistive device   OT: Perform aerobic activity with stable cardiovascular response 10 minutes;ambulation  (UE exercise, standing tolerance)   OT Discharge Planning   OT Plan standing tolerance, transfers to chair, UE exercise.  gh standing, dressing   OT Discharge Recommendation (DC Rec) Long term care facility   OT Rationale for DC Rec pt states she feels weaker than ususal and would like to work on her endurance to get stronger   OT Brief overview of current status Mod A bed to commode transfer, 2 min standing Min A, arm exercises   Total Session Time   Total Session Time (sum of timed and untimed services) 20

## 2023-03-30 NOTE — PROGRESS NOTES
RENAL PROGRESS NOTE    CC:  SOB    ASSESSMENT & PLAN:   ESRD:  Outpatient [facility] TTS (TT 180min, TW 42.5kg).  Dialyzed Tuesday and left at TW.  Dialyzed again emergently Tuesday night for SOB.  Only able to tolerate 900ml UF due to HoTN.  Crit line also suggested she was dry.     Plan:  Dialysis today to keep on TTS schedule.  Fluid restriction      SOB  Large left pleural effusion.  S/p Thoracentesis 900ml removed 3/29 and breathing much improved per Xee.  Pulmonary following  UF with dialysis to keep euvolemic      HTN  Coreg  25mg BID, isosorbide 30mg daily  BP significantly elevated on admission, now stable.  -continue home med regimen  -UF with dialysis      DM2     HFrEF  Echo this admission:  EF 30-35%, The left ventricular apex appears akinetic to possibly dyskinetic. The apical inferior wall appears akinetic. The apical anterior wall appears  Akinetic.  Mild decrease in RV systolic function.  Cardiology following-->planning angiogram today per nursing.  Stop IV lasix as she is anuric  -UF with dialysis      Liver abscess  Recently hospitalized at [facility] with klebsiella PNA/bacteremia with metastatic spread causing liver abscess  On levofloxacin and metronidazole PTA per pharmacy note.     CKD-MBD  Phoslo 1 TID AC     Anemia  hgb 11.6  Mircera at outpatient HD unit.     SUBJECTIVE:  Seen with assist of professional .  Xee reports she feels much better compared to yesterday.  Denies SOB.  No complaints at this time.  RN in room reports plans for angiogram today->Xee aware of plan.  Discussed plan for dialysis sometime today to keep her on her TTS schedule.     OBJECTIVE:  Physical Exam   Temp: 98  F (36.7  C) Temp src: Oral BP: 132/60 Pulse: 66   Resp: 16 SpO2: 97 % O2 Device: None (Room air)    Vitals:    03/28/23 1840 03/29/23 1929 03/30/23 0323   Weight: 46.3 kg (102 lb) 43.9 kg (96 lb 12.8 oz) 43.9 kg (96 lb 11.2 oz)     Vital Signs with Ranges  Temp:  [97.7  F (36.5   C)-98.2  F (36.8  C)] 98  F (36.7  C)  Pulse:  [61-68] 66  Resp:  [13-36] 16  BP: (113-167)/(54-72) 132/60  SpO2:  [96 %-100 %] 97 %  I/O last 3 completed shifts:  In: 600 [Other:600]  Out: 1500 [Other:1500]        Patient Vitals for the past 72 hrs:   Weight   03/30/23 0323 43.9 kg (96 lb 11.2 oz)   03/29/23 1929 43.9 kg (96 lb 12.8 oz)   03/28/23 1840 46.3 kg (102 lb)       PHYSICAL EXAM:  General: Alert, NAD  HEENT:  normocephalic  RESPIRATORY:  Lungs clear ant, normal effort, room air   CARDIOVASCULAR:  RRR,   No leg edema  ABDOMEN:  soft,  non-distended   GENITOURINARY:  No shepard   INTEGUMENTARY: Warm, dry, no rash  NEUROLOGIC: grossly intact   Psych: calm, cooperative  ACCESS: left CVC       LABORATORY STUDIES:     Recent Labs   Lab 03/28/23 1926   WBC 3.4*   RBC 3.56*   HGB 11.6*   HCT 37.6   PLT 67*       Basic Metabolic Panel:  Recent Labs   Lab 03/30/23  0739 03/29/23  2115 03/29/23  1738 03/29/23  1351 03/29/23  0825 03/29/23  0252 03/29/23  0114 03/28/23 1926   NA  --   --   --   --   --  136  --  133*   POTASSIUM  --   --   --   --   --  3.8  --  3.7   CHLORIDE  --   --   --   --   --  100  --  100   CO2  --   --   --   --   --  22  --  23   BUN  --   --   --   --   --  23.2*  --  20.5   CR  --   --   --   --   --  3.11*  --  2.82*   GLC 90 150* 103* 94 99 180*   < > 160*   FALLON  --   --   --   --   --  8.9  --  8.9    < > = values in this interval not displayed.       INRNo lab results found in last 7 days.     Recent Labs   Lab Test 03/28/23 1926 05/11/22  1211 01/24/20  0647 11/13/19  0930   INR  --   --  1.06 1.02   WBC 3.4* 6.3 11.4* 5.8   HGB 11.6* 10.4* 11.2* 8.5*   PLT 67* 122* 245 128*       Personally reviewed current labs       Irene Sands NP  [facility]  [phone]

## 2023-03-30 NOTE — PROGRESS NOTES
Received a call from Kaleigh with Care Management. She was requesting information related to services member receives. Shared that member goes to adult day care, has PCA and homecare services. No plan for discharge at this time.   Yolis Cantu RN  [facility]  Office:[phone]  Cell Phone: [phone]

## 2023-03-30 NOTE — CONSULTS
Clinical Nutrition Services - Education Note      Assessed learning needs, learning preferences, and willingness to learn    Nutrition Education (Content): 2g sodium diet  a) Provided handout on low sodium diet in Hmong and English  b) Discussed food and diet recommendations - in person  used for this visit    Nutrition Education (Application):  a) Discussed eating habits and recommended alternative food choices    Patient verbalizes understanding of diet by stating that they will look over the handout given.    Anticipate good compliance    Diet Education - refer to Education Flowsheet    Meseret Marks RDN, LD

## 2023-03-30 NOTE — PLAN OF CARE
Problem: Chronic Kidney Disease  Goal: Optimal Coping with Chronic Illness  Outcome: Not Progressing  Goal: Electrolyte Balance  Outcome: Not Progressing  Goal: Fluid Balance  Outcome: Not Progressing  Goal: Optimal Functional Ability  Outcome: Progressing  Intervention: Optimize Functional Ability  Recent Flowsheet Documentation  Taken 3/30/2023 0400 by Mark Atwood RN  Activity Management: activity adjusted per tolerance  Taken 3/30/2023 0000 by Mark Atwood RN  Activity Management: activity adjusted per tolerance  Taken 3/29/2023 2000 by Mark Atwood RN  Activity Management: activity adjusted per tolerance  Goal: Absence of Anemia Signs and Symptoms  Outcome: Not Progressing  Goal: Optimal Oral Intake  Outcome: Not Progressing  Goal: Acceptable Pain Control  Outcome: Not Progressing  Goal: Minimize Renal Failure Effects  Outcome: Not Progressing  Intervention: Monitor and Support Renal Function  Recent Flowsheet Documentation  Taken 3/30/2023 0400 by Mark Atwood RN  Medication Review/Management: medications reviewed  Taken 3/30/2023 0000 by Mark Atwood RN  Medication Review/Management: medications reviewed  Taken 3/29/2023 2000 by Mark Atwood RN  Medication Review/Management: medications reviewed   Goal Outcome Evaluation:    Admission  Diagnosis:  ESRD.  Pleural Effusion.  CHF. NSTEMI  Admitted from: U  Via: stretcher  Accompanied by: family  Belongings: Placed in closet; valuables sent home with family  Admission paperwork: complete  Teaching: Call don't fall, use of console, meal times, visiting hours, orientation to unit, when to call for the RN (angina/sob/dizzyness, etc.), and the importance of safety.  Access: PIV  Telemetry:Placed on pt  Ht./Wt.: complete   HD T-T-Sat.  Pulmonary consult today.  IV abx treatment. Cath lab when stable.

## 2023-03-30 NOTE — CONSULTS
Care Management Initial Consult    General Information  Assessment completed with: Patient, Xee  Type of CM/SW Visit: Initial Assessment    Primary Care Provider verified and updated as needed:     Readmission within the last 30 days:        Reason for Consult: discharge planning  Advance Care Planning:            Communication Assessment  Patient's communication style: spoken language (non-English)             Cognitive  Cognitive/Neuro/Behavioral: .WDL except, orientation        Orientation: disoriented to, situation (partially disoriented to situation/language barrier)             Living Environment:   People in home: child(charlette), adult  Ran, son and daughter in law  Current living Arrangements: house      Able to return to prior arrangements: yes       Family/Social Support:  Care provided by: self, child(charlette)  Provides care for: no one, unable/limited ability to care for self                Description of Support System:           Current Resources:   Patient receiving home care services:       Community Resources: Dialysis Services, DME, Transportation Services  Equipment currently used at home: walker, rolling, wheelchair, manual  Supplies currently used at home: Incontinence Supplies, Diabetic Supplies    Employment/Financial:  Employment Status: disabled        Financial Concerns:             Lifestyle & Psychosocial Needs:  Social Determinants of Health     Tobacco Use: Low Risk      Smoking Tobacco Use: Never     Smokeless Tobacco Use: Never     Passive Exposure: Not on file   Alcohol Use: Not At Risk     Frequency of Alcohol Consumption: Never     Average Number of Drinks: Patient does not drink     Frequency of Binge Drinking: Never   Financial Resource Strain: Low Risk      Difficulty of Paying Living Expenses: Not very hard   Food Insecurity: Not on file   Transportation Needs: No Transportation Needs     Lack of Transportation (Medical): No     Lack of Transportation (Non-Medical): No   Physical  Activity: Not on file   Stress: Not on file   Social Connections: Not on file   Intimate Partner Violence: Not on file   Depression: Not at risk     PHQ-2 Score: 0   Housing Stability: Unknown     Unable to Pay for Housing in the Last Year: No     Number of Places Lived in the Last Year: Not on file     Unstable Housing in the Last Year: No       Functional Status:  Prior to admission patient needed assistance:   Dependent ADLs:: Ambulation-walker, Ambulation-cane, Bathing, Dressing, Eating, Grooming, Incontinence, Positioning, Transfers, Wheelchair-with assist, Toileting  Dependent IADLs:: Cleaning, Cooking, Laundry, Shopping, Meal Preparation, Medication Management, Money Management, Transportation, Incontinence       Mental Health Status:          Chemical Dependency Status:                Values/Beliefs:  Spiritual, Cultural Beliefs, Amish Practices, Values that affect care:                 Additional Information:  PAPO met with patient at bedside. Srinivasan Liz  present in person also at bedside. Role explained, initial assessment completed.     Patient lives in house with son, Ran Negron [phone], and daughter in law. Patient has PCA services 5.5 hours/ day. Patient utilizes, cane, walker, and wheelchair at home. Has dialysis at [facility] T/T/S scheduled. Has transportation services that get her there. Patient states she does not receive any homecare services at this time.     Patient's goal would be to return home with resumed services/ support.     Chart reviewed - frequent admissions to [facility].     Yolis Cantu RN - covering for Lead CC Shruthi Martins RN 3/29/23 and 3/30/23  [facility]  Office:[phone]  Cell Phone: [phone]     CM will continue to follow care progression and aide in discharge planning as needed.     2:08 PM - RNCM placed call to patient's son to follow up on discharge recommendations. Ran stated that patient is currently living in a  "facility that is caring for her totally. Aides with feeding/ dialysis etc. Ran was not able to provide contact information for this facility however stated its with \"[facility]\" on [address].     2:29 PM - RNCM placed call to Care Coordinator to see if there was more information regarding patient's living situation. CC stated that they are providing patient with Adult day Care -  Adult Day Care services, and PCA via [facility]. Per Care Coordinator address on file for them is [address].     3:06 PM - [facility] does not have patient opened to services, discharged in January.     Berenice Arora RN        "

## 2023-03-30 NOTE — PROGRESS NOTES
PULMONARY PROGRESS NOTE    Date / Time of Admission:  3/28/2023  7:11 PM  Assessment:   82yoF with history of ESRD on HD and klebsiella bacteremia who presents with new left-sided pleural effusion. The effusion does not look consistent with infection given normal pH and glucose (no empyema or parapneumonic effusion). It meets criteria for exudative effusion based on the total protein but this could be post-dialysis UF as well.     Principal Problem:    ESRD (end stage renal disease) on dialysis (H)  Active Problems:    Shortness of breath    Pleural effusion    Elevated troponin      Plan:   Follow up cultures and cytology  Attempt to keep fluid off as best as can as this appears most consistent with renal failure and elevated volume.   Pancytopenia appears to be chronic (labs from February 2023 very simlilar). Caution with angiogram 3/31, not clear how well she will do with anti-platelet therapy.   Pulmonary will sign off.   Please call with questions.         Subjective:   HPI:  Lakhwinder Negron is a 82 year old female with history of diastolic dysfunction, CVA, DMII, ESRD on HD who presented after HD with shortness of breath. Troponin elevated on admission with large left-sided pleural effusion noted. UF attempted overnight with only 900cc removed. Underwent thoracentesis and breathing improved per patient.     Troponin remains elevated, plan for angiogram pending. Remains off of O2.         Allergies: No Known Allergies     MEDS:  Scheduled Meds:    aspirin  325 mg Oral Daily     atorvastatin  80 mg Oral QPM     calcium acetate  667 mg Oral TID w/meals     carvedilol  25 mg Oral BID     docusate sodium  100 mg Oral BID     [START ON 3/31/2023] famotidine  20 mg Oral Q48H     ferrous sulfate  325 mg Oral TID     heparin Lock (1000 units/mL High concentration)  2,200 Units Intracatheter During Dialysis/CRRT (from stock)     heparin Lock (1000 units/mL High concentration)  2,300 Units Intracatheter During Dialysis/CRRT  "(from stock)     insulin aspart  1-3 Units Subcutaneous TID AC     insulin aspart  1-3 Units Subcutaneous At Bedtime     isosorbide mononitrate  30 mg Oral QAM     metroNIDAZOLE  500 mg Oral BID     pantoprazole  40 mg Oral BID     sodium chloride (PF)  3 mL Intracatheter Q8H     Continuous Infusions:    - MEDICATION INSTRUCTIONS -       ACE/ARB/ARNI NOT PRESCRIBED       PRN Meds:.- MEDICATION INSTRUCTIONS -, sodium chloride 0.9%, acetaminophen **OR** acetaminophen, glucose **OR** dextrose **OR** glucagon, hydrALAZINE, lidocaine 4%, lidocaine (buffered or not buffered), melatonin, metoprolol, morphine, naloxone **OR** naloxone **OR** naloxone **OR** naloxone, ondansetron **OR** ondansetron, oxyCODONE IR, prochlorperazine **OR** prochlorperazine **OR** prochlorperazine, ACE/ARB/ARNI NOT PRESCRIBED, sodium chloride (PF), sodium phosphate    Objective:   VITALS:  BP (!) 154/73 (BP Location: Left arm)   Pulse 68   Temp 97.6  F (36.4  C) (Oral)   Resp 18   Ht 1.448 m (4' 9\")   Wt 43.9 kg (96 lb 11.2 oz)   LMP  (LMP Unknown)   SpO2 98%   BMI 20.93 kg/m    EXAM:    GENERAL APPEARANCE: Alert, no acute distress  HENT: Oral mucosa and posterior oropharynx normal, moist mucous membranes  NECK: No adenopathy,masses or thyromegaly  RESP: lungs clear to auscultation bilaterally  CV: regular rate and rhythm, no murmur, rub or gallop  ABDOMEN: normal bowel sounds, soft, nontender, no hepatosplenomegaly or other masses  LYMPHATICS: No cervical, or supraclavicular adenopathy  SKIN: no suspicious lesions or rashes  NEURO: Alert, oriented x 3, speech and mentation normal        I&O:    Date 03/30/23 0700 - 03/31/23 0659   Shift 7251-0777 5288-6136 [id number] 24 Hour Total   INTAKE   I.V. 3   3   Other  600  600   Shift Total(mL/kg) 3(0.07) 600(13.68)  603(13.75)   OUTPUT   Other  1200  1200   Shift Total(mL/kg)  1200(27.36)  1200(27.36)   Weight (kg) 43.86 43.86 43.86 43.86       Data Review:    Imaging: personally " reviewed  Chest CT:   EXAM: CT CHEST ABDOMEN PELVIS W/O CONTRAST  LOCATION: [facility]  DATE/TIME: 3/28/2023 8:04 PM     INDICATION: pain, short of breath  COMPARISON: None.  TECHNIQUE: CT scan of the chest, abdomen, and pelvis was performed without IV contrast. Multiplanar reformats were obtained. Dose reduction techniques were used.   CONTRAST: None.     FINDINGS:   LUNGS AND PLEURA: Bilateral groundglass opacities with interlobular septal thickening and mild bronchial wall thickening. Moderate-large left pleural effusion with associated complete left lower lobe and lingular collapse. Small-moderate right pleural   effusion with right lower lobe airspace opacities.     MEDIASTINUM/AXILLAE: No lymphadenopathy. No thoracic aortic aneurysms. Left neck central venous catheter terminates in the right atrium.     CORONARY ARTERY CALCIFICATION: Moderate.     HEPATOBILIARY: Cholecystectomy. No biliary dilation.     PANCREAS: Normal.     SPLEEN: Normal.     ADRENAL GLANDS: Normal.     KIDNEYS/BLADDER: Atrophic bilaterally. No collecting system dilation. Bladder is completely collapsed and unable to be evaluated.     BOWEL: Normal.     LYMPH NODES: Normal.     VASCULATURE: Moderate diffuse atherosclerotic calcification of the abdominal aorta, visceral branches, and iliofemoral arteries. No aneurysm.     PELVIC ORGANS: Normal.     MUSCULOSKELETAL: Diffuse body wall edema. No aggressive or destructive osseous lesions.                                                                      IMPRESSION:  1.  Moderate-large left pleural effusion with resultant complete left lower lobe and lingular collapse. Small-moderate right pleural effusion with associated airspace opacities, also likely compressive atelectasis although superimposed pneumonia is also   possible.     2.  Mild pulmonary edema.    Results for orders placed or performed during the hospital encounter of 03/28/23   Basic metabolic panel    Result Value Ref Range    Sodium 136 136 - 145 mmol/L    Potassium 3.8 3.4 - 5.3 mmol/L    Chloride 100 98 - 107 mmol/L    Carbon Dioxide (CO2) 22 22 - 29 mmol/L    Anion Gap 14 7 - 15 mmol/L    Urea Nitrogen 23.2 (H) 8.0 - 23.0 mg/dL    Creatinine 3.11 (H) 0.51 - 0.95 mg/dL    Calcium 8.9 8.8 - 10.2 mg/dL    Glucose 180 (H) 70 - 99 mg/dL    GFR Estimate 14 (L) >60 mL/min/1.73m2     Lab Results   Component Value Date    WBC 3.4 (L) 03/28/2023    HGB 11.6 (L) 03/28/2023    HCT 37.6 03/28/2023     (H) 03/28/2023    PLT 67 (L) 03/28/2023

## 2023-03-30 NOTE — ED NOTES
"[facility] ED Handoff Report    ED Chief Complaint: sob/c/p    ED Diagnosis:  (J90) Pleural effusion  Comment:   Plan:     (R06.02) Shortness of breath  Comment:   Plan:     (R77.8) Elevated troponin  Comment:   Plan:     (N18.6,  Z99.2) ESRD (end stage renal disease) on dialysis (H)  Comment:   Plan:        PMH:    Past Medical History:   Diagnosis Date    Acute on chronic diastolic CHF (congestive heart failure) (H) 3/9/2018    Arthritis     Cataract     CVA (cerebral vascular accident) (H) 2001    Visual changes - RT Leg weakness    DM (diabetes mellitus) (H)     dx around 15 years ago.     Encounter regarding vascular access for dialysis for ESRD (H) 1/10/2020    Added automatically from request for surgery [id number]    Gram-negative pneumonia (H) 5/8/2018    Hypertension     Moderate malnutrition (H) 9/27/2021    neuropathy     Nonproliferative diabetic retinopathy of both eyes (H) 5/12/2011        Code Status:  No CPR- Do NOT Intubate     Falls Risk: Yes Band: Applied    Current Living Situation/Residence: lives in a house with son and DTR    Elimination Status: Continent: No     Activity Level: SBA w/ walker    Patients Preferred Language:  Other: Hmong     Needed: Yes    Vital Signs:  BP (!) 141/66   Pulse 68   Temp 98.2  F (36.8  C) (Oral)   Resp 17   Ht 1.448 m (4' 9\")   Wt 46.3 kg (102 lb)   LMP  (LMP Unknown)   SpO2 96%   BMI 22.07 kg/m       Cardiac Rhythm:     Pain Score: 0/10    Is the Patient Confused:  No    Last Food or Drink: 03/29/23 at 6pm    Focused Assessment:  pt is a 83 yo female, who has dialysis, had thoracentesis, was on bIPAP last night, pt on RA.    Tests Performed: Done: Labs and Imaging    Treatments Provided:  see charting    Family Dynamics/Concerns: No    Family Updated On Visitor Policy: No    Plan of Care Communicated to Family: Yes    Who Was Updated about Plan of Care: family at bedside    Belongings Checklist Done and Signed by Patient: " No    Medications sent with patient: yes    Covid: asymptomatic , negative    Additional Information:     RN: Jeanne Woo RN   3/29/2023 7:14 PM

## 2023-03-30 NOTE — PROGRESS NOTES
With help of an , I discussed the indication for coronary angiogram/possible PCI and the risks associated with angiogram including <0.1% of MI and CVA, death or any of the following to the degree that it could threaten patient's life: allergic reaction, arrhythmia, renal failure, hemorrhage, thrombosis and infection.  Risk associated with therapeutic intervention includes 2% or less risk of MI, less than 1% risk of CVA, emergency heart surgery, death, less than 4% risk of vascular injury requiring surgical repair or blood transfusion with 20-30% risk of restenosis with a bare-metal stent and less than 10% risk of stenosis with a drug-eluting stent.  Patient states understanding of procedure and risks and agrees to proceed.    Consents are signed and on chart.     Will check with nurse, as room board says dialysis at noon today, which would interfere with cor angio.     Diane Sherman PA-C, MPAS  [facility]  [facility]
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
## 2023-03-30 NOTE — PROGRESS NOTES
[facility]    Medicine Progress Note - Hospitalist Service    Date of Admission:  3/28/2023    Assessment & Plan     Lakhwinder Negron is an 82 year old female with medical history of chronic diastolic heart failure, CVA, type 2 diabetes mellitus, hypertension, ESRD on hemodialysis, admitted on 3/28/202 with worsening shortness of breath at rest after hemodialysis, and substernal chest tightness.       Acute on chronic systolic heart failure:   Elevated BNP with CT of the chest showing a moderate to large left pleural effusion and mild pulmonary edema.   - Not making much urine currently and so doubt that diuretics will help much.  - Hemodialysis per nephrology  - Strict I/O's and restrict fluid/salt intake 1500 ml and 2400 mg.     NSTEMI:   Elevated to troponin of 207. Has been stable after admission. Echocardiogram showed LVEF 30-35%, compared to 64% in 2018, with akinetic in the apical inferior and anterior wall.  - Cardiology consulted and input appreciated: Plan cardiac cath when appropriate  - Cont aspirin 325 mg daily and lipitor 80 mg daily  - Continue PTA carvedilol and Imdur     Large left pleural effusion:  Received thoracentesis on 3/29/23 with 900 mL of clear yellow fluid removed.   -  Pulmonology assistance appreciated  -   Follow up fluid analysis pending ( added serum protein and LDH to A.M. labs)  -  Continue to monitor respiratory status.     Acute hypoxic respiratory failure: resolved  - Oxygen supplementation to keep saturations over 92%.  -  On room air as of this morning.   -  Patient is DNR.     Recent Klebsiella pneumoniae bacteremia and liver abscesses  admitted in outside facility from 1/23-2/8/23.   - Continue Metronidazole 500 mg PO q 12 + Levofloxacin 500 mg q 48 hrs x 3 weeks ending date 3/31/23.  - Follow up in the ID clinic ([facility]) and  liver in 3 weeks       Diet: Fluid restriction 1000 ML FLUID  NPO for Medical/Clinical Reasons Except for: Meds, Ice Chips    DVT  Prophylaxis: Heparin SQ  Girard Catheter: Not present  Lines: PRESENT             Cardiac Monitoring: ACTIVE order. Indication: Acute decompensated heart failure (48 hours)  Code Status: No CPR- Do NOT Intubate      Clinically Significant Risk Factors                # Thrombocytopenia: Lowest platelets = 67 in last 2 days, will monitor for bleeding                  Disposition Plan      Expected Discharge Date: 04/02/2023                  Sebastian Duron MD  Hospitalist Service  [facility]  Securely message with [employer] (more info)  Text page via [employer] Paging/Directory   ______________________________________________________________________    Interval History   Patient reports SOB improved at rest, but still there with minimal exertion . No chest pain.  a bedside. Hmong speaking only. Angio postponed to tomorrow.    Physical Exam   Vital Signs: Temp: 98  F (36.7  C) Temp src: Oral BP: 132/60 Pulse: 66   Resp: 16 SpO2: 97 % O2 Device: None (Room air)    Weight: 96 lbs 11.2 oz    General appearance: not in acute distress  HEENT: PERRL, EOMI  Lungs: Clear breath sounds in bilateral lung fields  Cardiovascular: Regular rate and rhythm, normal S1-S2  Abdomen: Soft, non tender, no distension  Musculoskeletal: No joint swelling  Skin: No rash and no edema  Neurology: AAO ×3.  Cranial nerves II - XII normal.  Normal muscle strength in all four extremities.    Medical Decision Making       35 MINUTES SPENT BY ME on the date of service doing chart review, history, exam, documentation & further activities per the note.      Data     I have personally reviewed the following data over the past 24 hrs:    N/A  \   N/A   / N/A     N/A N/A N/A /  90   N/A N/A N/A \       Imaging results reviewed over the past 24 hrs:   Recent Results (from the past 24 hour(s))   US Thoracentesis    Narrative    EXAM:   1. LEFT  THORACENTESIS  2. ULTRASOUND GUIDANCE  LOCATION: [facility]  DATE/TIME: 3/29/2023 12:14 PM    INDICATION: Pleural effusion.    PROCEDURE: Informed consent obtained. Time out performed. The chest was prepped and draped in sterile fashion. 6  mL of 1 % lidocaine was infused into the local soft tissues. Under direct ultrasound guidance, a 5 Faroese catheter system was placed into   the pleural effusion.     900 mL of clear yellow fluid were removed and sent to lab, if requested.    Patient tolerated procedure well.    Ultrasound imaging was obtained and placed in the patient's permanent medical record.      Impression    IMPRESSION:  Status post left  ultrasound-guided thoracentesis.    Reference CPT Code: 21746   Echocardiogram Complete    Narrative    [id number]  [id number]  [id number]  [id number]^GEORGE^[place] [address]     Name: MAURO LAZARO  MRN: [id number]  : 1940  Study Date: 2023 01:08 PM  Age: 82 yrs  Gender: Female  Patient Location: [facility]  Reason For Study: Syncope  Ordering Physician: GELACIO VAZQUEZ  Referring Physician: GELACIO VAZQUEZ  Performed By:      BSA: 1.4 m2  Height: 57 in  Weight: 102 lb  HR: 64  ______________________________________________________________________________  Procedure  Complete Echo Adult. Definity (NDC #53102-073) given intravenously.  ______________________________________________________________________________  Interpretation Summary     Normal ventricular size. Left ventricular systolic function is moderate to  severely reduced. Left ventricular ejection fraction estimated at 30 to 35%.  The left ventricular apex appears akinetic to possibly dyskinetic. The apical  inferior wall appears akinetic. The apical anterior wall appears  akinetic.Diastolic Doppler findings (E/E' ratio and/or other parameters)  suggest left ventricular filling pressures are increased  Normal right ventricular size. Mild decrease in right ventricular systolic  function suggested.  Mild left  atrial enlargement.  Mild mitral regurgitation.  Mild tricuspid regurgitation. Estimate of RV systolic pressure is normal at 23  mmHg plus right atrial pressure  Moderate aortic regurgitation     ______________________________________________________________________________  Left Ventricle  The left ventricle is normal in size. There is normal left ventricular wall  thickness. Diastolic Doppler findings (E/E' ratio and/or other parameters)  suggest left ventricular filling pressures are increased. Left ventricular  systolic function is moderate to severely reduced. Left ventricular ejection  fraction estimated at 30 to 35% with regional wall motion abnormality as  described. The left ventricular apex is akinetic to dyskinetic.  The apical inferior wall is akinetic. The apical anterior wall is akinetic.     Right Ventricle  The right ventricle is normal size. Mildly decreased right ventricular  systolic function. TAPSE is abnormal, which is consistent with abnormal right  ventricular systolic function.     Atria  The left atrium is mildly dilated. Right atrial size is normal. Lipomatous  hypertrophy of the interatrial septum is noted. Cannot exclude patent foramen  ovale.     Mitral Valve  Mitral valve leaflets appear normal. There is mild (1+) mitral regurgitation.     Tricuspid Valve  The tricuspid valve is not well visualized, but is grossly normal. There is  mild (1+) tricuspid regurgitation.     Aortic Valve  The aortic valve is trileaflet with aortic valve sclerosis. There is moderate  (2+) aortic regurgitation. No hemodynamically significant valvular aortic  stenosis.     Pulmonic Valve  The pulmonic valve is not well seen, but is grossly normal. There is trace  pulmonic valvular regurgitation.     Vessels  The aorta root is normal. Normal size ascending aorta. IVC diameter <2.1 cm  collapsing >50% with sniff suggests a normal RA pressure of 3 mmHg.     Pericardium  There is no pericardial effusion.      ______________________________________________________________________________  MMode/2D Measurements & Calculations  IVSd: 1.1 cm  LVIDd: 4.6 cm  LVIDs: 3.4 cm  LVPWd: 1.1 cm  FS: 26.5 %     LV mass(C)d: 174.9 grams  LV mass(C)dI: 129.5 grams/m2  Ao root diam: 3.2 cm  LA dimension: 3.2 cm  asc Aorta Diam: 3.4 cm  LA/Ao: 0.98  LVOT diam: 1.8 cm  LVOT area: 2.4 cm2  LA Volume Indexed (AL/bp): 34.2 ml/m2  RV Base: 3.6 cm  RWT: 0.47  TAPSE: 1.4 cm     Time Measurements  MM HR: 63.0 BPM     Doppler Measurements & Calculations  MV E max galindo: 51.8 cm/sec  MV A max galindo: 96.0 cm/sec  MV E/A: 0.54  MV max PG: 3.8 mmHg  MV mean P.5 mmHg  MV V2 VTI: 26.8 cm  MVA(VTI): 1.7 cm2  MV dec slope: 186.4 cm/sec2  MV dec time: 0.28 sec  Ao V2 max: 126.4 cm/sec  Ao max P.0 mmHg  Ao V2 mean: 92.1 cm/sec  Ao mean PG: 3.7 mmHg  Ao V2 VTI: 28.6 cm  SAYRA(I,D): 1.6 cm2  SAYRA(V,D): 1.5 cm2  AI P1/2t: 467.9 msec  LV V1 max P.5 mmHg  LV V1 max: 78.8 cm/sec  LV V1 VTI: 18.8 cm  SV(LVOT): 45.7 ml  SI(LVOT): 33.8 ml/m2  PA acc time: 0.08 sec  TR max galindo: 240.3 cm/sec  TR max P.1 mmHg  AV Galindo Ratio (DI): 0.62  SAYRA Index (cm2/m2): 1.2  E/E' av.9  Lateral E/e': 17.6     Medial E/e': 24.2  RV S Galindo: 5.1 cm/sec     ______________________________________________________________________________  Report approved by: Riley Toussaint 2023 02:16 PM

## 2023-03-30 NOTE — PROGRESS NOTES
"   03/30/23 1100   Appointment Info   Signing Clinician's Name / Credentials (PT) Susana Peña, PT, DPT       Present yes   Language Hmong in-person    Living Environment   People in Home child(charlette), adult  (Son and daughter-in-law)   Current Living Arrangements house   Home Accessibility other (see comments)  (Unknown)   Transportation Anticipated family or friend will provide   Living Environment Comments Patient told OT/PT she has been at a SNF, however per CM note, \"Patient lives in house with sonRan [phone], and daughter in law. Patient has PCA services 5.5 hours/day. Patient utilizes, cane, walker, and wheelchair at home. Has dialysis at [facility] T/T/S scheduled. Has transportation services that get her there. Patient states she does not receive any homecare services at this time.\"   Self-Care   Usual Activity Tolerance fair   Current Activity Tolerance fair   Regular Exercise No   Equipment Currently Used at Home walker, rolling;wheelchair, manual   Activity/Exercise/Self-Care Comment See above.   General Information   Onset of Illness/Injury or Date of Surgery 03/28/23   Referring Physician Maria E Abreu MD   Patient/Family Therapy Goals Statement (PT) Get stronger and be able to walk more   Pertinent History of Current Problem (include personal factors and/or comorbidities that impact the POC) Per chart, \"82yoM with ESRD, DMII, CVA who presented with large left-sided effusion.\"   Existing Precautions/Restrictions no known precautions/restrictions   Weight-Bearing Status - LLE full weight-bearing   Weight-Bearing Status - RLE full weight-bearing   Range of Motion (ROM)   Range of Motion ROM is WFL   Strength (Manual Muscle Testing)   Strength (Manual Muscle Testing) strength is WFL   Bed Mobility   Comment, (Bed Mobility) Patient sitting EOB with OT when PT arrived.   Transfers   Transfers sit-stand transfer   Impairments Contributing to Impaired Transfers " impaired balance;decreased strength   Sit-Stand Transfer   Sit-Stand [place] (Transfers) contact guard   Assistive Device (Sit-Stand Transfers) walker, front-wheeled   Gait/Stairs (Locomotion)   [place] Level (Gait) contact guard   Assistive Device (Gait) walker, front-wheeled   Pattern (Gait) step-to   Deviations/Abnormal Patterns (Gait) yahaira decreased;gait speed decreased;stride length decreased   Clinical Impression   Criteria for Skilled Therapeutic Intervention Yes, treatment indicated   PT Diagnosis (PT) Impaired functional mobility   Influenced by the following impairments Deconditioning, fatigue, shortness of breath   Functional limitations due to impairments Bed mobility, transfers, gait   Clinical Presentation (PT Evaluation Complexity) Evolving/Changing   Clinical Presentation Rationale Patient presents as medically diagnosed.   Clinical Decision Making (Complexity) moderate complexity   Planned Therapy Interventions (PT) balance training;bed mobility training;gait training;home exercise program;patient/family education;strengthening;transfer training;home program guidelines   Risk & Benefits of therapy have been explained evaluation/treatment results reviewed;care plan/treatment goals reviewed;patient   PT Total Evaluation Time   PT Eval, Moderate Complexity Minutes (78766) 10   Physical Therapy Goals   PT Frequency Daily   PT Predicted Duration/Target Date for Goal Attainment 04/06/23   PT Goals Bed Mobility;Transfers;Gait   PT: Bed Mobility Independent;Supine to/from sit   PT: Transfers Supervision/stand-by assist;Sit to/from stand;Assistive device  (FWW)   PT: Gait Supervision/stand-by assist;Assistive device;50 feet  (FWW)   Interventions   Interventions Quick Adds Gait Training;Therapeutic Activity;Therapeutic Procedure   Therapeutic Procedure/Exercise   Ther. Procedure: strength, endurance, ROM, flexibillity Minutes (40261) 10   Symptoms Noted During/After Treatment fatigue   Treatment  "Detail/Skilled Intervention Sitting EOB: Patient completed seated BLE exercises x 10 reps each. Demonstration and cues for exercise technique.   Therapeutic Activity   Therapeutic Activities: dynamic activities to improve functional performance Minutes (76205) 15   Symptoms Noted During/After Treatment Fatigue   Treatment Detail/Skilled Intervention Patient ambulated 10' around the bed with CGA and FWW. Slow pace but steady. Static standing x 5 minutes with CGA and FWW while talking to MD. Repeated sit<>stands from EOB x 5 with CGA-min A and FWW. Demonstration and cues for hand placement. Sit>supine with SBA.   PT Discharge Planning   PT Plan Transfers, gait with FWW (chair follow if out of room), LE strengthening   PT Discharge Recommendation (DC Rec) (S)  Long term care facility;home with assist;home with home care physical therapy   PT Rationale for DC Rec Patient currently requires assist of 1 for mobility due to weakness. Patient told OT/PT she has been living at a SNF. If this is accurate, then patient will be able to safely return. Per CM note, if patient lives at home, then patient would need assist of 1 from family and home PT.   PT Brief overview of current status CGA for transfers and short distance gait in room. Patient is very motivated to \"get stronger\".     "

## 2023-03-31 NOTE — PROGRESS NOTES
"Pt's was Npo for angiogram from midnight, Am aspirin given, pt\" wrist and groin areas  wiped down this morning, clean gown on, wrist band to ankle. Pt was alert during transportation to [facility] via wheel chair.   "

## 2023-03-31 NOTE — PROGRESS NOTES
RENAL PROGRESS NOTE    CC:  SOB    ASSESSMENT & PLAN:   ESRD:  Outpatient unit [facility] TTS (TT 180min, TW 42.5kg).  Dialyzed Tuesday and left at TW.  Dialyzed again emergently Tuesday night for SOB.  Only able to tolerate 900ml UF due to HoTN.  Dialyzed yesterday, set for aggressive goal but BP dropped.  LVEDP was 15 at angio today, not that high.    Plan:  Dialysis on TTS schedule  I don't see a lot of benefit in running her again today  Fluid restriction   I think we will continue to see trouble keeping this patient dry     SOB  Large left pleural effusion.  S/p Thoracentesis 900ml removed 3/29 and breathing much improved per Xee.  Pulmonary following  Appears all related to volume overload but limited by hypotension on dialysis  I'm very concerned we will not be able to keep this effusion from recurring with limited ability to UF     HTN  Coreg  25mg BID, isosorbide 30mg daily  BP quite labile, high after angiogram but coming back down to 120s  -continue home med regimen  -UF with dialysis      DM2     HFrEF  Echo this admission:  EF 30-35%, The left ventricular apex appears akinetic to possibly dyskinetic. The apical inferior wall appears akinetic. The apical anterior wall appears  Akinetic.  Mild decrease in RV systolic function.  Angiogram with multivessel disease, would be challenging to PCI on, no ability to support her hemodynamically because of her calcified arteries  I suspect UF will continue to be problematic, her dropping her BP on dialysis is possibly an ischemic symptom....     Liver abscess  Recently hospitalized at [facility] with klebsiella PNA/bacteremia with metastatic spread causing liver abscess  On levofloxacin and metronidazole PTA per pharmacy note.     CKD-MBD  Phoslo 1 TID AC     Anemia  hgb 11.6  Mircera at outpatient HD unit.           SUBJECTIVE:  Seen with assist of professional .  Reviewed angiogram.  Sounds as as if intervention would be challenging/hard  to support her.  Can't imagine she is an appropriate CABG candidate.  I told her this morning through an  she is in a very challenging medical situation that will be hard to improve.  She tells me she's much weaker than she was 3 months ago with recurrent hospital stays.    Discussed with Dr. Montaño    OBJECTIVE:  Physical Exam   Temp: 97.9  F (36.6  C) Temp src: Oral BP: 121/63 Pulse: 71   Resp: 16 SpO2: 100 % O2 Device: None (Room air) Oxygen Delivery: 2 LPM  Vitals:    03/29/23 1929 03/30/23 0323 03/31/23 0008   Weight: 43.9 kg (96 lb 12.8 oz) 43.9 kg (96 lb 11.2 oz) 42.2 kg (93 lb 0.6 oz)     Vital Signs with Ranges  Temp:  [97.6  F (36.4  C)-98.7  F (37.1  C)] 97.9  F (36.6  C)  Pulse:  [64-76] 71  Resp:  [12-20] 16  BP: ()/(47-80) 121/63  SpO2:  [96 %-100 %] 100 %  I/O last 3 completed shifts:  In: 606 [I.V.:6; Other:600]  Out: 1200 [Other:1200]        Patient Vitals for the past 72 hrs:   Weight   03/31/23 0008 42.2 kg (93 lb 0.6 oz)   03/30/23 0323 43.9 kg (96 lb 11.2 oz)   03/29/23 1929 43.9 kg (96 lb 12.8 oz)   03/28/23 1840 46.3 kg (102 lb)       PHYSICAL EXAM:  General: Alert, NAD  HEENT:  normocephalic  RESPIRATORY:  Lungs clear ant, normal effort, room air   CARDIOVASCULAR:  RRR,   No leg edema  ABDOMEN:  soft,  non-distended   GENITOURINARY:  No shepard   INTEGUMENTARY: Warm, dry, no rash  NEUROLOGIC: grossly intact   Psych: calm, cooperative  ACCESS: left CVC       LABORATORY STUDIES:     Recent Labs   Lab 03/31/23  0458 03/28/23 1926   WBC 3.0* 3.4*   RBC 3.83 3.56*   HGB 12.5 11.6*   HCT 40.4 37.6   PLT 37* 67*       Basic Metabolic Panel:  Recent Labs   Lab 03/31/23  1114 03/31/23  0458 03/30/23  2042 03/30/23  1549 03/30/23  1405 03/30/23  0739 03/29/23  0825 03/29/23  0252 03/29/23  0114 03/28/23  1926   NA  --  133*  --   --   --   --   --  136  --  133*   POTASSIUM  --  3.9  --   --   --   --   --  3.8  --  3.7   CHLORIDE  --  96*  --   --   --   --   --  100  --  100   CO2   --  27  --   --   --   --   --  22  --  23   BUN  --  14.5  --   --   --   --   --  23.2*  --  20.5   CR  --  2.30*  --   --   --   --   --  3.11*  --  2.82*   GLC 74 84 107* 85 83 90   < > 180*   < > 160*   FALLON  --  8.1*  --   --   --   --   --  8.9  --  8.9    < > = values in this interval not displayed.       INRNo lab results found in last 7 days.     Recent Labs   Lab Test 03/31/23  0458 03/28/23  1926 05/11/22  1211 01/24/20  0647 11/13/19  0930   INR  --   --   --  1.06 1.02   WBC 3.0* 3.4*   < > 11.4* 5.8   HGB 12.5 11.6*   < > 11.2* 8.5*   PLT 37* 67*   < > 245 128*    < > = values in this interval not displayed.       Personally reviewed current labs       Zackary Verma  [facility]  [phone]
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
## 2023-03-31 NOTE — PROGRESS NOTES
[facility]    Medicine Progress Note - Hospitalist Service    Date of Admission:  3/28/2023    Assessment & Plan     Lakhwinder Negron is an 82 year old female with medical history of chronic diastolic heart failure, CVA, type 2 diabetes mellitus, hypertension, ESRD on hemodialysis, admitted on 3/28/202 with worsening shortness of breath at rest after hemodialysis, and substernal chest tightness.     NPO this morning for coronary angiogram today.      Acute hypoxic respiratory failure: resolved  - Oxygen supplementation to keep saturations over 92%.  -  On room air as of this morning.   -  Patient is DNR.     Recent Klebsiella pneumoniae bacteremia and liver abscesses  admitted in outside facility from 1/23-2/8/23.   - Continue Metronidazole 500 mg PO q 12 + Levofloxacin 500 mg q 48 hrs x 3 weeks ending date 3/31/23.  - Follow up in the ID clinic ([facility]) and US liver in 3 weeks    Acute on chronic systolic heart failure: resolved  Elevated BNP with CT of the chest showing a moderate to large left pleural effusion and mild pulmonary edema.   - Not making much urine currently and so doubt that diuretics will help much.  - Hemodialysis per nephrology  - Strict I/O's and restrict fluid/salt intake 1500 ml and 2400 mg.     Large left pleural effusion:  Received thoracentesis on 3/29/23 with 900 mL of clear yellow fluid removed.   -  Pulmonology assistance appreciated.    -   Pleural fluid is exudative, follow up culture. So far NGTD  -   Continue to monitor respiratory status.    NSTEMI:   Elevated to troponin of 207. Has been stable after admission. Echocardiogram showed LVEF 30-35%, compared to 64% in 2018, with akinetic in the apical inferior and anterior wall.  - Cardiology consulted and input appreciated: Plan cardiac cath when appropriate  - Cont aspirin 325 mg daily and lipitor 80 mg daily  - Continue PTA carvedilol and Imdur        Diet: Fluid restriction 1000 ML FLUID  Advance Diet as  Tolerated: Clear Liquid Diet    DVT Prophylaxis: Heparin SQ  Girard Catheter: Not present  Lines: PRESENT      Hemodialysis Vascular Access Subclavian vein catheter Left-Site Assessment: WDL      Cardiac Monitoring: ACTIVE order. Indication: Post- PCI/Angiogram (24 hours)  Code Status: No CPR- Do NOT Intubate      Clinically Significant Risk Factors                # Thrombocytopenia: Lowest platelets = 37 in last 2 days, will monitor for bleeding                  Disposition Plan      Expected Discharge Date: 04/01/2023      Destination: home with family;home with help/services            Sebastian Duron MD  Hospitalist Service  [facility]  Securely message with [employer] (more info)  Text page via [employer] Paging/Directory   ______________________________________________________________________    Interval History   Patient reports SOB improved at rest, but still there with minimal exertion . No chest pain.  a bedside. Hmong speaking only. Angio postponed to tomorrow.    Physical Exam   Vital Signs: Temp: 97.9  F (36.6  C) Temp src: Oral BP: (!) 183/76 Pulse: 70   Resp: 12 SpO2: 99 % O2 Device: None (Room air) Oxygen Delivery: 2 LPM  Weight: 93 lbs .55 oz    General appearance: not in acute distress  HEENT: PERRL, EOMI  Lungs: Clear breath sounds in bilateral lung fields  Cardiovascular: Regular rate and rhythm, normal S1-S2  Abdomen: Soft, non tender, no distension  Musculoskeletal: No joint swelling  Skin: No rash and no edema  Neurology: AAO ×3.  Cranial nerves II - XII normal.  Normal muscle strength in all four extremities.    Medical Decision Making       35 MINUTES SPENT BY ME on the date of service doing chart review, history, exam, documentation & further activities per the note.      Data     I have personally reviewed the following data over the past 24 hrs:    3.0 (L)  \   12.5   / 37 (LL)     133 (L) 96 (L) 14.5 /  84   3.9 27 2.30 (H) \       Imaging results reviewed over the  past 24 hrs:   Recent Results (from the past 24 hour(s))   Cardiac Catheterization    Narrative    82-year-old female with multiple core abilities including end-stage renal   disease on hemodialysis, presenting with congestive heart failure and a   new cardiomyopathy, with coronary angiography requested to explain the   change in her left ventricular systolic function as well as her elevated   cardiac enzymes.    Coronary angiography today showed:    Left main has a 60 to 70% ostial stenosis with calcification as well as an   ulcerated segment in the proximal to mid left main.  The distal portion of   the vessel is calcified with mild to moderate disease  LAD is a long vessel wrapping around the apex with moderate diffuse   disease and significant calcification, but no clear obstructive stenosis   in this vessel  Left circumflex is nondominant providing 2 moderate-sized obtuse   marginals.  There is severe disease in the ostial and proximal portion of   the vessel with heavy calcification and moderate diffuse disease elsewhere  RCA is large and dominant providing the PDA as well as a large SORAIDA system.    There is moderate diffuse disease throughout the proximal mid and distal   portions of the vessel with significant calcification, but the only clear   obstructive lesion appears to be a focal 75 to 80% stenosis in the distal   portion of the vessel    LVEDP 15 mmHg    The angiographic films were carefully reviewed, and the ostial and   proximal left main is the lesion the resolution of which would likely   result in the greatest benefit for the patient.  She is likely not good   surgical candidate, but will obtain CT surgery consultation to confirm   that opinion.  If she is not felt to be a good surgical candidate, could   consider PCI of the ostial and proximal left main, but the patient and   family will have to understand that this would be a high risk procedure   given the absence of hemodynamic support options  due to severe peripheral   vascular disease and calcification.

## 2023-03-31 NOTE — PROGRESS NOTES
Member had angiogram this am with no intervention. Thoracentesis on 3/29/23 and removed 900 ml fluid with some improvement of dyspnea. Member may discharge to home 4/1/23 with family if stable overnight per Kaleigh AMADOR, Care Management.   Yolis Cantu RN  [facility]  Office:[phone]  Cell Phone: [phone]

## 2023-03-31 NOTE — PLAN OF CARE
Problem: Plan of Care - These are the overarching goals to be used throughout the patient stay.    Goal: Absence of Hospital-Acquired Illness or Injury  Intervention: Identify and Manage Fall Risk  Recent Flowsheet Documentation  Taken 3/31/2023 1523 by Clint Bright RN  Safety Promotion/Fall Prevention:    activity supervised    lighting adjusted    nonskid shoes/slippers when out of bed    patient and family education     Problem: Cardiac Catheterization (Diagnostic/Interventional)  Goal: Stable Heart Rate and Rhythm  Outcome: Progressing     Problem: Cardiac Catheterization (Diagnostic/Interventional)  Goal: Absence of Bleeding  Outcome: Progressing     Problem: Cardiac Catheterization (Diagnostic/Interventional)  Goal: Acceptable Pain Control  Outcome: Progressing     Problem: Cardiac Catheterization (Diagnostic/Interventional)  Goal: Absence of Vascular Access Complication  Outcome: Progressing     Goal Outcome Evaluation:  A/O, can make needs known. Hmong  utilized for communication, speaks a little English. SBP- 100-120's. Room Air. Denied pain. Normal Sinus Rhythm. AC/HS BG- 130 and 115. Up in the chair, up to BSC for BM. Right groin access site dressing is c/d/I. Hemodialysis port dressing is c/d/i.

## 2023-03-31 NOTE — PLAN OF CARE
Goal Outcome Evaluation:      Plan of Care Reviewed With: patient, family    Overall Patient Progress: improving    Pt returned from cath lab around 0940. Pt was alert & oriented, initially hypertensive upon return, but scheduled coreg was given and was effective. Otherwise, VS and CMS have been WDL. Pt denies the presence of pain or SOB. Pt was off bedrest at 1330, dangled legs at bedside and reported lightheadedness. Oral meds were handled well without signs of aspiration. Will continue to monitor.     Regan Hernandez RN on 3/31/2023 at 2:39 PM

## 2023-03-31 NOTE — PLAN OF CARE
Problem: Plan of Care - These are the overarching goals to be used throughout the patient stay.    Goal: Plan of Care Review  Description: The Plan of Care Review/Shift note should be completed every shift.  The Outcome Evaluation is a brief statement about your assessment that the patient is improving, declining, or no change.  This information will be displayed automatically on your shift note.  Outcome: Progressing  Goal: Absence of Hospital-Acquired Illness or Injury  Intervention: Identify and Manage Fall Risk  Recent Flowsheet Documentation  Taken 3/30/2023 2000 by Leon Miller RN  Safety Promotion/Fall Prevention:    activity supervised    bed alarm on    nonskid shoes/slippers when out of bed    room door open   Goal Outcome Evaluation:       pt is NPO for an angiogram in the morning. Pt is RA, lung sounds have crackles on the bases. Pt had dialysis today. Hmong speaking,  needs an  to provide all the angiogram instructions. Heavy assist of two. Will continue to monitor pt.

## 2023-04-01 NOTE — PROGRESS NOTES
RENAL PROGRESS NOTE    CC:  SOB    ASSESSMENT & PLAN:   ESRD:  Outpatient unit [facility] T[facility] (TT 180min, TW 42.5kg).  Dialyzed Tuesday and left at TW.  Dialyzed again emergently Tuesday night for SOB.  Only able to tolerate 900ml UF due to HoTN.  Dialyzed yesterday, set for aggressive goal but BP dropped.  LVEDP was 15 at angio today, not that high.    Plan:  Dialysis on TTS schedule  Fluid restriction   I think we will continue to see trouble keeping this patient dry with her poor heart function and trouble with hypotension on dialysis     SOB  Large left pleural effusion.  S/p Thoracentesis 900ml removed 3/29 and breathing much improved per Xee.  Pulmonary following  Appears all related to volume overload but limited by hypotension on dialysis  I'm very concerned we will not be able to keep this effusion from recurring with limited ability to UF     HTN  Coreg  25mg BID, isosorbide 30mg daily, amlodipine 5mg   BP quite labile, slightly better controlled  -continue home med regimen  -UF with dialysis      DM2     HFrEF  Echo this admission:  EF 30-35%, The left ventricular apex appears akinetic to possibly dyskinetic. The apical inferior wall appears akinetic. The apical anterior wall appears  Akinetic.  Mild decrease in RV systolic function.  Angiogram with multivessel disease, would be challenging to PCI on, no ability to support her hemodynamically because of her calcified arteries  I suspect UF will continue to be problematic, her dropping her BP on dialysis is possibly an ischemic symptom....  Currently plan is for high risk PCI on Monday     Liver abscess  Recently hospitalized at [facility] with klebsiella PNA/bacteremia with metastatic spread causing liver abscess  On levofloxacin and metronidazole just finished this yesterday     CKD-MBD  Phoslo 1 TID AC     Anemia  hgb 11.6  Mircera at outpatient HD unit.           SUBJECTIVE:  Possibly plan for discharge but I had been under the impression  that stenting of her stenotic lesions was being planned.  Discussed with Dr. Padilla, sounds as if family want to accept this risk.  Discussed multiple times with nursing about discharge plan.    Discussed with Dr. Padilla    OBJECTIVE:  Physical Exam   Temp: 98  F (36.7  C) Temp src: Oral BP: 115/58 Pulse: 71   Resp: 18 SpO2: 99 % O2 Device: None (Room air)    Vitals:    03/30/23 0323 03/31/23 0008 04/01/23 0313   Weight: 43.9 kg (96 lb 11.2 oz) 42.2 kg (93 lb 0.6 oz) 42.6 kg (94 lb)     Vital Signs with Ranges  Temp:  [97.7  F (36.5  C)-98.2  F (36.8  C)] 98  F (36.7  C)  Pulse:  [69-77] 71  Resp:  [16-20] 18  BP: (100-155)/(52-70) 115/58  SpO2:  [96 %-99 %] 99 %  I/O last 3 completed shifts:  In: 53 [P.O.:50; I.V.:3]  Out: 0         Patient Vitals for the past 72 hrs:   Weight   04/01/23 0313 42.6 kg (94 lb)   03/31/23 0008 42.2 kg (93 lb 0.6 oz)   03/30/23 0323 43.9 kg (96 lb 11.2 oz)   03/29/23 1929 43.9 kg (96 lb 12.8 oz)       PHYSICAL EXAM:  General: Alert, NAD  HEENT:  normocephalic  RESPIRATORY:  Lungs clear ant, normal effort, room air   CARDIOVASCULAR:  RRR,   No leg edema  ABDOMEN:  soft,  non-distended   GENITOURINARY:  No shepard   INTEGUMENTARY: Warm, dry, no rash  NEUROLOGIC: grossly intact   Psych: calm, cooperative  ACCESS: left CVC       LABORATORY STUDIES:     Recent Labs   Lab 03/31/23  0458 03/28/23  1926   WBC 3.0* 3.4*   RBC 3.83 3.56*   HGB 12.5 11.6*   HCT 40.4 37.6   PLT 37* 67*       Basic Metabolic Panel:  Recent Labs   Lab 04/01/23  1134 04/01/23  0812 03/31/23 2027 03/31/23  1625 03/31/23  1114 03/31/23  0458 03/29/23  0825 03/29/23  0252 03/29/23  0114 03/28/23  1926   NA  --   --   --   --   --  133*  --  136  --  133*   POTASSIUM  --   --   --   --   --  3.9  --  3.8  --  3.7   CHLORIDE  --   --   --   --   --  96*  --  100  --  100   CO2  --   --   --   --   --  27  --  22  --  23   BUN  --   --   --   --   --  14.5  --  23.2*  --  20.5   CR  --   --   --   --   --  2.30*  --  3.11*  --   2.82*   * 89 115* 130* 74 84   < > 180*   < > 160*   FALLON  --   --   --   --   --  8.1*  --  8.9  --  8.9    < > = values in this interval not displayed.       INRNo lab results found in last 7 days.     Recent Labs   Lab Test 03/31/23  0458 03/28/23  1926 05/11/22  1211 01/24/20  0647 11/13/19  0930   INR  --   --   --  1.06 1.02   WBC 3.0* 3.4*   < > 11.4* 5.8   HGB 12.5 11.6*   < > 11.2* 8.5*   PLT 37* 67*   < > 245 128*    < > = values in this interval not displayed.       Personally reviewed current labs       Zackary Verma  [facility]  [phone]

## 2023-04-01 NOTE — PLAN OF CARE
Problem: Plan of Care - These are the overarching goals to be used throughout the patient stay.    Goal: Absence of Hospital-Acquired Illness or Injury  Outcome: Progressing  Intervention: Identify and Manage Fall Risk  Recent Flowsheet Documentation  Taken 4/1/2023 1550 by Clint Bright RN  Safety Promotion/Fall Prevention:    activity supervised    bed alarm on    nonskid shoes/slippers when out of bed    lighting adjusted  Intervention: Prevent Skin Injury  Recent Flowsheet Documentation  Taken 4/1/2023 1657 by Clint Bright RN  Body Position:    turned    right     Problem: Chronic Kidney Disease  Goal: Minimize Renal Failure Effects  Outcome: Progressing  Intervention: Monitor and Support Renal Function  Recent Flowsheet Documentation  Taken 4/1/2023 1550 by Clint Bright RN  Medication Review/Management: medications reviewed     Problem: Cardiac Catheterization (Diagnostic/Interventional)  Goal: Absence of Vascular Access Complication  Outcome: Progressing  Intervention: Prevent Access Site Complications  Recent Flowsheet Documentation  Taken 4/1/2023 1657 by Clint Bright, RN  Activity Management: back to bed     Goal Outcome Evaluation:  Up in the chair for meals, poor PO intake noted. Had Hemodialysis. BP fluctuating, elevated at bedtime, scheduled Coreg given. Latest BP- 131/62.  Denied chest pain. SOB. Normal Sinus Rhythm.

## 2023-04-01 NOTE — PLAN OF CARE
Dialysis rescheduled for 1330 to facilitate discharge this afternoon.  Amlodipine held as per dialysis nurse. Tyra Hirsch RN      1430- dialysis to be later this evening. Amlodipine given. Me

## 2023-04-01 NOTE — PROGRESS NOTES
Assessment/Plan:  1.  Acute non-ST elevation MI, multiple vessel coronary artery disease including significant left main stenosis: The patient had a coronary angiogram yesterday which was a reported multiple vessel disease, left main 60 to 70% stenosis.  She is not a good candidate for CABG based on CV surgery consult note.  I called her son Ramin who told me that family has discussed and agreed to have a stent placement.  We will talk to interventional cardiologist Monday for possible stent placement to left main and RCA.  Continue aspirin, atorvastatin, carvedilol.    2.  Ischemic cardiomyopathy, LVEF 30 to 35%, acute systolic congestive heart failure: The patient is on hemodialysis Tuesday Thursday and Saturday.  Discussed with renal team.    3.  Benign essential hypertension: Continue carvedilol 25 mg twice a day, start amlodipine 2.5 mg daily for better blood pressure control    4.  Dyslipidemia: Continue atorvastatin 80 mg at bedtime.    5.  End-stage renal disease: Please see renal team note for detail.    Subjective:  Interval History:  Has no complaints of chest pain.  Improved shortness of breath.  No palpitations, no leg edema.  No acute events overnight.    Current Medications:  Scheduled Meds:    aspirin  325 mg Oral Daily     atorvastatin  80 mg Oral QPM     calcium acetate  667 mg Oral TID w/meals     carvedilol  25 mg Oral BID     docusate sodium  100 mg Oral BID     famotidine  20 mg Oral Q48H     ferrous sulfate  325 mg Oral TID     heparin Lock (1000 units/mL High concentration)  2,200 Units Intracatheter During Dialysis/CRRT (from stock)     heparin Lock (1000 units/mL High concentration)  2,300 Units Intracatheter During Dialysis/CRRT (from stock)     insulin aspart  1-3 Units Subcutaneous TID AC     insulin aspart  1-3 Units Subcutaneous At Bedtime     isosorbide mononitrate  30 mg Oral QAM     pantoprazole  40 mg Oral BID     sodium chloride (PF)  3 mL Intracatheter Q8H     sodium chloride  (PF)  3 mL Intracatheter Q8H     Continuous Infusions:    - MEDICATION INSTRUCTIONS -       ACE/ARB/ARNI NOT PRESCRIBED       PRN Meds:.- MEDICATION INSTRUCTIONS -, sodium chloride 0.9%, acetaminophen **OR** acetaminophen, acetaminophen, glucose **OR** dextrose **OR** glucagon, fentaNYL, HOLD MEDICATION, hydrALAZINE, lidocaine 4%, lidocaine 4%, lidocaine (buffered or not buffered), lidocaine (buffered or not buffered), melatonin, metoprolol, midazolam, morphine, naloxone **OR** naloxone **OR** naloxone **OR** naloxone, ondansetron **OR** ondansetron, oxyCODONE **OR** oxyCODONE, oxyCODONE IR, prochlorperazine **OR** prochlorperazine **OR** prochlorperazine, ACE/ARB/ARNI NOT PRESCRIBED, sodium chloride (PF), sodium chloride (PF), sodium phosphate    Objective:   Vital signs in last 24 hours:  Temp:  [97.7  F (36.5  C)-98.2  F (36.8  C)] 97.8  F (36.6  C)  Pulse:  [69-77] 77  Resp:  [16-20] 18  BP: (100-155)/(52-70) 151/69  SpO2:  [96 %-100 %] 99 %  Weight:   [unfilled]     Physical Exam:  General Appearance:   Awake, Alert, No acute distress.   HEENT:  No scleral icterus; the mucous membranes were moist.   Neck: No cervical bruits. No jugular venous distention   Lungs:   Bilateral crackles. No wheezing.   Cardiovascular:   RRR, normal first and second heart sounds with no murmurs. No rubs or gallops.     Abdomen:  Soft. No tenderness. Bowels sounds are present   Extremities: No leg edema. Equal posterior tibial pulsese.   Skin: Warm, Dry. No rashes.   Neurologic: Mood and affect are appropriate. No focal deficits.         Cardiographics:   Report: personally reviewed. .      Tele monitoring -normal sinus rhythm    Echocardiogram 3-:  Normal ventricular size. Left ventricular systolic function is moderate to  severely reduced. Left ventricular ejection fraction estimated at 30 to 35%.  The left ventricular apex appears akinetic to possibly dyskinetic. The apical  inferior wall appears akinetic. The apical  anterior wall appears  akinetic.Diastolic Doppler findings (E/E' ratio and/or other parameters)  suggest left ventricular filling pressures are increased  Normal right ventricular size. Mild decrease in right ventricular systolic  function suggested.  Mild left atrial enlargement.  Mild mitral regurgitation.  Mild tricuspid regurgitation. Estimate of RV systolic pressure is normal at 23  mmHg plus right atrial pressure  Moderate aortic regurgitation    Coronary angiogram yesterday:  Multiple vessel coronary artery disease per coronary angiogram report      Lab Results:   personally reviewed.     Lab Results   Component Value Date     03/31/2023     11/04/2019    CO2 27 03/31/2023    CO2 21 11/04/2019    BUN 14.5 03/31/2023    BUN 93 11/04/2019     No results found for: CKTOTAL, CKMB, TROPONINI  Lab Results   Component Value Date    WBC 3.0 03/31/2023    WBC 11.4 01/24/2020    HGB 12.5 03/31/2023    HGB 11.2 01/24/2020    HCT 40.4 03/31/2023    HCT 35.2 01/24/2020     03/31/2023    MCV 91 01/24/2020    PLT 37 03/31/2023     01/24/2020     Lab Results   Component Value Date    CHOL 110 05/11/2022    CHOL 138 03/15/2018    TRIG 93 05/11/2022    TRIG 291 03/15/2018    HDL 71 05/11/2022    HDL 46 03/15/2018    LDL 20 05/11/2022     01/20/2020    LDL 34 03/15/2018

## 2023-04-01 NOTE — PROGRESS NOTES
[facility]    Medicine Progress Note - Hospitalist Service    Date of Admission:  3/28/2023    Assessment & Plan     Lakhwinder Negron is an 82 year old female with medical history of chronic diastolic heart failure, CVA, type 2 diabetes mellitus, hypertension, ESRD on hemodialysis, admitted on 3/28/202 with worsening shortness of breath at rest after hemodialysis, and substernal chest tightness.     NPO this morning for coronary angiogram today.      Acute hypoxic respiratory failure: resolved  - Oxygen supplementation to keep saturations over 92%.  -  On room air as of this morning.   -  Patient is DNR.     Recent Klebsiella pneumoniae bacteremia and liver abscesses  admitted in outside facility from 1/23-2/8/23.   - Completed Metronidazole 500 mg PO q 12 + Levofloxacin 500 mg q 48 hrs x 3 weeks ending date 3/31/23.  - Follow up in the ID clinic ([facility]) and US liver in 3 weeks    Acute on chronic systolic heart failure: resolved  Elevated BNP with CT of the chest showing a moderate to large left pleural effusion and mild pulmonary edema.   - Not making much urine currently and so doubt that diuretics will help much.  - Hemodialysis per nephrology  - Strict I/O's and restrict fluid/salt intake 1500 ml and 2400 mg.   - Unable to tolerate UF due to hypotension during dialysis per nephro  - Anticipate she will be difficult maintaining euvolemic status     Large left pleural effusion:  Received thoracentesis on 3/29/23 with 900 mL of clear yellow fluid removed.   -  Pulmonology assistance appreciated.    -   Pleural fluid is exudative, follow up culture. So far NGTD  -   Continue to monitor respiratory status.    NSTEMI:   Elevated to troponin of 207. Has been stable after admission. Echocardiogram showed LVEF 30-35%, compared to 64% in 2018, with akinetic in the apical inferior and anterior wall.  - Cardiology consulted and input appreciated: Plan cardiac cath when appropriate  - Cont aspirin 325 mg  daily and lipitor 80 mg daily  - Status post coronary angiogram with significant severe multivessel CAD with 70% occlusion of L main.  - Cardiothoracic surgery consulted, patient is not a surgical candidate.  - Family agreeable to angioplasty, potential coronary intervention on Monday 4/3/23 per Cards.  - Continue PTA carvedilol and Imdur     End-stage renal disease, HD TTS  - Unable to tolerate UF due to hypotension during dialysis  - Nephrology is following, recommendations appreciated       Diet: Fluid restriction 1000 ML FLUID  Renal Diet (dialysis)    DVT Prophylaxis: Heparin SQ  Girard Catheter: Not present  Lines: PRESENT      Hemodialysis Vascular Access Subclavian vein catheter Left-Site Assessment: WDL      Cardiac Monitoring: ACTIVE order. Indication: Post- PCI/Angiogram (24 hours)  Code Status: No CPR- Do NOT Intubate      Clinically Significant Risk Factors                # Thrombocytopenia: Lowest platelets = 37 in last 2 days, will monitor for bleeding                  Disposition Plan     Expected Discharge Date: 04/01/2023      Destination: home with family;home with help/services            Sebastian Duron MD  Hospitalist Service  [facility]  Securely message with [employer] (more info)  Text page via [employer] Paging/Directory   ______________________________________________________________________    Interval History   Seen and examined at bedside.  No chest pain.  Dyspnea with exertion.  Hmong speaking only.    Physical Exam   Vital Signs: Temp: 97.8  F (36.6  C) Temp src: Oral BP: (!) 155/70 Pulse: 73   Resp: 18 SpO2: 99 % O2 Device: None (Room air) Oxygen Delivery: 2 LPM  Weight: 94 lbs 0 oz    General appearance: not in acute distress  HEENT: PERRL, EOMI  Lungs: Clear breath sounds in bilateral lung fields  Cardiovascular: Regular rate and rhythm, normal S1-S2  Abdomen: Soft, non tender, no distension  Musculoskeletal: No joint swelling  Skin: No rash and no edema  Neurology: AAO  ×3.  Cranial nerves II - XII normal.  Normal muscle strength in all four extremities.    Medical Decision Making       35 MINUTES SPENT BY ME on the date of service doing chart review, history, exam, documentation & further activities per the note.      Data         Imaging results reviewed over the past 24 hrs:   Recent Results (from the past 24 hour(s))   Cardiac Catheterization    Narrative    82-year-old female with multiple core abilities including end-stage renal   disease on hemodialysis, presenting with congestive heart failure and a   new cardiomyopathy, with coronary angiography requested to explain the   change in her left ventricular systolic function as well as her elevated   cardiac enzymes.    Coronary angiography today showed:    Left main has a 60 to 70% ostial stenosis with calcification as well as an   ulcerated segment in the proximal to mid left main.  The distal portion of   the vessel is calcified with mild to moderate disease  LAD is a long vessel wrapping around the apex with moderate diffuse   disease and significant calcification, but no clear obstructive stenosis   in this vessel  Left circumflex is nondominant providing 2 moderate-sized obtuse   marginals.  There is severe disease in the ostial and proximal portion of   the vessel with heavy calcification and moderate diffuse disease elsewhere  RCA is large and dominant providing the PDA as well as a large SORAIDA system.    There is moderate diffuse disease throughout the proximal mid and distal   portions of the vessel with significant calcification, but the only clear   obstructive lesion appears to be a focal 75 to 80% stenosis in the distal   portion of the vessel    LVEDP 15 mmHg    The angiographic films were carefully reviewed, and the ostial and   proximal left main is the lesion the resolution of which would likely   result in the greatest benefit for the patient.  She is likely not good   surgical candidate, but will obtain CT  surgery consultation to confirm   that opinion.  If she is not felt to be a good surgical candidate, could   consider PCI of the ostial and proximal left main, but the patient and   family will have to understand that this would be a high risk procedure   given the absence of hemodynamic support options due to severe peripheral   vascular disease and calcification.

## 2023-04-01 NOTE — PROGRESS NOTES
Care Management Follow Up    Length of Stay (days): 4    Expected Discharge Date: 04/01/2023     Concerns to be Addressed:       Patient plan of care discussed at interdisciplinary rounds: Yes    Anticipated Discharge Disposition:  Back to [facility]     Anticipated Discharge Services:    Anticipated Discharge DME:      Patient/family educated on Medicare website which has current facility and service quality ratings:    Education Provided on the Discharge Plan:    Patient/Family in Agreement with the Plan:      Referrals Placed by CM/SW:    Private pay costs discussed: Not applicable    Additional Information:  OPAL spoke to pt son Ramin and he told writer that pt lives at [facility] and gave me a phone number to call.  OPAL called the number given by pt son and it was for the pt PCA that works for [facility] and she was able to verify that pt lives at [facility] and she verified the address and gave me the number for the director of nursing of the facility. OPAL called the DON of the facility and no answer LVM to call back [phone] to discuss discharge option.    [place] [id number] [place] [address]  DON for facility Kimmy- [phone]  [facility] PCA- [place] [phone]    Yolis Cantu, RN - covering for [employer] [facility] Shruthi Martins RN 3/29/23 and 3/30/23  [facility]  Office:[phone]  Cell Phone: [phone]    RNOPAL to follow for medical progression, recommendations, and final discharge plan.          Tatum Tiwari RN

## 2023-04-01 NOTE — PLAN OF CARE
Denies shortness of breath. Right groin angio access without complications.  Up to bathroom with 1 assist. Dialysis will be this evening. All morning medications given (dialysis nurse okayed all morning BP medications)  ate breakfast well, compliant with fluid restriction. Alert and oriented remote  used. No family here currently. Tyra Hirsch RN    Problem: Cardiac Catheterization (Diagnostic/Interventional)  Goal: Absence of Vascular Access Complication  Outcome: Progressing     Problem: Chronic Kidney Disease  Goal: Absence of Anemia Signs and Symptoms  Outcome: Progressing  Goal: Fluid Balance  Outcome: Progressing

## 2023-04-01 NOTE — PLAN OF CARE
Problem: Cardiac Catheterization (Diagnostic/Interventional)  Goal: Absence of Vascular Access Complication  4/1/2023 0548 by Eliza Woods RN  Outcome: Progressing   Goal Outcome Evaluation:  Right groin area- CMS intact, no bleeding, swelling or hematoma noted.  Slept mostly during the night.  Denies any pain or discomfort

## 2023-04-02 NOTE — PROGRESS NOTES
[facility]    Medicine Progress Note - Hospitalist Service    Date of Admission:  3/28/2023    Assessment & Plan     Lakhwinder Negron is an 82 year old female with medical history of chronic diastolic heart failure, CVA, type 2 diabetes mellitus, hypertension, ESRD on hemodialysis, admitted on 3/28/202 with worsening shortness of breath at rest after hemodialysis, and substernal chest tightness.     NSTEMI:   Elevated to troponin of 207. Has been stable after admission. Echocardiogram showed LVEF 30-35%, compared to 64% in 2018, with akinetic in the apical inferior and anterior wall.  - Cardiology consulted and input appreciated: Plan cardiac cath when appropriate  - Cont aspirin 325 mg daily and lipitor 80 mg daily  - Status post coronary angiogram 3/30 with significant severe multivessel with 70% occlusion of left main.  - Cardiothoracic surgery consulted, patient is not a surgical candidate.  - Family agreeable to angioplasty, potential coronary intervention on Monday 4/3/23 per Cards.  - Continue PTA carvedilol and Imdur      Acute hypoxic respiratory failure: resolved  - Oxygen supplementation to keep saturations over 92%.  -  On room air as of this morning.   -  Patient is DNR.     Acute on chronic systolic heart failure: resolved  Elevated BNP with CT of the chest showing a moderate to large left pleural effusion and mild pulmonary edema.   - Not making much urine currently and so doubt that diuretics will help much.  - Hemodialysis per nephrology  - Strict I/O's and restrict fluid/salt intake 1500 ml and 2400 mg.   - Unable to tolerate UF due to hypotension during dialysis per nephro  - Anticipate she will be difficult maintaining euvolemic status     Large left pleural effusion:  Received thoracentesis on 3/29/23 with 900 mL of clear yellow fluid removed.   -  Pulmonology assistance appreciated.    -   Pleural fluid is exudative (based on Fpr/Spr 0.6), culture and cytology negative  -    Continue to monitor respiratory status. Stable and RA sating last several days    Recent Klebsiella pneumoniae bacteremia and liver abscesses, resolved  admitted in outside facility from 1/23-2/8/23.   - Completed Metronidazole 500 mg PO q 12 + Levofloxacin 500 mg q 48 hrs x 3 weeks ending date 3/31/23.  - Follow up in the ID clinic ([facility]) and US liver in 3 weeks    End-stage renal disease, HD TTS  - Unable to tolerate UF due to hypotension during dialysis  - Nephrology is following, recommendations appreciated       Diet: Fluid restriction 1000 ML FLUID  Renal Diet (dialysis)    DVT Prophylaxis: Heparin SQ  Girard Catheter: Not present  Lines: PRESENT      Hemodialysis Vascular Access Subclavian vein catheter Left-Site Assessment: WDL      Cardiac Monitoring: ACTIVE order. Indication: Post- PCI/Angiogram (24 hours)  Code Status: No CPR- Do NOT Intubate      Clinically Significant Risk Factors                                  Disposition Plan         potential coronary intervention on Monday 4/3/23 per Cards.     Sebastian Duron MD  Hospitalist Service  [facility]  Securely message with [employer] (more info)  Text page via [employer] Paging/Directory   ______________________________________________________________________    Interval History   Seen and examined at bedside.  No chest pain.  Dyspnea with exertion.  Hmong speaking only. NPO after midnight for possible cath Monday morning.     Physical Exam   Vital Signs: Temp: 97.8  F (36.6  C) Temp src: Oral BP: (!) 160/72 Pulse: 69   Resp: 18 SpO2: 100 % O2 Device: None (Room air)    Weight: 95 lbs 0 oz    General appearance: not in acute distress  HEENT: PERRL, EOMI  Lungs: Clear breath sounds in bilateral lung fields  Cardiovascular: Regular rate and rhythm, normal S1-S2  Abdomen: Soft, non tender, no distension  Musculoskeletal: No joint swelling  Skin: No rash and no edema  Neurology: AAO ×3.  Cranial nerves II - XII normal.  Normal muscle strength  in all four extremities.    Medical Decision Making       35 MINUTES SPENT BY ME on the date of service doing chart review, history, exam, documentation & further activities per the note.      Data         Imaging results reviewed over the past 24 hrs:   No results found for this or any previous visit (from the past 24 hour(s)).

## 2023-04-02 NOTE — PROGRESS NOTES
Assessment/Plan:  1.  Acute non-ST elevation MI, multiple vessel coronary artery disease including significant left main stenosis: The patient had a coronary angiogram yesterday which was a reported multiple vessel disease, left main 60 to 70% stenosis.  She is not a good candidate for CABG based on CV surgery consult note.  I called her son Ramin who told me that family has discussed and agreed to have stent placement yesterday.  I called the patient's son Ramin this morning. aRmin said the family agreed to have stent placement.  He will be here tomorrow.  Continue aspirin, atorvastatin, carvedilol.    2.  Ischemic cardiomyopathy, LVEF 30 to 35%, acute systolic congestive heart failure: The patient is on hemodialysis Tuesday Thursday and Saturday.      3.  Benign essential hypertension: Continue carvedilol 25 mg twice a day, amlodipine to 5 mg daily for better blood pressure control    4.  Dyslipidemia: Continue atorvastatin 80 mg at bedtime.    5.  End-stage renal disease: Please see renal team note for detail.    Subjective:  Interval History:  Has no complaints of chest pain.  Improved shortness of breath.  No palpitations, no leg edema.  No acute events overnight.    Current Medications:  Scheduled Meds:    amLODIPine  2.5 mg Oral Daily     aspirin  325 mg Oral Daily     atorvastatin  80 mg Oral QPM     calcium acetate  667 mg Oral TID w/meals     carvedilol  25 mg Oral BID     docusate sodium  100 mg Oral BID     famotidine  20 mg Oral Q48H     ferrous sulfate  325 mg Oral TID     heparin Lock (1000 units/mL High concentration)  2,200 Units Intracatheter During Dialysis/CRRT (from stock)     heparin Lock (1000 units/mL High concentration)  2,300 Units Intracatheter During Dialysis/CRRT (from stock)     insulin aspart  1-3 Units Subcutaneous TID AC     insulin aspart  1-3 Units Subcutaneous At Bedtime     isosorbide mononitrate  30 mg Oral QAM     pantoprazole  40 mg Oral BID     sodium chloride (PF)  3 mL  Intracatheter Q8H     sodium chloride (PF)  3 mL Intracatheter Q8H     Continuous Infusions:    - MEDICATION INSTRUCTIONS -       ACE/ARB/ARNI NOT PRESCRIBED       PRN Meds:.- MEDICATION INSTRUCTIONS -, sodium chloride 0.9%, acetaminophen **OR** acetaminophen, acetaminophen, glucose **OR** dextrose **OR** glucagon, HOLD MEDICATION, hydrALAZINE, lidocaine 4%, lidocaine 4%, lidocaine (buffered or not buffered), lidocaine (buffered or not buffered), melatonin, metoprolol, midazolam, morphine, naloxone **OR** naloxone **OR** naloxone **OR** naloxone, ondansetron **OR** ondansetron, oxyCODONE **OR** oxyCODONE, oxyCODONE IR, prochlorperazine **OR** prochlorperazine **OR** prochlorperazine, ACE/ARB/ARNI NOT PRESCRIBED, sodium chloride (PF), sodium chloride (PF), sodium phosphate    Objective:   Vital signs in last 24 hours:  Temp:  [97.8  F (36.6  C)-98.1  F (36.7  C)] 97.8  F (36.6  C)  Pulse:  [67-76] 69  Resp:  [18] 18  BP: ()/(48-81) 160/72  SpO2:  [99 %-100 %] 100 %  Weight:   [unfilled]     Physical Exam:  General Appearance:   Awake, Alert, No acute distress.   HEENT:  No scleral icterus; the mucous membranes were moist.   Neck: No cervical bruits. No jugular venous distention   Lungs:   Bilateral crackles. No wheezing.   Cardiovascular:   RRR, normal first and second heart sounds with no murmurs. No rubs or gallops.     Abdomen:  Soft. No tenderness. Bowels sounds are present   Extremities: No leg edema. Equal posterior tibial pulsese.   Skin: Warm, Dry. No rashes.   Neurologic: Mood and affect are appropriate. No focal deficits.         Cardiographics:   Report: personally reviewed. .      Tele monitoring -normal sinus rhythm    Echocardiogram 3-:  Normal ventricular size. Left ventricular systolic function is moderate to  severely reduced. Left ventricular ejection fraction estimated at 30 to 35%.  The left ventricular apex appears akinetic to possibly dyskinetic. The apical  inferior wall appears  akinetic. The apical anterior wall appears  akinetic.Diastolic Doppler findings (E/E' ratio and/or other parameters)  suggest left ventricular filling pressures are increased  Normal right ventricular size. Mild decrease in right ventricular systolic  function suggested.  Mild left atrial enlargement.  Mild mitral regurgitation.  Mild tricuspid regurgitation. Estimate of RV systolic pressure is normal at 23  mmHg plus right atrial pressure  Moderate aortic regurgitation    Coronary angiogram yesterday:  Multiple vessel coronary artery disease per coronary angiogram report      Lab Results:   personally reviewed.     Lab Results   Component Value Date     03/31/2023     11/04/2019    CO2 27 03/31/2023    CO2 21 11/04/2019    BUN 14.5 03/31/2023    BUN 93 11/04/2019     No results found for: CKTOTAL, CKMB, TROPONINI  Lab Results   Component Value Date    WBC 3.0 03/31/2023    WBC 11.4 01/24/2020    HGB 12.5 03/31/2023    HGB 11.2 01/24/2020    HCT 40.4 03/31/2023    HCT 35.2 01/24/2020     03/31/2023    MCV 91 01/24/2020    PLT 37 03/31/2023     01/24/2020     Lab Results   Component Value Date    CHOL 110 05/11/2022    CHOL 138 03/15/2018    TRIG 93 05/11/2022    TRIG 291 03/15/2018    HDL 71 05/11/2022    HDL 46 03/15/2018    LDL 20 05/11/2022     01/20/2020    LDL 34 03/15/2018

## 2023-04-02 NOTE — PROGRESS NOTES
Hemodialysis Progress Note:     Pre weight: 42.6 kg  Post weight: 42.1 kg      Assessment: Pt is alert, oriented and able to make needs known. On room air, denied chest pain or sob.      Access: Right CVC dressing C/D/I, no s/s of infection noted. No issues with aspirating or flushing lumens. Heparin locked, caps changed and lumens wrapped in gauze post tx. BFR maintained at 400.       UF Goal: 1000 ml     Net Fluid Removed: 545 ml     Dialyzer: [id number] with clear rinse post. No heparin used this tx.      Run Summary: 2hr run per Dr. Verma d/t staffing. K3 bath. . BFR maintained at 400. Pt remained hemodynamically stable but 5 mins. remaining into HD, SBP dropped to 70s. Pt alert, watching tv, and denied any complaint. Rinsed back immediately and BP went up to 148/70. Seen by MD prior to HD. Post tx report given to MARJORIE VICTOR       Plan: Per renal team, TTHSA schedule and PRN.

## 2023-04-02 NOTE — PLAN OF CARE
Goal Outcome Evaluation:      Plan of Care Reviewed With: patient    Overall Patient Progress: improvingOverall Patient Progress: improving     Pt was alert & oriented this shift. Pt remains hypertensive, but it is controlled with scheduled meds. Pt denied pain/dyspnea this shift. AM medication and meals were tolerated well. Pt remains anuric and compliant with her fluid restriction. Will continue to monitor.     Regan Hernandez RN on 4/2/2023 at 2:25 PM

## 2023-04-02 NOTE — PLAN OF CARE
Goal Outcome Evaluation:    Pt. Denies pain, repositioned and boosted up in bed.

## 2023-04-02 NOTE — PROGRESS NOTES
RENAL PROGRESS NOTE    CC:  SOB    ASSESSMENT & PLAN:   ESRD:  Outpatient [facility] TTS (TT 180min, TW 42.5kg).  Dialyzed Tuesday and left at TW.  Dialyzed again emergently Tuesday night for SOB.  Only able to tolerate 900ml UF due to HoTN.  LVEDP was 15 at angio today, not that high.  Dialysis Saturday with UF ~1L, limited by HoTN  Plan:  Dialysis on TTS schedule  Fluid restriction        SOB  Large left pleural effusion.  S/p Thoracentesis 900ml removed 3/29 and breathing much improved per Xee.  Pulmonary following  Appears all related to volume overload but limited by hypotension on dialysis  I'm very concerned we will not be able to keep this effusion from recurring with limited ability to UF     HTN  Coreg  25mg BID, isosorbide 30mg daily, amlodipine 5mg   BP quite labile  -continue home med regimen  -UF with dialysis      DM2     HFrEF  Echo this admission:  EF 30-35%, The left ventricular apex appears akinetic to possibly dyskinetic. The apical inferior wall appears akinetic. The apical anterior wall appears  Akinetic.  Mild decrease in RV systolic function.  Angiogram with multivessel disease, would be challenging to PCI on, no ability to support her hemodynamically because of her calcified arteries  I suspect UF will continue to be problematic, her dropping her BP on dialysis is possibly an ischemic symptom....  Currently plan is for high risk PCI on Monday     Liver abscess  Recently hospitalized at [facility] with klebsiella PNA/bacteremia with metastatic spread causing liver abscess  On levofloxacin and metronidazole just finished this yesterday     CKD-MBD  Phoslo 1 TID AC     Anemia  hgb 12.5  Mircera at outpatient HD unit.           SUBJECTIVE:  Family at bedside assist with interpreting.  Xefrankie has no complaints at this time.  No dialysis questions/concerns.  She is aware of plan for angiogram/stent tomorrow.     OBJECTIVE:  Physical Exam   Temp: 98  F (36.7  C) Temp src: Oral BP:  (!) 140/62 Pulse: 70   Resp: 18 SpO2: 100 % O2 Device: None (Room air)    Vitals:    03/31/23 0008 04/01/23 0313 04/02/23 0418   Weight: 42.2 kg (93 lb 0.6 oz) 42.6 kg (94 lb) 43.1 kg (95 lb)     Vital Signs with Ranges  Temp:  [97.8  F (36.6  C)-98.1  F (36.7  C)] 98  F (36.7  C)  Pulse:  [67-76] 70  Resp:  [18] 18  BP: ()/(48-81) 140/62  SpO2:  [99 %-100 %] 100 %  I/O last 3 completed shifts:  In: 743 [P.O.:340; I.V.:3; Other:400]  Out: 945 [Other:945]        Patient Vitals for the past 72 hrs:   Weight   04/02/23 0418 43.1 kg (95 lb)   04/01/23 0313 42.6 kg (94 lb)   03/31/23 0008 42.2 kg (93 lb 0.6 oz)       PHYSICAL EXAM:  General: Alert, NAD  HEENT:  normocephalic  RESPIRATORY:  Lungs clear ant, normal effort, room air   CARDIOVASCULAR:  RRR,   No leg edema  ABDOMEN:  soft,    GENITOURINARY:  No shepard   INTEGUMENTARY: Warm, dry, no rash  NEUROLOGIC: grossly intact   Psych: calm, cooperative  ACCESS: left CVC         LABORATORY STUDIES:     Recent Labs   Lab 03/31/23 0458 03/28/23 1926   WBC 3.0* 3.4*   RBC 3.83 3.56*   HGB 12.5 11.6*   HCT 40.4 37.6   PLT 37* 67*       Basic Metabolic Panel:  Recent Labs   Lab 04/02/23  1152 04/02/23  0754 04/01/23  2037 04/01/23  1608 04/01/23  1134 04/01/23  0812 03/31/23  1114 03/31/23  0458 03/29/23  0825 03/29/23  0252 03/29/23  0114 03/28/23  1926   NA  --   --   --   --   --   --   --  133*  --  136  --  133*   POTASSIUM  --   --   --   --   --   --   --  3.9  --  3.8  --  3.7   CHLORIDE  --   --   --   --   --   --   --  96*  --  100  --  100   CO2  --   --   --   --   --   --   --  27  --  22  --  23   BUN  --   --   --   --   --   --   --  14.5  --  23.2*  --  20.5   CR  --   --   --   --   --   --   --  2.30*  --  3.11*  --  2.82*   * 176* 189* 121* 202* 89   < > 84   < > 180*   < > 160*   FALLON  --   --   --   --   --   --   --  8.1*  --  8.9  --  8.9    < > = values in this interval not displayed.       INRNo lab results found in last 7 days.      Recent Labs   Lab Test 03/31/23  0458 03/28/23  1926 05/11/22  1211 01/24/20  0647 11/13/19  0930   INR  --   --   --  1.06 1.02   WBC 3.0* 3.4*   < > 11.4* 5.8   HGB 12.5 11.6*   < > 11.2* 8.5*   PLT 37* 67*   < > 245 128*    < > = values in this interval not displayed.       Personally reviewed current labs       Katt Sands NP  [facility]  [phone]

## 2023-04-03 NOTE — PLAN OF CARE
Goal Outcome Evaluation:      Problem: Plan of Care - These are the overarching goals to be used throughout the patient stay.    Goal: Optimal Comfort and Wellbeing  Outcome: Progressing       Pt. Having leg and knee pain I brought tylenol and she specially asked for oxy, used  and yes she wanted oxy.  (She asked specifically for the little pain pill)    Pt. NPO for angio tomorrow

## 2023-04-03 NOTE — PROGRESS NOTES
Renal progress note  CC: ESRD  Assessment and Plan:  82-year-old female with chronic diastolic heart failure severe CAD not amenable to CABG with plans for high risk PCI history of CVA DM2 hypertension ESRD on intermittent hemodialysis with poor tolerance secondary to dyspnea and chest pain and hypotension on dialysis admitted on 3/28/2022 with worsening dyspnea after hemodialysis and chest pain  Nephrology consulted for volume management and ESRD      ESRD on IHD  TTS schedule  Follows Dr.Kuzmenko Gisselle meehan Jacobo  Had left dialysis unit on Tuesday at target weight but needed emergent dialysis tonight for shortness of breath.  Full tolerance to dialysis with less than 1 L UF on treatment  Outpatient dialysis complicated by significant hypotension shortness of breath and chest pain and has not been able to completely tolerate dialysis as outpatient  Brief discussion with family regarding concerns for underlying coronary artery disease with no appropriate intervention that she can tolerate as well as poor tolerance to dialysis with a high risk of sudden cardiac death on dialysis if the symptoms persist  --> Plans for hemodialysis on TTS schedule  We will review plans with family tomorrow    Hypervolemia  Large pleural effusion s/p thoracocentesis on 3/29/2023  Heart failure exacerbation  Poor tolerance to UF with hypotension  Continue on salt restriction less than 2 g and fluid restriction less than 1500  Long-term management of hypervolemia is hard in light of her severe heart failure    History of hypertension on carvedilol 25 mg twice a day isosorbide and amlodipine  Blood pressure is very labile    Electrolytes stable    MBD  Hyperphosphatemia    Chronic anemia  On BELA  Hb stable    NSTEMI with troponin elevation  Acute on chronic systolic heart failure with EF 30 to 35% with decline from 60 over than 2018  Likely ischemic cardiomyopathy  Known coronary artery disease with no plans for CABG secondary to  not being a surgical candidate  Coronary angiogram 3/30/2023 with severe multivessel disease with 70% occlusion of left main  Plans for high risk PCI today now on hold per family  On aspirin and Lipitor  Cardiology following    Hypoxemic respiratory failure improved  Currently on room air  Subjective dyspnea and likely multifactorial related to volume overload and heart failure    History of DM2  Management per hospitalist medicine    Liver abscess with recent treatment for Klebsiella PNA and bacteremia with widespread  On Levaquin and metronidazole finished this admission    Thank you for the consultation we will follow  Juani Duron MD  [facility]  [phone]      Subjective  Provided  available as well as family at bedside helping with interpretation  When asked to see she had no complaints on the asking about how her overall health is doing  No dialysis related questions  Some discussion with the family regarding ongoing cardiac status and concerns for symptoms on dialysis with chest pain shortness of breath and dyspnea with hypotension could possibly be related to worsening ischemia of her heart.  Also discussed that high risk of a coronary event on hemodialysis with myocardial stunning.  They do appear to understand at this point the plan is for angiogram; later on chart review I found it was canceled by the family request    Objective    Vital signs in last 24 hours  Temp:  [97.8  F (36.6  C)-98  F (36.7  C)] 97.8  F (36.6  C)  Pulse:  [64-71] 64  Resp:  [18] 18  BP: (111-159)/(59-71) 115/59  SpO2:  [97 %-100 %] 97 %  Weight:   [unfilled]    Intake/Output last 3 shifts  I/O last 3 completed shifts:  In: 113 [P.O.:110; I.V.:3]  Out: -   Intake/Output this shift:  No intake/output data recorded.    Physical Exam  Alert, awake, NAD  CV: RRR without murmur or rub  Lung: clear and equal; no extra sounds  Ab: soft and NT; not distended; normal bs  Ext: no edema and well  perfused  Skin; no rash    Pertinent Labs   Lab Results   Component Value Date    WBC 2.6 (L) 04/03/2023    HGB 11.9 04/03/2023    HCT 37.9 04/03/2023     (H) 04/03/2023    PLT 38 (LL) 04/03/2023     Lab Results   Component Value Date    BUN 26.2 (H) 04/03/2023     (L) 04/03/2023    CO2 26 04/03/2023       Lab Results   Component Value Date    ALBUMIN 4.0 11/04/2019     Lab Results   Component Value Date    PHOS 4.7 (H) 11/04/2019     I reviewed all lab results  Juani Duron MD

## 2023-04-03 NOTE — PROGRESS NOTES
Care Management Follow Up    Length of Stay (days): 6    Expected Discharge Date: 04/04/2023     Concerns to be Addressed:     Discharge planning  Patient plan of care discussed at interdisciplinary rounds: Yes    Anticipated Discharge Disposition: [facility] (assisted living)     Anticipated Discharge Services:  Daily PCA , adult day program. [facility]  Anticipated Discharge DME:  Cane, walker, wheelchair    Referrals Placed by CM/SW:    Private pay costs discussed: Not applicable    Additional Information:  SW spoke to Kimmy RN at patient's [facility] facility. Patient is having an angiogram today.  Pt has a walker and wheelchair at baseline. Outpatient [facility] and has transportation to and from dialysis.    Patient discharged from [facility] services in January 2023    [facility]  [address] [place] [address]  [facility] for facility [facility]- [phone]  [facility] PCA- [facility] [phone]    GENESIS Rosario

## 2023-04-03 NOTE — PLAN OF CARE
Goal Outcome Evaluation:      Plan of Care Reviewed With: patient    Overall Patient Progress: improvingOverall Patient Progress: improving    Outcome Evaluation: Pt NPO for angio.  RN attempted put in [employer] angio video in for pt and daughter per request.  Video player is not working, though.  Work order placed to have it fixed. RN used interpretor for assessment and education on angiogram.  Both daughter and pt verbalize understanding. Pt continues on room air. Pt in SR.

## 2023-04-03 NOTE — PROVIDER NOTIFICATION
Pt and pt's family has now decided to not do the angiogram/angioplasty.  RN notifed MARIO Pagan in [facility] and [facility] charge nurse.  RN also notified hospitalist.
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
## 2023-04-03 NOTE — PROGRESS NOTES
Interventional Cardiology update    82F w/ CAD and newly discovered multi-vessel CAD including LMT, chronic diastolic heart failure, CVA, type 2 diabetes mellitus, hypertension, ESRD-HDD, admitted on 3/28/202 with worsening shortness of breath at rest after hemodialysis, and substernal chest tightness.    I have reviewed her angios and discussed findings in conference with the patient, her sone Ramin (over the phone), and granddaughter in the room.    We discussed elevated risk involving LMT intervention, no hemodynamic support due to poor peripheral access. She has already been evaluated and deemed not eligible for CABG.  Initially, patient and family agreed to proceed w/ the intervention, however as the team was preparing for transport, they changed their mind and are not interested in the procedure at this time.    Should anything change, I still think that PCI is feasible, albeit high risk, as we have discussed. We have communicated w/ HF/ICU team, .    Please, feel free to reach out with any questions.    Bennie Zaldivar MD  Interventional Cardiology

## 2023-04-03 NOTE — PROGRESS NOTES
[facility]    Medicine Progress Note - Hospitalist Service    Date of Admission:  3/28/2023    Assessment & Plan     Lakhwinder Negron is an 82 year old female with medical history of chronic diastolic heart failure, CVA, type 2 diabetes mellitus, hypertension, ESRD on hemodialysis, admitted on 3/28/202 with worsening shortness of breath at rest after hemodialysis, and substernal chest tightness.     Npo since midnight, potential coronary intervention on Monday 4/3/23 per Cards.      NSTEMI:   Elevated to troponin of 207. Has been stable after admission. Echocardiogram showed LVEF 30-35%, compared to 64% in 2018, with akinetic in the apical inferior and anterior wall.  - Cardiology consulted and input appreciated: Plan cardiac cath when appropriate  - Cont aspirin 325 mg daily and lipitor 80 mg daily  - Status post coronary angiogram 3/30 with significant severe multivessel with 70% occlusion of left main.  - Cardiothoracic surgery consulted, patient is not a surgical candidate.  - Family agreeable to angioplasty, potential coronary intervention on Monday 4/3/23 per Cards.  - Continue PTA carvedilol and Imdur      Acute hypoxic respiratory failure: resolved  - Oxygen supplementation to keep saturations over 92%.  -  On room air as of this morning.   -  Patient is DNR.     Acute on chronic systolic heart failure: resolved  Elevated BNP with CT of the chest showing a moderate to large left pleural effusion and mild pulmonary edema.   - Not making much urine currently and so doubt that diuretics will help much.  - Hemodialysis per nephrology  - Strict I/O's and restrict fluid/salt intake 1500 ml and 2400 mg.   - Unable to tolerate UF due to hypotension during dialysis per nephro  - Anticipate she will be difficult maintaining euvolemic status     Large left pleural effusion:  Received thoracentesis on 3/29/23 with 900 mL of clear yellow fluid removed.   -  Pulmonology assistance appreciated.    -    Pleural fluid is exudative (based on Fpr/Spr 0.6), culture and cytology negative  -   Continue to monitor respiratory status. Stable and RA sating last several days    Recent Klebsiella pneumoniae bacteremia and liver abscesses, resolved  admitted in outside facility from 1/23-2/8/23.   - Completed Metronidazole 500 mg PO q 12 + Levofloxacin 500 mg q 48 hrs x 3 weeks ending date 3/31/23.  - Follow up in the ID clinic ([facility]) and US liver in 3 weeks    End-stage renal disease, HD TTS  - Unable to tolerate UF due to hypotension during dialysis  - Nephrology is following, recommendations appreciated       Diet: Fluid restriction 1000 ML FLUID  NPO per Anesthesia Guidelines for Procedure/Surgery Except for: Meds    DVT Prophylaxis: Heparin SQ  Girard Catheter: Not present  Lines: PRESENT      Hemodialysis Vascular Access Subclavian vein catheter Left-Site Assessment: WDL      Cardiac Monitoring: ACTIVE order. Indication: Chest pain/ ACS rule out (24 hours)  Code Status: No CPR- Do NOT Intubate      Clinically Significant Risk Factors                # Thrombocytopenia: Lowest platelets = 38 in last 2 days, will monitor for bleeding                   Disposition Plan      Expected Discharge Date: 04/04/2023      Destination: home with family;home with help/services  Discharge Comments: Need to discuss plan for MVD      potential coronary intervention on Monday 4/3/23 per Cards.     Sebastian Duron MD  Hospitalist Service  [facility]  Securely message with [employer] (more info)  Text page via [employer] Paging/Directory   ______________________________________________________________________    Interval History   Seen and examined at bedside.  No chest pain.  Dyspnea with exertion.  Hmong speaking only. NPO after midnight for possible cath Monday morning.     Physical Exam   Vital Signs: Temp: 97.8  F (36.6  C) Temp src: Oral BP: (!) 177/80 Pulse: 69   Resp: 18 SpO2: 99 % O2 Device: None (Room air)    Weight:  90 lbs 0 oz    General appearance: not in acute distress  HEENT: PERRL, EOMI  Lungs: Clear breath sounds in bilateral lung fields  Cardiovascular: Regular rate and rhythm, normal S1-S2  Abdomen: Soft, non tender, no distension  Musculoskeletal: No joint swelling  Skin: No rash and no edema  Neurology: AAO ×3.  Cranial nerves II - XII normal.  Normal muscle strength in all four extremities.    Medical Decision Making       35 MINUTES SPENT BY ME on the date of service doing chart review, history, exam, documentation & further activities per the note.      Data     I have personally reviewed the following data over the past 24 hrs:    2.6 (L)  \   11.9   / 38 (LL)     133 (L) 97 (L) 26.2 (H) /  111 (H)   4.0 26 3.91 (H) \       Imaging results reviewed over the past 24 hrs:   No results found for this or any previous visit (from the past 24 hour(s)).

## 2023-04-03 NOTE — PLAN OF CARE
Problem: Chronic Kidney Disease  Goal: Optimal Functional Ability  Outcome: Progressing  Intervention: Optimize Functional Ability  Recent Flowsheet Documentation  Taken 4/2/2023 1545 by Clint Bright RN  Activity Management: activity adjusted per tolerance     Problem: Diabetes Comorbidity  Goal: Blood Glucose Level Within Targeted Range  Outcome: Progressing     Problem: Hypertension Comorbidity  Goal: Blood Pressure in Desired Range  Outcome: Progressing  Intervention: Maintain Blood Pressure Management  Recent Flowsheet Documentation  Taken 4/2/2023 1545 by Clint Bright RN  Medication Review/Management: medications reviewed      Goal Outcome Evaluation:  -150's, scheduled Coreg. Room Air. Denied Chest pain, no SOB. Normal Sinus Rhythm. Left CVC dressing c/d/I. Anuric. AC/Hs BG- 137 and 163. Aware of NPO midnight for possible Stent placement on Monday.

## 2023-04-04 NOTE — PLAN OF CARE
Problem: Chronic Kidney Disease  Goal: Minimize Renal Failure Effects  Outcome: Progressing  Intervention: Monitor and Support Renal Function  Recent Flowsheet Documentation  Taken 4/4/2023 0400 by Meghna Bradley RN  Medication Review/Management: medications reviewed  Taken 4/4/2023 0040 by Meghna Bradley RN  Medication Review/Management: medications reviewed     Problem: Gas Exchange Impaired  Goal: Optimal Gas Exchange  Outcome: Progressing     Problem: Diabetes Comorbidity  Goal: Blood Glucose Level Within Targeted Range  Outcome: Progressing     Problem: Hypertension Comorbidity  Goal: Blood Pressure in Desired Range  Outcome: Progressing  Intervention: Maintain Blood Pressure Management  Recent Flowsheet Documentation  Taken 4/4/2023 0400 by Meghna Bradley RN  Medication Review/Management: medications reviewed  Taken 4/4/2023 0040 by Meghna Bradley RN  Medication Review/Management: medications reviewed    Pt's vital signs were stable. She was NSR with inverted T waves on telemetry. She c/o pain in her legs and received her PRN oxycodone x 1. She has not voided overnight but is oliguric. She is in hemodialysis and she normally has it n Tuesday, Thursday and Saturday. She is an A1 with transfers. She is on a 1000 ml fluid restriction and has 50 ml overnight. She is able to make her needs known. Call light is within reach.

## 2023-04-04 NOTE — PROGRESS NOTES
Care Management Discharge Note    Discharge Date: 04/04/2023       Discharge Disposition: Home - Return to long term    Discharge Services:  Resumption of cares    Discharge DME:  n/a    Discharge Transportation: family or friend will provide    Private pay costs discussed: Not applicable    PAS Confirmation Code:  n/a  Patient/family educated on Medicare website which has current facility and service quality ratings: n/a      Education Provided on the Discharge Plan:  yes  Persons Notified of Discharge Plans: yes  Patient/Family in Agreement with the Plan: yes      Handoff Referral Completed: Yes    Additional Information:  Pt will be medically cleared to discharge; message left for DON (Kimmy [phone]) informing of return today and requested information on how pt will be returning as facility transport pt to and from destinations. SHEBA also placed call to  (frankie [phone]) inquiring about transportation; per facility they would like [employer] transport as that is what pt's son preferred. SHEBA se [facility] ride at 1430 with  window between [phone]. SHEBA spoke with son (Ran [phone]) informing of discharge plan and is agreeable to potential for private pay for ride. All parties aware and agreeable to discharge plan of home to Assisted Living Facility after dialysis. Orders faxed to [phone]. No other needs from care management.  11:56 AM    DANIEL Malhotra  4/4/2023

## 2023-04-04 NOTE — PROGRESS NOTES
HD Progress Note     Assessment: Pt awake and communicative. Pt bkfst arrived and ate while machine being set up for tx. VSS for dialysis.     Vascular Access: Dressing intact with no sign of issue having been changed 3/29. Patent, able to aspirate and flush both ports ~ set for prescribed BFR at 400 as ordered with good pressures.      Dialyzer/Rinse: Optiflux 160 / rinsed good     HD time: 3.0 hrs      HD fluid removal goal: 2-3 and to challenging per MD     Treatment: Attempted 3.0 goal, but reduced goal immediately for steep sloped crit-line and dropping b/p. Corrected profile C to overall profile A on crit-line and b/p stabilized from the 80s systolic to 100s. Dr Duron met with pt in attempts to convey her heart condition as not being cappatable with dialysis.      Fluid Removed Total: 1300ml              Net:  400ml     Interventions: Crit-line and vitals monitoring with goal adjustments reflected on flowsheet.      Plan: Discharge today.

## 2023-04-04 NOTE — PLAN OF CARE
Physical Therapy Discharge Summary    Reason for therapy discharge:    Discharged to home with home therapy.    Progress towards therapy goal(s). See goals on Care Plan in [facility] electronic health record for goal details.  Goals not met.  Barriers to achieving goals:   discharge from facility.    Therapy recommendation(s):    Continued therapy is recommended.  Rationale/Recommendations:  Continue with assist at [facility] and home PT as pt is progressing towards goals..

## 2023-04-04 NOTE — PLAN OF CARE
Pt denies pain, nausea, and shortness of breath. Adequate oxygenation on room air, BP controlled with scheduled medications. Had dialysis today.  Discharging to home (assisted living) today via wheelchair transport. All belongings sent with patient.

## 2023-04-04 NOTE — PROGRESS NOTES
Renal progress note  CC: ESRD  Assessment and Plan:  82-year-old female with chronic diastolic heart failure severe CAD not amenable to CABG with plans for high risk PCI history of CVA DM2 hypertension ESRD on intermittent hemodialysis with poor tolerance secondary to dyspnea and chest pain and hypotension on dialysis admitted on 3/28/2022 with worsening dyspnea after hemodialysis and chest pain  Nephrology consulted for volume management and ESRD    ESRD on IHD  TTS schedule  Follows Dr.Kuzmenko Pitts zoran [facility]  Had left dialysis unit on Tuesday at target weight but needed emergent dialysis tonight for shortness of breath.  Full tolerance to dialysis with less than 1 L UF on treatment  Outpatient dialysis complicated by significant hypotension shortness of breath and chest pain and has not been able to completely tolerate dialysis as outpatient  Brief discussion with family regarding concerns for underlying coronary artery disease with no appropriate intervention that she can tolerate as well as poor tolerance to dialysis with a high risk of sudden cardiac death on dialysis if the symptoms persist, this was extensively reviewed with grandson today, she is at high risk for adverse event on HD as well as repeated admissions   Will update dr Blackman as well  --> Plans for hemodialysis on TTS schedule  We will review plans with family tomorrow    Hypervolemia  Large pleural effusion s/p thoracocentesis on 3/29/2023  Heart failure exacerbation  Poor tolerance to UF with hypotension  Continue on salt restriction less than 2 g and fluid restriction less than 1500  Long-term management of hypervolemia is hard in light of her severe heart failure    History of hypertension on carvedilol 25 mg twice a day isosorbide and amlodipine  Blood pressure is very labile  Severe IDH on HD    Electrolytes stable    MBD  Hyperphosphatemia    Chronic anemia  On BELA  Hb stable    NSTEMI with troponin elevation  Acute on chronic  systolic heart failure with EF 30 to 35% with decline from 60 over than 2018  Likely ischemic cardiomyopathy  Known coronary artery disease with no plans for CABG secondary to not being a surgical candidate  Coronary angiogram 3/30/2023 with severe multivessel disease with 70% occlusion of left main  Plans for high risk PCI today now on hold per family  On aspirin and Lipitor  Cardiology following    Hypoxemic respiratory failure improved  Currently on room air  Subjective dyspnea and likely multifactorial related to volume overload and heart failure    History of DM2  Management per hospitalist medicine    Liver abscess with recent treatment for Klebsiella PNA and bacteremia with widespread  On Levaquin and metronidazole finished this admission    Thank you for the consultation we will follow  Juani Duron MD  [facility]  [phone]      Subjective  Patient seen with phone  today  Extensive discussion about her cardiac situation and poor tolerance to dialysis with significant hypotension complicating treatment  Also reviewed high risk of a cardiac event with such background on hemodialysis.  The patient responded with asking me to help her manage these conditions but was not very understanding of the overall discussion I did ask her if I should review this with her kids this was discussed with her grandson Ran.  The grandson does understand the overall risks at this time would like to continue with the current plan on dialysis she has a DNR, he does understand that she is at risk of dying on dialysis with her current cardiac status and having a cardiac event with hypotension but would like to continue the current plan of care   He would review my concerns with his dad and other siblings      Objective    Vital signs in last 24 hours  Temp:  [97.4  F (36.3  C)-98  F (36.7  C)] 98  F (36.7  C)  Pulse:  [65-72] 65  Resp:  [18-20] 20  BP: (114-168)/(56-85) 122/64  SpO2:  [99 %-100 %]  100 %  Weight:   [unfilled]    Intake/Output last 3 shifts  I/O last 3 completed shifts:  In: 410 [P.O.:410]  Out: 0   Intake/Output this shift:  I/O this shift:  In: 50 [P.O.:50]  Out: -     Physical Exam  Alert, awake, NAD  CV: RRR without murmur or rub  Lung: clear and equal; no extra sounds  Ab: soft and NT; not distended; normal bs  Ext: no edema and well perfused  Skin; no rash    Pertinent Labs   Lab Results   Component Value Date    WBC 2.6 (L) 04/03/2023    HGB 11.9 04/03/2023    HCT 37.9 04/03/2023     (H) 04/03/2023    PLT 38 (LL) 04/03/2023     Lab Results   Component Value Date    BUN 26.2 (H) 04/03/2023     (L) 04/03/2023    CO2 26 04/03/2023       Lab Results   Component Value Date    ALBUMIN 4.0 11/04/2019     Lab Results   Component Value Date    PHOS 4.7 (H) 11/04/2019     I reviewed all lab results  Juani Duron MD

## 2023-04-04 NOTE — DISCHARGE SUMMARY
[facility]  Hospitalist Discharge Summary      Date of Admission:  3/28/2023  Date of Discharge:  4/4/2023  Discharging Provider: Sebastian Duron MD  Discharge Service: Hospitalist Service    Discharge Diagnoses   NSTEMI    Follow-ups Needed After Discharge   Follow-up Appointments     Follow-up and recommended labs and tests       Follow up with primary care provider, DOE CONTRERAS, within 7 days   for hospital follow- up.  No follow up labs or test are needed.             Unresulted Labs Ordered in the Past 30 Days of this Admission     No orders found from 2/26/2023 to 3/29/2023.      These results will be followed up by PCP    Discharge Disposition   Discharged to assisted living  Condition at discharge: Stable    Hospital Course   Lakhwinder Negron is an 82 year old female with medical history of chronic diastolic heart failure, CVA, type 2 diabetes mellitus, hypertension, ESRD on hemodialysis, admitted on 3/28/202 with worsening shortness of breath at rest after hemodialysis, and substernal chest tightness.    Workup revealing a decline in LVSF - EF < 30 % in the setting of NSTEMI. She underwent coronary angiogram which showed significant severe multivessel CAD. She was not deemed a surgical candidate by cardiothoracic surgery. She offered staged PCI, but both patient and family decline. She will continue ASA, high intensity atorvastatin, and carvedilol.      Volume status is managed by nephrology via IHD TTS. She is unable to tolerate UF due to hypotension during dialysis even with midodrine prior dialysis. Anticipate she will be difficult to maintaining euvolemic status.  She will need ongoing therapy for PT/OT, RN and aide when she returns to assisted living facility. She will be discharge today with family transporting patient to [facility].          NSTEMI:   Elevated to troponin of 207. Has been stable after admission. Echocardiogram showed LVEF 30-35%, compared to 64% in 2018, with akinetic in  the apical inferior and anterior wall.  - Cardiology consulted and input appreciated: Plan cardiac cath when appropriate  - Cont aspirin 325 mg daily and lipitor 80 mg daily  - Status post coronary angiogram 3/30 with significant severe multivessel with 70% occlusion of left main.  - Cardiothoracic surgery consulted, patient is not a surgical candidate.  - Family declined staged PCI. - Continue PTA ASA, statin, carvedilol and Imdur      Acute hypoxic respiratory failure: resolved  - Oxygen supplementation to keep saturations over 92%.  -  On room air as of this morning.   -  Patient is DNR.     Acute on chronic systolic heart failure: resolved  Elevated BNP with CT of the chest showing a moderate to large left pleural effusion and mild pulmonary edema.   - Not making much urine currently and so doubt that diuretics will help much.  - Hemodialysis per nephrology  - Strict I/O's and restrict fluid/salt intake 1500 ml and 2400 mg.   - Unable to tolerate UF due to hypotension during dialysis per nephro  - Anticipate she will be difficult maintaining euvolemic status     Large left pleural effusion:  Received thoracentesis on 3/29/23 with 900 mL of clear yellow fluid removed.   -  Pulmonology assistance appreciated.    -   Pleural fluid is exudative (based on Fpr/Spr 0.6), culture and cytology negative  -   Continue to monitor respiratory status. Stable and RA sating last several days    Recent Klebsiella pneumoniae bacteremia and liver abscesses, resolved  admitted in outside facility from 1/23-2/8/23.   - Completed Metronidazole 500 mg PO q 12 + Levofloxacin 500 mg q 48 hrs x 3 weeks ending date 3/31/23.  - Follow up in the ID clinic ([facility]) and US liver in 3 weeks    End-stage renal disease, HD TTS  - Unable to tolerate UF due to hypotension during dialysis  - Nephrology is following, recommendations appreciated      Consultations This Hospital Stay   CORE CLINIC EVALUATION IP CONSULT  OCCUPATIONAL THERAPY ADULT IP  CONSULT  NUTRITION SERVICES ADULT IP CONSULT  CARE MANAGEMENT / SOCIAL WORK IP CONSULT  CARDIOLOGY IP CONSULT  NEPHROLOGY IP CONSULT  CARE MANAGEMENT / SOCIAL WORK IP CONSULT  PHYSICAL THERAPY ADULT IP CONSULT  PULMONARY IP CONSULT  CARDIOTHORACIC SURGERY IP CONSULT    Code Status   No CPR- Do NOT Intubate    Time Spent on this Encounter   Sebastian NIEVES MD, personally saw the patient today and spent greater than 30 minutes discharging this patient.       Sebastian Duron MD  [address] [address]  Phone: [phone]  Fax: [phone]  ______________________________________________________________________    Physical Exam   Vital Signs: Temp: 98  F (36.7  C) Temp src: Oral BP: 139/74 Pulse: 65   Resp: 20 SpO2: 100 % O2 Device: None (Room air)    Weight: 85 lbs 8.62 oz  General: AO X 3, lying comfortably in bed in no apparent distress  HEENT: pupils equal and reactive to light and accommodation, extraocular movements are intact; oral mucosa moist  Neck: Supple no raised JVD, no rigidity  Respiratory: lungs b/l clear to auscultation, no wheezing or crepitations  CVS: nl s1 s2, regular rate and rhythm, no murmur  P/A: soft, nt,nd, no guarding rigidity or rebound tenderness  Ext: no edema  Neuro: no focal neurological deficits noted, cranial nerves 2-12 grossly intact  Psychiatry: normal mood and affect  Skin: No obvious skin rashes or ulcers         Primary Care Physician   DOE CONTRERAS    Discharge Orders      Home Care Referral      Discharge Instructions - IF on Metformin (Glucophage or Glucovance) or Metformin containing medications on day of the procedure and for 48 hours after IV contrast given- Patients with acute kidney injury or severe chronic kidney disease (stage IV or stage V; i.e., eGFR less than 30)    IF on Metformin (Glucophage or Glucovance) or Metformin containing medications , schedule a Basic Metabolic Panel at [facility] or  Primary Clinic in 48 - 72 hours post procedure and PRIOR TO resuming the Metformin or Metformin containing medications.  Hold Metformin (Glucophage or Glucovance) or Metformin containing medications until after the Basic Metabolic Panel on the 2nd or 3rd day following the procedure.  May resume after blood draw is complete.     Reason for your hospital stay    NSTEMI     Follow-up and recommended labs and tests     Follow up with primary care provider, DOE CONTRERAS, within 7 days for hospital follow- up.  No follow up labs or test are needed.     Activity    Your activity upon discharge: activity as tolerated     Diet    Follow this diet upon discharge: Orders Placed This Encounter      Fluid restriction 1000 ML FLUID      Renal Diet (dialysis)       Significant Results and Procedures   Results for orders placed or performed during the hospital encounter of 03/28/23   CT Chest Abdomen Pelvis w/o Contrast    Narrative    EXAM: CT CHEST ABDOMEN PELVIS W/O CONTRAST  LOCATION: [facility]  DATE/TIME: 3/28/2023 8:04 PM    INDICATION: pain, short of breath  COMPARISON: None.  TECHNIQUE: CT scan of the chest, abdomen, and pelvis was performed without IV contrast. Multiplanar reformats were obtained. Dose reduction techniques were used.   CONTRAST: None.    FINDINGS:   LUNGS AND PLEURA: Bilateral groundglass opacities with interlobular septal thickening and mild bronchial wall thickening. Moderate-large left pleural effusion with associated complete left lower lobe and lingular collapse. Small-moderate right pleural   effusion with right lower lobe airspace opacities.    MEDIASTINUM/AXILLAE: No lymphadenopathy. No thoracic aortic aneurysms. Left neck central venous catheter terminates in the right atrium.    CORONARY ARTERY CALCIFICATION: Moderate.    HEPATOBILIARY: Cholecystectomy. No biliary dilation.    PANCREAS: Normal.    SPLEEN: Normal.    ADRENAL GLANDS: Normal.    KIDNEYS/BLADDER:  Atrophic bilaterally. No collecting system dilation. Bladder is completely collapsed and unable to be evaluated.    BOWEL: Normal.    LYMPH NODES: Normal.    VASCULATURE: Moderate diffuse atherosclerotic calcification of the abdominal aorta, visceral branches, and iliofemoral arteries. No aneurysm.    PELVIC ORGANS: Normal.    MUSCULOSKELETAL: Diffuse body wall edema. No aggressive or destructive osseous lesions.      Impression    IMPRESSION:  1.  Moderate-large left pleural effusion with resultant complete left lower lobe and lingular collapse. Small-moderate right pleural effusion with associated airspace opacities, also likely compressive atelectasis although superimposed pneumonia is also   possible.    2.  Mild pulmonary edema.   US Thoracentesis    Narrative    EXAM:   1. LEFT  THORACENTESIS  2. ULTRASOUND GUIDANCE  LOCATION: [facility]  DATE/TIME: 3/29/2023 12:14 PM    INDICATION: Pleural effusion.    PROCEDURE: Informed consent obtained. Time out performed. The chest was prepped and draped in sterile fashion. 6  mL of 1 % lidocaine was infused into the local soft tissues. Under direct ultrasound guidance, a 5 Jamaican catheter system was placed into   the pleural effusion.     900 mL of clear yellow fluid were removed and sent to lab, if requested.    Patient tolerated procedure well.    Ultrasound imaging was obtained and placed in the patient's permanent medical record.      Impression    IMPRESSION:  Status post left  ultrasound-guided thoracentesis.    Reference CPT Code: 57773   Echocardiogram Complete    Narrative    [id number]  [id number]  [id number]  [id number]^GEORGE^[place] [address]     Name: MAURO LAZARO  MRN: [id number]  : 1940  Study Date: 2023 01:08 PM  Age: 82 yrs  Gender: Female  Patient Location: [facility]  Reason For Study: Syncope  Ordering Physician: GELACIO VAZQUEZ  Referring Physician: GEORGE  GELACIO  Performed By: PAIGE     BSA: 1.4 m2  Height: 57 in  Weight: 102 lb  HR: 64  ______________________________________________________________________________  Procedure  Complete Echo Adult. Definity (NDC #22941-098) given intravenously.  ______________________________________________________________________________  Interpretation Summary     Normal ventricular size. Left ventricular systolic function is moderate to  severely reduced. Left ventricular ejection fraction estimated at 30 to 35%.  The left ventricular apex appears akinetic to possibly dyskinetic. The apical  inferior wall appears akinetic. The apical anterior wall appears  akinetic.Diastolic Doppler findings (E/E' ratio and/or other parameters)  suggest left ventricular filling pressures are increased  Normal right ventricular size. Mild decrease in right ventricular systolic  function suggested.  Mild left atrial enlargement.  Mild mitral regurgitation.  Mild tricuspid regurgitation. Estimate of RV systolic pressure is normal at 23  mmHg plus right atrial pressure  Moderate aortic regurgitation     ______________________________________________________________________________  Left Ventricle  The left ventricle is normal in size. There is normal left ventricular wall  thickness. Diastolic Doppler findings (E/E' ratio and/or other parameters)  suggest left ventricular filling pressures are increased. Left ventricular  systolic function is moderate to severely reduced. Left ventricular ejection  fraction estimated at 30 to 35% with regional wall motion abnormality as  described. The left ventricular apex is akinetic to dyskinetic.  The apical inferior wall is akinetic. The apical anterior wall is akinetic.     Right Ventricle  The right ventricle is normal size. Mildly decreased right ventricular  systolic function. TAPSE is abnormal, which is consistent with abnormal right  ventricular systolic function.     Atria  The left atrium is mildly dilated.  Right atrial size is normal. Lipomatous  hypertrophy of the interatrial septum is noted. Cannot exclude patent foramen  ovale.     Mitral Valve  Mitral valve leaflets appear normal. There is mild (1+) mitral regurgitation.     Tricuspid Valve  The tricuspid valve is not well visualized, but is grossly normal. There is  mild (1+) tricuspid regurgitation.     Aortic Valve  The aortic valve is trileaflet with aortic valve sclerosis. There is moderate  (2+) aortic regurgitation. No hemodynamically significant valvular aortic  stenosis.     Pulmonic Valve  The pulmonic valve is not well seen, but is grossly normal. There is trace  pulmonic valvular regurgitation.     Vessels  The aorta root is normal. Normal size ascending aorta. IVC diameter <2.1 cm  collapsing >50% with sniff suggests a normal RA pressure of 3 mmHg.     Pericardium  There is no pericardial effusion.     ______________________________________________________________________________  MMode/2D Measurements & Calculations  IVSd: 1.1 cm  LVIDd: 4.6 cm  LVIDs: 3.4 cm  LVPWd: 1.1 cm  FS: 26.5 %     LV mass(C)d: 174.9 grams  LV mass(C)dI: 129.5 grams/m2  Ao root diam: 3.2 cm  LA dimension: 3.2 cm  asc Aorta Diam: 3.4 cm  LA/Ao: 0.98  LVOT diam: 1.8 cm  LVOT area: 2.4 cm2  LA Volume Indexed (AL/bp): 34.2 ml/m2  RV Base: 3.6 cm  RWT: 0.47  TAPSE: 1.4 cm     Time Measurements  MM HR: 63.0 BPM     Doppler Measurements & Calculations  MV E max sandie: 51.8 cm/sec  MV A max sandie: 96.0 cm/sec  MV E/A: 0.54  MV max PG: 3.8 mmHg  MV mean P.5 mmHg  MV V2 VTI: 26.8 cm  MVA(VTI): 1.7 cm2  MV dec slope: 186.4 cm/sec2  MV dec time: 0.28 sec  Ao V2 max: 126.4 cm/sec  Ao max P.0 mmHg  Ao V2 mean: 92.1 cm/sec  Ao mean PG: 3.7 mmHg  Ao V2 VTI: 28.6 cm  SAYRA(I,D): 1.6 cm2  SAYRA(V,D): 1.5 cm2  AI P1/2t: 467.9 msec  LV V1 max P.5 mmHg  LV V1 max: 78.8 cm/sec  LV V1 VTI: 18.8 cm  SV(LVOT): 45.7 ml  SI(LVOT): 33.8 ml/m2  PA acc time: 0.08 sec  TR max sandie: 240.3 cm/sec  TR max  P.1 mmHg  AV Galindo Ratio (DI): 0.62  SAYRA Index (cm2/m2): 1.2  E/E' av.9  Lateral E/e': 17.6     Medial E/e': 24.2  RV S Galindo: 5.1 cm/sec     ______________________________________________________________________________  Report approved by: Riley Toussaint 2023 02:16 PM         Cardiac Catheterization    Narrative    82-year-old female with multiple core abilities including end-stage renal   disease on hemodialysis, presenting with congestive heart failure and a   new cardiomyopathy, with coronary angiography requested to explain the   change in her left ventricular systolic function as well as her elevated   cardiac enzymes.    Coronary angiography today showed:    Left main has a 60 to 70% ostial stenosis with calcification as well as an   ulcerated segment in the proximal to mid left main.  The distal portion of   the vessel is calcified with mild to moderate disease  LAD is a long vessel wrapping around the apex with moderate diffuse   disease and significant calcification, but no clear obstructive stenosis   in this vessel  Left circumflex is nondominant providing 2 moderate-sized obtuse   marginals.  There is severe disease in the ostial and proximal portion of   the vessel with heavy calcification and moderate diffuse disease elsewhere  RCA is large and dominant providing the PDA as well as a large SORAIDA system.    There is moderate diffuse disease throughout the proximal mid and distal   portions of the vessel with significant calcification, but the only clear   obstructive lesion appears to be a focal 75 to 80% stenosis in the distal   portion of the vessel    LVEDP 15 mmHg    The angiographic films were carefully reviewed, and the ostial and   proximal left main is the lesion the resolution of which would likely   result in the greatest benefit for the patient.  She is likely not good   surgical candidate, but will obtain CT surgery consultation to confirm   that opinion.  If she is not felt  to be a good surgical candidate, could   consider PCI of the ostial and proximal left main, but the patient and   family will have to understand that this would be a high risk procedure   given the absence of hemodynamic support options due to severe peripheral   vascular disease and calcification.     *Note: Due to a large number of results and/or encounters for the requested time period, some results have not been displayed. A complete set of results can be found in Results Review.       Discharge Medications   Current Discharge Medication List      START taking these medications    Details   aspirin (ASA) 325 MG tablet Take 1 tablet (325 mg) by mouth daily for 30 days  Qty: 30 tablet, Refills: 0    Associated Diagnoses: NSTEMI (non-ST elevated myocardial infarction) (H)         CONTINUE these medications which have CHANGED    Details   atorvastatin (LIPITOR) 80 MG tablet Take 1 tablet (80 mg) by mouth every evening  Qty: 30 tablet, Refills: 0    Associated Diagnoses: NSTEMI (non-ST elevated myocardial infarction) (H)      famotidine (PEPCID) 20 MG tablet Take 1 tablet (20 mg) by mouth three times a week 1 tab to be taken only after dialysis three times a week . Takes on Monday Wednesday and Friday.  Qty: 90 tablet, Refills: 0    Associated Diagnoses: Gastroesophageal reflux disease without esophagitis         CONTINUE these medications which have NOT CHANGED    Details   calcium acetate (PHOSLO) 667 MG CAPS capsule Take 667 mg by mouth 3 times daily (with meals)      carvedilol (COREG) 25 MG tablet Take 1 tablet (25 mg) by mouth 2 times daily (with meals) For blood pressure and heart.  Qty: 180 tablet, Refills: 3    Associated Diagnoses: NSTEMI (non-ST elevated myocardial infarction) (H); Essential hypertension with goal blood pressure less than 140/90      Cholecalciferol (VITAMIN D3) 50 MCG (2000 UT) CAPS Take 1 capsule by mouth daily      ferrous sulfate (FE TABS) 325 (65 Fe) MG EC tablet Take 1 tablet (325  mg) by mouth 3 times daily  Qty: 270 tablet, Refills: 3    Associated Diagnoses: Anemia due to gastrointestinal blood loss      isosorbide mononitrate (IMDUR) 30 MG 24 hr tablet Take 1 tablet (30 mg) by mouth daily For blood pressure and heart.    Associated Diagnoses: Essential hypertension with goal blood pressure less than 140/90; NSTEMI (non-ST elevated myocardial infarction) (H)      oxyCODONE (ROXICODONE) 5 MG tablet Take 2.5 mg by mouth every 4 hours as needed for severe pain (7-10)      pantoprazole (PROTONIX) 40 MG EC tablet Take 1 tablet (40 mg) by mouth 2 times daily For stomach ulcer.  Qty: 180 tablet, Refills: 3    Associated Diagnoses: PUD (peptic ulcer disease); Rivers's esophagus without dysplasia      polyethylene glycol (MIRALAX) 17 g packet Take 17 g by mouth daily Hold if more than 2 stools per day      ACE/ARB NOT PRESCRIBED, INTENTIONAL, Please choose reason not prescribed, below    Associated Diagnoses: Type 2 diabetes mellitus with stage 3 chronic kidney disease, without long-term current use of insulin (H)      blood glucose (ONETOUCH ULTRA) test strip USE TO TEST BLOOD SUGAR 2-3 TIMES A DAY //IB HNUB SIV 2-3 ZAUG LI QHIA  Qty: 200 each, Refills: 1    Associated Diagnoses: Type 2 diabetes mellitus with stage 4 chronic kidney disease, without long-term current use of insulin (H)      Lancets (ONETOUCH DELICA PLUS [id number]) MISC USE AS DIRECTED TWO TIMES A DAY // IB HNUB SIV 2 ZAUG LI QHIA  Qty: 200 each, Refills: 1    Associated Diagnoses: Essential hypertension; Type 2 diabetes mellitus with diabetic chronic kidney disease (H)         STOP taking these medications       aspirin 81 MG EC tablet Comments:   Reason for Stopping:         levofloxacin (LEVAQUIN) 500 MG tablet Comments:   Reason for Stopping:         metroNIDAZOLE (FLAGYL) 500 MG tablet Comments:   Reason for Stopping:             Allergies   No Known Allergies

## 2023-04-04 NOTE — PLAN OF CARE
Problem: Chronic Kidney Disease  Goal: Optimal Oral Intake  Outcome: Progressing  Goal: Acceptable Pain Control  Outcome: Progressing     Problem: Gas Exchange Impaired  Goal: Optimal Gas Exchange  Outcome: Progressing  Intervention: Optimize Oxygenation and Ventilation  Recent Flowsheet Documentation  Taken 4/3/2023 2027 by Huyen Payton RN  Head of Bed ([facility]) Positioning: HOB at 45 degrees  Taken 4/3/2023 1550 by Huyen Payton RN  Head of Bed ([facility]) Positioning: HOB at 45 degrees     Goal Outcome Evaluation:         Bilateral leg pain. Requested pain pill and oxycodone given. No void tonight. Patient is oliguric. On hemodialysis every TTS.

## 2023-04-04 NOTE — PLAN OF CARE
Occupational Therapy Discharge Summary    Reason for therapy discharge:    Discharged to home with home therapy. Pt's home is an longterm.    Progress towards therapy goal(s). See goals on Care Plan in [facility] electronic health record for goal details.  Goals partially met.  Barriers to achieving goals:   discharge from facility.    Therapy recommendation(s):    Continued therapy is recommended.  Rationale/Recommendations:  not at baseline for BADLs.
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
## 2023-04-05 NOTE — PROGRESS NOTES
Clinic Care Coordination Contact    Situation: Patient chart reviewed by care coordinator.    Background: Primary Care Coordination referral received. IP Handoff.     Assessment: patient is enrolled with [facility]. [facility] is aware of patients status as evidence by the IP Hand in communication.     Plan/Recommendations: Primary CC will not open program as this is duplication of care management     Stefanie Parikh RN, BSN, PHN Care Coordinator  [place], [place], and [facility]   Phone: [phone]

## 2023-04-06 NOTE — TELEPHONE ENCOUNTER
This writer attempted to contact Robert Bell on 04/06/23    Reason for call orders and left detailed message with approval to delay start of care. Advised to call back if has questions.     Katt Paul RN

## 2023-04-06 NOTE — TELEPHONE ENCOUNTER
Provider: Is it ok for the delay of care?  Thank you. Edel Rivera R.N.    They are requesting delay of care for PT/Nursing/ OT. Start of care will be 4/10/2023. Nicole could not answer when care was supposed to be started.   Delay is happening due to patient preference.      CHRISTEN QUINTEROS: [phone]    Ok to leave a message with the provider's response.

## 2023-04-07 NOTE — LETTER
April 7, 2023      MYCHALENRIQUE SEGOVIAUA  [address] [place] [address]      Dear Lakhwinder:    At [facility], we re dedicated to improving your health and wellness. Enclosed is the Care Plan developed with you on 3/15/2023. Please review the Care Plan carefully.    As a reminder, during your visit we talked about:  Ways to manage your physical and mental health  Using health care to maintain and improve your health   Your preventive care needs     Remember to contact your care coordinator if you:  Are hospitalized, or plan to be hospitalized   Have a fall    Have a change in your physical or mental health  Need help finding support or services    If you have questions, or don t agree with your Care Plan, call me at [phone]. You can also call me if your needs change. TTY users, call the [place] [employer] at (777) or 1-620.708.5335 (krclmu-zy-pbbron relay service).    Sincerely,    Shruthi Martins RN, PHN    E-mail:[email or web address]  Phone: [phone]    Care Manager  [facility] ([facility]) is a health plan that contracts with both Medicare and the [place] Medical Assistance (Medicaid) program to provide benefits of both programs to enrollees. Enrollment in [facility] depends on contract renewal.    [id number] accepted    [id number] (07/2022)

## 2023-04-07 NOTE — PROGRESS NOTES
[facility] Care Coordination Contact    Received after visit chart from care coordinator.  Completed following tasks: Mailed copy of care plan to client, Updated services in Database and Mailed Safe Medication Disposal    and Provider Signature - No POC Shared:  Member indicates that they do not want their POC shared with any [facility] providers.     AL will be processed once the final rates come in to CC.      Greta Floyd  Care Management Specialist  [facility]  [phone]

## 2023-04-10 NOTE — TELEPHONE ENCOUNTER
Ailyn, RN from [facility] calling for orders. See hospitalization notes from 3/28/23. Discharge diagnosis:  NSTEMI    - skilled nursing weekly x 2 weeks, then once every other week x 1 week to teach about CVA/ CHF disease management and pain management.    - PT/ OT evaluate/ treat    Ashley RASMUSSEN, RN

## 2023-04-10 NOTE — TELEPHONE ENCOUNTER
RN calling Ailyn to relay message from provider below.     Ailyn recommends clinic contact patient's assisted living facility, [facility], to relay message about scheduling hospital f/u visit at [phone]. She also provided nurse Ivory at [facility] living facility cell phone number: [phone].    RN spoke with MARJORIE Oneal at [facility] and relayed message about getting patient scheduled for hospital f/u visit with PCP. Kimmy states patient is being followed by [facility] Phys[facility], in house at [facility] living [facility] and has a visit with them tomorrow 4/11/23.     Routing to provider as FYI.    Keyanna Riddle, BSN, RN  [facility]  Nurse Triage, Family Practice

## 2023-04-12 NOTE — TELEPHONE ENCOUNTER
Ailyn RN with [facility] care called regarding the orders that she requested.  States that she has not received a call back.    Relayed that she was contacted regarding orders but reiterated that orders were approved.      No other questions at this time.      Holli Man RN  [facility]

## 2023-04-14 PROBLEM — I50.9 ACUTE ON CHRONIC CONGESTIVE HEART FAILURE, UNSPECIFIED HEART FAILURE TYPE (H): Status: ACTIVE | Noted: 2023-01-01

## 2023-04-14 PROBLEM — I50.22 CHRONIC SYSTOLIC CONGESTIVE HEART FAILURE (H): Status: ACTIVE | Noted: 2019-01-19

## 2023-04-14 PROBLEM — J96.01 ACUTE RESPIRATORY FAILURE WITH HYPOXIA (H): Status: ACTIVE | Noted: 2023-01-01

## 2023-04-14 PROBLEM — D69.6 THROMBOCYTOPENIA (H): Status: ACTIVE | Noted: 2023-01-01

## 2023-04-14 NOTE — CONSULTS
Referral Received - [facility]       [facility] would like to thank you for the [facility] referral.    Referral received and initial insurance information sent to  Hospice intake for review.    We are determining your patient's eligibility with a medical director at this time.    Our plan is to visit your patient as soon as possible. We will connect with the primary care team shortly to collect more information on the patient's progression. Thank you for your patience.    Update:  Pt qualifies for [facility].  Meeting set up with patient and family at bedside on 4/15 at 12 PM.  Dr. Segundo notified of the above information at 1720.    neyda Morataya RN  [facility]  Contact information available via [facility] Paging/Directory     Listed as [facility] Care in [facility]
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
## 2023-04-14 NOTE — ED TRIAGE NOTES
Patient arrives to the ED via [place] [facility] from [facility] facility ([address] [place] [address]) due to shortness of breath.  Per EMS, upon arrival patient was found in her w/c Tripod position, diaphoretic and using accessory muscles to breath.  Oxygen sats were 72% on RA prior to DuoNeb and Albuterol Administration.  Per EMS, sats came up to 96% on 8 liters.  Per EMS, patient hypertensive on scene 210/90 mmHg.  Patient now on 1 liter of O2 via NC per Dr. Castillo, sats are 98%.  Alert and oriented x3.

## 2023-04-14 NOTE — CONSULTS
Care Management Initial Consult    General Information  Assessment completed with: Children, Other ([facility] staff),    Type of CM/SW Visit: Initial Assessment    Primary Care Provider verified and updated as needed: Yes   Readmission within the last 30 days:        Reason for Consult: discharge planning  Advance Care Planning:            Communication Assessment  Patient's communication style: spoken language (non-English)             Cognitive  Cognitive/Neuro/Behavioral:                        Living Environment:   People in home: alone     Current living Arrangements: assisted living      Able to return to prior arrangements: yes       Family/Social Support:  Care provided by: self ([facility] staff)  Provides care for: no one, unable/limited ability to care for self  Marital Status:   Facility resident(s)/Staff, Children          Description of Support System: Supportive, Involved    Support Assessment: Adequate family and caregiver support    Current Resources:   Patient receiving home care services: No     Community Resources: Dialysis Services, DME, Transportation Services  Equipment currently used at home:    Supplies currently used at home: Incontinence Supplies, Diabetic Supplies    Employment/Financial:  Employment Status: disabled        Financial Concerns:     Referral to Financial Worker: No       Lifestyle & Psychosocial Needs:  Social Determinants of Health     Tobacco Use: Low Risk  (4/13/2023)    Patient History      Smoking Tobacco Use: Never      Smokeless Tobacco Use: Never      Passive Exposure: Not on file   Alcohol Use: Not At Risk (3/14/2023)    AUDIT-C      Frequency of Alcohol Consumption: Never      Average Number of Drinks: Patient does not drink      Frequency of Binge Drinking: Never   Financial Resource Strain: Low Risk  (3/14/2023)    Overall Financial Resource Strain (CARDIA)      Difficulty of Paying Living Expenses: Not very hard   Food Insecurity: Not on file   Transportation Needs:  No Transportation Needs (3/14/2023)    PRAPARE - Transportation      Lack of Transportation (Medical): No      Lack of Transportation (Non-Medical): No   Physical Activity: Not on file   Stress: Not on file   Social Connections: Not on file   Intimate Partner Violence: Not on file   Depression: Not at risk (3/14/2023)    PHQ-2      PHQ-2 Score: 0   Housing Stability: Unknown (3/14/2023)    Housing Stability Vital Sign      Unable to Pay for Housing in the Last Year: No      Number of Places Lived in the Last Year: Not on file      Unstable Housing in the Last Year: No       Functional Status:  Prior to admission patient needed assistance:   Dependent ADLs:: Ambulation-walker, Ambulation-cane, Bathing, Dressing, Eating, Grooming, Incontinence, Positioning, Transfers, Wheelchair-with assist, Toileting  Dependent IADLs:: Cleaning, Cooking, Laundry, Shopping, Meal Preparation, Medication Management, Money Management, Transportation, Incontinence       Mental Health Status:  Mental Health Status: No Current Concerns       Chemical Dependency Status:  Chemical Dependency Status: No Current Concerns             Values/Beliefs:  Spiritual, Cultural Beliefs, Zoroastrianism Practices, Values that affect care: no               Additional Information:  CM spoke to patient's son Ramin via phone as well as staff at patient's [facility].    Patient lives at [facility]. She gets assistance with  dressing, bathing, transfers, meals, escorts to meals, and medications. She uses walker in her apartment and w/c outside of apartment. Anticipate discharge back to [facility], likely needs [facility] transport. Palliative consult is pending.    Per [facility], cannot take patient on weekends due to no nurse on staff. They also asked that medications be sent to [facility].     [facility] [phone].    Cathleen Bernstein, BSW

## 2023-04-14 NOTE — PROGRESS NOTES
"[facility]    PROGRESS NOTE - Hospitalist Service    Assessment and Plan    Principal Problem:    Acute on chronic congestive heart failure, unspecified heart failure type (H)  Active Problems:    CVA (cerebral vascular accident) (H)    Right hemiparesis (H)    Hypertension, goal below 140/90    Gastroesophageal reflux disease without esophagitis    Elevated troponin    ESRD (end stage renal disease) on dialysis (H)    Acute respiratory failure with hypoxia (H)    Thrombocytopenia (H)    Lakhwinder Negron is a 82 year old female with h/o Hmong speaking female with past medical history significant for severe coronary artery disease not amendable to CABG with plans to high risk PCI, NSTEMI, congestive heart failure with reduced ejection fraction (most recent echocardiogram showed EF of less than 30%), CVA, type 2 diabetes mellitus, hypertension and end-stage renal disease on in center hemodialysis on TTS schedule at [facility] under my care who presented for evaluation of shortness of breath.    Acute respiratory failure with hypoxia  -Secondary to CHF with pulmonary edema  - supplemental oxygen for comfort      Acute on chronic CHFrEF  -Elevated NT proBNP 70 K with hypervolemic exam diffuse crackles and JVD  - per nephrology unable to dialyze and difficult to maintain euvolemic status. Family in agreement with stopping dialysis and starting comfort cares.   - Consulted GIP since stopping dialysis and with respiratory failure may decline quickly      CAD with multivessel disease  -Previously seen by CTS and cardiology, not a candidate for CTS surgery  -Previously declined PCI  -Continue GDMT with aspirin, Coreg and high intensity statin     ESRD on hemodialysis T TH S  - stopping HD       Clinically Significant Risk Factors      # Overweight: Estimated body mass index is 28.31 kg/m  as calculated from the following:  Height as of this encounter: 1.626 m (5' 4\").  Weight as of this encounter: 74.8 " kg (164 lb 14.4 oz)., PRESENT ON ADMISSION    COVID-19 PCR Results        4/13/2023    23:01   COVID-19 PCR Results   SARS CoV2 PCR Negative     COVID-19 Antibody Results, Testing for Immunity         No data to display               Code Status: No CPR- Do NOT Intubate  VTE prophylaxis:  Heparin subcutaneous held because platelets low and will stop   DIET: Orders Placed This Encounter      Combination Diet Renal Diet (dialysis); Low Saturated Fat Na <2400mg Diet, No Caffeine Diet    Drains/Lines: none  Weight bearing status: WBAT     Expected Discharge Date: 04/16/2023      Destination: assisted living        Subjective:  Discussed with nephrology and family wishes to purse comfort cares. Placed [facility] hospice consult     PHYSICAL EXAM  Temp:  [97.7  F (36.5  C)-97.8  F (36.6  C)] 97.8  F (36.6  C)  Pulse:  [65-93] 74  Resp:  [14-28] 18  BP: (166-196)/(77-98) 170/80  SpO2:  [98 %-100 %] 100 %  Wt Readings from Last 1 Encounters:   04/04/23 38.8 kg (85 lb 8.6 oz)     No intake or output data in the 24 hours ending 04/14/23 0800   There is no height or weight on file to calculate BMI.    Physical Exam  Constitutional:       General: She is not in acute distress.     Appearance: She is ill-appearing.   Cardiovascular:      Rate and Rhythm: Normal rate.      Pulses: Normal pulses.   Pulmonary:      Effort: Pulmonary effort is normal.      Comments: Crackles   Abdominal:      General: Bowel sounds are normal.      Palpations: Abdomen is soft.   Musculoskeletal:         General: Normal range of motion.      Right lower leg: No edema.      Left lower leg: No edema.   Skin:     General: Skin is warm and dry.      Capillary Refill: Capillary refill takes less than 2 seconds.   Neurological:      Mental Status: She is alert and oriented to person, place, and time. Mental status is at baseline.      Comments: right hemiplegia        PERTINENT LABS/IMAGING:  Results for orders placed or performed during the hospital encounter of  04/13/23   XR Chest Port 1 View    Impression    IMPRESSION: Moderate left pleural effusion. Retrocardiac left basilar atelectasis and/or consolidation. Small right pleural effusion. Moderate pulmonary edema. Trace fluid along right major fissure. Cardiomegaly. Left IJ double lumen catheter tip in   right atrium.        Imaging results reviewed over the past 24 hrs:   Recent Results (from the past 24 hour(s))   XR Chest Port 1 View    Narrative    EXAM: XR CHEST PORT 1 VIEW  LOCATION: [facility]  DATE/TIME: 4/13/2023 11:29 PM CDT    INDICATION: dyspnea  COMPARISON: 12/29/2022      Impression    IMPRESSION: Moderate left pleural effusion. Retrocardiac left basilar atelectasis and/or consolidation. Small right pleural effusion. Moderate pulmonary edema. Trace fluid along right major fissure. Cardiomegaly. Left IJ double lumen catheter tip in   right atrium.      Recent Labs   Lab 04/14/23  0756 04/13/23  2257 04/13/23  2249   WBC  --  4.1  --    HGB  --  13.6  --    MCV  --  104*  --    PLT  --  94*  --    * 132*  --    POTASSIUM 3.8 4.3  --    CHLORIDE 102 99  --    CO2 23 21*  --    BUN 18.0 13.0  --    CR 3.04* 2.68*  --    ANIONGAP 8 12  --    FALLON 8.2* 8.7*  --    * 175* 175*   ALBUMIN 3.1*  --   --          25 MINUTES SPENT BY ME on the date of service doing chart review, history, exam, documentation, discussion with nursing staff and specialist, & further activities per the note.  Izabella Segundo MD  [facility] Medicine Service  [phone]

## 2023-04-14 NOTE — PHARMACY-ADMISSION MEDICATION HISTORY
Pharmacist Admission Medication History    Admission medication history is complete. The information provided in this note is only as accurate as the sources available at the time of the update.    Medication reconciliation/reorder completed by provider prior to medication history? Yes    Information Source(s): Facility (U/NH/) medication list/MAR via phone and N/A    HMS will notified of changes to the PTA the need to be addressed.    Changes made to PTA medication list:    Added: PRN miralax    Deleted: vitamin D    Changed: ferrous sulfate and calcium acetate       Allergies reviewed with patient and updates made in EHR: yes    Medication History Completed By: Arsalan Brown RPH 4/14/2023 9:51 AM    PTA Med List   Medication Sig Last Dose     aspirin (ASA) 325 MG tablet Take 1 tablet (325 mg) by mouth daily for 30 days 4/13/2023 at am     atorvastatin (LIPITOR) 80 MG tablet Take 1 tablet (80 mg) by mouth every evening Past Week at pm     calcium acetate (PHOSLO) 667 MG CAPS capsule Take 667 mg by mouth daily (with dinner) 4/13/2023 at pm     carvedilol (COREG) 25 MG tablet Take 1 tablet (25 mg) by mouth 2 times daily (with meals) For blood pressure and heart. 4/13/2023 at am     famotidine (PEPCID) 20 MG tablet Take 1 tablet (20 mg) by mouth three times a week 1 tab to be taken only after dialysis three times a week . Takes on Monday Wednesday and Friday. 4/12/2023     ferrous sulfate (FE TABS) 325 (65 Fe) MG EC tablet Take 325 mg by mouth three times a week 4/12/2023 at am     isosorbide mononitrate (IMDUR) 30 MG 24 hr tablet Take 1 tablet (30 mg) by mouth daily For blood pressure and heart. 4/13/2023 at am     oxyCODONE (ROXICODONE) 5 MG tablet Take 2.5 mg by mouth every 4 hours as needed for severe pain (7-10) Unknown     pantoprazole (PROTONIX) 40 MG EC tablet Take 1 tablet (40 mg) by mouth 2 times daily For stomach ulcer. 4/13/2023 at am     polyethylene glycol (MIRALAX) 17 g packet Take 8.5 g by mouth  daily Hold if more than 2 stools per day 4/13/2023 at am     polyethylene glycol (MIRALAX) 17 GM/Dose powder Take 8.5 g by mouth daily as needed for constipation Unknown

## 2023-04-14 NOTE — PROGRESS NOTES
[facility] Care Coordination Contact    AL RS Tool was pending at time of EOV.     Mailed copy of CL tool to member, faxed copy to AL facility ([facility]) including Provider Signature Summary letter, and submitted authorization to health plan.    Greta Floyd  Care Management Specialist  [facility]  [phone]
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
## 2023-04-14 NOTE — ED NOTES
Reviewed patient's medical history at this time and noted that patient is diabetic. However, patient does not have orders for BG checks, insulin, or diabetic diet. Text page sent to Dr. Segundo for clarification. Per provider, no changes to orders at this time as palliative care consult is pending.

## 2023-04-14 NOTE — CONSULTS
NEPHROLOGY CONSULTATION    CC: shortness of breath.    REASON FOR CONSULTATION: We are asked to see pt by .    HISTORY OF PRESENT ILLNESS:82 year old Hmong speaking female with past medical history significant for severe coronary artery disease not amendable to CABG with plans to high risk PCI, NSTEMI, congestive heart failure with reduced ejection fraction (most recent echocardiogram showed EF of less than 30%), CVA, type 2 diabetes mellitus, hypertension and end-stage renal disease on in center hemodialysis on TTS schedule at [facility] under my care who presented for evaluation of shortness of breath.  No family present at the bedside.  Communication was done with the help of professional Hmong speaking  over the phone.  Patient stated that she received her usual dialysis treatment yesterday, then later in the evening she developed progressive shortness of breath.  EMS was called.  When EMS arrived the patient was found on home with apparently thoracic and cold sweats was noted to be hypoxic with oxygen saturation 72%.  She was placed on 8 L supplemental oxygen and taking to emergency room for further evaluation.  Chest x-ray showed recurrent moderate left pleural effusion and moderate pulmonary edema.  Elevated BNP  greater than 02290, elevated high-sensitivity troponin of 147, mild hyponatremia 132.  At the time of physical exam, the patient states that she is feeling better.  Ports improved shortness of breath.  Spoke with dialysis RN Yaritza at [facility].  The patient has not been tolerating UF due to hypotension during dialysis treatment despite receiving midodrine prior to dialysis.  Her estimated dry weight has been gradually increased from 38.5 to 41.6 kgs in last 10 days. Patient was last dialyzed on April 13, 1.2 kg removed.  Patient was able to achieve her estimated dry weight of 41.6 kgs.    Patient was recently 03/28-04/04/2023 hospitalized at [facility] with  shortness of breath and decompensated congestive heart failure.  During this hospitalization she was found to have large left sided pleural effusion.  Subsequently, the  patient underwent left-sided thoracocentesis, 900 cc of fluid was removed.      REVIEW OF SYSTEMS:  ROS was completely reviewed and otherwise negative and non-contributory    Past Medical History:   Diagnosis Date     Acute on chronic diastolic CHF (congestive heart failure) (H) 3/9/2018     Arthritis      Cataract      CVA (cerebral vascular accident) (H) 2001    Visual changes - RT Leg weakness     DM (diabetes mellitus) (H)     dx around 15 years ago.      Encounter regarding vascular access for dialysis for ESRD (H) 1/10/2020    Added automatically from request for surgery [id number]     Gram-negative pneumonia (H) 5/8/2018     Hypertension      Moderate malnutrition (H) 9/27/2021     neuropathy      Nonproliferative diabetic retinopathy of both eyes (H) 5/12/2011       Social History     Socioeconomic History     Marital status:      Spouse name: Not on file     Number of children: 2     Years of education: Not on file     Highest education level: Not on file   Occupational History     Not on file   Tobacco Use     Smoking status: Never     Smokeless tobacco: Never   Vaping Use     Vaping status: Never Used   Substance and Sexual Activity     Alcohol use: No     Drug use: No     Sexual activity: Not Currently     Partners: Male     Birth control/protection: None   Other Topics Concern     Parent/sibling w/ CABG, MI or angioplasty before 65F 55M? No   Social History Narrative     Not on file     Social Determinants of Health     Financial Resource Strain: Low Risk  (3/14/2023)    Overall Financial Resource Strain (CARDIA)      Difficulty of Paying Living Expenses: Not very hard   Food Insecurity: Not on file   Transportation Needs: No Transportation Needs (3/14/2023)    PRAPARE - Transportation      Lack of Transportation (Medical): No       Lack of Transportation (Non-Medical): No   Physical Activity: Not on file   Stress: Not on file   Social Connections: Not on file   Intimate Partner Violence: Not on file   Housing Stability: Unknown (3/14/2023)    Housing Stability Vital Sign      Unable to Pay for Housing in the Last Year: No      Number of Places Lived in the Last Year: Not on file      Unstable Housing in the Last Year: No       Family History   Problem Relation Age of Onset     Unknown/Adopted Mother      Unknown/Adopted Father      Blood Disease Son         passed at age 3 - patient reports due to low blood counts     Cancer No family hx of      Diabetes No family hx of      Hypertension No family hx of      Cerebrovascular Disease No family hx of      Thyroid Disease No family hx of      Glaucoma No family hx of      Macular Degeneration No family hx of      Kidney Disease No family hx of        No Known Allergies    MEDICATIONS:    aspirin  325 mg Oral Daily     atorvastatin  80 mg Oral QPM     calcium acetate  667 mg Oral TID w/meals     carvedilol  25 mg Oral BID w/meals     famotidine  20 mg Oral Once per day on Mon Wed Fri     ferrous sulfate  325 mg Oral TID     heparin ANTICOAGULANT  5,000 Units Subcutaneous Q12H     isosorbide mononitrate  30 mg Oral Daily     pantoprazole  40 mg Oral BID     polyethylene glycol  17 g Oral Daily     sodium chloride (PF)  3 mL Intracatheter Q8H         PHYSICAL EXAM    BP (!) 182/84   Pulse 74   Temp 97.8  F (36.6  C) (Oral)   Resp 20   LMP  (LMP Unknown)   SpO2 100%     No intake or output data in the 24 hours ending 04/14/23 1127    Alert/oriented x 3; awake and NAD, NC in place, sitting up in bed, frail and elderly looking.  HEENT NC/AT; perrla; OP clear without lesions; mmm  Neck supple without LAD, TM  CV; RRR without rub or murmur  Lung: Wet crackles in anterior posterior fields bilateral  Ab: soft and NT; not distended; normal bs  Ext: no edema and well perfused  Skin; no rash  Neuro;  grossly intact  Access: [facility] CVC site is covered.    LABORATORIES    Recent Labs   Lab 04/13/23  2257   WBC 4.1   HGB 13.6   HCT 44.8   PLT 94*     Recent Labs   Lab 04/14/23  0756 04/13/23  2257   * 132*   CO2 23 21*   BUN 18.0 13.0     No results for input(s): INR, PTT in the last 168 hours.    Invalid input(s): APTT  Invalid input(s): FERRITIN  No results for input(s): IRON in the last 168 hours.    Invalid input(s): TIBC    I reviewed all labs    ASSESSMENT/PLAN:  82 year old Hmong speaking female with past medical history significant for severe coronary artery disease not amendable to CABG with plans to high risk PCI, NSTEMI, congestive heart failure with reduced ejection fraction (most recent echocardiogram showed EF of less than 30%), CVA, type 2 diabetes mellitus, hypertension and end-stage renal disease on in center hemodialysis on TTS schedule at [facility] under my care who presented for evaluation of shortness of breath.Nephrology consulted for volume management and ESRD.        ESRD on IHD on TTS schedule at [facility] under my care. Last HD 04/13, 1.2 kgs removed  She has not been able to completely tolerate dialysis as outpatient. Her estimated dry weight has been gradually increased from 38.5 to 41.6 kgs in last 10 days.  Patient presented with decompensated congestive heart failure secondary to hypervolemia.  Patient said that she still urinates once or twice a day very small amounts of urine.  Patient reports being incontinent of urine.  I discussed in length with the patient intolerance of hemodialysis/UF treatments in light of severe heart failure. I told the patient that it would be difficult to maintain euvolemic status.  She will require recurrent hospitalizations.  Patient verbalized understanding.  Patient asked me to discuss this further with her son Ramin.  We will hold off on dialysis today, awaiting on family decision how to proceed.       Hypervolemia  Large pleural effusion  s/p thoracocentesis on 3/29/2023, 900 ml removed.  Recurrent moderate pleural effusion on CXR       History of hypertension on carvedilol 25 mg twice a day isosorbide and amlodipine  Blood pressure is very labile.  Patient blood pressure currently elevated 180/105.  Hydralazine as needed.     Electrolytes   -Mild hyponatremia.  Likely hypovolemia.  Trend.     MBD  Hyperphosphatemia     Chronic anemia  On BELA  Hb stable     NSTEMI with troponin elevation  Acute on chronic systolic heart failure with EF 30 to 35% with decline from 60 over than 2018  Likely ischemic cardiomyopathy  Known coronary artery disease with no plans for CABG secondary to not being a surgical candidate  Coronary angiogram 3/30/2023 with severe multivessel disease with 70% occlusion of left main  On aspirin and Lipitor       Hypoxemic respiratory failure   Currently on 2 L via nasal cannula       History of DM2  Management per hospitalist medicine     Liver abscess with recent treatment for Klebsiella PNA and bacteremia with widespread.  Treated with antibiotics last admission.    Goals of care:   I spoke with patient's son Ramin Negron over the phone. I discussed in length Lakhwinder's intolerance of hemodialysis/UF treatments in light of severe heart failure. I discussed that it would be difficult to maintain euvolemic status.  She will require recurrent hospitalizations.  He told me that he wants to discuss goals of care for his mother with to his other 2 brothers.  He will call back palliative care NP Philip Yanez this afternoon.    Case discussed with  and MARIO Yaenz     Addendum:  I discussed patient case again with MARIO Yanez at 3:39 pm.  Patient family decided to transition patient to comfort care.  No hemodialysis.  CODE STATUS changed to DNR/DNI.  Inpatient consult to hospice placed.  I spoke again with Dr. Justin Cabrera and updated on above plan.    Thank you for consultation.  Nephrology service will sign off.  Please do  not hesitate to call with questions.    ~ 80 Minutes spent in exam, POC, education regarding renal disease and management. Greater than 90% of the time spent education counseling and/or discussion with patient's care team.      Joanie Blackman MD  [facility]  [address] [place] [address], [address]  [place] [address]  Phone# [phone]  Fax# [phone]

## 2023-04-15 NOTE — PLAN OF CARE
[id number]    Patient on oxymask at 2 LPM throughout shift for comfort. Increased SOB overnight but patient O2 saturations and work of breathing within normal limits.     Patient up in chair for lunch.     Patient denies pain.     Patient transitioned to comfort cares at end of shift.     Patient assist x1-2. Patient utilized bedside commode.     Call light in reach and patient able to make needs known. Family able to communicate needs with family. Language services recommended for more detailed teaching and education.

## 2023-04-15 NOTE — PROGRESS NOTES
Care Management Chart Review    Length of Stay (days): 1    Expected Discharge Date: 04/16/2023    Concerns to be Addressed:   Hospice consult pending (planned for today at noon).  Patient and family have decided against dialysis. Note on 1 liter but desats with activity.   Patient plan of care discussed at interdisciplinary rounds: Yes    Anticipated Discharge Disposition:  Possible transition to [facility]     Anticipated Discharge Services:  To be determined by destination, patient/family preferences, medical needs and mobility status closer to the time of discharge.  Anticipated Discharge DME:  To be determined.     Patient/family educated on Medicare website which has current facility and service quality ratings:   NA  Education Provided on the Discharge Plan:   Per team  Patient/Family in Agreement with the Plan:   Yes    Referrals Placed by CM/SW:   None  Private pay costs discussed: Not applicable     Additional Information:  CM spoke to patient's son Ramin via phone as well as staff at patient's [facility]. Patient is a resident of [facility] where she gets assistance with  dressing, bathing, transfers, meals, escorts to meals, and medications. She uses walker in her apartment and wheelchair outside of apartment.  Possible transition on to [facility]IP.   Of note, [facility] stated that they cannot take patient on weekends due to no nurse on staff. They also asked that medications be sent to [facility] pharmacy.   12:35 PM:  Per nursing, patient's family is waiting for the hospice nurse to hold the consult meeting. Called intake for [facility] ([phone] option 1). Currently on hold.   12:42 PM:  Spoke with intake for [facility].  She will page someone to call writer back.  1:13 PM:  No call back yet from Hospice liaison.  Spoke with nursing who stated that they have not yet seen anyone from hospice either and have gotten patient back to bed. Called [facility] to discuss. On  hold.  1:43 PM:  Call was escalated to [facility]osp[facility] administrator. She will work on this and call writer back with an update as soon as possible.   2:34 PM:  No call back yet from [facility]. Reviewed with MD who plans to transition patient to comfort care at this time. If family wants to take her to their home, CM would make a referral to a home hospice agency.   2:47 PM:  Met with family at the bedside to apologize for the delay in the hospice consult. They plan to leave shortly but would be open to receiving a call from the hospice liaison whenever the liaison is available.   2:54 PM:  Received a call from [facility] who had received an email about this case. Unfortunately, she did not know what was going on with the consult either. They will continue to work on the case and promise to call writer with an update. Writer suggested that they call family as well.     Kim Hurt RN

## 2023-04-15 NOTE — PROGRESS NOTES
"CLINICAL NUTRITION SERVICES - ASSESSMENT NOTE     Nutrition Prescription    RECOMMENDATIONS FOR MDs/PROVIDERS TO ORDER:  Consider renal multi vitamin daily    Malnutrition Status:    Severe in acute on chronic illness    Recommendations already ordered by Registered Dietitian (RD):  Vanilla nepro daily with breakfast meal    Future/Additional Recommendations:  Monitor po intake, weight, labs     REASON FOR ASSESSMENT  Lakhwinder eNgron is a/an 82 year old female assessed by the dietitian for Admission Nutrition Risk Screen for positive weight loss and poor po intake PTA    Pt with a past medical history of severe CAD not amendable to CABG, NStemi, CHF with reduced EF, CVA, DM 2, hypertension and ESRD with HD-admitted with shortness of breath    NUTRITION HISTORY  Family members state her usual weight is 44 Kg-6 months ago  Pt is wanting sweet potato-recommended holding off on the as it is high in potassium and pt is on renal (dialysis) diet,  Pt was educated on 2 gram Na diet on 3/30/2023    CURRENT NUTRITION ORDERS  Diet: renal (dialysis), low saturated fat, <2400 mg Na, no caffeine, FR of 1500 ml/day  Intake/Tolerance: Family state she is eating well here but is interested in getting a nutritional supplement    LABS  Labs reviewed: Na 133, K 3.8, urea nitrogen 18, Cr 3.04, alb 3.1, Glu 121    MEDICATIONS  Medications reviewed: lipitor, phoslo, pepcid, pantoprazole, miralax    ANTHROPOMETRICS  Height: 4'9\"  Most Recent Weight:  38.8 Kg ( 85 lb 8.6 oz)    IBW: 43,2 kg (95 lb)  Weight History:   Wt Readings from Last 10 Encounters:   04/04/23 38.8 kg (85 lb 8.6 oz)   12/29/22 46.2 kg (101 lb 12.8 oz)   11/02/22 45.4 kg (100 lb 1.6 oz)   07/13/22 49.1 kg (108 lb 3.2 oz)   05/11/22 49.6 kg (109 lb 6.4 oz)   03/02/22 50 kg (110 lb 2 oz)   12/20/21 49 kg (108 lb)   09/27/21 50.3 kg (111 lb)   10/26/20 49 kg (108 lb)   05/18/20 48.1 kg (106 lb)   Weight down 16 lb in the past 4 months (15.8%) which is clinically significant and " severe    Dosing Weight: 38.8 kg/ current weight    ASSESSED NUTRITION NEEDS  Estimated Energy Needs: 6755-9742 kcals/day (30 - 35 kcals/kg )  Justification: Repletion  Estimated Protein Needs: 47-58 grams protein/day (1.2 - 1.5 grams of pro/kg)  Justification: Dialysis  Estimated Fluid Needs: 1500 mL/day   Justification: On a fluid restriction    PHYSICAL FINDINGS  See malnutrition section below.  Skin: Bruised (hand, finger, forearm)  [place] score=19, nutrition noted as adequate    MALNUTRITION:  % Weight Loss:  > 7.5% in 3 months (severe malnutrition)  % Intake:  </= 75% for >/= 1 month (severe malnutrition)  Subcutaneous Fat Loss:  Orbital region moderate depletion and Upper arm region moderate depletion  Muscle Loss:  Temporal region moderate depletion and Clavicle bone region moderate depletion  Fluid Retention:  None noted    Malnutrition Diagnosis: Severe malnutrition  In Context of:  Acute illness or injury  Chronic illness or disease    NUTRITION DIAGNOSIS  Malnutrition related to acute on chronic illness as evidenced by 15.8% weight loss in 4 months, inadequate oral intake and moderate fat and muscle loss      INTERVENTIONS  Implementation  Nutrition Education: Per provider order if indicated   Medical food supplement therapy -trial nepro daily with breakfast meal    Goals  Patient to consume % of nutritionally adequate meals three times per day, or the equivalent with supplements/snacks.  Gradual weight gain (non fluid)     Monitoring/Evaluation  Progress toward goals will be monitored and evaluated per protocol.

## 2023-04-15 NOTE — PROGRESS NOTES
[facility]    PROGRESS NOTE - Hospitalist Service    Assessment and Plan    Principal Problem:    Acute on chronic congestive heart failure, unspecified heart failure type (H)  Active Problems:    CVA (cerebral vascular accident) (H)    Right hemiparesis (H)    Hypertension, goal below 140/90    Gastroesophageal reflux disease without esophagitis    Elevated troponin    ESRD (end stage renal disease) on dialysis (H)    Acute respiratory failure with hypoxia (H)    Thrombocytopenia (H)      ADDENDUM:  - hospice still has not met with family.  - family in room and apologized for the delay and that we are currently working on this   - reviewed with family comfort care goals and they are in agreement with stopping all medications, treatments and vitals.   - Family in agreement to dilaudid for air hunger, pain or discomfort which can be seen at the end of life.   - will allow her to eat a regular diet no restrictions- comfort foods  - no vitals and stopping all meds other then comfort care meds  - ativan and haldol ordered  - oxygen for comfort  - also discussed with them the possibility of her going home with them with home hospice with [facility] if they would like since she can not go back to her intermediate on the weekend and they will think about it. For now she will s esther here on comfort care.  - family left and [facility] to contact them     Lakhwinder Negron is a 82 year old female with h/o Hmong speaking female with past medical history significant for severe coronary artery disease not amendable to CABG with plans to high risk PCI, NSTEMI, congestive heart failure with reduced ejection fraction (most recent echocardiogram showed EF of less than 30%), CVA, type 2 diabetes mellitus, hypertension and end-stage renal disease on in center hemodialysis on TTS schedule at [facility] under my care who presented for evaluation of shortness of breath.    Acute respiratory failure with hypoxia  -Secondary to  "CHF with pulmonary edema  - supplemental oxygen for comfort   - still waiting for Hospice to meet with family.      Acute on chronic CHFrEF  -Elevated NT proBNP 70 K with hypervolemic exam diffuse crackles and JVD  - per nephrology unable to dialyze and difficult to maintain euvolemic status. Family in agreement with stopping dialysis and starting comfort cares.   - Consulted [facility] per nursing she qualified and was to meet today at noon awaiting if family signing on      CAD with multivessel disease  -Previously seen by CTS and cardiology, not a candidate for CTS surgery  -Previously declined PCI     ESRD on hemodialysis T TH S  - stopping HD   - no labs       Clinically Significant Risk Factors      # Overweight: Estimated body mass index is 28.31 kg/m  as calculated from the following:  Height as of this encounter: 1.626 m (5' 4\").  Weight as of this encounter: 74.8 kg (164 lb 14.4 oz)., PRESENT ON ADMISSION    COVID-19 PCR Results        4/13/2023    23:01   COVID-19 PCR Results   SARS CoV2 PCR Negative     COVID-19 Antibody Results, Testing for Immunity         No data to display               Code Status: No CPR- Do NOT Intubate  VTE prophylaxis:  Heparin subcutaneous held because platelets low and will stop   DIET: Orders Placed This Encounter      Combination Diet Renal Diet (dialysis); Low Saturated Fat Na <2400mg Diet, No Caffeine Diet    Drains/Lines: none  Weight bearing status: WBAT    Expected Discharge Date: 04/16/2023      Destination: assisted living        Subjective:  Plan to meet with hospice today and was suppsoe to be at noon and still not here, discussed with CM and she is looking into it   PHYSICAL EXAM  Temp:  [97.8  F (36.6  C)] 97.8  F (36.6  C)  Pulse:  [65-80] 71  Resp:  [18-24] 21  BP: (142-182)/(63-85) 142/63  SpO2:  [99 %-100 %] 99 %  Wt Readings from Last 1 Encounters:   04/04/23 38.8 kg (85 lb 8.6 oz)     No intake or output data in the 24 hours ending 04/14/23 0800   There is no " height or weight on file to calculate BMI.    Physical Exam  Constitutional:       General: She is not in acute distress.     Appearance: She is ill-appearing.      Comments: Confused.    Cardiovascular:      Rate and Rhythm: Normal rate.      Pulses: Normal pulses.   Pulmonary:      Effort: Pulmonary effort is normal.      Comments: Crackles   Abdominal:      General: Bowel sounds are normal.      Palpations: Abdomen is soft.   Musculoskeletal:         General: Normal range of motion.      Right lower leg: No edema.      Left lower leg: No edema.   Skin:     General: Skin is warm and dry.      Capillary Refill: Capillary refill takes less than 2 seconds.   Neurological:      Mental Status: She is alert and oriented to person, place, and time. Mental status is at baseline.      Comments: right hemiplegia        PERTINENT LABS/IMAGING:  Results for orders placed or performed during the hospital encounter of 04/13/23   XR Chest Port 1 View    Impression    IMPRESSION: Moderate left pleural effusion. Retrocardiac left basilar atelectasis and/or consolidation. Small right pleural effusion. Moderate pulmonary edema. Trace fluid along right major fissure. Cardiomegaly. Left IJ double lumen catheter tip in   right atrium.        Imaging results reviewed over the past 24 hrs:   No results found for this or any previous visit (from the past 24 hour(s)).  Recent Labs   Lab 04/14/23  0756 04/13/23  2257 04/13/23  2249   WBC  --  4.1  --    HGB  --  13.6  --    MCV  --  104*  --    PLT  --  94*  --    * 132*  --    POTASSIUM 3.8 4.3  --    CHLORIDE 102 99  --    CO2 23 21*  --    BUN 18.0 13.0  --    CR 3.04* 2.68*  --    ANIONGAP 8 12  --    FALLON 8.2* 8.7*  --    * 175* 175*   ALBUMIN 3.1*  --   --          40 MINUTES SPENT BY ME on the date of service doing chart review, history, exam, documentation, discussion with nursing staff and specialist, & further activities per the note.  Izabella Segundo MD  [facility]  [facility] Medicine Service  [phone]

## 2023-04-15 NOTE — PLAN OF CARE
Problem: Risk for Delirium  Goal: Optimal Coping  Outcome: Progressing  Goal: Improved Sleep  Outcome: Progressing   Goal Outcome Evaluation:       Patient had no signs or symptoms of delirium. Patient denied pain. Up to the bathroom assist of 2 with a gate belt. Patient is very weak, recommendation bed side commode. Skin fragile but no signs of redness on buttocks. Patient in 1L NC at 99 - 100%. Patient desaturates with activity.Hannah Freed RN

## 2023-04-16 PROBLEM — Z51.5 HOSPICE CARE: Status: ACTIVE | Noted: 2023-01-01

## 2023-04-16 NOTE — DISCHARGE SUMMARY
[facility]  Hospitalist Discharge Summary      Date of Admission:  4/13/2023  Date of Discharge:  4/16/2023  Discharging Provider: Izabella Segundo MD    Discharge Diagnoses   Principal Problem:    Acute on chronic congestive heart failure, unspecified heart failure type (H)  Active Problems:    CVA (cerebral vascular accident) (H)    Right hemiparesis (H)    Hypertension, goal below 140/90    Gastroesophageal reflux disease without esophagitis    Elevated troponin    ESRD (end stage renal disease) on dialysis (H)    Acute respiratory failure with hypoxia (H)    Thrombocytopenia (H)      Follow-ups Needed After Discharge     Lakhwinder Negron is being readmitted to inpatient hospice    Discharge Disposition   Discharged to inpatient hospice  Condition at discharge: Guarded    Hospital Course   Lakhwinder Negron is a 82 year old female who was admitted on 4/13/2023.  She  is being discharged from the current hospital encounter and will be readmitted to inpatient hospice.    Lakhwinder Negron is a 82 year old female with h/o Hmong speaking female with past medical history significant for severe coronary artery disease not amendable to CABG with plans to high risk PCI, NSTEMI, congestive heart failure with reduced ejection fraction (most recent echocardiogram showed EF of less than 30%), CVA, type 2 diabetes mellitus, hypertension and end-stage renal disease on in center hemodialysis on TTS schedule at [facility] under my care who presented for evaluation of shortness of breath.     Comfort cares   - 4/15/2023  family agrees with comfort care goals and they are in agreement with stopping all medications, treatments and vitals.   - Family in agreement to dilaudid for air hunger, pain or discomfort which can be seen at the end of life.   - will allow her to eat a regular diet no restrictions- comfort foods  - no vitals and stopping all meds other then comfort care meds  - ativan and haldol ordered  - oxygen  for comfort     Acute respiratory failure with hypoxia  -Secondary to CHF with pulmonary edema  - supplemental oxygen for comfort      Acute on chronic CHFrEF  -Elevated NT proBNP 70 K with hypervolemic exam diffuse crackles and JVD  - per nephrology unable to dialyze and difficult to maintain euvolemic status. Family in agreement with stopping dialysis and starting comfort cares.      CAD with multivessel disease  -Previously seen by CTS and cardiology, not a candidate for CTS surgery  -Previously declined PCI  - stopped all meds     ESRD on hemodialysis T TH S  - stopping HD   - no labs     Consultations This Hospital Stay   NEPHROLOGY IP CONSULT  PALLIATIVE CARE ADULT IP CONSULT  CARE MANAGEMENT / SOCIAL WORK IP CONSULT  SOCIAL WORK IP CONSULT  [facility] INPATIENT HOSPICE ADULT CONSULT  [facility] INPATIENT HOSPICE ADULT CONSULT  [facility] INPATIENT HOSPICE ADULT CONSULT    Code Status   No CPR- Do NOT Intubate    Time Spent on this Encounter   I, Izabella Segundo MD, personally saw the patient today and spent greater than 30 minutes discharging this patient.       Izabella Segundo MD  [address] [address]  Phone: [phone]  Fax: [phone]  ______________________________________________________________________    Physical Exam   Vital Signs:                 Oxygen Delivery: 2 LPM  Weight: 0 lbs 0 oz    Please see physical exam from my PROGRESS NOTE on the same date       Primary Care Physician   DOE CONTRERAS    Discharge Orders   No discharge procedures on file.    Significant Results and Procedures   Most Recent 3 CBC's:Recent Labs   Lab Test 04/13/23 2257 04/03/23  0421 03/31/23  0458   WBC 4.1 2.6* 3.0*   HGB 13.6 11.9 12.5   * 103* 106*   PLT 94* 38* 37*     Most Recent 3 BMP's:Recent Labs   Lab Test 04/14/23  0756 04/13/23 2257 04/13/23  2249 04/03/23  0826 04/03/23  0421   * 132*  --   --  133*   POTASSIUM 3.8 4.3  --   --  4.0    CHLORIDE 102 99  --   --  97*   CO2 23 21*  --   --  26   BUN 18.0 13.0  --   --  26.2*   CR 3.04* 2.68*  --   --  3.91*   ANIONGAP 8 12  --   --  10   FALLON 8.2* 8.7*  --   --  8.2*   * 175* 175*   < > 112*    < > = values in this interval not displayed.     Most Recent 2 LFT's:Recent Labs   Lab Test 12/20/21  1135 03/08/18  1641   AST  --  44   ALT 21 45   ALKPHOS  --  93   BILITOTAL  --  0.2     Most Recent 3 INR's:Recent Labs   Lab Test 01/24/20  0647 11/13/19  0930   INR 1.06 1.02   ,   Results for orders placed or performed during the hospital encounter of 04/13/23   XR Chest Port 1 View    Narrative    EXAM: XR CHEST PORT 1 VIEW  LOCATION: [facility]  DATE/TIME: 4/13/2023 11:29 PM CDT    INDICATION: dyspnea  COMPARISON: 12/29/2022      Impression    IMPRESSION: Moderate left pleural effusion. Retrocardiac left basilar atelectasis and/or consolidation. Small right pleural effusion. Moderate pulmonary edema. Trace fluid along right major fissure. Cardiomegaly. Left IJ double lumen catheter tip in   right atrium.      *Note: Due to a large number of results and/or encounters for the requested time period, some results have not been displayed. A complete set of results can be found in Results Review.       Discharge Medications     Current Facility-Administered Medications:      acetaminophen (TYLENOL) tablet 650 mg, 650 mg, Oral, Q4H PRN, Natasha Crum MD     atropine 1 % ophthalmic solution 2 drop, 2 drop, Sublingual, Q4H PRN, Izabella Segundo MD     carboxymethylcellulose PF (REFRESH PLUS) 0.5 % ophthalmic solution 1-2 drop, 1-2 drop, Both Eyes, Q1H PRN, Izabella Segundo MD     famotidine (PEPCID) tablet 20 mg, 20 mg, Oral, Once per day on Mon Wed Fri, Derick Díaz MD, 20 mg at 04/14/23 0839     glycopyrrolate (ROBINUL) injection 0.2 mg, 0.2 mg, Intravenous, Q4H PRN, Izabella Segundo MD     haloperidol (HALDOL) 2 MG/ML (HIGH CONC) solution 1 mg, 1 mg, Oral, Q3H PRN,  Izabella Segundo MD     HYDROmorphone (STANDARD CONC) (DILAUDID) oral solution 0.5 mg, 0.5 mg, Oral, Q2H PRN **OR** HYDROmorphone (DILAUDID) half-tab 1 mg, 1 mg, Oral, Q2H PRN, Izabella Segundo MD, 1 mg at 04/16/23 0946     lidocaine (LMX4) cream, , Topical, Q1H PRN, Derick Díaz MD     lidocaine 1 % 0.1-1 mL, 0.1-1 mL, Other, Q1H PRN, Derick Díaz MD     LORazepam (ATIVAN) injection 1 mg, 1 mg, Intravenous, Q3H PRN **OR** LORazepam (ATIVAN) tablet 1 mg, 1 mg, Sublingual, Q3H PRN, Izabella Segundo MD     melatonin tablet 1 mg, 1 mg, Oral, At Bedtime PRN, Derick Díaz MD     mineral oil-hydrophilic petrolatum (AQUAPHOR), , Topical, Q1H PRN, Izabella Segundo MD     naloxone (NARCAN) injection 0.1 mg, 0.1 mg, Intravenous, Q2 Min PRN, Izabella Segundo MD     naloxone (NARCAN) injection 0.2 mg, 0.2 mg, Intravenous, Q2 Min PRN, Izabella Segundo MD     nitroGLYcerin (NITROSTAT) sublingual tablet 0.4 mg, 0.4 mg, Sublingual, Q5 Min PRN, Kingsley Castillo MD     ondansetron (ZOFRAN ODT) ODT tab 4 mg, 4 mg, Oral, Q6H PRN **OR** ondansetron (ZOFRAN) injection 4 mg, 4 mg, Intravenous, Q6H PRN, Derick Díaz MD     polyethylene glycol (MIRALAX) Packet 17 g, 17 g, Oral, Daily, Derick Díaz MD, 17 g at 04/16/23 0756     sodium chloride (OCEAN) 0.65 % nasal spray 1 spray, 1 spray, Both Nostrils, Q1H PRN, Izabella Segundo MD     sodium chloride (PF) 0.9% PF flush 3 mL, 3 mL, Intracatheter, q1 min prn, Derick Díaz MD    Allergies   No Known Allergies

## 2023-04-16 NOTE — PLAN OF CARE
Still short of breath with activity. On 2 liters/oymask. Stayed for 2 hours in chair overnight then back in bed. Complained of abdominal pain- dilaudid po given with relief. Incontinence care done

## 2023-04-16 NOTE — PROGRESS NOTES
[facility]    PROGRESS NOTE - Hospitalist Service    Assessment and Plan    Principal Problem:    Acute on chronic congestive heart failure, unspecified heart failure type (H)  Active Problems:    CVA (cerebral vascular accident) (H)    Right hemiparesis (H)    Hypertension, goal below 140/90    Gastroesophageal reflux disease without esophagitis    Elevated troponin    ESRD (end stage renal disease) on dialysis (H)    Acute respiratory failure with hypoxia (H)    Thrombocytopenia (H)    Lakhwinder Negron is a 82 year old female with h/o Hmong speaking female with past medical history significant for severe coronary artery disease not amendable to CABG with plans to high risk PCI, NSTEMI, congestive heart failure with reduced ejection fraction (most recent echocardiogram showed EF of less than 30%), CVA, type 2 diabetes mellitus, hypertension and end-stage renal disease on in center hemodialysis on TTS schedule at [facility] under my care who presented for evaluation of shortness of breath.    Comfort cares   - hospice still has not met with family.  - yesterday family agrees with comfort care goals and they are in agreement with stopping all medications, treatments and vitals.   - Family in agreement to dilaudid for air hunger, pain or discomfort which can be seen at the end of life.   - will allow her to eat a regular diet no restrictions- comfort foods  - no vitals and stopping all meds other then comfort care meds  - ativan and haldol ordered  - oxygen for comfort  - also discussed with them the possibility of her going home with them with home hospice with [facility] if they would like since she can not go back to her [facility] on the weekend and they will think about it. For now she will stay here on comfort care.    Acute respiratory failure with hypoxia  -Secondary to CHF with pulmonary edema  - supplemental oxygen for comfort      Acute on chronic CHFrEF  -Elevated NT proBNP 70 K with  "hypervolemic exam diffuse crackles and JVD  - per nephrology unable to dialyze and difficult to maintain euvolemic status. Family in agreement with stopping dialysis and starting comfort cares.      CAD with multivessel disease  -Previously seen by CTS and cardiology, not a candidate for CTS surgery  -Previously declined PCI  - all meds stopped      ESRD on hemodialysis T TH S  - stopping HD   - no labs       Clinically Significant Risk Factors      # Overweight: Estimated body mass index is 28.31 kg/m  as calculated from the following:  Height as of this encounter: 1.626 m (5' 4\").  Weight as of this encounter: 74.8 kg (164 lb 14.4 oz)., PRESENT ON ADMISSION    COVID-19 PCR Results        4/13/2023    23:01   COVID-19 PCR Results   SARS CoV2 PCR Negative     COVID-19 Antibody Results, Testing for Immunity         No data to display               Code Status: No CPR- Do NOT Intubate  VTE prophylaxis:  Heparin subcutaneous held because platelets low and will stop   DIET: Orders Placed This Encounter      Regular Diet Adult    Drains/Lines: none  Weight bearing status: WBAT     Expected Discharge Date: 04/16/2023      Destination: assisted living  Discharge Comments: Might sign on to [facility]      Subjective:  Comfort caresuntil signed on to [facility]     PHYSICAL EXAM     Wt Readings from Last 1 Encounters:   04/04/23 38.8 kg (85 lb 8.6 oz)     No intake or output data in the 24 hours ending 04/14/23 0800   There is no height or weight on file to calculate BMI.  Physical Exam  Constitutional:       Appearance: Normal appearance.   HENT:      Head: Normocephalic and atraumatic.   Cardiovascular:      Rate and Rhythm: Regular rhythm. Tachycardia present.      Pulses: Normal pulses.   Pulmonary:      Effort: Pulmonary effort is normal.      Comments: Bibasilar crackles, unlabored breathing  Neurological:      Mental Status: She is alert.         PERTINENT LABS/IMAGING:  Results for orders placed or performed during the hospital " encounter of 04/13/23   XR Chest Port 1 View    Impression    IMPRESSION: Moderate left pleural effusion. Retrocardiac left basilar atelectasis and/or consolidation. Small right pleural effusion. Moderate pulmonary edema. Trace fluid along right major fissure. Cardiomegaly. Left IJ double lumen catheter tip in   right atrium.        Imaging results reviewed over the past 24 hrs:   No results found for this or any previous visit (from the past 24 hour(s)).  Recent Labs   Lab 04/14/23  0756 04/13/23  2257 04/13/23  2249   WBC  --  4.1  --    HGB  --  13.6  --    MCV  --  104*  --    PLT  --  94*  --    * 132*  --    POTASSIUM 3.8 4.3  --    CHLORIDE 102 99  --    CO2 23 21*  --    BUN 18.0 13.0  --    CR 3.04* 2.68*  --    ANIONGAP 8 12  --    FALLON 8.2* 8.7*  --    * 175* 175*   ALBUMIN 3.1*  --   --          25 MINUTES SPENT BY ME on the date of service doing chart review, history, exam, documentation, discussion with nursing staff and specialist, & further activities per the note.  Izabella Segundo MD  [facility] Medicine Service  [phone]

## 2023-04-16 NOTE — PLAN OF CARE
Goal Outcome Evaluation:    Pt on oxy mask at 2L for comfort. Pt appears comfortable and denies pain. Family was at the bedside and left at the end of the shift. No concerns noted during this shift.

## 2023-04-16 NOTE — PROGRESS NOTES
[facility]    Consult Note - [facility]    ______________________________________________________________________    [facility] Contact Number: [phone]    - Providers: Please contact [facility] with changes in orders or clinical plan of care   - Nursing: Please contact [facility] with significant changes in patient condition    Hospice will notify the care team (including the hospitalist) to confirm date of inpatient hospice (GIP) admission.    New Epic encounter will not be created until hospice completes admission.   ______________________________________________________________________        Hospice Diagnosis: ESRD with discontinuation of HD    Indication for Inpatient Hospice: symptom management with discontinuation of HD    Goals for Hospital Discharge: Symptoms controlled on scheduled medications with less than 3 PRNs in 24hrs for 48 hrs    Plan of Care Discussed with the Following:   - Nurse: Annalise AMADOR--updated at bedside regarding consents being signed.  - Hospitalist/Rounding Provider: Dr. Segundo    - Xee's Family/Preferred Contact: Ramin, son  - Hospice Provider: Dr. Randall Osborn    Summary of Visit (includes assessment, medications and any new orders):   Met with Ramin, son and multiple extended family members at bedside to discuss [facility] hospice and our services including: different levels of care, criteria for each level of care, room and board, coverage with insurance benefits, bereavement services, and legacy projects. Extensive conversation with son, Ramin, on discharging planning occurred during visit; patient's discharge plan is to return to assisted living facility [facility] services [facility].  Lakhwinder and son Ramin elected [facility] hospice services and consents were signed at bedside.  Patient meets criteria for GIP.  [facility] 24/7 service phone number, consent booklet and 'Gone From my Sight' booklet left with  patient and family.      OPAL Alvarez updated at 1550 regarding consents signed.  Called SOC to create bed for GIP at 1557.  Notified Dr. Segundo at 1610 to place signed and held orders and discharge readmit orders for GIP, consents signed. Called p2 nurses station at 1617 to flip the chart; stated they would do it right now.  neyda Morataya RN

## 2023-04-16 NOTE — PROGRESS NOTES
Care Management Chart Review    Length of Stay (days): 2    Expected Discharge Date: To be determined.     Concerns to be Addressed:   Hospice consult pending.  Patient and family have decided against dialysis. Supplemental oxygen.  Patient plan of care discussed at interdisciplinary rounds: Yes    Anticipated Discharge Disposition:  Possible transition to GIP     Anticipated Discharge Services:  To be determined by destination, patient/family preferences, medical needs and mobility status closer to the time of discharge.  Anticipated Discharge DME:  To be determined.     Patient/family educated on Medicare website which has current facility and service quality ratings:   NA  Education Provided on the Discharge Plan:   Per team  Patient/Family in Agreement with the Plan:   Yes    Referrals Placed by CM/SW:   None  Private pay costs discussed: Not applicable     Additional Information:  Per patient's son Ramin, patient is a resident of [facility] where she gets assistance with  dressing, bathing, transfers, meals, escorts to meals, and medications. She uses walker in her apartment and wheelchair outside of apartment. Of note, senior living stated that they cannot take patient on weekends due to no nurse on staff. They also asked that medications be sent to [facility].   Family has decided against hemodialysis and the patient is now on comfort care.  Possible transition on to GIP.  A meeting with the hospice liaison was arranged for noon on 4/15 but no one from hospice came or called. Reviewed with MD who has put in another STAT consult for hospice/GIP. Writer has another call out to [facility].   9:10 AM:  Spoke with intake who will send a message to on-call liaison with a request to follow up with family and CM with plan.   11:15 AM:  Hospice nurse liaison is working with family to arrange a time to meet.  1:03 PM:  Reviewed with attending hospitalist.     Kim Hurt RN

## 2023-04-16 NOTE — PROGRESS NOTES
[facility]    Progress Note - [facility]    ______________________________________________________________________    [facility] Contact Number: [phone]    - Providers: Please contact [facility] with changes in orders or clinical plan of care   - Nursing: Please contact [facility] with significant changes in patient condition  ______________________________________________________________________          Summary of Visit (includes assessment, medications and any new orders):   Appointment with [facility] on 4/15 at 12 PM was missed by our team due to a staffing issue.  I called and left a VM on the son Ramin's phone attempting to reschedule this appointment; Awaiting call back.  Called OPAL Alvarez and left a VM informing her of the situation.    Touched base with OPAL Alvarez in person.  Updated her on VM left regarding the situation. She noted she would reach out to Dr. eSgundo and provide her with an update on the situation.     Update:  Spoke with Magda at 1050 AM over the phone, she is going to reach out to family to attempt to reschedule the visit.  She stated she would send me a message letting me know if/when family is available to meet for another visit.  I personally offered my apologies on behalf of the company for the missed visit related to staffing issues.  I personally offered to complete the visit today at any time given the circumstances of yesterday. Will update OPAL Alvarez.  1310:  Kim JOSEPH notified family at bedside as well as Dr. Segundo that family is at bedside and they speak English.  I arrived on the unit at 1325 to attempt to do a informational visit with family.  All of the family members stepped outside the room to speak with me.  Hugo spoke directly with me and stated that Ramin or her other son would be the ones to make a decision on whether or not to have a meeting with hospice and are the decision makers for the patients' care.  He  stated Ramin was on his way and would be at bedside soon, ETA 1 hr.  Dr. Segundo and Kim JOSEPH have both been updated that I will not have any further information until contact has been made with a decision maker.   neyda Morataya RN  Cell [phone]

## 2023-04-16 NOTE — PLAN OF CARE
[id number]    Patient on comfort cares.     Patient in chair [id number]. Patient returned to bed 1045 with head of the bed elevated. Patient able to reposition self in bed and shift weight in chair.       Call light in reach and patient able to call for help. BA on for safety.     Oxymask in place 2 LPM for patient comfort [phone]. Nasal canula applied 3587-2920 so patient could eat with O2 support.     PRN dilaudid given x1 for abdominal discomfort and SOB. Patient reports relief upon reassessment.     Rounding on patient throughout shift to ensure comfort.    Assist x1 with gait belt.     2 R PIVs saline locked and patent.     Family at bedside with hospice meeting with family at end of shift.

## 2023-04-16 NOTE — H&P
[facility]    History and Physical - Hospitalist Service       Date of Admission:  4/16/2023    Assessment & Plan   Lakhwinder Negron is a 82 year old female who is being transitioned to inpatient hospice on 4/16/2023. Please see the discharge summary from the same date for more details of her hospital course.           Diet: Snacks/Supplements Adult: Nepro Oral Supplement; With Meals  Regular Diet Adult    Girard Catheter: Not present  Lines: None     Code Status: No CPR- Do NOT Intubate    Expected Discharge: working on discharge with hospice    Izabella Segundo MD  Hospitalist Service  [facility]  Securely message with [employer] (more info)  Text page via [employer] Paging/Directory     ______________________________________________________________________    Chief Complaint   Transitioning to inpatient hospice    History of Present Illness   Lakhwinder Negron is a 82 year old female who is being transitioned to inpatient hospice.  Please see the discharge summary from the same date for more details; a brief summary of her current symptoms is included here.    Lakhwinder Negron is a 82 year old female with h/o Hmong speaking female with past medical history significant for severe coronary artery disease not amendable to CABG with plans to high risk PCI, NSTEMI, congestive heart failure with reduced ejection fraction (most recent echocardiogram showed EF of less than 30%), CVA, type 2 diabetes mellitus, hypertension and end-stage renal disease on in center hemodialysis on TTS schedule at [facility] under my care who presented for evaluation of shortness of breath.     Comfort cares   - 4/15/2023  family agrees with comfort care goals and they are in agreement with stopping all medications, treatments and vitals.   - Family in agreement to dilaudid for air hunger, pain or discomfort which can be seen at the end of life.   - will allow her to eat a regular diet no restrictions- comfort  foods  - no vitals and stopping all meds other then comfort care meds  - ativan and haldol ordered  - oxygen for comfort     Acute respiratory failure with hypoxia  -Secondary to CHF with pulmonary edema  - supplemental oxygen for comfort      Acute on chronic CHFrEF  -Elevated NT proBNP 70 K with hypervolemic exam diffuse crackles and JVD  - per nephrology unable to dialyze and difficult to maintain euvolemic status. Family in agreement with stopping dialysis and starting comfort cares.      CAD with multivessel disease  -Previously seen by CTS and cardiology, not a candidate for CTS surgery  -Previously declined PCI  - stopped all meds     ESRD on hemodialysis T TH S  - stopping HD   - no labs     Physical Exam   Vital Signs:                    Weight: 0 lbs 0 oz    Physical exam deferred given current goals of care    Medical Decision Making

## 2023-04-17 NOTE — PROGRESS NOTES
[facility]    Progress Note - [facility]    ______________________________________________________________________    [facility]  Contact Number: [phone]    - Providers: Please contact [facility] with changes in orders or clinical plan of care   - Nursing: Please contact [facility] with significant changes in patient condition  ______________________________________________________________________        Plan of Care Discussed with the Following:   - Nurse: Thom  - Hospitalist/Rounding Provider: Dr. Segundo --paged at 11 AM and 0922 for medication recommendations  - Patient's Family/Preferred Contact: Ramin, son  - Hospice Provider: Dr. Randall Osborn    [facility] Eligibility: pain, dyspnea, anxiety--symptoms with discontinuation of HD    /cremation facility: Follow up with family on     Education provided: Interventions for dyspnea, itching, and pain    Pertinent assessment information:   CNL and SW performed bedside visit together with patient.  Utilized [employer]  throughout entire visit.  Pt is A/O x 4; lethargic.  Temp. Oral 97.9 degrees F.  Stated she feels sleepy because of the medications they are giving her.  She is taking a pauses every 3-5 words to catch her breath and is noted to be mumbling at times by the .  RR 20-22 bpm on 2 LPM NC; O2 sat 100 % on oxygen.  LS diminished x all lobes; audible wheeze heard at bedside but not throughout lung fields.  Pt states she has to catch her breath a lot and is ready for medication to help take away her discomfort related to this. Placed call light on to notify bedside RN of patients symptoms and request PRN administration.  Discussed alternating Dilaudid with Ativan for dyspnea and anxiety with bedside RN. Noticed patient continuously itching her chest.  Pt stated she feels itchy, it has improved since she got washed up, but there are places she cannot scratch and  would like additional medicine aside from the topical cream to help her.  Abdomen soft/nontender; BS normoactive x all quadrants.  Last BM on 4/15 per pt report. No void today.      PRN medications in the last 24 hrs:  Dilaudid PO 1 mg x 6 doses; Ativan 1 mg x 1 dose  Hospice recommendations:    Per Dr. Randall Osborn at 0913:  Scheduled Dilaudid oral solution 2 mg Q6H and increase the frequency of PRN Dilaudid to Q1H.   Paged Dr. Reyes with medication recommendations at 0922.  MD ordered Dilaudid oral solution 1 mg Q4H and increased PRN Dilaudid frequency to Q1H  Per Dr. Randall Osborn at 1058 AM Benadryl 25-50 mg PO Q6H PRN for itching.    neyda Morataya RN  [facility]  cell [phone]

## 2023-04-17 NOTE — PROGRESS NOTES
Pt alert and oriented. Family was at the bedside until 8 pm. Pt was switched to inpatient hospice. Educated pt and family about the pain regime and routine of cares. Pt and family verbalized understanding of the expectations. Pt was consistently reporting pain 7/10 and reports relief with the oral 1 mg dilaudid. Ativan was also given once after pt reports unable to rest and feeling anxious. Pt still on 2L NC and is now resting in bed sleep. Consuming regular diet for comfort care. Pt repositions independently. No other concerns noted overnight. Nursing will continue to monitor and assess pain.

## 2023-04-17 NOTE — PROGRESS NOTES
[facility] Care Coordination Contact    [facility]  Ambulatory Care Coordination to Inpatient Care Management   Hand-In Communication    Date:  April 17, 2023  Name: Lakhwinder Negron is enrolled in [facility] Care Coordination program and I am the Lead Care Coordinator.  CC Contact Information:. [phone]  Payor Source: Payor: [facility] / Plan: [facility] DUAL / Product Type: HMO /   Current services in place:  Lakhwinder is receiving 24 hour customized living services at [facility].    Please see the CC Snaphot and Care Management Flowsheets for specific  details of this Lkahwinder Negron care plan.   Additional details/specific concerns r/t this admission:    No additional concerns at this time .    I will follow this admission in Epic. Please feel free to contact me with questions or for further collaboration in discharge planning.    Shruthi Martins RN, PHN  [facility]  [phone]

## 2023-04-17 NOTE — Clinical Note
Hello,  I'm the Care Coordinator for Lakhwinder Negron. Please let me know if I can assist with anything or if you have any questions.   Thank you,  Shruthi Martins RN, PHN [facility] [phone]

## 2023-04-17 NOTE — PROGRESS NOTES
CLINICAL NUTRITION SERVICES - ASSESSMENT NOTE     Nutrition Prescription    RECOMMENDATIONS FOR MDs/PROVIDERS TO ORDER:  None at this time    Malnutrition Status:    Severe in acute on chronic    Recommendations already ordered by Registered Dietitian (RD):  None at this time    Future/Additional Recommendations:  Noted pt now hospice cares.  RD will sign   But available if nutrition needs arise     REASON FOR ASSESSMENT  Lakhwinder Negron is a/an 82 year old female assessed by the dietitian for Admission Nutrition Risk Screen for positive weight loss and decreased appetite

## 2023-04-17 NOTE — PROGRESS NOTES
TRANSITIONS OF CARE (FREDDY) LOG   FREDDY tasks should be completed by the CC within one (1) business day of notification of each transition. Follow up contact with member is required after return to their usual care setting.  Note:  If CC finds out about the transitions fifteen (15) days or more after the member has returned to their usual care setting, no FREDDY log is needed. However, the CC should check in with the member to discuss the transition process, any changes needed to the care plan and document it in a case note.    Member Name:  Lakhwinder Negron MCO Name:  [facility] MCO/Health Plan Member ID#: [id number]   Product: [facility] Care Coordinator Contact:  Shruthi Martins RN, PHN Agency/County/Care System: [facility]   Transition Communication Actions from Care Management Contact   Transition #1   Notification Date: 4/17/23 Transition Date:   4/13/23 Transition From: [facility]      Is this the member s usual care setting?               yes Transition To: [facility]   Transition Type:  Unplanned  Reason for Admission/Comments:  Acute on CHF and Unspecified Heart Failure     Contact member/responsible party to offer assistance with transition Date completed: 4/17/23 - Spoke to Ramin acosta.     Notes from conversation with the member/responsible party, provider, discharging and receiving facility (as applicable):   Date 4/17/23:CC contacted Hospital /discharge planner. CC left a VM for assigned  at [facility] to contact CC. Hand in communication sent via Epic.   CC reached out to adult son Ramin Negron regarding transition and offered support as needed.  Reviewed and update care plan as needed.  Notified community service providers and placed services Assisted Living on hold as needed.  Transition log initiated.   PCP, Vee River, notified of hospitalization via EMR. Per sonRamin, the family and member agreed on hospice for  member. Member is currently an inpatient hospice patient at [facility] as of 4/16/23. Ramin states member will likely discharge back to [facility] in the next couple of days.      Shruthi Martins RN, N  [facility]  [phone]         Shared CC contact info, care plan/services with receiving setting--Date completed: 4/17/23   Name & Title of receiving setting contact: [facility]   Notified PCP of transition--Date completed:  4/13/23     via  EMR  Name of PCP: Vee River     Transition #2   Notification Date: 4/26/23   Transition Date:   4/26/236 Transition From: Hospital, [facility]     Is this the member s usual care setting?               no Transition To: Assisted Living[facility]    Transition Type:  Planned  Reason for Admission/Comments:  Acute on CHF and Unspecified Heart Failure     Contact member/responsible party to offer assistance with transition Date completed: 4/26/23 - Spoke to member's son, Ramin Negron.     Notes from conversation with the member/responsible party, provider, discharging and receiving facility (as applicable): Per Ramin, member discharge back to Assisted Living and start Hospice service through [facility]. Left a message for Irina at [facility] to contact CC as needed. CC spoke to Geoffrey, Director at the [facility] and he confirmed member returned to facility today (4/26/23.) [facility] states member will resume the same care from residential services plan. No change to current service plan. The AL will work follow new orders from hospital and hospice team for medication. New DME and Oxygen ordered for member.     Date 1:CC contacted adult son Ramin Negron and reviewed discharge summary.  Member has a follow-up appointment with PCP in 7 days: Member is enrolled with Hospice. Member had a hospice visit today at the [facility]. Hospice/AL will schedule appointment with provider.   Member has had a change in  condition: Yes, member has opted to enroll in hospice and discharge hemodialysis service. custodial states member will resume the same service from 24 hour residential service plan.   Home visit needed: No  Care plan reviewed and updated.  The following home based services Assisted Living were resumed.  New referrals placed: No  Transition log completed.   PCP, Vee River, notified of transition back to home via EMR.    Shruthi Martins RN, N  [facility]  [phone]         Shared CC contact info, care plan/services with receiving setting--Date completed: 4/26/23   Name & Title of receiving setting contact: [facility]   Notified PCP of transition--Date completed:  4/26/23     via  EMR  Name of PCP: Vee River      *RETURN TO USUAL CARE SETTING: *Complete tasks below when the member is discharging TO their usual care setting within one (1) business day of notification..      For situations where the Care Coordinator is notified of the discharge prior to the date of discharge, the Care Coordinator must follow up with the member or designated representative to confirm that discharge actually occurred and discuss required FREDDY tasks as outlined in the FREDDY Instructions.  (This includes situations where it may be a  new  usual care setting for the member. (i.e., a community member who decides upon permanent nursing home placement following hospitalization and rehab).    Discuss with Member/Responsible Party:    Check  Yes  - if the member, family member and/or SNF/facility staff manages the following:    If  No  provide explanation in the comments section.          Date completed: 4/26/23 Communicated with member or their designated representative about the following:  care transition process; about changes to the member s health status; plan of care updates; education about transitions and how to prevent unplanned transitions/readmissions    Four Pillars for Optimal  Transition:    Check  Yes  - if the member, family member and/or SNF/facility staff manages the following:    If  No  provide explanation in the comments section.          [x]  Yes     []  No Does the member have a follow-up appointment scheduled with primary care or specialist? (Mental health hospitalizations--the appt. should be w/in 7 days)              For mental health hospitalizations:  []  Yes     []  No     Does the member have a follow-up appointment scheduled with a mental health practitioner within 7 days of discharge?  [x]  Yes     []  No     Has a medication review been completed with member? If no, refer to PCP, home care nurse, MTM, pharmacist  []  Yes     [x]  No     Can the member manage their medications or is there a system in place to manage medications (e.g. home care set-up)?         [x]  Yes     []  No     Can the member verbalize warning signs and symptoms to watch for and how to respond?  [x]  Yes     []  No     Does the member have a copy of and understand their discharge instructions?  If no, assist to obtain copy of discharge instructions, review discharge instructions, and assist to contact PCP to discuss questions about their recent hospitalization.  [x]  Yes     []  No     Does the member have adequate food, housing and transportation?  If no, add goal and discuss additional supports available to the member                                                                                                                                                                                 [x]  Yes     []  No     Is the member safe in their home?  If no, document needs and support provided                                                                                                                                                                          []  Yes     [x]  No     Are there any concerns of vulnerability, abuse, or neglect?  If yes, document concerns and actions taken by Care  Coordinator as a mandated                                                                                                                                                                              [x]  Yes     []  No     Does the member use a Personal Health Care Record?  Check  Yes  if visit summary, discharge summary, and/or healthcare summary are being used as a PHR.                                                                                                                                                                                  []  Yes     [x]  No     Have you reviewed the discharge summary with the member? If  No  provide explanation in comments.  [x]  Yes     []  No     Have you updated the member s care plan/support plan? Add new diagnosis, medications, treatments, goals & interventions, as applicable. If No, provide explanation in comments.    Comments:           Notes from conversation with the member/responsible party, provider, discharging and receiving facility (as applicable): Discharge summary reviewed with member's son Ramin and Geoffrey, director at [facility]. The AL will manage member's medication.     Shruthi Martins RN, PHN  [facility]  [phone]

## 2023-04-17 NOTE — PROGRESS NOTES
[facility]    PROGRESS NOTE - Hospitalist Service    Assessment and Plan    Principal Problem:    Hospice care  Active Problems:    Right hemiparesis (H)    Chronic systolic congestive heart failure (H)    Elevated troponin    ESRD (end stage renal disease) on dialysis (H)    Acute on chronic congestive heart failure, unspecified heart failure type (H)    Acute respiratory failure with hypoxia (H)    Thrombocytopenia (H)      Lakhwinder Negron is a 82 year old female with h/o Hmong speaking female with past medical history significant for severe coronary artery disease not amendable to CABG with plans to high risk PCI, NSTEMI, congestive heart failure with reduced ejection fraction (most recent echocardiogram showed EF of less than 30%), CVA, type 2 diabetes mellitus, hypertension and end-stage renal disease on in center hemodialysis on TTS schedule at [facility] under my care who presented for evaluation of shortness of breath.    Acute respiratory failure with hypoxia  - GIP hospice  -comfort care goals  - family in room and apologized for the delay and that we are currently working on this   - reviewed with family comfort care goals and they are in agreement with stopping all medications, treatments and vitals.   - Family in agreement to dilaudid for air hunger, pain or discomfort which can be seen at the end of life.   - will allow her to eat a regular diet no restrictions- comfort foods  - no vitals and stopping all meds other then comfort care meds  - ativan and haldol ordered  - oxygen for comfort  - will schedule 1mg dilaudid Q4hrs since needed 7 mg total for PRN and continue prn dilaudid      Acute on chronic CHFrEF  -Elevated NT proBNP 70 K with hypervolemic exam diffuse crackles and JVD  - per nephrology unable to dialyze and difficult to maintain euvolemic status. Family in agreement with stopping dialysis.   - comfort care and dilaudid for air hunger     CAD with multivessel  "disease  -Previously seen by CTS and cardiology, not a candidate for CTS surgery  -Previously declined PCI     ESRD on hemodialysis T TH S  - stopping HD   - no labs       Clinically Significant Risk Factors      # Overweight: Estimated body mass index is 28.31 kg/m  as calculated from the following:  Height as of this encounter: 1.626 m (5' 4\").  Weight as of this encounter: 74.8 kg (164 lb 14.4 oz)., PRESENT ON ADMISSION    COVID-19 PCR Results        4/13/2023    23:01   COVID-19 PCR Results   SARS CoV2 PCR Negative     COVID-19 Antibody Results, Testing for Immunity         No data to display               Code Status: No CPR- Do NOT Intubate  VTE prophylaxis:  Heparin subcutaneous held because platelets low and will stop   DIET: Orders Placed This Encounter      Regular Diet Adult  Drains/Lines: none  Weight bearing status: WBAT     Expected Discharge Date: 04/18/2023              Subjective:  Complains of some pain and SOB.   PHYSICAL EXAM     Wt Readings from Last 1 Encounters:   04/04/23 38.8 kg (85 lb 8.6 oz)     No intake or output data in the 24 hours ending 04/14/23 0800   There is no height or weight on file to calculate BMI.    PERTINENT LABS/IMAGING:  Results for orders placed or performed during the hospital encounter of 04/13/23   XR Chest Port 1 View    Impression    IMPRESSION: Moderate left pleural effusion. Retrocardiac left basilar atelectasis and/or consolidation. Small right pleural effusion. Moderate pulmonary edema. Trace fluid along right major fissure. Cardiomegaly. Left IJ double lumen catheter tip in   right atrium.        Imaging results reviewed over the past 24 hrs:   No results found for this or any previous visit (from the past 24 hour(s)).  Recent Labs   Lab 04/14/23  0756 04/13/23  2257 04/13/23  2249   WBC  --  4.1  --    HGB  --  13.6  --    MCV  --  104*  --    PLT  --  94*  --    * 132*  --    POTASSIUM 3.8 4.3  --    CHLORIDE 102 99  --    CO2 23 21*  --    BUN 18.0 " 13.0  --    CR 3.04* 2.68*  --    ANIONGAP 8 12  --    FALLON 8.2* 8.7*  --    * 175* 175*   ALBUMIN 3.1*  --   --          25 MINUTES SPENT BY ME on the date of service doing chart review, history, exam, documentation, discussion with nursing staff and specialist, & further activities per the note.  Izabella Segundo MD  [facility] Medicine Service  [phone]

## 2023-04-17 NOTE — PLAN OF CARE
Problem: End-of-Life Care  Goal: Comfort, Peace and Preserved Dignity  Outcome: Progressing     Problem: Plan of Care - These are the overarching goals to be used throughout the patient stay.    Goal: Readiness for Transition of Care  Outcome: Progressing   Goal Outcome Evaluation:      Plan of Care Reviewed With: patient, other (see comments) ()    Overall Patient Progress: improvingOverall Patient Progress: improving

## 2023-04-18 NOTE — PROGRESS NOTES
[facility]    Medicine Progress Note - Hospitalist Service    Date of Admission:  4/16/2023    Assessment & Plan   Lakhwinder Negron is a 82 year old female with h/o Hmong speaking female with past medical history significant for severe coronary artery disease not amendable to CABG with plans to high risk PCI, NSTEMI, congestive heart failure with reduced ejection fraction (most recent echocardiogram showed EF of less than 30%), CVA, type 2 diabetes mellitus, hypertension and end-stage renal disease on in center hemodialysis on TTS schedule at [facility] under my care who presented for evaluation of shortness of breath.     Acute respiratory failure with hypoxia  - GIP hospice  -comfort care goals   - reviewed with family comfort care goals and they are in agreement with stopping all medications, treatments and vitals.   - Family in agreement to dilaudid for air hunger, pain or discomfort which can be seen at the end of life.   - will allow her to eat a regular diet no restrictions- comfort foods  - no vitals and stopping all meds other then comfort care meds  - ativan and haldol ordered  - oxygen for comfort  - will schedule 1mg dilaudid Q4hrs since needed 7 mg total for PRN and continue prn dilaudid      Acute on chronic CHFrEF  -Elevated NT proBNP 70 K with hypervolemic exam diffuse crackles and JVD  - per nephrology unable to dialyze and difficult to maintain euvolemic status. Family in agreement with stopping dialysis.   - comfort care and dilaudid for air hunger     CAD with multivessel disease  -Previously seen by CTS and cardiology, not a candidate for CTS surgery  -Previously declined PCI     ESRD on hemodialysis T TH S  - stopping HD   - no labs         Diet: Regular Diet Adult    DVT Prophylaxis: On comfort care  Girard Catheter: Not present  Lines: None     Cardiac Monitoring: None  Code Status: No CPR- Do NOT Intubate      Disposition Plan      Expected Discharge Date: 04/19/2023         Discharge Comments: GLENYS Ibrahim MD  Hospitalist Service  [facility]  Securely message with [employer] (more info)  Text page via [employer] Paging/Directory   ______________________________________________________________________    Interval History   Seen and examined at bedside.  Appears comfortable.  Denies pain.  Mild intermittent abdominal discomfort but feels like pain meds help.    Physical Exam   Vital Signs: Temp: 98.2  F (36.8  C) Temp src: Oral BP: (!) 181/84 Pulse: 76   Resp: 18 SpO2: 100 % O2 Device: Nasal cannula Oxygen Delivery: 1 LPM  Weight: 0 lbs 0 oz  General: Appears comfortable      Medical Decision Making   25 MINUTES SPENT BY ME on the date of service doing chart review, history, exam, documentation & further activities per the note.      Data           Imaging results reviewed over the past 24 hrs:   No results found for this or any previous visit (from the past 24 hour(s)).

## 2023-04-18 NOTE — PROGRESS NOTES
"[facility]    Progress Note - [facility]    ______________________________________________________________________    [facility] 24/7 Contact Number: [phone]    - Providers: [facility] with changes in orders or clinical plan of care   - Nursing: Please contact [facility] with significant changes in patient condition  ______________________________________________________________________        Plan of Care Discussed with the Following:   - Nurse: MARJORIE Lagos  - Hospitalist/Rounding Provider: Dr. Alexandria Keane's Family/Preferred Contact: Son Ramin  - Hospice Provider: Dr. Randall Erickson    Summary of Visit (includes assessment, medications and any new orders):   [facility] daily visit assessment. Utilized [employer] . Patient is alert and oriented x 4, appears comfortable. Patient stated she is feeling better, has intermittent stomach discomfort and tightness in chest. Patient stated medication helps with symptoms.  Patient is on scheduled Hydromorphone 1 mg Q4H and scheduled Pepcid 20 mg 3 x weekly. Patient has utilized 3 PRN doses of Hydromorphone since midnight with effective results. Upon arrival patient was on RA. HR 77, O2 sats 93 percent on RA, RR 20, crackles noted in bilateral LL. Patient stated she has SOB intermittently. Writer asked patient if she would like oxygen on for comfort, she said yes. O2 sats 99 percent on 2L via NC. Patient ate a good breakfast of eggs, pancakes and coffee, she drinks ensure daily. Patient gets out of bed with assist x 1 to use BSC. Writer placed pillow under her heels for comfort. Patient states \" I want to get better\". Patient was getting a bed bath later today, stated her itching it d/t dry skin. Coordinated cares with MARJORIE Lagos.  Hospice will continue to assess patient daily.        Thank you for the great care provided to this patient by nursing staff.      Mikayla Napoles RN  "

## 2023-04-18 NOTE — PHARMACY-ADMISSION MEDICATION HISTORY
Please see medication history completed on 4/14 in previous encounter.   Thank you,   Evelina Adkins, JenniferD

## 2023-04-18 NOTE — PLAN OF CARE
Goal Outcome Evaluation:      Plan of Care Reviewed With: patient, child    Overall Patient Progress: no changeOverall Patient Progress: no change    Outcome Evaluation: Pt rated upper quadrant pain 6-8/10 requiring prn oral pain medication x 2. Denied nausea. She shifts her weight well while in bed. 2LNC. Inspiratory and expiratory wheezes noted in the upper lobes. Pt is anuric due to dialysis. Will continue to monitor.

## 2023-04-18 NOTE — PLAN OF CARE
Shift from 0700 to 1930-      Problem: Pain Acute  Goal: Optimal Pain Control and Function  Outcome: Progressing  Intervention: Develop Pain Management Plan  Recent Flowsheet Documentation  Taken 4/18/2023 1331 by Yolis Prado RN  Pain Management Interventions:    emotional support    medication (see MAR)  Taken 4/18/2023 1246 by Yolis Prado RN  Pain Management Interventions:    emotional support    medication (see MAR)  Taken 4/18/2023 1100 by Yolis Prado RN  Pain Management Interventions: emotional support  Taken 4/18/2023 1031 by Yolis Prado RN  Pain Management Interventions:    medication (see MAR)    emotional support  Taken 4/18/2023 0832 by Yolis Prado RN  Pain Management Interventions:    medication (see MAR)    emotional support  Intervention: Prevent or Manage Pain  Recent Flowsheet Documentation  Taken 4/18/2023 0812 by Yolis Prado RN  Medication Review/Management:    medications reviewed    high-risk medications identified     Problem: Heart Failure Comorbidity  Goal: Maintenance of Heart Failure Symptom Control  Outcome: Progressing  Intervention: Maintain Heart Failure Management  Recent Flowsheet Documentation  Taken 4/18/2023 0812 by Yolis Prado RN  Medication Review/Management:    medications reviewed    high-risk medications identified       Goal Outcome Evaluation:    Patient continued on in patient hospice. Getting scheduled dilaudid SL and breakthrough dilaudid SL during shift. Pain and SOB appears manageable. Pt had 3 doses of dilaudid SL during 12 hr shift for breakthrough pain.     Patient having abdominal pain and intermittent chest pain. Patient continues to be on NC oxygen per pt request even though oxygen sats are adequate.     Family at bedside. Adequate intake for breakfast, lunch and dinner.     Up in chair for afternoon per pt request.

## 2023-04-18 NOTE — PROGRESS NOTES
Pt continued to C/O upper quadrant pain rated 4-8/10 requiring prn oral pain medication as well as scheduled.     She repositioned herself in bed. Drinking adequate amounts of water.    Will continue to monitor.

## 2023-04-19 NOTE — PLAN OF CARE
Goal Outcome Evaluation:      Plan of Care Reviewed With: child    Overall Patient Progress: no changeOverall Patient Progress: no change    Outcome Evaluation: C/O upper quadrant and chest pain.  Scheduled pain medication managing well. Pt drowsy tonight. Had an episode of nausea with dry heaving on evenings.  Administered prn IV Ondansetron which relieved it. Will continue to monitor.

## 2023-04-19 NOTE — PROGRESS NOTES
[facility]    Progress Note - [facility]  ____________________________________________________________________    [facility]  Contact Number: [phone]    - Providers: Please contact [facility] with changes in orders or clinical plan of care   - Nursing: Please contact [facility] with significant changes in patient condition  ______________________________________________________________________     Plan of Care Discussed with the Following:   - Nurse: MARJORIE Lagos  - Hospitalist/Rounding Provider: Dr. Wilson    - Patient's Family/Preferred Contact: Ramin, son  - Hospice Provider: Dr. Randall Erickson    [facility] Eligibility: Pain, dyspnea, anxiety--symptom management with discontinuation of hemodialysis    /cremation facility: Unknown; son not at bedside.    Education provided: Interventions for dyspnea and pain, when to utilize PRN medications given to patient    Pertinent assessment information:   CNL and AC SW completed daily GIP visit. Patient sitting in bed supine, visibly short of breath. [employer]  services used for visit. Patient states she had some food her family brought her in today for breakfast with sips of water and juice. Apical pulse 68, irregular. RR 17 shallow with audible secretions present and wheezing. Using 2L oxygen NC. LS diminished in all lobes.Patient states she has increased comfort with oxygen now in use. Stops to catch breath during conversation every few words. Initially denied pain when asked. After further assessment, patient did state she has pain and pressure in chest and shortness of breath, could not rate using 0-10 scale.  HOB elevated for comfort. Bowel sounds hypoactive, no void today.  Patient denies having bowel movement for several days. Miralax and senna are both scheduled; educated patient on medications and indications and end of life process with reduced intake and output.     Hospice recommendations:     -Utilize atropine drops SL PRN for increased secretions present  -Utilize dilaudid 0.5 mg oral solution PRN for air hunger        Sayra Kaye RN  Clinical Nurse Liaison  [facility]  Direct: [phone]  Contact information available via [facility] Paging/Directory

## 2023-04-19 NOTE — PROGRESS NOTES
[facility]    PROGRESS NOTE - Hospitalist Service    Assessment and Plan  Patient is new to me, Lakhwinder Negron is a 82 year old female with h/o Hmong speaking female with past medical history significant for severe coronary artery disease not amendable to CABG with plans to high risk PCI, NSTEMI, congestive heart failure with reduced ejection fraction (most recent echocardiogram showed EF of less than 30%), CVA, type 2 diabetes mellitus, hypertension and end-stage renal disease on in center hemodialysis on TTS schedule at [facility] under my care who presented for evaluation of shortness of breath.     Acute respiratory failure with hypoxia  - GIP hospice  -comfort care goals   - reviewed with family comfort care goals and they are in agreement with stopping all medications, treatments and vitals.   - Family in agreement to dilaudid for air hunger, pain or discomfort which can be seen at the end of life.   - will allow her to eat a regular diet no restrictions- comfort foods  - no vitals and stopping all meds other then comfort care meds  - ativan and haldol ordered  - oxygen for comfort  - will schedule 1mg dilaudid Q4hrs since needed 7 mg total for PRN and continue prn dilaudid      Acute on chronic CHFrEF  -Elevated NT proBNP 70 K with hypervolemic exam diffuse crackles and JVD  - per nephrology unable to dialyze and difficult to maintain euvolemic status. Family in agreement with stopping dialysis.   - comfort care and dilaudid for air hunger     CAD with multivessel disease  -Previously seen by CTS and cardiology, not a candidate for CTS surgery  -Previously declined PCI     ESRD on hemodialysis T TH S  - stopping HD   - no labs      40 MINUTES SPENT BY ME on the date of service doing chart review, history, exam, documentation & further activities per the note    Principal Problem:    Hospice care  Active Problems:    Right hemiparesis (H)    Chronic systolic congestive heart failure  (H)    Elevated troponin    ESRD (end stage renal disease) on dialysis (H)    Acute on chronic congestive heart failure, unspecified heart failure type (H)    Acute respiratory failure with hypoxia (H)    Thrombocytopenia (H)      VTE prophylaxis: Comfort care  DIET: Orders Placed This Encounter      Regular Diet Adult      Disposition/Barriers to discharge: GIP hospice  Code Status: No CPR- Do NOT Intubate    Subjective:  Xee is feeling about the same today, denies any chest pain or shortness of breath.  No acute significant events overnight.    PHYSICAL EXAM  There were no vitals filed for this visit.  B/P:121/65 T:98 P:84 R:20     Intake/Output Summary (Last 24 hours) at 4/19/2023 1508  Last data filed at 4/19/2023 1000  Gross per 24 hour   Intake 660 ml   Output --   Net 660 ml      There is no height or weight on file to calculate BMI.    Constitutional: awake, alert, cooperative, no apparent distress, and appears stated age  Respiratory: No increased work of breathing, good air exchange, clear to auscultation bilaterally, no crackles or wheezing  Cardiovascular: Normal apical impulse, regular rate and rhythm, normal S1 and S2, no S3 or S4, and no murmur noted  GI: No scars, normal bowel sounds, soft, non-distended, non-tender, no masses palpated, no hepatosplenomegally  Skin: no bruising or bleeding and normal skin color, texture, turgor  Musculoskeletal: There is no redness, warmth, or swelling of the joints.  Full range of motion noted.  no lower extremity pitting edema present  Neurologic: Awake, alert, moves 4 extremities.    Neuropsychiatric: Appropriate with examiner      PERTINENT LABS/IMAGING:      Imaging results reviewed over the past 24 hrs:   No results found for this or any previous visit (from the past 24 hour(s)).    Discussed with patient, family, hospice, nursing staff and discharge planner    German Wilson MD  [facility] Medicine Service  [phone]

## 2023-04-19 NOTE — PLAN OF CARE
Shift from 0700 to 1530-    Problem: Pain Acute  Goal: Optimal Pain Control and Function  4/19/2023 1300 by Yolis Prado RN  Outcome: Progressing  4/19/2023 1300 by Yolis Prado RN  Outcome: Progressing  Intervention: Develop Pain Management Plan  Recent Flowsheet Documentation  Taken 4/19/2023 1259 by Yolis Prado RN  Pain Management Interventions: emotional support  Taken 4/19/2023 0930 by Yolis Prado RN  Pain Management Interventions: emotional support  Taken 4/19/2023 0855 by Yolis Prado RN  Pain Management Interventions: medication (see MAR)  Intervention: Prevent or Manage Pain  Recent Flowsheet Documentation  Taken 4/19/2023 0856 by Yolis Prado RN  Sensory Stimulation Regulation: care clustered  Medication Review/Management:    medications reviewed    high-risk medications identified     Problem: Palliative Care  Goal: Enhanced Quality of Life  Outcome: Progressing  Intervention: Maximize Comfort  Recent Flowsheet Documentation  Taken 4/19/2023 1259 by Yolis Prado RN  Pain Management Interventions: emotional support  Taken 4/19/2023 0930 by Yolis Prado RN  Pain Management Interventions: emotional support  Taken 4/19/2023 0855 by Yolis Prado RN  Pain Management Interventions: medication (see MAR)  Intervention: Optimize Function  Recent Flowsheet Documentation  Taken 4/19/2023 0856 by Yolis Prado RN  Sensory Stimulation Regulation: care clustered       Goal Outcome Evaluation:    Pt continues to be on inpatient hospice. Given prayer quilt.    Patient's 0600 pain med held due to sedation per NOC RN.   Given morning dose as scheduled.   Pt hasnt called as often today for pain meds.        Getting scheduled dilaudid SL and breakthrough dilaudid SL during shift prn. Pain and SOB appears manageable.      Patient having abdominal pain and intermittent chest pain. Patient continues to be on NC oxygen per pt request even though oxygen sats are adequate.     Lungs have expiratory  wheezes. Oxygen for comfort.    Pt mostly anuric.    Large amount of family visiting and loving her throughout the day. Family bringing food from home.

## 2023-04-20 NOTE — PLAN OF CARE
Problem: Pain Acute  Goal: Optimal Pain Control and Function  Intervention: Develop Pain Management Plan  Recent Flowsheet Documentation  Taken 4/20/2023 1113 by Cesia Florian RN  Pain Management Interventions: medication (see MAR)   Goal Outcome Evaluation: Pain medicine scheduled and prn given.  Pt having obvious breathing trouble at times.  Pain medicine helping with both of these things.  Did have 1 episode of nausea today around 1430-IV zofran given.  Pt resting now with grandsons in room with her.

## 2023-04-20 NOTE — PROGRESS NOTES
"[facility]    Progress Note - [facility]    ______________________________________________________________________    [facility]  Contact Number: [phone]    - Providers: Please contact [facility] with changes in orders or clinical plan of care   - Nursing: Please contact [facility] with significant changes in patient condition  ______________________________________________________________________        Plan of Care Discussed with the Following:   - Nurse: MARJORIE Callaway  - Hospitalist/Rounding Provider: Dr. Wilson    - Patient's Family/Preferred Contact: no family present during visit  - Hospice Provider: Dr.Karel Erickson    [facility] Eligibility: Dyspnea, pain, symptom management    /cremation facility: Unknown--family still undecided. Per grandson, family has been discussing which facility is able to accommodate their needs. Several out of country family need to be present before .     Education provided: Educated patient on when to call for medications for dyspnea and pain.    Pertinent assessment information:   CNL completed daily GIP visit. Patient sitting up in bed watching TV and speaking with someone on speaker phone. [facility] [place] [facility]  services used for visit. Patient states her stomach pain has decreased today since having a BM this AM. Apical pulse is 72, RR 17 and labored. Currenty on 2L oxygen via NC. Arms arms and feet are cool to touch bilaterally. Patient states she did eat \" a little\" this AM. Grandchildren have been into visit, not children yet. Patient tells writer \"children are old enough, everyone is good, I am ready to go home\". Emotional support given to Xee. Encouragement provided on when to call for medications that can help with respirations and SOB. Contacted bedside RN for PRN dose of medication to help with SOB. Will round back on patient later in day, once more family is present.         Sayra Kaye RN  Clinical Nurse " Liaison  [facility]  Direct: [phone]  Contact information available via [facility] Paging/Directory

## 2023-04-20 NOTE — PLAN OF CARE
Problem: Plan of Care - These are the overarching goals to be used throughout the patient stay.    Goal: Optimal Comfort and Wellbeing  Outcome: Progressing     Problem: End-of-Life Care  Goal: Comfort, Peace and Preserved Dignity  Outcome: Progressing     Problem: Palliative Care  Goal: Enhanced Quality of Life  Outcome: Progressing   Goal Outcome Evaluation:     Patient lying in bed comfortably. Patient given pain meds during the night at patients request, along with scheduled. Patient reported intermittent nausea. Patient repositioned throughout the night. Pt reported itching and prn benadryl was given. Pt reported it was helpful.

## 2023-04-20 NOTE — PLAN OF CARE
Shift from 1500 to 2330-      Problem: Palliative Care  Goal: Enhanced Quality of Life  4/19/2023 2213 by Yolis Prado RN  Outcome: Progressing  4/19/2023 1300 by Yolis Prado RN  Outcome: Progressing  Intervention: Maximize Comfort  Recent Flowsheet Documentation  Taken 4/19/2023 2129 by Yolis Prado RN  Pain Management Interventions: emotional support  Taken 4/19/2023 2106 by Yolis Prado RN  Pain Management Interventions:    emotional support    medication (see MAR)  Intervention: Optimize Function  Recent Flowsheet Documentation  Taken 4/19/2023 1521 by Yolis Prado RN  Sensory Stimulation Regulation: care clustered  Taken 4/19/2023 0856 by Yolis Prado RN  Sensory Stimulation Regulation: care clustered     Goal Outcome Evaluation:  Pt continues to be on inpatient hospice.      Pt hasnt called as often today for pain meds.         Getting scheduled dilaudid SL and breakthrough dilaudid SL during shift prn. Pain and SOB appears manageable. Had breakthrough dilaudid only twice on eves.     Patient having abdominal pain and intermittent chest pain.    Lungs have expiratory wheezes. Oxygen for comfort.     Pt mostly anuric.     Large amount of family visiting and loving her throughout the day. Family bringing food from home.

## 2023-04-20 NOTE — PROGRESS NOTES
[facility]    PROGRESS NOTE - Hospitalist Service    Assessment and Plan  82 year old female with h/o Hmong speaking female with past medical history significant for severe coronary artery disease not amendable to CABG with plans to high risk PCI, NSTEMI, congestive heart failure with reduced ejection fraction (most recent echocardiogram showed EF of less than 30%), CVA, type 2 diabetes mellitus, hypertension and end-stage renal disease on in center hemodialysis on TTS schedule at [facility] under my care who presented for evaluation of shortness of breath.     Acute respiratory failure with hypoxia  - GIP hospice  -comfort care goals   - reviewed with family comfort care goals and they are in agreement with stopping all medications, treatments and vitals.   - Family in agreement to dilaudid for air hunger, pain or discomfort which can be seen at the end of life.   - will allow her to eat a regular diet no restrictions- comfort foods  - no vitals and stopping all meds other then comfort care meds  - ativan and haldol ordered  - oxygen for comfort  - will schedule 1mg dilaudid Q4hrs since needed 7 mg total for PRN and continue prn dilaudid      Acute on chronic CHFrEF  -Elevated NT proBNP 70 K with hypervolemic exam diffuse crackles and JVD  - per nephrology unable to dialyze and difficult to maintain euvolemic status. Family in agreement with stopping dialysis.   - comfort care and dilaudid for air hunger     CAD with multivessel disease  -Previously seen by CTS and cardiology, not a candidate for CTS surgery  -Previously declined PCI     ESRD on hemodialysis T TH S  - stopping HD   - no labs      30 MINUTES SPENT BY ME on the date of service doing chart review, history, exam, documentation & further activities per the note    Principal Problem:    Hospice care  Active Problems:    Right hemiparesis (H)    Chronic systolic congestive heart failure (H)    Elevated troponin    ESRD (end  stage renal disease) on dialysis (H)    Acute on chronic congestive heart failure, unspecified heart failure type (H)    Acute respiratory failure with hypoxia (H)    Thrombocytopenia (H)      VTE prophylaxis: Comfort care  DIET: Orders Placed This Encounter      Regular Diet Adult      Disposition/Barriers to discharge: GIP hospice  Code Status: No CPR- Do NOT Intubate    Subjective:  Xee is feeling about the same today, denies any chest pain or shortness of breath.  No acute significant events overnight.    PHYSICAL EXAM  There were no vitals filed for this visit.  B/P:121/65 T:98 P:84 R:20     Intake/Output Summary (Last 24 hours) at 4/20/2023 1646  Last data filed at 4/20/2023 1045  Gross per 24 hour   Intake 340 ml   Output --   Net 340 ml      There is no height or weight on file to calculate BMI.    Constitutional: awake, alert, cooperative, no apparent distress, and appears stated age  Respiratory: No increased work of breathing, good air exchange, clear to auscultation bilaterally, no crackles or wheezing  Cardiovascular: Normal apical impulse, regular rate and rhythm, normal S1 and S2, no S3 or S4, and no murmur noted  GI: No scars, normal bowel sounds, soft, non-distended, non-tender, no masses palpated, no hepatosplenomegally  Skin: no bruising or bleeding and normal skin color, texture, turgor  Musculoskeletal: There is no redness, warmth, or swelling of the joints.  Full range of motion noted.  no lower extremity pitting edema present  Neurologic: Sleepy, spun to the best my.  Son at bedside.  Neuropsychiatric: Appropriate with examiner      PERTINENT LABS/IMAGING:      Imaging results reviewed over the past 24 hrs:   No results found for this or any previous visit (from the past 24 hour(s)).    Discussed with patient, family,, nursing staff and discharge planner    German Wilson MD  [facility] Medicine Service  [phone]

## 2023-04-21 NOTE — PROGRESS NOTES
[facility]  Progress Note - [facility]  ______________________________________________________________________    [facility]  Contact Number: [phone]    - Providers: Please contact [facility] with changes in orders or clinical plan of care   - Nursing: Please contact [facility] with significant changes in patient condition  ______________________________________________________________________      Plan of Care Discussed with the Following:   - Nurse: MARJORIE Ch  - Hospitalist/Rounding Provider: Dr. Wilson    - Patient's Family/Preferred Contact: Ramin (son)  - Hospice Provider: Dr. Randall Erickson    [facility] Eligibility: Dyspnea, pain, symptom management    /cremation facility: Family still finalizing     Education provided: Educated patient on when to ask for medications for dyspnea and pain.     Pertinent assessment information:   Family has been surrounding patient throughout the day at bedside. Decrease in oral intake reported. Patient denies pain when asked, non-verbal indicators present of patient being uncomfortable with increase dyspnea and use of accessory muscles with conversation. Gasps for air noted during conversation with frequent pauses. Update provided to MD with recommendations. MD will reassess and update plan of care as needed. We appreciate the collaboration.     Hospice recommendations per Dr. Randall Erickson:  - Increase scheduled dilaudid to 2 mg oral solution every 4 hours   -Utilize language line/ services to communicate with Hmong speaking patient  -Use non-verbal assessment of pain and dyspnea patient has tendency to under report pain and shortness of breath.      Sayra Kaye RN  Clinical Nurse Liaison  [facility]  Direct: [phone]  Contact information available via [facility] Paging/Directory

## 2023-04-21 NOTE — PLAN OF CARE
Problem: Pain Acute  Goal: Optimal Pain Control and Function  Outcome: Progressing     Problem: Palliative Care  Goal: Enhanced Quality of Life  Outcome: Progressing   Goal Outcome Evaluation:    Pt is on comfort cares. Pain managed with scheduled dilaudid.

## 2023-04-21 NOTE — PROGRESS NOTES
Pt's daughter in law and son called unit to notify that they had a missed call from patient. Patient was telling them that she could not get out of bed. RN and NA went to room, pt on phone with family at the time.Assisted patient to get up to bedside commode. Pt had a BM. Resting in bed. Call light within reach and bed alarm on.

## 2023-04-21 NOTE — PROGRESS NOTES
[facility]    PROGRESS NOTE - Hospitalist Service    Assessment and Plan  82 year old female with h/o Hmong speaking female with past medical history significant for severe coronary artery disease not amendable to CABG with plans to high risk PCI, NSTEMI, congestive heart failure with reduced ejection fraction (most recent echocardiogram showed EF of less than 30%), CVA, type 2 diabetes mellitus, hypertension and end-stage renal disease on in center hemodialysis on TTS schedule at [facility] under my care who presented for evaluation of shortness of breath.     Acute respiratory failure with hypoxia  - GIP hospice  -comfort care goals   - reviewed with family comfort care goals and they are in agreement with stopping all medications, treatments and vitals.   - Family in agreement to dilaudid for air hunger, pain or discomfort which can be seen at the end of life.   - will allow her to eat a regular diet no restrictions- comfort foods  - no vitals and stopping all meds other then comfort care meds  - ativan and haldol ordered  - oxygen for comfort  - will schedule 1mg dilaudid Q4hrs since needed 7 mg total for PRN and continue prn dilaudid   - Patient looks very comfortable, has not used as needed Dilaudid and currently off oxygen     Acute on chronic CHFrEF  - Elevated NT proBNP 70 K with hypervolemic exam diffuse crackles and JVD  - per nephrology unable to dialyze and difficult to maintain euvolemic status. Family in agreement with stopping dialysis.   - comfort care and dilaudid for air hunger     CAD with multivessel disease  - Previously seen by CTS and cardiology, not a candidate for CTS surgery  - Previously declined PCI     ESRD on hemodialysis T TH S  - stopping HD   - no labs      30 MINUTES SPENT BY ME on the date of service doing chart review, history, exam, documentation & further activities per the note    Principal Problem:    Hospice care  Active Problems:    Right  hemiparesis (H)    Chronic systolic congestive heart failure (H)    Elevated troponin    ESRD (end stage renal disease) on dialysis (H)    Acute on chronic congestive heart failure, unspecified heart failure type (H)    Acute respiratory failure with hypoxia (H)    Thrombocytopenia (H)      VTE prophylaxis: Comfort care  DIET: Orders Placed This Encounter      Regular Diet Adult      Disposition/Barriers to discharge: GIP  Code Status: No CPR- Do NOT Intubate    Subjective:  Via  Xee is feeling much better today, family at bedside.  Patient is eating better and had bowel movement.  No shortness of breath or chest pain    PHYSICAL EXAM  There were no vitals filed for this visit.  B/P:121/65 T:98 P:84 R:20   No intake or output data in the 24 hours ending 04/21/23 1524   There is no height or weight on file to calculate BMI.    Constitutional: awake, alert, cooperative, no apparent distress, and appears stated age  Respiratory: No increased work of breathing, good air exchange, clear to auscultation bilaterally, no crackles or wheezing  Cardiovascular: Normal apical impulse, regular rate and rhythm, normal S1 and S2, no S3 or S4, and no murmur noted  GI: No scars, normal bowel sounds, soft, non-distended, non-tender, no masses palpated, no hepatosplenomegally  Skin: no bruising or bleeding and normal skin color, texture, turgor  Musculoskeletal: There is no redness, warmth, or swelling of the joints.  Full range of motion noted.  no lower extremity pitting edema present  Neurologic: Awake, alert, oriented to name, place only. Neuropsychiatric: Appropriate with examiner      PERTINENT LABS/IMAGING:      Imaging results reviewed over the past 24 hrs:   No results found for this or any previous visit (from the past 24 hour(s)).    Discussed with patient, family, GIP, nursing staff and discharge planner    German Wilson MD  [facility] Medicine Service  [phone]

## 2023-04-21 NOTE — PLAN OF CARE
"  Problem: Plan of Care - These are the overarching goals to be used throughout the patient stay.    Goal: Plan of Care Review  Description: The Plan of Care Review/Shift note should be completed every shift.  The Outcome Evaluation is a brief statement about your assessment that the patient is improving, declining, or no change.  This information will be displayed automatically on your shift note.  Outcome: Progressing  Goal: Patient-Specific Goal (Individualized)  Description: You can add care plan individualizations to a care plan. Examples of Individualization might be:  \"Parent requests to be called daily at 9am for status\", \"I have a hard time hearing out of my right ear\", or \"Do not touch me to wake me up as it startles me\".  Outcome: Progressing  Goal: Absence of Hospital-Acquired Illness or Injury  Intervention: Identify and Manage Fall Risk  Recent Flowsheet Documentation  Taken 4/21/2023 1024 by Dima Christensen RN  Safety Promotion/Fall Prevention: activity supervised  Intervention: Prevent and Manage VTE (Venous Thromboembolism) Risk  Recent Flowsheet Documentation  Taken 4/21/2023 1024 by Dima Christensen RN  VTE Prevention/Management: SCDs (sequential compression devices) off  Goal: Optimal Comfort and Wellbeing  Outcome: Progressing  Intervention: Monitor Pain and Promote Comfort  Recent Flowsheet Documentation  Taken 4/21/2023 1024 by Dima Christensen RN  Pain Management Interventions: medication (see MAR)  Goal: Readiness for Transition of Care  Outcome: Progressing   Goal Outcome Evaluation:    Pt alert, denied pain ans seems comfortable in bed and reposition self as needed. Family at bedside, discussed plan of care and verbalized understanding.                 "

## 2023-04-21 NOTE — PLAN OF CARE
Goal Outcome Evaluation:      Problem: End-of-Life Care  Goal: Comfort, Peace and Preserved Dignity  Outcome: Progressing     Problem: Pain Acute  Goal: Optimal Pain Control and Function  Outcome: Progressing  Intervention: Develop Pain Management Plan  Recent Flowsheet Documentation  Taken 4/20/2023 2131 by Mirta Brewer RN  Pain Management Interventions: medication (see MAR)  Taken 4/20/2023 1700 by Mirta Brewer RN  Pain Management Interventions: medication (see MAR)  Intervention: Prevent or Manage Pain  Recent Flowsheet Documentation  Taken 4/20/2023 1543 by Mirta Brewer RN  Medication Review/Management: medications reviewed     Problem: Heart Failure Comorbidity  Goal: Maintenance of Heart Failure Symptom Control  Outcome: Progressing  Intervention: Maintain Heart Failure Management  Recent Flowsheet Documentation  Taken 4/20/2023 1543 by Mirta Brewer RN  Medication Review/Management: medications reviewed    8/10 pain reported. Scheduled Dilaudid was utilized and effective. Family visiting all evening and are supportive. Patient repositioned as willing. Large BM this shift.  Comfort/Hospice care continued. Family and facility contacted RN asking about discharge. RN clarified things and questions were answered.     Mirta Brewer RN

## 2023-04-22 NOTE — PLAN OF CARE
Used  for assessment.  Denies pain.  Appears comfortable.  Asking writer where family is.  Up with assist of 2 to commode.

## 2023-04-22 NOTE — PROGRESS NOTES
[facility]  Progress Note - [facility]  ______________________________________________________________________     [facility]  Contact Number: [phone]    - Providers: Please contact [facility] with changes in orders or clinical plan of care   - Nursing: Please contact [facility] with significant changes in patient condition  ______________________________________________________________________      Care team:  - Hospitalist/Rounding Provider: Dr. Wilson     - Patient's Family/Preferred Contact: Ramin (son)  - Hospice Provider: Dr. Randall Erickson      [facility] Eligibility: Dyspnea, pain, symptom management     /cremation facility:  Planning in progress      Pertinent assessment information:   Patient experienced breakthrough abdominal pain rated 6/6 this afternoon.  Per bedside RN, scheduled dilaudid effective in reducing pain from 6/10 down to 3/10 after medication administration.  Patient with sings of  dyspnea AEB use of accessory muscles with conversation and  amplified by any exertion.  Recommend increasing scheduled dilaudid dose for more consistent pain and dyspnea control.      Hospice recommendations:   - Increase scheduled dilaudid to 2 mg oral solution every 4 hours   Continue to utilize PRN dilaudid for breakthrough pain     -Use non-verbal assessment of pain and dyspnea patient has tendency to under report pain and shortness of breath.    Tori Shipman RN  [facility]

## 2023-04-22 NOTE — PLAN OF CARE
Problem: Plan of Care - These are the overarching goals to be used throughout the patient stay.    Goal: Plan of Care Review  Description: The Plan of Care Review/Shift note should be completed every shift.  The Outcome Evaluation is a brief statement about your assessment that the patient is improving, declining, or no change.  This information will be displayed automatically on your shift note.  Outcome: Progressing     Problem: End-of-Life Care  Goal: Comfort, Peace and Preserved Dignity  Outcome: Progressing   Goal Outcome Evaluation:       Patient alert and oriented. On comfort care. Up in the chair and went down around the hospital with family tolerated well. Ate food from family. Comfortable denied pain through out this shift. Will continue to monitor.

## 2023-04-22 NOTE — PROGRESS NOTES
[facility]    PROGRESS NOTE - Hospitalist Service    Assessment and Plan  82 year old female with h/o Hmong speaking female with past medical history significant for severe coronary artery disease not amendable to CABG with plans to high risk PCI, NSTEMI, congestive heart failure with reduced ejection fraction (most recent echocardiogram showed EF of less than 30%), CVA, type 2 diabetes mellitus, hypertension and end-stage renal disease on in center hemodialysis on TTS schedule at [facility] under my care who presented for evaluation of shortness of breath.     Acute respiratory failure with hypoxia  - [facility] hospice  -comfort care goals   - reviewed with family comfort care goals and they are in agreement with stopping all medications, treatments and vitals.   - Family in agreement to dilaudid for air hunger, pain or discomfort which can be seen at the end of life.   - will allow her to eat a regular diet no restrictions- comfort foods  - no vitals and stopping all meds other then comfort care meds  - ativan and haldol ordered  - oxygen for comfort  - will schedule 1mg dilaudid Q4hrs since needed 7 mg total for PRN and continue prn dilaudid   - Patient still looks very comfortable, has not used as needed Dilaudid and currently off oxygen  -Patient goes out with family for a walk outside hospital     Acute on chronic CHFrEF  - Elevated NT proBNP 70 K with hypervolemic exam diffuse crackles and JVD  - per nephrology unable to dialyze and difficult to maintain euvolemic status. Family in agreement with stopping dialysis.   - comfort care and dilaudid for air hunger     CAD with multivessel disease  - Previously seen by CTS and cardiology, not a candidate for CTS surgery  - Previously declined PCI     ESRD on hemodialysis [facility]  - stopping HD   - no labs as patient transition to [facility]     30 MINUTES SPENT BY ME on the date of service doing chart review, history, exam, documentation & further  activities per the note    Principal Problem:    Hospice care  Active Problems:    Right hemiparesis (H)    Chronic systolic congestive heart failure (H)    Elevated troponin    ESRD (end stage renal disease) on dialysis (H)    Acute on chronic congestive heart failure, unspecified heart failure type (H)    Acute respiratory failure with hypoxia (H)    Thrombocytopenia (H)      VTE prophylaxis: Comfort care  DIET: Orders Placed This Encounter      Regular Diet Adult      Disposition/Barriers to discharge: GIP  Code Status: No CPR- Do NOT Intubate    Subjective:  Xee is feeling about the same today, still denies any chest pain or shortness of breath.  No acute significant events overnight.      PHYSICAL EXAM  There were no vitals filed for this visit.  B/P:121/65 T:98 P:84 R:20   No intake or output data in the 24 hours ending 04/22/23 1310   There is no height or weight on file to calculate BMI.    Constitutional: awake, alert, cooperative, no apparent distress, and appears stated age  Respiratory: Decreased breath sounds lung bases, rhonchi.  Cardiovascular: Normal apical impulse, regular rate and rhythm, normal S1 and S2, no S3 or S4, and no murmur noted  GI: No scars, normal bowel sounds, soft, non-distended, non-tender, no masses palpated, no hepatosplenomegally  Skin: no bruising or bleeding and normal skin color, texture, turgor  Musculoskeletal: There is no redness, warmth, or swelling of the joints.  Full range of motion noted.  no lower extremity pitting edema present  Neurologic: Awake, alert, oriented to name, place only.  Grossly intact.     Neuropsychiatric: Appropriate with examiner      PERTINENT LABS/IMAGING:      Imaging results reviewed over the past 24 hrs:   No results found for this or any previous visit (from the past 24 hour(s)).    Discussed with patient, family,, nursing staff and discharge planner    German Wilson MD  [facility] Medicine Service  [phone]

## 2023-04-22 NOTE — PLAN OF CARE
Goal Outcome Evaluation:      Problem: Pain Acute  Goal: Optimal Pain Control and Function  Outcome: Progressing  Intervention: Prevent or Manage Pain  Recent Flowsheet Documentation  Taken 4/22/2023 1000 by Jennie Swann RN  Sensory Stimulation Regulation: care clustered  Pt denied pain most shift; appeared comfortable. Scheduled Dilaudid given; reported ABD pain 6/10; then down to 3/10 after med.      Pt ambulated to the bathroom; per pt's request. Pt had loose stool x 2 (small).  Pt ate 25% of meals; poor appetite.  Family visited.

## 2023-04-23 NOTE — PROGRESS NOTES
[facility]    Progress Note - [facility]    ______________________________________________________________________    [facility]7 Contact Number: [phone]    - Providers: Please contact [facility] with changes in orders or clinical plan of care   - Nursing: Please contact [facility] with significant changes in patient condition  ______________________________________________________________________        Plan of Care Discussed with the Following:     - Hospitalist/Rounding Provider: Dr. Katie Keane's Family/Preferred Contact: Son, Ramin    Hospice Provider: Dr. Jones     Summary of Visit (includes assessment, medications and any new orders):     Pain and dyspnea are currently managed with scheduled dilaudid 1 mg every 4 hours.  Patient denies  Pain, nausea or shortness of breath, although increased work of breath evident with conversation, AEB long pauses, and use of accessory muscles. Per bedside RN, large BM this morning, which likely contributed to episode of acute abdominal pain on 4/22.  Please continue to monitor for nonverbal signs of pain / dyspnea and encourage use of PRN's for comfort.     Tori Shipman RN  [facility]

## 2023-04-23 NOTE — PROGRESS NOTES
[facility]    PROGRESS NOTE - Hospitalist Service    Assessment and Plan  82 year old female with h/o Hmong speaking female with past medical history significant for severe coronary artery disease not amendable to CABG with plans to high risk PCI, NSTEMI, congestive heart failure with reduced ejection fraction (most recent echocardiogram showed EF of less than 30%), CVA, type 2 diabetes mellitus, hypertension and end-stage renal disease on in center hemodialysis on TTS schedule at [facility] under my care who presented for evaluation of shortness of breath.     Acute respiratory failure with hypoxia  - [facility] hospice  -comfort care goals   - reviewed with family comfort care goals and they are in agreement with stopping all medications, treatments and vitals.   - Family in agreement to dilaudid for air hunger, pain or discomfort which can be seen at the end of life.   - will allow her to eat a regular diet no restrictions- comfort foods  - no vitals and stopping all meds other then comfort care meds  - ativan and haldol ordered  - oxygen for comfort  - will schedule 1mg dilaudid Q4hrs since needed 7 mg total for PRN and continue prn dilaudid   - Patient still looks very comfortable, has not used as needed Dilaudid and currently off oxygen  -Patient goes out with family for a walk outside hospital daily      Acute on chronic CHFrEF  - Elevated NT proBNP 70 K with hypervolemic exam diffuse crackles and JVD  - per nephrology unable to dialyze and difficult to maintain euvolemic status. Family in agreement with stopping dialysis.   - comfort care and dilaudid for air hunger     CAD with multivessel disease  - Previously seen by CTS and cardiology, not a candidate for CTS surgery  - Previously declined PCI     ESRD on hemodialysis [facility]  - stopping HD   - no labs as patient transition to [facility]    30 MINUTES SPENT BY ME on the date of service doing chart review, history, exam, documentation &  further activities per the note    Principal Problem:    Hospice care  Active Problems:    Right hemiparesis (H)    Chronic systolic congestive heart failure (H)    Elevated troponin    ESRD (end stage renal disease) on dialysis (H)    Acute on chronic congestive heart failure, unspecified heart failure type (H)    Acute respiratory failure with hypoxia (H)    Thrombocytopenia (H)      VTE prophylaxis: Comfort care  DIET: Orders Placed This Encounter      Regular Diet Adult      Disposition/Barriers to discharge: GIP  Code Status: No CPR- Do NOT Intubate    Subjective:  Xefrankie is feeling about the same, denies any chest pain shortness of breath.  Looks comfortable and goes out to his family for a walk every day.    PHYSICAL EXAM  There were no vitals filed for this visit.  B/P:121/65 T:98 P:84 R:20   No intake or output data in the 24 hours ending 04/23/23 1550   There is no height or weight on file to calculate BMI.    Constitutional: awake, alert, cooperative, no apparent distress, and appears stated age  Respiratory: No increased work of breathing, good air exchange, clear to auscultation bilaterally, no crackles or wheezing  Cardiovascular: Normal apical impulse, regular rate and rhythm, normal S1 and S2, no S3 or S4, and no murmur noted  GI: No scars, normal bowel sounds, soft, non-distended, non-tender, no masses palpated, no hepatosplenomegally  Skin: no bruising or bleeding and normal skin color, texture, turgor  Musculoskeletal: There is no redness, warmth, or swelling of the joints.  Full range of motion noted.  no lower extremity pitting edema present  Neurologic: Awake, alert, oriented to self and place only.  Neuropsychiatric: Appropriate with examiner      PERTINENT LABS/IMAGING:      Imaging results reviewed over the past 24 hrs:   No results found for this or any previous visit (from the past 24 hour(s)).    Discussed with patient, family, nursing staff and discharge planner    MD Juwan Young  [facility] Medicine Service  [phone]

## 2023-04-23 NOTE — PLAN OF CARE
Problem: Plan of Care - These are the overarching goals to be used throughout the patient stay.    Goal: Optimal Comfort and Wellbeing  Outcome: Progressing     Problem: End-of-Life Care  Goal: Comfort, Peace and Preserved Dignity  Outcome: Progressing  Intervention: Promote Physical Comfort  Recent Flowsheet Documentation  Taken 4/23/2023 0157 by Ailyn Negron RN  Sensory Stimulation Regulation: care clustered   Goal Outcome Evaluation:    Pt looks comfortable.  No c/o pain or discomfort.  She had large loose stool.  Pt asked why she's in the hospital and where's her son?  Speech is clear but confused.

## 2023-04-23 NOTE — PLAN OF CARE
Goal Outcome Evaluation:      Problem: End-of-Life Care  Goal: Comfort, Peace and Preserved Dignity  Outcome: Progressing     Problem: Pain Acute  Goal: Optimal Pain Control and Function  Outcome: Progressing  Intervention: Prevent or Manage Pain  Recent Flowsheet Documentation  Taken 4/23/2023 2085 by Jennie Swann RN  Sleep/Rest Enhancement: comfort measures  Pt denied pain throughout this shift; appeared comfortable.  Family present during this shift.  Pt went out of room with family, wheelchair assist; pt is back in room, resting quietly. Eats 25% of meals; family brings in food.

## 2023-04-23 NOTE — PLAN OF CARE
Problem: Palliative Care  Goal: Enhanced Quality of Life  Outcome: Progressing  Intervention: Maximize Comfort    Problem: Pain Acute  Goal: Optimal Pain Control and Function  Outcome: Progressing  Intervention: Prevent or Manage Pain   Goal Outcome Evaluation:      5607-5997... Pt is alert, confused at times, pain is being controlled with scheduled meds, she denied pain. Family members were at the bedside for most part of the evening. Pt is awaiting hospice placement.

## 2023-04-24 NOTE — PLAN OF CARE
Goal Outcome Evaluation:       4574-1469... Pt had a fairly quiet shift, alert to self, confused at times. She's on scheduled pain med, denied pain when asked twice through an . Will continue to monitor.
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
## 2023-04-24 NOTE — PLAN OF CARE
Problem: Palliative Care  Goal: Enhanced Quality of Life  Outcome: Progressing   Goal Outcome Evaluation:  Patient alert and oriented denies pain. Scheduled dilaudid given as ordered. Up to the commode X1 , had bowel movement no urine.

## 2023-04-24 NOTE — PROGRESS NOTES
"[facility]    Social Work Progress Note - [facility]  ______________________________________________________________________    [facility] Contact Number: [phone]    - Providers: Please contact [facility] with changes in orders or clinical plan of care   - Nursing: Please contact [facility] with significant changes in patient condition  - Social Work: Please contact [facility] for discharge phanning/updates  ______________________________________________________________________      Summary of Visit:  SW conducted joint visit with CNL and discussed discharge planning. SW explained that patients symptoms are looking more and more stable and we need to discharge patient soon. Family explained that patient is \"getting worse,\" acting more confused, not eating, very lethargic etc. SW observed patient laying on bed appearing to come in and out of focus.    As for discharge planning, family does not want patient to go back to [facility] because they will not be able to provide 24/7 care. SW explained that hospice does not mean 24/7 care and reiterated GIP eligibility and how patient will soon not meet criteria. SW discussed hospice homes and private pay as well as hospice in home. SW did discuss [facility] as an option. Family is speaking among themselves and will relay to SW what they have decided on.    Plan of Care Discussed with the Following:   - Nurse: MARJORIE Gamez  - Hospitalist/Rounding Provider: Dr. Wilson     - Hospice Provider: Dr. Randall Steward, [facility]  [phone]  "

## 2023-04-24 NOTE — PLAN OF CARE
Problem: End-of-Life Care  Goal: Comfort, Peace and Preserved Dignity  Outcome: Progressing   Problem: Plan of Care - These are the overarching goals to be used throughout the patient stay.    Goal: Optimal Comfort and Wellbeing  Outcome: Progressing  Intervention: Monitor Pain and Promote Comfort  Recent Flowsheet Documentation  Taken 4/24/2023 0348 by Eder Robles RN  Pain Management Interventions: medication (see MAR)     Goal Outcome Evaluation: Pt denies any pain but c/o of an headache. Pt given tylenol and helped. On schedule pain med. Alert to self. Pt able to communicate with me in [facility]. Intermittently confused.  Bed alarm on for safety.

## 2023-04-25 NOTE — PLAN OF CARE
Goal Outcome Evaluation:      Plan of Care Reviewed With: patient    Dilaudid solution given as scheduled. Patient sleeping between rounds. Comfortable.    Reviewed plan of care.   Rosa Webster, RN  [phone]

## 2023-04-25 NOTE — PLAN OF CARE
Goal Outcome Evaluation:    Problem: Pain Acute  Goal: Optimal Pain Control and Function  Outcome: Progressing  Intervention: Prevent or Manage Pain  Recent Flowsheet Documentation  Taken 4/24/2023 1700 by Jennie Swann RN  Sensory Stimulation Regulation: care clustered  Sleep/Rest Enhancement: comfort measures  Medication Review/Management: high-risk medications identified  Scheduled Dilaudid given.    Zofran given for nausea and 1 episode of emesis; helpful.  Pt complained about ABD discomfort due to diarrhea.   Pt and family does not want Miralax given except she is constipated; Hospice RN paged and med switched to PRN daily.     Pt had no further complaint; appeared comfortable. Denied pain.

## 2023-04-25 NOTE — PROGRESS NOTES
[facility] SHEBA communicated with [facility], RN Fue [phone] who stated they could take patient back today no later then noon. Writer called to schedule transportation via stretcher; the earliest stretcher transportation for today was 1500.      Writer scheduled stretcher transportation for 4/26 between 2248-2335 to [facility]. [facility] RN will meet patient and family at facility at 1100 tomorrow for community admission. [facility] facility is requesting commode and tray table to be delivered today, (supplies ordered). Patient will need a 3 day supply of all scheduled medications at discharge. Writer will complete PCS, fax PCS and place in patient chart.     Niya Steward, [facility]  [phone]

## 2023-04-25 NOTE — PROGRESS NOTES
[facility] Care Coordination Contact    Received a request to submit a DTR for the terminated of Adult Day Care with transportation and EW. Documentation completed and faxed to the health plan. Care Coordinator aware.    Chapis Huerta RN  Utilization   [facility]  [phone]

## 2023-04-25 NOTE — PROGRESS NOTES
"[facility]    Progress Note - [facility]  ____________________________________________________________________    [facility] 24/7 Contact Number: [phone]    - Providers: Please contact [facility] with changes in orders or clinical plan of care   - Nursing: Please contact [facility] with significant changes in patient condition  ______________________________________________________________________     Plan of Care Discussed with the Following:   - Hospitalist/Rounding Provider: Dr. Wilson    - Patient's Family/Preferred Contact: catalina Tong--reviewed plan of care with today as requested  - Hospice Provider: Dr. Randall Erickson    Pertinent assessment information:   Daily visit with patient and AC SW. Patient laying in bed supine with legs pulled up towards chest. Denies abdominal pain or shortness of breath. Bilateral arms and legs cool to touch. Warm blanket applied for comfort. Changes to bowel regimen due to diarrhea and hyperactive bowel sounds occurred last evening, normoactive bowel sounds present today. Apical pulse 74, RR 14. Reports eating \"a little\" this AM. Declined sips of water when offered.      Patient will be discharging 4/26 between 10-11 am via stretcher transport. Catalina Tong updated with the plan to discharge back to [facility]. Updated patient who states \"I'm happy\". Ran shares that today a  from their Faith will be coming to pray with Xee. Dr. Wilson updated with plans to discharge in the AM. Hospice has requested a 3 day supply of medications be sent with her upon discharge and for a COVID test to be ordered today.     We appreciate the collaborative care for this mutual patient on P2.       Sayra Kaye RN  Clinical Nurse Liaison  [facility]- [facility]  Direct: [phone]  Contact information available via [facility] Paging/Directory      " Olumiant Pregnancy And Lactation Text: Based on animal studies, Olumiant may cause embryo-fetal harm when administered to pregnant women.  The medication should not be used in pregnancy.  Breastfeeding is not recommended during treatment.

## 2023-04-25 NOTE — PROGRESS NOTES
[facility]    PROGRESS NOTE - Hospitalist Service    Assessment and Plan  82 year old female with h/o Hmong speaking female with past medical history significant for severe coronary artery disease not amendable to CABG with plans to high risk PCI, NSTEMI, congestive heart failure with reduced ejection fraction (most recent echocardiogram showed EF of less than 30%), CVA, type 2 diabetes mellitus, hypertension and end-stage renal disease on in center hemodialysis on TTS schedule at [facility] under my care who presented for evaluation of shortness of breath.  Palliative care was consulted and patient was transitioned to [facility] on 4/16/2023     Acute respiratory failure with hypoxia  - [facility] hospice  -comfort care goals   - reviewed with family comfort care goals and they are in agreement with stopping all medications, treatments and vitals.   - Family in agreement to dilaudid for air hunger, pain or discomfort which can be seen at the end of life.   - will allow her to eat a regular diet no restrictions- comfort foods  - no vitals and stopping all meds other then comfort care meds  - ativan and haldol ordered  - oxygen for comfort  - will schedule 1mg dilaudid Q4hrs since needed 7 mg total for PRN and continue prn dilaudid   - Patient still looks very comfortable, has not used as needed Dilaudid and currently off oxygen  - Patient mental condition fluctuate between lethargic and awakening.  - Patient goes out with family for a walk outside hospital daily   - Plan to discharge back to [facility] facility with hospice care tomorrow  - COVID test before discharge as requested by facility     Acute on chronic CHFrEF  - Elevated NT proBNP 70 K with hypervolemic exam diffuse crackles and JVD  - per nephrology unable to dialyze and difficult to maintain euvolemic status.   - Family in agreement with stopping dialysis.   - comfort care and dilaudid for air hunger  - Oxygen as needed for  comfort only     CAD with multivessel disease  - Previously seen by CTS and cardiology, not a candidate for CTS surgery  - Previously declined PCI     ESRD on hemodialysis T TH S  - stopping HD   - no labs as patient transition to [facility]    30 MINUTES SPENT BY ME on the date of service doing chart review, history, exam, documentation & further activities per the note    Principal Problem:    Hospice care  Active Problems:    Right hemiparesis (H)    Chronic systolic congestive heart failure (H)    Elevated troponin    ESRD (end stage renal disease) on dialysis (H)    Acute on chronic congestive heart failure, unspecified heart failure type (H)    Acute respiratory failure with hypoxia (H)    Thrombocytopenia (H)      VTE prophylaxis: Comfort care  DIET: Orders Placed This Encounter      Regular Diet Adult      Disposition/Barriers to discharge: [facility]  Code Status: No CPR- Do NOT Intubate    Subjective:  Via , Xee is feeling about the same today, improving abdominal pain.  Denies any chest pain or shortness of breath.    PHYSICAL EXAM  There were no vitals filed for this visit.  B/P:121/65 T:98 P:84 R:20     Intake/Output Summary (Last 24 hours) at 4/25/2023 1252  Last data filed at 4/25/2023 0400  Gross per 24 hour   Intake 180 ml   Output 0 ml   Net 180 ml      There is no height or weight on file to calculate BMI.    Constitutional: Lethargic but responding to verbal stimuli.  Respiratory: No increased work of breathing, good air exchange, clear to auscultation bilaterally, no crackles or wheezing  Cardiovascular: Normal apical impulse, regular rate and rhythm, normal S1 and S2, no S3 or S4, and no murmur noted  GI: No scars, normal bowel sounds, soft, non-distended, non-tender, no masses palpated, no hepatosplenomegally  Skin: no bruising or bleeding and normal skin color, texture, turgor  Musculoskeletal: There is no redness, warmth, or swelling of the joints.  Full range of motion noted.  no lower  extremity pitting edema present  Neurologic:  Awake, moves 4 extremities.   Neuropsychiatric: Appropriate with examiner         PERTINENT LABS/IMAGING:      Imaging results reviewed over the past 24 hrs:   No results found for this or any previous visit (from the past 24 hour(s)).    Discussed with patient, family, [facility], nursing staff and discharge planner    German Wilson MD  [facility] Medicine Service  [phone]

## 2023-04-25 NOTE — PLAN OF CARE
[id number]  Problem: End-of-Life Care  Goal: Comfort, Peace and Preserved Dignity  Intervention: Promote Physical Comfort  Recent Flowsheet Documentation  Taken 4/25/2023 0811 by Vera Boo RN  Sensory Stimulation Regulation: television on   Goal Outcome Evaluation:  Patient discharging tomorrow between 9054-3261 am. Patient reported nausea Zofran ODT given. Helpful per patient through [facility] [place] interpretor. Covid swab sent to lab.  Scheduled Dilaudid given for Abdominal pain. Helpful. Poor appetite  Few bites of food from home. Patient requested meds  for difficult swallowing secretions, atropine Prn given.  Oxygen 2L applied NC for dyspnea and comfort.. Family present most of the shift.

## 2023-04-26 NOTE — PROGRESS NOTES
Discharge Note    Discharge Date: 04/26/2023     Discharge Disposition:   [facility] [address] [place], [place], [address]    Discharge Services:    Discharge DME:    Discharge Transportation:  Stretcher. Pcs complete, on chart    Private pay costs discussed: Not applicable    PAS Confirmation Code:    Patient/family educated on Medicare website which has current facility and service quality ratings:      Education Provided on the Discharge Plan:    Persons Notified of Discharge Plans: Hospital staff, Facility, Family, Community Hospice Team  Patient/Family in Agreement with the Plan:  yes    Handoff Referral Completed: No    Additional Information:  Discharged orders faxed to [facility], DME was delivered yesterday.    ROHAN Conroy, [facility]  [phone]

## 2023-04-26 NOTE — PLAN OF CARE
Goal Outcome Evaluation:      Plan of Care Reviewed With: patient, child  Patient picked on stretcher by [facility] transport to [facility] accompanying all belongings. Grandson to pick medication from [facility].

## 2023-04-26 NOTE — PLAN OF CARE
Goal Outcome Evaluation:      Plan of Care Reviewed With: patient    Denies pain. Atropine drops given for increased respiratory secretions.   Patient has been comfortable throughout the night.     Reviewed plan of care.   Rosa Webster RN  [id number]

## 2023-04-26 NOTE — DISCHARGE SUMMARY
[facility]  Hospitalist Discharge Summary      Date of Admission:  4/16/2023  Date of Discharge:  4/26/2023  Discharging Provider: Izabella Segundo MD  Discharge Service: Hospitalist Service    Discharge Diagnoses   Principal Problem:    Hospice care  Active Problems:    Right hemiparesis (H)    Chronic systolic congestive heart failure (H)    Elevated troponin    ESRD (end stage renal disease) on dialysis (H)    Acute on chronic congestive heart failure, unspecified heart failure type (H)    Acute respiratory failure with hypoxia (H)    Thrombocytopenia (H)        Follow-ups Needed After Discharge   Follow-up Appointments     Follow-up and recommended labs and tests       No follow up at this time other then Hospice             Unresulted Labs Ordered in the Past 30 Days of this Admission     No orders found from 3/17/2023 to 4/17/2023.          Discharge Disposition   Admited to hospice care.  .  [facility]  Condition at discharge: terminal      Hospital Course   82 year old female with h/o Hmong speaking female with past medical history significant for severe coronary artery disease not amendable to CABG with plans to high risk PCI, NSTEMI, congestive heart failure with reduced ejection fraction (most recent echocardiogram showed EF of less than 30%), CVA, type 2 diabetes mellitus, hypertension and end-stage renal disease on in center hemodialysis on TTS schedule at [facility] under my care who presented for evaluation of shortness of breath.  Palliative care was consulted and patient was transitioned to [facility] on 4/16/2023     Acute respiratory failure with hypoxia  - [facility] hospice  -comfort care goals   - 3 days worth of medications sent with patent and Hospice to provide further management      Acute on chronic CHFrEF  - Elevated NT proBNP 70 K with hypervolemic exam diffuse crackles and JVD  - per nephrology unable to dialyze and difficult to maintain euvolemic status.   -  Family in agreement with stopping dialysis.   - comfort care and dilaudid for air hunger  - Oxygen as needed for comfort only     CAD with multivessel disease  - Previously seen by CTS and cardiology, not a candidate for CTS surgery  - Previously declined PCI     ESRD on hemodialysis   - stopping HD   - no labs   - asked to removed dialysis access multiple times by nursing and this is a tunneled dialysis catheter which will stay in.     Consultations This Hospital Stay   [facility] INPATIENT HOSPICE ADULT CONSULT    Code Status   No CPR- Do NOT Intubate    Time Spent on this Encounter   I, Izabella Segundo MD, personally saw the patient today and spent greater than 30 minutes discharging this patient.       Izabella Segundo MD  [address] [address]  Phone: [phone]  Fax: [phone]  ______________________________________________________________________    Physical Exam   Vital Signs:                    Weight: 0 lbs 0 oz         Primary Care Physician   DOE CONTRERAS    Discharge Orders      Reason for your hospital stay    Principal Problem:    Hospice care  Active Problems:    Right hemiparesis (H)    Chronic systolic congestive heart failure (H)    Elevated troponin    ESRD (end stage renal disease) on dialysis (H)    Acute on chronic congestive heart failure, unspecified heart failure type (H)    Acute respiratory failure with hypoxia (H)    Thrombocytopenia (H)     Follow-up and recommended labs and tests     No follow up at this time other then Hospice     Activity    Your activity upon discharge: activity as tolerated     Discharge Instructions    Hospice to provide further cares, oxygen and medications at [facility]     Diet    Follow this diet upon discharge: Orders Placed This Encounter      Regular Diet Adult       Significant Results and Procedures   Most Recent 3 CBC's:Recent Labs   Lab Test 04/13/23  2257 04/03/23  0421 03/31/23  0458   WBC  4.1 2.6* 3.0*   HGB 13.6 11.9 12.5   * 103* 106*   PLT 94* 38* 37*     Most Recent 3 BMP's:Recent Labs   Lab Test 04/14/23  0756 04/13/23  2257 04/13/23  2249 04/03/23  0826 04/03/23  0421   * 132*  --   --  133*   POTASSIUM 3.8 4.3  --   --  4.0   CHLORIDE 102 99  --   --  97*   CO2 23 21*  --   --  26   BUN 18.0 13.0  --   --  26.2*   CR 3.04* 2.68*  --   --  3.91*   ANIONGAP 8 12  --   --  10   FALLON 8.2* 8.7*  --   --  8.2*   * 175* 175*   < > 112*    < > = values in this interval not displayed.     Most Recent 2 LFT's:Recent Labs   Lab Test 12/20/21  1135 03/08/18  1641   AST  --  44   ALT 21 45   ALKPHOS  --  93   BILITOTAL  --  0.2   ,   Results for orders placed or performed during the hospital encounter of 04/13/23   XR Chest Port 1 View    Narrative    EXAM: XR CHEST PORT 1 VIEW  LOCATION: [facility]  DATE/TIME: 4/13/2023 11:29 PM CDT    INDICATION: dyspnea  COMPARISON: 12/29/2022      Impression    IMPRESSION: Moderate left pleural effusion. Retrocardiac left basilar atelectasis and/or consolidation. Small right pleural effusion. Moderate pulmonary edema. Trace fluid along right major fissure. Cardiomegaly. Left IJ double lumen catheter tip in   right atrium.      *Note: Due to a large number of results and/or encounters for the requested time period, some results have not been displayed. A complete set of results can be found in Results Review.       Discharge Medications   Current Discharge Medication List      START taking these medications    Details   acetaminophen (TYLENOL) 325 MG tablet Take 2 tablets (650 mg) by mouth every 4 hours as needed for mild pain or fever  Qty: 20 tablet, Refills: 0    Associated Diagnoses: Hospice care      atropine 1 % ophthalmic solution Place 2 drops under the tongue every 4 hours as needed for secretions  Qty: 2 mL, Refills: 0    Associated Diagnoses: Hospice care      HYDROmorphone, STANDARD CONC, (DILAUDID) 1 MG/ML oral  solution Take 0.5 mLs (0.5 mg) by mouth every hour as needed (pain, SOB, dyspnea, or iar hunger)  Qty: 10 mL, Refills: 0    Associated Diagnoses: Hospice care      LORazepam (ATIVAN) 1 MG tablet Place 1 tablet (1 mg) under the tongue every 3 hours as needed for anxiety, agitation, nausea, pain, sleep, vomiting or muscle spasms  Qty: 10 tablet, Refills: 0    Associated Diagnoses: Hospice care      senna-docusate (SENOKOT-S/PERICOLACE) 8.6-50 MG tablet Take 1 tablet by mouth 2 times daily  Qty: 30 tablet, Refills: 0    Associated Diagnoses: Hospice care         CONTINUE these medications which have CHANGED    Details   pantoprazole (PROTONIX) 40 MG EC tablet Take 1 tablet (40 mg) by mouth every morning (before breakfast) for 3 days  Qty: 3 tablet, Refills: 0    Associated Diagnoses: Hospice care         CONTINUE these medications which have NOT CHANGED    Details   ACE/ARB NOT PRESCRIBED, INTENTIONAL, Please choose reason not prescribed, below    Associated Diagnoses: Type 2 diabetes mellitus with stage 3 chronic kidney disease, without long-term current use of insulin (H)      blood glucose (ONETOUCH ULTRA) test strip USE TO TEST BLOOD SUGAR 2-3 TIMES A DAY //IB HNUB SIV 2-3 ZAUG LI QHIA  Qty: 200 each, Refills: 1    Associated Diagnoses: Type 2 diabetes mellitus with stage 4 chronic kidney disease, without long-term current use of insulin (H)      Lancets (ONETOUCH DELICA PLUS XNBGDL36H) MISC USE AS DIRECTED TWO TIMES A DAY // IB HNUB SIV 2 ZAUG LI QHIA  Qty: 200 each, Refills: 1    Associated Diagnoses: Essential hypertension; Type 2 diabetes mellitus with diabetic chronic kidney disease (H)         STOP taking these medications       aspirin (ASA) 325 MG tablet Comments:   Reason for Stopping:         atorvastatin (LIPITOR) 80 MG tablet Comments:   Reason for Stopping:         calcium acetate (PHOSLO) 667 MG CAPS capsule Comments:   Reason for Stopping:         carvedilol (COREG) 25 MG tablet Comments:   Reason  for Stopping:         famotidine (PEPCID) 20 MG tablet Comments:   Reason for Stopping:         ferrous sulfate (FE TABS) 325 (65 Fe) MG EC tablet Comments:   Reason for Stopping:         isosorbide mononitrate (IMDUR) 30 MG 24 hr tablet Comments:   Reason for Stopping:         oxyCODONE (ROXICODONE) 5 MG tablet Comments:   Reason for Stopping:         polyethylene glycol (MIRALAX) 17 g packet Comments:   Reason for Stopping:         polyethylene glycol (MIRALAX) 17 GM/Dose powder Comments:   Reason for Stopping:             Allergies   No Known Allergies

## 2023-05-03 NOTE — TELEPHONE ENCOUNTER
Daughter [place]ver Rud[place] and has questions on mom Lakhwinder Negron.  FNA advised that no consent to communicate has been signed and will need consent of the patient or to have consents to phone [facility] and Hever agrees.      Reason for Disposition    Information only question and nurse able to answer    Additional Information    Negative: Nursing judgment    Negative: Nursing judgment    Negative: Nursing judgment    Negative: Nursing judgment    Protocols used: INFORMATION ONLY CALL - NO TRIAGE-A-[place]

## 2023-05-16 NOTE — TELEPHONE ENCOUNTER
Enter Query Response Below      Query Response: Coccyx DTPI left heel DTPI and right toe DTPI present on admission Electronically signed by Manolo Barrera MD, 23, 11:08 AM EDT.               If applicable, please update the problem list.     Patient: Dayanna Ayala        : 10/10/1923  Account: [id number]           Admit Date: 2023        How to Respond to this query:       a. Click New Note     b. Answer query within the yellow box.                c. Update the Problem List, if applicable.      If you have any questions about this query contact me at: [email or web address]         89 year old woman with history of DM2, HTN, and HLD, admitted  with Sepsis, cellulitis, and FLAKO.  Wound nurse saw patient 5/10 and notes:   Coccyx DTPI, Left heel DTPI, and Right heel DTPI, all noted as POA. Treatment included low air loss mattress, pressure reduction, and monitoring.    Please document if the patient was being treated/monitored for:  - Coccyx DTPI, Left heel DTPI, and Right heel DTPI, all POA  - Other, please specify  - Unable to determine       By submitting this query, we are merely seeking further clarification of documentation to accurately reflect all conditions that you are monitoring, evaluating, treating or that extend the hospitalization or utilize additional resources of care. Please utilize your independent clinical judgment when addressing the question(s) above.     This query and your response, once completed, will be entered into the legal medical record.    Sincerely,   Rosa Elena Ferguson RN, BSN  [email or web address]  Clinical Documentation Integrity Program   Routing refill request to provider for review/approval because:  Drug not on the [facility] refill protocol     Theresa Gregorio RN, BSN, PHN

## 2023-05-16 NOTE — PATIENT INSTRUCTIONS
At [facility], we strive to deliver an exceptional experience to you, every time we see you. If you receive a survey, please complete it as we do value your feedback.  If you have MyChart, you can expect to receive results automatically within 24 hours of their completion.  Your provider will send a note interpreting your results as well.   If you do not have MyChart, you should receive your results in about a week by mail.    Your care team:                            Family Medicine Internal Medicine   MD Gera Frost MD Shantel Branch-Fleming, MD Srinivasa Vaka, MD Katya Belousova, CHANO Stallworth CNP, MD ([facility]) [facility]   Reagan Parham, MD Danica Lake MD Amelia Massimini APRN CNP Kim Thein, APRN CNP Bethany Templen, MD             Clinic hours: Monday - Thursday 7 am-6 pm; Fridays 7 am-5 pm.   Urgent care: Monday - Friday 10 am- 8 pm; Saturday and Sunday 9 am-5 pm.    Clinic: [phone]       [facility]: Monday - Thursday 8 am - 7 pm; Friday 8 am - 6 pm  [facility]: [phone]     Qbrexza Pregnancy And Lactation Text: There is no available data on Qbrexza use in pregnant women.  There is no available data on Qbrexza use in lactation.

## 2023-05-16 NOTE — PROGRESS NOTES
[facility] Care Coordination Contact    2nd Attempt: Signed Letter not received from [employer], resent per process.     Mirta Weir  Care Management Specialist  [facility]  [phone]

## 2023-05-17 ENCOUNTER — PATIENT OUTREACH (OUTPATIENT)
Dept: GERIATRIC MEDICINE | Facility: CLINIC | Age: 83
End: 2023-05-17
Payer: OTHER MISCELLANEOUS

## 2023-05-17 NOTE — TELEPHONE ENCOUNTER
Notification of  patient filled out and emailed to DEPT---NOTIFICATIONS on 23 @11:21am. Copy in abstract and original in tc bin.

## 2023-05-17 NOTE — PROGRESS NOTES
[facility] Care Coordination Contact    Notified by nurse Kimmy from [facility] that member passed away on 5/12/2023 at [facility].    PCP notified. Called all providers to cancel services.    Called member's son, Ramin Negron to offer condolences.   For [facility]:  Death Notification form completed and faxed to [facility].    Chart reviewed and this CC's encounter closed. Chart handed off to CMS to process disenrollment tasks.        Shruthi Martins RN, PHN  [facility]  [phone]

## 2023-05-17 NOTE — Clinical Note
Shine Baxter,  This message is to inform you, your patient Lakhwinder Negron passed away on 5/12/2023.   Thank you,  Shruthi Martins RN, [facility] [phone]

## 2023-06-20 NOTE — PROGRESS NOTES
[facility] Care Coordination Contact    No Letter Received: 60 day tracking of letter complete, no letter received from [facility]. Tracking discontinued.     Kim Lawrence  Care Management Specialist  [facility]  [phone]

## 2024-04-02 NOTE — PROGRESS NOTES
" Current Eye Medications:  Diclofenac or Ketotifen twice a day both eyes .  Patient ran out of Prednisolone 1% 2-3 wks ago.     Subjective:  2 months intraocular pressure recheck  Patient reports that she is seeing good  however her eyes are still watering.     Objective:  See Ophthalmology Exam.      Assessment:  Lakhwinder Negron is a 76 year old female who presents with:     Pseudophakia of both eyes Post-op cell resolved.     Dermatochalasis of both upper eyelids        Plan:  Continue Ketotifen (Zaditor)  twice a day both eyes.  Use artificial tears up to 4 times per day (Refresh Plus, Systane Balance, or generic artificial tears are ok. Avoid \"get the red out\" drops).   Could use a warm compress as needed     Julio Doll MD  [phone]             " Attempted to see patient. Patient refused to have foot assessed stated "the doctor knows her wound."

## 2024-06-11 NOTE — TELEPHONE ENCOUNTER
Left message for home care nurse, Lilliana to return call.   Dr. Rodriguez recommendations:   - I believe she is taking hdyralazine 50 mg BID - if this is hte case, would increase to 100 mg BID.   - Still concerns for dehydration at this time? If so, would assure she is not getting lasix and see if we can increase her oral fluid intake.   - Recommend repeat labs again next week to assure we are seeing ongoing improvement.      recommend repeat labs again in a week. Would also like to start sodium bicarbonate 650 mg twice a day given low bicarbonate level.    Will discuss with Lilliana and then plan to contact patient if needed.    Daysi Frias, RN, BSN  Nephrology Care Coordinator  [facility]    
Lilliana called back and Daysi was unavailable. I did relay the message below to Lilliana the home care nurse. She is requesting the script for sodium bicarbonate be sent to pt's pharmacy in [facility]. Pharmacy and medication already pended in encounter. Lilliana will call back with any other questions or concerns she has. She is going to see the pt tomorrow. I will route to pool for RN/provider approval  Peyton Floyd CMA    
Lilliana lane nurse called to give patients last BP results     8/25/17 /74   9/1/17 158/64     Lilliana stated that the patient is several dehydrated and she draw her for renal panel and hemoglobin. She is not sure if enough blood was drawn. Will forward this message to nursing pool.    Zach Fried Medical Assistant     
Routing encounter to Dr. Thomas to advise on lab work and blood pressures below.     Called home care nurse Lilliana regarding low glucose reading on lab on 35. Patient checked glucose while nurse was at home on same date and result was 114.    Daysi Frias RN, BSN  Nephrology Care Coordinator  [facility]    
Spoke to Lilliana. She stated that Lakhwinder was taking Hydralazine 25 TID.  Blood pressures have been elevated at 161/75 HR 77, 165/77 HR 92, 169/78 HR 80, 175/78 HR 80. Advised that she should increase Hydralazine to 50 mg BID at this time. Lilliana does not have concern that Lakhwinder could be dehydrated at this time. Home care will draw a renal panel and hgb next Monday.   Will plan to send prescription in for Sodium Bicarbonate as well as Hydrlazine at this time.    Daysi Frias, RN, BSN  Nephrology Care Coordinator  [facility]    
100

## 2024-07-17 NOTE — PROGRESS NOTES
[facility] Care Coordination Contact  Received after visit chart from care coordinator.  Completed following tasks: Mailed copy of care plan to client, Updated services in access, Submitted referrals/auths for [facility] and Entered MMIS  Chart was returned to CC.     Provider Signature - No POC Shared:  Member indicates that they do not want their POC shared with any [facility] providers.    Lilliana Greer  Care Management Specialist Supervisor  [facility]  [phone]      
impaired balance/pain/decreased strength

## 2024-10-09 NOTE — PATIENT INSTRUCTIONS
Problem: Chronic Conditions and Co-morbidities  Goal: Patient's chronic conditions and co-morbidity symptoms are monitored and maintained or improved  Outcome: Progressing     Problem: Discharge Planning  Goal: Discharge to home or other facility with appropriate resources  Outcome: Progressing     Problem: Safety - Adult  Goal: Free from fall injury  Outcome: Progressing     Problem: Pain  Goal: Verbalizes/displays adequate comfort level or baseline comfort level  Outcome: Progressing     Problem: Skin/Tissue Integrity  Goal: Absence of new skin breakdown  Description: 1.  Monitor for areas of redness and/or skin breakdown  2.  Assess vascular access sites hourly  3.  Every 4-6 hours minimum:  Change oxygen saturation probe site  4.  Every 4-6 hours:  If on nasal continuous positive airway pressure, respiratory therapy assess nares and determine need for appliance change or resting period.  Outcome: Progressing      1. Please ask your nurse to call jesika at [phone] to reconcile BP meds.   2. If ongoing swelling, please update us as we will consider a short trial of a higher lasix dose. For now, keep the lasix at 60 mg twice a day as you have been taking.  3. Follow-up in 8 weeks  4. For now, recommend keeping legs elevated and avoiding sodium in your diet.

## (undated) DEVICE — ADH SKIN CLOSURE PREMIERPRO EXOFIN MICOR HV 0.5ML 3471

## (undated) DEVICE — SYR ANGIOGRAPHY MULTIUSE KIT ACIST 014612

## (undated) DEVICE — GEL ULTRASOUND AQUASONIC 20GM 01-01

## (undated) DEVICE — ESU GROUND PAD ADULT W/CORD E7507

## (undated) DEVICE — DRAPE SHEET REV FOLD 3/4 9349

## (undated) DEVICE — KIT INTRODUCER FLUENT MICRO 5FRX10CM ECHO TIP KIT-038-04

## (undated) DEVICE — INTRO SHEATH 4FRX10CM PINNACLE RSS402

## (undated) DEVICE — GUIDEWIRE NITINOL .018 80CM N180802

## (undated) DEVICE — SPONGE RAY-TEC 4X8" 7318

## (undated) DEVICE — LINEN TOWEL PACK X6 WHITE 5487

## (undated) DEVICE — DRSG TEGADERM IV ADVANCED 3.5X4.5" 1685

## (undated) DEVICE — CATH TRAY FOLEY 16FR W/URINE METER STATLOCK 303316A

## (undated) DEVICE — CAP LUER LOCK MALE/FEMALE DUAL 2C6250

## (undated) DEVICE — SYR 50ML LL W/O NDL 309653

## (undated) DEVICE — VESSEL LOOPS DEV-O-LOOP WHITE MINI 31145728

## (undated) DEVICE — PACK AV FISTULA

## (undated) DEVICE — SU PROLENE 6-0 RB-2DA 18" 8714H

## (undated) DEVICE — SU ETHILON 2-0 FS 18" 664H

## (undated) DEVICE — LINEN TOWEL PACK X30 5481

## (undated) DEVICE — CUSTOM PACK CORONARY SAN5BCRHEA

## (undated) DEVICE — Device

## (undated) DEVICE — SU SILK 2-0 SH 30" K833H

## (undated) DEVICE — BASIN SET SINGLE STERILE 13752-624

## (undated) DEVICE — GLOVE PROTEXIS POWDER FREE SMT 7.5  2D72PT75X

## (undated) DEVICE — PREP CHLORAPREP 26ML TINTED ORANGE  260815

## (undated) DEVICE — DRAPE STOCKINETTE 4" 8544

## (undated) DEVICE — ADH SKIN CLOSURE PREMIERPRO EXOFIN 1.0ML 3470

## (undated) DEVICE — COVER EASY EQUIP BAG W/BAND LATEX FREE EZ-28

## (undated) DEVICE — SU MONOCRYL 4-0 PS-2 18" UND Y496G

## (undated) DEVICE — GOWN XLG DISP 9545

## (undated) DEVICE — CATH ANGIO INFINITI JR4 4FRX100CM 538421

## (undated) DEVICE — DRAPE EXTREMITY UPPER 120X76" 29414

## (undated) DEVICE — PACK CENTRAL LINE INSERTION SAN32CLFCG

## (undated) DEVICE — CLIP HORIZON SM RED WIDE SLOT 001201

## (undated) DEVICE — INSERT FOGARTY 33MM TRACTION HYDRAJAW HYDRA33

## (undated) DEVICE — PAD CHUX UNDERPAD 23X24" 7136

## (undated) DEVICE — COVER ULTRASOUND PROBE W/GEL FLEXI-FEEL 6"X58" LF  25-FF658

## (undated) DEVICE — SU SILK 2-0 TIE 12X30" A305H

## (undated) DEVICE — DECANTER BAG 2002S

## (undated) DEVICE — KIT HAND CONTROL ACIST 014644 AR-P54

## (undated) DEVICE — SUTURE BOOTS 051003PBX

## (undated) DEVICE — MANIFOLD KIT ANGIO AUTOMATED 014613

## (undated) DEVICE — SYR 10ML LL W/O NDL 302995

## (undated) DEVICE — SU VICRYL 3-0 SH 27" J316H

## (undated) DEVICE — ELECTRODE ADULT PACING MULTI P-211-M1

## (undated) DEVICE — LABEL MEDICATION SYSTEM 3303-P

## (undated) DEVICE — SUCTION MANIFOLD DORNOCH ULTRA CART UL-CL500

## (undated) DEVICE — CATH ANGIO INFINITI JL4 4FRX100CM 538420

## (undated) DEVICE — GLOVE PROTEXIS MICRO 7.5  2D73PM75

## (undated) DEVICE — SU PROLENE 5-0 RB-2DA 18" 8713H

## (undated) DEVICE — LINEN GOWN XLG 5407

## (undated) DEVICE — SU SILK 4-0 TIE 12X30" A303H

## (undated) DEVICE — SOL NACL 0.9% IRRIG 1000ML BOTTLE 2F7124

## (undated) DEVICE — INTRO MICRO MINI STICK 4FR X 10CM STIFF H965457511

## (undated) DEVICE — CLIP HORIZON MED BLUE 002200

## (undated) DEVICE — LINEN TOWEL PACK X5 5464

## (undated) DEVICE — SU SILK 3-0 TIE 12X30" A304H

## (undated) DEVICE — SOL WATER IRRIG 1000ML BOTTLE 2F7114

## (undated) RX ORDER — LABETALOL 20 MG/4 ML (5 MG/ML) INTRAVENOUS SYRINGE
Status: DISPENSED
Start: 2019-11-13

## (undated) RX ORDER — PROPOFOL 10 MG/ML
INJECTION, EMULSION INTRAVENOUS
Status: DISPENSED
Start: 2020-01-24

## (undated) RX ORDER — FENTANYL CITRATE 50 UG/ML
INJECTION, SOLUTION INTRAMUSCULAR; INTRAVENOUS
Status: DISPENSED
Start: 2020-01-24

## (undated) RX ORDER — ACETAMINOPHEN 325 MG/1
TABLET ORAL
Status: DISPENSED
Start: 2019-11-13

## (undated) RX ORDER — FENTANYL CITRATE 50 UG/ML
INJECTION, SOLUTION INTRAMUSCULAR; INTRAVENOUS
Status: DISPENSED
Start: 2023-01-01

## (undated) RX ORDER — ONDANSETRON 2 MG/ML
INJECTION INTRAMUSCULAR; INTRAVENOUS
Status: DISPENSED
Start: 2020-01-24

## (undated) RX ORDER — GLYCOPYRROLATE 0.2 MG/ML
INJECTION, SOLUTION INTRAMUSCULAR; INTRAVENOUS
Status: DISPENSED
Start: 2020-01-24

## (undated) RX ORDER — HEPARIN SODIUM 1000 [USP'U]/ML
INJECTION, SOLUTION INTRAVENOUS; SUBCUTANEOUS
Status: DISPENSED
Start: 2020-01-24

## (undated) RX ORDER — PAPAVERINE HYDROCHLORIDE 30 MG/ML
INJECTION INTRAMUSCULAR; INTRAVENOUS
Status: DISPENSED
Start: 2020-01-24

## (undated) RX ORDER — HYDRALAZINE HYDROCHLORIDE 20 MG/ML
INJECTION INTRAMUSCULAR; INTRAVENOUS
Status: DISPENSED
Start: 2020-01-24

## (undated) RX ORDER — CEFAZOLIN SODIUM 1 G/3ML
INJECTION, POWDER, FOR SOLUTION INTRAMUSCULAR; INTRAVENOUS
Status: DISPENSED
Start: 2019-11-13

## (undated) RX ORDER — BUPIVACAINE HYDROCHLORIDE 2.5 MG/ML
INJECTION, SOLUTION EPIDURAL; INFILTRATION; INTRACAUDAL
Status: DISPENSED
Start: 2020-01-24

## (undated) RX ORDER — LIDOCAINE HYDROCHLORIDE 20 MG/ML
INJECTION, SOLUTION EPIDURAL; INFILTRATION; INTRACAUDAL; PERINEURAL
Status: DISPENSED
Start: 2020-01-24

## (undated) RX ORDER — HYDRALAZINE HYDROCHLORIDE 20 MG/ML
INJECTION INTRAMUSCULAR; INTRAVENOUS
Status: DISPENSED
Start: 2019-11-13

## (undated) RX ORDER — EPHEDRINE SULFATE 50 MG/ML
INJECTION, SOLUTION INTRAMUSCULAR; INTRAVENOUS; SUBCUTANEOUS
Status: DISPENSED
Start: 2020-01-24

## (undated) RX ORDER — OXYCODONE HYDROCHLORIDE 5 MG/1
TABLET ORAL
Status: DISPENSED
Start: 2020-01-24

## (undated) RX ORDER — HEPARIN SODIUM 1000 [USP'U]/ML
INJECTION, SOLUTION INTRAVENOUS; SUBCUTANEOUS
Status: DISPENSED
Start: 2019-11-13